# Patient Record
Sex: MALE | Race: WHITE | NOT HISPANIC OR LATINO | Employment: OTHER | URBAN - NONMETROPOLITAN AREA
[De-identification: names, ages, dates, MRNs, and addresses within clinical notes are randomized per-mention and may not be internally consistent; named-entity substitution may affect disease eponyms.]

---

## 2017-03-14 ENCOUNTER — OFFICE VISIT (OUTPATIENT)
Dept: UROLOGY | Facility: CLINIC | Age: 82
End: 2017-03-14

## 2017-03-14 VITALS
SYSTOLIC BLOOD PRESSURE: 120 MMHG | WEIGHT: 158 LBS | HEIGHT: 68 IN | BODY MASS INDEX: 23.95 KG/M2 | DIASTOLIC BLOOD PRESSURE: 80 MMHG | TEMPERATURE: 97.2 F

## 2017-03-14 DIAGNOSIS — R35.0 FREQUENCY OF URINATION: ICD-10-CM

## 2017-03-14 DIAGNOSIS — N40.1 BPH WITH URINARY OBSTRUCTION: Primary | ICD-10-CM

## 2017-03-14 DIAGNOSIS — N13.8 BPH WITH URINARY OBSTRUCTION: Primary | ICD-10-CM

## 2017-03-14 DIAGNOSIS — N32.81 OAB (OVERACTIVE BLADDER): ICD-10-CM

## 2017-03-14 DIAGNOSIS — R35.1 NOCTURIA: ICD-10-CM

## 2017-03-14 LAB
BILIRUB BLD-MCNC: NEGATIVE MG/DL
CLARITY, POC: CLEAR
COLOR UR: YELLOW
GLUCOSE UR STRIP-MCNC: NEGATIVE MG/DL
KETONES UR QL: NEGATIVE
LEUKOCYTE EST, POC: NEGATIVE
NITRITE UR-MCNC: NEGATIVE MG/ML
PH UR: 7 [PH] (ref 5–8)
PROT UR STRIP-MCNC: NEGATIVE MG/DL
RBC # UR STRIP: NEGATIVE /UL
SP GR UR: 1.02 (ref 1–1.03)
UROBILINOGEN UR QL: NORMAL

## 2017-03-14 PROCEDURE — 99204 OFFICE O/P NEW MOD 45 MIN: CPT | Performed by: UROLOGY

## 2017-03-14 PROCEDURE — 81003 URINALYSIS AUTO W/O SCOPE: CPT | Performed by: UROLOGY

## 2017-03-14 PROCEDURE — 51798 US URINE CAPACITY MEASURE: CPT | Performed by: UROLOGY

## 2017-03-14 RX ORDER — ECHINACEA 400 MG
CAPSULE ORAL
COMMUNITY

## 2017-03-14 RX ORDER — PRAVASTATIN SODIUM 40 MG
20 TABLET ORAL NIGHTLY
COMMUNITY
End: 2021-07-07 | Stop reason: SDUPTHER

## 2017-03-14 RX ORDER — ASPIRIN 81 MG/1
TABLET ORAL
COMMUNITY
End: 2018-03-14 | Stop reason: HOSPADM

## 2017-03-14 RX ORDER — ASCORBIC ACID 500 MG
TABLET ORAL
COMMUNITY
End: 2018-11-20

## 2017-03-14 RX ORDER — UREA 10 %
LOTION (ML) TOPICAL
COMMUNITY

## 2017-03-14 RX ORDER — ALFUZOSIN HYDROCHLORIDE 10 MG/1
10 TABLET, EXTENDED RELEASE ORAL DAILY
Qty: 30 TABLET | Refills: 5 | Status: SHIPPED | OUTPATIENT
Start: 2017-03-14 | End: 2017-03-29 | Stop reason: SDUPTHER

## 2017-03-14 RX ORDER — METOPROLOL SUCCINATE 25 MG/1
TABLET, EXTENDED RELEASE ORAL
COMMUNITY
Start: 2015-05-19 | End: 2018-11-20

## 2017-03-14 RX ORDER — FINASTERIDE 5 MG/1
5 TABLET, FILM COATED ORAL DAILY
Qty: 30 TABLET | Refills: 5 | Status: SHIPPED | OUTPATIENT
Start: 2017-03-14 | End: 2017-03-29 | Stop reason: SDUPTHER

## 2017-03-14 RX ORDER — FAMOTIDINE 20 MG
TABLET ORAL
COMMUNITY
End: 2018-11-20

## 2017-03-14 NOTE — PROGRESS NOTES
Subjective    Mr. Henriquez is 87 y.o. male    CHIEF COMPLAINT: Prostate problems    Patient comes in today not seen now since 2012 he comes in with increasing symptoms of difficulties voiding urgency may be some urge incontinence nocturia frequency decreased force of his stream etc. he has not had any gross hematuria he denies any flank pain no urinary tract infections he is not on any medications for his prostate    N/A review of the old chart looks like we did have him on Flomax previously but that that did not help him a lot so he has not been taking it he is not tried any other medications then at that time also his PSA was perfectly normal at 3.1    I think some of his symptoms also may be irritable overactive-type bladder symptoms he did have some recent surgery for a spinal stenosis which improved his back discomfort etc. but again this may be still somewhat related to that as well  History of Present Illness      The following portions of the patient's history were reviewed and updated as appropriate: allergies, current medications, past family history, past medical history, past social history, past surgical history and problem list.    Review of Systems   Constitutional: Negative for appetite change and fever.   HENT: Negative for hearing loss and sore throat.    Eyes: Negative for pain and redness.   Respiratory: Negative for cough and shortness of breath.    Cardiovascular: Negative for chest pain and leg swelling.   Gastrointestinal: Negative for anal bleeding, nausea and vomiting.   Endocrine: Negative for cold intolerance and heat intolerance.   Genitourinary: Positive for difficulty urinating, frequency and urgency. Negative for dysuria, enuresis, flank pain and hematuria.   Musculoskeletal: Negative for joint swelling and myalgias.   Skin: Negative for color change and rash.   Allergic/Immunologic: Negative for immunocompromised state.   Neurological: Negative for dizziness and speech difficulty.  "  Hematological: Negative for adenopathy. Does not bruise/bleed easily.   Psychiatric/Behavioral: Negative for dysphoric mood and suicidal ideas.         Current Outpatient Prescriptions:   •  metoprolol succinate XL (TOPROL XL) 25 MG 24 hr tablet, Take 1 tablet by mouth daily, Disp: , Rfl:   •  aspirin 81 MG EC tablet, Take 81 mg by mouth daily.  , Disp: , Rfl:   •  Flaxseed, Linseed, (FLAXSEED OIL) 1000 MG capsule, Take 1,000 mg by mouth 2 times daily.  , Disp: , Rfl:   •  folic acid ([employer] FOLIC ACID) 800 MCG tablet, Take 800 mcg by mouth daily.  , Disp: , Rfl:   •  pravastatin (PRAVACHOL) 40 MG tablet, Take 40 mg by mouth daily.  , Disp: , Rfl:   •  vitamin C (ASCORBIC ACID) 500 MG tablet, Take 500 mg by mouth daily.  , Disp: , Rfl:   •  Vitamin D, Cholecalciferol, 1000 UNITS capsule, Take 4,000 Units by mouth daily , Disp: , Rfl:     Past Medical History   Diagnosis Date   • Hypertension        Past Surgical History   Procedure Laterality Date   • Back surgery     • Cardiac surgery     • Hernia repair     • Appendectomy         Social History     Social History   • Marital status:      Spouse name: N/A   • Number of children: N/A   • Years of education: N/A     Social History Main Topics   • Smoking status: Former Smoker   • Smokeless tobacco: None   • Alcohol use None   • Drug use: None   • Sexual activity: Not Asked     Other Topics Concern   • None     Social History Narrative   • None       Family History   Problem Relation Age of Onset   • No Known Problems Father    • No Known Problems Mother        Objective    Visit Vitals   • /80   • Temp 97.2 °F (36.2 °C)   • Ht 68\" (172.7 cm)   • Wt 158 lb (71.7 kg)   • BMI 24.02 kg/m2       Physical Exam   Constitutional: He is oriented to person, place, and time. He appears well-developed and well-nourished. No distress.   HENT:   Head: Normocephalic.   Nose: Nose normal.   Eyes: Conjunctivae are normal. No scleral icterus.   Neck: Normal range of " motion. Neck supple. No tracheal deviation present. No thyromegaly present.   Cardiovascular: Normal rate and regular rhythm.    No significant edema or tenderness    Pulmonary/Chest: Effort normal. No accessory muscle usage. No respiratory distress.   Abdominal: Soft. Bowel sounds are normal. He exhibits no distension, no ascites and no mass. There is no hepatosplenomegaly. There is no tenderness. There is no rebound, no guarding and no CVA tenderness. A hernia is present. Hernia confirmed positive in the right inguinal area. Hernia confirmed negative in the left inguinal area.   Stool specimen is not indicated for my portion of the exam   Genitourinary: Testes normal and penis normal. Rectal exam shows no mass, no tenderness, anal tone normal and guaiac negative stool. Tender: no nodules. Right testis shows no mass, no swelling and no tenderness. Left testis shows no mass, no swelling and no tenderness. Circumcised. No hypospadias or penile tenderness (no lesion or deformities).   Genitourinary Comments:  The urethral meatus normal in position without evidence of stricture. Epididymis without mass or tenderness. Vas Deferens is palpably normal.Anus and perineum without mass or tenderness. The seminal vesicle are without mass or enlargement. The prostate is approximately 35 ml. It is Symmetric, with a Soft consistency. There are no nodules present. . The seminal vesicles are Not palpable due to the size of the prostate.     Lymphadenopathy:     He has no cervical adenopathy. No inguinal adenopathy noted on the right or left side.        Right: No inguinal adenopathy present.        Left: No inguinal adenopathy present.   Neurological: He is alert and oriented to person, place, and time.   Skin: Skin is warm and dry. No rash noted. He is not diaphoretic. No pallor.   Psychiatric: He has a normal mood and affect. His behavior is normal.   Vitals reviewed.        No results found for any previous visit.    Results for  orders placed or performed in visit on 03/14/17   POC Urinalysis Dipstick, Automated   Result Value Ref Range    Color Yellow Yellow, Straw, Dark Yellow, Geovanna    Clarity, UA Clear Clear    Glucose, UA Negative Negative, 1000 mg/dL (3+) mg/dL    Bilirubin Negative Negative    Ketones, UA Negative Negative    Specific Gravity  1.020 1.005 - 1.030    Blood, UA Negative Negative    pH, Urine 7.0 5.0 - 8.0    Protein, POC Negative Negative mg/dL    Urobilinogen, UA Normal Normal    Leukocytes Negative Negative    Nitrite, UA Negative Negative   Bladder Scan interpretation  Estimation of residual urine via abdominal ultrasound  Residual Urine: 131ml  Indication: frequency  Position: Supine  Examination: Incremental scanning of the suprapubic area using 3 MHz transducer using copious amounts of acoustic gel.   Findings: An anechoic area was demonstrated which represented the bladder, with measurement of residual urine as noted. I inspected this myself. In that the residual urine was stable or insignificant, no treatment will be necessary at this time.         Assessment and Plan    Diagnoses and all orders for this visit:    BPH with urinary obstruction  -     POC Urinalysis Dipstick, Automated    Frequency of urination    Nocturia    OAB (overactive bladder)     plan--I discussed the options with Mr. Henriquez on the recommend we go ahead and try him on Uroxatral and some Proscar on was seen back in about 3-4 weeks and do a cystoscopy and see if he has improved.    If his prostate is not significantly obstructing and we may try him on an anticholinergic such as Myrbetriq.    I do not think he needs a PSA at this point in time I did discuss the bladder scan with him at length as well    EMR Dragon/Transcription disclaimer:  Much of this encounter note is an electronic transcription/translation of spoken language to printed text. The electronic translation of spoken language may permit erroneous, or at times, nonsensical  words or phrases to be inadvertently transcribed; although I have reviewed the note for such errors, some may still exist.

## 2017-03-29 ENCOUNTER — TELEPHONE (OUTPATIENT)
Dept: UROLOGY | Facility: CLINIC | Age: 82
End: 2017-03-29

## 2017-03-29 DIAGNOSIS — N40.1 BPH WITH URINARY OBSTRUCTION: ICD-10-CM

## 2017-03-29 DIAGNOSIS — N13.8 BPH WITH URINARY OBSTRUCTION: ICD-10-CM

## 2017-03-29 RX ORDER — ALFUZOSIN HYDROCHLORIDE 10 MG/1
10 TABLET, EXTENDED RELEASE ORAL DAILY
Qty: 30 TABLET | Refills: 5 | Status: SHIPPED | OUTPATIENT
Start: 2017-03-29

## 2017-03-29 RX ORDER — FINASTERIDE 5 MG/1
5 TABLET, FILM COATED ORAL DAILY
Qty: 30 TABLET | Refills: 5 | Status: SHIPPED | OUTPATIENT
Start: 2017-03-29

## 2017-03-29 NOTE — TELEPHONE ENCOUNTER
----- Message from Paola Osborne sent at 3/29/2017  2:03 PM CDT -----  Contact: LUISA ALMANZA  He would like his prescriptions for Finasteride 5mg once a day and Alfuzosin ER 10mg once a day with [facility] It [facility] less [facility]. The fax # is [phone]

## 2017-03-30 ENCOUNTER — TELEPHONE (OUTPATIENT)
Dept: UROLOGY | Facility: CLINIC | Age: 82
End: 2017-03-30

## 2017-04-11 ENCOUNTER — OFFICE VISIT (OUTPATIENT)
Dept: UROLOGY | Facility: CLINIC | Age: 82
End: 2017-04-11

## 2017-04-11 VITALS
DIASTOLIC BLOOD PRESSURE: 74 MMHG | TEMPERATURE: 97.3 F | SYSTOLIC BLOOD PRESSURE: 126 MMHG | BODY MASS INDEX: 24.52 KG/M2 | HEIGHT: 68 IN | WEIGHT: 161.8 LBS

## 2017-04-11 DIAGNOSIS — R35.0 FREQUENCY OF URINATION: ICD-10-CM

## 2017-04-11 DIAGNOSIS — R35.1 NOCTURIA: ICD-10-CM

## 2017-04-11 DIAGNOSIS — N32.81 OAB (OVERACTIVE BLADDER): ICD-10-CM

## 2017-04-11 DIAGNOSIS — N40.1 BPH WITH URINARY OBSTRUCTION: Primary | ICD-10-CM

## 2017-04-11 DIAGNOSIS — N13.8 BPH WITH URINARY OBSTRUCTION: Primary | ICD-10-CM

## 2017-04-11 LAB
BILIRUB BLD-MCNC: NEGATIVE MG/DL
CLARITY, POC: CLEAR
COLOR UR: YELLOW
GLUCOSE UR STRIP-MCNC: NEGATIVE MG/DL
KETONES UR QL: NEGATIVE
LEUKOCYTE EST, POC: NEGATIVE
NITRITE UR-MCNC: NEGATIVE MG/ML
PH UR: 6.5 [PH] (ref 5–8)
PROT UR STRIP-MCNC: NEGATIVE MG/DL
RBC # UR STRIP: NEGATIVE /UL
SP GR UR: 1.01 (ref 1–1.03)
UROBILINOGEN UR QL: NORMAL

## 2017-04-11 PROCEDURE — 81003 URINALYSIS AUTO W/O SCOPE: CPT | Performed by: UROLOGY

## 2017-04-11 PROCEDURE — 99213 OFFICE O/P EST LOW 20 MIN: CPT | Performed by: UROLOGY

## 2017-04-11 NOTE — PROGRESS NOTES
Subjective    Mr. Henriquez is 87 y.o. male    CHIEF COMPLAINT: BPH    The patient comes back today for follow-up of BPH he is feels like he is doing much better now on the Uroxatral and the finasteride he feels like he is emptying much better he got a stronger stream he denies any gross hematuria there is no flank pain history of urinary tract infection since we seen    From a BPH point review his AUA score today is 18    Also 0 overactive bladder symptoms I think it improved as well I had a discussion with him indicating that it really is his symptoms that determine what we do as far as the next step I discussed possibly doing a cystoscopy or even a G Roseline or a TURP but I think he is comfortable with his symptoms right now does not want to proceed with anything else      History of Present Illness      The following portions of the patient's history were reviewed and updated as appropriate: allergies, current medications, past family history, past medical history, past social history, past surgical history and problem list.    Review of Systems   Constitutional: Negative.  Negative for chills and fever.   Gastrointestinal: Negative for abdominal distention, abdominal pain, anal bleeding, blood in stool and nausea.   Genitourinary: Positive for frequency and urgency. Negative for difficulty urinating, dysuria, flank pain and hematuria.   Psychiatric/Behavioral: Negative.  Negative for agitation and confusion.         Current Outpatient Prescriptions:   •  alfuzosin (UROXATRAL) 10 MG 24 hr tablet, Take 1 tablet by mouth Daily., Disp: 30 tablet, Rfl: 5  •  aspirin 81 MG EC tablet, Take 81 mg by mouth daily.  , Disp: , Rfl:   •  finasteride (PROSCAR) 5 MG tablet, Take 1 tablet by mouth Daily., Disp: 30 tablet, Rfl: 5  •  Flaxseed, Linseed, (FLAXSEED OIL) 1000 MG capsule, Take 1,000 mg by mouth 2 times daily.  , Disp: , Rfl:   •  folic acid ([employer] FOLIC ACID) 800 MCG tablet, Take 800 mcg by mouth daily.  , Disp: , Rfl:   •  " metoprolol succinate XL (TOPROL XL) 25 MG 24 hr tablet, Take 1 tablet by mouth daily, Disp: , Rfl:   •  pravastatin (PRAVACHOL) 40 MG tablet, Take 40 mg by mouth daily.  , Disp: , Rfl:   •  vitamin C (ASCORBIC ACID) 500 MG tablet, Take 500 mg by mouth daily.  , Disp: , Rfl:   •  Vitamin D, Cholecalciferol, 1000 UNITS capsule, Take 4,000 Units by mouth daily , Disp: , Rfl:     Past Medical History:   Diagnosis Date   • Hypertension        Past Surgical History:   Procedure Laterality Date   • APPENDECTOMY     • BACK SURGERY     • CARDIAC SURGERY     • HERNIA REPAIR         Social History     Social History   • Marital status:      Spouse name: N/A   • Number of children: N/A   • Years of education: N/A     Social History Main Topics   • Smoking status: Former Smoker   • Smokeless tobacco: None   • Alcohol use None   • Drug use: None   • Sexual activity: Not Asked     Other Topics Concern   • None     Social History Narrative       Family History   Problem Relation Age of Onset   • No Known Problems Father    • No Known Problems Mother        Objective    /74  Temp 97.3 °F (36.3 °C)  Ht 68\" (172.7 cm)  Wt 161 lb 12.8 oz (73.4 kg)  BMI 24.6 kg/m2    Physical Exam      Office Visit on 03/14/2017   Component Date Value Ref Range Status   • Color 03/14/2017 Yellow  Yellow, Straw, Dark Yellow, Geovanna Final   • Clarity, UA 03/14/2017 Clear  Clear Final   • Glucose, UA 03/14/2017 Negative  Negative, 1000 mg/dL (3+) mg/dL Final   • Bilirubin 03/14/2017 Negative  Negative Final   • Ketones, UA 03/14/2017 Negative  Negative Final   • Specific Gravity  03/14/2017 1.020  1.005 - 1.030 Final   • Blood, UA 03/14/2017 Negative  Negative Final   • pH, Urine 03/14/2017 7.0  5.0 - 8.0 Final   • Protein, POC 03/14/2017 Negative  Negative mg/dL Final   • Urobilinogen, UA 03/14/2017 Normal  Normal Final   • Leukocytes 03/14/2017 Negative  Negative Final   • Nitrite, UA 03/14/2017 Negative  Negative Final       Results for " orders placed or performed in visit on 04/11/17   POC Urinalysis Dipstick, Automated   Result Value Ref Range    Color Yellow Yellow, Straw, Dark Yellow, Geovanna    Clarity, UA Clear Clear    Glucose, UA Negative Negative, 1000 mg/dL (3+) mg/dL    Bilirubin Negative Negative    Ketones, UA Negative Negative    Specific Gravity  1.015 1.005 - 1.030    Blood, UA Negative Negative    pH, Urine 6.5 5.0 - 8.0    Protein, POC Negative Negative mg/dL    Urobilinogen, UA Normal Normal    Leukocytes Negative Negative    Nitrite, UA Negative Negative       Assessment and Plan    Chad was seen today for benign prostatic hypertrophy.    Diagnoses and all orders for this visit:    BPH with urinary obstruction  -     POC Urinalysis Dipstick, Automated    Frequency of urination    Nocturia    OAB (overactive bladder)   plan--[facility]  I will we did fax those over to them I told him if he has problems with this please let me know    If his symptoms are stable or continue to improve then we will just seen back in about 4 months if they should worsen again I think we need to schedule him for cystoscopy he was currently as he had no further questions today    EMR Dragon/Transcription disclaimer:  Much of this encounter note is an electronic transcription/translation of spoken language to printed text. The electronic translation of spoken language may permit erroneous, or at times, nonsensical words or phrases to be inadvertently transcribed; although I have reviewed the note for such errors, some may still exist.

## 2017-06-20 NOTE — H&P
DATE OF CLINIC VISIT: 06/19/2017    CHIEF COMPLAINT: Bilateral inguinal hernias with right arm cyst.     HISTORY OF PRESENT ILLNESS: The patient is an 87-year-old gentleman who is status post left inguinal hernia repair in the past. He presents complaining of a bulge in his right groin. He does complain of heaviness and aching in this area. He denies any overlying skin changes or GI symptoms. He does state at his most recent prostate examination he was found to have a small recurrent left inguinal hernia. He denies any pain or known recurrence on the left inguinal region. Additionally he complains of a subcutaneous nodule near his right elbow. He states that it gets bumped and it hurts when he lies his arm down.     PAST MEDICAL HISTORY:  1. Chronic anemia.   2. Chronic constipation.   3. Right bundle branch block.   4. Hypercholesterolemia.   5. Orthostatic hypotension.   6. Benign prostatic hypertrophy.   7. Coronary artery disease.   8. History of colonic polyps.   9. Chronic back pain.   10. History of congestive heart failure.   11. Carotid artery stenosis.   12. Benign hypertension.   13. History of bleeding internal hemorrhoids.     PAST SURGICAL HISTORY:  1. Back surgery.   2. Percutaneous transluminal coronary angioplasty with stenting.   3. Coronary artery bypass grafting.   4. Appendectomy.   5. Left inguinal hernia repair.   6. Excision of cyst from left shoulder and neck.     HOME MEDICATIONS:  1. Alfuzosin.   2. Finasteride.   3. Pravastatin.   4. Diphenhydramine .   5. Metoprolol.   6. Aspirin.   7. Vitamin D.   8. Flaxseed oil.   9. Vitamin C.   10. Folic acid.   11. Cetirizine.   12. L-lysine.   13. Psyllium.     ALLERGIES: CODEINE.     SOCIAL HISTORY: Denies tobacco, ethanol, or illicit drug use.     FAMILY HISTORY: His father has a history of heart disease. Mother has a history of stroke.     REVIEW OF SYSTEMS:  CONSTITUTIONAL: No weight changes, fevers, or chills.   HEENT: Hearing loss.    PULMONARY: No shortness of breath, cough, or hoarseness.   CARDIOVASCULAR: No chest pain, palpitations, or arrhythmias.   GASTROINTESTINAL: Constipation and heartburn.   GENITOURINARY: Incontinence.   ENDOCRINE: No history of thyroid disorder or diabetes mellitus.   PSYCHIATRIC: No complaint.   NEUROLOGIC: No complaint.   MUSCULOSKELETAL: Osteoarthritis.   HEMATOLOGIC: Easy bruising.   INTEGUMENT: No complaint.     PHYSICAL EXAMINATION:  VITAL SIGNS: He is 5 foot 8 inches, weighs 145 pounds, BMI is 22, temperature 98.3, blood pressure 120/55, pulse 82.   GENERAL: The patient is a well-developed, well-nourished gentleman, in no acute distress.   HEENT: Normocephalic, atraumatic.   NECK: Supple.   PULMONARY: Clear to auscultation bilaterally.   CARDIOVASCULAR: Regular rate and rhythm.   GASTROINTESTINAL: Abdomen is soft. There is an easily reducible right inguinal hernia and a very small easily reducible left inguinal hernia. No overlying skin changes.   EXTREMITIES: No pedal edema. Right ulna just distal to the olecranon has a 1 to 1.5 cm subcutaneous nodule without overlying skin changes.   NEUROLOGIC: Alert and oriented. Moves all extremities. No gross sensory changes.     ASSESSMENT:   1. Right inguinal hernia.   2. Right upper extremity subcutaneous nodule.     PLAN: The patient will present to [facility] for excision of the right upper extremity mass and open right inguinal hernia repair with possible placement of mesh. The risks, benefits, complications, and possible alternatives of the above procedure were discussed with the patient who agreed to proceed.       cc:         Jackelyn VARNER/[id number]  D:  06/19/2017 15:50:00(Eastern Time)  T:  06/19/2017 17:00:19(Eastern Time)  Voice ID:  [id number]/Document ID:  [id number]  ([facility])

## 2017-06-21 ENCOUNTER — APPOINTMENT (OUTPATIENT)
Dept: PREADMISSION TESTING | Facility: HOSPITAL | Age: 82
End: 2017-06-21

## 2017-06-21 VITALS
OXYGEN SATURATION: 97 % | SYSTOLIC BLOOD PRESSURE: 159 MMHG | DIASTOLIC BLOOD PRESSURE: 76 MMHG | HEIGHT: 68 IN | BODY MASS INDEX: 24.13 KG/M2 | WEIGHT: 159.2 LBS | HEART RATE: 65 BPM | RESPIRATION RATE: 18 BRPM

## 2017-06-21 LAB
ALBUMIN SERPL-MCNC: 4.1 G/DL (ref 3.5–5)
ALBUMIN/GLOB SERPL: 1.8 G/DL (ref 1.1–2.5)
ALP SERPL-CCNC: 48 U/L (ref 24–120)
ALT SERPL W P-5'-P-CCNC: 35 U/L (ref 0–54)
ANION GAP SERPL CALCULATED.3IONS-SCNC: 9 MMOL/L (ref 4–13)
AST SERPL-CCNC: 35 U/L (ref 7–45)
BASOPHILS # BLD AUTO: 0.02 10*3/MM3 (ref 0–0.2)
BASOPHILS NFR BLD AUTO: 0.3 % (ref 0–2)
BILIRUB SERPL-MCNC: 0.6 MG/DL (ref 0.1–1)
BUN BLD-MCNC: 13 MG/DL (ref 5–21)
BUN/CREAT SERPL: 19.7 (ref 7–25)
CALCIUM SPEC-SCNC: 8.6 MG/DL (ref 8.4–10.4)
CHLORIDE SERPL-SCNC: 100 MMOL/L (ref 98–110)
CO2 SERPL-SCNC: 27 MMOL/L (ref 24–31)
CREAT BLD-MCNC: 0.66 MG/DL (ref 0.5–1.4)
DEPRECATED RDW RBC AUTO: 46.1 FL (ref 40–54)
EOSINOPHIL # BLD AUTO: 0.19 10*3/MM3 (ref 0–0.7)
EOSINOPHIL NFR BLD AUTO: 2.5 % (ref 0–4)
ERYTHROCYTE [DISTWIDTH] IN BLOOD BY AUTOMATED COUNT: 13.1 % (ref 12–15)
GFR SERPL CREATININE-BSD FRML MDRD: 114 ML/MIN/1.73
GLOBULIN UR ELPH-MCNC: 2.3 GM/DL
GLUCOSE BLD-MCNC: 96 MG/DL (ref 70–100)
HCT VFR BLD AUTO: 35.7 % (ref 40–52)
HGB BLD-MCNC: 11.9 G/DL (ref 14–18)
IMM GRANULOCYTES # BLD: 0.01 10*3/MM3 (ref 0–0.03)
IMM GRANULOCYTES NFR BLD: 0.1 % (ref 0–5)
LYMPHOCYTES # BLD AUTO: 1.46 10*3/MM3 (ref 0.72–4.86)
LYMPHOCYTES NFR BLD AUTO: 19.2 % (ref 15–45)
MCH RBC QN AUTO: 32.2 PG (ref 28–32)
MCHC RBC AUTO-ENTMCNC: 33.3 G/DL (ref 33–36)
MCV RBC AUTO: 96.7 FL (ref 82–95)
MONOCYTES # BLD AUTO: 0.76 10*3/MM3 (ref 0.19–1.3)
MONOCYTES NFR BLD AUTO: 10 % (ref 4–12)
NEUTROPHILS # BLD AUTO: 5.17 10*3/MM3 (ref 1.87–8.4)
NEUTROPHILS NFR BLD AUTO: 67.9 % (ref 39–78)
PLATELET # BLD AUTO: 246 10*3/MM3 (ref 130–400)
PMV BLD AUTO: 10 FL (ref 6–12)
POTASSIUM BLD-SCNC: 4.5 MMOL/L (ref 3.5–5.3)
PROT SERPL-MCNC: 6.4 G/DL (ref 6.3–8.7)
RBC # BLD AUTO: 3.69 10*6/MM3 (ref 4.8–5.9)
SODIUM BLD-SCNC: 136 MMOL/L (ref 135–145)
WBC NRBC COR # BLD: 7.61 10*3/MM3 (ref 4.8–10.8)

## 2017-06-21 PROCEDURE — 80053 COMPREHEN METABOLIC PANEL: CPT | Performed by: SPECIALIST

## 2017-06-21 PROCEDURE — 85025 COMPLETE CBC W/AUTO DIFF WBC: CPT | Performed by: SPECIALIST

## 2017-06-21 PROCEDURE — 93010 ELECTROCARDIOGRAM REPORT: CPT | Performed by: INTERNAL MEDICINE

## 2017-06-21 PROCEDURE — 93005 ELECTROCARDIOGRAM TRACING: CPT

## 2017-06-21 PROCEDURE — 36415 COLL VENOUS BLD VENIPUNCTURE: CPT

## 2017-06-21 RX ORDER — CHLORPHENIRAMINE MALEATE 4 MG/1
4 TABLET ORAL DAILY
COMMUNITY

## 2017-06-21 RX ORDER — DIPHENHYDRAMINE HCL 25 MG
25 CAPSULE ORAL 2 TIMES DAILY
COMMUNITY
End: 2021-03-30

## 2017-06-21 NOTE — DISCHARGE INSTRUCTIONS
DAY OF SURGERY INSTRUCTIONS        YOUR SURGEON: April YUN    PROCEDURE: RIGHT INGUINAL HERNIA REPAIR, RIGHT ELBOW CYST EXCISION    DATE OF SURGERY: June 22, 2017    ARRIVAL TIME: AS DIRECTED BY OFFICE    DAY OF SURGERY TAKE ONLY THESE MEDICATIONS: METOPROLOL        BEFORE YOU COME TO THE HOSPITAL  (Pre-op instructions)  • Do not eat, drink, smoke or chew gum after midnight the night before surgery.  This also includes no mints.  • Morning of surgery take only the medicines you have been instructed with a sip of water unless otherwise instructed  by your physician.  • Do not shave, wear makeup or dark nail polish.  • Remove all jewelry including rings.  • Leave anything you consider valuable at home.  • Leave your suitcase in the car until after your surgery.  • Bring the following with you if applicable:  o Picture ID and insurance, Medicare or Medicaid cards  o Co-pay/deductible required by insurance (cash, check, credit card)  o Copy of advance directive, living will or power-of- documents if not brought to PAT  o CPAP or BIPAP mask and tubing  o Relaxation aids (MP3 player, book, magazine)  • Confirm your arrival time with you surgeon the day before your surgery (surgery times are subject to change)  • On the day of surgery check in at registration located at the main entrance of the hospital.       Outpatient Surgery Guidelines, Adult  Outpatient procedures are those for which the person having the procedure is allowed to go home the same day as the procedure. Various procedures are done on an outpatient basis. You should follow some general guidelines if you will be having an outpatient procedure.  LET YOUR HEALTH CARE PROVIDER KNOW ABOUT:  · Any allergies you have.  · All medicines you are taking, including vitamins, herbs, eye drops, creams, and over-the-counter medicines.  · Previous problems you or members of your family have had with the use of anesthetics.  · Any blood disorders you  have.  · Previous surgeries you have had.  · Medical conditions you have.  RISKS AND COMPLICATIONS  Your health care provider will discuss possible risks and complications with you before surgery. Common risks and complications include:    · Problems due to the use of anesthetics.  · Blood loss and replacement (does not apply to minor surgical procedures).  · Temporary increase in pain due to surgery.  · Uncorrected pain or problems that the surgery was meant to correct.  · Infection.  · New damage.  BEFORE THE PROCEDURE  · Ask your health care provider about changing or stopping your regular medicines. You may need to stop taking certain medicines in the days or weeks before the procedure.  · Stop smoking at least 2 weeks before surgery. This lowers your risk for complications during and after surgery. Ask your health care provider for help with this if needed.  · Eat your usual meals and a light supper the day before surgery. Continue fluid intake. Do not drink alcohol.  · Do not eat or drink after midnight the night before your surgery.   · Arrange for someone to take you home and to stay with you for 24 hours after the procedure. Medicine given for your procedure may affect your ability to drive or to care for yourself.  · Call your health care provider's office if you develop an illness or problem that may prevent you from safely having your procedure.  AFTER THE PROCEDURE  After surgery, you will be taken to a recovery area, where your progress will be monitored. If there are no complications, you will be allowed to go home when you are awake, stable, and taking fluids well. You may have numbness around the surgical site. Healing will take some time. You will have tenderness at the surgical site and may have some swelling and bruising. You may also have some nausea.  HOME CARE INSTRUCTIONS  · Do not drive for 24 hours, or as directed by your health care provider. Do not drive while taking prescription pain  medicines.  · Do not drink alcohol for 24 hours.  · Do not make important decisions or sign legal documents for 24 hours.  · You may resume a normal diet and activities as directed.  · Do not lift anything heavier than 10 pounds (4.5 kg) or play contact sports until your health care provider says it is okay.  · Change your bandages (dressings) as directed.  · Only take over-the-counter or prescription medicines as directed by your health care provider.  · Follow up with your health care provider as directed.  SEEK MEDICAL CARE IF:  · You have increased bleeding (more than a small spot) from the surgical site.  · You have redness, swelling, or increasing pain in the wound.  · You see pus coming from the wound.  · You have a fever.  · You notice a bad smell coming from the wound or dressing.  · You feel lightheaded or faint.  · You develop a rash.  · You have trouble breathing.  · You develop allergies.  MAKE SURE YOU:  · Understand these instructions.  · Will watch your condition.  · Will get help right away if you are not doing well or get worse.     This information is not intended to replace advice given to you by your health care provider. Make sure you discuss any questions you have with your health care provider.     Document Released: 09/12/2002 Document Revised: 05/03/2016 Document Reviewed: 05/22/2014  [employer] Interactive Patient Education ©2016 [employer]       Fall Prevention in Hospitals, Adult  As a hospital patient, your condition and the treatments you receive can increase your risk for falls. Some additional risk factors for falls in a hospital include:  · Being in an unfamiliar environment.  · Being on bed rest.  · Your surgery.  · Taking certain medicines.  · Your tubing requirements, such as intravenous (IV) therapy or catheters.  It is important that you learn how to decrease fall risks while at the hospital. Below are important tips that can help prevent falls.  SAFETY TIPS FOR PREVENTING  FALLS  Talk about your risk of falling.  · Ask your health care provider why you are at risk for falling. Is it your medicine, illness, tubing placement, or something else?  · Make a plan with your health care provider to keep you safe from falls.  · Ask your health care provider or pharmacist about side effects of your medicines. Some medicines can make you dizzy or affect your coordination.  Ask for help.  · Ask for help before getting out of bed. You may need to press your call button.  · Ask for assistance in getting safely to the toilet.  · Ask for a walker or cane to be put at your bedside. Ask that most of the side rails on your bed be placed up before your health care provider leaves the room.  · Ask family or friends to sit with you.  · Ask for things that are out of your reach, such as your glasses, hearing aids, telephone, bedside table, or call button.  Follow these tips to avoid falling:  · Stay lying or seated, rather than standing, while waiting for help.  · Wear rubber-soled slippers or shoes whenever you walk in the hospital.  · Avoid quick, sudden movements.  ¨ Change positions slowly.  ¨ Sit on the side of your bed before standing.  ¨ Stand up slowly and wait before you start to walk.  · Let your health care provider know if there is a spill on the floor.  · Pay careful attention to the medical equipment, electrical cords, and tubes around you.  · When you need help, use your call button by your bed or in the bathroom. Wait for one of your health care providers to help you.  · If you feel dizzy or unsure of your footing, return to bed and wait for assistance.  · Avoid being distracted by the TV, telephone, or another person in your room.  · Do not lean or support yourself on rolling objects, such as IV poles or bedside tables.     This information is not intended to replace advice given to you by your health care provider. Make sure you discuss any questions you have with your health care  provider.     Document Released: 12/15/2001 Document Revised: 01/08/2016 Document Reviewed: 08/25/2013  [employer] Interactive Patient Education ©2016 [employer]       Surgical Site Infections FAQs  What is a Surgical Site Infection (SSI)?  A surgical site infection is an infection that occurs after surgery in the part of the body where the surgery took place. Most patients who have surgery do not develop an infection. However, infections develop in about 1 to 3 out of every 100 patients who have surgery.  Some of the common symptoms of a surgical site infection are:  · Redness and pain around the area where you had surgery  · Drainage of cloudy fluid from your surgical wound  · Fever  Can SSIs be treated?  Yes. Most surgical site infections can be treated with antibiotics. The antibiotic given to you depends on the bacteria (germs) causing the infection. Sometimes patients with SSIs also need another surgery to treat the infection.  What are some of the things that hospitals are doing to prevent SSIs?  To prevent SSIs, doctors, nurses, and other healthcare providers:  · Clean their hands and arms up to their elbows with an antiseptic agent just before the surgery.  · Clean their hands with soap and water or an alcohol-based hand rub before and after caring for each patient.  · May remove some of your hair immediately before your surgery using electric clippers if the hair is in the same area where the procedure will occur. They should not shave you with a razor.  · Wear special hair covers, masks, gowns, and gloves during surgery to keep the surgery area clean.  · Give you antibiotics before your surgery starts. In most cases, you should get antibiotics within 60 minutes before the surgery starts and the antibiotics should be stopped within 24 hours after surgery.  · Clean the skin at the site of your surgery with a special soap that kills germs.  What can I do to help prevent SSIs?  Before your surgery:  · Tell  your doctor about other medical problems you may have. Health problems such as allergies, diabetes, and obesity could affect your surgery and your treatment.  · Quit smoking. Patients who smoke get more infections. Talk to your doctor about how you can quit before your surgery.  · Do not shave near where you will have surgery. Shaving with a razor can irritate your skin and make it easier to develop an infection.  At the time of your surgery:  · Speak up if someone tries to shave you with a razor before surgery. Ask why you need to be shaved and talk with your surgeon if you have any concerns.  · Ask if you will get antibiotics before surgery.  After your surgery:  · Make sure that your healthcare providers clean their hands before examining you, either with soap and water or an alcohol-based hand rub.  · If you do not see your providers clean their hands, please ask them to do so.  · Family and friends who visit you should not touch the surgical wound or dressings.  · Family and friends should clean their hands with soap and water or an alcohol-based hand rub before and after visiting you. If you do not see them clean their hands, ask them to clean their hands.  What do I need to do when I go home from the hospital?  · Before you go home, your doctor or nurse should explain everything you need to know about taking care of your wound. Make sure you understand how to care for your wound before you leave the hospital.  · Always clean your hands before and after caring for your wound.  · Before you go home, make sure you know who to contact if you have questions or problems after you get home.  · If you have any symptoms of an infection, such as redness and pain at the surgery site, drainage, or fever, call your doctor immediately.  If you have additional questions, please ask your doctor or nurse.  Developed and co-sponsored by The Society for Healthcare Epidemiology of [place] (SHEA); Infectious Diseases Society of  [place] (IDSA); American Hospital Association; Association for Professionals in Infection Control and Epidemiology (APIC); Centers for Disease Control and Prevention (CDC); and The Joint Commission.     This information is not intended to replace advice given to you by your health care provider. Make sure you discuss any questions you have with your health care provider.     Document Released: 12/23/2014 Document Revised: 01/08/2016 Document Reviewed: 03/02/2016  [employer] Interactive Patient Education ©2016 [employer] Inc.       [facility]  CHG 4% Patient Instruction Sheet    Preparing the Skin Before Surgery  Preparing or “prepping” skin before surgery can reduce the risk of infection at the surgical site. To make the process easier,[facility] has chosen 4% Chlorhexidine Gluconate (CHG) antiseptic solution.   The steps below outline the prepping process and should be carefully followed.                                                                                                                                                      Prep the skin at the following time(s):                                                      We recommend you shower the night before surgery, and again the morning of surgery with the 4% CHG antiseptic solution using half of the bottle and a cloth each time.  Dress in clean clothes/sleepwear after showering.  See instructions below for application.          Do not apply any lotions or moisturizers.       Do not shave the area to be prepped for at least 2 days prior to surgery.    Clipping the hair may be done immediately prior to your surgery at the hospital    if needed.    Directions:  Thoroughly rinse your body with water.  Apply 4% CHG to a cloth and wash skin gently, paying special attention to the operative site.  Rinse again thoroughly.  Once you have begun using this product do not apply anything else to your skin. If itching or redness persists, rinse affected areas and  discontinue use.    When using this product:  • Keep out of eyes, ears, and mouth.  • If solution should contact these areas, rinse out promptly and thoroughly with water.  • For external use only.  • Do not use in genital area, on your face or head.      PATIENT/FAMILY/RESPONSIBLE PARTY VERBALIZES UNDERSTANDING OF ABOVE EDUCATION.  COPY OF PAIN SCALE GIVEN AND REVIEWED WITH VERBALIZED UNDERSTANDING.

## 2017-06-22 ENCOUNTER — HOSPITAL ENCOUNTER (OUTPATIENT)
Facility: HOSPITAL | Age: 82
Setting detail: HOSPITAL OUTPATIENT SURGERY
Discharge: HOME | End: 2017-06-22
Attending: SPECIALIST | Admitting: SPECIALIST

## 2017-06-22 ENCOUNTER — ANESTHESIA (OUTPATIENT)
Dept: PERIOP | Facility: HOSPITAL | Age: 82
End: 2017-06-22

## 2017-06-22 ENCOUNTER — ANESTHESIA EVENT (OUTPATIENT)
Dept: PERIOP | Facility: HOSPITAL | Age: 82
End: 2017-06-22

## 2017-06-22 VITALS
SYSTOLIC BLOOD PRESSURE: 166 MMHG | DIASTOLIC BLOOD PRESSURE: 68 MMHG | HEART RATE: 67 BPM | RESPIRATION RATE: 16 BRPM | TEMPERATURE: 97.5 F | OXYGEN SATURATION: 95 %

## 2017-06-22 PROCEDURE — 25010000002 VANCOMYCIN PER 500 MG: Performed by: SPECIALIST

## 2017-06-22 PROCEDURE — C1781 MESH (IMPLANTABLE): HCPCS | Performed by: SPECIALIST

## 2017-06-22 PROCEDURE — 25010000002 MORPHINE SULFATE (PF) 2 MG/ML SOLUTION: Performed by: ANESTHESIOLOGY

## 2017-06-22 PROCEDURE — 25010000002 PROPOFOL 10 MG/ML EMULSION: Performed by: NURSE ANESTHETIST, CERTIFIED REGISTERED

## 2017-06-22 PROCEDURE — 25010000002 ONDANSETRON PER 1 MG: Performed by: NURSE ANESTHETIST, CERTIFIED REGISTERED

## 2017-06-22 PROCEDURE — 25010000002 DEXAMETHASONE PER 1 MG: Performed by: NURSE ANESTHETIST, CERTIFIED REGISTERED

## 2017-06-22 PROCEDURE — 25010000002 FENTANYL CITRATE (PF) 100 MCG/2ML SOLUTION: Performed by: NURSE ANESTHETIST, CERTIFIED REGISTERED

## 2017-06-22 PROCEDURE — 25010000002 NEOSTIGMINE PER 0.5 MG: Performed by: NURSE ANESTHETIST, CERTIFIED REGISTERED

## 2017-06-22 DEVICE — BARD MESH PERFIX PLUG, EXTRA LARGE
Type: IMPLANTABLE DEVICE | Site: ABDOMEN | Status: FUNCTIONAL
Brand: BARD MESH PERFIX PLUG

## 2017-06-22 RX ORDER — MAGNESIUM HYDROXIDE 1200 MG/15ML
LIQUID ORAL AS NEEDED
Status: DISCONTINUED | OUTPATIENT
Start: 2017-06-22 | End: 2017-06-22 | Stop reason: HOSPADM

## 2017-06-22 RX ORDER — SODIUM CHLORIDE 0.9 % (FLUSH) 0.9 %
1-10 SYRINGE (ML) INJECTION AS NEEDED
Status: DISCONTINUED | OUTPATIENT
Start: 2017-06-22 | End: 2017-06-22 | Stop reason: HOSPADM

## 2017-06-22 RX ORDER — ROCURONIUM BROMIDE 10 MG/ML
INJECTION, SOLUTION INTRAVENOUS AS NEEDED
Status: DISCONTINUED | OUTPATIENT
Start: 2017-06-22 | End: 2017-06-22 | Stop reason: SURG

## 2017-06-22 RX ORDER — HYDRALAZINE HYDROCHLORIDE 20 MG/ML
5 INJECTION INTRAMUSCULAR; INTRAVENOUS
Status: DISCONTINUED | OUTPATIENT
Start: 2017-06-22 | End: 2017-06-22 | Stop reason: HOSPADM

## 2017-06-22 RX ORDER — ONDANSETRON 2 MG/ML
4 INJECTION INTRAMUSCULAR; INTRAVENOUS AS NEEDED
Status: DISCONTINUED | OUTPATIENT
Start: 2017-06-22 | End: 2017-06-22 | Stop reason: HOSPADM

## 2017-06-22 RX ORDER — LABETALOL HYDROCHLORIDE 5 MG/ML
5 INJECTION, SOLUTION INTRAVENOUS
Status: DISCONTINUED | OUTPATIENT
Start: 2017-06-22 | End: 2017-06-22 | Stop reason: HOSPADM

## 2017-06-22 RX ORDER — FENTANYL CITRATE 50 UG/ML
INJECTION, SOLUTION INTRAMUSCULAR; INTRAVENOUS AS NEEDED
Status: DISCONTINUED | OUTPATIENT
Start: 2017-06-22 | End: 2017-06-22 | Stop reason: SURG

## 2017-06-22 RX ORDER — METOCLOPRAMIDE HYDROCHLORIDE 5 MG/ML
5 INJECTION INTRAMUSCULAR; INTRAVENOUS
Status: DISCONTINUED | OUTPATIENT
Start: 2017-06-22 | End: 2017-06-22 | Stop reason: HOSPADM

## 2017-06-22 RX ORDER — BUPIVACAINE HYDROCHLORIDE AND EPINEPHRINE 2.5; 5 MG/ML; UG/ML
INJECTION, SOLUTION INFILTRATION; PERINEURAL AS NEEDED
Status: DISCONTINUED | OUTPATIENT
Start: 2017-06-22 | End: 2017-06-22 | Stop reason: HOSPADM

## 2017-06-22 RX ORDER — HYDROCODONE BITARTRATE AND ACETAMINOPHEN 5; 325 MG/1; MG/1
1 TABLET ORAL EVERY 4 HOURS PRN
Status: DISCONTINUED | OUTPATIENT
Start: 2017-06-22 | End: 2017-06-22 | Stop reason: HOSPADM

## 2017-06-22 RX ORDER — MORPHINE SULFATE 2 MG/ML
2 INJECTION, SOLUTION INTRAMUSCULAR; INTRAVENOUS AS NEEDED
Status: DISCONTINUED | OUTPATIENT
Start: 2017-06-22 | End: 2017-06-22 | Stop reason: HOSPADM

## 2017-06-22 RX ORDER — LIDOCAINE HYDROCHLORIDE 20 MG/ML
INJECTION, SOLUTION INFILTRATION; PERINEURAL AS NEEDED
Status: DISCONTINUED | OUTPATIENT
Start: 2017-06-22 | End: 2017-06-22 | Stop reason: SURG

## 2017-06-22 RX ORDER — IPRATROPIUM BROMIDE AND ALBUTEROL SULFATE 2.5; .5 MG/3ML; MG/3ML
3 SOLUTION RESPIRATORY (INHALATION) ONCE AS NEEDED
Status: DISCONTINUED | OUTPATIENT
Start: 2017-06-22 | End: 2017-06-22 | Stop reason: HOSPADM

## 2017-06-22 RX ORDER — DEXAMETHASONE SODIUM PHOSPHATE 4 MG/ML
INJECTION, SOLUTION INTRA-ARTICULAR; INTRALESIONAL; INTRAMUSCULAR; INTRAVENOUS; SOFT TISSUE AS NEEDED
Status: DISCONTINUED | OUTPATIENT
Start: 2017-06-22 | End: 2017-06-22 | Stop reason: SURG

## 2017-06-22 RX ORDER — ONDANSETRON 2 MG/ML
INJECTION INTRAMUSCULAR; INTRAVENOUS AS NEEDED
Status: DISCONTINUED | OUTPATIENT
Start: 2017-06-22 | End: 2017-06-22 | Stop reason: SURG

## 2017-06-22 RX ORDER — GLYCOPYRROLATE 0.2 MG/ML
INJECTION INTRAMUSCULAR; INTRAVENOUS AS NEEDED
Status: DISCONTINUED | OUTPATIENT
Start: 2017-06-22 | End: 2017-06-22 | Stop reason: SURG

## 2017-06-22 RX ORDER — NALOXONE HCL 0.4 MG/ML
0.04 VIAL (ML) INJECTION AS NEEDED
Status: DISCONTINUED | OUTPATIENT
Start: 2017-06-22 | End: 2017-06-22 | Stop reason: HOSPADM

## 2017-06-22 RX ORDER — SODIUM CHLORIDE, SODIUM LACTATE, POTASSIUM CHLORIDE, CALCIUM CHLORIDE 600; 310; 30; 20 MG/100ML; MG/100ML; MG/100ML; MG/100ML
100 INJECTION, SOLUTION INTRAVENOUS CONTINUOUS
Status: DISCONTINUED | OUTPATIENT
Start: 2017-06-22 | End: 2017-06-22 | Stop reason: HOSPADM

## 2017-06-22 RX ORDER — PROPOFOL 10 MG/ML
VIAL (ML) INTRAVENOUS AS NEEDED
Status: DISCONTINUED | OUTPATIENT
Start: 2017-06-22 | End: 2017-06-22 | Stop reason: SURG

## 2017-06-22 RX ORDER — LIDOCAINE HYDROCHLORIDE 40 MG/ML
SOLUTION TOPICAL AS NEEDED
Status: DISCONTINUED | OUTPATIENT
Start: 2017-06-22 | End: 2017-06-22 | Stop reason: SURG

## 2017-06-22 RX ORDER — FLUMAZENIL 0.1 MG/ML
0.2 INJECTION INTRAVENOUS AS NEEDED
Status: DISCONTINUED | OUTPATIENT
Start: 2017-06-22 | End: 2017-06-22 | Stop reason: HOSPADM

## 2017-06-22 RX ORDER — MEPERIDINE HYDROCHLORIDE 25 MG/ML
12.5 INJECTION INTRAMUSCULAR; INTRAVENOUS; SUBCUTANEOUS
Status: DISCONTINUED | OUTPATIENT
Start: 2017-06-22 | End: 2017-06-22 | Stop reason: HOSPADM

## 2017-06-22 RX ORDER — HYDROCODONE BITARTRATE AND ACETAMINOPHEN 5; 325 MG/1; MG/1
1-2 TABLET ORAL EVERY 4 HOURS PRN
Qty: 30 TABLET | Refills: 0 | Status: SHIPPED | OUTPATIENT
Start: 2017-06-22 | End: 2018-02-21

## 2017-06-22 RX ADMIN — PROPOFOL 150 MG: 10 INJECTION, EMULSION INTRAVENOUS at 10:06

## 2017-06-22 RX ADMIN — FENTANYL CITRATE 25 MCG: 50 INJECTION, SOLUTION INTRAMUSCULAR; INTRAVENOUS at 11:00

## 2017-06-22 RX ADMIN — LIDOCAINE HYDROCHLORIDE 1 EACH: 40 SOLUTION TOPICAL at 10:08

## 2017-06-22 RX ADMIN — MORPHINE SULFATE 2 MG: 2 INJECTION, SOLUTION INTRAMUSCULAR; INTRAVENOUS at 11:27

## 2017-06-22 RX ADMIN — Medication 3 MG: at 11:01

## 2017-06-22 RX ADMIN — LIDOCAINE HYDROCHLORIDE 0.5 ML: 10 INJECTION, SOLUTION EPIDURAL; INFILTRATION; INTRACAUDAL; PERINEURAL at 09:43

## 2017-06-22 RX ADMIN — SODIUM CHLORIDE, POTASSIUM CHLORIDE, SODIUM LACTATE AND CALCIUM CHLORIDE 100 ML/HR: 600; 310; 30; 20 INJECTION, SOLUTION INTRAVENOUS at 09:43

## 2017-06-22 RX ADMIN — FENTANYL CITRATE 25 MCG: 50 INJECTION, SOLUTION INTRAMUSCULAR; INTRAVENOUS at 10:33

## 2017-06-22 RX ADMIN — FENTANYL CITRATE 50 MCG: 50 INJECTION, SOLUTION INTRAMUSCULAR; INTRAVENOUS at 10:04

## 2017-06-22 RX ADMIN — ONDANSETRON HYDROCHLORIDE 4 MG: 2 SOLUTION INTRAMUSCULAR; INTRAVENOUS at 10:59

## 2017-06-22 RX ADMIN — ROCURONIUM BROMIDE 20 MG: 10 INJECTION INTRAVENOUS at 10:06

## 2017-06-22 RX ADMIN — LIDOCAINE HYDROCHLORIDE 80 MG: 20 INJECTION, SOLUTION INFILTRATION; PERINEURAL at 10:06

## 2017-06-22 RX ADMIN — HYDROCODONE BITARTRATE AND ACETAMINOPHEN 1 TABLET: 5; 325 TABLET ORAL at 12:45

## 2017-06-22 RX ADMIN — EPHEDRINE SULFATE 10 MG: 50 INJECTION INTRAMUSCULAR; INTRAVENOUS; SUBCUTANEOUS at 10:14

## 2017-06-22 RX ADMIN — DEXAMETHASONE SODIUM PHOSPHATE 8 MG: 4 INJECTION, SOLUTION INTRAMUSCULAR; INTRAVENOUS at 10:12

## 2017-06-22 RX ADMIN — GLYCOPYRROLATE 0.4 MG: 0.2 INJECTION, SOLUTION INTRAMUSCULAR; INTRAVENOUS at 11:01

## 2017-06-22 RX ADMIN — CEFAZOLIN 1 G: 1 INJECTION, POWDER, FOR SOLUTION INTRAMUSCULAR; INTRAVENOUS; PARENTERAL at 10:10

## 2017-06-22 NOTE — PLAN OF CARE
Problem: Patient Care Overview (Adult)  Goal: Plan of Care Review  Outcome: Outcome(s) achieved Date Met:  06/22/17 06/22/17 9297   Coping/Psychosocial Response Interventions   Plan Of Care Reviewed With patient;spouse   Patient Care Overview   Progress improving   Outcome Evaluation   Outcome Summary/Follow up Plan Patient mets discharge criteria. Medicated for pain with good relief noted. Patient and spouse both verbalize understanding of discharge instructions.          Problem: Perioperative Period (Adult)  Goal: Signs and Symptoms of Listed Potential Problems Will be Absent or Manageable (Perioperative Period)  Outcome: Outcome(s) achieved Date Met:  06/22/17

## 2017-06-22 NOTE — ANESTHESIA PROCEDURE NOTES
Airway  Urgency: elective    Airway not difficult    General Information and Staff    Patient location during procedure: OR  CRNA: SHANEKA DEAN    Indications and Patient Condition  Indications for airway management: airway protection    Preoxygenated: yes  Mask difficulty assessment: 1 - vent by mask    Final Airway Details  Final airway type: endotracheal airway      Successful airway: ETT  Cuffed: yes   Successful intubation technique: direct laryngoscopy  Facilitating devices/methods: intubating stylet  Endotracheal tube insertion site: oral  Blade: Vance  Blade size: #2  ETT size: 7.5 mm  Cormack-Lehane Classification: grade I - full view of glottis  Placement verified by: chest auscultation and capnometry   Cuff volume (mL): 8  Measured from: lips  ETT to lips (cm): 22  Number of attempts at approach: 1    Additional Comments  Easy, atraumatic intubation

## 2017-06-22 NOTE — PLAN OF CARE
Problem: Patient Care Overview (Adult)  Goal: Plan of Care Review  Outcome: Ongoing (interventions implemented as appropriate)    06/22/17 0942   Coping/Psychosocial Response Interventions   Plan Of Care Reviewed With patient   Patient Care Overview   Progress no change         Problem: Perioperative Period (Adult)  Goal: Signs and Symptoms of Listed Potential Problems Will be Absent or Manageable (Perioperative Period)  Outcome: Ongoing (interventions implemented as appropriate)    06/22/17 0942   Perioperative Period   Problems Assessed (Perioperative Period) hypothermia;physiologic stress response;situational response   Problems Present (Perioperative Period) none

## 2017-06-22 NOTE — PLAN OF CARE
Problem: Perioperative Period (Adult)  Goal: Signs and Symptoms of Listed Potential Problems Will be Absent or Manageable (Perioperative Period)  Outcome: Ongoing (interventions implemented as appropriate)    06/22/17 1119   Perioperative Period   Problems Assessed (Perioperative Period) all   Problems Present (Perioperative Period) none

## 2017-06-22 NOTE — ANESTHESIA POSTPROCEDURE EVALUATION
Patient: Chad Henriquez    Procedure Summary     Date Anesthesia Start Anesthesia Stop Room / Location    06/22/17 1001 1118  [facility] OR 06 / [facility] OR       Procedure Diagnosis Surgeon Provider    RIGHT INGUINAL HERNIA REPAIR AND EXCISION OF CYST RIGHT ELBOW  (Right Abdomen) (INGUINAL HERNIA , CYST ON ARM ) MD Lana Finch CRNA          Anesthesia Type: general  Last vitals  /65 (06/22/17 1121)    Temp 98.4 °F (36.9 °C) (06/22/17 1030)    Pulse 68 (06/22/17 1121)   Resp 12 (06/22/17 1121)    SpO2 96 % (06/22/17 1121)      Post Anesthesia Care and Evaluation    Patient location during evaluation: PACU  Patient participation: complete - patient participated  Level of consciousness: awake and alert  Pain management: adequate  Airway patency: patent  Anesthetic complications: No anesthetic complications  PONV Status: none  Cardiovascular status: acceptable  Respiratory status: acceptable  Hydration status: acceptable

## 2017-06-22 NOTE — ANESTHESIA PREPROCEDURE EVALUATION
Anesthesia Evaluation            Airway   Mallampati: II  TM distance: >3 FB  Neck ROM: full  no difficulty expected  Dental          Pulmonary - normal exam   (+) a smoker Former,   Cardiovascular - normal exam  Exercise tolerance: good (4-7 METS)    (+) hypertension well controlled, past MI , CABG, cardiac stents Bare metal stent more than 12 months ago hyperlipidemia      Neuro/Psych  (+) tremors (LEFT HANDED),    GI/Hepatic/Renal/Endo - negative ROS     Musculoskeletal (-) negative ROS    Abdominal  - normal exam   Substance History      OB/GYN          Other                                        Anesthesia Plan    ASA 3     general     intravenous induction   Anesthetic plan and risks discussed with patient.    Plan discussed with CRNA.

## 2017-06-22 NOTE — PLAN OF CARE
Problem: Patient Care Overview (Adult)  Goal: Plan of Care Review  Outcome: Ongoing (interventions implemented as appropriate)    06/22/17 1204   Coping/Psychosocial Response Interventions   Plan Of Care Reviewed With patient   Patient Care Overview   Progress improving   Outcome Evaluation   Outcome Summary/Follow up Plan meets criteria for discharge

## 2017-06-22 NOTE — DISCHARGE INSTRUCTIONS

## 2017-06-23 NOTE — OP NOTE
DATE OF PROCEDURE:  06/22/2017    PREOPERATIVE DIAGNOSES:   1.  Right inguinal hernia.   2.  Right upper extremity subcutaneous nodule.     POSTOPERATIVE DIAGNOSES:   1.  Right inguinal hernia.   2.  Right upper extremity subcutaneous nodule.     PROCEDURES PERFORMED:     1.  Open right inguinal hernia repair with placement of mesh.   2.  Excision of right upper extremity nodule.     SURGEON: Jackelyn Arriola MD     ANESTHESIA: General endotracheal with local.     ESTIMATED BLOOD LOSS: Minimal.     IV FLUIDS: Please see anesthesia's notes.     INDICATIONS: The patient is an 87-year-old gentleman who presented complaining of right inguinal hernia that had progressively increased in size as well as pain. He denies any overlying skin changes or GI symptoms. Additionally he complained of a nodule deep to the skin of the dorsal surface of his right elbow. He states that it would hurt when he would lay his arm down on a table or work source. He presents today for excision of the right upper extremity mass as well as open right inguinal hernia repair and possible placement of mesh. The risks, benefits, complications, and possible alternatives of the above procedures were discussed.  The patient agreed to proceed.     DESCRIPTION OF PROCEDURE: The patient was taken to the operating room and laid supine on the operating room table. After general endotracheal anesthesia was obtained, the right groin was prepped and draped in the usual sterile fashion. A 4 cm line was drawn with a marking pen parallel with the inguinal ligament running from the pubic tubercle toward the anterior/superior iliac spine. Then, 0.25% Marcaine with epinephrine was then used to infiltrate the skin and subcutaneous tissue for local anesthesia. The skin was incised. Bovie electrocautery was then used to enter the deep dermis and subcutaneous tissues through Camper's and Theresa's fascia down to the external oblique aponeurosis. A nick was created and  the external oblique aponeurosis parallel with its fibers. A Metzenbaum scissors was used to be placed to create a tract deep to the external oblique aponeurosis to the superficial inguinal ring. The tract was then incised. The free edges of the external oblique aponeurosis were then grasped with hemostats. Using blunt dissection, the loose areolar connective tissue was dissected free from the overlying external oblique aponeurosis from the underlying spermatic cord. The cord was then encircled with a Penrose drain. The ilioinguinal nerve was clipped proximally and distally and ligated. It was noted that the patient had a direct inguinal hernia. A size extra-large plug was chosen.  It was anchored in position in using 0 Prolene in simple interrupted fashion around its perimeter. Flat portion of the mesh was then placed first to the pubic tubercle, shelving edge of the inguinal ligament, and then the conjoined tendon. The tails of the mesh were wrapped around the spermatic cord. The wound bed was then copiously irrigated with sterile saline and suctioned dry. The external oblique aponeurosis was then reapproximated using 3-0 Vicryl in a running continuous fashion. Theresa's fascia and deep dermis were then reapproximated with 3-0 Vicryl in a buried simple interrupted fashion in multiple layers. The skin was then closed with 4-0 Vicryl in a running subcuticular fashion. The wounds were then cleansed and dried. Mastisol, Steri-Strips, dry dressings, and Tegaderm were applied.  Attention was then drawn to the dorsal surface of the right arm overlying the ulna just distal to the olecranon process. There was an approximately 1 cm subcutaneous nodule. An incision was created. There was slight induration of the subcutaneous tissues and then this was removed.  A discrete mass was not seen, but the palpable area was no longer present. The wound bed was then copiously irrigated with sterile saline and suctioned dry. The deep  dermis was then reapproximated with 3-0 Vicryl in a buried simple interrupted fashion. The wound was then cleansed and dried. Mastisol, Steri-Strips, dry dressings, and Tegaderm were applied.     COMPLICATIONS: None.     DISPOSITION: Good, stable to the PACU.     FINDINGS: Right direct hernia and a 1 cm subcutaneous mass, right elbow.       cc:                   Jackelyn VARNER/[id number]  D:  06/23/2017 07:52:27(Eastern Time)  T:  06/23/2017 08:49:42(Eastern Time)  Voice ID:  [id number]/Document ID:  [id number]

## 2017-07-19 ENCOUNTER — TELEPHONE (OUTPATIENT)
Dept: UROLOGY | Facility: CLINIC | Age: 82
End: 2017-07-19

## 2017-07-19 DIAGNOSIS — N13.8 BPH WITH URINARY OBSTRUCTION: ICD-10-CM

## 2017-07-19 DIAGNOSIS — N40.1 BPH WITH URINARY OBSTRUCTION: ICD-10-CM

## 2017-07-19 NOTE — TELEPHONE ENCOUNTER
Pt called today and said he needs a refill on finasteride 5mg 1x daily and alfuzosin hcl 10mg sa1ab 1x daily.  He uses the [facility] on [email or web address] in [facility].  Please call patient when this is done so he can get this medication.  Thank you.

## 2017-07-20 ENCOUNTER — TELEPHONE (OUTPATIENT)
Dept: UROLOGY | Facility: CLINIC | Age: 82
End: 2017-07-20

## 2017-07-20 NOTE — TELEPHONE ENCOUNTER
[facility] called on this patient and they need a corrected prescription faxed to them for alfuzosin and finasteride. The one they received was marked for only 1 pill instead of 30. Their fax is [phone]

## 2017-09-19 ENCOUNTER — OFFICE VISIT (OUTPATIENT)
Dept: UROLOGY | Facility: CLINIC | Age: 82
End: 2017-09-19

## 2017-09-19 VITALS — WEIGHT: 159.8 LBS | HEIGHT: 68 IN | BODY MASS INDEX: 24.22 KG/M2 | TEMPERATURE: 98.6 F

## 2017-09-19 DIAGNOSIS — N40.1 BPH WITH URINARY OBSTRUCTION: Primary | ICD-10-CM

## 2017-09-19 DIAGNOSIS — R35.1 NOCTURIA: ICD-10-CM

## 2017-09-19 DIAGNOSIS — N13.8 BPH WITH URINARY OBSTRUCTION: Primary | ICD-10-CM

## 2017-09-19 DIAGNOSIS — N32.81 OAB (OVERACTIVE BLADDER): ICD-10-CM

## 2017-09-19 DIAGNOSIS — R35.0 FREQUENCY OF URINATION: ICD-10-CM

## 2017-09-19 PROCEDURE — 99213 OFFICE O/P EST LOW 20 MIN: CPT | Performed by: UROLOGY

## 2017-09-19 PROCEDURE — 81003 URINALYSIS AUTO W/O SCOPE: CPT | Performed by: UROLOGY

## 2017-09-19 NOTE — PROGRESS NOTES
Subjective    Mr. Henriquez is 87 y.o. male    CHIEF COMPLAINT: BPH    The patient comes back today for follow-up BPH he seems doing quite well he did get the [facility]ral [facility] he does feel like this is helping him and even today is gotten better and better his AUA score today is only 8 he denies any gross hematuria no recurrent urinary tract infections no significant changes except for improvement since we last saw him.    His urgency frequency nocturia Overactive bladder symptoms also markedly improved so was again hopefully this will continue to slowly improve with him I discussed the use of Proscar and how it is a relatively slow acting drugs      History of Present Illness      The following portions of the patient's history were reviewed and updated as appropriate: allergies, current medications, past family history, past medical history, past social history, past surgical history and problem list.    Review of Systems   Constitutional: Negative.  Negative for chills and fever.   Gastrointestinal: Negative for abdominal distention, abdominal pain, anal bleeding, blood in stool and nausea.   Genitourinary: Negative for difficulty urinating, dysuria, flank pain, frequency, hematuria and urgency.   Psychiatric/Behavioral: Negative.  Negative for agitation and confusion.         Current Outpatient Prescriptions:   •  alfuzosin (UROXATRAL) 10 MG 24 hr tablet, Take 1 tablet by mouth Daily., Disp: 30 tablet, Rfl: 5  •  aspirin 81 MG EC tablet, Take 81 mg by mouth daily.  , Disp: , Rfl:   •  chlorpheniramine (CHLOR-TRIMETON) 4 MG tablet, Take 4 mg by mouth Daily., Disp: , Rfl:   •  diphenhydrAMINE (BENADRYL) 25 mg capsule, Take 25 mg by mouth At Night As Needed for Allergies., Disp: , Rfl:   •  finasteride (PROSCAR) 5 MG tablet, Take 1 tablet by mouth Daily., Disp: 30 tablet, Rfl: 5  •  Flaxseed, Linseed, (FLAXSEED OIL) 1000 MG capsule, Take 1,200 mg by mouth 2 times daily., Disp: , Rfl:   •  folic  acid ([employer] FOLIC ACID) 800 MCG tablet, Take 800 mcg by mouth daily.  , Disp: , Rfl:   •  HYDROcodone-acetaminophen (NORCO) 5-325 MG per tablet, Take 1-2 tablets by mouth Every 4 (Four) Hours As Needed (Pain)., Disp: 30 tablet, Rfl: 0  •  L-Lysine 500 MG tablet, Take 1 tablet by mouth Daily., Disp: , Rfl:   •  metoprolol succinate XL (TOPROL XL) 25 MG 24 hr tablet, Take 1 tablet by mouth daily, Disp: , Rfl:   •  pravastatin (PRAVACHOL) 40 MG tablet, Take 40 mg by mouth daily.  , Disp: , Rfl:   •  vitamin C (ASCORBIC ACID) 500 MG tablet, Take 500 mg by mouth daily.  , Disp: , Rfl:   •  Vitamin D, Cholecalciferol, 1000 UNITS capsule, Take 2,000 Units by mouth BID, Disp: , Rfl:     Past Medical History:   Diagnosis Date   • Allergic rhinitis    • BPH (benign prostatic hypertrophy) with urinary retention    • Hard of hearing    • Heart attack 1986    NO MUSCLE DAMAGE - JUST OCCLUDED VALVE   • High cholesterol    • History of skin cancer     BILATERAL EARS   • Hypertension    • Inguinal hernia    • Leaky heart valve     DR CONCEPCION SAYS NOT ENOUGH TO BE OF CONCERN   • Other bursal cyst, right elbow    • Sleep apnea     DOES NOT USE CPAP OR BIPAP   • Spinal stenosis of cervical region    • Spinal stenosis of lumbar region    • Wears hearing aid     BILATERAL       Past Surgical History:   Procedure Laterality Date   • APPENDECTOMY     • BACK SURGERY      X3   • CARDIAC SURGERY  08/08/1986    X1 BYPASS   • CORONARY ANGIOPLASTY WITH STENT PLACEMENT  1997    X3 STENTS   • HERNIA REPAIR     • INGUINAL HERNIA REPAIR Right 6/22/2017    Procedure: RIGHT INGUINAL HERNIA REPAIR AND EXCISION OF CYST RIGHT ELBOW ;  Surgeon: Jackelyn Arriola MD;  Location: [facility] OR;  Service:        Social History     Social History   • Marital status:      Spouse name: N/A   • Number of children: N/A   • Years of education: N/A     Social History Main Topics   • Smoking status: Former Smoker     Years: 6.00     Types: Cigarettes     Quit  "date: 1949   • Smokeless tobacco: Never Used   • Alcohol use No   • Drug use: No   • Sexual activity: Defer     Other Topics Concern   • None     Social History Narrative       Family History   Problem Relation Age of Onset   • No Known Problems Father    • No Known Problems Mother        Objective    Temp 98.6 °F (37 °C)  Ht 68\" (172.7 cm)  Wt 159 lb 12.8 oz (72.5 kg)  BMI 24.3 kg/m2    Physical Exam      Appointment on 06/21/2017   Component Date Value Ref Range Status   • Glucose 06/21/2017 96  70 - 100 mg/dL Final   • BUN 06/21/2017 13  5 - 21 mg/dL Final   • Creatinine 06/21/2017 0.66  0.50 - 1.40 mg/dL Final   • Sodium 06/21/2017 136  135 - 145 mmol/L Final   • Potassium 06/21/2017 4.5  3.5 - 5.3 mmol/L Final   • Chloride 06/21/2017 100  98 - 110 mmol/L Final   • CO2 06/21/2017 27.0  24.0 - 31.0 mmol/L Final   • Calcium 06/21/2017 8.6  8.4 - 10.4 mg/dL Final   • Total Protein 06/21/2017 6.4  6.3 - 8.7 g/dL Final   • Albumin 06/21/2017 4.10  3.50 - 5.00 g/dL Final   • ALT (SGPT) 06/21/2017 35  0 - 54 U/L Final   • AST (SGOT) 06/21/2017 35  7 - 45 U/L Final   • Alkaline Phosphatase 06/21/2017 48  24 - 120 U/L Final   • Total Bilirubin 06/21/2017 0.6  0.1 - 1.0 mg/dL Final   • eGFR Non African Amer 06/21/2017 114  >60 mL/min/1.73 Final   • Globulin 06/21/2017 2.3  gm/dL Final   • A/G Ratio 06/21/2017 1.8  1.1 - 2.5 g/dL Final   • BUN/Creatinine Ratio 06/21/2017 19.7  7.0 - 25.0 Final   • Anion Gap 06/21/2017 9.0  4.0 - 13.0 mmol/L Final   • WBC 06/21/2017 7.61  4.80 - 10.80 10*3/mm3 Final   • RBC 06/21/2017 3.69* 4.80 - 5.90 10*6/mm3 Final   • Hemoglobin 06/21/2017 11.9* 14.0 - 18.0 g/dL Final   • Hematocrit 06/21/2017 35.7* 40.0 - 52.0 % Final   • MCV 06/21/2017 96.7* 82.0 - 95.0 fL Final   • MCH 06/21/2017 32.2* 28.0 - 32.0 pg Final   • MCHC 06/21/2017 33.3  33.0 - 36.0 g/dL Final   • RDW 06/21/2017 13.1  12.0 - 15.0 % Final   • RDW-SD 06/21/2017 46.1  40.0 - 54.0 fl Final   • MPV 06/21/2017 10.0  6.0 - 12.0 " fL Final   • Platelets 06/21/2017 246  130 - 400 10*3/mm3 Final   • Neutrophil % 06/21/2017 67.9  39.0 - 78.0 % Final   • Lymphocyte % 06/21/2017 19.2  15.0 - 45.0 % Final   • Monocyte % 06/21/2017 10.0  4.0 - 12.0 % Final   • Eosinophil % 06/21/2017 2.5  0.0 - 4.0 % Final   • Basophil % 06/21/2017 0.3  0.0 - 2.0 % Final   • Immature Grans % 06/21/2017 0.1  0.0 - 5.0 % Final   • Neutrophils, Absolute 06/21/2017 5.17  1.87 - 8.40 10*3/mm3 Final   • Lymphocytes, Absolute 06/21/2017 1.46  0.72 - 4.86 10*3/mm3 Final   • Monocytes, Absolute 06/21/2017 0.76  0.19 - 1.30 10*3/mm3 Final   • Eosinophils, Absolute 06/21/2017 0.19  0.00 - 0.70 10*3/mm3 Final   • Basophils, Absolute 06/21/2017 0.02  0.00 - 0.20 10*3/mm3 Final   • Immature Grans, Absolute 06/21/2017 0.01  0.00 - 0.03 10*3/mm3 Final       Results for orders placed or performed in visit on 09/19/17   POC Urinalysis Dipstick, Automated   Result Value Ref Range    Color Yellow Yellow, Straw, Dark Yellow, Geovanna    Clarity, UA Clear Clear    Glucose, UA Negative Negative, 1000 mg/dL (3+) mg/dL    Bilirubin Negative Negative    Ketones, UA Negative Negative    Specific Gravity  1.020 1.005 - 1.030    Blood, UA Negative Negative    pH, Urine 7.0 5.0 - 8.0    Protein, POC Negative Negative mg/dL    Urobilinogen, UA Normal Normal    Leukocytes Negative Negative    Nitrite, UA Negative Negative       Assessment and Plan    Chad was seen today for benign prostatic hypertrophy.    Diagnoses and all orders for this visit:    BPH with urinary obstruction  -     POC Urinalysis Dipstick, Automated    Frequency of urination    Nocturia    OAB (overactive bladder)    Plan--the patient seems to doing quite well I am going to see him back again in 1 year he will continue the Uroxatral and the Proscar [facility] if his symptoms start to worsen again or he has problems bleeding etc. he will let me know    Given his overactive bladder symptoms seem to be  improving as well so I think that certainly keeping him on the medications as appropriate

## 2017-09-20 ENCOUNTER — OFFICE VISIT (OUTPATIENT)
Dept: CARDIOLOGY | Age: 82
End: 2017-09-20
Payer: MEDICARE

## 2017-09-20 VITALS
DIASTOLIC BLOOD PRESSURE: 60 MMHG | SYSTOLIC BLOOD PRESSURE: 138 MMHG | HEIGHT: 68 IN | HEART RATE: 74 BPM | BODY MASS INDEX: 24.1 KG/M2 | WEIGHT: 159 LBS

## 2017-09-20 DIAGNOSIS — I10 ESSENTIAL HYPERTENSION: ICD-10-CM

## 2017-09-20 DIAGNOSIS — E78.2 MIXED HYPERLIPIDEMIA: ICD-10-CM

## 2017-09-20 DIAGNOSIS — I25.118 CORONARY ARTERY DISEASE OF NATIVE ARTERY OF NATIVE HEART WITH STABLE ANGINA PECTORIS (HCC): Primary | ICD-10-CM

## 2017-09-20 PROCEDURE — 99213 OFFICE O/P EST LOW 20 MIN: CPT | Performed by: CLINICAL NURSE SPECIALIST

## 2017-09-20 PROCEDURE — 4040F PNEUMOC VAC/ADMIN/RCVD: CPT | Performed by: CLINICAL NURSE SPECIALIST

## 2017-09-20 PROCEDURE — 1123F ACP DISCUSS/DSCN MKR DOCD: CPT | Performed by: CLINICAL NURSE SPECIALIST

## 2017-09-20 PROCEDURE — G8598 ASA/ANTIPLAT THER USED: HCPCS | Performed by: CLINICAL NURSE SPECIALIST

## 2017-09-20 PROCEDURE — G8427 DOCREV CUR MEDS BY ELIG CLIN: HCPCS | Performed by: CLINICAL NURSE SPECIALIST

## 2017-09-20 PROCEDURE — G8420 CALC BMI NORM PARAMETERS: HCPCS | Performed by: CLINICAL NURSE SPECIALIST

## 2017-09-20 PROCEDURE — 1036F TOBACCO NON-USER: CPT | Performed by: CLINICAL NURSE SPECIALIST

## 2017-09-20 ASSESSMENT — ENCOUNTER SYMPTOMS
CHEST TIGHTNESS: 0
ABDOMINAL PAIN: 0
BACK PAIN: 1
ORTHOPNEA: 0
COUGH: 0
BLURRED VISION: 0
VOMITING: 0
SHORTNESS OF BREATH: 0
NAUSEA: 0
HEARTBURN: 0

## 2017-09-21 ENCOUNTER — OFFICE VISIT (OUTPATIENT)
Dept: NEUROLOGY | Age: 82
End: 2017-09-21
Payer: MEDICARE

## 2017-09-21 VITALS
DIASTOLIC BLOOD PRESSURE: 79 MMHG | HEIGHT: 68 IN | BODY MASS INDEX: 24.23 KG/M2 | OXYGEN SATURATION: 97 % | HEART RATE: 71 BPM | SYSTOLIC BLOOD PRESSURE: 154 MMHG | WEIGHT: 159.9 LBS

## 2017-09-21 DIAGNOSIS — G25.0 ESSENTIAL TREMOR: Primary | ICD-10-CM

## 2017-09-21 DIAGNOSIS — D64.9 ANEMIA, UNSPECIFIED TYPE: ICD-10-CM

## 2017-09-21 PROCEDURE — G8420 CALC BMI NORM PARAMETERS: HCPCS | Performed by: PHYSICIAN ASSISTANT

## 2017-09-21 PROCEDURE — 4040F PNEUMOC VAC/ADMIN/RCVD: CPT | Performed by: PHYSICIAN ASSISTANT

## 2017-09-21 PROCEDURE — 1123F ACP DISCUSS/DSCN MKR DOCD: CPT | Performed by: PHYSICIAN ASSISTANT

## 2017-09-21 PROCEDURE — 99214 OFFICE O/P EST MOD 30 MIN: CPT | Performed by: PHYSICIAN ASSISTANT

## 2017-09-21 PROCEDURE — G8427 DOCREV CUR MEDS BY ELIG CLIN: HCPCS | Performed by: PHYSICIAN ASSISTANT

## 2017-09-21 PROCEDURE — G8598 ASA/ANTIPLAT THER USED: HCPCS | Performed by: PHYSICIAN ASSISTANT

## 2017-09-21 PROCEDURE — 1036F TOBACCO NON-USER: CPT | Performed by: PHYSICIAN ASSISTANT

## 2017-09-21 RX ORDER — FINASTERIDE 5 MG/1
5 TABLET, FILM COATED ORAL DAILY
Status: ON HOLD | COMMUNITY
End: 2018-12-28 | Stop reason: HOSPADM

## 2017-09-21 RX ORDER — ALFUZOSIN HYDROCHLORIDE 10 MG/1
10 TABLET, EXTENDED RELEASE ORAL DAILY
Status: ON HOLD | COMMUNITY
End: 2018-12-28 | Stop reason: HOSPADM

## 2017-09-21 RX ORDER — CHLORPHENIRAMINE MALEATE 4 MG/1
4 TABLET ORAL DAILY
COMMUNITY

## 2017-09-21 RX ORDER — DIPHENHYDRAMINE HCL 25 MG
25 CAPSULE ORAL EVERY 6 HOURS PRN
COMMUNITY
End: 2017-11-16 | Stop reason: HOSPADM

## 2017-09-29 ENCOUNTER — TELEPHONE (OUTPATIENT)
Dept: NEUROLOGY | Age: 82
End: 2017-09-29

## 2017-10-02 ENCOUNTER — HOSPITAL ENCOUNTER (OUTPATIENT)
Dept: MRI IMAGING | Age: 82
Discharge: HOME | End: 2017-10-02
Payer: MEDICARE

## 2017-10-02 DIAGNOSIS — G25.0 ESSENTIAL TREMOR: ICD-10-CM

## 2017-10-02 PROCEDURE — 70551 MRI BRAIN STEM W/O DYE: CPT

## 2017-10-05 ENCOUNTER — TELEPHONE (OUTPATIENT)
Dept: NEUROLOGY | Age: 82
End: 2017-10-05

## 2017-11-16 ENCOUNTER — OFFICE VISIT (OUTPATIENT)
Dept: NEUROLOGY | Age: 82
End: 2017-11-16
Payer: MEDICARE

## 2017-11-16 VITALS
DIASTOLIC BLOOD PRESSURE: 66 MMHG | OXYGEN SATURATION: 98 % | HEART RATE: 52 BPM | HEIGHT: 68 IN | SYSTOLIC BLOOD PRESSURE: 156 MMHG | BODY MASS INDEX: 25.76 KG/M2 | WEIGHT: 170 LBS

## 2017-11-16 DIAGNOSIS — G25.0 ESSENTIAL TREMOR: Primary | ICD-10-CM

## 2017-11-16 PROCEDURE — 1036F TOBACCO NON-USER: CPT | Performed by: PHYSICIAN ASSISTANT

## 2017-11-16 PROCEDURE — G8419 CALC BMI OUT NRM PARAM NOF/U: HCPCS | Performed by: PHYSICIAN ASSISTANT

## 2017-11-16 PROCEDURE — 99214 OFFICE O/P EST MOD 30 MIN: CPT | Performed by: PHYSICIAN ASSISTANT

## 2017-11-16 PROCEDURE — 1123F ACP DISCUSS/DSCN MKR DOCD: CPT | Performed by: PHYSICIAN ASSISTANT

## 2017-11-16 PROCEDURE — G8427 DOCREV CUR MEDS BY ELIG CLIN: HCPCS | Performed by: PHYSICIAN ASSISTANT

## 2017-11-16 PROCEDURE — G8598 ASA/ANTIPLAT THER USED: HCPCS | Performed by: PHYSICIAN ASSISTANT

## 2017-11-16 PROCEDURE — 4040F PNEUMOC VAC/ADMIN/RCVD: CPT | Performed by: PHYSICIAN ASSISTANT

## 2017-11-16 PROCEDURE — G8484 FLU IMMUNIZE NO ADMIN: HCPCS | Performed by: PHYSICIAN ASSISTANT

## 2017-11-16 NOTE — PROGRESS NOTES
REVIEW OF SYSTEMS    Constitutional: []Fever []Sweats []Chills [] Recent Injury   [x] Denies all unless marked  HENT:[]Headache  [] Head Injury  [] Sore Throat  [] Ear Pain  [] Dizziness [] Hearing Loss   [x] Denies all unless marked  Spine:  [] Neck pain  [] Back pain  [] Sciaticia  [x] Denies all unless marked  Cardiovascular:[]Chest Pain []Palpitations [] Heart Disease  [x] Denies all unless marked  Pulmonary: []Shortness of Breath []Cough   [x] Denies all unless marked  Gastrointestinal:  []Abdominal Pain  []Blood in Stool  []Diarrhea []Constipation []Nausea  []Vomiting  [x] Denies all unless marked  Genitourinary:  [] Dysuria [] Frequency  [] Incontinence [] Urgency   [x] Denies all unless marked  Musculoskeletal: [] Arthralgia  [] Myalgias [] Muscle cramps  [x] Muscle twitches   [] Denies all unless marked   Extremities:   [] Pain   [] Swelling   [x] Denies all unless marked  Skin:[] Rash  [] Color Change  [x] Denies all unless marked  Neurological:[] Visual Disturbance [] Double Vision [] Slurred Speech [] Trouble swallowing  [] Vertigo [] Tingling [] Numbness [] Weakness [] Loss of Balance   [] Loss of Consciousness [] Memory Loss  [x] Denies all unless marked  Psychiatric/Behavioral:[] Depression [] Anxiety  [x] Denies all unless marked  Sleep: []  Insomnia [] Sleep Disturbance [] Snoring [] Restless Legs [] Daytime Sleepiness [] Sleep Apnea  [x] Denies all unless marked

## 2017-11-16 NOTE — PATIENT INSTRUCTIONS
Patient Education        Benign Essential Tremor: Care Instructions  Your Care Instructions  Benign essential tremor is a medical term for shaking that you can't control. Your hand or fingers may shake when you lift a cup or point at something. Or your voice may shake when you speak. This type of tremor is not harmful. It is not caused by a stroke or Parkinson's disease. Some things can affect how much you shake. For example, drinking or eating something with caffeine may make tremors worse for a while. Some medicines also can increase tremors. These include antidepressants and too much thyroid replacement. Talk to your doctor if you think one of your medicines makes your tremors worse. If you are self-conscious about your tremors, there are some things you can do to reduce them or make them less noticeable. This includes taking medicine. Follow-up care is a key part of your treatment and safety. Be sure to make and go to all appointments, and call your doctor if you are having problems. It's also a good idea to know your test results and keep a list of the medicines you take. How can you care for yourself at home? · Take your medicines exactly as prescribed. Call your doctor if you think you are having a problem with your medicine. Some medicines that help control tremors have to be taken every day, even if you are not having tremors. You will get more details on the specific medicines your doctor prescribes. · Get plenty of rest.  · Eat a balanced, healthy diet. · Try to reduce stress. Regular exercise and massages may help. · Limit alcohol. Heavy drinking can make your tremors worse. · Avoid drinks or foods with caffeine if they make your tremors worse. These include tea, cola, coffee, and chocolate. · Wear a heavy bracelet or watch. This adds a little weight to your hand. The extra weight may reduce tremors. · Drink from cups or glasses that are only half full.  You may also want to try drinking with a

## 2017-11-16 NOTE — PROGRESS NOTES
[facility] Neurology Follow Up Encounter      Information:   Patient Name: Vivien Cordero  :   1929  Age:   80 y.o. MRN:   [id number]  Account #:  [de-identified]  Today:              17    Provider:  Barry Thurston PA-C    Chief Complaint   Patient presents with    Tremors     Patient stated the tremors havent worsened, only left hand       Subjective:   Vivien Cordero is a 80 y.o. man  with a history of essential tremor who comes in for a follow up. At his last office 2017 I ordered labs and a brain MRI. He declined pharmacotherapy. The brain MRI was consistent for age related changes. He did not completed the labs. Tremor:  Character: Tremor that does not occur at rest but with action  Location:  Left hand  Onset:     Timing:   Only with eating and writing  Progression:  Stable  Associated:  Yes-change in handwriting  No-difficulty with ADLS Yes- impairment of dexterity Yes-postural instability that he relates to chronic back and hip pain No-sleep disturbance  No-rigidity  No- bradykinesia  No-memory imairment No-mood disorder  Aggravated:  Eating and writing   Alleviated:  Nothing   Diagnostic studies: Routine labs, 2017 revealed anemia  Treatments tried:  None  Exposure: vinyl, caustic soda,  and gasoline at a [employer]; employed 12-15 years     Injury:  No  Back pain:  Chronic low back pain  Neck pain:  No  Limb pan:  Chronic hip pain  Numbness:  No  Tingling:  No  Weakness: Right foot drop secondary back surgery  Bowel or bladder dysfunction:  No  Muscle atrophy:  No  Muscle spasm:  Leg cramps  Fasciculation:  No      Objective:     Past Medical History:   Diagnosis Date    Arthritis     Blind     Left eye    CAD (coronary artery disease)     SVG to OM     [facility]     [facility]     Hypertension     Low back pain radiating to right leg 2016    Lumbar facet arthropathy 2016    Lumbar radiculopathy intermittent 2016    Osteoarthritis     Peripheral neuropathy (HCC)     Right foot drop     Tremor        Past Surgical History:   Procedure Laterality Date    APPENDECTOMY      BACK SURGERY  10/2016    upper and lumbar    CARDIAC SURGERY      CABG    DIAGNOSTIC CARDIAC CATH LAB PROCEDURE      Stent to mid and distal RCA 1997    HERNIA REPAIR      NECK SURGERY         Recent Hospitalizations  ·     Significant Injuries  ·     Family History   Problem Relation Age of Onset    High Blood Pressure Mother     Stroke Mother     Coronary Art Dis Father        Social History     Social History    Marital status:      Spouse name: N/A    Number of children: N/A    Years of education: N/A     Occupational History    Not on file. Social History Main Topics    Smoking status: Former Smoker     Packs/day: 2.00     Years: 6.00     Types: Cigarettes    Smokeless tobacco: Never Used    Alcohol use No    Drug use: No    Sexual activity: Yes     Partners: Female      Comment: He is . Other Topics Concern    Not on file     Social History Narrative    No narrative on file       Medications:  Current Outpatient Prescriptions   Medication Sig Dispense Refill    Cyanocobalamin (B-12 PO) Take by mouth daily      alfuzosin (UROXATRAL) 10 MG extended release tablet Take 10 mg by mouth daily      finasteride (PROSCAR) 5 MG tablet Take 5 mg by mouth daily      chlorpheniramine (CHLOR-TRIMETON) 4 MG tablet Take 4 mg by mouth every 6 hours as needed for Allergies      Cetirizine HCl 10 MG CAPS Take 10 mg by mouth daily      Vitamin D (CHOLECALCIFEROL) 1000 UNITS CAPS capsule Take 2,000 Units by mouth daily       metoprolol (TOPROL XL) 25 MG XL tablet Take 1 tablet by mouth daily 90 tablet 3    Ascorbic Acid (VITAMIN C) 500 MG tablet Take 500 mg by mouth daily.  Flaxseed, Linseed, (FLAXSEED OIL) 1000 MG CAPS Take 2,000 mg by mouth daily       pravastatin (PRAVACHOL) 40 MG tablet Take 40 mg by mouth daily.         aspirin 81 MG EC tablet Take 81 mg by mouth daily.  folic acid (CVS FOLIC ACID) 197 MCG tablet Take 800 mcg by mouth daily.  L-Lysine 500 MG TABS Take 500 mg by mouth daily.  Psyllium 28 % POWD Take 1 tablet by mouth daily. No current facility-administered medications for this visit. Allergies: Allergies   Allergen Reactions    Codeine      REVIEW OF SYSTEMS     Constitutional: []Fever []Sweats []Chills [] Recent Injury   [x] Denies all unless marked  HENT:[]Headache  [] Head Injury  [] Sore Throat  [] Ear Pain  [] Dizziness [] Hearing Loss   [x] Denies all unless marked  Spine:  [] Neck pain  [] Back pain  [] Sciaticia  [x] Denies all unless marked  Cardiovascular:[]Chest Pain []Palpitations [] Heart Disease  [x] Denies all unless marked  Pulmonary: []Shortness of Breath []Cough   [x] Denies all unless marked  Gastrointestinal:  []Abdominal Pain  []Blood in Stool  []Diarrhea []Constipation []Nausea  []Vomiting  [x] Denies all unless marked  Genitourinary:  [] Dysuria [] Frequency  [] Incontinence [] Urgency   [x] Denies all unless marked  Musculoskeletal: [] Arthralgia  [] Myalgias [] Muscle cramps  [x] Muscle twitches   [] Denies all unless marked   Extremities:   [] Pain   [] Swelling   [x] Denies all unless marked  Skin:[] Rash  [] Color Change  [x] Denies all unless marked  Neurological:[] Visual Disturbance [] Double Vision [] Slurred Speech [] Trouble swallowing  [] Vertigo [] Tingling [] Numbness [] Weakness [] Loss of Balance   [] Loss of Consciousness [] Memory Loss  [x] Denies all unless marked  Psychiatric/Behavioral:[] Depression [] Anxiety  [x] Denies all unless marked  Sleep: []  Insomnia [] Sleep Disturbance [] Snoring [] Restless Legs [] Daytime Sleepiness [] Sleep Apnea  [x] Denies all unless marked      Examination:  Vitals:  BP (!) 156/66   Pulse 52   Ht 5' 8\" (1.727 m)   Wt 170 lb (77.1 kg)   SpO2 98%   BMI 25.85 kg/m²   General appearance:  Appears healthy. Alert; in no acute distress. Pleasant. , alert and cooperative with exam  HEENT:  PERRLA, EOMI and Neck supple with midline trachea  Heart[de-identified]  regular rate and rhythm, S1, S2 normal, no murmur, click, rub or gallop  Lungs:  clear to auscultation bilaterally  Extremities:  extremities normal, atraumatic, no cyanosis or edema  Neurologic:  Extraocular movements are intact without nystagmus. Visual fields are full to confrontation. Facial movements are symmetrical and normal.  Speech is precise. Extremity strength testing shows a right foot drop, otherwise normal.   Deep tendon reflexes are absent at the ankles and otherwise 3+. Rapid alternating movements are unimpaired. Finger-to-nose testing is performed well, with slight dysmetria in the left hand. There is an action tremor noted in the LUE. Gait is antalgic with evidence of right foot drop. I reviewed the following studies:  MRI brain without, 10/2/2017:    Impression: No acute intracranial abnormality. Mild atrophy and chronic small vessel white matter ischemic changes are present. Assessment:       ICD-10-CM ICD-9-CM    1. Essential tremor G25.0 333.1              [x]  :  Stable        []  :  Improved                          []  :  Well controlled                 []  :  Resolving        []  :  Resolved        []  :  Inadequately controlled        []  :  Worsening      Plan:   No orders of the defined types were placed in this encounter. No orders of the defined types were placed in this encounter. 1.  The following educational material has been included in this visit after visit summary for your review: essential tremor-  Discussed with the patient and all questions fully answered. 2.  We had a discussion about the clinical issues, test results, and went over the various important aspects to consider. Treatment plan discussed. Discussed use, benefit, and SE of indicated medication. Discussed the \"Gyenno\" fork/spoon.  All questions were answered. Pt voiced understanding but defers pharmacotherapy at this time  3. Monitor B/P and follow up with PMD if it persists to be elevated. 4.  Advised that he obtain labs as previously ordered  7. Follow up in 6 months    Note:  A total of >50% (>13 minutes) of 25 minutes was spent discussing the pathophysiology and treatment and/or coordination of care of the above diagnoses.

## 2017-11-17 LAB
FOLATE: >20 NG/ML (ref 4.5–32.2)
T4 FREE: 1 NG/DL (ref 0.9–1.7)
TSH SERPL DL<=0.05 MIU/L-ACNC: 3.2 UIU/ML (ref 0.27–4.2)
VITAMIN B-12: 774 PG/ML (ref 211–946)

## 2017-11-18 LAB — CERULOPLASMIN: 20 MG/DL (ref 17–54)

## 2017-11-22 ENCOUNTER — TELEPHONE (OUTPATIENT)
Dept: NEUROLOGY | Age: 82
End: 2017-11-22

## 2018-01-03 ENCOUNTER — TRANSCRIBE ORDERS (OUTPATIENT)
Dept: GENERAL RADIOLOGY | Facility: HOSPITAL | Age: 83
End: 2018-01-03

## 2018-01-03 ENCOUNTER — LAB (OUTPATIENT)
Dept: LAB | Facility: HOSPITAL | Age: 83
End: 2018-01-03

## 2018-01-03 DIAGNOSIS — R05.9 COUGH: Primary | ICD-10-CM

## 2018-01-03 DIAGNOSIS — R05.9 COUGH: ICD-10-CM

## 2018-01-03 LAB
FLUAV AG NPH QL: NEGATIVE
FLUBV AG NPH QL IA: NEGATIVE

## 2018-01-03 PROCEDURE — 87804 INFLUENZA ASSAY W/OPTIC: CPT

## 2018-02-16 ENCOUNTER — HOSPITAL ENCOUNTER (OUTPATIENT)
Dept: MRI IMAGING | Age: 83
Discharge: HOME | End: 2018-02-16
Payer: MEDICARE

## 2018-02-16 DIAGNOSIS — M54.50 CHRONIC BILATERAL LOW BACK PAIN WITHOUT SCIATICA: ICD-10-CM

## 2018-02-16 DIAGNOSIS — G89.29 CHRONIC BILATERAL LOW BACK PAIN WITHOUT SCIATICA: ICD-10-CM

## 2018-02-16 PROCEDURE — 72148 MRI LUMBAR SPINE W/O DYE: CPT

## 2018-02-21 ENCOUNTER — OFFICE VISIT (OUTPATIENT)
Dept: NEUROSURGERY | Facility: CLINIC | Age: 83
End: 2018-02-21

## 2018-02-21 ENCOUNTER — HOSPITAL ENCOUNTER (OUTPATIENT)
Dept: GENERAL RADIOLOGY | Facility: HOSPITAL | Age: 83
Discharge: HOME | End: 2018-02-21
Attending: NEUROLOGICAL SURGERY | Admitting: NEUROLOGICAL SURGERY

## 2018-02-21 VITALS
WEIGHT: 160 LBS | DIASTOLIC BLOOD PRESSURE: 60 MMHG | SYSTOLIC BLOOD PRESSURE: 148 MMHG | BODY MASS INDEX: 24.25 KG/M2 | HEIGHT: 68 IN

## 2018-02-21 DIAGNOSIS — M25.551 BILATERAL HIP PAIN: ICD-10-CM

## 2018-02-21 DIAGNOSIS — M79.605 LEFT LEG PAIN: ICD-10-CM

## 2018-02-21 DIAGNOSIS — Z78.9 NON-SMOKER: ICD-10-CM

## 2018-02-21 DIAGNOSIS — M25.552 BILATERAL HIP PAIN: ICD-10-CM

## 2018-02-21 DIAGNOSIS — M48.062 SPINAL STENOSIS, LUMBAR REGION, WITH NEUROGENIC CLAUDICATION: Primary | ICD-10-CM

## 2018-02-21 DIAGNOSIS — IMO0001 NORMAL BODY MASS INDEX (BMI): ICD-10-CM

## 2018-02-21 PROCEDURE — 73522 X-RAY EXAM HIPS BI 3-4 VIEWS: CPT

## 2018-02-21 PROCEDURE — 99203 OFFICE O/P NEW LOW 30 MIN: CPT | Performed by: NEUROLOGICAL SURGERY

## 2018-02-21 NOTE — H&P
Patient: Chad Henriquez  : 1929    Primary Care Provider: Zelalem Jackson MD    Requesting Provider: Zelalem Jackson MD        History    Chief Complaint: Left leg pain  Chief Complaint   Patient presents with   • back & leg pain     patient had an MRI @ [facility] and is here to discuss results; patient has constant back pain that radiates down his left hip and leg to his knee       History of Present Illness: 88 -year-old gentleman with a several year history of neurogenic claudication.  He describes left paraspinal back pain and left lower extremity radicular pain that starts in his buttock and hip and radiates into his thigh.  He is very clear when he stands and walks the pain gets worse.  The pain will disappear if he gets off his feet sits down or lies down.  He occasionally uses a cane.  He had some sort of non-instrumented lumbar surgery in 2016 and did not really improve from that.  He had a previous lumbar surgery 10 years before that and did very well.  He has done physical therapy in the past.  He has been working with a pain management specialist with injections for the last year to 2 years without any meaningful improvement.  He takes Tylenol or Motrin orally.  Retired from the [employer] is fully functional in his activities of daily living.  Continues to drive and lives independently    Review of Systems   Musculoskeletal: Positive for back pain.   All other systems reviewed and are negative.      Past Medical History:   Past Medical History:   Diagnosis Date   • Allergic rhinitis    • BPH (benign prostatic hypertrophy) with urinary retention    • Hard of hearing    • Heart attack     NO MUSCLE DAMAGE - JUST OCCLUDED VALVE   • High cholesterol    • History of skin cancer     BILATERAL EARS   • Hypertension    • Inguinal hernia    • Leaky heart valve     DR CONCEPCION SAYS NOT ENOUGH TO BE OF CONCERN   • Other bursal cyst, right elbow    • Sleep apnea     DOES NOT USE CPAP OR BIPAP    • Spinal stenosis of cervical region    • Spinal stenosis of lumbar region    • Wears hearing aid     BILATERAL       Past Surgical History:   Past Surgical History:   Procedure Laterality Date   • APPENDECTOMY     • BACK SURGERY      X3   • CARDIAC SURGERY  08/08/1986    X1 BYPASS   • CORONARY ANGIOPLASTY WITH STENT PLACEMENT  1997    X3 STENTS   • HERNIA REPAIR     • INGUINAL HERNIA REPAIR Right 6/22/2017    Procedure: RIGHT INGUINAL HERNIA REPAIR AND EXCISION OF CYST RIGHT ELBOW ;  Surgeon: Jackelyn Arriola MD;  Location: [facility];  Service:        Family History: family history includes No Known Problems in his father and mother.    Social History:  reports that he quit smoking about 69 years ago. His smoking use included Cigarettes. He quit after 6.00 years of use. He has never used smokeless tobacco. He reports that he does not drink alcohol or use illicit drugs.    Medications:    (Not in a hospital admission)    Allergies:  Codeine    Physical Exam:     Physical Exam   Constitutional: He is oriented to person, place, and time. He appears well-developed and well-nourished.   Cardiovascular: Normal rate and regular rhythm.    Pulmonary/Chest: Effort normal. No respiratory distress.   Abdominal: Soft. He exhibits no distension. There is no tenderness.   Neurological: He is oriented to person, place, and time. He has an abnormal Tandem Gait Test. He has a normal Finger-Nose-Finger Test. Gait normal.   Psychiatric: His speech is normal.       Neurologic Exam     Mental Status   Oriented to person, place, and time.   Attention: normal.   Speech: speech is normal   Level of consciousness: alert  Knowledge: good.     Cranial Nerves   Cranial nerves II through XII intact.     Motor Exam   Muscle bulk: normal  Overall muscle tone: normal  Right arm pronator drift: absent  Left arm pronator drift: absent    Strength   Strength 5/5 except as noted.        Right EHL 3 out of 5 right dorsiflexion 4 out of 5     Sensory  Exam   Light touch normal.   Pinprick normal.     Gait, Coordination, and Reflexes     Gait  Gait: normal    Coordination   Finger to nose coordination: normal  Tandem walking coordination: abnormal    Tremor   Resting tremor: absent  Intention tremor: absent  Action tremor: absent    Reflexes   Reflexes 2+ except as noted.     No Pain with active or passive range of motion of the hips    Independent Review of Radiographic Studies:   MRI the lumbar spine shows severe concentric lumbar stenosis mostly focused at L3 4 and 45.  His evidence of a previous laminectomy at L5-S1.    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  He has been through conservative care and injection therapy.  I think would be reasonable this point to consider a complete bilateral laminectomy and decompression at L3 4 4 5.  I think this would significantly improve his left leg pain which is by far his biggest complaint.  The risks and benefits of the procedure were discussed at length which included but were not limited to infection, bleeding, paralysis, spinal fluid leak, bowel bladder incontinence, stroke, coma, and death.  They acknowledge understanding of this.  Their questions concerns were addressed.  We will get him preop and scheduled by his cardiologist.  In addition we will get a set of hip x-rays to make sure he does not have a primary hip arthropathy  1. Spinal stenosis, lumbar region, with neurogenic claudication    2. Left leg pain    3. Bilateral hip pain    4. Non-smoker    5. Normal body mass index (BMI)          Return for postoperatively.      Kj Falcon MD

## 2018-02-22 ENCOUNTER — TELEPHONE (OUTPATIENT)
Dept: CARDIOLOGY | Age: 83
End: 2018-02-22

## 2018-02-22 NOTE — TELEPHONE ENCOUNTER
Date: 3/12/18    Cardiologist: Brandon Gonzalez    Procedure: Lumbar laminectomy without fusion    Surgeon: Charles Oviedo    Last Office Visit: 9/20/17  Reason for office visit and medical concerns addressed at this office visit: Cad, HTN, Hyperlipidemia    Testing Performed and Date of Service:8/25/16 neg Jules,      RCRI = (1pt CAD) class 2, Risk Low  0.9%   METs n/a    Current Medications:ASA, B-12, Uroxatral, Proscar, Chlor-trimeton, Certirizine HCL, Cholecalciferol, Toprol xl, Pravachol, Folic acid, Psyllium 99% powd    Is the patient currently taking an anticoagulant? If so, what is the diagnosis the patient has been given to warrant the need for the anticoagulant? ASA    Additional Notes: Risk stratification     Addendum:   Ok to send Risk stratification letter.

## 2018-03-02 ENCOUNTER — APPOINTMENT (OUTPATIENT)
Dept: PREADMISSION TESTING | Facility: HOSPITAL | Age: 83
End: 2018-03-02

## 2018-03-02 VITALS
BODY MASS INDEX: 25.62 KG/M2 | HEART RATE: 63 BPM | DIASTOLIC BLOOD PRESSURE: 68 MMHG | HEIGHT: 69 IN | WEIGHT: 173 LBS | RESPIRATION RATE: 18 BRPM | OXYGEN SATURATION: 98 % | SYSTOLIC BLOOD PRESSURE: 163 MMHG

## 2018-03-02 DIAGNOSIS — M48.062 SPINAL STENOSIS, LUMBAR REGION, WITH NEUROGENIC CLAUDICATION: ICD-10-CM

## 2018-03-02 LAB
ALBUMIN SERPL-MCNC: 4.3 G/DL (ref 3.5–5)
ALBUMIN/GLOB SERPL: 1.7 G/DL (ref 1.1–2.5)
ALP SERPL-CCNC: 48 U/L (ref 24–120)
ALT SERPL W P-5'-P-CCNC: 35 U/L (ref 0–54)
ANION GAP SERPL CALCULATED.3IONS-SCNC: 10 MMOL/L (ref 4–13)
AST SERPL-CCNC: 30 U/L (ref 7–45)
BASOPHILS # BLD AUTO: 0.03 10*3/MM3 (ref 0–0.2)
BASOPHILS NFR BLD AUTO: 0.4 % (ref 0–2)
BILIRUB SERPL-MCNC: 0.3 MG/DL (ref 0.1–1)
BILIRUB UR QL STRIP: NEGATIVE
BUN BLD-MCNC: 13 MG/DL (ref 5–21)
BUN/CREAT SERPL: 17.8 (ref 7–25)
CALCIUM SPEC-SCNC: 9.2 MG/DL (ref 8.4–10.4)
CHLORIDE SERPL-SCNC: 99 MMOL/L (ref 98–110)
CLARITY UR: CLEAR
CO2 SERPL-SCNC: 28 MMOL/L (ref 24–31)
COLOR UR: YELLOW
CREAT BLD-MCNC: 0.73 MG/DL (ref 0.5–1.4)
DEPRECATED RDW RBC AUTO: 42.9 FL (ref 40–54)
EOSINOPHIL # BLD AUTO: 0.24 10*3/MM3 (ref 0–0.7)
EOSINOPHIL NFR BLD AUTO: 3.4 % (ref 0–4)
ERYTHROCYTE [DISTWIDTH] IN BLOOD BY AUTOMATED COUNT: 12.4 % (ref 12–15)
GFR SERPL CREATININE-BSD FRML MDRD: 101 ML/MIN/1.73
GLOBULIN UR ELPH-MCNC: 2.5 GM/DL
GLUCOSE BLD-MCNC: 83 MG/DL (ref 70–100)
GLUCOSE UR STRIP-MCNC: NEGATIVE MG/DL
HCT VFR BLD AUTO: 34.9 % (ref 40–52)
HGB BLD-MCNC: 12.1 G/DL (ref 14–18)
HGB UR QL STRIP.AUTO: NEGATIVE
IMM GRANULOCYTES # BLD: 0.01 10*3/MM3 (ref 0–0.03)
IMM GRANULOCYTES NFR BLD: 0.1 % (ref 0–5)
KETONES UR QL STRIP: NEGATIVE
LEUKOCYTE ESTERASE UR QL STRIP.AUTO: NEGATIVE
LYMPHOCYTES # BLD AUTO: 1.86 10*3/MM3 (ref 0.72–4.86)
LYMPHOCYTES NFR BLD AUTO: 26.6 % (ref 15–45)
MCH RBC QN AUTO: 32.7 PG (ref 28–32)
MCHC RBC AUTO-ENTMCNC: 34.7 G/DL (ref 33–36)
MCV RBC AUTO: 94.3 FL (ref 82–95)
MONOCYTES # BLD AUTO: 0.74 10*3/MM3 (ref 0.19–1.3)
MONOCYTES NFR BLD AUTO: 10.6 % (ref 4–12)
NEUTROPHILS # BLD AUTO: 4.1 10*3/MM3 (ref 1.87–8.4)
NEUTROPHILS NFR BLD AUTO: 58.9 % (ref 39–78)
NITRITE UR QL STRIP: NEGATIVE
NRBC BLD MANUAL-RTO: 0 /100 WBC (ref 0–0)
PH UR STRIP.AUTO: 6 [PH] (ref 5–8)
PLATELET # BLD AUTO: 254 10*3/MM3 (ref 130–400)
PMV BLD AUTO: 9.5 FL (ref 6–12)
POTASSIUM BLD-SCNC: 4.7 MMOL/L (ref 3.5–5.3)
PROT SERPL-MCNC: 6.8 G/DL (ref 6.3–8.7)
PROT UR QL STRIP: NEGATIVE
RBC # BLD AUTO: 3.7 10*6/MM3 (ref 4.8–5.9)
SODIUM BLD-SCNC: 137 MMOL/L (ref 135–145)
SP GR UR STRIP: 1.01 (ref 1–1.03)
UROBILINOGEN UR QL STRIP: NORMAL
WBC NRBC COR # BLD: 6.98 10*3/MM3 (ref 4.8–10.8)

## 2018-03-02 PROCEDURE — 80053 COMPREHEN METABOLIC PANEL: CPT | Performed by: NEUROLOGICAL SURGERY

## 2018-03-02 PROCEDURE — 36415 COLL VENOUS BLD VENIPUNCTURE: CPT

## 2018-03-02 PROCEDURE — 81003 URINALYSIS AUTO W/O SCOPE: CPT | Performed by: NEUROLOGICAL SURGERY

## 2018-03-02 PROCEDURE — 85025 COMPLETE CBC W/AUTO DIFF WBC: CPT | Performed by: NEUROLOGICAL SURGERY

## 2018-03-02 PROCEDURE — 93010 ELECTROCARDIOGRAM REPORT: CPT | Performed by: INTERNAL MEDICINE

## 2018-03-02 PROCEDURE — 93005 ELECTROCARDIOGRAM TRACING: CPT

## 2018-03-02 RX ORDER — FUROSEMIDE 40 MG/1
40 TABLET ORAL DAILY
COMMUNITY
End: 2021-02-08

## 2018-03-02 NOTE — DISCHARGE INSTRUCTIONS
DAY OF SURGERY INSTRUCTIONS        YOUR SURGEON: Dr. Falcon    PROCEDURE: Lumbar laminectomy 3-4 4-5    DATE OF SURGERY: March 12, 2018    ARRIVAL TIME: AS DIRECTED BY OFFICE    DAY OF SURGERY TAKE ONLY THESE MEDICATIONS: none        BEFORE YOU COME TO THE HOSPITAL  (Pre-op instructions)  • Do not eat, drink, smoke or chew gum after midnight the night before surgery.  This also includes no mints.  • Morning of surgery take only the medicines you have been instructed with a sip of water unless otherwise instructed  by your physician.  • Do not shave, wear makeup or dark nail polish.  • Remove all jewelry including rings.  • Leave anything you consider valuable at home.  • Leave your suitcase in the car until after your surgery.  • Bring the following with you if applicable:  o Picture ID and insurance, Medicare or Medicaid cards  o Co-pay/deductible required by insurance (cash, check, credit card)  o Copy of advance directive, living will or power-of- documents if not brought to PAT  o CPAP or BIPAP mask and tubing  o Relaxation aids (MP3 player, book, magazine)  • On the day of surgery check in at registration located at the main entrance of the hospital.       Outpatient Surgery Guidelines, Adult  Outpatient procedures are those for which the person having the procedure is allowed to go home the same day as the procedure. Various procedures are done on an outpatient basis. You should follow some general guidelines if you will be having an outpatient procedure.  LET YOUR HEALTH CARE PROVIDER KNOW ABOUT:  · Any allergies you have.  · All medicines you are taking, including vitamins, herbs, eye drops, creams, and over-the-counter medicines.  · Previous problems you or members of your family have had with the use of anesthetics.  · Any blood disorders you have.  · Previous surgeries you have had.  · Medical conditions you have.  RISKS AND COMPLICATIONS  Your health care provider will discuss possible risks  and complications with you before surgery. Common risks and complications include:    · Problems due to the use of anesthetics.  · Blood loss and replacement (does not apply to minor surgical procedures).  · Temporary increase in pain due to surgery.  · Uncorrected pain or problems that the surgery was meant to correct.  · Infection.  · New damage.  BEFORE THE PROCEDURE  · Ask your health care provider about changing or stopping your regular medicines. You may need to stop taking certain medicines in the days or weeks before the procedure.  · Stop smoking at least 2 weeks before surgery. This lowers your risk for complications during and after surgery. Ask your health care provider for help with this if needed.  · Eat your usual meals and a light supper the day before surgery. Continue fluid intake. Do not drink alcohol.  · Do not eat or drink after midnight the night before your surgery.   · Arrange for someone to take you home and to stay with you for 24 hours after the procedure. Medicine given for your procedure may affect your ability to drive or to care for yourself.  · Call your health care provider's office if you develop an illness or problem that may prevent you from safely having your procedure.  AFTER THE PROCEDURE  After surgery, you will be taken to a recovery area, where your progress will be monitored. If there are no complications, you will be allowed to go home when you are awake, stable, and taking fluids well. You may have numbness around the surgical site. Healing will take some time. You will have tenderness at the surgical site and may have some swelling and bruising. You may also have some nausea.  HOME CARE INSTRUCTIONS  · Do not drive for 24 hours, or as directed by your health care provider. Do not drive while taking prescription pain medicines.  · Do not drink alcohol for 24 hours.  · Do not make important decisions or sign legal documents for 24 hours.  · You may resume a normal diet and  activities as directed.  · Do not lift anything heavier than 10 pounds (4.5 kg) or play contact sports until your health care provider says it is okay.  · Change your bandages (dressings) as directed.  · Only take over-the-counter or prescription medicines as directed by your health care provider.  · Follow up with your health care provider as directed.  SEEK MEDICAL CARE IF:  · You have increased bleeding (more than a small spot) from the surgical site.  · You have redness, swelling, or increasing pain in the wound.  · You see pus coming from the wound.  · You have a fever.  · You notice a bad smell coming from the wound or dressing.  · You feel lightheaded or faint.  · You develop a rash.  · You have trouble breathing.  · You develop allergies.  MAKE SURE YOU:  · Understand these instructions.  · Will watch your condition.  · Will get help right away if you are not doing well or get worse.     This information is not intended to replace advice given to you by your health care provider. Make sure you discuss any questions you have with your health care provider.     Document Released: 09/12/2002 Document Revised: 05/03/2016 Document Reviewed: 05/22/2014  [employer] Interactive Patient Education ©2016 [employer]       Fall Prevention in Hospitals, Adult  As a hospital patient, your condition and the treatments you receive can increase your risk for falls. Some additional risk factors for falls in a hospital include:  · Being in an unfamiliar environment.  · Being on bed rest.  · Your surgery.  · Taking certain medicines.  · Your tubing requirements, such as intravenous (IV) therapy or catheters.  It is important that you learn how to decrease fall risks while at the hospital. Below are important tips that can help prevent falls.  SAFETY TIPS FOR PREVENTING FALLS  Talk about your risk of falling.  · Ask your health care provider why you are at risk for falling. Is it your medicine, illness, tubing placement, or  something else?  · Make a plan with your health care provider to keep you safe from falls.  · Ask your health care provider or pharmacist about side effects of your medicines. Some medicines can make you dizzy or affect your coordination.  Ask for help.  · Ask for help before getting out of bed. You may need to press your call button.  · Ask for assistance in getting safely to the toilet.  · Ask for a walker or cane to be put at your bedside. Ask that most of the side rails on your bed be placed up before your health care provider leaves the room.  · Ask family or friends to sit with you.  · Ask for things that are out of your reach, such as your glasses, hearing aids, telephone, bedside table, or call button.  Follow these tips to avoid falling:  · Stay lying or seated, rather than standing, while waiting for help.  · Wear rubber-soled slippers or shoes whenever you walk in the hospital.  · Avoid quick, sudden movements.  ¨ Change positions slowly.  ¨ Sit on the side of your bed before standing.  ¨ Stand up slowly and wait before you start to walk.  · Let your health care provider know if there is a spill on the floor.  · Pay careful attention to the medical equipment, electrical cords, and tubes around you.  · When you need help, use your call button by your bed or in the bathroom. Wait for one of your health care providers to help you.  · If you feel dizzy or unsure of your footing, return to bed and wait for assistance.  · Avoid being distracted by the TV, telephone, or another person in your room.  · Do not lean or support yourself on rolling objects, such as IV poles or bedside tables.     This information is not intended to replace advice given to you by your health care provider. Make sure you discuss any questions you have with your health care provider.     Document Released: 12/15/2001 Document Revised: 01/08/2016 Document Reviewed: 08/25/2013  [employer] Interactive Patient Education ©2016 [employer]       Surgical Site Infections FAQs  What is a Surgical Site Infection (SSI)?  A surgical site infection is an infection that occurs after surgery in the part of the body where the surgery took place. Most patients who have surgery do not develop an infection. However, infections develop in about 1 to 3 out of every 100 patients who have surgery.  Some of the common symptoms of a surgical site infection are:  · Redness and pain around the area where you had surgery  · Drainage of cloudy fluid from your surgical wound  · Fever  Can SSIs be treated?  Yes. Most surgical site infections can be treated with antibiotics. The antibiotic given to you depends on the bacteria (germs) causing the infection. Sometimes patients with SSIs also need another surgery to treat the infection.  What are some of the things that hospitals are doing to prevent SSIs?  To prevent SSIs, doctors, nurses, and other healthcare providers:  · Clean their hands and arms up to their elbows with an antiseptic agent just before the surgery.  · Clean their hands with soap and water or an alcohol-based hand rub before and after caring for each patient.  · May remove some of your hair immediately before your surgery using electric clippers if the hair is in the same area where the procedure will occur. They should not shave you with a razor.  · Wear special hair covers, masks, gowns, and gloves during surgery to keep the surgery area clean.  · Give you antibiotics before your surgery starts. In most cases, you should get antibiotics within 60 minutes before the surgery starts and the antibiotics should be stopped within 24 hours after surgery.  · Clean the skin at the site of your surgery with a special soap that kills germs.  What can I do to help prevent SSIs?  Before your surgery:  · Tell your doctor about other medical problems you may have. Health problems such as allergies, diabetes, and obesity could affect your surgery and your  treatment.  · Quit smoking. Patients who smoke get more infections. Talk to your doctor about how you can quit before your surgery.  · Do not shave near where you will have surgery. Shaving with a razor can irritate your skin and make it easier to develop an infection.  At the time of your surgery:  · Speak up if someone tries to shave you with a razor before surgery. Ask why you need to be shaved and talk with your surgeon if you have any concerns.  · Ask if you will get antibiotics before surgery.  After your surgery:  · Make sure that your healthcare providers clean their hands before examining you, either with soap and water or an alcohol-based hand rub.  · If you do not see your providers clean their hands, please ask them to do so.  · Family and friends who visit you should not touch the surgical wound or dressings.  · Family and friends should clean their hands with soap and water or an alcohol-based hand rub before and after visiting you. If you do not see them clean their hands, ask them to clean their hands.  What do I need to do when I go home from the hospital?  · Before you go home, your doctor or nurse should explain everything you need to know about taking care of your wound. Make sure you understand how to care for your wound before you leave the hospital.  · Always clean your hands before and after caring for your wound.  · Before you go home, make sure you know who to contact if you have questions or problems after you get home.  · If you have any symptoms of an infection, such as redness and pain at the surgery site, drainage, or fever, call your doctor immediately.  If you have additional questions, please ask your doctor or nurse.  Developed and co-sponsored by The Society for Healthcare Epidemiology of [place] (SHEA); Infectious Diseases Society of [place] (IDSA); American Hospital Association; Association for Professionals in Infection Control and Epidemiology (APIC); Centers for Disease  Control and Prevention (CDC); and The Joint Commission.     This information is not intended to replace advice given to you by your health care provider. Make sure you discuss any questions you have with your health care provider.     Document Released: 12/23/2014 Document Revised: 01/08/2016 Document Reviewed: 03/02/2016  [employer] Interactive Patient Education ©2016 [employer].       [facility]  CHG 4% Patient Instruction Sheet    Preparing the Skin Before Surgery  Preparing or “prepping” skin before surgery can reduce the risk of infection at the surgical site. To make the process easier,[facility] has chosen 4% Chlorhexidine Gluconate (CHG) antiseptic solution.   The steps below outline the prepping process and should be carefully followed.                                                                                                                                                      Prep the skin at the following time(s):                                                      We recommend you shower the night before surgery, and again the morning of surgery with the 4% CHG antiseptic solution using half of the bottle and a cloth each time.  Dress in clean clothes/sleepwear after showering.  See instructions below for application.          Do not apply any lotions or moisturizers.       Do not shave the area to be prepped for at least 2 days prior to surgery.    Clipping the hair may be done immediately prior to your surgery at the hospital    if needed.    Directions:  Thoroughly rinse your body with water.  Apply 4% CHG to a cloth and wash skin gently, paying special attention to the operative site.  Rinse again thoroughly.  Once you have begun using this product do not apply anything else to your skin. If itching or redness persists, rinse affected areas and discontinue use.    When using this product:  • Keep out of eyes, ears, and mouth.  • If solution should contact these areas, rinse out promptly  and thoroughly with water.  • For external use only.  • Do not use in genital area, on your face or head.      PATIENT/FAMILY/RESPONSIBLE PARTY VERBALIZES UNDERSTANDING OF ABOVE EDUCATION.  COPY OF PAIN SCALE GIVEN AND REVIEWED WITH VERBALIZED UNDERSTANDING.

## 2018-03-09 ENCOUNTER — ANESTHESIA EVENT (OUTPATIENT)
Dept: PERIOP | Facility: HOSPITAL | Age: 83
End: 2018-03-09

## 2018-03-12 ENCOUNTER — APPOINTMENT (OUTPATIENT)
Dept: GENERAL RADIOLOGY | Facility: HOSPITAL | Age: 83
End: 2018-03-12

## 2018-03-12 ENCOUNTER — HOSPITAL ENCOUNTER (OUTPATIENT)
Facility: HOSPITAL | Age: 83
LOS: 1 days | Discharge: HOME HEALTH | End: 2018-03-14
Attending: NEUROLOGICAL SURGERY | Admitting: NEUROLOGICAL SURGERY

## 2018-03-12 ENCOUNTER — ANESTHESIA (OUTPATIENT)
Dept: PERIOP | Facility: HOSPITAL | Age: 83
End: 2018-03-12

## 2018-03-12 DIAGNOSIS — Z74.09 IMPAIRED MOBILITY AND ADLS: ICD-10-CM

## 2018-03-12 DIAGNOSIS — Z74.09 IMPAIRED MOBILITY: ICD-10-CM

## 2018-03-12 DIAGNOSIS — M48.062 SPINAL STENOSIS, LUMBAR REGION, WITH NEUROGENIC CLAUDICATION: ICD-10-CM

## 2018-03-12 DIAGNOSIS — Z78.9 IMPAIRED MOBILITY AND ADLS: ICD-10-CM

## 2018-03-12 LAB
ABO GROUP BLD: NORMAL
BLD GP AB SCN SERPL QL: NEGATIVE
RH BLD: NEGATIVE

## 2018-03-12 PROCEDURE — 94799 UNLISTED PULMONARY SVC/PX: CPT

## 2018-03-12 PROCEDURE — 25010000002 PROPOFOL 10 MG/ML EMULSION: Performed by: NURSE ANESTHETIST, CERTIFIED REGISTERED

## 2018-03-12 PROCEDURE — 86901 BLOOD TYPING SEROLOGIC RH(D): CPT | Performed by: NEUROLOGICAL SURGERY

## 2018-03-12 PROCEDURE — 72020 X-RAY EXAM OF SPINE 1 VIEW: CPT

## 2018-03-12 PROCEDURE — 86850 RBC ANTIBODY SCREEN: CPT | Performed by: NEUROLOGICAL SURGERY

## 2018-03-12 PROCEDURE — 25010000002 SUCCINYLCHOLINE PER 20 MG: Performed by: NURSE ANESTHETIST, CERTIFIED REGISTERED

## 2018-03-12 PROCEDURE — 25010000003 CEFAZOLIN PER 500 MG: Performed by: NURSE PRACTITIONER

## 2018-03-12 PROCEDURE — 25010000002 ONDANSETRON PER 1 MG: Performed by: NURSE ANESTHETIST, CERTIFIED REGISTERED

## 2018-03-12 PROCEDURE — 25010000002 DEXAMETHASONE PER 1 MG: Performed by: NURSE ANESTHETIST, CERTIFIED REGISTERED

## 2018-03-12 PROCEDURE — G0378 HOSPITAL OBSERVATION PER HR: HCPCS

## 2018-03-12 PROCEDURE — 25010000002 PROMETHAZINE PER 50 MG: Performed by: NEUROLOGICAL SURGERY

## 2018-03-12 PROCEDURE — 86900 BLOOD TYPING SEROLOGIC ABO: CPT | Performed by: NEUROLOGICAL SURGERY

## 2018-03-12 PROCEDURE — 25010000002 FENTANYL CITRATE (PF) 250 MCG/5ML SOLUTION: Performed by: NURSE ANESTHETIST, CERTIFIED REGISTERED

## 2018-03-12 PROCEDURE — 63030 LAMOT DCMPRN NRV RT 1 LMBR: CPT | Performed by: NEUROLOGICAL SURGERY

## 2018-03-12 PROCEDURE — 76000 FLUOROSCOPY <1 HR PHYS/QHP: CPT

## 2018-03-12 PROCEDURE — 25010000002 ONDANSETRON PER 1 MG: Performed by: NURSE PRACTITIONER

## 2018-03-12 PROCEDURE — 25010000002 HYDROMORPHONE PER 4 MG: Performed by: ANESTHESIOLOGY

## 2018-03-12 RX ORDER — DEXTROSE MONOHYDRATE 25 G/50ML
12.5 INJECTION, SOLUTION INTRAVENOUS AS NEEDED
Status: DISCONTINUED | OUTPATIENT
Start: 2018-03-12 | End: 2018-03-12 | Stop reason: HOSPADM

## 2018-03-12 RX ORDER — FINASTERIDE 5 MG/1
5 TABLET, FILM COATED ORAL DAILY
Status: DISCONTINUED | OUTPATIENT
Start: 2018-03-12 | End: 2018-03-14 | Stop reason: HOSPADM

## 2018-03-12 RX ORDER — SODIUM CHLORIDE 9 MG/ML
INJECTION, SOLUTION INTRAVENOUS AS NEEDED
Status: DISCONTINUED | OUTPATIENT
Start: 2018-03-12 | End: 2018-03-12 | Stop reason: HOSPADM

## 2018-03-12 RX ORDER — SODIUM CHLORIDE 9 MG/ML
75 INJECTION, SOLUTION INTRAVENOUS CONTINUOUS
Status: DISCONTINUED | OUTPATIENT
Start: 2018-03-12 | End: 2018-03-14 | Stop reason: HOSPADM

## 2018-03-12 RX ORDER — TAMSULOSIN HYDROCHLORIDE 0.4 MG/1
0.4 CAPSULE ORAL NIGHTLY
Status: DISCONTINUED | OUTPATIENT
Start: 2018-03-12 | End: 2018-03-14 | Stop reason: HOSPADM

## 2018-03-12 RX ORDER — SODIUM CHLORIDE, SODIUM LACTATE, POTASSIUM CHLORIDE, CALCIUM CHLORIDE 600; 310; 30; 20 MG/100ML; MG/100ML; MG/100ML; MG/100ML
9 INJECTION, SOLUTION INTRAVENOUS CONTINUOUS
Status: DISCONTINUED | OUTPATIENT
Start: 2018-03-12 | End: 2018-03-12 | Stop reason: HOSPADM

## 2018-03-12 RX ORDER — MIDAZOLAM HYDROCHLORIDE 1 MG/ML
2 INJECTION INTRAMUSCULAR; INTRAVENOUS
Status: DISCONTINUED | OUTPATIENT
Start: 2018-03-12 | End: 2018-03-12 | Stop reason: HOSPADM

## 2018-03-12 RX ORDER — MIDAZOLAM HYDROCHLORIDE 1 MG/ML
1 INJECTION INTRAMUSCULAR; INTRAVENOUS
Status: DISCONTINUED | OUTPATIENT
Start: 2018-03-12 | End: 2018-03-12 | Stop reason: HOSPADM

## 2018-03-12 RX ORDER — ROCURONIUM BROMIDE 10 MG/ML
INJECTION, SOLUTION INTRAVENOUS AS NEEDED
Status: DISCONTINUED | OUTPATIENT
Start: 2018-03-12 | End: 2018-03-12 | Stop reason: SURG

## 2018-03-12 RX ORDER — ONDANSETRON 2 MG/ML
4 INJECTION INTRAMUSCULAR; INTRAVENOUS EVERY 6 HOURS PRN
Status: DISCONTINUED | OUTPATIENT
Start: 2018-03-12 | End: 2018-03-14 | Stop reason: HOSPADM

## 2018-03-12 RX ORDER — HYDRALAZINE HYDROCHLORIDE 20 MG/ML
5 INJECTION INTRAMUSCULAR; INTRAVENOUS
Status: DISCONTINUED | OUTPATIENT
Start: 2018-03-12 | End: 2018-03-12 | Stop reason: HOSPADM

## 2018-03-12 RX ORDER — NALOXONE HCL 0.4 MG/ML
0.4 VIAL (ML) INJECTION
Status: DISCONTINUED | OUTPATIENT
Start: 2018-03-12 | End: 2018-03-14 | Stop reason: HOSPADM

## 2018-03-12 RX ORDER — LIDOCAINE HYDROCHLORIDE 20 MG/ML
INJECTION, SOLUTION INFILTRATION; PERINEURAL AS NEEDED
Status: DISCONTINUED | OUTPATIENT
Start: 2018-03-12 | End: 2018-03-12 | Stop reason: SURG

## 2018-03-12 RX ORDER — VASOPRESSIN 20 U/ML
INJECTION PARENTERAL AS NEEDED
Status: DISCONTINUED | OUTPATIENT
Start: 2018-03-12 | End: 2018-03-12 | Stop reason: SURG

## 2018-03-12 RX ORDER — METOPROLOL SUCCINATE 25 MG/1
25 TABLET, EXTENDED RELEASE ORAL DAILY
Status: DISCONTINUED | OUTPATIENT
Start: 2018-03-12 | End: 2018-03-14 | Stop reason: HOSPADM

## 2018-03-12 RX ORDER — NALOXONE HCL 0.4 MG/ML
0.4 VIAL (ML) INJECTION AS NEEDED
Status: DISCONTINUED | OUTPATIENT
Start: 2018-03-12 | End: 2018-03-12 | Stop reason: HOSPADM

## 2018-03-12 RX ORDER — SODIUM CHLORIDE 0.9 % (FLUSH) 0.9 %
1-10 SYRINGE (ML) INJECTION AS NEEDED
Status: DISCONTINUED | OUTPATIENT
Start: 2018-03-12 | End: 2018-03-12 | Stop reason: HOSPADM

## 2018-03-12 RX ORDER — FENTANYL CITRATE 50 UG/ML
INJECTION, SOLUTION INTRAMUSCULAR; INTRAVENOUS AS NEEDED
Status: DISCONTINUED | OUTPATIENT
Start: 2018-03-12 | End: 2018-03-12 | Stop reason: SURG

## 2018-03-12 RX ORDER — FAMOTIDINE 20 MG/1
40 TABLET, FILM COATED ORAL DAILY
Status: DISCONTINUED | OUTPATIENT
Start: 2018-03-12 | End: 2018-03-12

## 2018-03-12 RX ORDER — GLYCOPYRROLATE 0.2 MG/ML
INJECTION INTRAMUSCULAR; INTRAVENOUS AS NEEDED
Status: DISCONTINUED | OUTPATIENT
Start: 2018-03-12 | End: 2018-03-12 | Stop reason: SURG

## 2018-03-12 RX ORDER — ONDANSETRON 2 MG/ML
INJECTION INTRAMUSCULAR; INTRAVENOUS AS NEEDED
Status: DISCONTINUED | OUTPATIENT
Start: 2018-03-12 | End: 2018-03-12 | Stop reason: SURG

## 2018-03-12 RX ORDER — BISACODYL 10 MG
10 SUPPOSITORY, RECTAL RECTAL DAILY PRN
Status: DISCONTINUED | OUTPATIENT
Start: 2018-03-12 | End: 2018-03-14 | Stop reason: HOSPADM

## 2018-03-12 RX ORDER — ACETAMINOPHEN 325 MG/1
650 TABLET ORAL EVERY 4 HOURS PRN
Status: DISCONTINUED | OUTPATIENT
Start: 2018-03-12 | End: 2018-03-14 | Stop reason: HOSPADM

## 2018-03-12 RX ORDER — PROMETHAZINE HYDROCHLORIDE 25 MG/ML
25 INJECTION, SOLUTION INTRAMUSCULAR; INTRAVENOUS EVERY 6 HOURS PRN
Status: DISCONTINUED | OUTPATIENT
Start: 2018-03-12 | End: 2018-03-14 | Stop reason: HOSPADM

## 2018-03-12 RX ORDER — HYDROCODONE BITARTRATE AND ACETAMINOPHEN 7.5; 325 MG/1; MG/1
1 TABLET ORAL EVERY 4 HOURS PRN
Status: DISCONTINUED | OUTPATIENT
Start: 2018-03-12 | End: 2018-03-13

## 2018-03-12 RX ORDER — MAGNESIUM HYDROXIDE 1200 MG/15ML
LIQUID ORAL AS NEEDED
Status: DISCONTINUED | OUTPATIENT
Start: 2018-03-12 | End: 2018-03-12 | Stop reason: HOSPADM

## 2018-03-12 RX ORDER — MEPERIDINE HYDROCHLORIDE 25 MG/ML
12.5 INJECTION INTRAMUSCULAR; INTRAVENOUS; SUBCUTANEOUS
Status: DISCONTINUED | OUTPATIENT
Start: 2018-03-12 | End: 2018-03-12 | Stop reason: HOSPADM

## 2018-03-12 RX ORDER — FENTANYL CITRATE 50 UG/ML
25 INJECTION, SOLUTION INTRAMUSCULAR; INTRAVENOUS
Status: DISCONTINUED | OUTPATIENT
Start: 2018-03-12 | End: 2018-03-12 | Stop reason: HOSPADM

## 2018-03-12 RX ORDER — SUCCINYLCHOLINE CHLORIDE 20 MG/ML
INJECTION INTRAMUSCULAR; INTRAVENOUS AS NEEDED
Status: DISCONTINUED | OUTPATIENT
Start: 2018-03-12 | End: 2018-03-12 | Stop reason: SURG

## 2018-03-12 RX ORDER — LABETALOL HYDROCHLORIDE 5 MG/ML
5 INJECTION, SOLUTION INTRAVENOUS
Status: DISCONTINUED | OUTPATIENT
Start: 2018-03-12 | End: 2018-03-12 | Stop reason: HOSPADM

## 2018-03-12 RX ORDER — DEXAMETHASONE SODIUM PHOSPHATE 4 MG/ML
INJECTION, SOLUTION INTRA-ARTICULAR; INTRALESIONAL; INTRAMUSCULAR; INTRAVENOUS; SOFT TISSUE AS NEEDED
Status: DISCONTINUED | OUTPATIENT
Start: 2018-03-12 | End: 2018-03-12 | Stop reason: SURG

## 2018-03-12 RX ORDER — MORPHINE SULFATE 2 MG/ML
2 INJECTION, SOLUTION INTRAMUSCULAR; INTRAVENOUS
Status: DISCONTINUED | OUTPATIENT
Start: 2018-03-12 | End: 2018-03-12 | Stop reason: HOSPADM

## 2018-03-12 RX ORDER — SODIUM CHLORIDE 0.9 % (FLUSH) 0.9 %
1-10 SYRINGE (ML) INJECTION AS NEEDED
Status: DISCONTINUED | OUTPATIENT
Start: 2018-03-12 | End: 2018-03-14 | Stop reason: HOSPADM

## 2018-03-12 RX ORDER — IPRATROPIUM BROMIDE AND ALBUTEROL SULFATE 2.5; .5 MG/3ML; MG/3ML
3 SOLUTION RESPIRATORY (INHALATION) ONCE AS NEEDED
Status: DISCONTINUED | OUTPATIENT
Start: 2018-03-12 | End: 2018-03-12 | Stop reason: HOSPADM

## 2018-03-12 RX ORDER — FUROSEMIDE 40 MG/1
40 TABLET ORAL DAILY
Status: DISCONTINUED | OUTPATIENT
Start: 2018-03-12 | End: 2018-03-14 | Stop reason: HOSPADM

## 2018-03-12 RX ORDER — FAMOTIDINE 10 MG/ML
20 INJECTION, SOLUTION INTRAVENOUS DAILY
Status: DISCONTINUED | OUTPATIENT
Start: 2018-03-12 | End: 2018-03-14 | Stop reason: HOSPADM

## 2018-03-12 RX ORDER — PROPOFOL 10 MG/ML
VIAL (ML) INTRAVENOUS AS NEEDED
Status: DISCONTINUED | OUTPATIENT
Start: 2018-03-12 | End: 2018-03-12 | Stop reason: SURG

## 2018-03-12 RX ORDER — LIDOCAINE HYDROCHLORIDE 40 MG/ML
SOLUTION TOPICAL AS NEEDED
Status: DISCONTINUED | OUTPATIENT
Start: 2018-03-12 | End: 2018-03-12 | Stop reason: SURG

## 2018-03-12 RX ORDER — MORPHINE SULFATE 2 MG/ML
1 INJECTION, SOLUTION INTRAMUSCULAR; INTRAVENOUS EVERY 4 HOURS PRN
Status: DISCONTINUED | OUTPATIENT
Start: 2018-03-12 | End: 2018-03-13

## 2018-03-12 RX ORDER — DIPHENHYDRAMINE HYDROCHLORIDE 50 MG/ML
12.5 INJECTION INTRAMUSCULAR; INTRAVENOUS
Status: DISCONTINUED | OUTPATIENT
Start: 2018-03-12 | End: 2018-03-12 | Stop reason: HOSPADM

## 2018-03-12 RX ORDER — ONDANSETRON 2 MG/ML
4 INJECTION INTRAMUSCULAR; INTRAVENOUS ONCE AS NEEDED
Status: DISCONTINUED | OUTPATIENT
Start: 2018-03-12 | End: 2018-03-12 | Stop reason: HOSPADM

## 2018-03-12 RX ORDER — SODIUM CHLORIDE, SODIUM LACTATE, POTASSIUM CHLORIDE, CALCIUM CHLORIDE 600; 310; 30; 20 MG/100ML; MG/100ML; MG/100ML; MG/100ML
30 INJECTION, SOLUTION INTRAVENOUS CONTINUOUS
Status: DISCONTINUED | OUTPATIENT
Start: 2018-03-12 | End: 2018-03-12 | Stop reason: HOSPADM

## 2018-03-12 RX ORDER — DOCUSATE SODIUM 100 MG/1
100 CAPSULE, LIQUID FILLED ORAL 2 TIMES DAILY PRN
Status: DISCONTINUED | OUTPATIENT
Start: 2018-03-12 | End: 2018-03-14 | Stop reason: HOSPADM

## 2018-03-12 RX ORDER — SENNA AND DOCUSATE SODIUM 50; 8.6 MG/1; MG/1
1 TABLET, FILM COATED ORAL NIGHTLY PRN
Status: DISCONTINUED | OUTPATIENT
Start: 2018-03-12 | End: 2018-03-14 | Stop reason: HOSPADM

## 2018-03-12 RX ADMIN — SODIUM CHLORIDE, POTASSIUM CHLORIDE, SODIUM LACTATE AND CALCIUM CHLORIDE: 600; 310; 30; 20 INJECTION, SOLUTION INTRAVENOUS at 11:53

## 2018-03-12 RX ADMIN — VASOPRESSIN 1 UNITS: 20 INJECTION INTRAVENOUS at 11:01

## 2018-03-12 RX ADMIN — PROMETHAZINE HYDROCHLORIDE 25 MG: 25 INJECTION INTRAMUSCULAR; INTRAVENOUS at 23:13

## 2018-03-12 RX ADMIN — FENTANYL CITRATE 100 MCG: 50 INJECTION INTRAMUSCULAR; INTRAVENOUS at 10:45

## 2018-03-12 RX ADMIN — SODIUM CHLORIDE, POTASSIUM CHLORIDE, SODIUM LACTATE AND CALCIUM CHLORIDE 30 ML/HR: 600; 310; 30; 20 INJECTION, SOLUTION INTRAVENOUS at 09:43

## 2018-03-12 RX ADMIN — FUROSEMIDE 40 MG: 40 TABLET ORAL at 16:56

## 2018-03-12 RX ADMIN — CEFAZOLIN 2 G: 1 INJECTION, POWDER, FOR SOLUTION INTRAVENOUS at 18:18

## 2018-03-12 RX ADMIN — FENTANYL CITRATE 150 MCG: 50 INJECTION INTRAMUSCULAR; INTRAVENOUS at 10:40

## 2018-03-12 RX ADMIN — ONDANSETRON 4 MG: 2 INJECTION INTRAMUSCULAR; INTRAVENOUS at 18:18

## 2018-03-12 RX ADMIN — FAMOTIDINE 20 MG: 10 INJECTION, SOLUTION INTRAVENOUS at 23:13

## 2018-03-12 RX ADMIN — ROCURONIUM BROMIDE 5 MG: 10 INJECTION INTRAVENOUS at 10:40

## 2018-03-12 RX ADMIN — DEXAMETHASONE SODIUM PHOSPHATE 4 MG: 4 INJECTION, SOLUTION INTRAMUSCULAR; INTRAVENOUS at 10:54

## 2018-03-12 RX ADMIN — LIDOCAINE HYDROCHLORIDE 1 EACH: 40 SOLUTION TOPICAL at 10:40

## 2018-03-12 RX ADMIN — SODIUM CHLORIDE 75 ML/HR: 9 INJECTION, SOLUTION INTRAVENOUS at 14:59

## 2018-03-12 RX ADMIN — HYDROMORPHONE HYDROCHLORIDE 1 MG: 1 INJECTION, SOLUTION INTRAMUSCULAR; INTRAVENOUS; SUBCUTANEOUS at 13:57

## 2018-03-12 RX ADMIN — PROPOFOL 50 MG: 10 INJECTION, EMULSION INTRAVENOUS at 12:30

## 2018-03-12 RX ADMIN — FAMOTIDINE 40 MG: 20 TABLET, FILM COATED ORAL at 22:38

## 2018-03-12 RX ADMIN — PROPOFOL 200 MG: 10 INJECTION, EMULSION INTRAVENOUS at 10:40

## 2018-03-12 RX ADMIN — ONDANSETRON HYDROCHLORIDE 4 MG: 2 SOLUTION INTRAMUSCULAR; INTRAVENOUS at 12:59

## 2018-03-12 RX ADMIN — GLYCOPYRROLATE 0.2 MG: 0.2 INJECTION, SOLUTION INTRAMUSCULAR; INTRAVENOUS at 10:37

## 2018-03-12 RX ADMIN — FINASTERIDE 5 MG: 5 TABLET, FILM COATED ORAL at 16:56

## 2018-03-12 RX ADMIN — LIDOCAINE HYDROCHLORIDE 40 MG: 20 INJECTION, SOLUTION INFILTRATION; PERINEURAL at 10:40

## 2018-03-12 RX ADMIN — PROPOFOL 100 MG: 10 INJECTION, EMULSION INTRAVENOUS at 10:54

## 2018-03-12 RX ADMIN — HYDROMORPHONE HYDROCHLORIDE 1 MG: 1 INJECTION, SOLUTION INTRAMUSCULAR; INTRAVENOUS; SUBCUTANEOUS at 13:43

## 2018-03-12 RX ADMIN — EPHEDRINE SULFATE 15 MG: 50 INJECTION INTRAMUSCULAR; INTRAVENOUS; SUBCUTANEOUS at 11:53

## 2018-03-12 RX ADMIN — METOPROLOL SUCCINATE 25 MG: 25 TABLET, FILM COATED, EXTENDED RELEASE ORAL at 16:56

## 2018-03-12 RX ADMIN — Medication 2 G: at 10:43

## 2018-03-12 RX ADMIN — SUCCINYLCHOLINE CHLORIDE 140 MG: 20 INJECTION, SOLUTION INTRAMUSCULAR; INTRAVENOUS at 10:40

## 2018-03-12 NOTE — OP NOTE
Procedure Note  Preop Diagnosis: Spinal stenosis, lumbar region, with neurogenic claudication [M48.062]    Post-Op Diagnosis Codes:     * Spinal stenosis, lumbar region, with neurogenic claudication [M48.062]     Procedure Name:L3,L4 laminectomy, foramenotomy    Indications:  A MRI of the lumbar spine revealed findings of lumbar stenosis. The patient now presents for lumbar decompression after discussing therapeutic alternatives.          Surgeon: Kj Falcon MD     Assistants: None    Anesthesia: General endotracheal anesthesia    ASA Class: 3    Procedure Details   After obtaining informed consent, having the risks and benefits of the procedure explained including but not limited to infection, bleeding, paralysis, spinal fluid leak, bowel bladder incontinence, stroke, coma, and death.  The patient was brought to the operating.  He was given general anesthesia via an endotracheal tube.  He was flipped prone onto a Flako axis table.  Portable fluoroscopy was used to localize level of L3 and 4.  A preplanned midline incision was marked with an indelible marker.  The patient was then prepped and draped in a standard sterile fashion.  The preplanned incision was infiltrated Marcaine and epinephrine.  A 10 blade scalpel was used to make an incision through the dermis and epidermis.  Bovie cautery was used to extend the incision down to the subcutaneous and soft tissues to the level of the spinous processes.  The musculature and connective tissue then dissected off the spinous processes and lamina of L3 and L4 bilaterally.  A Kenedy instrument was placed under the lamina of L3.  Fluoroscopy confirm this was a correct level.  2 cerebellar retractors were then placed into the wound.  A Leksell rongeur was then used to remove the supraspinous and interspinous ligaments of L3 and 4.  And the spinous process and portions of the lamina removed using a Leksell rongeur.  The laminectomy was then completed at each level  using a 3 mm round cutting bur and a combination of 2 mm and 3 mm Kerrisons.  There was a tremendous scar tissue at the L3 4 junction.  The foramen bilaterally however could be decompressed with the 2 and 3 mm Kerrison.  Once we are satisfied that we had safely adequately decompressed all the neural structures the wound was copiously irrigated with an antibiotic solution.  It was inspected for hemostasis.  A 7 flat YECENIA drain was placed into the epidural space and anchored to the skin using a 4-0 Monocryl.  The deep tissues were then closed using a series of inverted interrupted 0 Vicryl sutures.  Subcutaneous and soft tissues were closed using a series of inverted interrupted 2-0 Vicryl sutures.  And the skin was closed using a running 4-0 Monocryl subcuticular.  All sponge needle and instrument counts were correct at the end the procedure.  The patient was extubated in stable condition returned recovery with about 80 mL of blood loss    Findings:  umbar stenosis]    Estimated Blood Loss: 80           Drains: 7 flat YECENIA           Total IV Fluids: ml           Specimens: None           Implants: * No implants in log *           Complications:  None           Disposition: PACU - hemodynamically stable.           Condition: stable        Kj Falcon MD

## 2018-03-12 NOTE — ANESTHESIA PROCEDURE NOTES
Airway  Urgency: elective    Airway not difficult    General Information and Staff    Patient location during procedure: OR  CRNA: TIFF GARCIA    Indications and Patient Condition  Indications for airway management: airway protection    Preoxygenated: yes  Mask difficulty assessment: 1 - vent by mask    Final Airway Details  Final airway type: endotracheal airway      Successful airway: ETT and reinforced tube  Cuffed: yes   Successful intubation technique: direct laryngoscopy  Facilitating devices/methods: intubating stylet  Endotracheal tube insertion site: oral  Blade: Vance  ETT size: 8.0 mm  Cormack-Lehane Classification: grade I - full view of glottis  Placement verified by: chest auscultation and capnometry   Measured from: lips  ETT to lips (cm): 23  Number of attempts at approach: 1

## 2018-03-12 NOTE — ANESTHESIA POSTPROCEDURE EVALUATION
"Patient: Chad Henriquez    Procedure Summary     Date:  03/12/18 Room / Location:   [facility]  /  [facility]    Anesthesia Start:  1036 Anesthesia Stop:  1318    Procedure:  LUMBAR LAMINECTOMY WITHOUT FUSION L3-4,4-5 (N/A Spine Lumbar) Diagnosis:       Spinal stenosis, lumbar region, with neurogenic claudication      (Spinal stenosis, lumbar region, with neurogenic claudication [M48.062])    Surgeon:  Kj Falcon MD Provider:  Abhijeet Wang CRNA    Anesthesia Type:  general ASA Status:  3          Anesthesia Type: general  Last vitals  BP   138/65 (03/12/18 1516)   Temp   97.8 °F (36.6 °C) (03/12/18 1516)   Pulse   71 (03/12/18 1516)   Resp   20 (03/12/18 1516)     SpO2   99 % (03/12/18 1516)     Post Anesthesia Care and Evaluation    PONV Status: none  Comments: Patient d/c from PACU prior to anes eval based on Colton score.  Please see RN notes for details of d/c criteria.    Blood pressure 138/65, pulse 71, temperature 97.8 °F (36.6 °C), temperature source Axillary, resp. rate 20, height 172.7 cm (68\"), weight 68.7 kg (151 lb 8 oz), SpO2 99 %.          "

## 2018-03-12 NOTE — ANESTHESIA PREPROCEDURE EVALUATION
Anesthesia Evaluation     Patient summary reviewed   history of anesthetic complications: PONV  NPO Solid Status: > 8 hours             Airway   Mallampati: II  TM distance: >3 FB  Neck ROM: full  Dental    (+) lower dentures and upper dentures    Pulmonary    (+) sleep apnea,   (-) COPD, asthma, not a smoker  Cardiovascular   Exercise tolerance: good (4-7 METS)    ECG reviewed  Patient on routine beta blocker and Beta blocker given within 24 hours of surgery    (+) hypertension, CAD, CABG (1986), PVD, hyperlipidemia,   (-) pacemaker, angina, cardiac stents    ROS comment: Low risk per [place]    Neuro/Psych  (-) seizures, TIA, CVA  GI/Hepatic/Renal/Endo    (-) GERD, liver disease, no renal disease, diabetes    Musculoskeletal     Abdominal    Substance History      OB/GYN          Other                        Anesthesia Plan    ASA 3     general     intravenous induction   Anesthetic plan and risks discussed with patient.

## 2018-03-12 NOTE — H&P (VIEW-ONLY)
Patient: Chad Henriquez  : 1929    Primary Care Provider: Zelalem Jackson MD    Requesting Provider: Zelalem Jackson MD        History    Chief Complaint: Left leg pain  Chief Complaint   Patient presents with   • back & leg pain     patient had an MRI @ [facility] and is here to discuss results; patient has constant back pain that radiates down his left hip and leg to his knee       History of Present Illness: 88 -year-old gentleman with a several year history of neurogenic claudication.  He describes left paraspinal back pain and left lower extremity radicular pain that starts in his buttock and hip and radiates into his thigh.  He is very clear when he stands and walks the pain gets worse.  The pain will disappear if he gets off his feet sits down or lies down.  He occasionally uses a cane.  He had some sort of non-instrumented lumbar surgery in 2016 and did not really improve from that.  He had a previous lumbar surgery 10 years before that and did very well.  He has done physical therapy in the past.  He has been working with a pain management specialist with injections for the last year to 2 years without any meaningful improvement.  He takes Tylenol or Motrin orally.  Retired from the [employer] is fully functional in his activities of daily living.  Continues to drive and lives independently    Review of Systems   Musculoskeletal: Positive for back pain.   All other systems reviewed and are negative.      Past Medical History:   Past Medical History:   Diagnosis Date   • Allergic rhinitis    • BPH (benign prostatic hypertrophy) with urinary retention    • Hard of hearing    • Heart attack     NO MUSCLE DAMAGE - JUST OCCLUDED VALVE   • High cholesterol    • History of skin cancer     BILATERAL EARS   • Hypertension    • Inguinal hernia    • Leaky heart valve     DR CONCEPCION SAYS NOT ENOUGH TO BE OF CONCERN   • Other bursal cyst, right elbow    • Sleep apnea     DOES NOT USE CPAP OR BIPAP    • Spinal stenosis of cervical region    • Spinal stenosis of lumbar region    • Wears hearing aid     BILATERAL       Past Surgical History:   Past Surgical History:   Procedure Laterality Date   • APPENDECTOMY     • BACK SURGERY      X3   • CARDIAC SURGERY  08/08/1986    X1 BYPASS   • CORONARY ANGIOPLASTY WITH STENT PLACEMENT  1997    X3 STENTS   • HERNIA REPAIR     • INGUINAL HERNIA REPAIR Right 6/22/2017    Procedure: RIGHT INGUINAL HERNIA REPAIR AND EXCISION OF CYST RIGHT ELBOW ;  Surgeon: Jackelyn Arriola MD;  Location: [facility];  Service:        Family History: family history includes No Known Problems in his father and mother.    Social History:  reports that he quit smoking about 69 years ago. His smoking use included Cigarettes. He quit after 6.00 years of use. He has never used smokeless tobacco. He reports that he does not drink alcohol or use illicit drugs.    Medications:    (Not in a hospital admission)    Allergies:  Codeine    Physical Exam:     Physical Exam   Constitutional: He is oriented to person, place, and time. He appears well-developed and well-nourished.   Cardiovascular: Normal rate and regular rhythm.    Pulmonary/Chest: Effort normal. No respiratory distress.   Abdominal: Soft. He exhibits no distension. There is no tenderness.   Neurological: He is oriented to person, place, and time. He has an abnormal Tandem Gait Test. He has a normal Finger-Nose-Finger Test. Gait normal.   Psychiatric: His speech is normal.       Neurologic Exam     Mental Status   Oriented to person, place, and time.   Attention: normal.   Speech: speech is normal   Level of consciousness: alert  Knowledge: good.     Cranial Nerves   Cranial nerves II through XII intact.     Motor Exam   Muscle bulk: normal  Overall muscle tone: normal  Right arm pronator drift: absent  Left arm pronator drift: absent    Strength   Strength 5/5 except as noted.        Right EHL 3 out of 5 right dorsiflexion 4 out of 5     Sensory  Exam   Light touch normal.   Pinprick normal.     Gait, Coordination, and Reflexes     Gait  Gait: normal    Coordination   Finger to nose coordination: normal  Tandem walking coordination: abnormal    Tremor   Resting tremor: absent  Intention tremor: absent  Action tremor: absent    Reflexes   Reflexes 2+ except as noted.     No Pain with active or passive range of motion of the hips    Independent Review of Radiographic Studies:   MRI the lumbar spine shows severe concentric lumbar stenosis mostly focused at L3 4 and 45.  His evidence of a previous laminectomy at L5-S1.    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  He has been through conservative care and injection therapy.  I think would be reasonable this point to consider a complete bilateral laminectomy and decompression at L3 4 4 5.  I think this would significantly improve his left leg pain which is by far his biggest complaint.  The risks and benefits of the procedure were discussed at length which included but were not limited to infection, bleeding, paralysis, spinal fluid leak, bowel bladder incontinence, stroke, coma, and death.  They acknowledge understanding of this.  Their questions concerns were addressed.  We will get him preop and scheduled by his cardiologist.  In addition we will get a set of hip x-rays to make sure he does not have a primary hip arthropathy  1. Spinal stenosis, lumbar region, with neurogenic claudication    2. Left leg pain    3. Bilateral hip pain    4. Non-smoker    5. Normal body mass index (BMI)          Return for postoperatively.      Kj Falcon MD

## 2018-03-13 LAB
ANION GAP SERPL CALCULATED.3IONS-SCNC: 7 MMOL/L (ref 4–13)
BASOPHILS # BLD AUTO: 0.02 10*3/MM3 (ref 0–0.2)
BASOPHILS NFR BLD AUTO: 0.2 % (ref 0–2)
BUN BLD-MCNC: 13 MG/DL (ref 5–21)
BUN/CREAT SERPL: 21.3 (ref 7–25)
CALCIUM SPEC-SCNC: 8.4 MG/DL (ref 8.4–10.4)
CHLORIDE SERPL-SCNC: 103 MMOL/L (ref 98–110)
CO2 SERPL-SCNC: 29 MMOL/L (ref 24–31)
CREAT BLD-MCNC: 0.61 MG/DL (ref 0.5–1.4)
DEPRECATED RDW RBC AUTO: 42.8 FL (ref 40–54)
EOSINOPHIL # BLD AUTO: 0.05 10*3/MM3 (ref 0–0.7)
EOSINOPHIL NFR BLD AUTO: 0.4 % (ref 0–4)
ERYTHROCYTE [DISTWIDTH] IN BLOOD BY AUTOMATED COUNT: 12.4 % (ref 12–15)
GFR SERPL CREATININE-BSD FRML MDRD: 125 ML/MIN/1.73
GLUCOSE BLD-MCNC: 105 MG/DL (ref 70–100)
HCT VFR BLD AUTO: 29.6 % (ref 40–52)
HGB BLD-MCNC: 10.1 G/DL (ref 14–18)
IMM GRANULOCYTES # BLD: 0.05 10*3/MM3 (ref 0–0.03)
IMM GRANULOCYTES NFR BLD: 0.4 % (ref 0–5)
LYMPHOCYTES # BLD AUTO: 1.3 10*3/MM3 (ref 0.72–4.86)
LYMPHOCYTES NFR BLD AUTO: 11.4 % (ref 15–45)
MCH RBC QN AUTO: 32 PG (ref 28–32)
MCHC RBC AUTO-ENTMCNC: 34.1 G/DL (ref 33–36)
MCV RBC AUTO: 93.7 FL (ref 82–95)
MONOCYTES # BLD AUTO: 0.96 10*3/MM3 (ref 0.19–1.3)
MONOCYTES NFR BLD AUTO: 8.4 % (ref 4–12)
NEUTROPHILS # BLD AUTO: 9.03 10*3/MM3 (ref 1.87–8.4)
NEUTROPHILS NFR BLD AUTO: 79.2 % (ref 39–78)
NRBC BLD MANUAL-RTO: 0 /100 WBC (ref 0–0)
PLATELET # BLD AUTO: 220 10*3/MM3 (ref 130–400)
PMV BLD AUTO: 9.8 FL (ref 6–12)
POTASSIUM BLD-SCNC: 4.3 MMOL/L (ref 3.5–5.3)
RBC # BLD AUTO: 3.16 10*6/MM3 (ref 4.8–5.9)
SODIUM BLD-SCNC: 139 MMOL/L (ref 135–145)
WBC NRBC COR # BLD: 11.41 10*3/MM3 (ref 4.8–10.8)

## 2018-03-13 PROCEDURE — G8979 MOBILITY GOAL STATUS: HCPCS

## 2018-03-13 PROCEDURE — 99024 POSTOP FOLLOW-UP VISIT: CPT | Performed by: NEUROLOGICAL SURGERY

## 2018-03-13 PROCEDURE — 25010000003 CEFAZOLIN PER 500 MG: Performed by: NURSE PRACTITIONER

## 2018-03-13 PROCEDURE — G8988 SELF CARE GOAL STATUS: HCPCS | Performed by: OCCUPATIONAL THERAPIST

## 2018-03-13 PROCEDURE — 97110 THERAPEUTIC EXERCISES: CPT

## 2018-03-13 PROCEDURE — 97535 SELF CARE MNGMENT TRAINING: CPT

## 2018-03-13 PROCEDURE — G8978 MOBILITY CURRENT STATUS: HCPCS

## 2018-03-13 PROCEDURE — 80048 BASIC METABOLIC PNL TOTAL CA: CPT | Performed by: NURSE PRACTITIONER

## 2018-03-13 PROCEDURE — 97116 GAIT TRAINING THERAPY: CPT

## 2018-03-13 PROCEDURE — 97161 PT EVAL LOW COMPLEX 20 MIN: CPT

## 2018-03-13 PROCEDURE — 97166 OT EVAL MOD COMPLEX 45 MIN: CPT | Performed by: OCCUPATIONAL THERAPIST

## 2018-03-13 PROCEDURE — 85025 COMPLETE CBC W/AUTO DIFF WBC: CPT | Performed by: NURSE PRACTITIONER

## 2018-03-13 PROCEDURE — G8987 SELF CARE CURRENT STATUS: HCPCS | Performed by: OCCUPATIONAL THERAPIST

## 2018-03-13 RX ORDER — TRAMADOL HYDROCHLORIDE 50 MG/1
50 TABLET ORAL EVERY 6 HOURS PRN
Status: DISCONTINUED | OUTPATIENT
Start: 2018-03-13 | End: 2018-03-14 | Stop reason: HOSPADM

## 2018-03-13 RX ORDER — MEPERIDINE HYDROCHLORIDE 50 MG/ML
50 INJECTION INTRAMUSCULAR; INTRAVENOUS; SUBCUTANEOUS EVERY 4 HOURS PRN
Status: DISCONTINUED | OUTPATIENT
Start: 2018-03-13 | End: 2018-03-14 | Stop reason: HOSPADM

## 2018-03-13 RX ADMIN — SODIUM CHLORIDE 75 ML/HR: 9 INJECTION, SOLUTION INTRAVENOUS at 15:10

## 2018-03-13 RX ADMIN — FINASTERIDE 5 MG: 5 TABLET, FILM COATED ORAL at 09:01

## 2018-03-13 RX ADMIN — SODIUM CHLORIDE 75 ML/HR: 9 INJECTION, SOLUTION INTRAVENOUS at 02:15

## 2018-03-13 RX ADMIN — TRAMADOL HYDROCHLORIDE 50 MG: 50 TABLET, COATED ORAL at 14:45

## 2018-03-13 RX ADMIN — CEFAZOLIN 2 G: 1 INJECTION, POWDER, FOR SOLUTION INTRAVENOUS at 04:02

## 2018-03-13 RX ADMIN — FUROSEMIDE 40 MG: 40 TABLET ORAL at 09:01

## 2018-03-13 RX ADMIN — ACETAMINOPHEN 650 MG: 325 TABLET, FILM COATED ORAL at 18:00

## 2018-03-13 RX ADMIN — METOPROLOL SUCCINATE 25 MG: 25 TABLET, FILM COATED, EXTENDED RELEASE ORAL at 09:01

## 2018-03-13 RX ADMIN — TAMSULOSIN HYDROCHLORIDE 0.4 MG: 0.4 CAPSULE ORAL at 20:55

## 2018-03-13 NOTE — PROGRESS NOTES
Chad Henriquez  88 y.o.      Chief complaint:   Back pain     Subjective  Nausea and vomiting overnight, feeling better this am. Pain controlled    Temp:  [97.2 °F (36.2 °C)-98.5 °F (36.9 °C)] 98.5 °F (36.9 °C)  Heart Rate:  [65-84] 69  Resp:  [10-20] 18  BP: (111-171)/(41-66) 127/46      Objective    Neurologic Exam     Mental Status   Oriented to person, place, and time.   Attention: normal.   Speech: speech is normal   Level of consciousness: alert  Knowledge: good.     Cranial Nerves   Cranial nerves II through XII intact.     Motor Exam   Muscle bulk: normal  Overall muscle tone: normal  Right arm pronator drift: absent  Left arm pronator drift: absent    Strength   Strength 5/5 throughout.     Sensory Exam   Light touch normal.   Proprioception normal.     Gait, Coordination, and Reflexes     Gait  Gait: normal      Principal Problem:    Spinal stenosis, lumbar region, with neurogenic claudication      Lab Results (last 24 hours)     Procedure Component Value Units Date/Time    Basic Metabolic Panel [[id number]]  (Abnormal) Collected:  03/13/18 0443    Specimen:  Blood Updated:  03/13/18 0533     Glucose 105 (H) mg/dL      BUN 13 mg/dL      Creatinine 0.61 mg/dL      Sodium 139 mmol/L      Potassium 4.3 mmol/L      Chloride 103 mmol/L      CO2 29.0 mmol/L      Calcium 8.4 mg/dL      eGFR Non African Amer 125 mL/min/1.73      BUN/Creatinine Ratio 21.3     Anion Gap 7.0 mmol/L     Narrative:       The MDRD GFR formula is only valid for adults with stable renal function between ages 18 and 70.    CBC & Differential [[id number]] Collected:  03/13/18 0443    Specimen:  Blood Updated:  03/13/18 0515    Narrative:       The following orders were created for panel order CBC & Differential.  Procedure                               Abnormality         Status                     ---------                               -----------         ------                     CBC Auto Differential[[id number]]        Abnormal             Final result                 Please view results for these tests on the individual orders.    CBC Auto Differential [[id number]]  (Abnormal) Collected:  03/13/18 0443    Specimen:  Blood Updated:  03/13/18 0515     WBC 11.41 (H) 10*3/mm3      RBC 3.16 (L) 10*6/mm3      Hemoglobin 10.1 (L) g/dL      Hematocrit 29.6 (L) %      MCV 93.7 fL      MCH 32.0 pg      MCHC 34.1 g/dL      RDW 12.4 %      RDW-SD 42.8 fl      MPV 9.8 fL      Platelets 220 10*3/mm3      Neutrophil % 79.2 (H) %      Lymphocyte % 11.4 (L) %      Monocyte % 8.4 %      Eosinophil % 0.4 %      Basophil % 0.2 %      Immature Grans % 0.4 %      Neutrophils, Absolute 9.03 (H) 10*3/mm3      Lymphocytes, Absolute 1.30 10*3/mm3      Monocytes, Absolute 0.96 10*3/mm3      Eosinophils, Absolute 0.05 10*3/mm3      Basophils, Absolute 0.02 10*3/mm3      Immature Grans, Absolute 0.05 (H) 10*3/mm3      nRBC 0.0 /100 WBC                Plan:   Lumbar laminectomy, change pain medication to aide in vomiting. Mobilize with PT    Kj Falcon MD

## 2018-03-13 NOTE — PROGRESS NOTES
Discharge Planning Assessment  [facility]     Patient Name: Chad Henriquez  MRN: [id number]  Today's Date: 3/13/2018    Admit Date: 3/12/2018          Discharge Needs Assessment     Row Name 03/13/18 1356       Living Environment    Lives With spouse    Current Living Arrangements home/apartment/condo    Primary Care Provided by spouse/significant other    Family Caregiver if Needed spouse    Quality of Family Relationships involved;supportive;helpful    Able to Return to Prior Arrangements yes       Resource/Environmental Concerns    Transportation Concerns car, none       Transition Planning    Patient/Family Anticipates Transition to home with family    Patient/Family Anticipated Services at Transition none    Transportation Anticipated family or friend will provide       Discharge Needs Assessment    Readmission Within the Last 30 Days no previous admission in last 30 days    Concerns to be Addressed no discharge needs identified;denies needs/concerns at this time    Equipment Currently Used at Home cane, quad;walker, rolling;shower chair   transfer bench    Anticipated Changes Related to Illness none    Discharge Coordination/Progress Patient lives with his wife and plans to return home at time of discharge. No definite needs identified. If MD feels home health is necessary, SW can arrange at discharge with MD orders. SW will follow.             Discharge Plan    No documentation.       Destination     No service coordination in this encounter.      Durable Medical Equipment     No service coordination in this encounter.      Dialysis/Infusion     No service coordination in this encounter.      Home Medical Care     No service coordination in this encounter.      Social Care     No service coordination in this encounter.                Demographic Summary    No documentation.           Functional Status    No documentation.           Psychosocial    No documentation.           Abuse/Neglect    No  documentation.           Legal    No documentation.           Substance Abuse    No documentation.           Patient Forms    No documentation.         SARBJIT Terry

## 2018-03-13 NOTE — THERAPY TREATMENT NOTE
Acute Care - Physical Therapy Treatment Note  [facility]     Patient Name: Chad Henriquez  : 1929  MRN: [id number]  Today's Date: 3/13/2018  Onset of Illness/Injury or Date of Surgery: 18  Date of Referral to PT: 18  Referring Physician: EDIE Jaime    Admit Date: 3/12/2018    Visit Dx:    ICD-10-CM ICD-9-CM   1. Spinal stenosis, lumbar region, with neurogenic claudication M48.062 724.03   2. Impaired mobility Z74.09 799.89   3. Impaired mobility and ADLs Z74.09 799.89     Patient Active Problem List   Diagnosis   • BPH with urinary obstruction   • Frequency of urination   • Nocturia   • OAB (overactive bladder)   • Non-smoker   • Normal body mass index (BMI)   • Spinal stenosis, lumbar region, with neurogenic claudication   • Left leg pain   • Bilateral hip pain       Therapy Treatment    Therapy Treatment / Health Promotion    Treatment Time/Intention  Discipline: physical therapy assistant  Document Type: therapy note (daily note)  Subjective Information: complains of, weakness, pain  Mode of Treatment: physical therapy  Therapy Frequency (PT Clinical Impression): 2 times/day  Patient Effort: excellent  Existing Precautions/Restrictions: fall, spinal  Plan of Care Review  Plan of Care Reviewed With: patient    Vitals/Pain/Safety  Vital Signs  Post SpO2 (%): 96  O2 Delivery Post Treatment: room air  Post Patient Position: Sitting  Pain Scale: Numbers Pre/Post-Treatment  Pain Scale: Numbers, Pretreatment: 310  Pain Location - Side:  (incision)  Pain Location - Orientation: lower  Pain Location: back  Pain Intervention(s): Medication (See MAR), Repositioned, Ambulation/increased activity  Pain Scale: Word Pre/Post-Treatment  Pain Location - Side:  (incision)  Pain Location - Orientation: lower  Pain Location: back  Pain Intervention(s): Medication (See MAR), Repositioned, Ambulation/increased activity  Pain Scale: FACES Pre/Post-Treatment  Pain Location - Side:  (incision)  Pain  Location - Orientation: lower  Pain Location: back  Pain Intervention(s): Medication (See MAR), Repositioned, Ambulation/increased activity  Positioning and Restraints  Pre-Treatment Position: in bed  Post Treatment Position: bed  In Bed: fowlers, call light within reach, encouraged to call for assist, with family/caregiver, with other staff, side rails up x2  In Chair: sitting, call light within reach, encouraged to call for assist, with family/caregiver    Mobility,ADL,Motor, Modality  Bed Mobility Assessment/Treatment  Bed Mobility Assessment/Treatment: rolling right, supine-sit  Rolling Right [place] (Bed Mobility): supervision  Supine-Sit [place] (Bed Mobility): verbal cues, supervision  Sidelying-Sit [place] (Bed Mobility): supervision  Assistive Device (Bed Mobility): bed rails  Transfer Assessment/Treatment  Transfer Assessment/Treatment: sit-stand transfer, stand-sit transfer  Sit-Stand Transfer  Sit-Stand [place] (Transfers): verbal cues, contact guard  Stand-Sit Transfer  Stand-Sit [place] (Transfers): verbal cues, contact guard  Gait/Stairs Assessment/Training  [place] Level (Gait): contact guard  Assistive Device (Gait):  (hand held)  Distance in Feet (Gait): 150 (x 2 with short standing rest)     Motor Skills Assessment/Interventions  Additional Documentation: Therapeutic Exercise (Group)  Therapeutic Exercise  Comment (Therapeutic Exercise): COMPLETED 20 REPS B LE STRENGTHENING EXERCISES, AP', LAQ'S, MARCHING, HIP ABD/ADD           ROM/MMT  General ROM  GENERAL ROM COMMENTS: WFL  General Assessment (Manual Muscle Testing)  General Manual Muscle Testing (MMT) Assessment: no strength deficits identified       Sensory, Edema, Orthotics          Cognition, Communication, Swallow  Cognitive Assessment/Intervention- PT/OT  Orientation Status (Cognition): oriented x 4  Follows Commands (Cognition): WNL  Speaking Valve  Post SpO2 (%): 96  General Eating/Swallowing  Observations  Post SpO2 (%): 96    Outcome Summary  Outcome Summary/Treatment Plan (PT)  Anticipated Discharge Disposition (PT): home or self care            PT Rehab Goals     Row Name 03/13/18 0950             Bed Mobility Goal 1 (PT)    Activity/Assistive Device (Bed Mobility Goal 1, PT) bed mobility activities, all  -LH      [place] Level/Cues Needed (Bed Mobility Goal 1, PT) independent  -LH      Time Frame (Bed Mobility Goal 1, PT) by discharge  -      Progress/Outcomes (Bed Mobility Goal 1, PT) goal ongoing  -         Transfer Goal 1 (PT)    Activity/Assistive Device (Transfer Goal 1, PT) sit-to-stand/stand-to-sit;bed-to-chair/chair-to-bed  -LH      [place] Level/Cues Needed (Transfer Goal 1, PT) independent  -LH      Time Frame (Transfer Goal 1, PT) by discharge  -      Progress/Outcome (Transfer Goal 1, PT) goal ongoing  -         Gait Training Goal 1 (PT)    Activity/Assistive Device (Gait Training Goal 1, PT) gait (walking locomotion)  -      [place] Level (Gait Training Goal 1, PT) independent  -      Distance (Gait Goal 1, PT) 250  -LH      Time Frame (Gait Training Goal 1, PT) by discharge  -      Progress/Outcome (Gait Training Goal 1, PT) goal ongoing  -         Stairs Goal 1 (PT)    Activity/Assistive Device (Stairs Goal 1, PT) ascending stairs;descending stairs;using handrail  -      [place] Level/Cues Needed (Stairs Goal 1, PT) supervision required  -      Number of Stairs (Stairs Goal 1, PT) 5  -LH      Time Frame (Stairs Goal 1, PT) by discharge  -      Progress/Outcome (Stairs Goal 1, PT) goal ongoing  -        User Key  (r) = Recorded By, (t) = Taken By, (c) = Cosigned By    Initials Name Provider Type     Champ Hercules PT Physical Therapist          Physical Therapy Education     Title: PT OT SLP Therapies (Active)     Topic: Physical Therapy (Done)     Point: Mobility training (Done)    Learning Progress Summary     Learner Status Readiness  Method Response Comment Documented by    Patient Done Acceptance E VU transfers, home safety, LB dressing TS 03/13/18 1401     Done Acceptance E,D VU,DU benefits of PT and POC, call for assist w/ mobility, proper body mechanics when transitioning  03/13/18 1038    Significant Other Done Acceptance E VU transfers, home safety, LB dressing TS 03/13/18 1401          Point: Body mechanics (Done)    Learning Progress Summary     Learner Status Readiness Method Response Comment Documented by    Patient Done Acceptance E VU transfers, home safety, LB dressing TS 03/13/18 1401     Done Acceptance E,D VU,DU benefits of PT and POC, call for assist w/ mobility, proper body mechanics when transitioning  03/13/18 1038    Significant Other Done Acceptance E VU transfers, home safety, LB dressing TS 03/13/18 1401          Point: Precautions (Done)    Learning Progress Summary     Learner Status Readiness Method Response Comment Documented by    Patient Done Acceptance E VU transfers, home safety, LB dressing TS 03/13/18 1401     Done Acceptance E,D VU,DU benefits of PT and POC, call for assist w/ mobility, proper body mechanics when transitioning  03/13/18 1038    Significant Other Done Acceptance E VU transfers, home safety, LB dressing TS 03/13/18 1401                      User Key     Initials Effective Dates Name Provider Type Discipline     08/02/16 -  Champ Hercules, PT Physical Therapist PT     08/02/16 -  KIM Loving/L Occupational Therapy Assistant OT                    PT Recommendation and Plan  Anticipated Discharge Disposition (PT): home or self care  Planned Therapy Interventions (PT Eval): bed mobility training, gait training, patient/family education, stair training, strengthening, transfer training  Therapy Frequency (PT Clinical Impression): 2 times/day             Outcome Measures     Row Name 03/13/18 1400 03/13/18 1036 03/13/18 0947       How much help from another person do you currently  need...    Turning from your back to your side while in flat bed without using bedrails?  -- 4  -LH  --    Moving from lying on back to sitting on the side of a flat bed without bedrails?  -- 3  -LH  --    Moving to and from a bed to a chair (including a wheelchair)?  -- 3  -LH  --    Standing up from a chair using your arms (e.g., wheelchair, bedside chair)?  -- 3  -LH  --    Climbing 3-5 steps with a railing?  -- 3  -LH  --    To walk in hospital room?  -- 3  -LH  --    AM-PAC 6 Clicks Score  -- 19  -LH  --       How much help from another is currently needed...    Putting on and taking off regular lower body clothing? 3  -TS  -- 3  -CH    Bathing (including washing, rinsing, and drying) 3  -TS  -- 3  -CH    Toileting (which includes using toilet bed pan or urinal) 3  -TS  -- 3  -CH    Putting on and taking off regular upper body clothing 4  -TS  -- 4  -CH    Taking care of personal grooming (such as brushing teeth) 4  -TS  -- 4  -CH    Eating meals 4  -TS  -- 4  -CH    Score 21  -TS  -- 21  -CH       Functional Assessment    Outcome Measure Options AM-PAC 6 Clicks Daily Activity (OT)  -TS AM-PAC 6 Clicks Basic Mobility (PT)  - AM-PAC 6 Clicks Daily Activity (OT)  -      User Key  (r) = Recorded By, (t) = Taken By, (c) = Cosigned By    Initials Name Provider Type     Champ Herucles, PT Physical Therapist     Deepti Buckner, OTR/L Occupational Therapist    TS Kitty Villeda ALVAREZ/L Occupational Therapy Assistant           Time Calculation:         PT Charges     Row Name 03/13/18 1444 03/13/18 1037          Time Calculation    Start Time 1444  -LG 0950  -     Stop Time 1517  -LG 1030  -     Time Calculation (min) 33 min  -LG 40 min  -     PT Received On 03/13/18  -LG 03/13/18  -     PT Goal Re-Cert Due Date 03/23/18  -LG 03/23/18  -        Time Calculation- PT    Total Timed Code Minutes- PT 33 minute(s)  -  --       User Key  (r) = Recorded By, (t) = Taken By, (c) = Cosigned By    Initials  Name Provider Type     Ari Alicia PTA Physical Therapy Assistant     Champ Hercuels PT Physical Therapist          Therapy Charges for Today     Code Description Service Date Service Provider Modifiers Qty    28056933058 HC GAIT TRAINING EA 15 MIN 3/13/2018 Ari Alicia PTA  1    51037619134 HC PT THER PROC EA 15 MIN 3/13/2018 Ari Alicia PTA  1          PT G-Codes  Outcome Measure Options: AM-PAC 6 Clicks Daily Activity (OT)  Score: 19  Functional Limitation: Mobility: Walking and moving around  Mobility: Walking and Moving Around Current Status (): At least 20 percent but less than 40 percent impaired, limited or restricted  Mobility: Walking and Moving Around Goal Status (): At least 1 percent but less than 20 percent impaired, limited or restricted    Ari Alicia PTA  3/13/2018

## 2018-03-13 NOTE — THERAPY EVALUATION
Acute Care - Physical Therapy Initial Evaluation  [facility]     Patient Name: Chad Henriquez  : 1929  MRN: [id number]  Today's Date: 3/13/2018   Onset of Illness/Injury or Date of Surgery: 18  Date of Referral to PT: 18  Referring Physician: EDIE Jaime      Admit Date: 3/12/2018    Visit Dx:     ICD-10-CM ICD-9-CM   1. Spinal stenosis, lumbar region, with neurogenic claudication M48.062 724.03   2. Impaired mobility Z74.09 799.89     Patient Active Problem List   Diagnosis   • BPH with urinary obstruction   • Frequency of urination   • Nocturia   • OAB (overactive bladder)   • Non-smoker   • Normal body mass index (BMI)   • Spinal stenosis, lumbar region, with neurogenic claudication   • Left leg pain   • Bilateral hip pain     Past Medical History:   Diagnosis Date   • Allergic rhinitis    • Arthritis    • BPH (benign prostatic hypertrophy) with urinary retention    • Cancer     skin cancer   • Constipation    • Coronary artery disease    • Hard of hearing    • Heart attack     NO MUSCLE DAMAGE - JUST OCCLUDED VALVE   • High cholesterol    • History of skin cancer     BILATERAL EARS   • History of transfusion        • Hypertension    • Inguinal hernia    • Leaky heart valve     DR CONCEPCION SAYS NOT ENOUGH TO BE OF CONCERN   • Other bursal cyst, right elbow    • PONV (postoperative nausea and vomiting)     has had scopalamine patch in past    • Sleep apnea     DOES NOT USE CPAP OR BIPAP   • Spinal stenosis of cervical region    • Spinal stenosis of lumbar region    • Wears hearing aid     BILATERAL     Past Surgical History:   Procedure Laterality Date   • APPENDECTOMY     • BACK SURGERY      X3   • CARDIAC SURGERY  08/08/1986    X1 BYPASS   • CORONARY ANGIOPLASTY WITH STENT PLACEMENT  1997    X3 STENTS   • HERNIA REPAIR      x2   • INGUINAL HERNIA REPAIR Right 2017    Procedure: RIGHT INGUINAL HERNIA REPAIR AND EXCISION OF CYST RIGHT ELBOW ;  Surgeon: Jackelyn Arriola,  MD;  Location:  [facility] OR;  Service:    • LUMBAR LAMINECTOMY N/A 3/12/2018    Procedure: LUMBAR LAMINECTOMY WITHOUT FUSION L3-4,4-5;  Surgeon: Kj Falcon MD;  Location:  [facility] OR;  Service: Neurosurgery        PT ASSESSMENT (last 72 hours)      Physical Therapy Evaluation     Row Name 03/13/18 0950          PT Evaluation Time/Intention    Subjective Information complains of;pain  -     Document Type evaluation  -     Mode of Treatment physical therapy   co-evaluation  -     Row Name 03/13/18 0957          General Information    Patient Profile Reviewed? yes  -     Onset of Illness/Injury or Date of Surgery 03/12/18  -     Referring Physician EDIE Jaime  -     Patient Observations alert;cooperative;agree to therapy  -     General Observations of Patient awake, fowlers in bed  -     Prior Level of Function independent:;all household mobility;community mobility;ADL's;driving  -     Equipment Currently Used at Home bath bench;cane, quad  -     Pertinent History of Current Functional Problem several yr hx of neurogenic claudication, back pain into LLE to knee, Dx:  spinal stenosis, Sx:  lumbar laminectomy L3,4 3.12.18  -     Existing Precautions/Restrictions spinal  -     Limitations/Impairments hearing  -     Risks Reviewed patient:;LOB;nausea/vomiting;dizziness;increased discomfort  -     Benefits Reviewed patient:;improve function;increase independence;increase strength;increase balance  -     Barriers to Rehab hearing deficit  -     Row Name 03/13/18 0950          Relationship/Environment    Primary Source of Support/Comfort spouse  -     Lives With spouse  -     Row Name 03/13/18 0950          Resource/Environmental Concerns    Current Living Arrangements home/apartment/condo  -     Row Name 03/13/18 0950          Home Main Entrance    Number of Stairs, Main Entrance five  -     Stair Railings, Main Entrance railing on left side (ascending)  -     Row Name  03/13/18 0950          Cognitive Assessment/Intervention- PT/OT    Orientation Status (Cognition) oriented x 4  -LH     Follows Commands (Cognition) WNL  -     Row Name 03/13/18 0950          Bed Mobility Assessment/Treatment    Bed Mobility Assessment/Treatment rolling right;supine-sit  -     Rolling Right [place] (Bed Mobility) verbal cues;supervision  -     Supine-Sit [place] (Bed Mobility) verbal cues;supervision  -     Assistive Device (Bed Mobility) bed rails  -     Row Name 03/13/18 0950          Transfer Assessment/Treatment    Transfer Assessment/Treatment sit-stand transfer;stand-sit transfer  -     Sit-Stand [place] (Transfers) verbal cues;contact guard  -     Stand-Sit [place] (Transfers) verbal cues;contact guard  -     Row Name 03/13/18 0950          Gait/Stairs Assessment/Training    [place] Level (Gait) contact guard  -     Assistive Device (Gait) --   hand held  -     Distance in Feet (Gait) 120  -     Row Name 03/13/18 0950          General ROM    GENERAL ROM COMMENTS WFL  -     Row Name 03/13/18 0950          General Assessment (Manual Muscle Testing)    General Manual Muscle Testing (MMT) Assessment no strength deficits identified  -     Row Name 03/13/18 0950          Pain Assessment    Additional Documentation Pain Scale: Numbers Pre/Post-Treatment (Group)  -     Row Name 03/13/18 0950          Pain Scale: Numbers Pre/Post-Treatment    Pain Scale: Numbers, Pretreatment 3/10  -     Pain Location - Side --   incision  -LH     Pain Location - Orientation lower  -LH     Pain Location back  -LH     Pain Intervention(s) Medication (See MAR);Repositioned;Ambulation/increased activity  -     Row Name             Wound 03/12/18 1124 posterior back incision    Wound - Properties Group Date first assessed: 03/12/18  -SWETA Time first assessed: 1124  -SWETA Present On Admission : no  -SWETA Orientation: posterior  -SWETA Location: back  -SWETA Type: incision  -SWETA  Additional Comments: mike tape  -SWETA    Row Name             Wound 03/12/18 1259 back incision    Wound - Properties Group Date first assessed: 03/12/18  -SWETA Time first assessed: 1259  -SWETA Location: back  -SWETA Type: incision  -SWETA    Row Name 03/13/18 0950          Plan of Care Review    Plan of Care Reviewed With patient  -     Row Name 03/13/18 0950          Physical Therapy Clinical Impression    Date of Referral to PT 03/12/18  -     PT Diagnosis (PT Clinical Impression) impaired mobility  -     Patient/Family Goals Statement (PT Clinical Impression) return home, walk in mall  -     Criteria for Skilled Interventions Met (PT Clinical Impression) yes;treatment indicated  -     Rehab Potential (PT Clinical Summary) good, to achieve stated therapy goals  -     Predicted Duration of Therapy (PT) until d/c  -     Care Plan Review (PT) patient/other agree to care plan;risks/benefits reviewed  -     Row Name 03/13/18 0950          Vital Signs    Post SpO2 (%) 96  -     O2 Delivery Post Treatment room air  -     Post Patient Position Sitting  -     Row Name 03/13/18 0950          Physical Therapy Goals    Bed Mobility Goal Selection (PT) bed mobility, PT goal 1  -     Transfer Goal Selection (PT) transfer, PT goal 1  -     Gait Training Goal Selection (PT) gait training, PT goal 1  -     Stairs Goal Selection (PT) stairs, PT goal 1  -     Additional Documentation Stairs Goal Selection (PT) (Row)  -     Row Name 03/13/18 0950          Bed Mobility Goal 1 (PT)    Activity/Assistive Device (Bed Mobility Goal 1, PT) bed mobility activities, all  -     [place] Level/Cues Needed (Bed Mobility Goal 1, PT) independent  -     Time Frame (Bed Mobility Goal 1, PT) by discharge  -     Progress/Outcomes (Bed Mobility Goal 1, PT) goal ongoing  -     Row Name 03/13/18 0950          Transfer Goal 1 (PT)    Activity/Assistive Device (Transfer Goal 1, PT)  sit-to-stand/stand-to-sit;bed-to-chair/chair-to-bed  -     [place] Level/Cues Needed (Transfer Goal 1, PT) independent  -     Time Frame (Transfer Goal 1, PT) by discharge  -     Progress/Outcome (Transfer Goal 1, PT) goal ongoing  -     Row Name 03/13/18 0950          Gait Training Goal 1 (PT)    Activity/Assistive Device (Gait Training Goal 1, PT) gait (walking locomotion)  -     [place] Level (Gait Training Goal 1, PT) independent  -     Distance (Gait Goal 1, PT) 250  -LH     Time Frame (Gait Training Goal 1, PT) by discharge  -     Progress/Outcome (Gait Training Goal 1, PT) goal ongoing  -     Row Name 03/13/18 0950          Stairs Goal 1 (PT)    Activity/Assistive Device (Stairs Goal 1, PT) ascending stairs;descending stairs;using handrail  -     [place] Level/Cues Needed (Stairs Goal 1, PT) supervision required  -     Number of Stairs (Stairs Goal 1, PT) 5  -LH     Time Frame (Stairs Goal 1, PT) by discharge  -     Progress/Outcome (Stairs Goal 1, PT) goal ongoing  -     Row Name 03/13/18 0937          Positioning and Restraints    Pre-Treatment Position in bed  -     Post Treatment Position chair  -     In Chair sitting;call light within reach;encouraged to call for assist;with family/caregiver  -     Row Name 03/13/18 0943          Living Environment    Home Accessibility stairs to enter home;tub/shower is not walk in  -       User Key  (r) = Recorded By, (t) = Taken By, (c) = Cosigned By    Initials Name Provider Type     Champ Hercules PT Physical Therapist    SWETA Oropeza, RN Registered Nurse          Physical Therapy Education     Title: PT OT SLP Therapies (Done)     Topic: Physical Therapy (Done)     Point: Mobility training (Done)    Learning Progress Summary     Learner Status Readiness Method Response Comment Documented by    Patient Done Acceptance E,STACIA CHEN benefits of PT and POC, call for assist w/ mobility, proper body mechanics when  transitioning  03/13/18 1038          Point: Body mechanics (Done)    Learning Progress Summary     Learner Status Readiness Method Response Comment Documented by    Patient Done Acceptance DAGOBERTO HAMLIN,STACIA benefits of PT and POC, call for assist w/ mobility, proper body mechanics when transitioning  03/13/18 1038          Point: Precautions (Done)    Learning Progress Summary     Learner Status Readiness Method Response Comment Documented by    Patient Done Acceptance DAGOBERTO HAMLIN,DU benefits of PT and POC, call for assist w/ mobility, proper body mechanics when transitioning  03/13/18 1038                      User Key     Initials Effective Dates Name Provider Type Discipline     08/02/16 -  Champ Hercules, PT Physical Therapist PT                PT Recommendation and Plan  Planned Therapy Interventions (PT Eval): bed mobility training, gait training, patient/family education, stair training, strengthening, transfer training  Therapy Frequency (PT Clinical Impression): 2 times/day  Plan of Care Reviewed With: patient  Plan of Care Reviewed With: patient          Outcome Measures     Row Name 03/13/18 1036 03/13/18 0947          How much help from another person do you currently need...    Turning from your back to your side while in flat bed without using bedrails? 4  -LH  --     Moving from lying on back to sitting on the side of a flat bed without bedrails? 3  -LH  --     Moving to and from a bed to a chair (including a wheelchair)? 3  -LH  --     Standing up from a chair using your arms (e.g., wheelchair, bedside chair)? 3  -LH  --     Climbing 3-5 steps with a railing? 3  -LH  --     To walk in hospital room? 3  -LH  --     AM-PAC 6 Clicks Score 19  -LH  --        How much help from another is currently needed...    Putting on and taking off regular lower body clothing?  -- 3  -CH     Bathing (including washing, rinsing, and drying)  -- 3  -CH     Toileting (which includes using toilet bed pan or urinal)  -- 3  -CH      Putting on and taking off regular upper body clothing  -- 4  -CH     Taking care of personal grooming (such as brushing teeth)  -- 4  -CH     Eating meals  -- 4  -CH     Score  -- 21  -CH        Functional Assessment    Outcome Measure Options AM-PAC 6 Clicks Basic Mobility (PT)  - AM-PAC 6 Clicks Daily Activity (OT)  -       User Key  (r) = Recorded By, (t) = Taken By, (c) = Cosigned By    Initials Name Provider Type     Champ Hercules PT Physical Therapist    CH Deepti Buckner, OTR/L Occupational Therapist           Time Calculation:         PT Charges     Row Name 03/13/18 1037             Time Calculation    Start Time 0950  -      Stop Time 1030  -      Time Calculation (min) 40 min  -      PT Received On 03/13/18  -      PT Goal Re-Cert Due Date 03/23/18  -        User Key  (r) = Recorded By, (t) = Taken By, (c) = Cosigned By    Initials Name Provider Type     Champ Hercules PT Physical Therapist          Therapy Charges for Today     Code Description Service Date Service Provider Modifiers Qty    82338746967 HC PT MOBILITY CURRENT 3/13/2018 Champ Hercules, PT GP, CJ 1    [id number] HC PT MOBILITY PROJECTED 3/13/2018 Champ Hercules, PT GP, CI 1    [id number] HC PT EVAL LOW COMPLEXITY 3 3/13/2018 Champ Hercules, PT GP 1          PT G-Codes  Outcome Measure Options: AM-PAC 6 Clicks Basic Mobility (PT)  Score: 19  Functional Limitation: Mobility: Walking and moving around  Mobility: Walking and Moving Around Current Status (): At least 20 percent but less than 40 percent impaired, limited or restricted  Mobility: Walking and Moving Around Goal Status (): At least 1 percent but less than 20 percent impaired, limited or restricted      Champ Hercules, PT  3/13/2018

## 2018-03-13 NOTE — PLAN OF CARE
Problem: Patient Care Overview (Adult)  Goal: Plan of Care Review  Outcome: Ongoing (interventions implemented as appropriate)   03/13/18 1401   Coping/Psychosocial Response Interventions   Plan Of Care Reviewed With patient   Outcome Evaluation   Outcome Summary/Follow up Plan Pt and spouse educated on DME and AE for increased safety at home. Pt S for bed mobility and SBA/CGA for transfers. Pt min A for LB dressing but spouse will be with pt at discharge and is able to assist. Continue OT POC    Patient Care Overview   Progress improving

## 2018-03-13 NOTE — PLAN OF CARE
Problem: Patient Care Overview  Goal: Plan of Care Review  Outcome: Ongoing (interventions implemented as appropriate)   03/12/18 9126   Coping/Psychosocial   Plan of Care Reviewed With patient;spouse   Plan of Care Review   Progress no change   OTHER   Outcome Summary Pt A&Ox4. O2 per nasal canula at 3L. Incision covered by gauze/medipore which is c/d/i. Pt drowsy, but easily awakened. Vomited after taking b/p meds, zofran effective. Garcia in place. Family at bedside.       Problem: Fall Risk (Adult)  Goal: Identify Related Risk Factors and Signs and Symptoms  Outcome: Ongoing (interventions implemented as appropriate)    Goal: Absence of Fall  Outcome: Ongoing (interventions implemented as appropriate)      Problem: Pain, Acute (Adult)  Goal: Identify Related Risk Factors and Signs and Symptoms  Outcome: Ongoing (interventions implemented as appropriate)    Goal: Acceptable Pain Control/Comfort Level  Outcome: Ongoing (interventions implemented as appropriate)

## 2018-03-13 NOTE — PLAN OF CARE
Problem: Patient Care Overview (Adult)  Goal: Plan of Care Review  Outcome: Ongoing (interventions implemented as appropriate)   03/13/18 1039   Coping/Psychosocial Response Interventions   Plan Of Care Reviewed With patient   Outcome Evaluation   Outcome Summary/Follow up Plan PT IE complete. Pt ambulated ~ 120 feet contact guard assist hand held. No LOB. Pt to receive skilled PT to address gait distance, proper body mechanics during mobility, and stairs. Recommend home w/ family at D/C. Thank you for referral.

## 2018-03-13 NOTE — PLAN OF CARE
Problem: Patient Care Overview  Goal: Plan of Care Review  Outcome: Ongoing (interventions implemented as appropriate)   03/13/18 5327   Coping/Psychosocial   Plan of Care Reviewed With patient;family   Plan of Care Review   Progress improving   OTHER   Outcome Summary VSS. YECENIA drain removed. Patient has 02@1. Patient voided. Minimal pain throughout shift.      Goal: Individualization and Mutuality  Outcome: Ongoing (interventions implemented as appropriate)    Goal: Discharge Needs Assessment  Outcome: Ongoing (interventions implemented as appropriate)    Goal: Interprofessional Rounds/Family Conf  Outcome: Ongoing (interventions implemented as appropriate)      Problem: Fall Risk (Adult)  Goal: Absence of Fall  Outcome: Ongoing (interventions implemented as appropriate)      Problem: Skin Injury Risk (Adult)  Goal: Skin Health and Integrity  Outcome: Ongoing (interventions implemented as appropriate)

## 2018-03-13 NOTE — THERAPY EVALUATION
Acute Care - Occupational Therapy Initial Evaluation  [facility]     Patient Name: Chad Henriquez  : 1929  MRN: [id number]  Today's Date: 3/13/2018  Onset of Illness/Injury or Date of Surgery: 18  Date of Referral to OT: 18  Referring Physician: EDIE Jaime    Admit Date: 3/12/2018       ICD-10-CM ICD-9-CM   1. Spinal stenosis, lumbar region, with neurogenic claudication M48.062 724.03   2. Impaired mobility Z74.09 799.89   3. Impaired mobility and ADLs Z74.09 799.89     Patient Active Problem List   Diagnosis   • BPH with urinary obstruction   • Frequency of urination   • Nocturia   • OAB (overactive bladder)   • Non-smoker   • Normal body mass index (BMI)   • Spinal stenosis, lumbar region, with neurogenic claudication   • Left leg pain   • Bilateral hip pain     Past Medical History:   Diagnosis Date   • Allergic rhinitis    • Arthritis    • BPH (benign prostatic hypertrophy) with urinary retention    • Cancer     skin cancer   • Constipation    • Coronary artery disease    • Hard of hearing    • Heart attack     NO MUSCLE DAMAGE - JUST OCCLUDED VALVE   • High cholesterol    • History of skin cancer     BILATERAL EARS   • History of transfusion        • Hypertension    • Inguinal hernia    • Leaky heart valve     DR CONCEPCION SAYS NOT ENOUGH TO BE OF CONCERN   • Other bursal cyst, right elbow    • PONV (postoperative nausea and vomiting)     has had scopalamine patch in past    • Sleep apnea     DOES NOT USE CPAP OR BIPAP   • Spinal stenosis of cervical region    • Spinal stenosis of lumbar region    • Wears hearing aid     BILATERAL     Past Surgical History:   Procedure Laterality Date   • APPENDECTOMY     • BACK SURGERY      X3   • CARDIAC SURGERY  08/08/1986    X1 BYPASS   • CORONARY ANGIOPLASTY WITH STENT PLACEMENT  1997    X3 STENTS   • HERNIA REPAIR      x2   • INGUINAL HERNIA REPAIR Right 2017    Procedure: RIGHT INGUINAL HERNIA REPAIR AND EXCISION OF CYST RIGHT  ELBOW ;  Surgeon: Jackelyn Arriola MD;  Location:  [facility] OR;  Service:    • LUMBAR LAMINECTOMY N/A 3/12/2018    Procedure: LUMBAR LAMINECTOMY WITHOUT FUSION L3-4,4-5;  Surgeon: Kj Falcon MD;  Location:  [facility] OR;  Service: Neurosurgery          OT ASSESSMENT FLOWSHEET (last 72 hours)      Occupational Therapy Evaluation     Row Name 03/13/18 0947 03/12/18 1331                OT Evaluation Time/Intention    Subjective Information complains of;pain  -  --       Document Type evaluation  -  --       Mode of Treatment occupational therapy  -CH  --       Patient Effort excellent  -CH  --          General Information    Patient Profile Reviewed? yes  -CH  --       Onset of Illness/Injury or Date of Surgery 03/12/18  -  --       Referring Physician Dr. Falcon  -  --       Patient Observations alert;cooperative;agree to therapy  -  --       General Observations of Patient fowlers, O2 per NC, hemavac, cont pulse ox  -  --       Pertinent History of Current Functional Problem Pt. reports several year history of neurogenic claudication.  He describes left paraspinal back pain and left lower extremity radicular pain that starts in his buttock and hip and radiates into his thigh that increases with standing & ambulation.  Pain improves with rest.  Pt. has had PT, injections, & medication.  Dx: L3,L4 laminectomy, foramenotomy  -  --       Existing Precautions/Restrictions spinal;fall  -  --       Limitations/Impairments hearing  -  --       Risks Reviewed patient and family:;LOB;increased discomfort;dizziness;nausea/vomiting  -  --       Benefits Reviewed patient and family:;improve function;increase independence;increase strength;increase balance  -CH  --       Barriers to Rehab hearing deficit  -  --          Relationship/Environment    Primary Source of Support/Comfort spouse  -CH  --       Lives With spouse  -CH  --          Resource/Environmental Concerns    Current Living Arrangements  home/apartment/condo  -  --          Home Main Entrance    Number of Stairs, Main Entrance five  -  --       Stair Railings, Main Entrance railing on left side (ascending)  -  --       Stairs Comment, Main Entrance from garage  -  --          Cognitive Assessment/Interventions    Additional Documentation Cognitive Assessment/Intervention (Group)  -  --          Cognitive Assessment/Intervention- PT/OT    Affect/Mental Status (Cognitive) WNL  -  --       Orientation Status (Cognition) oriented x 4  -  --       Follows Commands (Cognition) WNL  -  --       Personal Safety Interventions fall prevention program maintained;gait belt;muscle strengthening facilitated;nonskid shoes/slippers when out of bed;supervised activity  -  --          Safety Issues, Functional Mobility    Impairments Affecting Function (Mobility) balance;postural/trunk control;pain  -  --          Bed Mobility Assessment/Treatment    Bed Mobility Assessment/Treatment rolling right;sidelying-sit  -  --       Rolling Right [place] (Bed Mobility) supervision;verbal cues  -  --       Sidelying-Sit [place] (Bed Mobility) supervision;verbal cues  -  --       Assistive Device (Bed Mobility) bed rails;head of bed elevated  -  --          Functional Mobility    Functional Mobility- Ind. Level contact guard assist;2 person assist required   with [facility]  -  --       Functional Mobility- Device other (see comments)   [facility]  -  --       Functional Mobility- Safety Issues step length decreased;other (see comments)   R foot drop from previous sx but he compensates well   -  --       Functional Mobility- Comment ambulated in room & hallway  -  --          Transfer Assessment/Treatment    Transfer Assessment/Treatment sit-stand transfer;stand-sit transfer  -  --       Sit-Stand [place] (Transfers) contact guard;verbal cues  -  --       Stand-Sit [place] (Transfers) contact guard;verbal cues   A  -  --           Sit-Stand Transfer    Assistive Device (Sit-Stand Transfers) --   HHA  -CH  --          ADL Assessment/Intervention    BADL Assessment/Intervention upper body dressing;lower body dressing  -CH  --          Upper Body Dressing Assessment/Training    Upper Body Dressing [place] Level set up;[place]/diane  -CH  --       Upper Body Dressing Position edge of bed sitting  -CH  --          Lower Body Dressing Assessment/Training    Lower Body Dressing [place] Level minimum assist (75% patient effort);don;shoes/slippers  -CH  --       Lower Body Dressing Position edge of bed sitting  -CH  --       Comment (Lower Body Dressing) Anticipate pt. will require assist for socks, brief, pants  -CH  --          BADL Safety/Performance    Impairments, BADL Safety/Performance balance;pain  -CH  --          General ROM    GENERAL ROM COMMENTS AROM WFL for age  -CH  --          General Assessment (Manual Muscle Testing)    General Manual Muscle Testing (MMT) Assessment no strength deficits identified  -CH  --          Motor Assessment/Interventions    Additional Documentation Balance (Group)  -CH  --          Balance    Balance static sitting balance;dynamic sitting balance;static standing balance  -CH  --          Static Sitting Balance    Level of [place] (Unsupported Sitting, Static Balance) supervision  -CH  --       Sitting Position (Unsupported Sitting, Static Balance) sitting on edge of bed  -CH  --       Time Able to Maintain Position (Unsupported Sitting, Static Balance) 1 to 2 minutes  -CH  --          Dynamic Sitting Balance    Level of [place], Reaches Outside Midline (Sitting, Dynamic Balance) contact guard assist  -CH  --       Sitting Position, Reaches Outside Midline (Sitting, Dynamic Balance) sitting on edge of bed  -CH  --          Static Standing Balance    Level of [place] (Supported Standing, Static Balance) contact guard assist   x2  -CH  --       Assistive Device Utilized (Supported  Standing, Static Balance) --   HHA  -  --          Sensory Assessment/Intervention    Sensory General Assessment no sensation deficits identified  -  --       Additional Documentation Vision Assessment/Intervention (Group);Hearing Assessment (Group)  - Vision Assessment/Intervention (Group)  -          Hearing Assessment    Hearing Status hearing aid, bilateral  -  --          Vision Assessment/Intervention    Visual Impairment/Limitations WFL  -  --          Positioning and Restraints    Pre-Treatment Position in bed  -  --       Post Treatment Position chair  -  --       In Chair sitting;call light within reach;encouraged to call for assist;with family/caregiver  -  --          Pain Assessment    Additional Documentation Pain Scale: Numbers Pre/Post-Treatment (Group)  -  --          Pain Scale: Numbers Pre/Post-Treatment    Pain Scale: Numbers, Pretreatment 3/10  -  --       Pain Location - Orientation lower  -  --       Pain Location back  -  --       Pain Intervention(s) Ambulation/increased activity;Repositioned  -  --          Orthotics & Prosthetics Management    Additional Documentation --   no brace ordered by MD  -  --          Wound 03/12/18 1124 posterior back incision    Wound - Properties Group Date first assessed: 03/12/18  -SWETA Time first assessed: 1124  -SWETA Present On Admission : no  -SWETA Orientation: posterior  -SWETA Location: back  -SWETA Type: incision  -SWETA Additional Comments: telfa, tape  -SEWTA       Wound 03/12/18 1259 back incision    Wound - Properties Group Date first assessed: 03/12/18  -SWETA Time first assessed: 1259  -SWETA Location: back  -SWETA Type: incision  -SWETA       Plan of Care Review    Plan of Care Reviewed With patient;spouse;son  -  --          Clinical Impression (OT)    Date of Referral to OT 03/12/18  -  --       OT Diagnosis decline in ADL  -  --       Patient/Family Goals Statement (OT Eval) return home  -  --       Criteria for Skilled Therapeutic  Interventions Met (OT Eval) yes;treatment indicated  -CH  --       Rehab Potential (OT Eval) good, to achieve stated therapy goals  -CH  --       Therapy Frequency (OT Eval) 3 times/wk  -CH  --       Predicted Duration of Therapy Intervention (OT Eval) until DC from this facility  -CH  --       Care Plan Review (OT) evaluation/treatment results reviewed;care plan/treatment goals reviewed;risks/benefits reviewed;current/potential barriers reviewed;patient/other agree to care plan  -CH  --       Care Plan Review, Other Participant (OT Eval) spouse;son  -CH  --       Anticipated Equipment Needs at Discharge (OT) front wheeled walker  -CH  --       Anticipated Discharge Disposition (OT) home or self care;home with home health  -CH  --          Vital Signs    Pre SpO2 (%) 94  -CH  --       O2 Delivery Pre Treatment supplemental O2   1L  -CH  --       Post SpO2 (%) 96  -CH  --       O2 Delivery Post Treatment room air  -CH  --       Pre Patient Position Supine  -CH  --       Post Patient Position Sitting  -CH  --          Planned OT Interventions    Planned Therapy Interventions (OT Eval) activity tolerance training;BADL retraining;functional balance retraining;occupation/activity based interventions;transfer/mobility retraining;patient/caregiver education/training  -CH  --          OT Goals    Dressing Goal Selection (OT) dressing, OT goal 1  -CH  --       Toileting Goal Selection (OT) toileting, OT goal 1  -CH  --          Dressing Goal 1 (OT)    Activity/Assistive Device (Dressing Goal 1, OT) lower body dressing  -CH  --       [place]/Cues Needed (Dressing Goal 1, OT) verbal cues required  -CH  --       Time Frame (Dressing Goal 1, OT) long term goal (LTG);by discharge  -CH  --       Barriers (Dressing Goal 1, OT) pain  -CH  --       Progress/Outcome (Dressing Goal 1, OT) goal ongoing  -CH  --          Toileting Goal 1 (OT)    Activity/Device (Toileting Goal 1, OT) toileting skills, all;commode  -CH  --        [place] Level/Cues Needed (Toileting Goal 1, OT) conditional independence  -CH  --       Time Frame (Toileting Goal 1, OT) long term goal (LTG);by discharge  -CH  --       Barriers (Toileting Goal 1, OT) pain, balance impairments  -CH  --       Progress/Outcome (Toileting Goal 1, OT) goal ongoing  -CH  --          Living Environment    Home Accessibility stairs to enter home;tub/shower is not walk in  - stairs to enter home  -         User Key  (r) = Recorded By, (t) = Taken By, (c) = Cosigned By    Initials Name Effective Dates    CH Deepti Buckner, OTR/L 08/02/16 -     SWETA Martell Oropeza RN 08/02/16 -                  OT Recommendation and Plan  Outcome Evaluation   Outcome Summary/Follow up Plan OT eval completed. Pt. performed bed mobility at S, LBD at Min A, t/fs & amb at CGA x 2. He shows good safety during fxl mobility & demos he is highly motivated to return to his PLOF. Mr. Henriquez would benefit from skilled OT Tx toimprove his indep & fxl mobility prior to DC. I anticipate he will DC home with assist from spouse & rec HH OT.   Patient Care Overview   Progress progress toward functional goals as expected       Anticipated Equipment Needs at Discharge (OT): front wheeled walker  Anticipated Discharge Disposition (OT): home or self care, home with home health  Planned Therapy Interventions (OT Eval): activity tolerance training, BADL retraining, functional balance retraining, occupation/activity based interventions, transfer/mobility retraining, patient/caregiver education/training  Therapy Frequency (OT Eval): 3 times/wk  Plan of Care Review  Plan of Care Reviewed With: patient, spouse, son  Plan of Care Reviewed With: patient, spouse, son          Outcome Measures     Row Name 03/13/18 1036 03/13/18 0947          How much help from another person do you currently need...    Turning from your back to your side while in flat bed without using bedrails? 4  -LH  --     Moving from lying on back to  sitting on the side of a flat bed without bedrails? 3  -LH  --     Moving to and from a bed to a chair (including a wheelchair)? 3  -LH  --     Standing up from a chair using your arms (e.g., wheelchair, bedside chair)? 3  -LH  --     Climbing 3-5 steps with a railing? 3  -LH  --     To walk in hospital room? 3  -LH  --     AM-PAC 6 Clicks Score 19  -LH  --        How much help from another is currently needed...    Putting on and taking off regular lower body clothing?  -- 3  -CH     Bathing (including washing, rinsing, and drying)  -- 3  -CH     Toileting (which includes using toilet bed pan or urinal)  -- 3  -CH     Putting on and taking off regular upper body clothing  -- 4  -CH     Taking care of personal grooming (such as brushing teeth)  -- 4  -CH     Eating meals  -- 4  -CH     Score  -- 21  -CH        Functional Assessment    Outcome Measure Options AM-PAC 6 Clicks Basic Mobility (PT)  - AM-PAC 6 Clicks Daily Activity (OT)  -       User Key  (r) = Recorded By, (t) = Taken By, (c) = Cosigned By    Initials Name Provider Type     Champ Hercules, PT Physical Therapist     Deepti Buckner OTR/L Occupational Therapist          Time Calculation:   OT Start Time: 0947  OT Stop Time: 1030  OT Time Calculation (min): 43 min    Therapy Charges for Today     Code Description Service Date Service Provider Modifiers Qty    [id number]  OT SELFCARE CURRENT 3/13/2018 Deepti Buckner OTR/L GO, CJ 1    [id number]  OT SELFCARE PROJECTED 3/13/2018 Deepti Buckner OTR/L CLAUDINE, CI 1    [id number]  OT EVAL MOD COMPLEXITY 3 3/13/2018 ELENA Jaimes/L GO, KX 1          OT G-codes  OT Professional Judgement Used?: Yes  OT Functional Scales Options: AM-PAC 6 Clicks Daily Activity (OT)  Score: 21  Functional Limitation: Self care  Self Care Current Status (): At least 20 percent but less than 40 percent impaired, limited or restricted  Self Care Goal Status (): At least 1 percent but less than 20 percent  impaired, limited or restricted    Deepti Buckner, OTR/L  3/13/2018

## 2018-03-13 NOTE — PLAN OF CARE
Problem: Patient Care Overview  Goal: Plan of Care Review  Outcome: Ongoing (interventions implemented as appropriate)   03/13/18 0517   Coping/Psychosocial   Plan of Care Reviewed With patient;son   Plan of Care Review   Progress improving   OTHER   Outcome Summary Dressing has small amount of drainage, O2 @ 1L, YECENIA drain- 50 ml of output, Phenergan given for nausea with relief, unable to tolerate PO medication, Garcia in place and draining (will be d/c this AM), VSS, safety maintained, will continue to monitor.        Problem: Fall Risk (Adult)  Goal: Identify Related Risk Factors and Signs and Symptoms  Outcome: Outcome(s) achieved Date Met: 03/13/18    Goal: Absence of Fall  Outcome: Ongoing (interventions implemented as appropriate)      Problem: Skin Injury Risk (Adult)  Goal: Identify Related Risk Factors and Signs and Symptoms  Outcome: Outcome(s) achieved Date Met: 03/13/18    Goal: Skin Health and Integrity  Outcome: Ongoing (interventions implemented as appropriate)

## 2018-03-13 NOTE — THERAPY TREATMENT NOTE
Acute Care - Occupational Therapy Treatment Note  [facility]     Patient Name: Chad Henriquez  : 1929  MRN: [id number]  Today's Date: 3/13/2018  Onset of Illness/Injury or Date of Surgery: 18  Date of Referral to OT: 18  Referring Physician: EDIE Jaime    Admit Date: 3/12/2018       ICD-10-CM ICD-9-CM   1. Spinal stenosis, lumbar region, with neurogenic claudication M48.062 724.03   2. Impaired mobility Z74.09 799.89   3. Impaired mobility and ADLs Z74.09 799.89     Patient Active Problem List   Diagnosis   • BPH with urinary obstruction   • Frequency of urination   • Nocturia   • OAB (overactive bladder)   • Non-smoker   • Normal body mass index (BMI)   • Spinal stenosis, lumbar region, with neurogenic claudication   • Left leg pain   • Bilateral hip pain     Past Medical History:   Diagnosis Date   • Allergic rhinitis    • Arthritis    • BPH (benign prostatic hypertrophy) with urinary retention    • Cancer     skin cancer   • Constipation    • Coronary artery disease    • Hard of hearing    • Heart attack     NO MUSCLE DAMAGE - JUST OCCLUDED VALVE   • High cholesterol    • History of skin cancer     BILATERAL EARS   • History of transfusion        • Hypertension    • Inguinal hernia    • Leaky heart valve     DR CONCEPCION SAYS NOT ENOUGH TO BE OF CONCERN   • Other bursal cyst, right elbow    • PONV (postoperative nausea and vomiting)     has had scopalamine patch in past    • Sleep apnea     DOES NOT USE CPAP OR BIPAP   • Spinal stenosis of cervical region    • Spinal stenosis of lumbar region    • Wears hearing aid     BILATERAL     Past Surgical History:   Procedure Laterality Date   • APPENDECTOMY     • BACK SURGERY      X3   • CARDIAC SURGERY  08/08/1986    X1 BYPASS   • CORONARY ANGIOPLASTY WITH STENT PLACEMENT  1997    X3 STENTS   • HERNIA REPAIR      x2   • INGUINAL HERNIA REPAIR Right 2017    Procedure: RIGHT INGUINAL HERNIA REPAIR AND EXCISION OF CYST RIGHT  ELBOW ;  Surgeon: Jackelyn Arriola MD;  Location:  [facility] OR;  Service:    • LUMBAR LAMINECTOMY N/A 3/12/2018    Procedure: LUMBAR LAMINECTOMY WITHOUT FUSION L3-4,4-5;  Surgeon: Kj Falcon MD;  Location:  [facility] OR;  Service: Neurosurgery       Therapy Treatment    Therapy Treatment / Health Promotion    Treatment Time/Intention  Discipline: occupational therapy assistant  Document Type: therapy note (daily note)  Subjective Information: no complaints  Mode of Treatment: occupational therapy  Therapy Frequency (OT Eval): 3 times/wk  Patient Effort: excellent  Existing Precautions/Restrictions: fall, brace worn when out of bed, spinal  Plan of Care Review  Plan of Care Reviewed With: patient, spouse, son    Vitals/Pain/Safety  Vital Signs  Pre SpO2 (%): 94  O2 Delivery Pre Treatment: supplemental O2 (1L)  Post SpO2 (%): 96  O2 Delivery Post Treatment: room air  Pre Patient Position: Supine  Post Patient Position: Sitting  Pain Scale: Numbers Pre/Post-Treatment  Pain Scale: Numbers, Pretreatment: 3/10  Pain Location - Orientation: lower  Pain Location: back  Pain Intervention(s): Ambulation/increased activity, Repositioned  Pain Scale: Word Pre/Post-Treatment  Pain Location - Orientation: lower  Pain Location: back  Pain Intervention(s): Ambulation/increased activity, Repositioned  Pain Scale: FACES Pre/Post-Treatment  Pain Location - Orientation: lower  Pain Location: back  Pain Intervention(s): Ambulation/increased activity, Repositioned  Safety Issues, Functional Mobility  Impairments Affecting Function (Mobility): balance, postural/trunk control, pain  Positioning and Restraints  Pre-Treatment Position: in bed  Post Treatment Position: chair  In Chair: sitting, call light within reach, encouraged to call for assist, notified nsg, with family/caregiver    Mobility,ADL,Motor, Modality  Bed Mobility Assessment/Treatment  Bed Mobility Assessment/Treatment: rolling right, sidelying-sit  Rolling Right [place]  (Bed Mobility): supervision  Sidelying-Sit [place] (Bed Mobility): supervision  Assistive Device (Bed Mobility): bed rails, head of bed elevated  Transfer Assessment/Treatment  Transfer Assessment/Treatment: sit-stand transfer, stand-sit transfer  Comment (Transfers): pt and spouse educated on improving safety for transfers at home with grab bar for tub transfer and BSC if needed for commode transfer  Sit-Stand Transfer  Sit-Stand [place] (Transfers):  (SBA)  Assistive Device (Sit-Stand Transfers):  (HHA)  Stand-Sit Transfer  Stand-Sit [place] (Transfers):  (SBA)  ADL Assessment/Intervention  BADL Assessment/Intervention: lower body dressing  Additional Documentation: Comment, IADL Assessment/Training (Row)  Upper Body Dressing Assessment/Training  Upper Body Dressing [place] Level: set up, pajama/robe  Upper Body Dressing Position: edge of bed sitting  Lower Body Dressing Assessment/Training  Lower Body Dressing [place] Level: minimum assist (75% patient effort), don, pants/bottoms  Lower Body Dressing Position: edge of bed sitting, supported standing  Comment (Lower Body Dressing): pt has spouse who is able to assist at home. Pt able to bring BLE up over opposite knee  BADL Safety/Performance  Impairments, BADL Safety/Performance: balance, pain     Balance  Balance: static sitting balance, dynamic sitting balance, static standing balance  Static Sitting Balance  Level of [place] (Unsupported Sitting, Static Balance): supervision  Sitting Position (Unsupported Sitting, Static Balance): sitting on edge of bed  Time Able to Maintain Position (Unsupported Sitting, Static Balance): 1 to 2 minutes  Dynamic Sitting Balance  Level of [place], Reaches Outside Midline (Sitting, Dynamic Balance): contact guard assist  Sitting Position, Reaches Outside Midline (Sitting, Dynamic Balance): sitting on edge of bed  Static Standing Balance  Level of [place] (Supported Standing, Static  Balance): contact guard assist (x2)  Assistive Device Utilized (Supported Standing, Static Balance):  (HHA)        ROM/MMT  General ROM  GENERAL ROM COMMENTS: AROM WFL for age  General Assessment (Manual Muscle Testing)  General Manual Muscle Testing (MMT) Assessment: no strength deficits identified       Sensory, Edema, Orthotics  Sensory Assessment/Intervention  Sensory General Assessment: no sensation deficits identified  Hearing Assessment  Hearing Status: hearing aid, bilateral  Vision Assessment/Intervention  Visual Impairment/Limitations: WFL       Cognition, Communication, Swallow  Cognitive Assessment/Intervention- PT/OT  Affect/Mental Status (Cognitive): WNL  Orientation Status (Cognition): oriented x 4  Follows Commands (Cognition): WNL  Personal Safety Interventions: fall prevention program maintained, gait belt, muscle strengthening facilitated, nonskid shoes/slippers when out of bed, supervised activity  Speaking Valve  Pre SpO2 (%): 94  Post SpO2 (%): 96  General Eating/Swallowing Observations  Pre SpO2 (%): 94  Post SpO2 (%): 96    Outcome Summary  Rehab Outcome Summary/Treatment Plan  Anticipated Equipment Needs at Discharge (OT): front wheeled walker  Anticipated Discharge Disposition (OT): home or self care, home with home health        OT Rehab Goals     Row Name 03/13/18 0947             Dressing Goal 1 (OT)    Activity/Assistive Device (Dressing Goal 1, OT) lower body dressing  -CH      [place]/Cues Needed (Dressing Goal 1, OT) verbal cues required  -CH      Time Frame (Dressing Goal 1, OT) long term goal (LTG);by discharge  -CH      Barriers (Dressing Goal 1, OT) pain  -CH      Progress/Outcome (Dressing Goal 1, OT) goal ongoing  -CH         Toileting Goal 1 (OT)    Activity/Device (Toileting Goal 1, OT) toileting skills, all;commode  -CH      [place] Level/Cues Needed (Toileting Goal 1, OT) conditional independence  -CH      Time Frame (Toileting Goal 1, OT) long term goal (LTG);by  discharge  -CH      Barriers (Toileting Goal 1, OT) pain, balance impairments  -      Progress/Outcome (Toileting Goal 1, OT) goal ongoing  -        User Key  (r) = Recorded By, (t) = Taken By, (c) = Cosigned By    Initials Name Provider Type    CH Deepti Buckner OTR/L Occupational Therapist        Occupational Therapy Education     Title: PT OT SLP Therapies (Active)     Topic: Occupational Therapy (Active)     Point: ADL training (Done)     Description: Instruct learner(s) on proper safety adaptation and remediation techniques during self care or transfers.   Instruct in proper use of assistive devices.   Learning Progress Summary     Learner Status Readiness Method Response Comment Documented by    Patient Done Acceptance E VU transfers, home safety, LB dressing TS 03/13/18 1401    Significant Other Done Acceptance E VU transfers, home safety, LB dressing TS 03/13/18 1401                      User Key     Initials Effective Dates Name Provider Type Discipline     08/02/16 -  Kitty Villeda ALVAREZ/L Occupational Therapy Assistant OT                  OT Recommendation and Plan  Anticipated Equipment Needs at Discharge (OT): front wheeled walker  Anticipated Discharge Disposition (OT): home or self care, home with home health  Planned Therapy Interventions (OT Eval): activity tolerance training, BADL retraining, functional balance retraining, occupation/activity based interventions, transfer/mobility retraining, patient/caregiver education/training  Therapy Frequency (OT Eval): 3 times/wk           Outcome Measures     Row Name 03/13/18 1400 03/13/18 1036 03/13/18 0947       How much help from another person do you currently need...    Turning from your back to your side while in flat bed without using bedrails?  -- 4  -LH  --    Moving from lying on back to sitting on the side of a flat bed without bedrails?  -- 3  -LH  --    Moving to and from a bed to a chair (including a wheelchair)?  -- 3  -LH  --     Standing up from a chair using your arms (e.g., wheelchair, bedside chair)?  -- 3  -LH  --    Climbing 3-5 steps with a railing?  -- 3  -LH  --    To walk in hospital room?  -- 3  -LH  --    AM-PAC 6 Clicks Score  -- 19  -LH  --       How much help from another is currently needed...    Putting on and taking off regular lower body clothing? 3  -TS  -- 3  -CH    Bathing (including washing, rinsing, and drying) 3  -TS  -- 3  -CH    Toileting (which includes using toilet bed pan or urinal) 3  -TS  -- 3  -CH    Putting on and taking off regular upper body clothing 4  -TS  -- 4  -CH    Taking care of personal grooming (such as brushing teeth) 4  -TS  -- 4  -CH    Eating meals 4  -TS  -- 4  -CH    Score 21  -TS  -- 21  -CH       Functional Assessment    Outcome Measure Options AM-PAC 6 Clicks Daily Activity (OT)  -TS AM-PAC 6 Clicks Basic Mobility (PT)  - AM-PAC 6 Clicks Daily Activity (OT)  -      User Key  (r) = Recorded By, (t) = Taken By, (c) = Cosigned By    Initials Name Provider Type     Champ Hercules, PT Physical Therapist     Deepti Buckner, OTR/L Occupational Therapist     KIM Loving/L Occupational Therapy Assistant           Time Calculation:         Time Calculation- OT     Row Name 03/13/18 1403 03/13/18 0947          Time Calculation- OT    OT Start Time 1305  -TS 0947  -     OT Stop Time 1330  -TS 1030  -     OT Time Calculation (min) 25 min  -TS 43 min  -     Total Timed Code Minutes- OT 25 minute(s)  -TS  --     OT Received On 03/13/18  -TS 03/13/18  -     OT Goal Re-Cert Due Date  -- 03/23/18  -       User Key  (r) = Recorded By, (t) = Taken By, (c) = Cosigned By    Initials Name Provider Type     Deepti Buckner, OTR/L Occupational Therapist     KIM Loving/L Occupational Therapy Assistant           Therapy Charges for Today     Code Description Service Date Service Provider Modifiers Qty    [id number]  OT SELF CARE/MGMT/TRAIN EA 15 MIN 3/13/2018  KIM Loving/L GO, KX 2          OT G-codes  OT Professional Judgement Used?: Yes  OT Functional Scales Options: AM-PAC 6 Clicks Daily Activity (OT)  Score: 21  Functional Limitation: Self care  Self Care Current Status (): At least 20 percent but less than 40 percent impaired, limited or restricted  Self Care Goal Status (): At least 1 percent but less than 20 percent impaired, limited or restricted    MYRA Deutsch  3/13/2018

## 2018-03-13 NOTE — PLAN OF CARE
Problem: Patient Care Overview (Adult)  Goal: Plan of Care Review  Outcome: Ongoing (interventions implemented as appropriate)   03/13/18 4317   Coping/Psychosocial Response Interventions   Plan Of Care Reviewed With patient   Outcome Evaluation   Outcome Summary/Follow up Plan OT natan completed. Pt. performed bed mobility at S, LBD at Min A, t/fs & amb at CGA x 2. He shows good safety during fxl mobility & demos he is highly motivated to return to his PLOF. Mr. Henriquez would benefit from skilled OT Tx toimprove his indep & fxl mobility prior to DC. I anticipate he will DC home with assist from spouse & rec HH OT.   Patient Care Overview   Progress progress toward functional goals as expected

## 2018-03-13 NOTE — THERAPY EVALUATION
Acute Care - Occupational Therapy Initial Evaluation  [facility]     Patient Name: Chad Henriquez  : 1929  MRN: [id number]  Today's Date: 3/13/2018  Onset of Illness/Injury or Date of Surgery: 18  Date of Referral to OT: 18  Referring Physician: EDIE Jaime    Admit Date: 3/12/2018       ICD-10-CM ICD-9-CM   1. Spinal stenosis, lumbar region, with neurogenic claudication M48.062 724.03   2. Impaired mobility Z74.09 799.89   3. Impaired mobility and ADLs Z74.09 799.89     Patient Active Problem List   Diagnosis   • BPH with urinary obstruction   • Frequency of urination   • Nocturia   • OAB (overactive bladder)   • Non-smoker   • Normal body mass index (BMI)   • Spinal stenosis, lumbar region, with neurogenic claudication   • Left leg pain   • Bilateral hip pain     Past Medical History:   Diagnosis Date   • Allergic rhinitis    • Arthritis    • BPH (benign prostatic hypertrophy) with urinary retention    • Cancer     skin cancer   • Constipation    • Coronary artery disease    • Hard of hearing    • Heart attack     NO MUSCLE DAMAGE - JUST OCCLUDED VALVE   • High cholesterol    • History of skin cancer     BILATERAL EARS   • History of transfusion        • Hypertension    • Inguinal hernia    • Leaky heart valve     DR CONCEPCION SAYS NOT ENOUGH TO BE OF CONCERN   • Other bursal cyst, right elbow    • PONV (postoperative nausea and vomiting)     has had scopalamine patch in past    • Sleep apnea     DOES NOT USE CPAP OR BIPAP   • Spinal stenosis of cervical region    • Spinal stenosis of lumbar region    • Wears hearing aid     BILATERAL     Past Surgical History:   Procedure Laterality Date   • APPENDECTOMY     • BACK SURGERY      X3   • CARDIAC SURGERY  08/08/1986    X1 BYPASS   • CORONARY ANGIOPLASTY WITH STENT PLACEMENT  1997    X3 STENTS   • HERNIA REPAIR      x2   • INGUINAL HERNIA REPAIR Right 2017    Procedure: RIGHT INGUINAL HERNIA REPAIR AND EXCISION OF CYST RIGHT  ELBOW ;  Surgeon: Jackelyn Arriola MD;  Location:  [facility] OR;  Service:    • LUMBAR LAMINECTOMY N/A 3/12/2018    Procedure: LUMBAR LAMINECTOMY WITHOUT FUSION L3-4,4-5;  Surgeon: Kj Falcon MD;  Location:  [facility] OR;  Service: Neurosurgery          OT ASSESSMENT FLOWSHEET (last 72 hours)      Occupational Therapy Evaluation     Row Name 03/13/18 0947 03/12/18 1331                OT Evaluation Time/Intention    Subjective Information complains of;pain  -  --       Document Type evaluation  -  --       Mode of Treatment occupational therapy  -CH  --       Patient Effort excellent  -CH  --          General Information    Patient Profile Reviewed? yes  -CH  --       Onset of Illness/Injury or Date of Surgery 03/12/18  -  --       Referring Physician Dr. Falcon  -  --       Patient Observations alert;cooperative;agree to therapy  -  --       General Observations of Patient fowlers, O2 per NC, hemavac, cont pulse ox  -  --       Pertinent History of Current Functional Problem Pt. reports several year history of neurogenic claudication.  He describes left paraspinal back pain and left lower extremity radicular pain that starts in his buttock and hip and radiates into his thigh that increases with standing & ambulation.  Pain improves with rest.  Pt. has had PT, injections, & medication.  Dx: L3,L4 laminectomy, foramenotomy  -  --       Existing Precautions/Restrictions spinal;fall  -  --       Limitations/Impairments hearing  -  --       Risks Reviewed patient and family:;LOB;increased discomfort;dizziness;nausea/vomiting  -  --       Benefits Reviewed patient and family:;improve function;increase independence;increase strength;increase balance  -CH  --       Barriers to Rehab hearing deficit  -  --          Relationship/Environment    Primary Source of Support/Comfort spouse  -CH  --       Lives With spouse  -CH  --          Resource/Environmental Concerns    Current Living Arrangements  home/apartment/condo  -  --          Home Main Entrance    Number of Stairs, Main Entrance five  -  --       Stair Railings, Main Entrance railing on left side (ascending)  -  --       Stairs Comment, Main Entrance from garage  -  --          Cognitive Assessment/Interventions    Additional Documentation Cognitive Assessment/Intervention (Group)  -  --          Cognitive Assessment/Intervention- PT/OT    Affect/Mental Status (Cognitive) WNL  -  --       Orientation Status (Cognition) oriented x 4  -  --       Follows Commands (Cognition) WNL  -  --       Personal Safety Interventions fall prevention program maintained;gait belt;muscle strengthening facilitated;nonskid shoes/slippers when out of bed;supervised activity  -  --          Safety Issues, Functional Mobility    Impairments Affecting Function (Mobility) balance;postural/trunk control;pain  -  --          Bed Mobility Assessment/Treatment    Bed Mobility Assessment/Treatment rolling right;sidelying-sit  -  --       Rolling Right [place] (Bed Mobility) supervision;verbal cues  -  --       Sidelying-Sit [place] (Bed Mobility) supervision;verbal cues  -  --       Assistive Device (Bed Mobility) bed rails;head of bed elevated  -  --          Functional Mobility    Functional Mobility- Ind. Level contact guard assist;2 person assist required   with [facility]  -  --       Functional Mobility- Device other (see comments)   [facility]  -  --       Functional Mobility- Safety Issues step length decreased;other (see comments)   R foot drop from previous sx but he compensates well   -  --       Functional Mobility- Comment ambulated in room & hallway  -  --          Transfer Assessment/Treatment    Transfer Assessment/Treatment sit-stand transfer;stand-sit transfer  -  --       Sit-Stand [place] (Transfers) contact guard;verbal cues  -  --       Stand-Sit [place] (Transfers) contact guard;verbal cues   A  -  --           Sit-Stand Transfer    Assistive Device (Sit-Stand Transfers) --   HHA  -CH  --          ADL Assessment/Intervention    BADL Assessment/Intervention upper body dressing;lower body dressing  -CH  --          Upper Body Dressing Assessment/Training    Upper Body Dressing [place] Level set up;[place]/diane  -CH  --       Upper Body Dressing Position edge of bed sitting  -CH  --          Lower Body Dressing Assessment/Training    Lower Body Dressing [place] Level minimum assist (75% patient effort);don;shoes/slippers  -CH  --       Lower Body Dressing Position edge of bed sitting  -CH  --       Comment (Lower Body Dressing) Anticipate pt. will require assist for socks, brief, pants  -CH  --          BADL Safety/Performance    Impairments, BADL Safety/Performance balance;pain  -CH  --          General ROM    GENERAL ROM COMMENTS AROM WFL for age  -CH  --          General Assessment (Manual Muscle Testing)    General Manual Muscle Testing (MMT) Assessment no strength deficits identified  -CH  --          Motor Assessment/Interventions    Additional Documentation Balance (Group)  -CH  --          Balance    Balance static sitting balance;dynamic sitting balance;static standing balance  -CH  --          Static Sitting Balance    Level of [place] (Unsupported Sitting, Static Balance) supervision  -CH  --       Sitting Position (Unsupported Sitting, Static Balance) sitting on edge of bed  -CH  --       Time Able to Maintain Position (Unsupported Sitting, Static Balance) 1 to 2 minutes  -CH  --          Dynamic Sitting Balance    Level of [place], Reaches Outside Midline (Sitting, Dynamic Balance) contact guard assist  -CH  --       Sitting Position, Reaches Outside Midline (Sitting, Dynamic Balance) sitting on edge of bed  -CH  --          Static Standing Balance    Level of [place] (Supported Standing, Static Balance) contact guard assist   x2  -CH  --       Assistive Device Utilized (Supported  Standing, Static Balance) --   HHA  -  --          Sensory Assessment/Intervention    Sensory General Assessment no sensation deficits identified  -  --       Additional Documentation Vision Assessment/Intervention (Group);Hearing Assessment (Group)  - Vision Assessment/Intervention (Group)  -          Hearing Assessment    Hearing Status hearing aid, bilateral  -  --          Vision Assessment/Intervention    Visual Impairment/Limitations WFL  -  --          Positioning and Restraints    Pre-Treatment Position in bed  -  --       Post Treatment Position chair  -  --       In Chair sitting;call light within reach;encouraged to call for assist;with family/caregiver  -  --          Pain Assessment    Additional Documentation Pain Scale: Numbers Pre/Post-Treatment (Group)  -  --          Pain Scale: Numbers Pre/Post-Treatment    Pain Scale: Numbers, Pretreatment 3/10  -  --       Pain Location - Orientation lower  -  --       Pain Location back  -  --       Pain Intervention(s) Ambulation/increased activity;Repositioned  -  --          Orthotics & Prosthetics Management    Additional Documentation --   no brace ordered by MD  -  --          Wound 03/12/18 1124 posterior back incision    Wound - Properties Group Date first assessed: 03/12/18  -SWETA Time first assessed: 1124  -SWETA Present On Admission : no  -SWETA Orientation: posterior  -SWETA Location: back  -SWETA Type: incision  -SWETA Additional Comments: telfa, tape  -SWETA       Wound 03/12/18 1259 back incision    Wound - Properties Group Date first assessed: 03/12/18  -SWETA Time first assessed: 1259  -SWETA Location: back  -SWETA Type: incision  -SWETA       Plan of Care Review    Plan of Care Reviewed With patient;spouse;son  -  --          Clinical Impression (OT)    Date of Referral to OT 03/12/18  -  --       OT Diagnosis decline in ADL  -  --       Patient/Family Goals Statement (OT Eval) return home  -  --       Criteria for Skilled Therapeutic  Interventions Met (OT Eval) yes;treatment indicated  -CH  --       Rehab Potential (OT Eval) good, to achieve stated therapy goals  -CH  --       Therapy Frequency (OT Eval) 3 times/wk  -CH  --       Predicted Duration of Therapy Intervention (OT Eval) until DC from this facility  -CH  --       Care Plan Review (OT) evaluation/treatment results reviewed;care plan/treatment goals reviewed;risks/benefits reviewed;current/potential barriers reviewed;patient/other agree to care plan  -CH  --       Care Plan Review, Other Participant (OT Eval) spouse;son  -CH  --       Anticipated Equipment Needs at Discharge (OT) front wheeled walker  -CH  --       Anticipated Discharge Disposition (OT) home or self care;home with home health  -CH  --          Vital Signs    Pre SpO2 (%) 94  -CH  --       O2 Delivery Pre Treatment supplemental O2   1L  -CH  --       Post SpO2 (%) 96  -CH  --       O2 Delivery Post Treatment room air  -CH  --       Pre Patient Position Supine  -CH  --       Post Patient Position Sitting  -CH  --          Planned OT Interventions    Planned Therapy Interventions (OT Eval) activity tolerance training;BADL retraining;functional balance retraining;occupation/activity based interventions;transfer/mobility retraining;patient/caregiver education/training  -CH  --          OT Goals    Dressing Goal Selection (OT) dressing, OT goal 1  -CH  --       Toileting Goal Selection (OT) toileting, OT goal 1  -CH  --          Dressing Goal 1 (OT)    Activity/Assistive Device (Dressing Goal 1, OT) lower body dressing  -CH  --       [place]/Cues Needed (Dressing Goal 1, OT) verbal cues required  -CH  --       Time Frame (Dressing Goal 1, OT) long term goal (LTG);by discharge  -CH  --       Barriers (Dressing Goal 1, OT) pain  -CH  --       Progress/Outcome (Dressing Goal 1, OT) goal ongoing  -CH  --          Toileting Goal 1 (OT)    Activity/Device (Toileting Goal 1, OT) toileting skills, all;commode  -CH  --        [place] Level/Cues Needed (Toileting Goal 1, OT) conditional independence  -CH  --       Time Frame (Toileting Goal 1, OT) long term goal (LTG);by discharge  -CH  --       Barriers (Toileting Goal 1, OT) pain, balance impairments  -  --       Progress/Outcome (Toileting Goal 1, OT) goal ongoing  -  --          Living Environment    Home Accessibility stairs to enter home;tub/shower is not walk in  - stairs to enter home  -         User Key  (r) = Recorded By, (t) = Taken By, (c) = Cosigned By    Initials Name Effective Dates     Deepti Buckner, OTR/L 08/02/16 -     SWETA Martell Oropeza RN 08/02/16 -                  OT Recommendation and Plan  Anticipated Equipment Needs at Discharge (OT): front wheeled walker  Anticipated Discharge Disposition (OT): home or self care, home with home health  Planned Therapy Interventions (OT Eval): activity tolerance training, BADL retraining, functional balance retraining, occupation/activity based interventions, transfer/mobility retraining, patient/caregiver education/training  Therapy Frequency (OT Eval): 3 times/wk  Plan of Care Review  Plan of Care Reviewed With: patient, spouse, son  Plan of Care Reviewed With: patient, spouse, son          Outcome Measures     Row Name 03/13/18 1036 03/13/18 0947          How much help from another person do you currently need...    Turning from your back to your side while in flat bed without using bedrails? 4  -LH  --     Moving from lying on back to sitting on the side of a flat bed without bedrails? 3  -LH  --     Moving to and from a bed to a chair (including a wheelchair)? 3  -LH  --     Standing up from a chair using your arms (e.g., wheelchair, bedside chair)? 3  -LH  --     Climbing 3-5 steps with a railing? 3  -LH  --     To walk in hospital room? 3  -LH  --     AM-PAC 6 Clicks Score 19  -LH  --        How much help from another is currently needed...    Putting on and taking off regular lower body clothing?  -- 3  -CH      Bathing (including washing, rinsing, and drying)  -- 3  -CH     Toileting (which includes using toilet bed pan or urinal)  -- 3  -CH     Putting on and taking off regular upper body clothing  -- 4  -CH     Taking care of personal grooming (such as brushing teeth)  -- 4  -CH     Eating meals  -- 4  -CH     Score  -- 21  -CH        Functional Assessment    Outcome Measure Options AM-PAC 6 Clicks Basic Mobility (PT)  - AM-PAC 6 Clicks Daily Activity (OT)  -       User Key  (r) = Recorded By, (t) = Taken By, (c) = Cosigned By    Initials Name Provider Type     Champ Hercules, PT Physical Therapist     Deepti Buckner, OTR/L Occupational Therapist          Time Calculation:   OT Start Time: 0947  OT Stop Time: 1030  OT Time Calculation (min): 43 min    Therapy Charges for Today     Code Description Service Date Service Provider Modifiers Qty    76399864293  OT SELFCARE CURRENT 3/13/2018 Deepti Buckner OTR/L CLAUDINE, CJ 1    [id number]  OT SELFCARE PROJECTED 3/13/2018 Deepti Buckner OTR/L GO, CI 1    [id number]  OT EVAL MOD COMPLEXITY 3 3/13/2018 Deepti Buckner OTR/L CLAUDINE, KX 1          OT G-codes  OT Professional Judgement Used?: Yes  OT Functional Scales Options: AM-PAC 6 Clicks Daily Activity (OT)  Score: 21  Functional Limitation: Self care  Self Care Current Status (): At least 20 percent but less than 40 percent impaired, limited or restricted  Self Care Goal Status (): At least 1 percent but less than 20 percent impaired, limited or restricted    Deepti Buckner OTR/L  3/13/2018

## 2018-03-14 VITALS
HEIGHT: 68 IN | TEMPERATURE: 98.6 F | HEART RATE: 72 BPM | BODY MASS INDEX: 22.96 KG/M2 | RESPIRATION RATE: 18 BRPM | OXYGEN SATURATION: 92 % | DIASTOLIC BLOOD PRESSURE: 56 MMHG | SYSTOLIC BLOOD PRESSURE: 167 MMHG | WEIGHT: 151.5 LBS

## 2018-03-14 PROCEDURE — 97116 GAIT TRAINING THERAPY: CPT

## 2018-03-14 PROCEDURE — 97110 THERAPEUTIC EXERCISES: CPT

## 2018-03-14 PROCEDURE — 97530 THERAPEUTIC ACTIVITIES: CPT

## 2018-03-14 PROCEDURE — 99024 POSTOP FOLLOW-UP VISIT: CPT | Performed by: NURSE PRACTITIONER

## 2018-03-14 RX ORDER — TRAMADOL HYDROCHLORIDE 50 MG/1
50 TABLET ORAL EVERY 6 HOURS PRN
Qty: 120 TABLET | Refills: 0 | Status: SHIPPED | OUTPATIENT
Start: 2018-03-14 | End: 2018-03-23

## 2018-03-14 RX ADMIN — METOPROLOL SUCCINATE 25 MG: 25 TABLET, FILM COATED, EXTENDED RELEASE ORAL at 08:52

## 2018-03-14 RX ADMIN — FUROSEMIDE 40 MG: 40 TABLET ORAL at 08:52

## 2018-03-14 RX ADMIN — FINASTERIDE 5 MG: 5 TABLET, FILM COATED ORAL at 08:52

## 2018-03-14 RX ADMIN — FAMOTIDINE 20 MG: 10 INJECTION, SOLUTION INTRAVENOUS at 08:52

## 2018-03-14 RX ADMIN — SODIUM CHLORIDE 75 ML/HR: 9 INJECTION, SOLUTION INTRAVENOUS at 03:20

## 2018-03-14 RX ADMIN — MEPERIDINE HYDROCHLORIDE 50 MG: 50 INJECTION, SOLUTION INTRAMUSCULAR; INTRAVENOUS; SUBCUTANEOUS at 03:20

## 2018-03-14 NOTE — THERAPY DISCHARGE NOTE
Acute Care - Physical Therapy Discharge Summary  [facility]       Patient Name: Chad Henriquez  : 1929  MRN: [id number]    Today's Date: 3/14/2018  Onset of Illness/Injury or Date of Surgery: 18    Date of Referral to PT: 18  Referring Physician: EDIE Jaime      Admit Date: 3/12/2018      PT Recommendation and Plan    Visit Dx:    ICD-10-CM ICD-9-CM   1. Spinal stenosis, lumbar region, with neurogenic claudication M48.062 724.03   2. Impaired mobility Z74.09 799.89   3. Impaired mobility and ADLs Z74.09 799.89             Outcome Measures     Row Name 18 1119 18 1400 18 1036       How much help from another person do you currently need...    Turning from your back to your side while in flat bed without using bedrails? 4  -LG  -- 4  -LH    Moving from lying on back to sitting on the side of a flat bed without bedrails? 3  -LG  -- 3  -LH    Moving to and from a bed to a chair (including a wheelchair)? 3  -LG  -- 3  -LH    Standing up from a chair using your arms (e.g., wheelchair, bedside chair)? 3  -LG  -- 3  -LH    Climbing 3-5 steps with a railing? 3  -LG  -- 3  -LH    To walk in hospital room? 3  -LG  -- 3  -LH    AM-PAC 6 Clicks Score 19  -LG  -- 19  -LH       How much help from another is currently needed...    Putting on and taking off regular lower body clothing?  -- 3  -TS  --    Bathing (including washing, rinsing, and drying)  -- 3  -TS  --    Toileting (which includes using toilet bed pan or urinal)  -- 3  -TS  --    Putting on and taking off regular upper body clothing  -- 4  -TS  --    Taking care of personal grooming (such as brushing teeth)  -- 4  -TS  --    Eating meals  -- 4  -TS  --    Score  -- 21  -TS  --       Functional Assessment    Outcome Measure Options AM-PAC 6 Clicks Basic Mobility (PT)  -LG AM-PAC 6 Clicks Daily Activity (OT)  -TS AM-PAC 6 Clicks Basic Mobility (PT)  -    Row Name 18 0947             How much help from another is  currently needed...    Putting on and taking off regular lower body clothing? 3  -CH      Bathing (including washing, rinsing, and drying) 3  -CH      Toileting (which includes using toilet bed pan or urinal) 3  -CH      Putting on and taking off regular upper body clothing 4  -CH      Taking care of personal grooming (such as brushing teeth) 4  -CH      Eating meals 4  -CH      Score 21  -CH         Functional Assessment    Outcome Measure Options AM-PAC 6 Clicks Daily Activity (OT)  -        User Key  (r) = Recorded By, (t) = Taken By, (c) = Cosigned By    Initials Name Provider Type     Ari Alicia PTA Physical Therapy Assistant     Champ Hercules, PT Physical Therapist    CH Deepti Buckner, OTR/L Occupational Therapist    TS Kitty Villeda, ALVAREZ/L Occupational Therapy Assistant                PT Charges     Row Name 03/14/18 1034             Time Calculation    Start Time 1034  -      Stop Time 1119  -      Time Calculation (min) 45 min  -      PT Received On 03/14/18  -      PT Goal Re-Cert Due Date 03/23/18  -         Time Calculation- PT    Total Timed Code Minutes- PT 45 minute(s)  -        User Key  (r) = Recorded By, (t) = Taken By, (c) = Cosigned By    Initials Name Provider Type     Ari Alicia PTA Physical Therapy Assistant                  PT Rehab Goals     Row Name 03/14/18 1608 03/13/18 0950          Bed Mobility Goal 1 (PT)    Activity/Assistive Device (Bed Mobility Goal 1, PT)  -- bed mobility activities, all  -     [place] Level/Cues Needed (Bed Mobility Goal 1, PT)  -- independent  -     Time Frame (Bed Mobility Goal 1, PT)  -- by discharge  -     Progress/Outcomes (Bed Mobility Goal 1, PT) goal met  - goal ongoing  -        Transfer Goal 1 (PT)    Activity/Assistive Device (Transfer Goal 1, PT)  -- sit-to-stand/stand-to-sit;bed-to-chair/chair-to-bed  -     [place] Level/Cues Needed (Transfer Goal 1, PT)  -- independent  -     Time Frame  (Transfer Goal 1, PT)  -- by discharge  -     Progress/Outcome (Transfer Goal 1, PT) goal not met  -LG goal ongoing  -        Gait Training Goal 1 (PT)    Activity/Assistive Device (Gait Training Goal 1, PT)  -- gait (walking locomotion)  -     [place] Level (Gait Training Goal 1, PT)  -- independent  -     Distance (Gait Goal 1, PT)  -- 250  -LH     Time Frame (Gait Training Goal 1, PT)  -- by discharge  -     Progress/Outcome (Gait Training Goal 1, PT) goal not met  -LG goal ongoing  -        Stairs Goal 1 (PT)    Activity/Assistive Device (Stairs Goal 1, PT)  -- ascending stairs;descending stairs;using handrail  -     [place] Level/Cues Needed (Stairs Goal 1, PT)  -- supervision required  -     Number of Stairs (Stairs Goal 1, PT)  -- 5  -LH     Time Frame (Stairs Goal 1, PT)  -- by discharge  -     Progress/Outcome (Stairs Goal 1, PT) goal not met   pt did 5 steps but not supervision, required CGA  -LG goal ongoing  -       User Key  (r) = Recorded By, (t) = Taken By, (c) = Cosigned By    Initials Name Provider Type     Ari Alicia PTA Physical Therapy Assistant     Champ Hercules PT Physical Therapist          Therapy Charges for Today     Code Description Service Date Service Provider Modifiers Qty    00410269779 HC GAIT TRAINING EA 15 MIN 3/13/2018 Ari Alicia PTA GP, KX 1    [id number] HC PT THER PROC EA 15 MIN 3/13/2018 Ari Alicia PTA GP, KX 1    [id number] HC GAIT TRAINING EA 15 MIN 3/14/2018 Ari Alicia PTA GP, KX 1    [id number] HC PT THERAPEUTIC ACT EA 15 MIN 3/14/2018 Ari Alicia PTA GP, KX 1    83391701274 HC PT THER PROC EA 15 MIN 3/14/2018 Ari Alicia PTA GP, KX 1          PT Discharge Summary  Anticipated Discharge Disposition (PT): home with home health care  Reason for Discharge: Discharge from facility  Outcomes Achieved: Refer to plan of care for updates on goals achieved  Discharge Destination: Home with home health      Ari Alicia  PTA   3/14/2018

## 2018-03-14 NOTE — PROGRESS NOTES
Spoke with patient about plans for [facility] and he is agreeable to admission. Santa Harrell Rn, [facility]

## 2018-03-14 NOTE — PLAN OF CARE
Problem: Patient Care Overview  Goal: Plan of Care Review  Outcome: Ongoing (interventions implemented as appropriate)   03/14/18 3256   Coping/Psychosocial   Plan of Care Reviewed With patient;family   Plan of Care Review   Progress improving   OTHER   Outcome Summary VSS, safety maintained, O2 @ 1L, c/o pain with movement, prn pain medication provided X1 with good relief, up X1, resting well throughout shift, will continue to monitor     Goal: Individualization and Mutuality  Outcome: Ongoing (interventions implemented as appropriate)    Goal: Discharge Needs Assessment  Outcome: Ongoing (interventions implemented as appropriate)      Problem: Skin Injury Risk (Adult)  Goal: Skin Health and Integrity  Outcome: Ongoing (interventions implemented as appropriate)

## 2018-03-14 NOTE — THERAPY DISCHARGE NOTE
Acute Care - Occupational Therapy Discharge Summary  [facility]     Patient Name: Chad Henriquez  : 1929  MRN: [id number]    Today's Date: 3/14/2018       Date of Referral to OT: 18         Admit Date: 3/12/2018        OT Recommendation and Plan    Visit Dx:    ICD-10-CM ICD-9-CM   1. Spinal stenosis, lumbar region, with neurogenic claudication M48.062 724.03   2. Impaired mobility Z74.09 799.89   3. Impaired mobility and ADLs Z74.09 799.89                     OT Rehab Goals     Row Name 18 1400 18 0947          Dressing Goal 1 (OT)    Activity/Assistive Device (Dressing Goal 1, OT)  -- lower body dressing  -CH     [place]/Cues Needed (Dressing Goal 1, OT)  -- verbal cues required  -CH     Time Frame (Dressing Goal 1, OT)  -- long term goal (LTG);by discharge  -CH     Barriers (Dressing Goal 1, OT)  -- pain  -CH     Progress/Outcome (Dressing Goal 1, OT) goal not met  -TS goal ongoing  -CH        Toileting Goal 1 (OT)    Activity/Device (Toileting Goal 1, OT)  -- toileting skills, all;commode  -CH     [place] Level/Cues Needed (Toileting Goal 1, OT)  -- conditional independence  -CH     Time Frame (Toileting Goal 1, OT)  -- long term goal (LTG);by discharge  -CH     Barriers (Toileting Goal 1, OT)  -- pain, balance impairments  -CH     Progress/Outcome (Toileting Goal 1, OT) goal not met  -TS goal ongoing  -CH       User Key  (r) = Recorded By, (t) = Taken By, (c) = Cosigned By    Initials Name Provider Type    CH ELENA Jaimes/L Occupational Therapist    TS KIM Loving/L Occupational Therapy Assistant                Outcome Measures     Row Name 18 1119 18 1400 18 1036       How much help from another person do you currently need...    Turning from your back to your side while in flat bed without using bedrails? 4  -LG  -- 4  -LH    Moving from lying on back to sitting on the side of a flat bed without bedrails? 3  -LG  -- 3  -LH    Moving to  and from a bed to a chair (including a wheelchair)? 3  -LG  -- 3  -LH    Standing up from a chair using your arms (e.g., wheelchair, bedside chair)? 3  -LG  -- 3  -LH    Climbing 3-5 steps with a railing? 3  -LG  -- 3  -LH    To walk in hospital room? 3  -LG  -- 3  -LH    AM-PAC 6 Clicks Score 19  -LG  -- 19  -LH       How much help from another is currently needed...    Putting on and taking off regular lower body clothing?  -- 3  -TS  --    Bathing (including washing, rinsing, and drying)  -- 3  -TS  --    Toileting (which includes using toilet bed pan or urinal)  -- 3  -TS  --    Putting on and taking off regular upper body clothing  -- 4  -TS  --    Taking care of personal grooming (such as brushing teeth)  -- 4  -TS  --    Eating meals  -- 4  -TS  --    Score  -- 21  -TS  --       Functional Assessment    Outcome Measure Options AM-PAC 6 Clicks Basic Mobility (PT)  -LG AM-PAC 6 Clicks Daily Activity (OT)  -TS AM-PAC 6 Clicks Basic Mobility (PT)  -    Row Name 03/13/18 0947             How much help from another is currently needed...    Putting on and taking off regular lower body clothing? 3  -CH      Bathing (including washing, rinsing, and drying) 3  -CH      Toileting (which includes using toilet bed pan or urinal) 3  -CH      Putting on and taking off regular upper body clothing 4  -CH      Taking care of personal grooming (such as brushing teeth) 4  -CH      Eating meals 4  -CH      Score 21  -CH         Functional Assessment    Outcome Measure Options AM-PAC 6 Clicks Daily Activity (OT)  -        User Key  (r) = Recorded By, (t) = Taken By, (c) = Cosigned By    Initials Name Provider Type    LG Ari Alicia, FRANCHESKA Physical Therapy Assistant    EUSEBIA Hercules, PT Physical Therapist    CH Deepti Buckner, OTR/L Occupational Therapist    TS Kitty Villeda, ALVAREZ/L Occupational Therapy Assistant          Therapy Charges for Today     Code Description Service Date Service Provider Modifiers Qty     22115119274  OT SELF CARE/MGMT/TRAIN EA 15 MIN 3/13/2018 KIM Loving/L GO, KX 2          OT Discharge Summary  Reason for Discharge: Discharge from facility  Outcomes Achieved: Refer to plan of care for updates on goals achieved  Discharge Destination: Home with assist, Home with home health      MYRA Deutsch  3/14/2018

## 2018-03-14 NOTE — PROGRESS NOTES
Continued Stay Note  [facility]     Patient Name: Chad Henriquez  MRN: [id number]  Today's Date: 3/14/2018    Admit Date: 3/12/2018          Discharge Plan     Row Name 03/14/18 0911       Plan    Plan [facility]    Patient/Family in Agreement with Plan yes    Final Discharge Disposition Code 06 - home with home health care    Final Note Patient is discharged home today with orders for home health care. TATIANA spoke to patient and wife in room re this and provided options for [facility]. Patient has selected [facility]. TATIANA called Santa Harrell RN with [facility] and provided referral. Santa confirmed patient will be accepted and she will speak to patient and wife in room re admission to [facility].               Discharge Codes    No documentation.       Expected Discharge Date and Time     Expected Discharge Date Expected Discharge Time    Mar 14, 2018             SARBJIT Terry

## 2018-03-14 NOTE — THERAPY TREATMENT NOTE
Acute Care - Physical Therapy Treatment Note  [facility]     Patient Name: Chad Henriquez  : 1929  MRN: [id number]  Today's Date: 3/14/2018  Onset of Illness/Injury or Date of Surgery: 18  Date of Referral to PT: 18  Referring Physician: EDIE Jaime    Admit Date: 3/12/2018    Visit Dx:    ICD-10-CM ICD-9-CM   1. Spinal stenosis, lumbar region, with neurogenic claudication M48.062 724.03   2. Impaired mobility Z74.09 799.89   3. Impaired mobility and ADLs Z74.09 799.89     Patient Active Problem List   Diagnosis   • BPH with urinary obstruction   • Frequency of urination   • Nocturia   • OAB (overactive bladder)   • Non-smoker   • Normal body mass index (BMI)   • Spinal stenosis, lumbar region, with neurogenic claudication   • Left leg pain   • Bilateral hip pain       Therapy Treatment    Therapy Treatment / Health Promotion    Treatment Time/Intention  Discipline: physical therapy assistant (Ari Alicia PTA)  Document Type: therapy note (daily note) (Ari Alicia PTA)  Subjective Information: complains of (Ari Alicia PTA)  Mode of Treatment: physical therapy (Ari Alicia PTA)  Existing Precautions/Restrictions: fall, spinal (Ari Alicia PTA)  Plan of Care Review  Plan of Care Reviewed With: patient, spouse (Ari Alicia PTA)    Vitals/Pain/Safety  Pain Scale: Numbers Pre/Post-Treatment  Pain Scale: Numbers, Pretreatment: 3/10 (Ari Alicia PTA)  Pain Location - Orientation: lower (Ari Alicia PTA)  Pain Location: back (Ari Alicia PTA)  Pain Intervention(s): Medication (See MAR), Repositioned, Ambulation/increased activity (Ari Alicia PTA)  Pain Scale: Word Pre/Post-Treatment  Pain Location - Orientation: lower (Ari Alicia PTA)  Pain Location: back (Ari Alicia PTA)  Pain Intervention(s): Medication (See MAR), Repositioned, Ambulation/increased activity (Ari Alicia PTA)  Pain Scale: FACES Pre/Post-Treatment  Pain Location - Orientation: lower ([place] Alicia,  PTA)  Pain Location: back ([place] R Maral PTA)  Pain Intervention(s): Medication (See MAR), Repositioned, Ambulation/increased activity ([place] R Alicia, PTA)  Positioning and Restraints  Pre-Treatment Position: in bed ([place] R Alicia, PTA)  Post Treatment Position: chair (Chap[place] R Alicia, PTA)  In Chair: sitting, call light within reach, encouraged to call for assist, with family/caregiver (Ari Alicia PTA)    Mobility,ADL,Motor, Modality  Bed Mobility Assessment/Treatment  Sidelying-Sit [place] (Bed Mobility): contact guard (Chap[place] R Alicia, PTA)  Assistive Device (Bed Mobility): bed rails ([place] R Alicia, PTA)  Sit-Stand Transfer  Sit-Stand [place] (Transfers): contact guard ([place] R Alicia, PTA)  Stand-Sit Transfer  Stand-Sit [place] (Transfers): contact guard (Chapman R Alicia, PTA)  Gait/Stairs Assessment/Training  [place] Level (Gait): contact guard ([place] R Alicia, PTA)  Assistive Device (Gait):  (HHA) (Chapman R Alicia, PTA)  Distance in Feet (Gait): 150 (x 2 reps with short standing rest break) (Chapman R Alicia, PTA)  [place] Level (Stairs): verbal cues, minimum assist (75% patient effort) (Chapman R Alicia PTA)  Handrail Location (Stairs): left side (ascending) (Chapman R Alicia, PTA)  Number of Steps (Stairs): 5 (Chapman R Alicia, PTA)  Ascending Technique (Stairs): step-to-step (Chapman R Alicia, PTA)  Descending Technique (Stairs): step-to-step (Chap[place] R Alicia, PTA)  Stairs, Safety Issues: balance decreased during turns (Chapman R Alicia PTA)  Stairs, Impairments: strength decreased, pain (Chapman R Alicia, PTA)     Therapeutic Exercise  Comment (Therapeutic Exercise): 20 Reps B LE Strengthenging (Chapman R Alicia, PTA)           ROM/MMT             Sensory, Edema, Orthotics          Cognition, Communication, Swallow       Outcome Summary               PT Rehab Goals     Row Name 03/13/18 5032             Bed Mobility Goal 1 (PT)    Activity/Assistive Device (Bed Mobility Goal 1, PT) bed mobility activities, all  -       [place] Level/Cues Needed (Bed Mobility Goal 1, PT) independent  -LH      Time Frame (Bed Mobility Goal 1, PT) by discharge  -      Progress/Outcomes (Bed Mobility Goal 1, PT) goal ongoing  -         Transfer Goal 1 (PT)    Activity/Assistive Device (Transfer Goal 1, PT) sit-to-stand/stand-to-sit;bed-to-chair/chair-to-bed  -LH      [place] Level/Cues Needed (Transfer Goal 1, PT) independent  -LH      Time Frame (Transfer Goal 1, PT) by discharge  -LH      Progress/Outcome (Transfer Goal 1, PT) goal ongoing  -         Gait Training Goal 1 (PT)    Activity/Assistive Device (Gait Training Goal 1, PT) gait (walking locomotion)  -      [place] Level (Gait Training Goal 1, PT) independent  -      Distance (Gait Goal 1, PT) 250  -LH      Time Frame (Gait Training Goal 1, PT) by discharge  -      Progress/Outcome (Gait Training Goal 1, PT) goal ongoing  -         Stairs Goal 1 (PT)    Activity/Assistive Device (Stairs Goal 1, PT) ascending stairs;descending stairs;using handrail  -      [place] Level/Cues Needed (Stairs Goal 1, PT) supervision required  -      Number of Stairs (Stairs Goal 1, PT) 5  -LH      Time Frame (Stairs Goal 1, PT) by discharge  -      Progress/Outcome (Stairs Goal 1, PT) goal ongoing  -        User Key  (r) = Recorded By, (t) = Taken By, (c) = Cosigned By    Initials Name Provider Type     Champ Hercules PT Physical Therapist          Physical Therapy Education     Title: PT OT SLP Therapies (Active)     Topic: Physical Therapy (Done)     Point: Mobility training (Done)    Learning Progress Summary     Learner Status Readiness Method Response Comment Documented by    Patient Done DAGOBERTO aGrcia Educated on benefits of activity. Instructed in Bed Mobility, Transfers, Gait, Therapeutic Exercises. LG 03/14/18 1034     Done Acceptance YAKELIN DACOSTA transfers, home safety, LB dressing TS 03/13/18 1401     Done Acceptance DAGOBERTO HAMLIN DU benefits of PT and POC, call for assist w/  mobility, proper body mechanics when transitioning  03/13/18 1038    Significant Other Done DAGOBERTO Garcia Educated on benefits of activity. Instructed in Bed Mobility, Transfers, Gait, Therapeutic Exercises.  03/14/18 1034     Done Acceptance E VU transfers, home safety, LB dressing TS 03/13/18 1401          Point: Body mechanics (Done)    Learning Progress Summary     Learner Status Readiness Method Response Comment Documented by    Patient Done DAGOBERTO Garcia Educated on benefits of activity. Instructed in Bed Mobility, Transfers, Gait, Therapeutic Exercises.  03/14/18 1034     Done Acceptance E VU transfers, home safety, LB dressing TS 03/13/18 1401     Done Acceptance DAGOBERTO HAMLIN DU benefits of PT and POC, call for assist w/ mobility, proper body mechanics when transitioning  03/13/18 1038    Significant Other Done DAGOBERTO Garcia Educated on benefits of activity. Instructed in Bed Mobility, Transfers, Gait, Therapeutic Exercises.  03/14/18 1034     Done Acceptance E VU transfers, home safety, LB dressing TS 03/13/18 1401          Point: Precautions (Done)    Learning Progress Summary     Learner Status Readiness Method Response Comment Documented by    Patient Done DAGOBERTO Garcia Educated on benefits of activity. Instructed in Bed Mobility, Transfers, Gait, Therapeutic Exercises.  03/14/18 1034     Done Acceptance E VU transfers, home safety, LB dressing TS 03/13/18 1401     Done Acceptance DAGOBERTO HAMLIN DU benefits of PT and POC, call for assist w/ mobility, proper body mechanics when transitioning  03/13/18 1038    Significant Other Done DAGOBERTO Garcia Educated on benefits of activity. Instructed in Bed Mobility, Transfers, Gait, Therapeutic Exercises.  03/14/18 1034     Done Acceptance E VU transfers, home safety, LB dressing  03/13/18 1401                      User Key     Initials Effective Dates Name Provider Type Discipline     08/02/16 -  Ari Alicia PTA Physical Therapy Assistant PT     08/02/16 -   Champ Hercules, PT Physical Therapist PT    TS 08/02/16 -  KIM Loving/L Occupational Therapy Assistant OT                    PT Recommendation and Plan     Plan of Care Reviewed With: patient, spouse  Progress: improving  Outcome Summary: Pt Supervision for bed mobility, CGA for Sit to/from Stand, CGA for Gait x 150' x 2 reps with 1 standing break. Pt completd 5 steps with step to step pattern. Plans to DC with HH and PT.           Outcome Measures     Row Name 03/14/18 1119 03/13/18 1400 03/13/18 1036       How much help from another person do you currently need...    Turning from your back to your side while in flat bed without using bedrails? 4  -LG  -- 4  -LH    Moving from lying on back to sitting on the side of a flat bed without bedrails? 3  -LG  -- 3  -LH    Moving to and from a bed to a chair (including a wheelchair)? 3  -LG  -- 3  -LH    Standing up from a chair using your arms (e.g., wheelchair, bedside chair)? 3  -LG  -- 3  -LH    Climbing 3-5 steps with a railing? 3  -LG  -- 3  -LH    To walk in hospital room? 3  -LG  -- 3  -LH    AM-PAC 6 Clicks Score 19  -LG  -- 19  -LH       How much help from another is currently needed...    Putting on and taking off regular lower body clothing?  -- 3  -TS  --    Bathing (including washing, rinsing, and drying)  -- 3  -TS  --    Toileting (which includes using toilet bed pan or urinal)  -- 3  -TS  --    Putting on and taking off regular upper body clothing  -- 4  -TS  --    Taking care of personal grooming (such as brushing teeth)  -- 4  -TS  --    Eating meals  -- 4  -TS  --    Score  -- 21  -TS  --       Functional Assessment    Outcome Measure Options AM-PAC 6 Clicks Basic Mobility (PT)  -LG AM-PAC 6 Clicks Daily Activity (OT)  -TS AM-PAC 6 Clicks Basic Mobility (PT)  -    Row Name 03/13/18 0947             How much help from another is currently needed...    Putting on and taking off regular lower body clothing? 3  -CH      Bathing (including  washing, rinsing, and drying) 3  -CH      Toileting (which includes using toilet bed pan or urinal) 3  -CH      Putting on and taking off regular upper body clothing 4  -CH      Taking care of personal grooming (such as brushing teeth) 4  -CH      Eating meals 4  -CH      Score 21  -CH         Functional Assessment    Outcome Measure Options AM-PAC 6 Clicks Daily Activity (OT)  -        User Key  (r) = Recorded By, (t) = Taken By, (c) = Cosigned By    Initials Name Provider Type    LG Ari Alicia PTA Physical Therapy Assistant     Champ Hercules, PT Physical Therapist    CH Deepti Buckner, OTR/L Occupational Therapist    TS Kitty Villeda, ALVAREZ/L Occupational Therapy Assistant           Time Calculation:         PT Charges     Row Name 03/14/18 1034             Time Calculation    Start Time 1034  -LG      Stop Time 1119  -LG      Time Calculation (min) 45 min  -LG      PT Received On 03/14/18  -LG      PT Goal Re-Cert Due Date 03/23/18  -         Time Calculation- PT    Total Timed Code Minutes- PT 45 minute(s)  -        User Key  (r) = Recorded By, (t) = Taken By, (c) = Cosigned By    Initials Name Provider Type    LG Ari Alicia PTA Physical Therapy Assistant          Therapy Charges for Today     Code Description Service Date Service Provider Modifiers Qty    80351425632 HC GAIT TRAINING EA 15 MIN 3/13/2018 Ari Alicia PTA GP, KX 1    97228562998 HC PT THER PROC EA 15 MIN 3/13/2018 Ari Alicia PTA GP, KX 1    63378694272 HC GAIT TRAINING EA 15 MIN 3/14/2018 Ari Alicia PTA GP, KX 1    98503436148 HC PT THERAPEUTIC ACT EA 15 MIN 3/14/2018 Ari Alicia PTA GP, KX 1    56004834872 HC PT THER PROC EA 15 MIN 3/14/2018 Ari Alicia PTA GP, KX 1          PT G-Codes  Outcome Measure Options: AM-PAC 6 Clicks Basic Mobility (PT)  Score: 19  Functional Limitation: Mobility: Walking and moving around  Mobility: Walking and Moving Around Current Status (): At least 20 percent but less than 40  percent impaired, limited or restricted  Mobility: Walking and Moving Around Goal Status (): At least 1 percent but less than 20 percent impaired, limited or restricted    Ari Alicia, PTA  3/14/2018

## 2018-03-14 NOTE — PLAN OF CARE
Problem: Patient Care Overview  Goal: Plan of Care Review  Outcome: Ongoing (interventions implemented as appropriate)   03/14/18 1034   Coping/Psychosocial   Plan of Care Reviewed With patient;spouse   Plan of Care Review   Progress improving   OTHER   Outcome Summary Pt Supervision for bed mobility, CGA for Sit to/from Stand, CGA for Gait x 150' x 2 reps with 1 standing break. Pt completd 5 steps with step to step pattern. Plans to DC with HH and PT.

## 2018-03-14 NOTE — DISCHARGE SUMMARY
Date of Discharge:  3/14/2018    Discharge Diagnosis: Lumbar stenosis    Presenting Problem/History of Present Illness  Spinal stenosis, lumbar region, with neurogenic claudication [M48.062]  Spinal stenosis, lumbar region, with neurogenic claudication [M48.062]  Spinal stenosis, lumbar region, with neurogenic claudication [M48.062]       Hospital Course  Patient is a 88 y.o. male presented with back and leg pain.  He went to the operating room on 03/12/2018 for an L3-4, 4-5 lumbar laminectomy.  Patient admitted through all manner conservative care without any relief.  Patient was having neurogenic claudication.  Patient did tolerate the surgery well.  He states at this time that his leg pain is much improved.  He is still having some back pain but this is more related to the postop incision.  He is ambulating.  He is voiding spontaneously.  He is tolerating by mouth.  It is felt this on patient is stable and suitable for discharged home.  We will have home health nursing to assess the patient for postop wound care and vital signs and neuro checks as well as medication education.  We will have physical therapy and occupational therapy come to help the patient with gait training strengthening and balance and home health assessment.      Procedures Performed  Procedure(s):  LUMBAR LAMINECTOMY WITHOUT FUSION L3-4,4-5       Consults:   Consults     No orders found from 2/11/2018 to 3/13/2018.              Condition on Discharge:  stable    Vital Signs  Temp:  [98.2 °F (36.8 °C)-98.8 °F (37.1 °C)] 98.6 °F (37 °C)  Heart Rate:  [65-80] 71  Resp:  [16-18] 16  BP: (123-141)/(47-65) 141/58    Physical Exam:   Physical Exam   Constitutional: He is oriented to person, place, and time. He appears well-developed and well-nourished.   HENT:   Head: Normocephalic.   Eyes: EOM are normal. Pupils are equal, round, and reactive to light.   Neck: Normal range of motion.   Pulmonary/Chest: Effort normal.   Musculoskeletal: Normal  range of motion.        Lumbar back: He exhibits pain.   Neurological: He is alert and oriented to person, place, and time. He has normal strength and normal reflexes. No cranial nerve deficit or sensory deficit. Gait normal. GCS eye subscore is 4. GCS verbal subscore is 5. GCS motor subscore is 6.   Skin: Skin is warm.   Incision clean dry and intact   Psychiatric: He has a normal mood and affect. His speech is normal and behavior is normal. Thought content normal.        Neurologic Exam     Mental Status   Oriented to person, place, and time.   Speech: speech is normal     Cranial Nerves     CN III, IV, VI   Pupils are equal, round, and reactive to light.  Extraocular motions are normal.     Motor Exam     Strength   Strength 5/5 throughout.          Discharge Disposition  Home or Self Care    Discharge Medications   Chad Henriquez   Home Medication Instructions JEAN:[id number]    Printed on:03/14/18 4867   Medication Information                      alfuzosin (UROXATRAL) 10 MG 24 hr tablet  Take 1 tablet by mouth Daily.             chlorpheniramine (CHLOR-TRIMETON) 4 MG tablet  Take 4 mg by mouth Daily.             diphenhydrAMINE (BENADRYL) 25 mg capsule  Take 25 mg by mouth Every Night.             finasteride (PROSCAR) 5 MG tablet  Take 1 tablet by mouth Daily.             Flaxseed, Linseed, (FLAXSEED OIL) 1000 MG capsule  540mg takes 4 tablets by mouth daily             folic acid ([employer] FOLIC ACID) 800 MCG tablet  Take 800 mcg by mouth daily.               furosemide (LASIX) 40 MG tablet  Take 40 mg by mouth Daily.             L-Lysine 500 MG tablet  Take 1 tablet by mouth Daily.             metoprolol succinate XL (TOPROL XL) 25 MG 24 hr tablet  Take 1 tablet by mouth daily             pravastatin (PRAVACHOL) 40 MG tablet  Take 40 mg by mouth daily.               traMADol (ULTRAM) 50 MG tablet  Take 1 tablet by mouth Every 6 (Six) Hours As Needed for Moderate Pain  for up to 9 days.             vitamin C  (ASCORBIC ACID) 500 MG tablet  Take 500 mg by mouth daily.               Vitamin D, Cholecalciferol, 1000 UNITS capsule  2 tablets by mouth daily                 Discharge Diet:   Diet Instructions     Diet: Regular; Thin       Discharge Diet:  Regular    Fluid Consistency:  Thin          Activity at Discharge:   Activity Instructions     Discharge Activity Restrictions       1) No driving for 1 week and no longer taking narcotics.     2) May shower / sponge bathe in 24 hours.  3) Do not lift, push, or pull          Follow-up Appointments  No future appointments.  Additional Instructions for the Follow-ups that You Need to Schedule     Call MD With Problems / Concerns    As directed      Call with worsening back or leg pain, drainage from wound, fever, or difficulty walking    Order Comments:  Call with worsening back or leg pain, drainage from wound, fever, or difficulty walking          Discharge Follow-up with Specialty: job hernandez np; 3 Weeks    As directed      Specialty:  job hernandez np    Follow Up:  3 Weeks         Referral to Home Health    As directed      Face to Face Visit Date:  3/14/2018    Follow-up Provider for Plan of Care?:  I will be treating the patient on an ongoing basis.  Please send me the Plan of Care for signature.    Follow-up Provider:  BJORN GOODWIN [[id number]]    Reason/Clinical Findings:  post op    Describe mobility limitations that make leaving home difficult:  recent surgery    Nursing/Therapeutic Services Requested:  Skilled Nursing Physical Therapy Occupational Therapy    Skilled nursing orders:  Medication education Pain management Wound care dressing/changes    Instructions:  no dressing after 72 post op. check wound to make sure no drainage or s/s of infection. clean daily with soap and water    PT orders:  Therapeutic exercise Gait Training Strengthening Home safety assessment    Weight Bearing Status:  Full Weight Bearing    Occupational orders:  Activities of daily  living Energy conservation Strengthening Home safety assessment    Frequency:  1 Week 1               Test Results Pending at Discharge       Josiah Robles, EDIE  03/14/18  8:08 AM    Time: Discharge 30 min

## 2018-03-14 NOTE — PLAN OF CARE
Problem: Patient Care Overview  Goal: Plan of Care Review  Outcome: Outcome(s) achieved Date Met: 03/14/18 03/14/18 1210   Coping/Psychosocial   Plan of Care Reviewed With patient;spouse   Plan of Care Review   Progress improving   OTHER   Outcome Summary VSS. Oxygen @1L. pain only when moving. Ready for discharge.      Goal: Individualization and Mutuality  Outcome: Ongoing (interventions implemented as appropriate)    Goal: Discharge Needs Assessment  Outcome: Ongoing (interventions implemented as appropriate)    Goal: Interprofessional Rounds/Family Conf  Outcome: Ongoing (interventions implemented as appropriate)      Problem: Fall Risk (Adult)  Goal: Absence of Fall  Outcome: Outcome(s) achieved Date Met: 03/14/18      Problem: Skin Injury Risk (Adult)  Goal: Skin Health and Integrity  Outcome: Ongoing (interventions implemented as appropriate)

## 2018-03-19 RX ORDER — ONDANSETRON 4 MG/1
4 TABLET, FILM COATED ORAL EVERY 8 HOURS PRN
Qty: 42 TABLET | Refills: 0 | Status: SHIPPED | OUTPATIENT
Start: 2018-03-19 | End: 2018-06-05 | Stop reason: SDUPTHER

## 2018-03-22 ENCOUNTER — OFFICE VISIT (OUTPATIENT)
Dept: CARDIOLOGY | Age: 83
End: 2018-03-22
Payer: MEDICARE

## 2018-03-22 VITALS
SYSTOLIC BLOOD PRESSURE: 136 MMHG | HEIGHT: 68 IN | WEIGHT: 170 LBS | DIASTOLIC BLOOD PRESSURE: 70 MMHG | HEART RATE: 80 BPM | BODY MASS INDEX: 25.76 KG/M2

## 2018-03-22 DIAGNOSIS — I10 ESSENTIAL HYPERTENSION: ICD-10-CM

## 2018-03-22 DIAGNOSIS — I25.10 ARTERIOSCLEROTIC HEART DISEASE: Primary | ICD-10-CM

## 2018-03-22 DIAGNOSIS — M48.062 SPINAL STENOSIS, LUMBAR REGION, WITH NEUROGENIC CLAUDICATION: Primary | ICD-10-CM

## 2018-03-22 PROCEDURE — G8419 CALC BMI OUT NRM PARAM NOF/U: HCPCS | Performed by: INTERNAL MEDICINE

## 2018-03-22 PROCEDURE — G8484 FLU IMMUNIZE NO ADMIN: HCPCS | Performed by: INTERNAL MEDICINE

## 2018-03-22 PROCEDURE — 1036F TOBACCO NON-USER: CPT | Performed by: INTERNAL MEDICINE

## 2018-03-22 PROCEDURE — 1123F ACP DISCUSS/DSCN MKR DOCD: CPT | Performed by: INTERNAL MEDICINE

## 2018-03-22 PROCEDURE — 4040F PNEUMOC VAC/ADMIN/RCVD: CPT | Performed by: INTERNAL MEDICINE

## 2018-03-22 PROCEDURE — G8427 DOCREV CUR MEDS BY ELIG CLIN: HCPCS | Performed by: INTERNAL MEDICINE

## 2018-03-22 PROCEDURE — 99213 OFFICE O/P EST LOW 20 MIN: CPT | Performed by: INTERNAL MEDICINE

## 2018-03-22 PROCEDURE — G8598 ASA/ANTIPLAT THER USED: HCPCS | Performed by: INTERNAL MEDICINE

## 2018-03-22 RX ORDER — FUROSEMIDE 40 MG/1
40 TABLET ORAL 2 TIMES DAILY
COMMUNITY
End: 2018-09-27 | Stop reason: ALTCHOICE

## 2018-03-26 RX ORDER — CYCLOBENZAPRINE HCL 5 MG
5 TABLET ORAL 2 TIMES DAILY PRN
Qty: 28 TABLET | Refills: 0 | Status: SHIPPED | OUTPATIENT
Start: 2018-03-26 | End: 2018-11-20

## 2018-03-26 NOTE — TELEPHONE ENCOUNTER
Home health nurse, Rhonda, called stating patient was complinaing of stiffness and muscle spasms in the morning that cuase him to have trouble getting out of bed.  The patient's wife had mentioned that the patient was given Flexeril last time he had surgery and it seemed to really help with the muscle spasms and wonders if maybe Dr Falcon would give him a couple weeks worth to see if that will help him.  I spoke w/patient's wife and she said the patient is doing well except that he is having some leg pain/discomfort in the mornings but actually he is doing really well.  Per Dr Falcon: Flexeril 5 mg BID x 2 weeks    chi wilkerson CMA

## 2018-04-04 ENCOUNTER — OFFICE VISIT (OUTPATIENT)
Dept: NEUROSURGERY | Facility: CLINIC | Age: 83
End: 2018-04-04

## 2018-04-04 VITALS
HEIGHT: 68 IN | SYSTOLIC BLOOD PRESSURE: 145 MMHG | BODY MASS INDEX: 22.88 KG/M2 | DIASTOLIC BLOOD PRESSURE: 60 MMHG | WEIGHT: 151 LBS

## 2018-04-04 DIAGNOSIS — M48.062 SPINAL STENOSIS, LUMBAR REGION, WITH NEUROGENIC CLAUDICATION: Primary | ICD-10-CM

## 2018-04-04 DIAGNOSIS — IMO0001 NORMAL BODY MASS INDEX (BMI): ICD-10-CM

## 2018-04-04 DIAGNOSIS — Z78.9 NON-SMOKER: ICD-10-CM

## 2018-04-04 PROCEDURE — 99024 POSTOP FOLLOW-UP VISIT: CPT | Performed by: NURSE PRACTITIONER

## 2018-04-04 NOTE — PROGRESS NOTES
"    Chief complaint:   Chief Complaint   Patient presents with   • Post-op     Chad returns today for a post op visit for his 03/12/18 back surgery.         Subjective     HPI: This is an 88-year-old male gentleman who went to the operating room on 03/12/2018 for an L3-4, 4-5 laminectomy.  He is here in follow-up today.  He states that he is doing better since the surgery.  Is not having as much pain as he did before.  He says that he will occasionally get some back pain pain that right is down into his left lower extremity.  He has had a few days where he is not had any pain but this comes on and can cause him quite a bit of pain but he says again that he is better than what he was prior to surgery.  He has not taking any pain medication on the fact that he does not like topics and fill and it does constipate him.  Denies any bowel or bladder incontinence.  He is working with home health therapy and does want to start getting back into his regular activities.    Review of Systems   Musculoskeletal: Positive for back pain.   Neurological: Negative.          Objective      Vital Signs  /60 (BP Location: Right arm, Patient Position: Sitting)   Ht 172.7 cm (68\")   Wt 68.5 kg (151 lb)   BMI 22.96 kg/m²     Physical Exam   Constitutional: He is oriented to person, place, and time. He appears well-developed and well-nourished.   HENT:   Head: Normocephalic.   Eyes: EOM are normal. Pupils are equal, round, and reactive to light.   Neck: Normal range of motion.   Pulmonary/Chest: Effort normal.   Musculoskeletal: Normal range of motion.        Lumbar back: He exhibits pain.   Neurological: He is alert and oriented to person, place, and time. He has normal strength and normal reflexes. No cranial nerve deficit or sensory deficit. Gait normal. GCS eye subscore is 4. GCS verbal subscore is 5. GCS motor subscore is 6.   Skin: Skin is warm.   Incision clean dry and intact   Psychiatric: He has a normal mood and affect. " His speech is normal and behavior is normal. Thought content normal.       Results Review: No new imaging          Assessment/Plan: At this point the patient appears to be doing very well.  We will follow-up with him in 6 weeks which time he will see Dr. Falcon.  He was told that if he had any further positives a call and we will be happy to see him back here in the office.  BMI shows that is at a healthy weight.  He is a nonsmoker.  He was told that after next week he can start getting back into his regular activities.         Chad was seen today for post-op.    Diagnoses and all orders for this visit:    Spinal stenosis, lumbar region, with neurogenic claudication    Normal body mass index (BMI)    Non-smoker        I discussed the patients findings and my recommendations with patient  EDIE Maloney  04/04/18  10:32 AM

## 2018-05-16 ENCOUNTER — OFFICE VISIT (OUTPATIENT)
Dept: NEUROLOGY | Age: 83
End: 2018-05-16
Payer: MEDICARE

## 2018-05-16 VITALS
BODY MASS INDEX: 24.4 KG/M2 | SYSTOLIC BLOOD PRESSURE: 155 MMHG | HEART RATE: 76 BPM | WEIGHT: 161 LBS | OXYGEN SATURATION: 94 % | DIASTOLIC BLOOD PRESSURE: 64 MMHG | HEIGHT: 68 IN

## 2018-05-16 DIAGNOSIS — G25.0 ESSENTIAL TREMOR: Primary | ICD-10-CM

## 2018-05-16 PROCEDURE — 1036F TOBACCO NON-USER: CPT | Performed by: PHYSICIAN ASSISTANT

## 2018-05-16 PROCEDURE — G8420 CALC BMI NORM PARAMETERS: HCPCS | Performed by: PHYSICIAN ASSISTANT

## 2018-05-16 PROCEDURE — 4040F PNEUMOC VAC/ADMIN/RCVD: CPT | Performed by: PHYSICIAN ASSISTANT

## 2018-05-16 PROCEDURE — 99213 OFFICE O/P EST LOW 20 MIN: CPT | Performed by: PHYSICIAN ASSISTANT

## 2018-05-16 PROCEDURE — G8598 ASA/ANTIPLAT THER USED: HCPCS | Performed by: PHYSICIAN ASSISTANT

## 2018-05-16 PROCEDURE — G8427 DOCREV CUR MEDS BY ELIG CLIN: HCPCS | Performed by: PHYSICIAN ASSISTANT

## 2018-05-16 PROCEDURE — 1123F ACP DISCUSS/DSCN MKR DOCD: CPT | Performed by: PHYSICIAN ASSISTANT

## 2018-05-29 ENCOUNTER — OFFICE VISIT (OUTPATIENT)
Dept: NEUROSURGERY | Facility: CLINIC | Age: 83
End: 2018-05-29

## 2018-05-29 VITALS
DIASTOLIC BLOOD PRESSURE: 60 MMHG | HEIGHT: 68 IN | BODY MASS INDEX: 22.88 KG/M2 | WEIGHT: 151 LBS | SYSTOLIC BLOOD PRESSURE: 145 MMHG

## 2018-05-29 DIAGNOSIS — Z78.9 NON-SMOKER: ICD-10-CM

## 2018-05-29 DIAGNOSIS — M25.551 BILATERAL HIP PAIN: ICD-10-CM

## 2018-05-29 DIAGNOSIS — M25.552 BILATERAL HIP PAIN: ICD-10-CM

## 2018-05-29 DIAGNOSIS — M79.605 LEFT LEG PAIN: ICD-10-CM

## 2018-05-29 DIAGNOSIS — M54.42 ACUTE LEFT-SIDED LOW BACK PAIN WITH LEFT-SIDED SCIATICA: Primary | ICD-10-CM

## 2018-05-29 DIAGNOSIS — IMO0001 NORMAL BODY MASS INDEX (BMI): ICD-10-CM

## 2018-05-29 PROCEDURE — 99024 POSTOP FOLLOW-UP VISIT: CPT | Performed by: NEUROLOGICAL SURGERY

## 2018-05-29 RX ORDER — METHYLPREDNISOLONE 4 MG/1
TABLET ORAL
Qty: 1 EACH | Refills: 0 | Status: SHIPPED | OUTPATIENT
Start: 2018-05-29 | End: 2018-06-05

## 2018-05-29 RX ORDER — DICLOFENAC SODIUM 75 MG/1
75 TABLET, DELAYED RELEASE ORAL 2 TIMES DAILY
Qty: 60 TABLET | Refills: 3 | Status: SHIPPED | OUTPATIENT
Start: 2018-05-29 | End: 2018-11-20

## 2018-05-29 NOTE — PROGRESS NOTES
SUBJECTIVE:  Patient ID: Chad Henriquez is a 88 y.o. male is here today for follow-up.    Chief Complaint: Leg pain  Chief Complaint   Patient presents with   • back & leg pain     patient underwent laminectomy @ L3/4, 4/5 on 3/12/18; today patient states his LLE pain is back, seems to be worsening in the last week (had been pain free for over a month)       HPI  88-year-old gentleman that went to the operating room at the beginning of March for a L34, 45 laminectomy for left lower extremity radicular pain.  He did very well after that surgery with a marked improvement in his left leg pain.  He was and doing better and not using any sort of pain medicine or a cane.  He says about a week ago he developed worsening left lower extremity pain.  He does feel that is different than his previous pain.  Its more posterior where his previous pain was anterior thigh.  He does describe some numbness and tingling in that leg also.  He is taken some Ultram but it has not been very effective.  Its worse when he stands and walks.  He gets notable amount of relief if he gets off of his feet.    The following portions of the patient's history were reviewed and updated as appropriate: allergies, current medications, past family history, past medical history, past social history, past surgical history and problem list.    OBJECTIVE:    Review of Systems   Musculoskeletal: Positive for back pain.   All other systems reviewed and are negative.         Physical Exam   Constitutional: He is oriented to person, place, and time. He appears well-developed and well-nourished.   HENT:   Head: Normocephalic and atraumatic.   Right Ear: Hearing normal.   Left Ear: Hearing normal.   Eyes: EOM are normal. Pupils are equal, round, and reactive to light.   Neck: Normal range of motion.   Neurological: He is alert and oriented to person, place, and time. He has normal strength and normal reflexes. No cranial nerve deficit or sensory deficit. He  displays a negative Romberg sign. GCS eye subscore is 4. GCS verbal subscore is 5. GCS motor subscore is 6. He displays no Babinski's sign on the right side. He displays no Babinski's sign on the left side.   Psychiatric: His speech is normal. Judgment normal. Cognition and memory are normal.       Neurologic Exam     Mental Status   Oriented to person, place, and time.   Speech: speech is normal     Cranial Nerves     CN III, IV, VI   Pupils are equal, round, and reactive to light.  Extraocular motions are normal.     Motor Exam     Strength   Strength 5/5 throughout.    no real pain with active or passive range of motion of the hips.    Independent Review of Radiographic Studies:       ASSESSMENT/PLAN:  Chad has a recurrence of his left lower extremity radicular pain after an initial significant improvement after laminectomy.  We are going to start him on steroids and a prescription strength anti-inflammatory.  And order a new MRI of his lumbar spine to see if there is any changes.  He is already had hip x-rays this year I think it unlikely this is a primary hip arthropathy.      1. Acute left-sided low back pain with left-sided sciatica    2. Bilateral hip pain    3. Left leg pain    4. Non-smoker    5. Normal body mass index (BMI)            No Follow-up on file.      Kj Falcon MD

## 2018-05-29 NOTE — PATIENT INSTRUCTIONS
PATIENT TO CONTINUE TO FOLLOW UP WITH HIS PRIMARY CARE PROVIDER FOR YEARLY EXAMS TO ENSURE COMPLETE HEALTH MAINTENANCE.

## 2018-05-31 RX ORDER — HYDROCODONE BITARTRATE AND ACETAMINOPHEN 7.5; 325 MG/1; MG/1
1 TABLET ORAL EVERY 6 HOURS PRN
Qty: 56 TABLET | Refills: 0 | Status: SHIPPED | OUTPATIENT
Start: 2018-05-31 | End: 2018-08-28 | Stop reason: ALTCHOICE

## 2018-06-01 ENCOUNTER — HOSPITAL ENCOUNTER (OUTPATIENT)
Dept: MRI IMAGING | Facility: HOSPITAL | Age: 83
Discharge: HOME | End: 2018-06-01
Attending: NEUROLOGICAL SURGERY | Admitting: NEUROLOGICAL SURGERY

## 2018-06-01 DIAGNOSIS — M25.551 BILATERAL HIP PAIN: ICD-10-CM

## 2018-06-01 DIAGNOSIS — M54.42 ACUTE LEFT-SIDED LOW BACK PAIN WITH LEFT-SIDED SCIATICA: ICD-10-CM

## 2018-06-01 DIAGNOSIS — M79.605 LEFT LEG PAIN: ICD-10-CM

## 2018-06-01 DIAGNOSIS — M25.552 BILATERAL HIP PAIN: ICD-10-CM

## 2018-06-01 PROCEDURE — 72148 MRI LUMBAR SPINE W/O DYE: CPT

## 2018-06-04 RX ORDER — LORAZEPAM 1 MG/1
TABLET ORAL
Qty: 2 TABLET | Refills: 0 | Status: SHIPPED | OUTPATIENT
Start: 2018-06-04 | End: 2018-08-28

## 2018-06-05 ENCOUNTER — OFFICE VISIT (OUTPATIENT)
Dept: NEUROSURGERY | Facility: CLINIC | Age: 83
End: 2018-06-05

## 2018-06-05 VITALS
SYSTOLIC BLOOD PRESSURE: 164 MMHG | BODY MASS INDEX: 22.88 KG/M2 | WEIGHT: 151 LBS | HEIGHT: 68 IN | DIASTOLIC BLOOD PRESSURE: 84 MMHG

## 2018-06-05 DIAGNOSIS — Z78.9 NON-SMOKER: ICD-10-CM

## 2018-06-05 DIAGNOSIS — M48.062 SPINAL STENOSIS, LUMBAR REGION, WITH NEUROGENIC CLAUDICATION: Primary | ICD-10-CM

## 2018-06-05 DIAGNOSIS — IMO0001 NORMAL BODY MASS INDEX (BMI): ICD-10-CM

## 2018-06-05 DIAGNOSIS — M79.605 LEFT LEG PAIN: ICD-10-CM

## 2018-06-05 DIAGNOSIS — M54.42 ACUTE LEFT-SIDED LOW BACK PAIN WITH LEFT-SIDED SCIATICA: ICD-10-CM

## 2018-06-05 PROCEDURE — 99024 POSTOP FOLLOW-UP VISIT: CPT | Performed by: NEUROLOGICAL SURGERY

## 2018-06-05 RX ORDER — FENTANYL 12 UG/H
1 PATCH TRANSDERMAL
Qty: 10 PATCH | Refills: 0 | Status: SHIPPED | OUTPATIENT
Start: 2018-06-05 | End: 2018-08-28 | Stop reason: ALTCHOICE

## 2018-06-05 RX ORDER — GABAPENTIN 100 MG/1
CAPSULE ORAL
Qty: 63 CAPSULE | Refills: 0 | Status: SHIPPED | OUTPATIENT
Start: 2018-06-05 | End: 2018-08-28 | Stop reason: ALTCHOICE

## 2018-06-05 RX ORDER — GABAPENTIN 300 MG/1
CAPSULE ORAL
Qty: 90 CAPSULE | Refills: 0 | Status: SHIPPED | OUTPATIENT
Start: 2018-06-05 | End: 2018-08-28 | Stop reason: ALTCHOICE

## 2018-06-05 RX ORDER — ONDANSETRON 4 MG/1
4 TABLET, FILM COATED ORAL EVERY 8 HOURS PRN
Qty: 63 TABLET | Refills: 0 | Status: SHIPPED | OUTPATIENT
Start: 2018-06-05 | End: 2018-11-20

## 2018-06-05 NOTE — PROGRESS NOTES
SUBJECTIVE:  Patient ID: Chad Henriquez is a 88 y.o. male is here today for follow-up.    Chief Complaint: Back pain  Chief Complaint   Patient presents with   • back & leg pain     acute onset of LLE pain; patient had MRI on 6/1/18 and is here to discuss results.       HPI  88-year-old gentleman that had a multilevel laminectomy for back pain and left lower extremity radicular pain a few months ago.  He initially did well but then he said a recurrence of his severe back pain and left lower extremity radicular pain we repeated his MRI is here to discuss the results.    The following portions of the patient's history were reviewed and updated as appropriate: allergies, current medications, past family history, past medical history, past social history, past surgical history and problem list.    OBJECTIVE:    Review of Systems   Musculoskeletal: Positive for back pain and gait problem.   All other systems reviewed and are negative.         Physical Exam   Constitutional: He is oriented to person, place, and time. He appears well-developed and well-nourished.   HENT:   Head: Normocephalic and atraumatic.   Right Ear: Hearing normal.   Left Ear: Hearing normal.   Eyes: EOM are normal. Pupils are equal, round, and reactive to light.   Neck: Normal range of motion.   Neurological: He is alert and oriented to person, place, and time. He has normal strength and normal reflexes. No cranial nerve deficit or sensory deficit. He displays a negative Romberg sign. GCS eye subscore is 4. GCS verbal subscore is 5. GCS motor subscore is 6. He displays no Babinski's sign on the right side. He displays no Babinski's sign on the left side.   Psychiatric: His speech is normal. Judgment normal. Cognition and memory are normal.       Neurologic Exam     Mental Status   Oriented to person, place, and time.   Speech: speech is normal     Cranial Nerves     CN III, IV, VI   Pupils are equal, round, and reactive to light.  Extraocular  motions are normal.     Motor Exam     Strength   Strength 5/5 throughout.       Independent Review of Radiographic Studies:   MRI shows central decompression at L3 4 and 5 from the previous laminectomy.  However there is significant disc bulging and facet arthropathy still present that is likely contributing to continue lateral recess and neuroforaminal narrowing.    ASSESSMENT/PLAN:  Mr. 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  At this point unfortunately I think he would require a multilevel facetectomy and instrumented fusion to try to address all of the pathology lumbar spine.  I do not think this is appropriate at his age.  We discussed several other options to manage his pain.  I am going to start him on a trial dose of fentanyl patch 12.5 µg, escalating dose of Neurontin and make a referral for discussions about a spinal cord stimulator with Dr. callahan.  He has had some pain management interventions in the past with mixed results.  There has been discussions prior to this laminectomy about a spinal cord stimulator.  We will see him in follow-up in about 3 weeks.      1. Spinal stenosis, lumbar region, with neurogenic claudication    2. Acute left-sided low back pain with left-sided sciatica    3. Left leg pain    4. Non-smoker    5. Normal body mass index (BMI)            Return in about 3 weeks (around 6/26/2018) for follow up w/DR GOODWIN.      Kj Goodwin MD

## 2018-06-05 NOTE — PATIENT INSTRUCTIONS
PATIENT TO CONTINUE TO FOLLOW UP WITH HIS PRIMARY CARE PROVIDER FOR YEARLY EXAM TO ENSURE COMPLETE HEALTH MAINTENANCE.

## 2018-06-14 ENCOUNTER — TELEPHONE (OUTPATIENT)
Dept: NEUROSURGERY | Facility: CLINIC | Age: 83
End: 2018-06-14

## 2018-06-14 NOTE — TELEPHONE ENCOUNTER
"Patient was seen in the office on 6/5/18 and given Fentanyl patch and to increase his Norco, he was also given escalating dose of Neurontin (he began the 200 mg TID today) - however, this is not helping him with his \"nerve pain\".    Patient does have an appt w/Dr Guillory in August for consultation for the DCS.    I will talk to Dr Falcon about this and see what he wants to do next.  I am going to call Dr Guillory's office and see if we can get him in sooner.    chi wilkerson CMA  "

## 2018-06-26 ENCOUNTER — OFFICE VISIT (OUTPATIENT)
Dept: NEUROSURGERY | Facility: CLINIC | Age: 83
End: 2018-06-26

## 2018-06-26 VITALS
SYSTOLIC BLOOD PRESSURE: 138 MMHG | HEIGHT: 68 IN | WEIGHT: 151 LBS | BODY MASS INDEX: 22.88 KG/M2 | DIASTOLIC BLOOD PRESSURE: 58 MMHG

## 2018-06-26 DIAGNOSIS — M48.062 SPINAL STENOSIS, LUMBAR REGION, WITH NEUROGENIC CLAUDICATION: Primary | ICD-10-CM

## 2018-06-26 DIAGNOSIS — IMO0001 NORMAL BODY MASS INDEX (BMI): ICD-10-CM

## 2018-06-26 DIAGNOSIS — Z78.9 NON-SMOKER: ICD-10-CM

## 2018-06-26 DIAGNOSIS — M79.605 LEFT LEG PAIN: ICD-10-CM

## 2018-06-26 PROCEDURE — 99213 OFFICE O/P EST LOW 20 MIN: CPT | Performed by: NEUROLOGICAL SURGERY

## 2018-07-16 ENCOUNTER — HOSPITAL ENCOUNTER (OUTPATIENT)
Dept: MRI IMAGING | Age: 83
Discharge: HOME | End: 2018-07-16
Payer: MEDICARE

## 2018-07-16 DIAGNOSIS — M51.34 THORACIC DEGENERATIVE DISC DISEASE: ICD-10-CM

## 2018-07-16 DIAGNOSIS — M54.15 RADICULOPATHY, THORACOLUMBAR REGION: ICD-10-CM

## 2018-08-01 ENCOUNTER — HOSPITAL ENCOUNTER (OUTPATIENT)
Dept: MRI IMAGING | Age: 83
Discharge: HOME | End: 2018-08-01
Payer: MEDICARE

## 2018-08-01 DIAGNOSIS — M47.816 SPONDYLOSIS WITHOUT MYELOPATHY OR RADICULOPATHY, LUMBAR REGION: ICD-10-CM

## 2018-08-01 DIAGNOSIS — M51.36 OTHER INTERVERTEBRAL DISC DEGENERATION, LUMBAR REGION: ICD-10-CM

## 2018-08-01 DIAGNOSIS — M51.16 INTERVERTEBRAL DISC DISORDERS WITH RADICULOPATHY, LUMBAR REGION: ICD-10-CM

## 2018-08-01 DIAGNOSIS — M43.06 SPONDYLOLYSIS, LUMBAR REGION: ICD-10-CM

## 2018-08-01 PROCEDURE — 72148 MRI LUMBAR SPINE W/O DYE: CPT

## 2018-08-27 ENCOUNTER — HOSPITAL ENCOUNTER (EMERGENCY)
Facility: HOSPITAL | Age: 83
Discharge: HOME | End: 2018-08-27
Admitting: EMERGENCY MEDICINE

## 2018-08-27 ENCOUNTER — OFFICE VISIT (OUTPATIENT)
Dept: RETAIL CLINIC | Facility: CLINIC | Age: 83
End: 2018-08-27

## 2018-08-27 VITALS
SYSTOLIC BLOOD PRESSURE: 187 MMHG | TEMPERATURE: 98 F | HEIGHT: 68 IN | HEART RATE: 77 BPM | OXYGEN SATURATION: 95 % | WEIGHT: 140 LBS | BODY MASS INDEX: 21.22 KG/M2 | DIASTOLIC BLOOD PRESSURE: 86 MMHG | RESPIRATION RATE: 17 BRPM

## 2018-08-27 VITALS
SYSTOLIC BLOOD PRESSURE: 204 MMHG | HEART RATE: 97 BPM | DIASTOLIC BLOOD PRESSURE: 103 MMHG | RESPIRATION RATE: 20 BRPM | TEMPERATURE: 98.6 F | OXYGEN SATURATION: 98 %

## 2018-08-27 DIAGNOSIS — T14.8XXA SKIN AVULSION: Primary | ICD-10-CM

## 2018-08-27 DIAGNOSIS — R03.0 ELEVATED BLOOD PRESSURE READING: ICD-10-CM

## 2018-08-27 DIAGNOSIS — I10 UNCONTROLLED HYPERTENSION: Primary | ICD-10-CM

## 2018-08-27 DIAGNOSIS — T14.8XXA BLEEDING FROM WOUND: ICD-10-CM

## 2018-08-27 DIAGNOSIS — Z76.89 REFERRAL OF PATIENT: ICD-10-CM

## 2018-08-27 LAB
ALBUMIN SERPL-MCNC: 3.7 G/DL (ref 3.5–5)
ALBUMIN/GLOB SERPL: 1.6 G/DL (ref 1.1–2.5)
ALP SERPL-CCNC: 46 U/L (ref 24–120)
ALT SERPL W P-5'-P-CCNC: 23 U/L (ref 0–54)
ANION GAP SERPL CALCULATED.3IONS-SCNC: 9 MMOL/L (ref 4–13)
AST SERPL-CCNC: 30 U/L (ref 7–45)
BASOPHILS # BLD AUTO: 0.04 10*3/MM3 (ref 0–0.2)
BASOPHILS NFR BLD AUTO: 0.6 % (ref 0–2)
BILIRUB SERPL-MCNC: 0.4 MG/DL (ref 0.1–1)
BUN BLD-MCNC: 15 MG/DL (ref 5–21)
BUN/CREAT SERPL: 28.8 (ref 7–25)
CALCIUM SPEC-SCNC: 8.3 MG/DL (ref 8.4–10.4)
CHLORIDE SERPL-SCNC: 102 MMOL/L (ref 98–110)
CO2 SERPL-SCNC: 27 MMOL/L (ref 24–31)
CREAT BLD-MCNC: 0.52 MG/DL (ref 0.5–1.4)
DEPRECATED RDW RBC AUTO: 46.9 FL (ref 40–54)
EOSINOPHIL # BLD AUTO: 0.27 10*3/MM3 (ref 0–0.7)
EOSINOPHIL NFR BLD AUTO: 3.9 % (ref 0–4)
ERYTHROCYTE [DISTWIDTH] IN BLOOD BY AUTOMATED COUNT: 13.3 % (ref 12–15)
GFR SERPL CREATININE-BSD FRML MDRD: 150 ML/MIN/1.73
GLOBULIN UR ELPH-MCNC: 2.3 GM/DL
GLUCOSE BLD-MCNC: 99 MG/DL (ref 70–100)
HCT VFR BLD AUTO: 36.1 % (ref 40–52)
HGB BLD-MCNC: 12.3 G/DL (ref 14–18)
IMM GRANULOCYTES # BLD: 0.02 10*3/MM3 (ref 0–0.03)
IMM GRANULOCYTES NFR BLD: 0.3 % (ref 0–5)
LYMPHOCYTES # BLD AUTO: 1.48 10*3/MM3 (ref 0.72–4.86)
LYMPHOCYTES NFR BLD AUTO: 21.4 % (ref 15–45)
MCH RBC QN AUTO: 32.8 PG (ref 28–32)
MCHC RBC AUTO-ENTMCNC: 34.1 G/DL (ref 33–36)
MCV RBC AUTO: 96.3 FL (ref 82–95)
MONOCYTES # BLD AUTO: 0.71 10*3/MM3 (ref 0.19–1.3)
MONOCYTES NFR BLD AUTO: 10.3 % (ref 4–12)
NEUTROPHILS # BLD AUTO: 4.4 10*3/MM3 (ref 1.87–8.4)
NEUTROPHILS NFR BLD AUTO: 63.5 % (ref 39–78)
NRBC BLD MANUAL-RTO: 0 /100 WBC (ref 0–0)
PLATELET # BLD AUTO: 265 10*3/MM3 (ref 130–400)
PMV BLD AUTO: 9.2 FL (ref 6–12)
POTASSIUM BLD-SCNC: 4.2 MMOL/L (ref 3.5–5.3)
PROT SERPL-MCNC: 6 G/DL (ref 6.3–8.7)
RBC # BLD AUTO: 3.75 10*6/MM3 (ref 4.8–5.9)
SODIUM BLD-SCNC: 138 MMOL/L (ref 135–145)
WBC NRBC COR # BLD: 6.92 10*3/MM3 (ref 4.8–10.8)

## 2018-08-27 PROCEDURE — 90471 IMMUNIZATION ADMIN: CPT | Performed by: PHYSICIAN ASSISTANT

## 2018-08-27 PROCEDURE — 99284 EMERGENCY DEPT VISIT MOD MDM: CPT

## 2018-08-27 PROCEDURE — 85025 COMPLETE CBC W/AUTO DIFF WBC: CPT | Performed by: PHYSICIAN ASSISTANT

## 2018-08-27 PROCEDURE — 25010000002 TDAP 5-2.5-18.5 LF-MCG/0.5 SUSPENSION: Performed by: PHYSICIAN ASSISTANT

## 2018-08-27 PROCEDURE — 96374 THER/PROPH/DIAG INJ IV PUSH: CPT

## 2018-08-27 PROCEDURE — 80053 COMPREHEN METABOLIC PANEL: CPT | Performed by: PHYSICIAN ASSISTANT

## 2018-08-27 PROCEDURE — 90715 TDAP VACCINE 7 YRS/> IM: CPT | Performed by: PHYSICIAN ASSISTANT

## 2018-08-27 RX ORDER — LABETALOL HYDROCHLORIDE 5 MG/ML
20 INJECTION, SOLUTION INTRAVENOUS ONCE
Status: COMPLETED | OUTPATIENT
Start: 2018-08-27 | End: 2018-08-27

## 2018-08-27 RX ADMIN — LABETALOL HYDROCHLORIDE 20 MG: 5 INJECTION INTRAVENOUS at 20:17

## 2018-08-27 RX ADMIN — TETANUS TOXOID, REDUCED DIPHTHERIA TOXOID AND ACELLULAR PERTUSSIS VACCINE, ADSORBED 0.5 ML: 5; 2.5; 8; 8; 2.5 SUSPENSION INTRAMUSCULAR at 20:09

## 2018-08-28 ENCOUNTER — OFFICE VISIT (OUTPATIENT)
Dept: NEUROSURGERY | Facility: CLINIC | Age: 83
End: 2018-08-28

## 2018-08-28 VITALS
DIASTOLIC BLOOD PRESSURE: 76 MMHG | BODY MASS INDEX: 21.22 KG/M2 | WEIGHT: 140 LBS | SYSTOLIC BLOOD PRESSURE: 140 MMHG | HEIGHT: 68 IN

## 2018-08-28 DIAGNOSIS — G25.0 ESSENTIAL TREMOR: ICD-10-CM

## 2018-08-28 DIAGNOSIS — M48.062 SPINAL STENOSIS, LUMBAR REGION, WITH NEUROGENIC CLAUDICATION: Primary | ICD-10-CM

## 2018-08-28 DIAGNOSIS — Z78.9 NON-SMOKER: ICD-10-CM

## 2018-08-28 DIAGNOSIS — M25.551 BILATERAL HIP PAIN: ICD-10-CM

## 2018-08-28 DIAGNOSIS — IMO0001 NORMAL BODY MASS INDEX (BMI): ICD-10-CM

## 2018-08-28 DIAGNOSIS — M25.552 BILATERAL HIP PAIN: ICD-10-CM

## 2018-08-28 PROCEDURE — 99213 OFFICE O/P EST LOW 20 MIN: CPT | Performed by: NEUROLOGICAL SURGERY

## 2018-08-28 RX ORDER — DULOXETIN HYDROCHLORIDE 60 MG/1
60 CAPSULE, DELAYED RELEASE ORAL DAILY
COMMUNITY
End: 2021-02-08

## 2018-08-28 RX ORDER — OXYCODONE AND ACETAMINOPHEN 7.5; 325 MG/1; MG/1
1 TABLET ORAL EVERY 8 HOURS PRN
COMMUNITY
End: 2018-12-13 | Stop reason: HOSPADM

## 2018-08-28 RX ORDER — PRIMIDONE 50 MG/1
TABLET ORAL
Qty: 45 TABLET | Refills: 3 | Status: SHIPPED | OUTPATIENT
Start: 2018-08-28

## 2018-08-28 RX ORDER — LIDOCAINE 50 MG/G
1 PATCH TOPICAL EVERY 24 HOURS
COMMUNITY
End: 2021-02-08

## 2018-08-28 NOTE — PROGRESS NOTES
Patient presented with his wife with complaints of wound involving his little toe left foot.  They clipped his toenails this morning and cut his skin. He had bleeding and they felt that it was controlled. He was very active today and has not been able to keep his foot elevated. Tonight when he took his shoe off his sock was saturated with blood. Despite applying pressure and redressing the wound it has continued to bleed.  He denies being on a prescription blood thinner, but he does take Flax seed oil daily.  He has never had a problem with bleeding in the past. His blood pressure is very high.  He states it has been normal at home. He denies headache or other symptoms.  Due to the limitations of this clinic, his high blood pressure and issues with bleeding wound I have recommended he go now to the ER for evaluation. I did not unwrap his foot as there is only a small area of blood noted on the dressing currently.   His wife states she will drive him now to [facility] ER.  They did not want to consider ambulance transport. He was escorted from the clinic by HARJIT Nunez and helped to the awaiting vehicle.  MARISSA Zhao charge nurse at  ER notified.

## 2018-08-28 NOTE — PATIENT INSTRUCTIONS
PATIENT TO CONTINUE TO FOLLOW UP WITH HIS PRIMARY CARE PROVIDER FOR YEARLY PHYSICAL EXAMS TO ENSURE COMPLETE HEALTH MAINTENANCE.

## 2018-09-24 ENCOUNTER — HOSPITAL ENCOUNTER (OUTPATIENT)
Dept: MRI IMAGING | Age: 83
Discharge: HOME | End: 2018-09-24
Payer: MEDICARE

## 2018-09-24 DIAGNOSIS — M54.15 RADICULOPATHY, THORACOLUMBAR REGION: ICD-10-CM

## 2018-09-24 DIAGNOSIS — M51.34 THORACIC DEGENERATIVE DISC DISEASE: ICD-10-CM

## 2018-09-24 PROCEDURE — 72146 MRI CHEST SPINE W/O DYE: CPT

## 2018-09-27 ENCOUNTER — OFFICE VISIT (OUTPATIENT)
Dept: CARDIOLOGY | Age: 83
End: 2018-09-27
Payer: MEDICARE

## 2018-09-27 VITALS
WEIGHT: 156 LBS | SYSTOLIC BLOOD PRESSURE: 140 MMHG | HEIGHT: 66 IN | HEART RATE: 82 BPM | BODY MASS INDEX: 25.07 KG/M2 | DIASTOLIC BLOOD PRESSURE: 56 MMHG

## 2018-09-27 DIAGNOSIS — E78.2 MIXED HYPERLIPIDEMIA: ICD-10-CM

## 2018-09-27 DIAGNOSIS — I25.10 CORONARY ARTERY DISEASE INVOLVING NATIVE CORONARY ARTERY OF NATIVE HEART WITHOUT ANGINA PECTORIS: Primary | ICD-10-CM

## 2018-09-27 DIAGNOSIS — I10 ESSENTIAL HYPERTENSION: ICD-10-CM

## 2018-09-27 PROCEDURE — G8427 DOCREV CUR MEDS BY ELIG CLIN: HCPCS | Performed by: NURSE PRACTITIONER

## 2018-09-27 PROCEDURE — 99214 OFFICE O/P EST MOD 30 MIN: CPT | Performed by: NURSE PRACTITIONER

## 2018-09-27 PROCEDURE — 1101F PT FALLS ASSESS-DOCD LE1/YR: CPT | Performed by: NURSE PRACTITIONER

## 2018-09-27 PROCEDURE — 93000 ELECTROCARDIOGRAM COMPLETE: CPT | Performed by: NURSE PRACTITIONER

## 2018-09-27 PROCEDURE — G8419 CALC BMI OUT NRM PARAM NOF/U: HCPCS | Performed by: NURSE PRACTITIONER

## 2018-09-27 RX ORDER — OXYCODONE AND ACETAMINOPHEN 10; 325 MG/1; MG/1
1 TABLET ORAL EVERY 4 HOURS PRN
Status: ON HOLD | COMMUNITY
End: 2018-12-13 | Stop reason: ALTCHOICE

## 2018-09-27 RX ORDER — SUCRALFATE 1 G/1
1 TABLET ORAL 4 TIMES DAILY
Status: ON HOLD | COMMUNITY
End: 2018-12-13 | Stop reason: ALTCHOICE

## 2018-09-27 RX ORDER — DULOXETIN HYDROCHLORIDE 60 MG/1
60 CAPSULE, DELAYED RELEASE ORAL DAILY
COMMUNITY
End: 2020-11-12

## 2018-09-27 NOTE — PATIENT INSTRUCTIONS
may be the most important step you can take to protect your heart. It is never too late to quit. You will get health benefits right away.     · Limit alcohol to 2 drinks a day for men and 1 drink a day for women. Too much alcohol can cause health problems. Medicines    · Take your medicines exactly as prescribed. Call your doctor if you think you are having a problem with your medicine.     · If your doctor recommends aspirin, take the amount directed each day. Make sure you take aspirin and not another kind of pain reliever, such as acetaminophen (Tylenol). If you take ibuprofen (such as Advil or Motrin) for other problems, take aspirin at least 2 hours before taking ibuprofen. When should you call for help? Call 911 if you have symptoms of a heart attack. These may include:    · Chest pain or pressure, or a strange feeling in the chest.     · Sweating.     · Shortness of breath.     · Pain, pressure, or a strange feeling in the back, neck, jaw, or upper belly or in one or both shoulders or arms.     · Lightheadedness or sudden weakness.     · A fast or irregular heartbeat.    After you call 911, the  may tell you to chew 1 adult-strength or 2 to 4 low-dose aspirin. Wait for an ambulance. Do not try to drive yourself.   Watch closely for changes in your health, and be sure to contact your doctor if you have any problems. Where can you learn more? Go to https://[employer] ProjectpesalewJoroto.Vivebio. org and sign in to your jslyhl account. Enter Y859 in the [facility] box to learn more about \"A Healthy Heart: Care Instructions. \"     If you do not have an account, please click on the \"Sign Up Now\" link. Current as of: December 6, 2017  Content Version: 11.7  © 7197-3086 [employer] Virtual [employer]. Care instructions adapted under license by [facility] ([facility]).  If you have questions about a medical condition or this instruction, always ask your healthcare professional. [address] any warranty or liability for your use of this information.

## 2018-09-27 NOTE — PROGRESS NOTES
cyanosis. Dentition is normal.  EYES -   Lids normal without ptosis. No discharge, edema or subconjunctival hemorrhage. Neck - Symmetrical without apparent mass or lymphadenopathy. Respiratory - Normal respiratory effort without use of accessory muscles. Ausculatation reveals vesicular breath sounds without crackles, wheezes, rub or rhonchi. Cardiovascular  No jugular venous distention. Auscultation reveals regular rate and rhythm. No audible clicks, gallop or rub. No murmur. No lower extremity varicosities. No carotid bruits. Abdominal -  No visible distention, mass or pulsations. Extremities - No clubbing or cyanosis. No statis dermatitis or ulcers. No edema. Musculoskeletal -   No Osler's nodes. No kyphosis or scoliosis. Gait is even and regular without limp or shuffle. Ambulates with assistance of cane. Skin -  Warm and dry; no rash or pallor. No new surgical wound. Neurological - No focal neurological deficits. Thought processes coherent. No apparent tremor. Oriented to person, place and time. Psychiatric -  Appropriate affect and mood. Assessment:     Diagnosis Orders   1. Coronary artery disease involving native coronary artery of native heart without angina pectoris     2. Essential hypertension  EKG 12 lead   3. Mixed hyperlipidemia       Data reviewed:  Findings: The resting ejection fraction was calculated to be 78   percent. There was no evidence of infarct or reversible ischemia.       Conclusions: Grace Love with preserved resting left ventricular systolic   function without evidence of infarct or reversible ischemia       Dictated on 8/23/2016 4:18 PM EST. Signed by Dr Greg Gandara on   8/23/2016 4:19 PM EST   Signed by Dr Dotty Mendez 08/23/2016 15:19     EKG reviewed:  NSR 66 BPM; RBBB; rate variation. No acute ischemic changes. BP currently well controlled. PCP follows lipids - tolerating statin.   Stable CV status without symptoms of overt heart failure, arrhythmia or angina. Patient is compliant with medication regimen. BP Readings from Last 3 Encounters:   09/27/18 (!) 140/56   05/16/18 (!) 155/64   03/22/18 136/70    Pulse Readings from Last 3 Encounters:   09/27/18 82   05/16/18 76   03/22/18 80        Wt Readings from Last 3 Encounters:   09/27/18 156 lb (70.8 kg)   05/16/18 161 lb (73 kg)   03/22/18 170 lb (77.1 kg)     Plan  Previous cardiac history and records reviewed. Continue current medications as prescribed. Continue to follow up with primary care provider for non cardiac medical problems. Call the office with any problems, questions or concerns at [phone]. Follow up as scheduled with your cardiologist - Dr. Keyon Rucker 6 months. The following educational material has been included in this after visit summary for your review: heart health.     Additional instructions:  Coronary artery disease risk factors you can control: Smoking, high blood pressure, high cholesterol, diabetes, being overweight, lack of exercise and stress. Continue heart healthy diet. Take medications as directed. Exercise as tolerated. Strive for 15 minutes of exercise most days of the week. If asked to keep a blood pressure log, do so for 2 weeks. Call the office to report readings at [phone]. Blood pressure goal is 140/90 or less. If you are a diabetic, the goal is 130/80 or less. If you are taking cholesterol lowering medications, it is recommended that lab work be checked annually. Always keep a current medication list. Bring your medications to every office visit.       MORGAN Tomas

## 2018-10-15 LAB
ALBUMIN SERPL-MCNC: 4.2 G/DL (ref 3.5–5.2)
ALP BLD-CCNC: 65 U/L (ref 40–130)
ALT SERPL-CCNC: 18 U/L (ref 5–41)
ANION GAP SERPL CALCULATED.3IONS-SCNC: 11 MMOL/L (ref 7–19)
AST SERPL-CCNC: 16 U/L (ref 5–40)
BASOPHILS ABSOLUTE: 0 K/UL (ref 0–0.2)
BASOPHILS RELATIVE PERCENT: 0.4 % (ref 0–1)
BILIRUB SERPL-MCNC: 0.4 MG/DL (ref 0.2–1.2)
BUN BLDV-MCNC: 14 MG/DL (ref 8–23)
CALCIUM SERPL-MCNC: 9 MG/DL (ref 8.8–10.2)
CHLORIDE BLD-SCNC: 97 MMOL/L (ref 98–111)
CO2: 29 MMOL/L (ref 22–29)
CREAT SERPL-MCNC: 0.7 MG/DL (ref 0.5–1.2)
EOSINOPHILS ABSOLUTE: 0.2 K/UL (ref 0–0.6)
EOSINOPHILS RELATIVE PERCENT: 2.3 % (ref 0–5)
GFR NON-AFRICAN AMERICAN: >60
GLUCOSE BLD-MCNC: 92 MG/DL (ref 74–109)
HCT VFR BLD CALC: 36.9 % (ref 42–52)
HEMOGLOBIN: 11.9 G/DL (ref 14–18)
LYMPHOCYTES ABSOLUTE: 1.4 K/UL (ref 1.1–4.5)
LYMPHOCYTES RELATIVE PERCENT: 19.3 % (ref 20–40)
MCH RBC QN AUTO: 32.2 PG (ref 27–31)
MCHC RBC AUTO-ENTMCNC: 32.2 G/DL (ref 33–37)
MCV RBC AUTO: 99.7 FL (ref 80–94)
MONOCYTES ABSOLUTE: 0.8 K/UL (ref 0–0.9)
MONOCYTES RELATIVE PERCENT: 11 % (ref 0–10)
NEUTROPHILS ABSOLUTE: 4.8 K/UL (ref 1.5–7.5)
NEUTROPHILS RELATIVE PERCENT: 66.6 % (ref 50–65)
PDW BLD-RTO: 13 % (ref 11.5–14.5)
PLATELET # BLD: 240 K/UL (ref 130–400)
PMV BLD AUTO: 9.4 FL (ref 9.4–12.4)
POTASSIUM SERPL-SCNC: 4.4 MMOL/L (ref 3.5–5)
RBC # BLD: 3.7 M/UL (ref 4.7–6.1)
SODIUM BLD-SCNC: 137 MMOL/L (ref 136–145)
TOTAL PROTEIN: 6.1 G/DL (ref 6.6–8.7)
WBC # BLD: 7.3 K/UL (ref 4.8–10.8)

## 2018-10-16 ENCOUNTER — OFFICE VISIT (OUTPATIENT)
Dept: UROLOGY | Facility: CLINIC | Age: 83
End: 2018-10-16

## 2018-10-16 VITALS — WEIGHT: 143 LBS | TEMPERATURE: 98.4 F | HEIGHT: 68 IN | BODY MASS INDEX: 21.67 KG/M2

## 2018-10-16 DIAGNOSIS — N32.81 OAB (OVERACTIVE BLADDER): ICD-10-CM

## 2018-10-16 DIAGNOSIS — N40.1 BPH WITH URINARY OBSTRUCTION: Primary | ICD-10-CM

## 2018-10-16 DIAGNOSIS — R35.1 NOCTURIA: ICD-10-CM

## 2018-10-16 DIAGNOSIS — N13.8 BPH WITH URINARY OBSTRUCTION: Primary | ICD-10-CM

## 2018-10-16 DIAGNOSIS — R35.0 FREQUENCY OF URINATION: ICD-10-CM

## 2018-10-16 PROCEDURE — 81001 URINALYSIS AUTO W/SCOPE: CPT | Performed by: UROLOGY

## 2018-10-16 PROCEDURE — 99213 OFFICE O/P EST LOW 20 MIN: CPT | Performed by: UROLOGY

## 2018-10-16 NOTE — PROGRESS NOTES
Subjective    Mr. Henriquez is 88 y.o. male    CHIEF COMPLAINT: BPH    Patient comes back today for follow-up of BPH with his wife is a way score today is 10 he denies any gross hematuria there is no flank pain no burning stinging he does have low but urgency frequency and alive but much improved with the Uroxatral and finasteride that he is on.    He had no recurrent urinary tract infections gross hematuria flank pain etc.    He also has an overactive irritable bladder fact I he is improved with some the medications he is going to get a pain sounds like InterStim type procedure tomorrow from Dr. callahan he does have spinal stenosis which may be contributing to his OAB      History of Present Illness      The following portions of the patient's history were reviewed and updated as appropriate: allergies, current medications, past family history, past medical history, past social history, past surgical history and problem list.    Review of Systems   Constitutional: Negative.  Negative for chills and fever.   Gastrointestinal: Negative for abdominal distention, abdominal pain, anal bleeding, blood in stool and nausea.   Genitourinary: Positive for frequency and urgency. Negative for decreased urine volume, difficulty urinating, discharge, dysuria, enuresis, flank pain, genital sores, hematuria, penile pain, penile swelling, scrotal swelling and testicular pain.   Psychiatric/Behavioral: Negative.  Negative for agitation and confusion.         Current Outpatient Prescriptions:   •  alfuzosin (UROXATRAL) 10 MG 24 hr tablet, Take 1 tablet by mouth Daily., Disp: 30 tablet, Rfl: 5  •  chlorpheniramine (CHLOR-TRIMETON) 4 MG tablet, Take 4 mg by mouth Daily., Disp: , Rfl:   •  cyclobenzaprine (FLEXERIL) 5 MG tablet, Take 1 tablet by mouth 2 (Two) Times a Day As Needed for Muscle Spasms., Disp: 28 tablet, Rfl: 0  •  diclofenac (VOLTAREN) 75 MG EC tablet, Take 1 tablet by mouth 2 (Two) Times a Day., Disp: 60 tablet, Rfl: 3  •   diphenhydrAMINE (BENADRYL) 25 mg capsule, Take 25 mg by mouth Every Night., Disp: , Rfl:   •  DULoxetine (CYMBALTA) 60 MG capsule, Take 60 mg by mouth Daily., Disp: , Rfl:   •  finasteride (PROSCAR) 5 MG tablet, Take 1 tablet by mouth Daily., Disp: 30 tablet, Rfl: 5  •  Flaxseed, Linseed, (FLAXSEED OIL) 1000 MG capsule, 540mg takes 4 tablets by mouth daily, Disp: , Rfl:   •  folic acid ([employer]S FOLIC ACID) 800 MCG tablet, Take 800 mcg by mouth daily.  , Disp: , Rfl:   •  furosemide (LASIX) 40 MG tablet, Take 40 mg by mouth Daily., Disp: , Rfl:   •  L-Lysine 500 MG tablet, Take 1 tablet by mouth Daily., Disp: , Rfl:   •  lidocaine (LIDODERM) 5 %, Place 1 patch on the skin as directed by provider Daily. Remove & Discard patch within 12 hours or as directed by MD, Disp: , Rfl:   •  metoprolol succinate XL (TOPROL XL) 25 MG 24 hr tablet, Take 1 tablet by mouth daily, Disp: , Rfl:   •  ondansetron (ZOFRAN) 4 MG tablet, Take 1 tablet by mouth Every 8 (Eight) Hours As Needed for Nausea or Vomiting., Disp: 63 tablet, Rfl: 0  •  oxyCODONE-acetaminophen (PERCOCET) 7.5-325 MG per tablet, Take 1 tablet by mouth Every 8 (Eight) Hours As Needed., Disp: , Rfl:   •  pravastatin (PRAVACHOL) 40 MG tablet, Take 40 mg by mouth daily.  , Disp: , Rfl:   •  primidone (MYSOLINE) 50 MG tablet, Take 1/2 tablet at bedtime, Disp: 45 tablet, Rfl: 3  •  PSYLLIUM PO, Take  by mouth., Disp: , Rfl:   •  vitamin C (ASCORBIC ACID) 500 MG tablet, Take 500 mg by mouth daily.  , Disp: , Rfl:   •  Vitamin D, Cholecalciferol, 1000 UNITS capsule, 2 tablets by mouth daily, Disp: , Rfl:     Past Medical History:   Diagnosis Date   • Allergic rhinitis    • Arthritis    • BPH (benign prostatic hypertrophy) with urinary retention    • Cancer (CMS/HCC)     skin cancer   • Constipation    • Coronary artery disease    • Hard of hearing    • Heart attack (CMS/HCC) 1986    NO MUSCLE DAMAGE - JUST OCCLUDED VALVE   • High cholesterol    • History of skin cancer      "BILATERAL EARS   • History of transfusion     1986   • Hypertension    • Inguinal hernia    • Leaky heart valve     DR CONCEPCION SAYS NOT ENOUGH TO BE OF CONCERN   • Other bursal cyst, right elbow    • PONV (postoperative nausea and vomiting)     has had scopalamine patch in past    • Sleep apnea     DOES NOT USE CPAP OR BIPAP   • Spinal stenosis of cervical region    • Spinal stenosis of lumbar region    • Wears hearing aid     BILATERAL       Past Surgical History:   Procedure Laterality Date   • APPENDECTOMY     • BACK SURGERY      X3   • CARDIAC SURGERY  08/08/1986    X1 BYPASS   • CORONARY ANGIOPLASTY WITH STENT PLACEMENT  1997    X3 STENTS   • HERNIA REPAIR      x2   • INGUINAL HERNIA REPAIR Right 6/22/2017    Procedure: RIGHT INGUINAL HERNIA REPAIR AND EXCISION OF CYST RIGHT ELBOW ;  Surgeon: Jackelyn Arriola MD;  Location:  [facility] OR;  Service:    • LUMBAR LAMINECTOMY N/A 3/12/2018    Procedure: LUMBAR LAMINECTOMY WITHOUT FUSION L3-4,4-5;  Surgeon: Kj Falcon MD;  Location:  [facility] OR;  Service: Neurosurgery       Social History     Social History   • Marital status:      Social History Main Topics   • Smoking status: Former Smoker     Years: 6.00     Types: Cigarettes     Quit date: 1949   • Smokeless tobacco: Never Used      Comment: age 14-20 years of age   • Alcohol use No   • Drug use: No   • Sexual activity: Defer     Other Topics Concern   • Not on file       Family History   Problem Relation Age of Onset   • No Known Problems Father    • No Known Problems Mother        Objective    Temp 98.4 °F (36.9 °C)   Ht 172.7 cm (68\")   Wt 64.9 kg (143 lb)   BMI 21.74 kg/m²     Physical Exam      Admission on 08/27/2018, Discharged on 08/27/2018   Component Date Value Ref Range Status   • Glucose 08/27/2018 99  70 - 100 mg/dL Final   • BUN 08/27/2018 15  5 - 21 mg/dL Final    Specimen hemolyzed. Results may be affected.   • Creatinine 08/27/2018 0.52  0.50 - 1.40 mg/dL Final   • Sodium 08/27/2018 " 138  135 - 145 mmol/L Final   • Potassium 08/27/2018 4.2  3.5 - 5.3 mmol/L Final    Specimen hemolyzed.  Results may be affected.   • Chloride 08/27/2018 102  98 - 110 mmol/L Final   • CO2 08/27/2018 27.0  24.0 - 31.0 mmol/L Final   • Calcium 08/27/2018 8.3* 8.4 - 10.4 mg/dL Final   • Total Protein 08/27/2018 6.0* 6.3 - 8.7 g/dL Final   • Albumin 08/27/2018 3.70  3.50 - 5.00 g/dL Final   • ALT (SGPT) 08/27/2018 23  0 - 54 U/L Final    Specimen hemolyzed.  Results may be affected.   • AST (SGOT) 08/27/2018 30  7 - 45 U/L Final    Specimen hemolyzed.  Results may be affected.   • Alkaline Phosphatase 08/27/2018 46  24 - 120 U/L Final    Specimen hemolyzed. Results may be affected.   • Total Bilirubin 08/27/2018 0.4  0.1 - 1.0 mg/dL Final   • eGFR Non African Amer 08/27/2018 150  >60 mL/min/1.73 Final   • Globulin 08/27/2018 2.3  gm/dL Final   • A/G Ratio 08/27/2018 1.6  1.1 - 2.5 g/dL Final   • BUN/Creatinine Ratio 08/27/2018 28.8* 7.0 - 25.0 Final   • Anion Gap 08/27/2018 9.0  4.0 - 13.0 mmol/L Final   • WBC 08/27/2018 6.92  4.80 - 10.80 10*3/mm3 Final   • RBC 08/27/2018 3.75* 4.80 - 5.90 10*6/mm3 Final   • Hemoglobin 08/27/2018 12.3* 14.0 - 18.0 g/dL Final   • Hematocrit 08/27/2018 36.1* 40.0 - 52.0 % Final   • MCV 08/27/2018 96.3* 82.0 - 95.0 fL Final   • MCH 08/27/2018 32.8* 28.0 - 32.0 pg Final   • MCHC 08/27/2018 34.1  33.0 - 36.0 g/dL Final   • RDW 08/27/2018 13.3  12.0 - 15.0 % Final   • RDW-SD 08/27/2018 46.9  40.0 - 54.0 fl Final   • MPV 08/27/2018 9.2  6.0 - 12.0 fL Final   • Platelets 08/27/2018 265  130 - 400 10*3/mm3 Final   • Neutrophil % 08/27/2018 63.5  39.0 - 78.0 % Final   • Lymphocyte % 08/27/2018 21.4  15.0 - 45.0 % Final   • Monocyte % 08/27/2018 10.3  4.0 - 12.0 % Final   • Eosinophil % 08/27/2018 3.9  0.0 - 4.0 % Final   • Basophil % 08/27/2018 0.6  0.0 - 2.0 % Final   • Immature Grans % 08/27/2018 0.3  0.0 - 5.0 % Final   • Neutrophils, Absolute 08/27/2018 4.40  1.87 - 8.40 10*3/mm3 Final   •  Lymphocytes, Absolute 08/27/2018 1.48  0.72 - 4.86 10*3/mm3 Final   • Monocytes, Absolute 08/27/2018 0.71  0.19 - 1.30 10*3/mm3 Final   • Eosinophils, Absolute 08/27/2018 0.27  0.00 - 0.70 10*3/mm3 Final   • Basophils, Absolute 08/27/2018 0.04  0.00 - 0.20 10*3/mm3 Final   • Immature Grans, Absolute 08/27/2018 0.02  0.00 - 0.03 10*3/mm3 Final   • nRBC 08/27/2018 0.0  0.0 - 0.0 /100 WBC Final       Results for orders placed or performed in visit on 10/16/18   POC Urinalysis Dipstick, Multipro   Result Value Ref Range    Color Yellow Yellow, Straw, Dark Yellow, Geovanna    Clarity, UA Clear Clear    Glucose, UA Negative Negative, 1000 mg/dL (3+) mg/dL    Bilirubin Negative Negative    Ketones, UA Negative Negative    Specific Gravity  1.015 1.005 - 1.030    Blood, UA Negative Negative    pH, Urine 6.5 5.0 - 8.0    Protein, POC Negative Negative mg/dL    Urobilinogen, UA Normal Normal    Nitrite, UA Negative Negative    Leukocytes Negative Negative       Assessment and Plan    Diagnoses and all orders for this visit:    BPH with urinary obstruction  -     POC Urinalysis Dipstick, Multipro    Frequency of urination    Nocturia    OAB (overactive bladder)    Plan--the patient's symptoms seem to be stable on present medications [facility] so we will continue to feel it does fill.    As for his OAB symptoms these are stable as well I he does want any additional medication for discussed the options with the wife I am limited to retire next year and [facility] so he went to see me back again here when necessary if he should have any difficulties think he and his wife are both comfortable

## 2018-10-29 ENCOUNTER — OFFICE VISIT (OUTPATIENT)
Dept: NEUROSURGERY | Facility: CLINIC | Age: 83
End: 2018-10-29

## 2018-10-29 VITALS
WEIGHT: 143 LBS | BODY MASS INDEX: 21.67 KG/M2 | SYSTOLIC BLOOD PRESSURE: 124 MMHG | DIASTOLIC BLOOD PRESSURE: 58 MMHG | HEIGHT: 68 IN

## 2018-10-29 DIAGNOSIS — M79.605 LEFT LEG PAIN: ICD-10-CM

## 2018-10-29 DIAGNOSIS — M48.062 SPINAL STENOSIS, LUMBAR REGION, WITH NEUROGENIC CLAUDICATION: Primary | ICD-10-CM

## 2018-10-29 DIAGNOSIS — Z78.9 NON-SMOKER: ICD-10-CM

## 2018-10-29 PROCEDURE — 99214 OFFICE O/P EST MOD 30 MIN: CPT | Performed by: NEUROLOGICAL SURGERY

## 2018-10-29 NOTE — PATIENT INSTRUCTIONS
PATIENT TO CONTINUE TO FOLLOW UP WITH HIS PRIMARY CARE PROVIDER FOR YEARLY PHYSICAL EXAMS TO ENSURE COMPLETE HEALTH MAINTENANCE

## 2018-10-29 NOTE — PROGRESS NOTES
SUBJECTIVE:  Patient ID: Chad Henriquez is a 88 y.o. male is here today for follow-up.    Chief Complaint: Back pain  Chief Complaint   Patient presents with   • Back & leg pain     patient underwent DCS trial but it was not successful; he is here today to discuss possible lumbar fusion.       HPI  88-year-old gentleman we have been following for quite some time for back pain and left lower extremity pain.  He is about 60% leg pain and about 40% back pain.  He underwent a dorsal column stimulator trial with Dr. callahan but unfortunately did not get a lot of satisfaction from the trial.  He has had a multilevel laminectomy which did improve his situation for several months however most recently he has been almost completely disabled from back pain and claudicate leg pain.  He is able to get around the house however to leave the house he is premature in a wheelchair.  He has done a dedicated course of physical therapy.  He is gone through a series of injections tried oral pain medications, anti-inflammatories all of which has not improved his function at all.    The following portions of the patient's history were reviewed and updated as appropriate: allergies, current medications, past family history, past medical history, past social history, past surgical history and problem list.    OBJECTIVE:    Review of Systems   All other systems reviewed and are negative.         Physical Exam   Constitutional: He is oriented to person, place, and time. He appears well-developed and well-nourished.   Cardiovascular: Normal rate and regular rhythm.    Pulmonary/Chest: Effort normal. No respiratory distress. He has no wheezes.   Abdominal: Soft. He exhibits no distension. There is no tenderness.   Neurological: He is oriented to person, place, and time. He has normal strength.   Reflex Scores:       Patellar reflexes are 0 on the right side and 0 on the left side.       Achilles reflexes are 0 on the right side and 0 on the left  side.  Psychiatric: His speech is normal.       Neurologic Exam     Mental Status   Oriented to person, place, and time.   Attention: normal.   Speech: speech is normal   Level of consciousness: alert  Knowledge: good.     Cranial Nerves   Cranial nerves II through XII intact.     Motor Exam   Muscle bulk: normal  Overall muscle tone: normal  Right arm pronator drift: absent  Left arm pronator drift: absent    Strength   Strength 5/5 throughout.     Sensory Exam   Light touch normal.   Pinprick normal.     Gait, Coordination, and Reflexes     Gait  Gait: (Significantly impaired gait, flexed posture)    Reflexes   Right patellar: 0  Left patellar: 0  Right achilles: 0  Left achilles: 0      Independent Review of Radiographic Studies:   MRI the lumbar spine shows severe degenerative disc disease at L3 4, 45, 5 S1.  There is bilateral foraminal stenosis but left is greater than right at each level.    ASSESSMENT/PLAN:  Mr. Henriquez has gone through all manner of conservative care including needing a dorsal column stimulator trial to try to improve his back pain and left lower extremity claudication pain.  At this point really out of nonsurgical options.  The only reasonable option to try to improve this situation would be a extensive 3 level lumbar fusion.  I would recommend an anterior lumbar fusion at L5-S1 followed by a left L3 4, 45 transforaminal lumbar body fusion using the quadrant tube and minimally invasive techniques.  We discussed this at length.  We would thoroughly reviewed the risks of doing an extensive lumbar fusion at this man's age with his cardiac history.  These risks included infection, bleeding, paralysis, spinal fluid leak, bowel bladder incontinence, major cardiac event, extended course of recovery and rehabilitation, stroke, coma, and death.  He acknowledged understanding of this.  Him and his family's questions were answered.  We will get him prepped and scheduled as soon as possible.      1.  Spinal stenosis, lumbar region, with neurogenic claudication    2. Left leg pain    3. Non-smoker    4. BMI 21.0-21.9, adult            Return for postoperatively.      Kj Falcon MD

## 2018-11-06 ENCOUNTER — TELEPHONE (OUTPATIENT)
Dept: VASCULAR SURGERY | Facility: CLINIC | Age: 83
End: 2018-11-06

## 2018-11-06 NOTE — TELEPHONE ENCOUNTER
Confirmed with spouse of changed appointment for morning of 11/20  instead of afternoon. Spouse expressed understanding.

## 2018-11-19 ENCOUNTER — TELEPHONE (OUTPATIENT)
Dept: VASCULAR SURGERY | Facility: CLINIC | Age: 83
End: 2018-11-19

## 2018-11-20 ENCOUNTER — APPOINTMENT (OUTPATIENT)
Dept: PREADMISSION TESTING | Facility: HOSPITAL | Age: 83
End: 2018-11-20

## 2018-11-20 ENCOUNTER — OFFICE VISIT (OUTPATIENT)
Dept: VASCULAR SURGERY | Facility: CLINIC | Age: 83
End: 2018-11-20

## 2018-11-20 VITALS
WEIGHT: 150 LBS | OXYGEN SATURATION: 97 % | HEIGHT: 68 IN | HEART RATE: 91 BPM | SYSTOLIC BLOOD PRESSURE: 144 MMHG | BODY MASS INDEX: 22.73 KG/M2 | DIASTOLIC BLOOD PRESSURE: 74 MMHG

## 2018-11-20 VITALS
HEART RATE: 84 BPM | HEIGHT: 68 IN | SYSTOLIC BLOOD PRESSURE: 195 MMHG | OXYGEN SATURATION: 98 % | WEIGHT: 161.6 LBS | DIASTOLIC BLOOD PRESSURE: 85 MMHG | BODY MASS INDEX: 24.49 KG/M2

## 2018-11-20 DIAGNOSIS — M48.062 SPINAL STENOSIS, LUMBAR REGION, WITH NEUROGENIC CLAUDICATION: ICD-10-CM

## 2018-11-20 DIAGNOSIS — M48.062 SPINAL STENOSIS, LUMBAR REGION, WITH NEUROGENIC CLAUDICATION: Primary | ICD-10-CM

## 2018-11-20 LAB
ALBUMIN SERPL-MCNC: 4.2 G/DL (ref 3.5–5)
ALBUMIN/GLOB SERPL: 1.6 G/DL (ref 1.1–2.5)
ALP SERPL-CCNC: 54 U/L (ref 24–120)
ALT SERPL W P-5'-P-CCNC: 25 U/L (ref 0–54)
ANION GAP SERPL CALCULATED.3IONS-SCNC: 9 MMOL/L (ref 4–13)
AST SERPL-CCNC: 32 U/L (ref 7–45)
BILIRUB SERPL-MCNC: 0.6 MG/DL (ref 0.1–1)
BUN BLD-MCNC: 10 MG/DL (ref 5–21)
BUN/CREAT SERPL: 17.5 (ref 7–25)
CALCIUM SPEC-SCNC: 9.1 MG/DL (ref 8.4–10.4)
CHLORIDE SERPL-SCNC: 95 MMOL/L (ref 98–110)
CO2 SERPL-SCNC: 31 MMOL/L (ref 24–31)
CREAT BLD-MCNC: 0.57 MG/DL (ref 0.5–1.4)
DEPRECATED RDW RBC AUTO: 44.6 FL (ref 40–54)
ERYTHROCYTE [DISTWIDTH] IN BLOOD BY AUTOMATED COUNT: 12.5 % (ref 12–15)
GFR SERPL CREATININE-BSD FRML MDRD: 135 ML/MIN/1.73
GLOBULIN UR ELPH-MCNC: 2.6 GM/DL
GLUCOSE BLD-MCNC: 94 MG/DL (ref 70–100)
HCT VFR BLD AUTO: 36.3 % (ref 40–52)
HGB BLD-MCNC: 12.4 G/DL (ref 14–18)
MCH RBC QN AUTO: 33.4 PG (ref 28–32)
MCHC RBC AUTO-ENTMCNC: 34.2 G/DL (ref 33–36)
MCV RBC AUTO: 97.8 FL (ref 82–95)
PLATELET # BLD AUTO: 245 10*3/MM3 (ref 130–400)
PMV BLD AUTO: 9.5 FL (ref 6–12)
POTASSIUM BLD-SCNC: 4.7 MMOL/L (ref 3.5–5.3)
PROT SERPL-MCNC: 6.8 G/DL (ref 6.3–8.7)
RBC # BLD AUTO: 3.71 10*6/MM3 (ref 4.8–5.9)
SODIUM BLD-SCNC: 135 MMOL/L (ref 135–145)
WBC NRBC COR # BLD: 7.44 10*3/MM3 (ref 4.8–10.8)

## 2018-11-20 PROCEDURE — 99203 OFFICE O/P NEW LOW 30 MIN: CPT | Performed by: SURGERY

## 2018-11-20 PROCEDURE — 80053 COMPREHEN METABOLIC PANEL: CPT | Performed by: NEUROLOGICAL SURGERY

## 2018-11-20 PROCEDURE — 93010 ELECTROCARDIOGRAM REPORT: CPT | Performed by: INTERNAL MEDICINE

## 2018-11-20 PROCEDURE — 93005 ELECTROCARDIOGRAM TRACING: CPT

## 2018-11-20 PROCEDURE — 85027 COMPLETE CBC AUTOMATED: CPT | Performed by: NEUROLOGICAL SURGERY

## 2018-11-20 PROCEDURE — 36415 COLL VENOUS BLD VENIPUNCTURE: CPT

## 2018-11-20 RX ORDER — FERROUS SULFATE 325(65) MG
325 TABLET ORAL
COMMUNITY
End: 2021-02-08

## 2018-11-20 RX ORDER — METOPROLOL SUCCINATE 25 MG/1
25 TABLET, EXTENDED RELEASE ORAL NIGHTLY
COMMUNITY
End: 2021-03-30

## 2018-11-20 NOTE — PROGRESS NOTES
11/20/2018      Kj Falcon MD  [address]  [facility] [address]  [place] [address]    Chad Henriquez  11/9/1929    Chief Complaint   Patient presents with   • Establish Care     ALIF with Dr Falcon. Patient denies any stroke like symptoms.        Dear Kj Falcon MD:      HPI  I had the pleasure of seeing your patient Chad Henriquez in the office today.  Thank you kindly for this consultation.  As you recall, Chad Henriquez is a 89 y.o.  male who you are currently following for chronic back and left lower extremity pain.  He has had previous dorsal column stimulator trial with Dr. Guillory without much relief.  He has had multilevel laminectomy which improved initially, however has gradually worsened.  He has had increasing back and left leg pain.      Past Medical History:   Diagnosis Date   • Allergic rhinitis    • Anemia    • Arthritis    • BPH (benign prostatic hypertrophy) with urinary retention    • Cancer (CMS/HCC)     skin cancer   • Chronic back pain     RUNS DOWN BOTH LEGS   • Constipation    • Coronary artery disease    • Hard of hearing    • Heart attack (CMS/HCC) 1986    NO MUSCLE DAMAGE - JUST OCCLUDED VALVE   • High cholesterol    • History of skin cancer     BILATERAL EARS   • History of transfusion     1986   • Hypertension    • Inguinal hernia    • Leaky heart valve     DR CONCEPCION SAYS NOT ENOUGH TO BE OF CONCERN   • Other bursal cyst, right elbow    • PONV (postoperative nausea and vomiting)     has had scopalamine patch in past    • Sleep apnea     DOES NOT USE CPAP OR BIPAP   • Spinal stenosis of cervical region    • Spinal stenosis of lumbar region    • Tremors of nervous system    • Wears hearing aid     BILATERAL       Past Surgical History:   Procedure Laterality Date   • APPENDECTOMY     • BACK SURGERY      X3   • CARDIAC SURGERY  08/08/1986    X1 BYPASS   • CORONARY ANGIOPLASTY WITH STENT PLACEMENT  1997    X3 STENTS   • HERNIA REPAIR Bilateral     x2       Family History   Problem  Relation Age of Onset   • No Known Problems Father    • No Known Problems Mother        Social History     Socioeconomic History   • Marital status:      Spouse name: Not on file   • Number of children: Not on file   • Years of education: Not on file   • Highest education level: Not on file   Social Needs   • Financial resource strain: Not on file   • Food insecurity - worry: Not on file   • Food insecurity - inability: Not on file   • Transportation needs - medical: Not on file   • Transportation needs - non-medical: Not on file   Occupational History   • Not on file   Tobacco Use   • Smoking status: Former Smoker     Years: 6.00     Types: Cigarettes     Last attempt to quit: 194     Years since quittin.9   • Smokeless tobacco: Never Used   • Tobacco comment: age 14-20 years of age   Substance and Sexual Activity   • Alcohol use: No   • Drug use: No   • Sexual activity: Defer   Other Topics Concern   • Not on file   Social History Narrative   • Not on file       Allergies   Allergen Reactions   • Fentanyl Hallucinations     DELUSIONAL   • Codeine Nausea And Vomiting and GI Intolerance       Prior to Admission medications    Medication Sig Start Date End Date Taking? Authorizing Provider   alfuzosin (UROXATRAL) 10 MG 24 hr tablet Take 1 tablet by mouth Daily. 3/29/17  Yes Dylon Maldonado MD   chlorpheniramine (CHLOR-TRIMETON) 4 MG tablet Take 4 mg by mouth Daily.   Yes ProviderFredrick MD   diclofenac (VOLTAREN) 75 MG EC tablet Take 1 tablet by mouth 2 (Two) Times a Day. 18  Yes Kj Falcon MD   diphenhydrAMINE (BENADRYL) 25 mg capsule Take 25 mg by mouth Every Night.   Yes ProviderFredrick MD   DULoxetine (CYMBALTA) 60 MG capsule Take 60 mg by mouth Daily.   Yes ProviderFredrick MD   ferrous sulfate 325 (65 FE) MG tablet Take 325 mg by mouth 3 (Three) Times a Day With Meals.   Yes ProviderFredrick MD   finasteride (PROSCAR) 5 MG tablet Take 1 tablet by mouth Daily.  3/29/17  Yes Dylon Maldonado MD   Flaxseed, Linseed, (FLAXSEED OIL) 1000 MG capsule 540mg takes 4 tablets by mouth daily   Yes Fredrick Manley MD   folic acid ([employer]S FOLIC ACID) 800 MCG tablet Take 800 mcg by mouth daily.     Yes Fredrick Manley MD   furosemide (LASIX) 40 MG tablet Take 40 mg by mouth Daily.   Yes Fredrick Manley MD   L-Lysine 500 MG tablet Take 1 tablet by mouth Daily.   Yes Fredrick Manley MD   lidocaine (LIDODERM) 5 % Place 1 patch on the skin as directed by provider Daily. Remove & Discard patch within 12 hours or as directed by MD   Yes Fredrick Manley MD   metoprolol succinate XL (TOPROL XL) 25 MG 24 hr tablet Take 1 tablet by mouth daily 5/19/15  Yes Fredrick Manley MD   oxyCODONE-acetaminophen (PERCOCET) 7.5-325 MG per tablet Take 1 tablet by mouth Every 8 (Eight) Hours As Needed.   Yes Fredrick Manley MD   pravastatin (PRAVACHOL) 40 MG tablet Take 40 mg by mouth daily.     Yes ProviderFredrick MD   primidone (MYSOLINE) 50 MG tablet Take 1/2 tablet at bedtime 8/28/18  Yes Kj Falcon MD   PSYLLIUM PO Take  by mouth.   Yes ProviderFredrick MD   vitamin C (ASCORBIC ACID) 500 MG tablet Take 500 mg by mouth daily.     Yes ProviderFredrick MD   Vitamin D, Cholecalciferol, 1000 UNITS capsule 2 tablets by mouth daily   Yes Fredrick Manley MD   cyclobenzaprine (FLEXERIL) 5 MG tablet Take 1 tablet by mouth 2 (Two) Times a Day As Needed for Muscle Spasms. 3/26/18 11/20/18  Kj Falcon MD   ondansetron (ZOFRAN) 4 MG tablet Take 1 tablet by mouth Every 8 (Eight) Hours As Needed for Nausea or Vomiting. 6/5/18 11/20/18  Kj Falcon MD       Review of Systems   Constitutional: Negative.    HENT: Positive for hearing loss.    Eyes: Negative.    Respiratory: Negative.    Cardiovascular: Negative.    Gastrointestinal: Negative.    Endocrine: Negative.    Genitourinary: Negative.    Musculoskeletal: Positive for back pain and  "gait problem.   Skin: Negative.    Allergic/Immunologic: Negative.    Hematological: Negative.    Psychiatric/Behavioral: Negative.    All other systems reviewed and are negative.      /74 (BP Location: Right arm, Patient Position: Sitting, Cuff Size: Adult)   Pulse 91   Ht 172.7 cm (68\")   Wt 68 kg (150 lb)   SpO2 97%   BMI 22.81 kg/m²   Physical Exam   Constitutional: He is oriented to person, place, and time. He appears well-developed and well-nourished.   HENT:   Head: Normocephalic and atraumatic.   Eyes: Conjunctivae are normal. Pupils are equal, round, and reactive to light. No scleral icterus.   Neck: Normal range of motion. Neck supple. No thyromegaly present.   Cardiovascular: Normal rate, regular rhythm, normal heart sounds and intact distal pulses.   Pulmonary/Chest: Effort normal and breath sounds normal.   Abdominal: Soft. Bowel sounds are normal.   Musculoskeletal: Normal range of motion.        Lumbar back: He exhibits pain.   Neurological: He is alert and oriented to person, place, and time.   Gait difficulty   Skin: Skin is warm and dry.   Psychiatric: He has a normal mood and affect. His behavior is normal. Judgment and thought content normal.   Nursing note and vitals reviewed.      No results found.    Patient Active Problem List   Diagnosis   • BPH with urinary obstruction   • Frequency of urination   • Nocturia   • OAB (overactive bladder)   • Non-smoker   • Normal body mass index (BMI)   • Spinal stenosis, lumbar region, with neurogenic claudication   • Left leg pain   • Bilateral hip pain   • Acute left-sided low back pain with left-sided sciatica   • Essential tremor   • BMI 21.0-21.9, adult         ICD-10-CM ICD-9-CM   1. Spinal stenosis, lumbar region, with neurogenic claudication M48.062 724.03       Plan: After thoroughly evaluating Chad Henriquez, I believe the best course of action is to proceed with anterior lumbar interbody fusion of L5-S1.  Risks of ALIF were discussed " and include, but are not limited to, bleeding, infection, nerve damage, vessel damage, retrograde ejaculation, bowel damage, ureteral damage, DVT, MI, stroke, and death.  The patient understands these risks and wishes to proceed with procedure. The patient can continue taking their current medication regimen as previously planned.  This was all discussed in full with complete understanding.    Thank you for allowing me to participate in the care of your patient.  Please do not hesitate with any questions or concerns.  I will keep you aware of any further encounters with Chad Henriquez.        Sincerely yours,         Manolo Mcleod, DO

## 2018-11-20 NOTE — DISCHARGE INSTRUCTIONS
DAY OF SURGERY INSTRUCTIONS  LUMBAR LAMINECTOMY TRANSFORAMINAL LUMBAR INTERBODY FUSION LEFT L3-4, L4-5 QUADRANT   DR GOODWIN        DATE OF SURGERY: DEC 7 2018    ARRIVAL TIME: AS DIRECTED BY OFFICE    YOU MAY TAKE THE FOLLOWING MEDICATION(S) THE MORNING OF SURGERY WITH A SIP OF WATER: OXYCODONE    DO NOT TAKE THE FOLLOWING MEDICATION(S) THE MORNING OF SURGERY:NO OTHER MEDICATIONS              MANAGING PAIN AFTER SURGERY    We know you are probably wondering what your pain will be like after surgery.  Following surgery it is unrealistic to expect you will not have pain.   Pain is how our bodies let us know that something is wrong or cautions us to be careful.  That said, our goal is to make your pain tolerable.    Methods we may use to treat your pain include (oral or IV medications, PCAs, epidurals, nerve blocks, etc.)   While some procedures require IV pain medications for a short time after surgery, transitioning to pain medications by mouth allows for better management of pain.   Your nurse will encourage you to take oral pain medications whenever possible.  IV medications work almost immediately, but only last a short while.  Taking medications by mouth allows for a more constant level of medication in your blood stream for a longer period of time.      Once your pain is out of control it is harder to get back under control.  It is important you are aware when your next dose of pain medication is due.  If you are admitted, your nurse may write the time of your next dose on the white board in your room to help you remember.      We are interested in your pain and encourage you to inform us about aggravating factors during your visit.   Many times a simple repositioning every few hours can make a big difference.    If your physician says it is okay, do not let your pain prevent you from getting out of bed. Be sure to call your nurse for assistance prior to getting up so you do not fall.      Before surgery,  please decide your tolerable pain goal.  These faces help describe the pain ratings we use on a 0-10 scale.   Be prepared to tell us your goal and whether or not you take pain or anxiety medications at home.          BEFORE YOU COME TO THE HOSPITAL  (Pre-op instructions)  • Do not eat, drink, smoke or chew gum after midnight the night before surgery.  This also includes no mints.  • Morning of surgery take only the medicines you have been instructed with a sip of water unless otherwise instructed  by your physician.  • Do not shave, wear makeup or dark nail polish.  • Remove all jewelry including rings.  • Leave anything you consider valuable at home.  • Leave your suitcase in the car until after your surgery.  • Bring the following with you if applicable:  o Picture ID and insurance, Medicare or Medicaid cards  o Co-pay/deductible required by insurance (cash, check, credit card)  o Copy of advance directive, living will or power-of- documents if not brought to PAT  o CPAP or BIPAP mask and tubing  o Relaxation aids (MP3 player, book, magazine)  • On the day of surgery check in at registration located at the main entrance of the hospital.       Outpatient Surgery Guidelines, Adult  Outpatient procedures are those for which the person having the procedure is allowed to go home the same day as the procedure. Various procedures are done on an outpatient basis. You should follow some general guidelines if you will be having an outpatient procedure.  LET YOUR HEALTH CARE PROVIDER KNOW ABOUT:  · Any allergies you have.  · All medicines you are taking, including vitamins, herbs, eye drops, creams, and over-the-counter medicines.  · Previous problems you or members of your family have had with the use of anesthetics.  · Any blood disorders you have.  · Previous surgeries you have had.  · Medical conditions you have.  RISKS AND COMPLICATIONS  Your health care provider will discuss possible risks and complications  with you before surgery. Common risks and complications include:    · Problems due to the use of anesthetics.  · Blood loss and replacement (does not apply to minor surgical procedures).  · Temporary increase in pain due to surgery.  · Uncorrected pain or problems that the surgery was meant to correct.  · Infection.  · New damage.  BEFORE THE PROCEDURE  · Ask your health care provider about changing or stopping your regular medicines. You may need to stop taking certain medicines in the days or weeks before the procedure.  · Stop smoking at least 2 weeks before surgery. This lowers your risk for complications during and after surgery. Ask your health care provider for help with this if needed.  · Eat your usual meals and a light supper the day before surgery. Continue fluid intake. Do not drink alcohol.  · Do not eat or drink after midnight the night before your surgery.   · Arrange for someone to take you home and to stay with you for 24 hours after the procedure. Medicine given for your procedure may affect your ability to drive or to care for yourself.  · Call your health care provider's office if you develop an illness or problem that may prevent you from safely having your procedure.  AFTER THE PROCEDURE  After surgery, you will be taken to a recovery area, where your progress will be monitored. If there are no complications, you will be allowed to go home when you are awake, stable, and taking fluids well. You may have numbness around the surgical site. Healing will take some time. You will have tenderness at the surgical site and may have some swelling and bruising. You may also have some nausea.  HOME CARE INSTRUCTIONS  · Do not drive for 24 hours, or as directed by your health care provider. Do not drive while taking prescription pain medicines.  · Do not drink alcohol for 24 hours.  · Do not make important decisions or sign legal documents for 24 hours.  · You may resume a normal diet and activities as  directed.  · Do not lift anything heavier than 10 pounds (4.5 kg) or play contact sports until your health care provider says it is okay.  · Change your bandages (dressings) as directed.  · Only take over-the-counter or prescription medicines as directed by your health care provider.  · Follow up with your health care provider as directed.  SEEK MEDICAL CARE IF:  · You have increased bleeding (more than a small spot) from the surgical site.  · You have redness, swelling, or increasing pain in the wound.  · You see pus coming from the wound.  · You have a fever.  · You notice a bad smell coming from the wound or dressing.  · You feel lightheaded or faint.  · You develop a rash.  · You have trouble breathing.  · You develop allergies.  MAKE SURE YOU:  · Understand these instructions.  · Will watch your condition.  · Will get help right away if you are not doing well or get worse.     This information is not intended to replace advice given to you by your health care provider. Make sure you discuss any questions you have with your health care provider.     Document Released: 09/12/2002 Document Revised: 05/03/2016 Document Reviewed: 05/22/2014  [employer] Interactive Patient Education ©2016 [employer]       Fall Prevention in Hospitals, Adult  As a hospital patient, your condition and the treatments you receive can increase your risk for falls. Some additional risk factors for falls in a hospital include:  · Being in an unfamiliar environment.  · Being on bed rest.  · Your surgery.  · Taking certain medicines.  · Your tubing requirements, such as intravenous (IV) therapy or catheters.  It is important that you learn how to decrease fall risks while at the hospital. Below are important tips that can help prevent falls.  SAFETY TIPS FOR PREVENTING FALLS  Talk about your risk of falling.  · Ask your health care provider why you are at risk for falling. Is it your medicine, illness, tubing placement, or something  else?  · Make a plan with your health care provider to keep you safe from falls.  · Ask your health care provider or pharmacist about side effects of your medicines. Some medicines can make you dizzy or affect your coordination.  Ask for help.  · Ask for help before getting out of bed. You may need to press your call button.  · Ask for assistance in getting safely to the toilet.  · Ask for a walker or cane to be put at your bedside. Ask that most of the side rails on your bed be placed up before your health care provider leaves the room.  · Ask family or friends to sit with you.  · Ask for things that are out of your reach, such as your glasses, hearing aids, telephone, bedside table, or call button.  Follow these tips to avoid falling:  · Stay lying or seated, rather than standing, while waiting for help.  · Wear rubber-soled slippers or shoes whenever you walk in the hospital.  · Avoid quick, sudden movements.  ¨ Change positions slowly.  ¨ Sit on the side of your bed before standing.  ¨ Stand up slowly and wait before you start to walk.  · Let your health care provider know if there is a spill on the floor.  · Pay careful attention to the medical equipment, electrical cords, and tubes around you.  · When you need help, use your call button by your bed or in the bathroom. Wait for one of your health care providers to help you.  · If you feel dizzy or unsure of your footing, return to bed and wait for assistance.  · Avoid being distracted by the TV, telephone, or another person in your room.  · Do not lean or support yourself on rolling objects, such as IV poles or bedside tables.     This information is not intended to replace advice given to you by your health care provider. Make sure you discuss any questions you have with your health care provider.     Document Released: 12/15/2001 Document Revised: 01/08/2016 Document Reviewed: 08/25/2013  Elsevier Interactive Patient Education ©2016 [employer]       Surgical Site Infections FAQs  What is a Surgical Site Infection (SSI)?  A surgical site infection is an infection that occurs after surgery in the part of the body where the surgery took place. Most patients who have surgery do not develop an infection. However, infections develop in about 1 to 3 out of every 100 patients who have surgery.  Some of the common symptoms of a surgical site infection are:  · Redness and pain around the area where you had surgery  · Drainage of cloudy fluid from your surgical wound  · Fever  Can SSIs be treated?  Yes. Most surgical site infections can be treated with antibiotics. The antibiotic given to you depends on the bacteria (germs) causing the infection. Sometimes patients with SSIs also need another surgery to treat the infection.  What are some of the things that hospitals are doing to prevent SSIs?  To prevent SSIs, doctors, nurses, and other healthcare providers:  · Clean their hands and arms up to their elbows with an antiseptic agent just before the surgery.  · Clean their hands with soap and water or an alcohol-based hand rub before and after caring for each patient.  · May remove some of your hair immediately before your surgery using electric clippers if the hair is in the same area where the procedure will occur. They should not shave you with a razor.  · Wear special hair covers, masks, gowns, and gloves during surgery to keep the surgery area clean.  · Give you antibiotics before your surgery starts. In most cases, you should get antibiotics within 60 minutes before the surgery starts and the antibiotics should be stopped within 24 hours after surgery.  · Clean the skin at the site of your surgery with a special soap that kills germs.  What can I do to help prevent SSIs?  Before your surgery:  · Tell your doctor about other medical problems you may have. Health problems such as allergies, diabetes, and obesity could affect your surgery and your  treatment.  · Quit smoking. Patients who smoke get more infections. Talk to your doctor about how you can quit before your surgery.  · Do not shave near where you will have surgery. Shaving with a razor can irritate your skin and make it easier to develop an infection.  At the time of your surgery:  · Speak up if someone tries to shave you with a razor before surgery. Ask why you need to be shaved and talk with your surgeon if you have any concerns.  · Ask if you will get antibiotics before surgery.  After your surgery:  · Make sure that your healthcare providers clean their hands before examining you, either with soap and water or an alcohol-based hand rub.  · If you do not see your providers clean their hands, please ask them to do so.  · Family and friends who visit you should not touch the surgical wound or dressings.  · Family and friends should clean their hands with soap and water or an alcohol-based hand rub before and after visiting you. If you do not see them clean their hands, ask them to clean their hands.  What do I need to do when I go home from the hospital?  · Before you go home, your doctor or nurse should explain everything you need to know about taking care of your wound. Make sure you understand how to care for your wound before you leave the hospital.  · Always clean your hands before and after caring for your wound.  · Before you go home, make sure you know who to contact if you have questions or problems after you get home.  · If you have any symptoms of an infection, such as redness and pain at the surgery site, drainage, or fever, call your doctor immediately.  If you have additional questions, please ask your doctor or nurse.  Developed and co-sponsored by The Society for Healthcare Epidemiology of [place] (SHEA); Infectious Diseases Society of [place] (IDSA); American Hospital Association; Association for Professionals in Infection Control and Epidemiology (APIC); Centers for Disease  Control and Prevention (CDC); and The Joint Commission.     This information is not intended to replace advice given to you by your health care provider. Make sure you discuss any questions you have with your health care provider.     Document Released: 12/23/2014 Document Revised: 01/08/2016 Document Reviewed: 03/02/2016  [employer] Interactive Patient Education ©2016 [employer].       [facility]  CHG 4% Patient Instruction Sheet    Preparing the Skin Before Surgery  Preparing or “prepping” skin before surgery can reduce the risk of infection at the surgical site. To make the process easier,[facility] has chosen 4% Chlorhexidine Gluconate (CHG) antiseptic solution.   The steps below outline the prepping process and should be carefully followed.                                                                                                                                                      Prep the skin at the following time(s):                                                      We recommend you shower the night before surgery, and again the morning of surgery with the 4% CHG antiseptic solution using half of the bottle and a cloth each time.  Dress in clean clothes/sleepwear after showering.  See instructions below for application.          Do not apply any lotions or moisturizers.       Do not shave the area to be prepped for at least 2 days prior to surgery.    Clipping the hair may be done immediately prior to your surgery at the hospital    if needed.    Directions:  Thoroughly rinse your body with water.  Apply 4% CHG to a cloth and wash skin gently, paying special attention to the operative site.  Rinse again thoroughly.  Once you have begun using this product do not apply anything else to your skin. If itching or redness persists, rinse affected areas and discontinue use.    When using this product:  • Keep out of eyes, ears, and mouth.  • If solution should contact these areas, rinse out promptly  and thoroughly with water.  • For external use only.  • Do not use in genital area, on your face or head.      PATIENT/FAMILY/RESPONSIBLE PARTY VERBALIZES UNDERSTANDING OF ABOVE EDUCATION.  COPY OF PAIN SCALE GIVEN AND REVIEWED WITH VERBALIZED UNDERSTANDING.

## 2018-12-07 ENCOUNTER — ANESTHESIA EVENT (OUTPATIENT)
Dept: PERIOP | Facility: HOSPITAL | Age: 83
End: 2018-12-07

## 2018-12-07 ENCOUNTER — ANESTHESIA (OUTPATIENT)
Dept: PERIOP | Facility: HOSPITAL | Age: 83
End: 2018-12-07

## 2018-12-07 ENCOUNTER — APPOINTMENT (OUTPATIENT)
Dept: GENERAL RADIOLOGY | Facility: HOSPITAL | Age: 83
End: 2018-12-07

## 2018-12-07 ENCOUNTER — HOSPITAL ENCOUNTER (INPATIENT)
Facility: HOSPITAL | Age: 83
LOS: 6 days | Discharge: NURSING, REHAB OR LONG-TERM CARE | End: 2018-12-13
Attending: NEUROLOGICAL SURGERY | Admitting: NEUROLOGICAL SURGERY

## 2018-12-07 DIAGNOSIS — M51.37 DEGENERATION OF LUMBAR OR LUMBOSACRAL INTERVERTEBRAL DISC: ICD-10-CM

## 2018-12-07 DIAGNOSIS — Z78.9 DECREASED ACTIVITIES OF DAILY LIVING (ADL): ICD-10-CM

## 2018-12-07 DIAGNOSIS — M48.062 SPINAL STENOSIS, LUMBAR REGION, WITH NEUROGENIC CLAUDICATION: ICD-10-CM

## 2018-12-07 DIAGNOSIS — Z74.09 IMPAIRED FUNCTIONAL MOBILITY, BALANCE, GAIT, AND ENDURANCE: ICD-10-CM

## 2018-12-07 PROBLEM — M48.00 SPINAL STENOSIS: Status: ACTIVE | Noted: 2018-12-07

## 2018-12-07 LAB
ABO GROUP BLD: NORMAL
BLD GP AB SCN SERPL QL: NEGATIVE
RH BLD: NEGATIVE
T&S EXPIRATION DATE: NORMAL

## 2018-12-07 PROCEDURE — 88304 TISSUE EXAM BY PATHOLOGIST: CPT | Performed by: NEUROLOGICAL SURGERY

## 2018-12-07 PROCEDURE — 25010000002 ONDANSETRON PER 1 MG: Performed by: NURSE ANESTHETIST, CERTIFIED REGISTERED

## 2018-12-07 PROCEDURE — 25010000002 ONDANSETRON PER 1 MG: Performed by: NURSE PRACTITIONER

## 2018-12-07 PROCEDURE — 22558 ARTHRD ANT NTRBD MIN DSC LUM: CPT | Performed by: NEUROLOGICAL SURGERY

## 2018-12-07 PROCEDURE — 22558 ARTHRD ANT NTRBD MIN DSC LUM: CPT | Performed by: SURGERY

## 2018-12-07 PROCEDURE — 94799 UNLISTED PULMONARY SVC/PX: CPT

## 2018-12-07 PROCEDURE — 22845 INSERT SPINE FIXATION DEVICE: CPT | Performed by: NEUROLOGICAL SURGERY

## 2018-12-07 PROCEDURE — 88311 DECALCIFY TISSUE: CPT | Performed by: NEUROLOGICAL SURGERY

## 2018-12-07 PROCEDURE — 25010000002 ONDANSETRON PER 1 MG: Performed by: ANESTHESIOLOGY

## 2018-12-07 PROCEDURE — G8979 MOBILITY GOAL STATUS: HCPCS | Performed by: PHYSICAL THERAPIST

## 2018-12-07 PROCEDURE — C1713 ANCHOR/SCREW BN/BN,TIS/BN: HCPCS | Performed by: NEUROLOGICAL SURGERY

## 2018-12-07 PROCEDURE — G8978 MOBILITY CURRENT STATUS: HCPCS | Performed by: PHYSICAL THERAPIST

## 2018-12-07 PROCEDURE — 25010000002 PROPOFOL 10 MG/ML EMULSION: Performed by: NURSE ANESTHETIST, CERTIFIED REGISTERED

## 2018-12-07 PROCEDURE — 76000 FLUOROSCOPY <1 HR PHYS/QHP: CPT

## 2018-12-07 PROCEDURE — 86850 RBC ANTIBODY SCREEN: CPT | Performed by: NEUROLOGICAL SURGERY

## 2018-12-07 PROCEDURE — 25010000002 MORPHINE SULFATE (PF) 2 MG/ML SOLUTION: Performed by: ANESTHESIOLOGY

## 2018-12-07 PROCEDURE — 86900 BLOOD TYPING SEROLOGIC ABO: CPT | Performed by: NEUROLOGICAL SURGERY

## 2018-12-07 PROCEDURE — 25010000002 SUCCINYLCHOLINE PER 20 MG: Performed by: NURSE ANESTHETIST, CERTIFIED REGISTERED

## 2018-12-07 PROCEDURE — 74018 RADEX ABDOMEN 1 VIEW: CPT

## 2018-12-07 PROCEDURE — 25010000002 NEOSTIGMINE PER 0.5 MG: Performed by: NURSE ANESTHETIST, CERTIFIED REGISTERED

## 2018-12-07 PROCEDURE — 25010000002 MORPHINE PER 10 MG: Performed by: ANESTHESIOLOGY

## 2018-12-07 PROCEDURE — 25010000002 MORPHINE PER 10 MG: Performed by: NURSE PRACTITIONER

## 2018-12-07 PROCEDURE — 97161 PT EVAL LOW COMPLEX 20 MIN: CPT | Performed by: PHYSICAL THERAPIST

## 2018-12-07 PROCEDURE — 72100 X-RAY EXAM L-S SPINE 2/3 VWS: CPT

## 2018-12-07 PROCEDURE — 22853 INSJ BIOMECHANICAL DEVICE: CPT | Performed by: NEUROLOGICAL SURGERY

## 2018-12-07 PROCEDURE — 86901 BLOOD TYPING SEROLOGIC RH(D): CPT | Performed by: NEUROLOGICAL SURGERY

## 2018-12-07 PROCEDURE — 0SG30A0 FUSION OF LUMBOSACRAL JOINT WITH INTERBODY FUSION DEVICE, ANTERIOR APPROACH, ANTERIOR COLUMN, OPEN APPROACH: ICD-10-PCS | Performed by: NEUROLOGICAL SURGERY

## 2018-12-07 DEVICE — ASSEMBLY 7969812 L 42X30 12MM 8 DEG CP
Type: IMPLANTABLE DEVICE | Site: SPINE LUMBAR | Status: FUNCTIONAL
Brand: CRESCENT™ SPINAL SYSTEM

## 2018-12-07 DEVICE — BONE GRAFT KIT 7510200 INFUSE SMALL
Type: IMPLANTABLE DEVICE | Status: FUNCTIONAL
Brand: INFUSE® BONE GRAFT

## 2018-12-07 DEVICE — HEMO ABS GELFOAM PWDR PORCN 1GM: Type: IMPLANTABLE DEVICE | Status: FUNCTIONAL

## 2018-12-07 DEVICE — SCREW 7975530 SOVEREIGN FA 5.5 X 30MM
Type: IMPLANTABLE DEVICE | Status: FUNCTIONAL
Brand: SOVEREIGN™ SPINAL SYSTEM

## 2018-12-07 DEVICE — DBM T43105 5CC GRAFTON PUTTY
Type: IMPLANTABLE DEVICE | Status: FUNCTIONAL
Brand: GRAFTON®AND GRAFTON PLUS®DEMINERALIZED BONE MATRIX (DBM)

## 2018-12-07 RX ORDER — NALOXONE HCL 0.4 MG/ML
0.04 VIAL (ML) INJECTION AS NEEDED
Status: DISCONTINUED | OUTPATIENT
Start: 2018-12-07 | End: 2018-12-07 | Stop reason: HOSPADM

## 2018-12-07 RX ORDER — SODIUM CHLORIDE 9 MG/ML
75 INJECTION, SOLUTION INTRAVENOUS CONTINUOUS
Status: DISCONTINUED | OUTPATIENT
Start: 2018-12-07 | End: 2018-12-13 | Stop reason: HOSPADM

## 2018-12-07 RX ORDER — SODIUM CHLORIDE 0.9 % (FLUSH) 0.9 %
3 SYRINGE (ML) INJECTION EVERY 12 HOURS SCHEDULED
Status: DISCONTINUED | OUTPATIENT
Start: 2018-12-07 | End: 2018-12-13 | Stop reason: HOSPADM

## 2018-12-07 RX ORDER — SUFENTANIL CITRATE 50 UG/ML
INJECTION EPIDURAL; INTRAVENOUS AS NEEDED
Status: DISCONTINUED | OUTPATIENT
Start: 2018-12-07 | End: 2018-12-07 | Stop reason: SURG

## 2018-12-07 RX ORDER — METOPROLOL SUCCINATE 25 MG/1
25 TABLET, EXTENDED RELEASE ORAL NIGHTLY
Status: DISCONTINUED | OUTPATIENT
Start: 2018-12-07 | End: 2018-12-13 | Stop reason: HOSPADM

## 2018-12-07 RX ORDER — IPRATROPIUM BROMIDE AND ALBUTEROL SULFATE 2.5; .5 MG/3ML; MG/3ML
3 SOLUTION RESPIRATORY (INHALATION) ONCE AS NEEDED
Status: DISCONTINUED | OUTPATIENT
Start: 2018-12-07 | End: 2018-12-07 | Stop reason: HOSPADM

## 2018-12-07 RX ORDER — OXYCODONE AND ACETAMINOPHEN 10; 325 MG/1; MG/1
1 TABLET ORAL ONCE AS NEEDED
Status: DISCONTINUED | OUTPATIENT
Start: 2018-12-07 | End: 2018-12-07 | Stop reason: HOSPADM

## 2018-12-07 RX ORDER — SODIUM CHLORIDE 0.9 % (FLUSH) 0.9 %
1-10 SYRINGE (ML) INJECTION AS NEEDED
Status: DISCONTINUED | OUTPATIENT
Start: 2018-12-07 | End: 2018-12-07 | Stop reason: HOSPADM

## 2018-12-07 RX ORDER — METOCLOPRAMIDE HYDROCHLORIDE 5 MG/ML
5 INJECTION INTRAMUSCULAR; INTRAVENOUS
Status: DISCONTINUED | OUTPATIENT
Start: 2018-12-07 | End: 2018-12-07 | Stop reason: HOSPADM

## 2018-12-07 RX ORDER — HYDRALAZINE HYDROCHLORIDE 20 MG/ML
5 INJECTION INTRAMUSCULAR; INTRAVENOUS
Status: DISCONTINUED | OUTPATIENT
Start: 2018-12-07 | End: 2018-12-07 | Stop reason: HOSPADM

## 2018-12-07 RX ORDER — MORPHINE SULFATE 2 MG/ML
2 INJECTION, SOLUTION INTRAMUSCULAR; INTRAVENOUS
Status: DISCONTINUED | OUTPATIENT
Start: 2018-12-07 | End: 2018-12-07 | Stop reason: HOSPADM

## 2018-12-07 RX ORDER — SODIUM CHLORIDE 9 MG/ML
INJECTION, SOLUTION INTRAVENOUS AS NEEDED
Status: DISCONTINUED | OUTPATIENT
Start: 2018-12-07 | End: 2018-12-07 | Stop reason: HOSPADM

## 2018-12-07 RX ORDER — FINASTERIDE 5 MG/1
5 TABLET, FILM COATED ORAL DAILY
Status: DISCONTINUED | OUTPATIENT
Start: 2018-12-07 | End: 2018-12-13 | Stop reason: HOSPADM

## 2018-12-07 RX ORDER — ONDANSETRON 2 MG/ML
4 INJECTION INTRAMUSCULAR; INTRAVENOUS AS NEEDED
Status: DISCONTINUED | OUTPATIENT
Start: 2018-12-07 | End: 2018-12-07 | Stop reason: HOSPADM

## 2018-12-07 RX ORDER — ACETAMINOPHEN 500 MG
1000 TABLET ORAL ONCE
Status: COMPLETED | OUTPATIENT
Start: 2018-12-07 | End: 2018-12-07

## 2018-12-07 RX ORDER — LABETALOL HYDROCHLORIDE 5 MG/ML
5 INJECTION, SOLUTION INTRAVENOUS
Status: DISCONTINUED | OUTPATIENT
Start: 2018-12-07 | End: 2018-12-07 | Stop reason: HOSPADM

## 2018-12-07 RX ORDER — SODIUM CHLORIDE 0.9 % (FLUSH) 0.9 %
1-10 SYRINGE (ML) INJECTION AS NEEDED
Status: DISCONTINUED | OUTPATIENT
Start: 2018-12-07 | End: 2018-12-13 | Stop reason: HOSPADM

## 2018-12-07 RX ORDER — SUCCINYLCHOLINE CHLORIDE 20 MG/ML
INJECTION INTRAMUSCULAR; INTRAVENOUS AS NEEDED
Status: DISCONTINUED | OUTPATIENT
Start: 2018-12-07 | End: 2018-12-07 | Stop reason: SURG

## 2018-12-07 RX ORDER — DULOXETIN HYDROCHLORIDE 30 MG/1
60 CAPSULE, DELAYED RELEASE ORAL DAILY
Status: DISCONTINUED | OUTPATIENT
Start: 2018-12-07 | End: 2018-12-13 | Stop reason: HOSPADM

## 2018-12-07 RX ORDER — PRIMIDONE 50 MG/1
25 TABLET ORAL NIGHTLY
Status: DISCONTINUED | OUTPATIENT
Start: 2018-12-07 | End: 2018-12-13 | Stop reason: HOSPADM

## 2018-12-07 RX ORDER — SODIUM CHLORIDE, SODIUM LACTATE, POTASSIUM CHLORIDE, CALCIUM CHLORIDE 600; 310; 30; 20 MG/100ML; MG/100ML; MG/100ML; MG/100ML
20 INJECTION, SOLUTION INTRAVENOUS CONTINUOUS
Status: DISCONTINUED | OUTPATIENT
Start: 2018-12-07 | End: 2018-12-07 | Stop reason: HOSPADM

## 2018-12-07 RX ORDER — SODIUM CHLORIDE, SODIUM LACTATE, POTASSIUM CHLORIDE, CALCIUM CHLORIDE 600; 310; 30; 20 MG/100ML; MG/100ML; MG/100ML; MG/100ML
1000 INJECTION, SOLUTION INTRAVENOUS CONTINUOUS
Status: DISCONTINUED | OUTPATIENT
Start: 2018-12-07 | End: 2018-12-07 | Stop reason: HOSPADM

## 2018-12-07 RX ORDER — SODIUM CHLORIDE 0.9 % (FLUSH) 0.9 %
3 SYRINGE (ML) INJECTION AS NEEDED
Status: DISCONTINUED | OUTPATIENT
Start: 2018-12-07 | End: 2018-12-07 | Stop reason: HOSPADM

## 2018-12-07 RX ORDER — MEPERIDINE HYDROCHLORIDE 25 MG/ML
12.5 INJECTION INTRAMUSCULAR; INTRAVENOUS; SUBCUTANEOUS
Status: DISCONTINUED | OUTPATIENT
Start: 2018-12-07 | End: 2018-12-07 | Stop reason: HOSPADM

## 2018-12-07 RX ORDER — FUROSEMIDE 40 MG/1
40 TABLET ORAL DAILY
Status: DISCONTINUED | OUTPATIENT
Start: 2018-12-07 | End: 2018-12-13 | Stop reason: HOSPADM

## 2018-12-07 RX ORDER — ONDANSETRON 2 MG/ML
INJECTION INTRAMUSCULAR; INTRAVENOUS AS NEEDED
Status: DISCONTINUED | OUTPATIENT
Start: 2018-12-07 | End: 2018-12-07 | Stop reason: SURG

## 2018-12-07 RX ORDER — ONDANSETRON 2 MG/ML
4 INJECTION INTRAMUSCULAR; INTRAVENOUS EVERY 6 HOURS PRN
Status: DISCONTINUED | OUTPATIENT
Start: 2018-12-07 | End: 2018-12-13 | Stop reason: HOSPADM

## 2018-12-07 RX ORDER — BUPIVACAINE HCL/0.9 % NACL/PF 0.1 %
2 PLASTIC BAG, INJECTION (ML) EPIDURAL ONCE
Status: COMPLETED | OUTPATIENT
Start: 2018-12-07 | End: 2018-12-07

## 2018-12-07 RX ORDER — LIDOCAINE HYDROCHLORIDE 20 MG/ML
INJECTION, SOLUTION INFILTRATION; PERINEURAL AS NEEDED
Status: DISCONTINUED | OUTPATIENT
Start: 2018-12-07 | End: 2018-12-07 | Stop reason: SURG

## 2018-12-07 RX ORDER — SODIUM CHLORIDE, SODIUM LACTATE, POTASSIUM CHLORIDE, CALCIUM CHLORIDE 600; 310; 30; 20 MG/100ML; MG/100ML; MG/100ML; MG/100ML
100 INJECTION, SOLUTION INTRAVENOUS CONTINUOUS
Status: DISCONTINUED | OUTPATIENT
Start: 2018-12-07 | End: 2018-12-07 | Stop reason: HOSPADM

## 2018-12-07 RX ORDER — ROCURONIUM BROMIDE 10 MG/ML
INJECTION, SOLUTION INTRAVENOUS AS NEEDED
Status: DISCONTINUED | OUTPATIENT
Start: 2018-12-07 | End: 2018-12-07 | Stop reason: SURG

## 2018-12-07 RX ORDER — MAGNESIUM HYDROXIDE 1200 MG/15ML
LIQUID ORAL AS NEEDED
Status: DISCONTINUED | OUTPATIENT
Start: 2018-12-07 | End: 2018-12-07 | Stop reason: HOSPADM

## 2018-12-07 RX ORDER — GLYCOPYRROLATE 0.2 MG/ML
INJECTION INTRAMUSCULAR; INTRAVENOUS AS NEEDED
Status: DISCONTINUED | OUTPATIENT
Start: 2018-12-07 | End: 2018-12-07 | Stop reason: SURG

## 2018-12-07 RX ORDER — SODIUM CHLORIDE 0.9 % (FLUSH) 0.9 %
3 SYRINGE (ML) INJECTION EVERY 12 HOURS SCHEDULED
Status: DISCONTINUED | OUTPATIENT
Start: 2018-12-07 | End: 2018-12-07 | Stop reason: HOSPADM

## 2018-12-07 RX ORDER — NALOXONE HCL 0.4 MG/ML
0.4 VIAL (ML) INJECTION
Status: DISCONTINUED | OUTPATIENT
Start: 2018-12-07 | End: 2018-12-13 | Stop reason: HOSPADM

## 2018-12-07 RX ORDER — MORPHINE SULFATE 2 MG/ML
1 INJECTION, SOLUTION INTRAMUSCULAR; INTRAVENOUS EVERY 4 HOURS PRN
Status: DISCONTINUED | OUTPATIENT
Start: 2018-12-07 | End: 2018-12-09 | Stop reason: RX

## 2018-12-07 RX ORDER — SENNA AND DOCUSATE SODIUM 50; 8.6 MG/1; MG/1
1 TABLET, FILM COATED ORAL NIGHTLY PRN
Status: DISCONTINUED | OUTPATIENT
Start: 2018-12-07 | End: 2018-12-13 | Stop reason: HOSPADM

## 2018-12-07 RX ORDER — PROPOFOL 10 MG/ML
VIAL (ML) INTRAVENOUS AS NEEDED
Status: DISCONTINUED | OUTPATIENT
Start: 2018-12-07 | End: 2018-12-07 | Stop reason: SURG

## 2018-12-07 RX ORDER — ACETAMINOPHEN 325 MG/1
650 TABLET ORAL EVERY 4 HOURS PRN
Status: DISCONTINUED | OUTPATIENT
Start: 2018-12-07 | End: 2018-12-13 | Stop reason: HOSPADM

## 2018-12-07 RX ORDER — BISACODYL 10 MG
10 SUPPOSITORY, RECTAL RECTAL DAILY PRN
Status: DISCONTINUED | OUTPATIENT
Start: 2018-12-07 | End: 2018-12-13 | Stop reason: HOSPADM

## 2018-12-07 RX ORDER — DOCUSATE SODIUM 100 MG/1
100 CAPSULE, LIQUID FILLED ORAL 2 TIMES DAILY PRN
Status: DISCONTINUED | OUTPATIENT
Start: 2018-12-07 | End: 2018-12-13 | Stop reason: HOSPADM

## 2018-12-07 RX ORDER — OXYCODONE AND ACETAMINOPHEN 7.5; 325 MG/1; MG/1
1 TABLET ORAL EVERY 4 HOURS PRN
Status: DISCONTINUED | OUTPATIENT
Start: 2018-12-07 | End: 2018-12-13 | Stop reason: HOSPADM

## 2018-12-07 RX ORDER — DEXAMETHASONE SODIUM PHOSPHATE 4 MG/ML
4 INJECTION, SOLUTION INTRA-ARTICULAR; INTRALESIONAL; INTRAMUSCULAR; INTRAVENOUS; SOFT TISSUE ONCE AS NEEDED
Status: DISCONTINUED | OUTPATIENT
Start: 2018-12-07 | End: 2018-12-07 | Stop reason: HOSPADM

## 2018-12-07 RX ORDER — FLUMAZENIL 0.1 MG/ML
0.2 INJECTION INTRAVENOUS AS NEEDED
Status: DISCONTINUED | OUTPATIENT
Start: 2018-12-07 | End: 2018-12-07 | Stop reason: HOSPADM

## 2018-12-07 RX ADMIN — SODIUM CHLORIDE, POTASSIUM CHLORIDE, SODIUM LACTATE AND CALCIUM CHLORIDE 1000 ML: 600; 310; 30; 20 INJECTION, SOLUTION INTRAVENOUS at 06:04

## 2018-12-07 RX ADMIN — MORPHINE SULFATE 1 MG: 2 INJECTION, SOLUTION INTRAMUSCULAR; INTRAVENOUS at 16:17

## 2018-12-07 RX ADMIN — SODIUM CHLORIDE, POTASSIUM CHLORIDE, SODIUM LACTATE AND CALCIUM CHLORIDE: 600; 310; 30; 20 INJECTION, SOLUTION INTRAVENOUS at 10:58

## 2018-12-07 RX ADMIN — EPHEDRINE SULFATE 15 MG: 50 INJECTION INTRAMUSCULAR; INTRAVENOUS; SUBCUTANEOUS at 07:37

## 2018-12-07 RX ADMIN — Medication 2 MG: at 08:19

## 2018-12-07 RX ADMIN — MORPHINE SULFATE 2 MG: 2 INJECTION, SOLUTION INTRAMUSCULAR; INTRAVENOUS at 12:00

## 2018-12-07 RX ADMIN — FUROSEMIDE 40 MG: 40 TABLET ORAL at 14:13

## 2018-12-07 RX ADMIN — FINASTERIDE 5 MG: 5 TABLET, FILM COATED ORAL at 14:14

## 2018-12-07 RX ADMIN — SODIUM CHLORIDE, POTASSIUM CHLORIDE, SODIUM LACTATE AND CALCIUM CHLORIDE 20 ML/HR: 600; 310; 30; 20 INJECTION, SOLUTION INTRAVENOUS at 06:51

## 2018-12-07 RX ADMIN — LABETALOL 20 MG/4 ML (5 MG/ML) INTRAVENOUS SYRINGE 5 MG: at 11:55

## 2018-12-07 RX ADMIN — LIDOCAINE HYDROCHLORIDE 100 MG: 20 INJECTION, SOLUTION INFILTRATION; PERINEURAL at 07:27

## 2018-12-07 RX ADMIN — EPHEDRINE SULFATE 15 MG: 50 INJECTION INTRAMUSCULAR; INTRAVENOUS; SUBCUTANEOUS at 08:31

## 2018-12-07 RX ADMIN — MORPHINE SULFATE 2 MG: 2 INJECTION, SOLUTION INTRAMUSCULAR; INTRAVENOUS at 13:31

## 2018-12-07 RX ADMIN — DULOXETINE HYDROCHLORIDE 60 MG: 30 CAPSULE, DELAYED RELEASE ORAL at 14:14

## 2018-12-07 RX ADMIN — SUCCINYLCHOLINE CHLORIDE 100 MG: 20 INJECTION, SOLUTION INTRAMUSCULAR; INTRAVENOUS at 07:27

## 2018-12-07 RX ADMIN — GLYCOPYRROLATE 0.2 MG: 0.2 INJECTION, SOLUTION INTRAMUSCULAR; INTRAVENOUS at 08:19

## 2018-12-07 RX ADMIN — KETAMINE HYDROCHLORIDE 3 MCG/KG/MIN: 100 INJECTION INTRAMUSCULAR; INTRAVENOUS at 07:50

## 2018-12-07 RX ADMIN — LIDOCAINE HYDROCHLORIDE 0.5 ML: 10 INJECTION, SOLUTION EPIDURAL; INFILTRATION; INTRACAUDAL; PERINEURAL at 06:02

## 2018-12-07 RX ADMIN — SUFENTANIL CITRATE 10 MCG: 50 INJECTION, SOLUTION EPIDURAL; INTRAVENOUS at 08:02

## 2018-12-07 RX ADMIN — ACETAMINOPHEN 1000 MG: 500 TABLET ORAL at 06:47

## 2018-12-07 RX ADMIN — SODIUM CHLORIDE 75 ML/HR: 9 INJECTION, SOLUTION INTRAVENOUS at 14:15

## 2018-12-07 RX ADMIN — OXYCODONE HYDROCHLORIDE AND ACETAMINOPHEN 1 TABLET: 7.5; 325 TABLET ORAL at 14:13

## 2018-12-07 RX ADMIN — PRIMIDONE 25 MG: 50 TABLET ORAL at 21:42

## 2018-12-07 RX ADMIN — ONDANSETRON 4 MG: 2 INJECTION INTRAMUSCULAR; INTRAVENOUS at 12:00

## 2018-12-07 RX ADMIN — SUFENTANIL CITRATE 10 MCG: 50 INJECTION, SOLUTION EPIDURAL; INTRAVENOUS at 07:45

## 2018-12-07 RX ADMIN — METOPROLOL SUCCINATE 25 MG: 25 TABLET, FILM COATED, EXTENDED RELEASE ORAL at 21:42

## 2018-12-07 RX ADMIN — ROCURONIUM BROMIDE 10 MG: 10 INJECTION INTRAVENOUS at 07:27

## 2018-12-07 RX ADMIN — SUFENTANIL CITRATE 10 MCG: 50 INJECTION, SOLUTION EPIDURAL; INTRAVENOUS at 07:56

## 2018-12-07 RX ADMIN — ROCURONIUM BROMIDE 20 MG: 10 INJECTION INTRAVENOUS at 07:52

## 2018-12-07 RX ADMIN — ONDANSETRON HYDROCHLORIDE 4 MG: 2 SOLUTION INTRAMUSCULAR; INTRAVENOUS at 11:04

## 2018-12-07 RX ADMIN — ONDANSETRON 4 MG: 2 INJECTION, SOLUTION INTRAMUSCULAR; INTRAVENOUS at 14:16

## 2018-12-07 RX ADMIN — EPHEDRINE SULFATE 15 MG: 50 INJECTION INTRAMUSCULAR; INTRAVENOUS; SUBCUTANEOUS at 07:39

## 2018-12-07 RX ADMIN — PROPOFOL 70 MG: 10 INJECTION, EMULSION INTRAVENOUS at 07:27

## 2018-12-07 RX ADMIN — SODIUM CHLORIDE, PRESERVATIVE FREE 3 ML: 5 INJECTION INTRAVENOUS at 14:15

## 2018-12-07 RX ADMIN — Medication 2 G: at 07:38

## 2018-12-07 RX ADMIN — SUFENTANIL CITRATE 20 MCG: 50 INJECTION, SOLUTION EPIDURAL; INTRAVENOUS at 07:24

## 2018-12-07 NOTE — ANESTHESIA PROCEDURE NOTES
ANESTHESIA INTUBATION  Urgency: elective    Airway not difficult    General Information and Staff    Patient location during procedure: OR  CRNA: Tre Malagon CRNA    Indications and Patient Condition  Indications for airway management: airway protection    Preoxygenated: yes  Mask difficulty assessment: 1 - vent by mask    Final Airway Details  Final airway type: endotracheal airway      Successful airway: ETT  Cuffed: yes   Successful intubation technique: direct laryngoscopy  Endotracheal tube insertion site: oral  Blade: Vance  Blade size: 2  ETT size (mm): 8.0  Cormack-Lehane Classification: grade I - full view of glottis  Placement verified by: capnometry   Measured from: lips  ETT to lips (cm): 23  Number of attempts at approach: 1

## 2018-12-07 NOTE — PLAN OF CARE
Problem: Patient Care Overview  Goal: Plan of Care Review  Outcome: Outcome(s) achieved Date Met: 12/07/18 12/07/18 0839   Coping/Psychosocial   Plan of Care Reviewed With patient   Plan of Care Review   Progress improving   OTHER   Outcome Summary PT evaluation completed. The patient demonstrates weakness on the left leg and had difficulty with clearing his left foot when walking. He became very fatigued and nauseated after walking 25ft. He had to sit down to rest. The patient is aware of his spinal restrictions and will need to use a rolling walker for now instead of his cane. He will benefit from further PT to work increase strength, endurance, and gait. The patient is having the second part of his surgery on Monday, PT will re-assess then for discharge planning.

## 2018-12-07 NOTE — ANESTHESIA POSTPROCEDURE EVALUATION
Patient: Chad Henriquez    Procedure Summary     Date:  12/07/18 Room / Location:   [facility] OR  /  [facility]    Anesthesia Start:  0719 Anesthesia Stop:  1116    Procedures:       ANTERIOR LUMBAR INTERBODY FUSION L5-S1 (N/A Spine Cervical)      LUMBAR LAMINECTOMY TRANSFORAMINAL LUMBAR INTERBODY FUSION LEFT L3-4, L4-5 QUADRANT RETRACTOR (Left Spine Lumbar)      ANTERIOR LUMBAR EXPOSURE (N/A Abdomen) Diagnosis:       Spinal stenosis, lumbar region, with neurogenic claudication      (Spinal stenosis, lumbar region, with neurogenic claudication [M48.062])    Surgeon:  Kj Falcon MD; Manolo Mcleod DO Provider:  Tre Malagon CRNA    Anesthesia Type:  general ASA Status:  3          Anesthesia Type: general  Last vitals  BP   (!) 183/61(MARISSA Harley notified) (12/07/18 1305)   Temp   97.4 °F (36.3 °C) (12/07/18 1305)   Pulse   70 (12/07/18 1305)   Resp   14 (12/07/18 1305)     SpO2   95 % (12/07/18 1305)     Post Anesthesia Care and Evaluation    PONV Status: none  Comments: Patient d/c from PACU prior to anes eval based on Colton score.  Please see RN notes for details of d/c criteria.    Blood pressure (!) 183/61, pulse 70, temperature 97.4 °F (36.3 °C), temperature source Oral, resp. rate 14, SpO2 95 %.

## 2018-12-07 NOTE — ANESTHESIA PREPROCEDURE EVALUATION
Anesthesia Evaluation     Patient summary reviewed   history of anesthetic complications (Remote): PONV  NPO Solid Status: > 8 hours  NPO Liquid Status: > 8 hours           Airway   Mallampati: I  TM distance: >3 FB  Neck ROM: full  Dental          Pulmonary - normal exam    breath sounds clear to auscultation  (+) sleep apnea,   (-) asthma, recent URI, not a smoker  Cardiovascular - normal exam  Exercise tolerance: good (4-7 METS)    ECG reviewed  Patient on routine beta blocker and Beta blocker given within 24 hours of surgery  Rhythm: regular  Rate: normal    (+) hypertension well controlled, past MI (1986) , CAD, CABG (1 vessel, 1986), cardiac stents (x3, 1998) hyperlipidemia,   (-) pacemaker, angina      Neuro/Psych  (-) seizures, TIA, CVA  GI/Hepatic/Renal/Endo    (-) GERD, liver disease, no renal disease, diabetes, hypothyroidism, hyperthyroidism    Musculoskeletal     Abdominal    Substance History      OB/GYN          Other                        Anesthesia Plan    ASA 3     general     intravenous induction   Anesthetic plan, all risks, benefits, and alternatives have been provided, discussed and informed consent has been obtained with: patient.  Use of blood products discussed with patient  Consented to blood products.

## 2018-12-07 NOTE — H&P
Chief Complaint   Patient presents with   • Back & leg pain       patient underwent DCS trial but it was not successful; he is here today to discuss possible lumbar fusion.         HPI  88-year-old gentleman we have been following for quite some time for back pain and left lower extremity pain.  He is about 60% leg pain and about 40% back pain.  He underwent a dorsal column stimulator trial with Dr. callahan but unfortunately did not get a lot of satisfaction from the trial.  He has had a multilevel laminectomy which did improve his situation for several months however most recently he has been almost completely disabled from back pain and claudicate leg pain.  He is able to get around the house however to leave the house he is premature in a wheelchair.  He has done a dedicated course of physical therapy.  He is gone through a series of injections tried oral pain medications, anti-inflammatories all of which has not improved his function at all.     The following portions of the patient's history were reviewed and updated as appropriate: allergies, current medications, past family history, past medical history, past social history, past surgical history and problem list.     OBJECTIVE:     Review of Systems   All other systems reviewed and are negative.           Physical Exam   Constitutional: He is oriented to person, place, and time. He appears well-developed and well-nourished.   Cardiovascular: Normal rate and regular rhythm.    Pulmonary/Chest: Effort normal. No respiratory distress. He has no wheezes.   Abdominal: Soft. He exhibits no distension. There is no tenderness.   Neurological: He is oriented to person, place, and time. He has normal strength.   Reflex Scores:       Patellar reflexes are 0 on the right side and 0 on the left side.       Achilles reflexes are 0 on the right side and 0 on the left side.  Psychiatric: His speech is normal.         Neurologic Exam      Mental Status   Oriented to person,  place, and time.   Attention: normal.   Speech: speech is normal   Level of consciousness: alert  Knowledge: good.      Cranial Nerves   Cranial nerves II through XII intact.      Motor Exam   Muscle bulk: normal  Overall muscle tone: normal  Right arm pronator drift: absent  Left arm pronator drift: absent     Strength   Strength 5/5 throughout.      Sensory Exam   Light touch normal.   Pinprick normal.      Gait, Coordination, and Reflexes      Gait  Gait: (Significantly impaired gait, flexed posture)     Reflexes   Right patellar: 0  Left patellar: 0  Right achilles: 0  Left achilles: 0        Independent Review of Radiographic Studies:   MRI the lumbar spine shows severe degenerative disc disease at L3 4, 45, 5 S1.  There is bilateral foraminal stenosis but left is greater than right at each level.     ASSESSMENT/PLAN:  Mr. Henriquez has gone through all manner of conservative care including needing a dorsal column stimulator trial to try to improve his back pain and left lower extremity claudication pain.  At this point really out of nonsurgical options.  The only reasonable option to try to improve this situation would be a extensive 3 level lumbar fusion.  I would recommend an anterior lumbar fusion at L5-S1 followed by a left L3 4, 45 transforaminal lumbar body fusion using the quadrant tube and minimally invasive techniques.  We discussed this at length.  We would thoroughly reviewed the risks of doing an extensive lumbar fusion at this man's age with his cardiac history.  These risks included infection, bleeding, paralysis, spinal fluid leak, bowel bladder incontinence, major cardiac event, extended course of recovery and rehabilitation, stroke, coma, and death.  He acknowledged understanding of this.  Him and his family's questions were answered.  We will get him prepped and scheduled as soon as possible.        1. Spinal stenosis, lumbar region, with neurogenic claudication    2. Left leg pain    3.  Non-smoker    4. BMI 21.0-21.9, adult                Return for postoperatively.        Kj Falcon MD

## 2018-12-07 NOTE — ANESTHESIA PROCEDURE NOTES
Arterial Line    Pre-sedation assessment completed: 12/7/2018 6:48 AM    Patient location during procedure: pre-op  Start time: 12/7/2018 6:49 AM  Stop Time:12/7/2018 6:50 AM       Line placed for ABGs/Labs/ISTAT and hemodynamic monitoring.  Performed By   Anesthesiologist: Rich Soto MD  Preanesthetic Checklist  Completed: patient identified, site marked, surgical consent, pre-op evaluation, timeout performed, IV checked, risks and benefits discussed and monitors and equipment checked  Arterial Line Prep   Sterile Tech: gloves  Prep: ChloraPrep  Patient monitoring: continuous pulse oximetry  Arterial Line Procedure   Laterality:right  Location:  radial artery  Catheter size: 20 G   Guidance: palpation technique  Number of attempts: 1  Successful placement: yes          Post Assessment   Dressing Type: secured with tape and wrist guard applied.   Complications no  Circ/Move/Sens Assessment: normal.   Patient Tolerance: patient tolerated the procedure well with no apparent complications

## 2018-12-07 NOTE — THERAPY EVALUATION
Acute Care - Physical Therapy Initial Evaluation  [facility]     Patient Name: Chad Henriquez  : 1929  MRN: [id number]  Today's Date: 2018   Onset of Illness/Injury or Date of Surgery: 18  Date of Referral to PT: 18  Referring Physician: Dr. Falcon      Admit Date: 2018    Visit Dx:     ICD-10-CM ICD-9-CM   1. Spinal stenosis, lumbar region, with neurogenic claudication M48.062 724.03   2. Impaired functional mobility, balance, gait, and endurance Z74.09 V49.89     Patient Active Problem List   Diagnosis   • BPH with urinary obstruction   • Frequency of urination   • Nocturia   • OAB (overactive bladder)   • Non-smoker   • Normal body mass index (BMI)   • Spinal stenosis, lumbar region, with neurogenic claudication   • Left leg pain   • Bilateral hip pain   • Acute left-sided low back pain with left-sided sciatica   • Essential tremor   • BMI 21.0-21.9, adult   • Spinal stenosis     Past Medical History:   Diagnosis Date   • Allergic rhinitis    • Anemia    • Arthritis    • BPH (benign prostatic hypertrophy) with urinary retention    • Cancer (CMS/HCC)     skin cancer   • Chronic back pain     RUNS DOWN BOTH LEGS   • Constipation    • Coronary artery disease    • Hard of hearing    • Heart attack (CMS/HCC)     NO MUSCLE DAMAGE - JUST OCCLUDED VALVE   • High cholesterol    • History of skin cancer     BILATERAL EARS   • History of transfusion        • Hypertension    • Inguinal hernia    • Leaky heart valve     DR CONCEPCION SAYS NOT ENOUGH TO BE OF CONCERN   • Other bursal cyst, right elbow    • PONV (postoperative nausea and vomiting)     has had scopalamine patch in past    • Sleep apnea     DOES NOT USE CPAP OR BIPAP   • Spinal stenosis of cervical region    • Spinal stenosis of lumbar region    • Tremors of nervous system    • Wears hearing aid     BILATERAL     Past Surgical History:   Procedure Laterality Date   • APPENDECTOMY     • BACK SURGERY      X3   • CARDIAC SURGERY   08/08/1986    X1 BYPASS   • CORONARY ANGIOPLASTY WITH STENT PLACEMENT  1997    X3 STENTS   • HERNIA REPAIR Bilateral     x2        PT ASSESSMENT (last 12 hours)      Physical Therapy Evaluation     Row Name 12/07/18 1330          PT Evaluation Time/Intention    Subjective Information  complains of;pain  -MS     Document Type  evaluation  -MS     Mode of Treatment  physical therapy  -MS     Row Name 12/07/18 1330          General Information    Patient Profile Reviewed?  yes  -MS     Onset of Illness/Injury or Date of Surgery  12/07/18  -MS     Referring Physician  Dr. Falcon  -MS     Patient Observations  alert;cooperative;agree to therapy  -MS     Patient/Family Observations  wife present in room  -MS     General Observations of Patient  pt lying in bed, awake and in no apparent distress. LSO in room  -MS     Prior Level of Function  independent:;all household mobility;ADL's used cane, wife would help with tying shoes sometimes  -MS     Equipment Currently Used at Home  walker, rolling;cane, straight;bath bench  -MS     Pertinent History of Current Functional Problem  ANTERIOR LUMBAR INTERBODY FUSION L5-S1 ANTERIOR LUMBAR INTERBODY FUSION L5-S1  -MS     Existing Precautions/Restrictions  fall;spinal;brace worn when out of bed  -MS     Risks Reviewed  patient:;LOB;nausea/vomiting;dizziness;increased discomfort  -MS     Benefits Reviewed  patient:;improve function;increase independence;increase strength;increase balance;decrease pain;increase knowledge  -MS     Barriers to Rehab  physical barrier  -MS     Row Name 12/07/18 1330          Relationship/Environment    Primary Source of Support/Comfort  spouse  -MS     Lives With  spouse  -MS     Family Caregiver if Needed  spouse  -MS     Row Name 12/07/18 1330          Resource/Environmental Concerns    Current Living Arrangements  home/apartment/condo  -MS     Row Name 12/07/18 1330          Home Main Entrance    Number of Stairs, Main Entrance  four  -MS     Stair  Railings, Main Entrance  railing on right side (ascending)  -MS     Row Name 12/07/18 1330          Cognitive Assessment/Interventions    Additional Documentation  Cognitive Assessment/Intervention (Group)  -MS     Row Name 12/07/18 1330          Cognitive Assessment/Intervention- PT/OT    Affect/Mental Status (Cognitive)  WNL  -MS     Orientation Status (Cognition)  oriented x 4  -MS     Follows Commands (Cognition)  WFL  -MS     Personal Safety Interventions  fall prevention program maintained;muscle strengthening facilitated;nonskid shoes/slippers when out of bed;supervised activity  -MS     Row Name 12/07/18 1330          Safety Issues, Functional Mobility    Impairments Affecting Function (Mobility)  balance;pain;strength  -MS     Row Name 12/07/18 1330          Bed Mobility Assessment/Treatment    Bed Mobility Assessment/Treatment  rolling right;sidelying-sit  -MS     Rolling Right [place] (Bed Mobility)  contact guard;verbal cues;nonverbal cues (demo/gesture)  -MS     Sidelying-Sit [place] (Bed Mobility)  minimum assist (75% patient effort);verbal cues;nonverbal cues (demo/gesture)  -MS     Bed Mobility, Safety Issues  decreased use of arms for pushing/pulling  -MS     Assistive Device (Bed Mobility)  bed rails  -MS     Row Name 12/07/18 1330          Transfer Assessment/Treatment    Transfer Assessment/Treatment  sit-stand transfer;stand-sit transfer  -MS     Sit-Stand [place] (Transfers)  minimum assist (75% patient effort);verbal cues;nonverbal cues (demo/gesture)  -MS     Stand-Sit [place] (Transfers)  minimum assist (75% patient effort);nonverbal cues (demo/gesture);contact guard  -MS     Row Name 12/07/18 1330          Sit-Stand Transfer    Assistive Device (Sit-Stand Transfers)  walker, front-wheeled  -MS     Row Name 12/07/18 1330          Stand-Sit Transfer    Assistive Device (Stand-Sit Transfers)  walker, front-wheeled  -MS     Row Name 12/07/18 1330          Gait/Stairs  Assessment/Training    N[place]oll[place] Level (Gait)  minimum assist (75% patient effort);verbal cues;nonverbal cues (demo/gesture)  -MS     Assistive Device (Gait)  walker, front-wheeled  -MS     Distance in Feet (Gait)  25ft, pt became fatigued and nauseated, Pt demonstrated difficulty with clearing the L foot during swing phase and became more difficult as he fatigued  -MS     Row Name 12/07/18 1330          General ROM    GENERAL ROM COMMENTS  all extremities WNL  -MS     Row Name 12/07/18 1330          MMT (Manual Muscle Testing)    General MMT Comments  L great toe 4/5, dorsiflexion 4/5, knee flex/ext 5/5. R great toe 4-/5, dorsiflexion 5/5, knee flex/ext 5/5  -MS     Row Name 12/07/18 1330          Sensory Assessment/Intervention    Sensory General Assessment  no sensation deficits identified  -MS     Row Name 12/07/18 1330          Pain Assessment    Additional Documentation  Pain Scale: Numbers Pre/Post-Treatment (Group)  -MS     Row Name 12/07/18 1330          Pain Scale: Numbers Pre/Post-Treatment    Pain Scale: Numbers, Pretreatment  5/10  -MS     Pain Location  abdomen  -MS     Pain Intervention(s)  Medication (See MAR);Repositioned;Ambulation/increased activity  -MS     Row Name 12/07/18 1330          Orthotics & Prosthetics Management    Orthosis Location  spinal orthosis  -MS     Additional Documentation  Orthosis Location (Row)  -MS     Row Name 12/07/18 1330          Spinal Orthosis Management    Type (Spinal Orthosis)  LSO (lumbar sacral orthosis)  -MS     Fabrication Comment (Spinal Orthosis)  pt fitted for LSO at outside facility and correctly fits the patient  -MS     Functional Design (Spinal Orthosis)  static orthosis  -MS     Therapeutic Indications (Spinal Orthosis)  post-op positioning/protection  -MS     Wearing Schedule (Spinal Orthosis)  wear when out of bed only  -MS     Orthosis Training (Spinal Orthosis)  patient;caregiver;all orthosis skills  -MS     Compliance/Wearing Issues (Spinal  Orthosis)  patient/caregiver comprehend strategies  -MS     Row Name             Wound 12/07/18 1109 back incision    Wound - Properties Group Date first assessed: 12/07/18  -SWETA Time first assessed: 1109  -SWETA Location: back  -SWETA Type: incision  -SWETA    Row Name 12/07/18 1330          Plan of Care Review    Plan of Care Reviewed With  patient  -MS     Row Name 12/07/18 1330          Physical Therapy Clinical Impression    Date of Referral to PT  12/07/18  -MS     Patient/Family Goals Statement (PT Clinical Impression)  go to inpt rehab and then return home  -MS     Criteria for Skilled Interventions Met (PT Clinical Impression)  yes;treatment indicated  -MS     Pathology/Pathophysiology Noted (Describe Specifically for Each System)  neuromuscular  -MS     Impairments Found (describe specific impairments)  gait, locomotion, and balance;muscle performance  -MS     Rehab Potential (PT Clinical Summary)  good, to achieve stated therapy goals  -MS     Predicted Duration of Therapy (PT)  until discharge  -MS     Care Plan Review (PT)  evaluation/treatment results reviewed;care plan/treatment goals reviewed;risks/benefits reviewed;current/potential barriers reviewed;patient/other agree to care plan  -MS     Care Plan Review, Other Participant (PT Clinical Impression)  spouse  -MS     Row Name 12/07/18 1330          Vital Signs    Pre SpO2 (%)  95  -MS     O2 Delivery Pre Treatment  room air  -MS     Intra SpO2 (%)  98  -MS     Post SpO2 (%)  97  -MS     Row Name 12/07/18 1330          Physical Therapy Goals    Bed Mobility Goal Selection (PT)  bed mobility, PT goal 1  -MS     Transfer Goal Selection (PT)  transfer, PT goal 1  -MS     Gait Training Goal Selection (PT)  gait training, PT goal 1  -MS     Stairs Goal Selection (PT)  stairs, PT goal 1  -MS     Additional Documentation  Stairs Goal Selection (PT) (Row)  -MS     Row Name 12/07/18 1330          Bed Mobility Goal 1 (PT)    Activity/Assistive Device (Bed Mobility Goal  1, PT)  bed mobility activities, all  -MS     Homer Level/Cues Needed (Bed Mobility Goal 1, PT)  independent  -MS     Time Frame (Bed Mobility Goal 1, PT)  long term goal (LTG);by discharge  -MS     Progress/Outcomes (Bed Mobility Goal 1, PT)  goal ongoing  -MS     Row Name 12/07/18 1468          Transfer Goal 1 (PT)    Activity/Assistive Device (Transfer Goal 1, PT)  sit-to-stand/stand-to-sit;bed-to-chair/chair-to-bed;walker, rolling  -MS     Homer Level/Cues Needed (Transfer Goal 1, PT)  conditional independence  -MS     Time Frame (Transfer Goal 1, PT)  long term goal (LTG);by discharge  -MS     Progress/Outcome (Transfer Goal 1, PT)  goal ongoing  -MS     Row Name 12/07/18 4480          Gait Training Goal 1 (PT)    Activity/Assistive Device (Gait Training Goal 1, PT)  gait (walking locomotion);assistive device use;decrease fall risk;forward stepping;improve balance and speed;increase endurance/gait distance;walker, rolling  -MS     Homer Level (Gait Training Goal 1, PT)  conditional independence  -MS     Distance (Gait Goal 1, PT)  100ft with clearance of L foot 100% of the time  -MS     Time Frame (Gait Training Goal 1, PT)  long term goal (LTG);by discharge  -MS     Progress/Outcome (Gait Training Goal 1, PT)  goal ongoing  -MS     Row Name 12/07/18 3292          Stairs Goal 1 (PT)    Activity/Assistive Device (Stairs Goal 1, PT)  ascending stairs;descending stairs;using handrail, right;decrease fall risk;step-to-step  -MS     Homer Level/Cues Needed (Stairs Goal 1, PT)  supervision required  -MS     Number of Stairs (Stairs Goal 1, PT)  5  -MS     Time Frame (Stairs Goal 1, PT)  long term goal (LTG);by discharge  -MS     Progress/Outcome (Stairs Goal 1, PT)  goal ongoing  -MS     Row Name 12/07/18 5588          Positioning and Restraints    Post Treatment Position  chair  -MS     In Chair  sitting;call light within reach;encouraged to call for assist;with family/caregiver;with  nsg;with brace  -MS     Row Name 12/07/18 5970          Living Environment    Home Accessibility  stairs to enter home;tub/shower is not walk in  -MS       User Key  (r) = Recorded By, (t) = Taken By, (c) = Cosigned By    Initials Name Provider Type    MS Sarah Rainey MNIO, PT, DPT, NCS Physical Therapist    Martell Garcia, RN Registered Nurse        Physical Therapy Education     Title: PT OT SLP Therapies (In Progress)     Topic: Physical Therapy (In Progress)     Point: Mobility training (Done)     Learning Progress Summary           Patient Acceptance, E, VU by MS at 12/7/2018  4:32 PM    Comment:  role of PT in his care, spinal restrictions                   Point: Precautions (Done)     Learning Progress Summary           Patient Acceptance, E, VU by MS at 12/7/2018  4:32 PM    Comment:  role of PT in his care, spinal restrictions                               User Key     Initials Effective Dates Name Provider Type Discipline    MS 06/19/18 -  Sarah Rainey, PT, DPT, NCS Physical Therapist PT              PT Recommendation and Plan  Anticipated Discharge Disposition (PT): inpatient rehabilitation facility, home with assist(depending upon progress after second sx)  Planned Therapy Interventions (PT Eval): balance training, bed mobility training, gait training, orthotic fitting/training, patient/family education, strengthening, transfer training  Therapy Frequency (PT Clinical Impression): 2 times/day  Outcome Summary/Treatment Plan (PT)  Anticipated Discharge Disposition (PT): inpatient rehabilitation facility, home with assist(depending upon progress after second sx)  Plan of Care Reviewed With: patient  Progress: improving  Outcome Summary: PT evaluation completed. The patient demonstrates weakness on the left leg and had difficulty with clearing his left foot when walking. He became very fatigued and nauseated after walking 25ft. He had to sit down to rest. The patient is aware of his spinal  restrictions and will need to use a rolling walker for now instead of his cane. He will benefit from further PT to work increase strength, endurance, and gait. The patient is having the second part of his surgery on Monday, PT will re-assess then for discharge planning.   Outcome Measures     Row Name 12/07/18 1330             How much help from another person do you currently need...    Turning from your back to your side while in flat bed without using bedrails?  3  -MS      Moving from lying on back to sitting on the side of a flat bed without bedrails?  3  -MS      Moving to and from a bed to a chair (including a wheelchair)?  3  -MS      Standing up from a chair using your arms (e.g., wheelchair, bedside chair)?  3  -MS      Climbing 3-5 steps with a railing?  1  -MS      To walk in hospital room?  3  -MS      AM-PAC 6 Clicks Score  16  -MS         Functional Assessment    Outcome Measure Options  AM-PAC 6 Clicks Basic Mobility (PT)  -MS        User Key  (r) = Recorded By, (t) = Taken By, (c) = Cosigned By    Initials Name Provider Type    MS Sarah Rainey, PT, DPT, NCS Physical Therapist         Time Calculation:   PT Charges     Row Name 12/07/18 1638             Time Calculation    Start Time  1330  -MS      Stop Time  1420  -MS      Time Calculation (min)  50 min  -MS      PT Received On  12/07/18  -MS      PT Goal Re-Cert Due Date  12/17/18  -MS        User Key  (r) = Recorded By, (t) = Taken By, (c) = Cosigned By    Initials Name Provider Type    MS Sarah Rainey, PT, DPT, NCS Physical Therapist        Therapy Suggested Charges     Code   Minutes Charges    None           Therapy Charges for Today     Code Description Service Date Service Provider Modifiers Qty    73616240703 HC PT MOBILITY CURRENT 12/7/2018 Sarah Rainey PT, DPT, NCS GP, CK 1    04725629873 HC PT MOBILITY PROJECTED 12/7/2018 Sarah Rainey PT, DPT, NCS GP, CI 1    [id number] HC PT EVAL LOW COMPLEXITY 3 12/7/2018 Redd  Sarah HERZOG PT, DPT, NCS GP 1          PT G-Codes  Outcome Measure Options: AM-PAC 6 Clicks Basic Mobility (PT)  AM-PAC 6 Clicks Score: 16  Functional Limitation: Mobility: Walking and moving around  Mobility: Walking and Moving Around Current Status (): At least 40 percent but less than 60 percent impaired, limited or restricted  Mobility: Walking and Moving Around Goal Status (): At least 1 percent but less than 20 percent impaired, limited or restricted      Sarah Rainey, PT, DPT, NCS  12/7/2018

## 2018-12-07 NOTE — OP NOTE
Chad Henriquez  12/7/2018     PREOPERATIVE DIAGNOSIS: Spinal stenosis, lumbar region, with neurogenic claudication [M48.062]     POSTOPERATIVE DIAGNOSIS: Post-Op Diagnosis Codes:     * Spinal stenosis, lumbar region, with neurogenic claudication [M48.062]     PROCEDURE PERFORMED:   1.  Anterior lumbar interbody fusion of L5-S1 with instrumentation     SURGEON: Manolo Mcleod DO   COSURGEON: Kj Falcon M.D.     ANESTHESIA: General.    PREPARATION: Routine.    STAFF: Circulator: Martell Oropeza RN  Scrub Person: Suma Melara Tiffany M  Vendor Representative: Ayana Sarkar    ESTIMATED BLOOD LOSS: 75 mL    SPECIMENS: None    COMPLICATIONS: None    INDICATIONS: Chad Henriquez is a 89 y.o. male who you are currently following for chronic back and left lower extremity pain.  He has had previous dorsal column stimulator trial with Dr. Guillory without much relief.  He has had multilevel laminectomy which improved initially, however has gradually worsened.  He has had increasing back and left leg pain.  The indications, risks, and possible complications of the procedure were explained to the patient, who voiced understanding and wished to proceed with surgery.     PROCEDURE IN DETAIL:   The patient was taken to the operating room and placed on the operating table in a supine position. After general anesthesia was obtained, the abdomen was prepped and draped in a sterile manner.  The skin was anesthetized using 20 mL of 0.5% Marcaine plain.  A transverse incision was then made in the left lower quadrant.  Careful dissection was made down through the subcutaneous tissues using the Bovie cautery to ensure hemostasis.  Any crossing veins were ligated with 3-0 silk suture and hemoclips.  The rectus fascia was identified.  It was incised with the Bovie cautery.  Kocher clamps are placed on each side of the rectus fascia and subfascial planes were established in a cephalad and caudad direction.  Once the  subfascial planes were established the attention was then turned to the left rectus muscle.  Blunt mobilization was made of the left rectus muscle including its blood supply medially and to the right.  Once it was fully mobilized the attention was then turned laterally to the peritoneal reflection.  Entrance into the retroperitoneal space was established with the use of a sponge stick and the Bovie cautery.  Continued blunt mobilization was made with my hand using a finger sweeping motion moving the peritoneal contents and sacral fat pad medially and to the right.  Once it was fully mobilized the Brau-York retractor system was set up.  The retractor blades were set in place.  The left iliac vein was then carefully dissected free and placed safely behind the retractor blade.  The sacral vessels were carefully taken down with hemoclips.  At this point full exposure was established of the L5-S1 disc space.  The next part of the case will be dictated by Dr. Falcon.  Upon completion of Dr. Falcon's part of the case the wound bed was irrigated with antibiotic saline and hemostasis was observed.  The retractor blades were carefully taken out one at a time.  The structures were then placed back in their normal anatomic positions.  The rectus fascia was then closed with a #1 PDS in a running fashion to meet in the midline.  The deep layers were closed with a 2-0 Vicryl in a running fashion.  The skin was then reapproximated using a 4-0 Monocryl in a subcuticular fashion.  The wound was then cleaned.  Sterile dressings were applied. The patient tolerated the procedure well. Sponge and needle counts were correct. The patient was then awakened and extubated in the operating room and taken to the recovery room in good condition.    Manolo Mcleod,     Date: 12/7/2018 Time: 9:16 AM

## 2018-12-08 ENCOUNTER — PREP FOR SURGERY (OUTPATIENT)
Dept: OTHER | Facility: HOSPITAL | Age: 83
End: 2018-12-08

## 2018-12-08 DIAGNOSIS — M51.37 DEGENERATION OF LUMBAR OR LUMBOSACRAL INTERVERTEBRAL DISC: Primary | ICD-10-CM

## 2018-12-08 LAB
ANION GAP SERPL CALCULATED.3IONS-SCNC: 9 MMOL/L (ref 4–13)
BASOPHILS # BLD AUTO: 0.02 10*3/MM3 (ref 0–0.2)
BASOPHILS NFR BLD AUTO: 0.2 % (ref 0–2)
BUN BLD-MCNC: 13 MG/DL (ref 5–21)
BUN/CREAT SERPL: 22.4 (ref 7–25)
CALCIUM SPEC-SCNC: 8.1 MG/DL (ref 8.4–10.4)
CHLORIDE SERPL-SCNC: 96 MMOL/L (ref 98–110)
CO2 SERPL-SCNC: 29 MMOL/L (ref 24–31)
CREAT BLD-MCNC: 0.58 MG/DL (ref 0.5–1.4)
DEPRECATED RDW RBC AUTO: 42.7 FL (ref 40–54)
EOSINOPHIL # BLD AUTO: 0.02 10*3/MM3 (ref 0–0.7)
EOSINOPHIL NFR BLD AUTO: 0.2 % (ref 0–4)
ERYTHROCYTE [DISTWIDTH] IN BLOOD BY AUTOMATED COUNT: 12.3 % (ref 12–15)
GFR SERPL CREATININE-BSD FRML MDRD: 132 ML/MIN/1.73
GLUCOSE BLD-MCNC: 107 MG/DL (ref 70–100)
HCT VFR BLD AUTO: 30.1 % (ref 40–52)
HGB BLD-MCNC: 10.4 G/DL (ref 14–18)
IMM GRANULOCYTES # BLD: 0.06 10*3/MM3 (ref 0–0.03)
IMM GRANULOCYTES NFR BLD: 0.5 % (ref 0–5)
LYMPHOCYTES # BLD AUTO: 1.1 10*3/MM3 (ref 0.72–4.86)
LYMPHOCYTES NFR BLD AUTO: 8.6 % (ref 15–45)
MCH RBC QN AUTO: 32.5 PG (ref 28–32)
MCHC RBC AUTO-ENTMCNC: 34.6 G/DL (ref 33–36)
MCV RBC AUTO: 94.1 FL (ref 82–95)
MONOCYTES # BLD AUTO: 1.71 10*3/MM3 (ref 0.19–1.3)
MONOCYTES NFR BLD AUTO: 13.3 % (ref 4–12)
NEUTROPHILS # BLD AUTO: 9.94 10*3/MM3 (ref 1.87–8.4)
NEUTROPHILS NFR BLD AUTO: 77.2 % (ref 39–78)
NRBC BLD MANUAL-RTO: 0 /100 WBC (ref 0–0)
PLATELET # BLD AUTO: 248 10*3/MM3 (ref 130–400)
PMV BLD AUTO: 9.5 FL (ref 6–12)
POTASSIUM BLD-SCNC: 4.3 MMOL/L (ref 3.5–5.3)
RBC # BLD AUTO: 3.2 10*6/MM3 (ref 4.8–5.9)
SODIUM BLD-SCNC: 134 MMOL/L (ref 135–145)
WBC NRBC COR # BLD: 12.85 10*3/MM3 (ref 4.8–10.8)

## 2018-12-08 PROCEDURE — G8987 SELF CARE CURRENT STATUS: HCPCS | Performed by: OCCUPATIONAL THERAPIST

## 2018-12-08 PROCEDURE — 94799 UNLISTED PULMONARY SVC/PX: CPT

## 2018-12-08 PROCEDURE — 97165 OT EVAL LOW COMPLEX 30 MIN: CPT | Performed by: OCCUPATIONAL THERAPIST

## 2018-12-08 PROCEDURE — 80048 BASIC METABOLIC PNL TOTAL CA: CPT | Performed by: NURSE PRACTITIONER

## 2018-12-08 PROCEDURE — 85025 COMPLETE CBC W/AUTO DIFF WBC: CPT | Performed by: NURSE PRACTITIONER

## 2018-12-08 PROCEDURE — 99024 POSTOP FOLLOW-UP VISIT: CPT | Performed by: NEUROLOGICAL SURGERY

## 2018-12-08 PROCEDURE — 94760 N-INVAS EAR/PLS OXIMETRY 1: CPT

## 2018-12-08 PROCEDURE — G8988 SELF CARE GOAL STATUS: HCPCS | Performed by: OCCUPATIONAL THERAPIST

## 2018-12-08 PROCEDURE — 97116 GAIT TRAINING THERAPY: CPT

## 2018-12-08 RX ORDER — BUPIVACAINE HCL/0.9 % NACL/PF 0.1 %
2 PLASTIC BAG, INJECTION (ML) EPIDURAL ONCE
Status: CANCELLED | OUTPATIENT
Start: 2018-12-08 | End: 2018-12-08

## 2018-12-08 RX ADMIN — OXYCODONE HYDROCHLORIDE AND ACETAMINOPHEN 1 TABLET: 7.5; 325 TABLET ORAL at 18:09

## 2018-12-08 RX ADMIN — OXYCODONE HYDROCHLORIDE AND ACETAMINOPHEN 1 TABLET: 7.5; 325 TABLET ORAL at 08:18

## 2018-12-08 RX ADMIN — FUROSEMIDE 40 MG: 40 TABLET ORAL at 08:17

## 2018-12-08 RX ADMIN — SODIUM CHLORIDE, PRESERVATIVE FREE 3 ML: 5 INJECTION INTRAVENOUS at 21:30

## 2018-12-08 RX ADMIN — FINASTERIDE 5 MG: 5 TABLET, FILM COATED ORAL at 08:17

## 2018-12-08 RX ADMIN — OXYCODONE HYDROCHLORIDE AND ACETAMINOPHEN 1 TABLET: 7.5; 325 TABLET ORAL at 22:52

## 2018-12-08 RX ADMIN — PRIMIDONE 25 MG: 50 TABLET ORAL at 21:48

## 2018-12-08 RX ADMIN — SODIUM CHLORIDE 75 ML/HR: 9 INJECTION, SOLUTION INTRAVENOUS at 04:02

## 2018-12-08 RX ADMIN — DULOXETINE HYDROCHLORIDE 60 MG: 30 CAPSULE, DELAYED RELEASE ORAL at 08:17

## 2018-12-08 NOTE — THERAPY TREATMENT NOTE
Acute Care - Physical Therapy Treatment Note  [facility]     Patient Name: Chad Henriquez  : 1929  MRN: [id number]  Today's Date: 2018  Onset of Illness/Injury or Date of Surgery: 18  Date of Referral to PT: 18  Referring Physician: Dr. Falcon     Admit Date: 2018    Visit Dx:    ICD-10-CM ICD-9-CM   1. Spinal stenosis, lumbar region, with neurogenic claudication M48.062 724.03   2. Impaired functional mobility, balance, gait, and endurance Z74.09 V49.89   3. Decreased activities of daily living (ADL) R68.89 780.99     Patient Active Problem List   Diagnosis   • BPH with urinary obstruction   • Frequency of urination   • Nocturia   • OAB (overactive bladder)   • Non-smoker   • Normal body mass index (BMI)   • Spinal stenosis, lumbar region, with neurogenic claudication   • Left leg pain   • Bilateral hip pain   • Acute left-sided low back pain with left-sided sciatica   • Essential tremor   • BMI 21.0-21.9, adult   • Spinal stenosis       Therapy Treatment    Rehabilitation Treatment Summary     Row Name 18 1445 18 0758          Treatment Time/Intention    Discipline  physical therapy assistant  -KJ  physical therapy assistant  -CW     Document Type  wound care  -KJ  therapy note (daily note)  -CW2     Subjective Information  no complaints  -KJ  complains of;pain  -CW2     Mode of Treatment  physical therapy  -KJ  physical therapy  -CW2     Patient Effort  good  -KJ  --     Existing Precautions/Restrictions  brace worn when out of bed;fall  -KJ  brace worn when out of bed;fall;spinal  -CW2     Recorded by [KJ] Luz Montalvo, PTA 18 1514 [CW] Elsie Bosch, PTA 18 0759  [CW2] Elsie Bosch, PTA 18 1001     Row Name 18 1445 18 0756          Bed Mobility Assessment/Treatment    Sidelying-Sit [place] (Bed Mobility)  verbal cues;minimum assist (75% patient effort)  -KJ  --     Comment (Bed Mobility)  --  up in chair  -CW      Recorded by [KJ] Luz Montalvo, PTA 12/08/18 1514 [CW] Elsie Bosch, PTA 12/08/18 1001     Row Name 12/08/18 1445 12/08/18 0758          Sit-Stand Transfer    Sit-Stand [place] (Transfers)  verbal cues;minimum assist (75% patient effort)  -KJ  minimum assist (75% patient effort)  -CW     Assistive Device (Sit-Stand Transfers)  walker, front-wheeled  -KJ  walker, front-wheeled  -CW     Recorded by [KJ] Luz Montalvo, PTA 12/08/18 1514 [CW] Elsie Bosch, PTA 12/08/18 1001     Row Name 12/08/18 1445 12/08/18 0758          Stand-Sit Transfer    Stand-Sit [place] (Transfers)  verbal cues;contact guard;minimum assist (75% patient effort)  -KJ  minimum assist (75% patient effort)  -CW     Assistive Device (Stand-Sit Transfers)  walker, front-wheeled  -KJ  walker, front-wheeled  -CW     Recorded by [KJ] Luz Montalvo, PTA 12/08/18 1514 [CW] Elsie Bosch, PTA 12/08/18 1001     Row Name 12/08/18 1445 12/08/18 0758          Gait/Stairs Assessment/Training    [place] Level (Gait)  minimum assist (75% patient effort)  -KJ  contact guard;minimum assist (75% patient effort);verbal cues  -CW     Assistive Device (Gait)  walker, front-wheeled  -KJ  walker, front-wheeled  -CW     Distance in Feet (Gait)  75' x 2  -KJ  75  -CW     Pattern (Gait)  step-through  -KJ  step-through  -CW     Deviations/Abnormal Patterns (Gait)  stride length decreased;gait speed decreased;coty decreased  -KJ  coty decreased  -CW     Bilateral Gait Deviations  forward flexed posture  -KJ  forward flexed posture  -CW     Recorded by [KJ] Luz Montalvo, PTA 12/08/18 1514 [CW] Elsie Bosch, PTA 12/08/18 1001     Row Name 12/08/18 1445 12/08/18 0758          Positioning and Restraints    Pre-Treatment Position  sitting in chair/recliner  -KJ  sitting in chair/recliner  -CW     Post Treatment Position  chair  -KJ  chair  -CW     In Chair  --  sitting;call light within reach;encouraged to call for  assist;with family/caregiver  -CW     Recorded by [KJ] Luz Montalvo, PTA 12/08/18 1514 [CW] Elsie Bosch, PTA 12/08/18 1001     Row Name 12/08/18 1445 12/08/18 0758          Pain Scale: Numbers Pre/Post-Treatment    Pain Scale: Numbers, Pretreatment  4/10  -KJ  4/10  -CW     Pain Location  back  -KJ  back  -CW     Pain Intervention(s)  --  Ambulation/increased activity  -CW     Recorded by [KJ] Luz Montalvo, PTA 12/08/18 1514 [CW] Elsie Bosch, PTA 12/08/18 1001     Row Name                Wound 12/07/18 1109 back incision    Wound - Properties Group Date first assessed: 12/07/18 [SWETA] Time first assessed: 1109 [SWETA] Location: back [SWETA] Type: incision [SWETA] Recorded by:  [SWETA] Martell Oropeza RN 12/07/18 1109      User Key  (r) = Recorded By, (t) = Taken By, (c) = Cosigned By    Initials Name Effective Dates Discipline     Luz Montalvo, PTA 08/02/16 -  PT    CW Elsie Bosch, PTA 06/22/15 -  PT    Martell Garcia RN 08/02/16 -  Nurse          Wound 12/07/18 1109 back incision (Active)   Dressing Appearance moist drainage 12/8/2018  8:30 AM   Drainage Characteristics/Odor sanguineous 12/8/2018  8:30 AM   Drainage Amount small 12/8/2018  8:30 AM   Dressing Care, Wound dressing reinforced 12/7/2018  8:00 PM           Physical Therapy Education     Title: PT OT SLP Therapies (In Progress)     Topic: Physical Therapy (In Progress)     Point: Mobility training (Done)     Learning Progress Summary           Patient Acceptance, E,D, VU,DU by CW at 12/8/2018 10:05 AM    Comment:  transfers, gait, plan of care, benefits of activity    Acceptance, E, VU by MS at 12/7/2018  4:32 PM    Comment:  role of PT in his care, spinal restrictions                   Point: Body mechanics (Done)     Learning Progress Summary           Patient Acceptance, E,D, VU,DU by CW at 12/8/2018 10:05 AM    Comment:  transfers, gait, plan of care, benefits of activity                   Point: Precautions (Done)      Learning Progress Summary           Patient Acceptance, DAGOBERTO HAMLIN VU, DU by  at 12/8/2018 10:05 AM    Comment:  transfers, gait, plan of care, benefits of activity    YAKELIN Pompa VU by MS at 12/7/2018  4:32 PM    Comment:  role of PT in his care, spinal restrictions                               User Key     Initials Effective Dates Name Provider Type Discipline    MS 06/19/18 -  Sarah Rainey, PT, DPT, NCS Physical Therapist PT     06/22/15 -  Elsie Bosch PTA Physical Therapy Assistant PT                PT Recommendation and Plan        Outcome Measures     Row Name 12/08/18 1025 12/08/18 1000 12/07/18 1330       How much help from another person do you currently need...    Turning from your back to your side while in flat bed without using bedrails?  --  3  -CW  3  -MS    Moving from lying on back to sitting on the side of a flat bed without bedrails?  --  3  -CW  3  -MS    Moving to and from a bed to a chair (including a wheelchair)?  --  3  -CW  3  -MS    Standing up from a chair using your arms (e.g., wheelchair, bedside chair)?  --  3  -CW  3  -MS    Climbing 3-5 steps with a railing?  --  3  -CW  1  -MS    To walk in hospital room?  --  3  -CW  3  -MS    AM-PAC 6 Clicks Score  --  18  -CW  16  -MS       How much help from another is currently needed...    Putting on and taking off regular lower body clothing?  2  -JJ  --  --    Bathing (including washing, rinsing, and drying)  3  -JJ  --  --    Toileting (which includes using toilet bed pan or urinal)  3  -JJ  --  --    Putting on and taking off regular upper body clothing  3  -JJ  --  --    Taking care of personal grooming (such as brushing teeth)  3  -JJ  --  --    Eating meals  4  -JJ  --  --    Score  18  -JJ  --  --       Functional Assessment    Outcome Measure Options  AM-PAC 6 Clicks Daily Activity (OT)  -JJ  AM-PAC 6 Clicks Basic Mobility (PT)  -CW  AM-PAC 6 Clicks Basic Mobility (PT)  -MS      User Key  (r) = Recorded By, (t) = Taken  By, (c) = Cosigned By    Initials Name Provider Type    Sarah Betancourt R, PT, DPT, NCS Physical Therapist    Elsie Buck, PTA Physical Therapy Assistant    Ayana Rizvi, OTR/L Occupational Therapist         Time Calculation:   PT Charges     Row Name 12/08/18 1514 12/08/18 1006          Time Calculation    Start Time  1330  -KJ  0758  -CW     Stop Time  1344  -KJ  0821  -CW     Time Calculation (min)  14 min  -KJ  23 min  -CW     PT Received On  12/08/18  -KJ  12/08/18  -CW     PT Goal Re-Cert Due Date  12/17/18  -KJ  12/17/18  -CW        Time Calculation- PT    Total Timed Code Minutes- PT  14 minute(s)  -KJ  23 minute(s)  -CW        Timed Charges    51303 - Gait Training Minutes   --  23  -CW       User Key  (r) = Recorded By, (t) = Taken By, (c) = Cosigned By    Initials Name Provider Type    Luz Douglass, FRANCHESKA Physical Therapy Assistant    Elsie Bcuk PTA Physical Therapy Assistant        Therapy Suggested Charges     Code   Minutes Charges    33318 (CPT®) Hc Pt Neuromusc Re Education Ea 15 Min      72775 (CPT®) Hc Pt Ther Proc Ea 15 Min      68457 (CPT®) Hc Gait Training Ea 15 Min 23 2    60416 (CPT®) Hc Pt Therapeutic Act Ea 15 Min      68718 (CPT®) Hc Pt Manual Therapy Ea 15 Min      85027 (CPT®) Hc Pt Iontophoresis Ea 15 Min      55409 (CPT®) Hc Pt Elec Stim Ea-Per 15 Min      13710 (CPT®) Hc Pt Ultrasound Ea 15 Min      16073 (CPT®) Hc Pt Self Care/Mgmt/Train Ea 15 Min      45259 (CPT®) Hc Pt Prosthetic (S) Train Initial Encounter, Each 15 Min      28744 (CPT®) Hc Pt Orthotic(S)/Prosthetic(S) Encounter, Each 15 Min      18909 (CPT®) Hc Orthotic(S) Mgmt/Train Initial Encounter, Each 15min      Total  23 2        Therapy Charges for Today     Code Description Service Date Service Provider Modifiers Qty    30291526377 HC GAIT TRAINING EA 15 MIN 12/8/2018 Luz Montalvo, PTA GP, KX 1          PT G-Codes  Outcome Measure Options: AM-PAC 6 Clicks Daily Activity  (OT)  AM-PAC 6 Clicks Score: 18  Score: 18  Functional Limitation: Mobility: Walking and moving around  Mobility: Walking and Moving Around Current Status (): At least 40 percent but less than 60 percent impaired, limited or restricted  Mobility: Walking and Moving Around Goal Status (): At least 1 percent but less than 20 percent impaired, limited or restricted    Luz Montalvo, PTA  12/8/2018

## 2018-12-08 NOTE — PROGRESS NOTES
Chad Henriquez  89 y.o.      Chief complaint:   Back pain    Subjective  Patient with uncontrolled back pain this morning.  Tolerating all by mouth.    Temp:  [97.4 °F (36.3 °C)-98.6 °F (37 °C)] 97.6 °F (36.4 °C)  Heart Rate:  [59-89] 89  Resp:  [12-18] 18  BP: (122-183)/(47-67) 122/47  Arterial Line BP: (181-189)/(48-51) 189/49      Objective    Neurologic Exam     Mental Status   Oriented to person, place, and time.   Speech: speech is normal   Level of consciousness: alert  Knowledge: good.     Cranial Nerves   Cranial nerves II through XII intact.     Motor Exam   Muscle bulk: normal  Overall muscle tone: normal  Right arm pronator drift: absent  Left arm pronator drift: absent    Strength   Strength 5/5 throughout.     Sensory Exam   Light touch normal.   Pinprick normal.     Gait, Coordination, and Reflexes     Gait  Gait: normal    Reflexes   Reflexes 2+ except as noted.         Spinal stenosis, lumbar region, with neurogenic claudication    Spinal stenosis      Lab Results (last 24 hours)     Procedure Component Value Units Date/Time    Basic Metabolic Panel [[id number]]  (Abnormal) Collected:  12/08/18 0437    Specimen:  Blood Updated:  12/08/18 0529     Glucose 107 mg/dL      BUN 13 mg/dL      Creatinine 0.58 mg/dL      Sodium 134 mmol/L      Potassium 4.3 mmol/L      Chloride 96 mmol/L      CO2 29.0 mmol/L      Calcium 8.1 mg/dL      eGFR Non African Amer 132 mL/min/1.73      BUN/Creatinine Ratio 22.4     Anion Gap 9.0 mmol/L     Narrative:       The MDRD GFR formula is only valid for adults with stable renal function between ages 18 and 70.    CBC & Differential [[id number]] Collected:  12/08/18 0437    Specimen:  Blood Updated:  12/08/18 0516    Narrative:       The following orders were created for panel order CBC & Differential.  Procedure                               Abnormality         Status                     ---------                               -----------         ------                      CBC Auto Differential[[id number]]        Abnormal            Final result                 Please view results for these tests on the individual orders.    CBC Auto Differential [[id number]]  (Abnormal) Collected:  12/08/18 0437    Specimen:  Blood Updated:  12/08/18 0516     WBC 12.85 10*3/mm3      RBC 3.20 10*6/mm3      Hemoglobin 10.4 g/dL      Hematocrit 30.1 %      MCV 94.1 fL      MCH 32.5 pg      MCHC 34.6 g/dL      RDW 12.3 %      RDW-SD 42.7 fl      MPV 9.5 fL      Platelets 248 10*3/mm3      Neutrophil % 77.2 %      Lymphocyte % 8.6 %      Monocyte % 13.3 %      Eosinophil % 0.2 %      Basophil % 0.2 %      Immature Grans % 0.5 %      Neutrophils, Absolute 9.94 10*3/mm3      Lymphocytes, Absolute 1.10 10*3/mm3      Monocytes, Absolute 1.71 10*3/mm3      Eosinophils, Absolute 0.02 10*3/mm3      Basophils, Absolute 0.02 10*3/mm3      Immature Grans, Absolute 0.06 10*3/mm3      nRBC 0.0 /100 WBC     Tissue Pathology Exam [[id number]] Collected:  12/07/18 0901    Specimen:  Disc from Spine, Lumbar Updated:  12/07/18 1238              Plan:   Anterior lumbar fusion.  Plan to level upper lumbar fusion on Monday.  Mobilize with physical therapy.    Kj Falcon MD

## 2018-12-08 NOTE — PLAN OF CARE
Problem: Patient Care Overview  Goal: Plan of Care Review  Outcome: Ongoing (interventions implemented as appropriate)   12/08/18 4781   Coping/Psychosocial   Plan of Care Reviewed With patient   Plan of Care Review   Progress improving   OTHER   Outcome Summary Up with back brace and doing well. Using PO medication for pain control. Safety maintained. Continue to monitor.        Problem: Fall Risk (Adult)  Goal: Absence of Fall  Outcome: Ongoing (interventions implemented as appropriate)      Problem: Laminectomy/Foraminotomy/Discectomy (Adult)  Goal: Signs and Symptoms of Listed Potential Problems Will be Absent, Minimized or Managed (Laminectomy/Foraminotomy/Discectomy)  Outcome: Ongoing (interventions implemented as appropriate)

## 2018-12-08 NOTE — PLAN OF CARE
Problem: Patient Care Overview  Goal: Plan of Care Review  Outcome: Ongoing (interventions implemented as appropriate)   12/08/18 0818   Coping/Psychosocial   Plan of Care Reviewed With patient   Plan of Care Review   Progress no change   OTHER   Outcome Summary patient with no c/o back pain this shift, dressing to lower back with moderate drainage, dressing reinforced around 2300, no c/o numbness/tingling, pulses palpable, SCDs in use, up with assistance to chair, falk DCd this am        Problem: Fall Risk (Adult)  Goal: Identify Related Risk Factors and Signs and Symptoms  Outcome: Ongoing (interventions implemented as appropriate)    Goal: Absence of Fall  Outcome: Ongoing (interventions implemented as appropriate)

## 2018-12-08 NOTE — THERAPY TREATMENT NOTE
Acute Care - Physical Therapy Treatment Note  [facility]     Patient Name: Chad Henriquez  : 1929  MRN: [id number]  Today's Date: 2018  Onset of Illness/Injury or Date of Surgery: 18  Date of Referral to PT: 18  Referring Physician: Dr. Falcon     Admit Date: 2018    Visit Dx:    ICD-10-CM ICD-9-CM   1. Spinal stenosis, lumbar region, with neurogenic claudication M48.062 724.03   2. Impaired functional mobility, balance, gait, and endurance Z74.09 V49.89     Patient Active Problem List   Diagnosis   • BPH with urinary obstruction   • Frequency of urination   • Nocturia   • OAB (overactive bladder)   • Non-smoker   • Normal body mass index (BMI)   • Spinal stenosis, lumbar region, with neurogenic claudication   • Left leg pain   • Bilateral hip pain   • Acute left-sided low back pain with left-sided sciatica   • Essential tremor   • BMI 21.0-21.9, adult   • Spinal stenosis       Therapy Treatment    Rehabilitation Treatment Summary     Row Name 18 0758             Treatment Time/Intention    Discipline  physical therapy assistant  -CW      Document Type  therapy note (daily note)  -CW2      Subjective Information  complains of;pain  -CW2      Mode of Treatment  physical therapy  -CW2      Existing Precautions/Restrictions  brace worn when out of bed;fall;spinal  -CW2      Recorded by [CW] Elsie Bosch, PTA 18 0759  [CW2] Elsie Bosch, PTA 18 1001      Row Name 18 0758             Bed Mobility Assessment/Treatment    Comment (Bed Mobility)  up in chair  -CW      Recorded by [CW] Elsie Bosch, PTA 18 1001      Row Name 18 0758             Sit-Stand Transfer    Sit-Stand [place] (Transfers)  minimum assist (75% patient effort)  -CW      Assistive Device (Sit-Stand Transfers)  walker, front-wheeled  -CW      Recorded by [CW] Elsie Bosch PTA 18 1001      Row Name 18 0758             Stand-Sit Transfer     Stand-Sit [place] (Transfers)  minimum assist (75% patient effort)  -CW      Assistive Device (Stand-Sit Transfers)  walker, front-wheeled  -CW      Recorded by [CW] Elsie Bosch PTA 12/08/18 1001      Row Name 12/08/18 0758             Gait/Stairs Assessment/Training    [place] Level (Gait)  contact guard;minimum assist (75% patient effort);verbal cues  -CW      Assistive Device (Gait)  walker, front-wheeled  -CW      Distance in Feet (Gait)  75  -CW      Pattern (Gait)  step-through  -CW      Deviations/Abnormal Patterns (Gait)  coty decreased  -CW      Bilateral Gait Deviations  forward flexed posture  -CW      Recorded by [CW] Elsie Bosch PTA 12/08/18 1001      Row Name 12/08/18 0758             Positioning and Restraints    Pre-Treatment Position  sitting in chair/recliner  -CW      Post Treatment Position  chair  -CW      In Chair  sitting;call light within reach;encouraged to call for assist;with family/caregiver  -CW      Recorded by [CW] Elsie Bosch PTA 12/08/18 1001      Row Name 12/08/18 0758             Pain Scale: Numbers Pre/Post-Treatment    Pain Scale: Numbers, Pretreatment  4/10  -CW      Pain Location  back  -CW      Pain Intervention(s)  Ambulation/increased activity  -CW      Recorded by [CW] Elsie Bosch PTA 12/08/18 1001      Row Name                Wound 12/07/18 1109 back incision    Wound - Properties Group Date first assessed: 12/07/18 [SWETA] Time first assessed: 1109 [SWETA] Location: back [SWETA] Type: incision [SWETA] Recorded by:  [SWETA] Martell Oropeza RN 12/07/18 1109      User Key  (r) = Recorded By, (t) = Taken By, (c) = Cosigned By    Initials Name Effective Dates Discipline    CW Elsie Bosch PTA 06/22/15 -  PT    Martell Garcia RN 08/02/16 -  Nurse          Wound 12/07/18 1109 back incision (Active)   Dressing Appearance moist drainage 12/8/2018  8:30 AM   Drainage Characteristics/Odor sanguineous 12/8/2018  8:30 AM   Drainage Amount  small 12/8/2018  8:30 AM   Dressing Care, Wound dressing reinforced 12/7/2018  8:00 PM           Physical Therapy Education     Title: PT OT SLP Therapies (In Progress)     Topic: Physical Therapy (In Progress)     Point: Mobility training (Done)     Learning Progress Summary           Patient Acceptance, E,D, VU,DU by CW at 12/8/2018 10:05 AM    Comment:  transfers, gait, plan of care, benefits of activity    Acceptance, E, VU by MS at 12/7/2018  4:32 PM    Comment:  role of PT in his care, spinal restrictions                   Point: Body mechanics (Done)     Learning Progress Summary           Patient Acceptance, E,D, VU,DU by CW at 12/8/2018 10:05 AM    Comment:  transfers, gait, plan of care, benefits of activity                   Point: Precautions (Done)     Learning Progress Summary           Patient Acceptance, E,D, VU,DU by CW at 12/8/2018 10:05 AM    Comment:  transfers, gait, plan of care, benefits of activity    Acceptance, E, VU by MS at 12/7/2018  4:32 PM    Comment:  role of PT in his care, spinal restrictions                               User Key     Initials Effective Dates Name Provider Type Discipline    MS 06/19/18 -  Sarah Rainey, PT, DPT, NCS Physical Therapist PT     06/22/15 -  Elsie Bosch PTA Physical Therapy Assistant PT                PT Recommendation and Plan        Outcome Measures     Row Name 12/08/18 1000 12/07/18 1330          How much help from another person do you currently need...    Turning from your back to your side while in flat bed without using bedrails?  3  -CW  3  -MS     Moving from lying on back to sitting on the side of a flat bed without bedrails?  3  -CW  3  -MS     Moving to and from a bed to a chair (including a wheelchair)?  3  -CW  3  -MS     Standing up from a chair using your arms (e.g., wheelchair, bedside chair)?  3  -CW  3  -MS     Climbing 3-5 steps with a railing?  3  -CW  1  -MS     To walk in hospital room?  3  -CW  3  -MS     AM-PAC 6  Clicks Score  18  -CW  16  -MS        Functional Assessment    Outcome Measure Options  AM-PAC 6 Clicks Basic Mobility (PT)  -CW  AM-PAC 6 Clicks Basic Mobility (PT)  -MS       User Key  (r) = Recorded By, (t) = Taken By, (c) = Cosigned By    Initials Name Provider Type    Sarah Betancourt R, PT, DPT, NCS Physical Therapist    CW Elsie Bosch, PTA Physical Therapy Assistant         Time Calculation:   PT Charges     Row Name 12/08/18 1006             Time Calculation    Start Time  0758  -CW      Stop Time  0821  -CW      Time Calculation (min)  23 min  -CW      PT Received On  12/08/18  -CW      PT Goal Re-Cert Due Date  12/17/18  -CW         Time Calculation- PT    Total Timed Code Minutes- PT  23 minute(s)  -CW         Timed Charges    26153 - Gait Training Minutes   23  -CW        User Key  (r) = Recorded By, (t) = Taken By, (c) = Cosigned By    Initials Name Provider Type    Elsie Buck, PTA Physical Therapy Assistant        Therapy Suggested Charges     Code   Minutes Charges    62714 (CPT®) Hc Pt Neuromusc Re Education Ea 15 Min      65680 (CPT®) Hc Pt Ther Proc Ea 15 Min      26075 (CPT®) Hc Gait Training Ea 15 Min 23 2    97789 (CPT®) Hc Pt Therapeutic Act Ea 15 Min      79578 (CPT®) Hc Pt Manual Therapy Ea 15 Min      98215 (CPT®) Hc Pt Iontophoresis Ea 15 Min      30292 (CPT®) Hc Pt Elec Stim Ea-Per 15 Min      83743 (CPT®) Hc Pt Ultrasound Ea 15 Min      05193 (CPT®) Hc Pt Self Care/Mgmt/Train Ea 15 Min      46416 (CPT®) Hc Pt Prosthetic (S) Train Initial Encounter, Each 15 Min      16581 (CPT®) Hc Pt Orthotic(S)/Prosthetic(S) Encounter, Each 15 Min      05242 (CPT®) Hc Orthotic(S) Mgmt/Train Initial Encounter, Each 15min      Total  23 2        Therapy Charges for Today     Code Description Service Date Service Provider Modifiers Qty    69101488818 HC GAIT TRAINING EA 15 MIN 12/8/2018 Elsie Bosch PTA GP 2          PT G-Codes  Outcome Measure Options: AM-PAC 6 Clicks Basic  Mobility (PT)  AM-PAC 6 Clicks Score: 18  Functional Limitation: Mobility: Walking and moving around  Mobility: Walking and Moving Around Current Status (): At least 40 percent but less than 60 percent impaired, limited or restricted  Mobility: Walking and Moving Around Goal Status (): At least 1 percent but less than 20 percent impaired, limited or restricted    Elsie Bosch, PTA  12/8/2018

## 2018-12-08 NOTE — PLAN OF CARE
Problem: Patient Care Overview  Goal: Plan of Care Review  Outcome: Ongoing (interventions implemented as appropriate)   12/08/18 1028   Coping/Psychosocial   Plan of Care Reviewed With patient   Plan of Care Review   Progress improving   OTHER   Outcome Summary OT eval completed. Pt able to complete bed mobility with CGA and VC, while demoing and providing teach back of log rolling technqiues. Sit <> stand t/f from [facility] CGA and rwx. Amb from bed to hallway and back to bed with CGA and rwx. Pt c/o of LLE weakness during amb and L knee slightly buckled but pt was able to recover with Min A. Pt requires Max A for LBD due to spinal preucations. Pt would benefit from skilled OT services to increase I and safety with spinal precuations/education, adl, t/fs and mobility. Upon d/c from facility, OT recommends acute rehab vs Home with HH, pending progress.

## 2018-12-08 NOTE — PROGRESS NOTES
Discharge Planning Assessment  [facility]     Patient Name: Chad Henriquez  MRN: [id number]  Today's Date: 12/8/2018    Admit Date: 12/7/2018    Discharge Needs Assessment     Row Name 12/08/18 1628       Living Environment    Lives With  spouse    Current Living Arrangements  home/apartment/condo    Primary Care Provided by  self    Provides Primary Care For  no one    Family Caregiver if Needed  spouse    Quality of Family Relationships  helpful;involved;supportive    Able to Return to Prior Arrangements  yes       Resource/Environmental Concerns    Resource/Environmental Concerns  none    Transportation Concerns  car, none       Transition Planning    Patient/Family Anticipates Transition to  inpatient rehabilitation facility    Transportation Anticipated  family or friend will provide       Discharge Needs Assessment    Readmission Within the Last 30 Days  no previous admission in last 30 days    Concerns to be Addressed  discharge planning    Concerns Comments  PT. AND WIFE WANT [facility] AT D/C.    Equipment Currently Used at Home  cane, straight;shower chair    Equipment Needed After Discharge  none        Discharge Plan     Row Name 12/08/18 1631       Plan    Plan  [facility]; PENDING MD ORDER AND THERAPY RECOMMENDATION    Patient/Family in Agreement with Plan  yes    Plan Comments  PT. LIVES INDEPENDENTLY AT HOME WITH HIS WIFE.  HE STATES HE HAS RX INS. COVERAGE AND CAN AFFORD COPAY'S.  PT. AND WIFE STATE THAT MD ADVISED PRIOR TO SURGERY THAT HE WOULD GO TO [facility] ACUTE REH[facility] AT D/C.  WILL FOLLOW FOR SECOND SURGERY AND THERAPY RECOMMENDATIONS TO BETTER DETERMINE D/C DISPOSITION.          Destination      No service coordination in this encounter.      Durable Medical Equipment      No service coordination in this encounter.      Dialysis/Infusion      No service coordination in this encounter.      Home Medical Care      No service coordination in this encounter.      Community  Resources      No service coordination in this encounter.          Demographic Summary    No documentation.       Functional Status    No documentation.       Psychosocial    No documentation.       Abuse/Neglect    No documentation.       Legal    No documentation.       Substance Abuse    No documentation.       Patient Forms    No documentation.           CON Hernandez

## 2018-12-08 NOTE — THERAPY EVALUATION
Acute Care - Occupational Therapy Initial Evaluation  [facility]     Patient Name: Chad Henriquez  : 1929  MRN: [id number]  Today's Date: 2018  Onset of Illness/Injury or Date of Surgery: 18  Date of Referral to OT: 18  Referring Physician: Dr. Falcon     Admit Date: 2018       ICD-10-CM ICD-9-CM   1. Spinal stenosis, lumbar region, with neurogenic claudication M48.062 724.03   2. Impaired functional mobility, balance, gait, and endurance Z74.09 V49.89   3. Decreased activities of daily living (ADL) R68.89 780.99     Patient Active Problem List   Diagnosis   • BPH with urinary obstruction   • Frequency of urination   • Nocturia   • OAB (overactive bladder)   • Non-smoker   • Normal body mass index (BMI)   • Spinal stenosis, lumbar region, with neurogenic claudication   • Left leg pain   • Bilateral hip pain   • Acute left-sided low back pain with left-sided sciatica   • Essential tremor   • BMI 21.0-21.9, adult   • Spinal stenosis     Past Medical History:   Diagnosis Date   • Allergic rhinitis    • Anemia    • Arthritis    • BPH (benign prostatic hypertrophy) with urinary retention    • Cancer (CMS/HCC)     skin cancer   • Chronic back pain     RUNS DOWN BOTH LEGS   • Constipation    • Coronary artery disease    • Hard of hearing    • Heart attack (CMS/HCC)     NO MUSCLE DAMAGE - JUST OCCLUDED VALVE   • High cholesterol    • History of skin cancer     BILATERAL EARS   • History of transfusion        • Hypertension    • Inguinal hernia    • Leaky heart valve     DR CONCEPCION SAYS NOT ENOUGH TO BE OF CONCERN   • Other bursal cyst, right elbow    • PONV (postoperative nausea and vomiting)     has had scopalamine patch in past    • Sleep apnea     DOES NOT USE CPAP OR BIPAP   • Spinal stenosis of cervical region    • Spinal stenosis of lumbar region    • Tremors of nervous system    • Wears hearing aid     BILATERAL     Past Surgical History:   Procedure Laterality Date   •  APPENDECTOMY     • BACK SURGERY      X3   • CARDIAC SURGERY  08/08/1986    X1 BYPASS   • CORONARY ANGIOPLASTY WITH STENT PLACEMENT  1997    X3 STENTS   • HERNIA REPAIR Bilateral     x2          OT ASSESSMENT FLOWSHEET (last 72 hours)      Occupational Therapy Evaluation     Row Name 12/08/18 0736                   OT Evaluation Time/Intention    Subjective Information  complains of;weakness;fatigue;pain  -JJ        Document Type  evaluation  -JJ        Mode of Treatment  occupational therapy  -JJ        Comment  Eval completed at [id number]  -           General Information    Patient Profile Reviewed?  yes  -JJ        Onset of Illness/Injury or Date of Surgery  12/07/18  -J        Referring Physician  Dr. Falcon   -J        Patient Observations  alert;cooperative;agree to therapy  -JJ        Patient/Family Observations  family present in room   -JJ        General Observations of Patient  Pt sitting up in chair, alert and oriented and no apparent distress.   -JJ        Prior Level of Function  independent:;all household mobility;transfer;bed mobility;ADL's Cane for mobility   -JJ        Equipment Currently Used at Home  walker, rolling;cane, straight;bath bench  -JJ        Pertinent History of Current Functional Problem  ANTERIOR LUMBAR INTERBODY FUSION L5-S1 ANTERIOR LUMBAR INTERBODY FUSION L5-S1  -JJ        Existing Precautions/Restrictions  fall;spinal;brace worn when out of bed  -JJ        Limitations/Impairments  hearing  -JJ        Equipment Issued to Patient  gait belt;walker, front wheeled  -JJ        Risks Reviewed  patient:;LOB;nausea/vomiting;dizziness;increased discomfort;change in vital signs;increased drainage;lines disloged  -JJ        Benefits Reviewed  patient:;improve function;increase independence;increase strength;increase balance;decrease pain;decrease risk of DVT;improve skin integrity;increase knowledge  -JJ        Barriers to Rehab  physical barrier  -JJ           Relationship/Environment     Primary Source of Support/Comfort  spouse  -J        Name of Support/Comfort Primary Source  Cherri   -        Lives With  spouse  -        Family Caregiver if Needed  spouse  -           Resource/Environmental Concerns    Current Living Arrangements  home/apartment/condo  -           Home Main Entrance    Number of Stairs, Main Entrance  four  -J        Stair Railings, Main Entrance  railing on right side (ascending)  -           Cognitive Assessment/Interventions    Additional Documentation  Cognitive Assessment/Intervention (Group)  -           Cognitive Assessment/Intervention- PT/OT    Affect/Mental Status (Cognitive)  WNL  -        Orientation Status (Cognition)  oriented x 4  -        Follows Commands (Cognition)  WFL  -        Cognitive Function (Cognitive)  WFL  -        Personal Safety Interventions  fall prevention program maintained;gait belt;muscle strengthening facilitated;nonskid shoes/slippers when out of bed;supervised activity  -           Safety Issues, Functional Mobility    Impairments Affecting Function (Mobility)  balance;pain;strength  -           Bed Mobility Assessment/Treatment    Bed Mobility Assessment/Treatment  rolling left;sit-sidelying  -        Rolling Left [place] (Bed Mobility)  contact guard  -        Rolling Right [place] (Bed Mobility)  --  -        Sit-Sidelying [place] (Bed Mobility)  contact guard;verbal cues  -        Bed Mobility, Safety Issues  decreased use of legs for bridging/pushing;decreased use of arms for pushing/pulling  -        Assistive Device (Bed Mobility)  bed rails  -           Functional Mobility    Functional Mobility- Ind. Level  contact guard assist  -        Functional Mobility- Device  rolling walker  -        Functional Mobility- Comment  Amb from bed, down hallway and back to bed with CGA. c/o L knee weakness with slight buckle during amb.   -           Transfer Assessment/Treatment     Transfer Assessment/Treatment  sit-stand transfer;stand-sit transfer  -           Sit-Stand Transfer    Sit-Stand [place] (Transfers)  contact guard;verbal cues  -        Assistive Device (Sit-Stand Transfers)  walker, front-wheeled  -J           Stand-Sit Transfer    Stand-Sit [place] (Transfers)  contact guard;verbal cues  -        Assistive Device (Stand-Sit Transfers)  walker, front-wheeled  -J           ADL Assessment/Intervention    BADL Assessment/Intervention  lower body dressing;upper body dressing  -           Upper Body Dressing Assessment/Training    Upper Body Dressing [place] Level  doff;pajama/robe;don;minimum assist (75% patient effort);verbal cues LSO   -        Upper Body Dressing Position  edge of bed sitting  -           Lower Body Dressing Assessment/Training    Lower Body Dressing [place] Level  don;socks;maximum assist (25% patient effort)  -        Lower Body Dressing Position  edge of bed sitting  -           BADL Safety/Performance    Skilled BADL Treatment/Intervention  BADL process/adaptation training;cognitive/safety deficit modifications;adaptive equipment training;environmental modifications;compensatory training  -           General ROM    GENERAL ROM COMMENTS  BUE and BLE AROM WFL   -           MMT (Manual Muscle Testing)    General MMT Comments  BUE strength functionally 4/5   -           Motor Assessment/Interventions    Additional Documentation  Balance (Group)  -           Balance    Balance  static sitting balance;static standing balance;dynamic sitting balance;dynamic standing balance  -           Static Sitting Balance    Level of [place] (Unsupported Sitting, Static Balance)  independent  -        Sitting Position (Unsupported Sitting, Static Balance)  sitting on edge of bed  -           Dynamic Sitting Balance    Level of [place], Reaches Outside Midline (Sitting, Dynamic Balance)  contact guard assist  -         Sitting Position, Reaches Outside Midline (Sitting, Dynamic Balance)  sitting on edge of bed  -JJ           Static Standing Balance    Level of [place] (Supported Standing, Static Balance)  contact guard assist  -JJ        Assistive Device Utilized (Supported Standing, Static Balance)  rolling walker  -JJ           Dynamic Standing Balance    Level of [place], Reaches Outside Midline (Standing, Dynamic Balance)  contact guard assist  -JJ        Comment, Reaches Outside Midline (Standing, Dynamic Balance)  rwx  -JJ           Sensory Assessment/Intervention    Sensory General Assessment  no sensation deficits identified  -JJ           Positioning and Restraints    Pre-Treatment Position  sitting in chair/recliner  -JJ        Post Treatment Position  bed  -JJ        In Bed  fowlers;call light within reach;encouraged to call for assist;with family/caregiver;side rails up x2  -JJ           Pain Assessment    Additional Documentation  Pain Scale: Numbers Pre/Post-Treatment (Group)  -           Pain Scale: Numbers Pre/Post-Treatment    Pain Scale: Numbers, Pretreatment  2/10  -        Pain Location  back abdomen  -JJ        Pain Intervention(s)  Medication (See MAR);Repositioned;Ambulation/increased activity  -           Orthotics & Prosthetics Management    Orthosis Location  spinal orthosis  -J           Spinal Orthosis Management    Type (Spinal Orthosis)  LSO (lumbar sacral orthosis)  -J        Functional Design (Spinal Orthosis)  static orthosis  -J        Therapeutic Indications (Spinal Orthosis)  deformity prevention/reduction;pain management;post-op positioning/protection;rest/inflammation reduction;stabilization and support  -        Wearing Schedule (Spinal Orthosis)  wear when out of bed only  -JJ        Orthosis Training (Spinal Orthosis)  patient;all orthosis skills  -        Compliance/Wearing Issues (Spinal Orthosis)  patient/caregiver comprehend strategies  -J           Wound  12/07/18 1109 back incision    Wound - Properties Group Date first assessed: 12/07/18  -SWETA Time first assessed: 1109  -SWETA Location: back  -SWETA Type: incision  -SWETA       Plan of Care Review    Plan of Care Reviewed With  patient  -JJ           Clinical Impression (OT)    Date of Referral to OT  12/06/18  -JJ        OT Diagnosis  decreased adls  -JJ        Functional Level at Time of Evaluation (OT Eval)  good  -JJ        Patient/Family Goals Statement (OT Eval)  go home  -J        Criteria for Skilled Therapeutic Interventions Met (OT Eval)  yes;treatment indicated  -JJ        Rehab Potential (OT Eval)  good, to achieve stated therapy goals  -JJ        Therapy Frequency (OT Eval)  5 times/wk  -JJ        Predicted Duration of Therapy Intervention (Therapy Eval)  until d/c from facility   -J        Care Plan Review (OT)  evaluation/treatment results reviewed;care plan/treatment goals reviewed;risks/benefits reviewed;current/potential barriers reviewed;patient/other agree to care plan  -JJ        Anticipated Discharge Disposition (OT)  inpatient rehabilitation facility;home with home health  -J           Planned OT Interventions    Planned Therapy Interventions (OT Eval)  activity tolerance training;adaptive equipment training;BADL retraining;functional balance retraining;occupation/activity based interventions;patient/caregiver education/training;orthotic fabrication/fitting/training  -JJ           OT Goals    Dressing Goal Selection (OT)  dressing, OT goal 1  -JJ           Dressing Goal 1 (OT)    Activity/Assistive Device (Dressing Goal 1, OT)  lower body dressing  -JJ        [place]/Cues Needed (Dressing Goal 1, OT)  minimum assist (75% or more patient effort) DME PRN   -JJ        Time Frame (Dressing Goal 1, OT)  long term goal (LTG);by discharge  -JJ        Progress/Outcome (Dressing Goal 1, OT)  goal ongoing  -JJ           Patient Education Goal (OT)    Activity (Patient Education Goal, OT)  LSO wear  schedule/fitting/mgt, spinal precuations, adl modifications, adaptive equipment use, home/enviromental modifications.   -JJ        [place]/Cues/Accuracy (Memory Goal 2, OT)  independent;verbalizes understanding  -JJ        Time Frame (Patient Education Goal, OT)  long term goal (LTG);by discharge  -JJ        Progress/Outcome (Patient Education Goal, OT)  goal ongoing  -JJ           Living Environment    Home Accessibility  stairs to enter home;tub/shower is not walk in  -          User Key  (r) = Recorded By, (t) = Taken By, (c) = Cosigned By    Initials Name Effective Dates    Martell Garcia RN 08/02/16 -     JJ Ayana Mcintyre OTR/L 10/12/18 -          Occupational Therapy Education     Title: PT OT SLP Therapies (In Progress)     Topic: Occupational Therapy (In Progress)     Point: ADL training (Done)     Description: Instruct learner(s) on proper safety adaptation and remediation techniques during self care or transfers.   Instruct in proper use of assistive devices.    Learning Progress Summary           Patient Acceptance, E, VU,NR by ANA at 12/8/2018 10:27 AM    Comment:  Pt educated on the benefits and role of OT, POC, adl modifications, LSO wear schedule/fitting/mgt, spinal precuations, adaptive equipment use, home/environmental modifications.                   Point: Precautions (Done)     Description: Instruct learner(s) on prescribed precautions during self-care and functional transfers.    Learning Progress Summary           Patient Acceptance, E, VU,NR by ANA at 12/8/2018 10:27 AM    Comment:  Pt educated on the benefits and role of OT, POC, adl modifications, LSO wear schedule/fitting/mgt, spinal precuations, adaptive equipment use, home/environmental modifications.                   Point: Body mechanics (Done)     Description: Instruct learner(s) on proper positioning and spine alignment during self-care, functional mobility activities and/or exercises.    Learning Progress Summary            Patient Acceptance, E, VU,NR by ANA at 12/8/2018 10:27 AM    Comment:  Pt educated on the benefits and role of OT, POC, adl modifications, LSO wear schedule/fitting/mgt, spinal precuations, adaptive equipment use, home/environmental modifications.                               User Key     Initials Effective Dates Name Provider Type Discipline    ANA 10/12/18 -  Ayana Mcintyre, OTR/L Occupational Therapist OT                  OT Recommendation and Plan  Outcome Summary/Treatment Plan (OT)  Anticipated Discharge Disposition (OT): inpatient rehabilitation facility, home with home health  Planned Therapy Interventions (OT Eval): activity tolerance training, adaptive equipment training, BADL retraining, functional balance retraining, occupation/activity based interventions, patient/caregiver education/training, orthotic fabrication/fitting/training  Therapy Frequency (OT Eval): 5 times/wk  Plan of Care Review  Plan of Care Reviewed With: patient  Plan of Care Reviewed With: patient  Outcome Summary: OT eval completed. Pt able to complete bed mobility with CGA and VC, while demoing and providing teach back of log rolling technqiues. Sit <> stand t/f from BCS CGA and rwx. Amb from bed to hallway and back to bed with CGA and rwx. Pt c/o of LLE weakness during amb and L knee slightly buckled but pt was able to recover with Min A. Pt requires Max A for LBD due to spinal preucations. Pt would benefit from skilled OT services to increase I and safety with spinal precuations/education, adl, t/fs and mobility. Upon d/c from facility, OT recommends acute rehab vs Home with HH, pending progress.     Outcome Measures     Row Name 12/08/18 1025 12/08/18 1000 12/07/18 1330       How much help from another person do you currently need...    Turning from your back to your side while in flat bed without using bedrails?  --  3  -CW  3  -MS    Moving from lying on back to sitting on the side of a flat bed without bedrails?  --  3   -CW  3  -MS    Moving to and from a bed to a chair (including a wheelchair)?  --  3  -CW  3  -MS    Standing up from a chair using your arms (e.g., wheelchair, bedside chair)?  --  3  -CW  3  -MS    Climbing 3-5 steps with a railing?  --  3  -CW  1  -MS    To walk in hospital room?  --  3  -CW  3  -MS    AM-PAC 6 Clicks Score  --  18  -CW  16  -MS       How much help from another is currently needed...    Putting on and taking off regular lower body clothing?  2  -JJ  --  --    Bathing (including washing, rinsing, and drying)  3  -JJ  --  --    Toileting (which includes using toilet bed pan or urinal)  3  -JJ  --  --    Putting on and taking off regular upper body clothing  3  -JJ  --  --    Taking care of personal grooming (such as brushing teeth)  3  -JJ  --  --    Eating meals  4  -JJ  --  --    Score  18  -JJ  --  --       Functional Assessment    Outcome Measure Options  AM-PAC 6 Clicks Daily Activity (OT)  -JJ  AM-PAC 6 Clicks Basic Mobility (PT)  -CW  AM-PAC 6 Clicks Basic Mobility (PT)  -MS      User Key  (r) = Recorded By, (t) = Taken By, (c) = Cosigned By    Initials Name Provider Type    Sarah Betancourt MINO, PT, DPT, NCS Physical Therapist    Elsie Buck PTA Physical Therapy Assistant    Ayana Rizvi, DARRIUSR/L Occupational Therapist          Time Calculation:   Time Calculation- OT     Row Name 12/08/18 1026 12/08/18 1006          Time Calculation- OT    OT Start Time  0924 add 10 minutes for chart review   -  --     OT Stop Time  1008  -J  --     OT Time Calculation (min)  44 min  -  --     OT Received On  12/08/18  -J  --     OT Goal Re-Cert Due Date  12/18/18  -JCHAPINCITO  --        Timed Charges    83662 - Gait Training Minutes   --  23  -CW       User Key  (r) = Recorded By, (t) = Taken By, (c) = Cosigned By    Initials Name Provider Type    Elsie Buck PTA Physical Therapy Assistant    Ayana Rizvi, OTR/L Occupational Therapist        Therapy Suggested Charges      Code   Minutes Charges    None           Therapy Charges for Today     Code Description Service Date Service Provider Modifiers Qty    96107665309 HC OT SELFCARE CURRENT 12/8/2018 Ayana Mcintyre OTR/L CLAUDINE, CK 1    20021480259 HC OT SELFCARE PROJECTED 12/8/2018 Ayana Mcintyre OTR/L CLAUDINE, CJ 1    [id number]  OT EVAL LOW COMPLEXITY 4 12/8/2018 Ayana Mcintyre OTR/L GO, KX 1          OT G-codes  OT Professional Judgement Used?: Yes  OT Functional Scales Options: AM-PAC 6 Clicks Daily Activity (OT)  Score: 18  Functional Limitation: Self care  Self Care Current Status (): At least 40 percent but less than 60 percent impaired, limited or restricted  Self Care Goal Status (): At least 20 percent but less than 40 percent impaired, limited or restricted    ELENA Evans/JAH  12/8/2018

## 2018-12-09 PROCEDURE — 94760 N-INVAS EAR/PLS OXIMETRY 1: CPT

## 2018-12-09 PROCEDURE — 99024 POSTOP FOLLOW-UP VISIT: CPT | Performed by: NEUROLOGICAL SURGERY

## 2018-12-09 PROCEDURE — 94799 UNLISTED PULMONARY SVC/PX: CPT

## 2018-12-09 PROCEDURE — 97116 GAIT TRAINING THERAPY: CPT

## 2018-12-09 RX ADMIN — PRIMIDONE 25 MG: 50 TABLET ORAL at 20:50

## 2018-12-09 RX ADMIN — FINASTERIDE 5 MG: 5 TABLET, FILM COATED ORAL at 08:26

## 2018-12-09 RX ADMIN — OXYCODONE HYDROCHLORIDE AND ACETAMINOPHEN 1 TABLET: 7.5; 325 TABLET ORAL at 06:37

## 2018-12-09 RX ADMIN — BISACODYL 10 MG: 10 SUPPOSITORY RECTAL at 15:14

## 2018-12-09 RX ADMIN — DULOXETINE HYDROCHLORIDE 60 MG: 30 CAPSULE, DELAYED RELEASE ORAL at 08:26

## 2018-12-09 RX ADMIN — SODIUM CHLORIDE, PRESERVATIVE FREE 3 ML: 5 INJECTION INTRAVENOUS at 08:26

## 2018-12-09 RX ADMIN — OXYCODONE HYDROCHLORIDE AND ACETAMINOPHEN 1 TABLET: 7.5; 325 TABLET ORAL at 20:50

## 2018-12-09 RX ADMIN — SODIUM CHLORIDE, PRESERVATIVE FREE 3 ML: 5 INJECTION INTRAVENOUS at 20:50

## 2018-12-09 RX ADMIN — METOPROLOL SUCCINATE 25 MG: 25 TABLET, FILM COATED, EXTENDED RELEASE ORAL at 20:50

## 2018-12-09 NOTE — PROGRESS NOTES
Chad Henriquez  89 y.o.      Chief complaint:   Back pain     Subjective  No events, pain controlled, ambulating with PT    Temp:  [97.7 °F (36.5 °C)-98.7 °F (37.1 °C)] 97.7 °F (36.5 °C)  Heart Rate:  [] 82  Resp:  [16-18] 16  BP: (110-155)/(48-56) 138/56      Objective    Neurologic Exam     Mental Status   Oriented to person, place, and time.   Attention: normal.   Speech: speech is normal   Level of consciousness: alert  Knowledge: good.     Cranial Nerves   Cranial nerves II through XII intact.     Motor Exam   Muscle bulk: normal  Overall muscle tone: normal  Right arm pronator drift: absent  Left arm pronator drift: absent    Strength   Strength 5/5 throughout.     Sensory Exam   Light touch normal.   Pinprick normal.     Gait, Coordination, and Reflexes     Gait  Gait: normal    Coordination   Finger to nose coordination: normal    Tremor   Resting tremor: absent  Intention tremor: absent  Action tremor: absent    Reflexes   Reflexes 2+ except as noted.         Spinal stenosis, lumbar region, with neurogenic claudication    Spinal stenosis      Lab Results (last 24 hours)     ** No results found for the last 24 hours. **              Plan:   Anterior lumbar fusion.  Plan L3-4, 4-5 transforaminal lumbar interbody fusion tomorrow morning.    Kj Falcon MD

## 2018-12-09 NOTE — PLAN OF CARE
Problem: Patient Care Overview  Goal: Plan of Care Review  Outcome: Ongoing (interventions implemented as appropriate)   12/09/18 3063   Coping/Psychosocial   Plan of Care Reviewed With patient;family   Plan of Care Review   Progress improving   OTHER   Outcome Summary Doing very well with ambulation and mobility. Safety maintained. NPO after midnight for surgery tomorrow.        Problem: Fall Risk (Adult)  Goal: Absence of Fall  Outcome: Ongoing (interventions implemented as appropriate)      Problem: Laminectomy/Foraminotomy/Discectomy (Adult)  Goal: Signs and Symptoms of Listed Potential Problems Will be Absent, Minimized or Managed (Laminectomy/Foraminotomy/Discectomy)  Outcome: Ongoing (interventions implemented as appropriate)

## 2018-12-09 NOTE — THERAPY TREATMENT NOTE
Acute Care - Physical Therapy Treatment Note  [facility]     Patient Name: Chad Henriquez  : 1929  MRN: [id number]  Today's Date: 2018  Onset of Illness/Injury or Date of Surgery: 18  Date of Referral to PT: 18  Referring Physician: Dr. Falcon     Admit Date: 2018    Visit Dx:    ICD-10-CM ICD-9-CM   1. Spinal stenosis, lumbar region, with neurogenic claudication M48.062 724.03   2. Impaired functional mobility, balance, gait, and endurance Z74.09 V49.89   3. Decreased activities of daily living (ADL) R68.89 780.99     Patient Active Problem List   Diagnosis   • BPH with urinary obstruction   • Frequency of urination   • Nocturia   • OAB (overactive bladder)   • Non-smoker   • Normal body mass index (BMI)   • Spinal stenosis, lumbar region, with neurogenic claudication   • Left leg pain   • Bilateral hip pain   • Acute left-sided low back pain with left-sided sciatica   • Essential tremor   • BMI 21.0-21.9, adult   • Spinal stenosis       Therapy Treatment    Rehabilitation Treatment Summary     Row Name 18 1415 18 09          Treatment Time/Intention    Discipline  physical therapy assistant  -KJ  physical therapy assistant  -CW     Document Type  therapy note (daily note)  -KJ2  therapy note (daily note)  -CW     Subjective Information  no complaints  -KJ2  complains of;pain  -CW     Mode of Treatment  physical therapy  -KJ2  physical therapy  -CW     Patient Effort  good  -KJ2  --     Existing Precautions/Restrictions  brace worn when out of bed;fall  -KJ2  brace worn when out of bed;fall;spinal  -CW     Recorded by [KJ] Luz Montalvo, PTA 18 1426  [KJ2] Luz Montalvo, PTA 18 1437 [CW] Elsie Bosch, PTA 18 0939     Row Name 18 1415 18 09          Bed Mobility Assessment/Treatment    Sidelying-Sit [place] (Bed Mobility)  verbal cues;minimum assist (75% patient effort)  -KJ  --     Sit-Sidelying [place] (Bed Mobility)   verbal cues;contact guard  -KJ  --     Comment (Bed Mobility)  --  up in chair  -CW     Recorded by [KJ] Luz Montalvo, PTA 12/09/18 1437 [CW] Elsie Bosch, PTA 12/09/18 0939     Row Name 12/09/18 1415 12/09/18 0900          Sit-Stand Transfer    Sit-Stand [place] (Transfers)  verbal cues;minimum assist (75% patient effort)  -KJ  minimum assist (75% patient effort)  -CW     Assistive Device (Sit-Stand Transfers)  walker, front-wheeled  -KJ  walker, front-wheeled  -CW     Recorded by [KJ] Luz Montalvo, PTA 12/09/18 1437 [CW] Elsie Bosch, PTA 12/09/18 0939     Row Name 12/09/18 1415 12/09/18 0900          Stand-Sit Transfer    Stand-Sit [place] (Transfers)  verbal cues;contact guard;minimum assist (75% patient effort)  -KJ  contact guard;verbal cues  -CW     Assistive Device (Stand-Sit Transfers)  walker, front-wheeled  -KJ  walker, front-wheeled  -CW     Recorded by [KJ] Luz Montalvo, PTA 12/09/18 1437 [CW] Elsie Bosch, PTA 12/09/18 0939     Row Name 12/09/18 1415 12/09/18 0900          Gait/Stairs Assessment/Training    [place] Level (Gait)  minimum assist (75% patient effort)  -KJ  contact guard  -CW     Assistive Device (Gait)  walker, front-wheeled  -KJ  walker, front-wheeled  -CW     Distance in Feet (Gait)  85' x 2  -KJ  75 x 4 with standing rests  -CW     Pattern (Gait)  step-through  -KJ  step-through  -CW     Deviations/Abnormal Patterns (Gait)  stride length decreased;gait speed decreased;coty decreased  -KJ  coty decreased  -CW     Bilateral Gait Deviations  forward flexed posture  -KJ  forward flexed posture  -CW     Recorded by [KJ] Luz Montalvo, PTA 12/09/18 1437 [CW] Elsie Bosch, PTA 12/09/18 0939     Row Name 12/09/18 1415 12/09/18 0900          Positioning and Restraints    Pre-Treatment Position  in bed  -KJ  sitting in chair/recliner  -CW     Post Treatment Position  bed  -KJ  chair  -CW     In Chair  --  sitting;call light within  reach;encouraged to call for assist;with family/caregiver  -CW     Recorded by [KJ] Luz Montalvo, PTA 12/09/18 1437 [CW] Elsie Bosch, PTA 12/09/18 0939     Row Name 12/09/18 1415 12/09/18 0900          Pain Scale: Numbers Pre/Post-Treatment    Pain Scale: Numbers, Pretreatment  4/10  -KJ  1/10  -CW     Pain Location  back  -KJ  back  -CW     Pain Intervention(s)  --  Ambulation/increased activity  -CW     Recorded by [KJ] Luz Montalvo, PTA 12/09/18 1437 [CW] Elsie Bosch, PTA 12/09/18 0939     Row Name                Wound 12/07/18 1109 back incision    Wound - Properties Group Date first assessed: 12/07/18 [SWETA] Time first assessed: 1109 [SWETA] Location: back [SWETA] Type: incision [SWETA] Recorded by:  [SWETA] Martell Oropeza RN 12/07/18 1109    Row Name                [REMOVED] Wound 03/12/18 1124 posterior back incision    Wound - Properties Group Date first assessed: 03/12/18 [SWETA] Time first assessed: 1124 [SWETA] Present On Admission : no [SWETA] Orientation: posterior [SWETA] Location: back [SWETA] Type: incision [SWETA] Additional Comments: telfa, tape [SWETA] Resolution Date: 12/09/18 [SF] Resolution Time: 0632 [SF] Recorded by:  [SWETA] Martell Oropeza RN 03/12/18 1125 [SF] Margo Mast RN 12/09/18 0632    Row Name                [REMOVED] Wound 03/12/18 1259 back incision    Wound - Properties Group Date first assessed: 03/12/18 [SWETA] Time first assessed: 1259 [SWETA] Location: back [SWETA] Type: incision [SWETA] Resolution Date: 12/09/18 [SF] Resolution Time: 0632 [SF] Recorded by:  [SWETA] Martell Oropeza RN 03/12/18 1259 [SF] Margo Mast RN 12/09/18 0632      User Key  (r) = Recorded By, (t) = Taken By, (c) = Cosigned By    Initials Name Effective Dates Discipline    KJ Lzu Montalvo, PTA 08/02/16 -  PT    CW Elsie Bosch PTA 06/22/15 -  PT    SF Margo Mast RN 08/02/16 -  Nurse    SWETA Martell Oropeza RN 08/02/16 -  Nurse          Wound 12/07/18 1109 back incision (Active)   Dressing Appearance  intact 12/9/2018  7:53 AM   Drainage Characteristics/Odor sanguineous 12/8/2018  8:00 PM   Drainage Amount small 12/8/2018  8:00 PM           Physical Therapy Education     Title: PT OT SLP Therapies (In Progress)     Topic: Physical Therapy (In Progress)     Point: Mobility training (Done)     Learning Progress Summary           Patient Acceptance, E, VU,NR by CW at 12/9/2018  9:39 AM    Comment:  transfers, gait, body mechanics, plan of care    Acceptance, E,D, VU,DU by CW at 12/8/2018 10:05 AM    Comment:  transfers, gait, plan of care, benefits of activity    Acceptance, E, VU by MS at 12/7/2018  4:32 PM    Comment:  role of PT in his care, spinal restrictions                   Point: Body mechanics (Done)     Learning Progress Summary           Patient Acceptance, E, VU,NR by CW at 12/9/2018  9:39 AM    Comment:  transfers, gait, body mechanics, plan of care    Acceptance, E,D, VU,DU by CW at 12/8/2018 10:05 AM    Comment:  transfers, gait, plan of care, benefits of activity                   Point: Precautions (Done)     Learning Progress Summary           Patient Acceptance, E, VU,NR by CW at 12/9/2018  9:39 AM    Comment:  transfers, gait, body mechanics, plan of care    Acceptance, E,D, VU,DU by CW at 12/8/2018 10:05 AM    Comment:  transfers, gait, plan of care, benefits of activity    Acceptance, E, VU by MS at 12/7/2018  4:32 PM    Comment:  role of PT in his care, spinal restrictions                               User Key     Initials Effective Dates Name Provider Type Discipline    MS 06/19/18 -  Sarah Rainey, PT, DPT, NCS Physical Therapist PT    CW 06/22/15 -  Elsie Bosch PTA Physical Therapy Assistant PT                PT Recommendation and Plan        Outcome Measures     Row Name 12/09/18 0900 12/08/18 1025 12/08/18 1000       How much help from another person do you currently need...    Turning from your back to your side while in flat bed without using bedrails?  3  -CW  --  3  -CW     Moving from lying on back to sitting on the side of a flat bed without bedrails?  3  -CW  --  3  -CW    Moving to and from a bed to a chair (including a wheelchair)?  3  -CW  --  3  -CW    Standing up from a chair using your arms (e.g., wheelchair, bedside chair)?  3  -CW  --  3  -CW    Climbing 3-5 steps with a railing?  3  -CW  --  3  -CW    To walk in hospital room?  3  -CW  --  3  -CW    AM-PAC 6 Clicks Score  18  -CW  --  18  -CW       How much help from another is currently needed...    Putting on and taking off regular lower body clothing?  --  2  -JJ  --    Bathing (including washing, rinsing, and drying)  --  3  -JJ  --    Toileting (which includes using toilet bed pan or urinal)  --  3  -JJ  --    Putting on and taking off regular upper body clothing  --  3  -JJ  --    Taking care of personal grooming (such as brushing teeth)  --  3  -JJ  --    Eating meals  --  4  -JJ  --    Score  --  18  -JJ  --       Functional Assessment    Outcome Measure Options  AM-PAC 6 Clicks Basic Mobility (PT)  -CW  AM-PAC 6 Clicks Daily Activity (OT)  -JJ  AM-PAC 6 Clicks Basic Mobility (PT)  -CW    Row Name 12/07/18 1330             How much help from another person do you currently need...    Turning from your back to your side while in flat bed without using bedrails?  3  -MS      Moving from lying on back to sitting on the side of a flat bed without bedrails?  3  -MS      Moving to and from a bed to a chair (including a wheelchair)?  3  -MS      Standing up from a chair using your arms (e.g., wheelchair, bedside chair)?  3  -MS      Climbing 3-5 steps with a railing?  1  -MS      To walk in hospital room?  3  -MS      AM-PAC 6 Clicks Score  16  -MS         Functional Assessment    Outcome Measure Options  AM-PAC 6 Clicks Basic Mobility (PT)  -MS        User Key  (r) = Recorded By, (t) = Taken By, (c) = Cosigned By    Initials Name Provider Type    Sarah Betancourt, PT, DPT, NCS Physical Therapist    RADHA Bosch  Elsie TYSON PTA Physical Therapy Assistant    Ayana Rizvi, OTR/L Occupational Therapist         Time Calculation:   PT Charges     Row Name 12/09/18 1438 12/09/18 0942          Time Calculation    Start Time  1415  -KJ  0900  -CW     Stop Time  1438  -KJ  0923  -CW     Time Calculation (min)  23 min  -KJ  23 min  -CW     PT Received On  12/09/18  -KJ  12/09/18  -CW     PT Goal Re-Cert Due Date  12/17/18  -KJ  12/17/18  -CW        Time Calculation- PT    Total Timed Code Minutes- PT  23 minute(s)  -KJ  23 minute(s)  -CW        Timed Charges    47420 - Gait Training Minutes   --  23  -CW       User Key  (r) = Recorded By, (t) = Taken By, (c) = Cosigned By    Initials Name Provider Type    Luz Douglass, PTA Physical Therapy Assistant    Elsie Buck PTA Physical Therapy Assistant        Therapy Suggested Charges     Code   Minutes Charges    27846 (CPT®) Hc Pt Neuromusc Re Education Ea 15 Min      36054 (CPT®) Hc Pt Ther Proc Ea 15 Min      89104 (CPT®) Hc Gait Training Ea 15 Min 23 2    53405 (CPT®) Hc Pt Therapeutic Act Ea 15 Min      18366 (CPT®) Hc Pt Manual Therapy Ea 15 Min      53826 (CPT®) Hc Pt Iontophoresis Ea 15 Min      10720 (CPT®) Hc Pt Elec Stim Ea-Per 15 Min      26108 (CPT®) Hc Pt Ultrasound Ea 15 Min      16183 (CPT®) Hc Pt Self Care/Mgmt/Train Ea 15 Min      60351 (CPT®) Hc Pt Prosthetic (S) Train Initial Encounter, Each 15 Min      58811 (CPT®) Hc Pt Orthotic(S)/Prosthetic(S) Encounter, Each 15 Min      95727 (CPT®) Hc Orthotic(S) Mgmt/Train Initial Encounter, Each 15min      Total  23 2        Therapy Charges for Today     Code Description Service Date Service Provider Modifiers Qty    35839177474 HC GAIT TRAINING EA 15 MIN 12/8/2018 Luz Montalvo, PTA GP, KX 1    29842085918 HC GAIT TRAINING EA 15 MIN 12/9/2018 Luz Montalvo, PTA GP, KX 2          PT G-Codes  Outcome Measure Options: AM-PAC 6 Clicks Basic Mobility (PT)  AM-PAC 6 Clicks Score: 18  Score:  18  Functional Limitation: Mobility: Walking and moving around  Mobility: Walking and Moving Around Current Status (): At least 40 percent but less than 60 percent impaired, limited or restricted  Mobility: Walking and Moving Around Goal Status (): At least 1 percent but less than 20 percent impaired, limited or restricted    Luz Montalvo, PTA  12/9/2018

## 2018-12-09 NOTE — THERAPY TREATMENT NOTE
Acute Care - Physical Therapy Treatment Note  [facility]     Patient Name: Chad Henriquez  : 1929  MRN: [id number]  Today's Date: 2018  Onset of Illness/Injury or Date of Surgery: 18  Date of Referral to PT: 18  Referring Physician: Dr. Falcon     Admit Date: 2018    Visit Dx:    ICD-10-CM ICD-9-CM   1. Spinal stenosis, lumbar region, with neurogenic claudication M48.062 724.03   2. Impaired functional mobility, balance, gait, and endurance Z74.09 V49.89   3. Decreased activities of daily living (ADL) R68.89 780.99     Patient Active Problem List   Diagnosis   • BPH with urinary obstruction   • Frequency of urination   • Nocturia   • OAB (overactive bladder)   • Non-smoker   • Normal body mass index (BMI)   • Spinal stenosis, lumbar region, with neurogenic claudication   • Left leg pain   • Bilateral hip pain   • Acute left-sided low back pain with left-sided sciatica   • Essential tremor   • BMI 21.0-21.9, adult   • Spinal stenosis       Therapy Treatment    Rehabilitation Treatment Summary     Row Name 18             Treatment Time/Intention    Discipline  physical therapy assistant  -CW      Document Type  therapy note (daily note)  -CW      Subjective Information  complains of;pain  -CW      Mode of Treatment  physical therapy  -CW      Existing Precautions/Restrictions  brace worn when out of bed;fall;spinal  -CW      Recorded by [CW] Elsie Bosch PTA 18      Row Name 18             Bed Mobility Assessment/Treatment    Comment (Bed Mobility)  up in chair  -CW      Recorded by [CW] Elsie Bosch PTA 18      Row Name 18             Sit-Stand Transfer    Sit-Stand [place] (Transfers)  minimum assist (75% patient effort)  -CW      Assistive Device (Sit-Stand Transfers)  walker, front-wheeled  -CW      Recorded by [CW] Elsie Bosch PTA 18      Row Name 18             Stand-Sit  Transfer    Stand-Sit [place] (Transfers)  contact guard;verbal cues  -CW      Assistive Device (Stand-Sit Transfers)  walker, front-wheeled  -CW      Recorded by [CW] Elsie Bosch, PTA 12/09/18 0939      Row Name 12/09/18 0900             Gait/Stairs Assessment/Training    [place] Level (Gait)  contact guard  -CW      Assistive Device (Gait)  walker, front-wheeled  -CW      Distance in Feet (Gait)  75 x 4 with standing rests  -CW      Pattern (Gait)  step-through  -CW      Deviations/Abnormal Patterns (Gait)  coty decreased  -CW      Bilateral Gait Deviations  forward flexed posture  -CW      Recorded by [CW] Elise Bosch, PTA 12/09/18 0939      Row Name 12/09/18 0900             Positioning and Restraints    Pre-Treatment Position  sitting in chair/recliner  -CW      Post Treatment Position  chair  -CW      In Chair  sitting;call light within reach;encouraged to call for assist;with family/caregiver  -CW      Recorded by [CW] Elsie Bosch PTA 12/09/18 0939      Row Name 12/09/18 0900             Pain Scale: Numbers Pre/Post-Treatment    Pain Scale: Numbers, Pretreatment  1/10  -CW      Pain Location  back  -CW      Pain Intervention(s)  Ambulation/increased activity  -CW      Recorded by [CW] Elsie Bosch, PTA 12/09/18 0939      Row Name                Wound 12/07/18 1109 back incision    Wound - Properties Group Date first assessed: 12/07/18 [SWETA] Time first assessed: 1109 [SWETA] Location: back [SWETA] Type: incision [SWETA] Recorded by:  [SWETA] Martell Oropeza RN 12/07/18 1109    Row Name                [REMOVED] Wound 03/12/18 1124 posterior back incision    Wound - Properties Group Date first assessed: 03/12/18 [SWETA] Time first assessed: 1124 [SWETA] Present On Admission : no [SWETA] Orientation: posterior [SWETA] Location: back [SWETA] Type: incision [SWETA] Additional Comments: telfa, tape [SWETA] Resolution Date: 12/09/18 [SF] Resolution Time: 0632 [SF] Recorded by:  [SWETA] Martell Oropeza RN  03/12/18 1125 [SF] Margo Mast RN 12/09/18 0632    Row Name                [REMOVED] Wound 03/12/18 1259 back incision    Wound - Properties Group Date first assessed: 03/12/18 [SWETA] Time first assessed: 1259 [SWETA] Location: back [SWETA] Type: incision [SWETA] Resolution Date: 12/09/18 [SF] Resolution Time: 0632 [SF] Recorded by:  [SWETA] Martell Oropeza RN 03/12/18 1259 [SF] Margo Mast RN 12/09/18 0632      User Key  (r) = Recorded By, (t) = Taken By, (c) = Cosigned By    Initials Name Effective Dates Discipline    CW Elsie Bosch PTA 06/22/15 -  PT    SF Margo Mast RN 08/02/16 -  Nurse    SWETA Martell Oropeza RN 08/02/16 -  Nurse          Wound 12/07/18 1109 back incision (Active)   Dressing Appearance intact 12/9/2018  7:53 AM   Drainage Characteristics/Odor sanguineous 12/8/2018  8:00 PM   Drainage Amount small 12/8/2018  8:00 PM           Physical Therapy Education     Title: PT OT SLP Therapies (In Progress)     Topic: Physical Therapy (In Progress)     Point: Mobility training (Done)     Learning Progress Summary           Patient Acceptance, E, VU,NR by CW at 12/9/2018  9:39 AM    Comment:  transfers, gait, body mechanics, plan of care    Acceptance, E,D, VU,DU by CW at 12/8/2018 10:05 AM    Comment:  transfers, gait, plan of care, benefits of activity    Acceptance, E, VU by MS at 12/7/2018  4:32 PM    Comment:  role of PT in his care, spinal restrictions                   Point: Body mechanics (Done)     Learning Progress Summary           Patient Acceptance, E, VU,NR by CW at 12/9/2018  9:39 AM    Comment:  transfers, gait, body mechanics, plan of care    Acceptance, E,D, VU,DU by CW at 12/8/2018 10:05 AM    Comment:  transfers, gait, plan of care, benefits of activity                   Point: Precautions (Done)     Learning Progress Summary           Patient Acceptance, E, VU,NR by CW at 12/9/2018  9:39 AM    Comment:  transfers, gait, body mechanics, plan of care    Acceptance, E,D,  STACIA DACOSTA by CW at 12/8/2018 10:05 AM    Comment:  transfers, gait, plan of care, benefits of activity    YAKELIN Pompa VU by MS at 12/7/2018  4:32 PM    Comment:  role of PT in his care, spinal restrictions                               User Key     Initials Effective Dates Name Provider Type Discipline    MS 06/19/18 -  Sarah Rainey, PT, DPT, NCS Physical Therapist PT    CW 06/22/15 -  Elsie Bosch PTA Physical Therapy Assistant PT                PT Recommendation and Plan     Plan of Care Reviewed With: patient  Progress: improving  Outcome Summary: Pt up in chair upon enterinng room.  Min assist for sit to stand transfers.  Ambulated cga with rolling walker up to 75' at a time before requiring standing rest break.  Pain rated at 1/10 low back.  Plans for second sx tomorrow morning.  Outcome Measures     Row Name 12/09/18 0900 12/08/18 1025 12/08/18 1000       How much help from another person do you currently need...    Turning from your back to your side while in flat bed without using bedrails?  3  -CW  --  3  -CW    Moving from lying on back to sitting on the side of a flat bed without bedrails?  3  -CW  --  3  -CW    Moving to and from a bed to a chair (including a wheelchair)?  3  -CW  --  3  -CW    Standing up from a chair using your arms (e.g., wheelchair, bedside chair)?  3  -CW  --  3  -CW    Climbing 3-5 steps with a railing?  3  -CW  --  3  -CW    To walk in hospital room?  3  -CW  --  3  -CW    AM-PAC 6 Clicks Score  18  -CW  --  18  -CW       How much help from another is currently needed...    Putting on and taking off regular lower body clothing?  --  2  -JJ  --    Bathing (including washing, rinsing, and drying)  --  3  -JJ  --    Toileting (which includes using toilet bed pan or urinal)  --  3  -JJ  --    Putting on and taking off regular upper body clothing  --  3  -JJ  --    Taking care of personal grooming (such as brushing teeth)  --  3  -JJ  --    Eating meals  --  4  -JJ  --     Score  --  18  -JJ  --       Functional Assessment    Outcome Measure Options  AM-PAC 6 Clicks Basic Mobility (PT)  -CW  AM-PAC 6 Clicks Daily Activity (OT)  -  AM-PAC 6 Clicks Basic Mobility (PT)  -CW    Row Name 12/07/18 1330             How much help from another person do you currently need...    Turning from your back to your side while in flat bed without using bedrails?  3  -MS      Moving from lying on back to sitting on the side of a flat bed without bedrails?  3  -MS      Moving to and from a bed to a chair (including a wheelchair)?  3  -MS      Standing up from a chair using your arms (e.g., wheelchair, bedside chair)?  3  -MS      Climbing 3-5 steps with a railing?  1  -MS      To walk in hospital room?  3  -MS      AM-PAC 6 Clicks Score  16  -MS         Functional Assessment    Outcome Measure Options  AM-PAC 6 Clicks Basic Mobility (PT)  -MS        User Key  (r) = Recorded By, (t) = Taken By, (c) = Cosigned By    Initials Name Provider Type    Sarah Betancourt, PT, DPT, NCS Physical Therapist    Elsie Buck PTA Physical Therapy Assistant    Ayana Rizvi, OTR/L Occupational Therapist         Time Calculation:   PT Charges     Row Name 12/09/18 0942             Time Calculation    Start Time  0900  -CW      Stop Time  0923  -CW      Time Calculation (min)  23 min  -CW      PT Received On  12/09/18  -CW      PT Goal Re-Cert Due Date  12/17/18  -CW         Time Calculation- PT    Total Timed Code Minutes- PT  23 minute(s)  -CW         Timed Charges    67157 - Gait Training Minutes   23  -CW        User Key  (r) = Recorded By, (t) = Taken By, (c) = Cosigned By    Initials Name Provider Type    Elsie Buck PTA Physical Therapy Assistant        Therapy Suggested Charges     Code   Minutes Charges    25382 (CPT®) Hc Pt Neuromusc Re Education Ea 15 Min      22708 (CPT®) Hc Pt Ther Proc Ea 15 Min      60805 (CPT®) Hc Gait Training Ea 15 Min 23 2    45069 (CPT®) Hc Pt  Therapeutic Act Ea 15 Min      00892 (CPT®) Hc Pt Manual Therapy Ea 15 Min      36374 (CPT®) Hc Pt Iontophoresis Ea 15 Min      80823 (CPT®) Hc Pt Elec Stim Ea-Per 15 Min      97529 (CPT®) Hc Pt Ultrasound Ea 15 Min      20718 (CPT®) Hc Pt Self Care/Mgmt/Train Ea 15 Min      03157 (CPT®) Hc Pt Prosthetic (S) Train Initial Encounter, Each 15 Min      18045 (CPT®) Hc Pt Orthotic(S)/Prosthetic(S) Encounter, Each 15 Min      00148 (CPT®) Hc Orthotic(S) Mgmt/Train Initial Encounter, Each 15min      Total  23 2        Therapy Charges for Today     Code Description Service Date Service Provider Modifiers Qty    30698416156 HC GAIT TRAINING EA 15 MIN 12/8/2018 Elsie Bosch, [place] GP 2    29203599992 HC GAIT TRAINING EA 15 MIN 12/9/2018 Elsie Bosch PTA GP, KX 2          PT G-Codes  Outcome Measure Options: AM-PAC 6 Clicks Basic Mobility (PT)  AM-PAC 6 Clicks Score: 18  Score: 18  Functional Limitation: Mobility: Walking and moving around  Mobility: Walking and Moving Around Current Status (): At least 40 percent but less than 60 percent impaired, limited or restricted  Mobility: Walking and Moving Around Goal Status (): At least 1 percent but less than 20 percent impaired, limited or restricted    Elsie Bosch PTA  12/9/2018

## 2018-12-09 NOTE — PLAN OF CARE
Problem: Patient Care Overview  Goal: Plan of Care Review  Outcome: Ongoing (interventions implemented as appropriate)   12/09/18 0927   Coping/Psychosocial   Plan of Care Reviewed With patient   Plan of Care Review   Progress improving   OTHER   Outcome Summary Pt up in chair upon entering room. Min assist for sit to stand transfers. Ambulated cga with rolling walker up to 75' at a time before requiring standing rest break. Pain rated at 1/10 low back. Plans for second sx tomorrow morning.

## 2018-12-10 ENCOUNTER — ANESTHESIA (OUTPATIENT)
Dept: PERIOP | Facility: HOSPITAL | Age: 83
End: 2018-12-10

## 2018-12-10 ENCOUNTER — ANESTHESIA EVENT (OUTPATIENT)
Dept: PERIOP | Facility: HOSPITAL | Age: 83
End: 2018-12-10

## 2018-12-10 ENCOUNTER — APPOINTMENT (OUTPATIENT)
Dept: GENERAL RADIOLOGY | Facility: HOSPITAL | Age: 83
End: 2018-12-10

## 2018-12-10 PROBLEM — M51.379 DEGENERATION OF LUMBAR OR LUMBOSACRAL INTERVERTEBRAL DISC: Status: ACTIVE | Noted: 2018-10-31

## 2018-12-10 PROBLEM — M51.37 DEGENERATION OF LUMBAR OR LUMBOSACRAL INTERVERTEBRAL DISC: Status: ACTIVE | Noted: 2018-10-31

## 2018-12-10 LAB
ABO GROUP BLD: NORMAL
BLD GP AB SCN SERPL QL: NEGATIVE
CYTO UR: NORMAL
LAB AP CASE REPORT: NORMAL
PATH REPORT.FINAL DX SPEC: NORMAL
PATH REPORT.GROSS SPEC: NORMAL
RH BLD: NEGATIVE
T&S EXPIRATION DATE: NORMAL

## 2018-12-10 PROCEDURE — 25010000002 DEXAMETHASONE PER 1 MG: Performed by: NURSE ANESTHETIST, CERTIFIED REGISTERED

## 2018-12-10 PROCEDURE — 25010000002 ONDANSETRON PER 1 MG: Performed by: NURSE ANESTHETIST, CERTIFIED REGISTERED

## 2018-12-10 PROCEDURE — C1713 ANCHOR/SCREW BN/BN,TIS/BN: HCPCS | Performed by: NEUROLOGICAL SURGERY

## 2018-12-10 PROCEDURE — 63030 LAMOT DCMPRN NRV RT 1 LMBR: CPT | Performed by: NEUROLOGICAL SURGERY

## 2018-12-10 PROCEDURE — 86901 BLOOD TYPING SEROLOGIC RH(D): CPT | Performed by: NEUROLOGICAL SURGERY

## 2018-12-10 PROCEDURE — 22612 ARTHRD PST TQ 1NTRSPC LUMBAR: CPT | Performed by: NEUROLOGICAL SURGERY

## 2018-12-10 PROCEDURE — 25010000002 ONDANSETRON PER 1 MG: Performed by: ANESTHESIOLOGY

## 2018-12-10 PROCEDURE — 63035 LAMOT DCMPRN NRV RT EA ADDL: CPT | Performed by: NEUROLOGICAL SURGERY

## 2018-12-10 PROCEDURE — 0SB20ZZ EXCISION OF LUMBAR VERTEBRAL DISC, OPEN APPROACH: ICD-10-PCS | Performed by: NEUROLOGICAL SURGERY

## 2018-12-10 PROCEDURE — 88311 DECALCIFY TISSUE: CPT | Performed by: NEUROLOGICAL SURGERY

## 2018-12-10 PROCEDURE — 99024 POSTOP FOLLOW-UP VISIT: CPT | Performed by: NEUROLOGICAL SURGERY

## 2018-12-10 PROCEDURE — 86900 BLOOD TYPING SEROLOGIC ABO: CPT | Performed by: NEUROLOGICAL SURGERY

## 2018-12-10 PROCEDURE — 25010000002 PROPOFOL 10 MG/ML EMULSION: Performed by: NURSE ANESTHETIST, CERTIFIED REGISTERED

## 2018-12-10 PROCEDURE — 25010000002 CEFAZOLIN PER 500 MG: Performed by: NEUROLOGICAL SURGERY

## 2018-12-10 PROCEDURE — 22853 INSJ BIOMECHANICAL DEVICE: CPT | Performed by: NEUROLOGICAL SURGERY

## 2018-12-10 PROCEDURE — 86850 RBC ANTIBODY SCREEN: CPT | Performed by: NEUROLOGICAL SURGERY

## 2018-12-10 PROCEDURE — 22842 INSERT SPINE FIXATION DEVICE: CPT | Performed by: NEUROLOGICAL SURGERY

## 2018-12-10 PROCEDURE — 25010000002 SUCCINYLCHOLINE PER 20 MG: Performed by: NURSE ANESTHETIST, CERTIFIED REGISTERED

## 2018-12-10 PROCEDURE — 0SG0071 FUSION OF LUMBAR VERTEBRAL JOINT WITH AUTOLOGOUS TISSUE SUBSTITUTE, POSTERIOR APPROACH, POSTERIOR COLUMN, OPEN APPROACH: ICD-10-PCS | Performed by: NEUROLOGICAL SURGERY

## 2018-12-10 PROCEDURE — 25010000002 MORPHINE PER 10 MG: Performed by: ANESTHESIOLOGY

## 2018-12-10 PROCEDURE — 76380 CAT SCAN FOLLOW-UP STUDY: CPT

## 2018-12-10 PROCEDURE — 0SG10AJ FUSION OF 2 OR MORE LUMBAR VERTEBRAL JOINTS WITH INTERBODY FUSION DEVICE, POSTERIOR APPROACH, ANTERIOR COLUMN, OPEN APPROACH: ICD-10-PCS | Performed by: NEUROLOGICAL SURGERY

## 2018-12-10 PROCEDURE — 22614 ARTHRD PST TQ 1NTRSPC EA ADD: CPT | Performed by: NEUROLOGICAL SURGERY

## 2018-12-10 PROCEDURE — 88304 TISSUE EXAM BY PATHOLOGIST: CPT | Performed by: NEUROLOGICAL SURGERY

## 2018-12-10 DEVICE — MAS 54850016545 4.75 EXT TAB CANN 6.5X45
Type: IMPLANTABLE DEVICE | Site: EPIDURAL SPACE | Status: FUNCTIONAL
Brand: CD HORIZON® SOLERA® SPINAL SYSTEM

## 2018-12-10 DEVICE — SPACER 7770828 ELEVATE STD 28X8MM
Type: IMPLANTABLE DEVICE | Site: EPIDURAL SPACE | Status: FUNCTIONAL
Brand: ELEVATE™ SPINAL SYSTEM

## 2018-12-10 DEVICE — SCRW ST SOLERA VOYAGER BRKOFF TI 4.75MM: Type: IMPLANTABLE DEVICE | Site: EPIDURAL SPACE | Status: FUNCTIONAL

## 2018-12-10 DEVICE — HEMO ABS GELFOAM SPNG PORCN SZ100: Type: IMPLANTABLE DEVICE | Status: FUNCTIONAL

## 2018-12-10 RX ORDER — PROPOFOL 10 MG/ML
VIAL (ML) INTRAVENOUS AS NEEDED
Status: DISCONTINUED | OUTPATIENT
Start: 2018-12-10 | End: 2018-12-10 | Stop reason: SURG

## 2018-12-10 RX ORDER — SODIUM CHLORIDE, SODIUM LACTATE, POTASSIUM CHLORIDE, CALCIUM CHLORIDE 600; 310; 30; 20 MG/100ML; MG/100ML; MG/100ML; MG/100ML
20 INJECTION, SOLUTION INTRAVENOUS CONTINUOUS
Status: DISCONTINUED | OUTPATIENT
Start: 2018-12-10 | End: 2018-12-13 | Stop reason: HOSPADM

## 2018-12-10 RX ORDER — ROCURONIUM BROMIDE 10 MG/ML
INJECTION, SOLUTION INTRAVENOUS AS NEEDED
Status: DISCONTINUED | OUTPATIENT
Start: 2018-12-10 | End: 2018-12-10 | Stop reason: SURG

## 2018-12-10 RX ORDER — IPRATROPIUM BROMIDE AND ALBUTEROL SULFATE 2.5; .5 MG/3ML; MG/3ML
3 SOLUTION RESPIRATORY (INHALATION) ONCE AS NEEDED
Status: DISCONTINUED | OUTPATIENT
Start: 2018-12-10 | End: 2018-12-10 | Stop reason: HOSPADM

## 2018-12-10 RX ORDER — PHENYLEPHRINE HCL IN 0.9% NACL 0.8MG/10ML
SYRINGE (ML) INTRAVENOUS AS NEEDED
Status: DISCONTINUED | OUTPATIENT
Start: 2018-12-10 | End: 2018-12-10 | Stop reason: SURG

## 2018-12-10 RX ORDER — FLUMAZENIL 0.1 MG/ML
0.2 INJECTION INTRAVENOUS AS NEEDED
Status: DISCONTINUED | OUTPATIENT
Start: 2018-12-10 | End: 2018-12-10 | Stop reason: HOSPADM

## 2018-12-10 RX ORDER — OXYCODONE AND ACETAMINOPHEN 10; 325 MG/1; MG/1
1 TABLET ORAL ONCE AS NEEDED
Status: COMPLETED | OUTPATIENT
Start: 2018-12-10 | End: 2018-12-10

## 2018-12-10 RX ORDER — ONDANSETRON 2 MG/ML
INJECTION INTRAMUSCULAR; INTRAVENOUS AS NEEDED
Status: DISCONTINUED | OUTPATIENT
Start: 2018-12-10 | End: 2018-12-10 | Stop reason: SURG

## 2018-12-10 RX ORDER — BUPIVACAINE HCL/0.9 % NACL/PF 0.1 %
2 PLASTIC BAG, INJECTION (ML) EPIDURAL ONCE
Status: COMPLETED | OUTPATIENT
Start: 2018-12-10 | End: 2018-12-10

## 2018-12-10 RX ORDER — DEXAMETHASONE SODIUM PHOSPHATE 4 MG/ML
INJECTION, SOLUTION INTRA-ARTICULAR; INTRALESIONAL; INTRAMUSCULAR; INTRAVENOUS; SOFT TISSUE AS NEEDED
Status: DISCONTINUED | OUTPATIENT
Start: 2018-12-10 | End: 2018-12-10 | Stop reason: SURG

## 2018-12-10 RX ORDER — SUCCINYLCHOLINE CHLORIDE 20 MG/ML
INJECTION INTRAMUSCULAR; INTRAVENOUS AS NEEDED
Status: DISCONTINUED | OUTPATIENT
Start: 2018-12-10 | End: 2018-12-10 | Stop reason: SURG

## 2018-12-10 RX ORDER — BUPIVACAINE HCL/0.9 % NACL/PF 0.1 %
2 PLASTIC BAG, INJECTION (ML) EPIDURAL EVERY 8 HOURS
Status: COMPLETED | OUTPATIENT
Start: 2018-12-10 | End: 2018-12-12

## 2018-12-10 RX ORDER — SODIUM CHLORIDE 0.9 % (FLUSH) 0.9 %
3 SYRINGE (ML) INJECTION EVERY 12 HOURS SCHEDULED
Status: DISCONTINUED | OUTPATIENT
Start: 2018-12-10 | End: 2018-12-10 | Stop reason: HOSPADM

## 2018-12-10 RX ORDER — MORPHINE SULFATE 2 MG/ML
1 INJECTION, SOLUTION INTRAMUSCULAR; INTRAVENOUS EVERY 4 HOURS PRN
Status: DISCONTINUED | OUTPATIENT
Start: 2018-12-10 | End: 2018-12-13 | Stop reason: HOSPADM

## 2018-12-10 RX ORDER — SODIUM CHLORIDE, SODIUM LACTATE, POTASSIUM CHLORIDE, CALCIUM CHLORIDE 600; 310; 30; 20 MG/100ML; MG/100ML; MG/100ML; MG/100ML
100 INJECTION, SOLUTION INTRAVENOUS CONTINUOUS
Status: DISCONTINUED | OUTPATIENT
Start: 2018-12-10 | End: 2018-12-13 | Stop reason: HOSPADM

## 2018-12-10 RX ORDER — MAGNESIUM HYDROXIDE 1200 MG/15ML
LIQUID ORAL AS NEEDED
Status: DISCONTINUED | OUTPATIENT
Start: 2018-12-10 | End: 2018-12-10 | Stop reason: HOSPADM

## 2018-12-10 RX ORDER — METOPROLOL TARTRATE 5 MG/5ML
INJECTION INTRAVENOUS AS NEEDED
Status: DISCONTINUED | OUTPATIENT
Start: 2018-12-10 | End: 2018-12-10 | Stop reason: SURG

## 2018-12-10 RX ORDER — LIDOCAINE HYDROCHLORIDE 40 MG/ML
SOLUTION TOPICAL AS NEEDED
Status: DISCONTINUED | OUTPATIENT
Start: 2018-12-10 | End: 2018-12-10 | Stop reason: SURG

## 2018-12-10 RX ORDER — METOCLOPRAMIDE HYDROCHLORIDE 5 MG/ML
5 INJECTION INTRAMUSCULAR; INTRAVENOUS
Status: DISCONTINUED | OUTPATIENT
Start: 2018-12-10 | End: 2018-12-10 | Stop reason: HOSPADM

## 2018-12-10 RX ORDER — VASOPRESSIN 20 U/ML
INJECTION PARENTERAL AS NEEDED
Status: DISCONTINUED | OUTPATIENT
Start: 2018-12-10 | End: 2018-12-10 | Stop reason: SURG

## 2018-12-10 RX ORDER — SUFENTANIL CITRATE 50 UG/ML
INJECTION EPIDURAL; INTRAVENOUS AS NEEDED
Status: DISCONTINUED | OUTPATIENT
Start: 2018-12-10 | End: 2018-12-10 | Stop reason: SURG

## 2018-12-10 RX ORDER — SODIUM CHLORIDE 0.9 % (FLUSH) 0.9 %
1-10 SYRINGE (ML) INJECTION AS NEEDED
Status: DISCONTINUED | OUTPATIENT
Start: 2018-12-10 | End: 2018-12-10 | Stop reason: HOSPADM

## 2018-12-10 RX ORDER — HYDRALAZINE HYDROCHLORIDE 20 MG/ML
5 INJECTION INTRAMUSCULAR; INTRAVENOUS
Status: DISCONTINUED | OUTPATIENT
Start: 2018-12-10 | End: 2018-12-10 | Stop reason: HOSPADM

## 2018-12-10 RX ORDER — NALOXONE HCL 0.4 MG/ML
0.04 VIAL (ML) INJECTION AS NEEDED
Status: DISCONTINUED | OUTPATIENT
Start: 2018-12-10 | End: 2018-12-10 | Stop reason: HOSPADM

## 2018-12-10 RX ORDER — MEPERIDINE HYDROCHLORIDE 25 MG/ML
12.5 INJECTION INTRAMUSCULAR; INTRAVENOUS; SUBCUTANEOUS
Status: DISCONTINUED | OUTPATIENT
Start: 2018-12-10 | End: 2018-12-10 | Stop reason: HOSPADM

## 2018-12-10 RX ORDER — LIDOCAINE HYDROCHLORIDE 20 MG/ML
INJECTION, SOLUTION INFILTRATION; PERINEURAL AS NEEDED
Status: DISCONTINUED | OUTPATIENT
Start: 2018-12-10 | End: 2018-12-10 | Stop reason: SURG

## 2018-12-10 RX ORDER — LABETALOL HYDROCHLORIDE 5 MG/ML
5 INJECTION, SOLUTION INTRAVENOUS
Status: DISCONTINUED | OUTPATIENT
Start: 2018-12-10 | End: 2018-12-10 | Stop reason: HOSPADM

## 2018-12-10 RX ORDER — ONDANSETRON 2 MG/ML
4 INJECTION INTRAMUSCULAR; INTRAVENOUS AS NEEDED
Status: DISCONTINUED | OUTPATIENT
Start: 2018-12-10 | End: 2018-12-10 | Stop reason: HOSPADM

## 2018-12-10 RX ORDER — MORPHINE SULFATE 2 MG/ML
2 INJECTION, SOLUTION INTRAMUSCULAR; INTRAVENOUS
Status: DISCONTINUED | OUTPATIENT
Start: 2018-12-10 | End: 2018-12-10 | Stop reason: HOSPADM

## 2018-12-10 RX ORDER — ACETAMINOPHEN 500 MG
1000 TABLET ORAL ONCE
Status: COMPLETED | OUTPATIENT
Start: 2018-12-10 | End: 2018-12-10

## 2018-12-10 RX ORDER — SODIUM CHLORIDE 9 MG/ML
INJECTION, SOLUTION INTRAVENOUS AS NEEDED
Status: DISCONTINUED | OUTPATIENT
Start: 2018-12-10 | End: 2018-12-10 | Stop reason: HOSPADM

## 2018-12-10 RX ADMIN — Medication 2 G: at 08:15

## 2018-12-10 RX ADMIN — OXYCODONE HYDROCHLORIDE AND ACETAMINOPHEN 1 TABLET: 7.5; 325 TABLET ORAL at 17:29

## 2018-12-10 RX ADMIN — ROCURONIUM BROMIDE 10 MG: 10 INJECTION INTRAVENOUS at 08:17

## 2018-12-10 RX ADMIN — SODIUM CHLORIDE, POTASSIUM CHLORIDE, SODIUM LACTATE AND CALCIUM CHLORIDE: 600; 310; 30; 20 INJECTION, SOLUTION INTRAVENOUS at 11:39

## 2018-12-10 RX ADMIN — SODIUM CHLORIDE, POTASSIUM CHLORIDE, SODIUM LACTATE AND CALCIUM CHLORIDE 100 ML/HR: 600; 310; 30; 20 INJECTION, SOLUTION INTRAVENOUS at 21:17

## 2018-12-10 RX ADMIN — DEXAMETHASONE SODIUM PHOSPHATE 4 MG: 4 INJECTION, SOLUTION INTRAMUSCULAR; INTRAVENOUS at 09:30

## 2018-12-10 RX ADMIN — OXYCODONE HYDROCHLORIDE AND ACETAMINOPHEN 1 TABLET: 7.5; 325 TABLET ORAL at 23:17

## 2018-12-10 RX ADMIN — ONDANSETRON 4 MG: 2 INJECTION INTRAMUSCULAR; INTRAVENOUS at 13:05

## 2018-12-10 RX ADMIN — SODIUM CHLORIDE, PRESERVATIVE FREE 3 ML: 5 INJECTION INTRAVENOUS at 21:20

## 2018-12-10 RX ADMIN — MORPHINE SULFATE 2 MG: 2 INJECTION, SOLUTION INTRAMUSCULAR; INTRAVENOUS at 12:50

## 2018-12-10 RX ADMIN — SUCCINYLCHOLINE CHLORIDE 100 MG: 20 INJECTION, SOLUTION INTRAMUSCULAR; INTRAVENOUS at 08:17

## 2018-12-10 RX ADMIN — Medication 400 MCG: at 08:50

## 2018-12-10 RX ADMIN — PROPOFOL 50 MG: 10 INJECTION, EMULSION INTRAVENOUS at 08:17

## 2018-12-10 RX ADMIN — PRIMIDONE 25 MG: 50 TABLET ORAL at 21:19

## 2018-12-10 RX ADMIN — METOPROLOL TARTRATE 2.5 MG: 1 INJECTION, SOLUTION INTRAVENOUS at 11:35

## 2018-12-10 RX ADMIN — VASOPRESSIN 2 UNITS: 20 INJECTION INTRAVENOUS at 08:25

## 2018-12-10 RX ADMIN — SUFENTANIL CITRATE 10 MCG: 50 INJECTION, SOLUTION EPIDURAL; INTRAVENOUS at 10:52

## 2018-12-10 RX ADMIN — LIDOCAINE HYDROCHLORIDE 100 MG: 20 INJECTION, SOLUTION INFILTRATION; PERINEURAL at 08:17

## 2018-12-10 RX ADMIN — SUFENTANIL CITRATE 15 MCG: 50 INJECTION, SOLUTION EPIDURAL; INTRAVENOUS at 11:18

## 2018-12-10 RX ADMIN — MORPHINE SULFATE 2 MG: 2 INJECTION, SOLUTION INTRAMUSCULAR; INTRAVENOUS at 12:40

## 2018-12-10 RX ADMIN — METOPROLOL SUCCINATE 25 MG: 25 TABLET, FILM COATED, EXTENDED RELEASE ORAL at 21:18

## 2018-12-10 RX ADMIN — OXYCODONE HYDROCHLORIDE AND ACETAMINOPHEN 1 TABLET: 10; 325 TABLET ORAL at 13:01

## 2018-12-10 RX ADMIN — SODIUM CHLORIDE, POTASSIUM CHLORIDE, SODIUM LACTATE AND CALCIUM CHLORIDE 100 ML/HR: 600; 310; 30; 20 INJECTION, SOLUTION INTRAVENOUS at 08:05

## 2018-12-10 RX ADMIN — CEFAZOLIN SODIUM 2 G: 10 INJECTION, POWDER, FOR SOLUTION INTRAVENOUS at 23:21

## 2018-12-10 RX ADMIN — ACETAMINOPHEN 1000 MG: 500 TABLET ORAL at 08:09

## 2018-12-10 RX ADMIN — MORPHINE SULFATE 2 MG: 2 INJECTION, SOLUTION INTRAMUSCULAR; INTRAVENOUS at 12:30

## 2018-12-10 RX ADMIN — SUFENTANIL CITRATE 25 MCG: 50 INJECTION, SOLUTION EPIDURAL; INTRAVENOUS at 08:17

## 2018-12-10 RX ADMIN — LIDOCAINE HYDROCHLORIDE 1 EACH: 40 SOLUTION TOPICAL at 08:17

## 2018-12-10 RX ADMIN — OXYCODONE HYDROCHLORIDE AND ACETAMINOPHEN 1 TABLET: 7.5; 325 TABLET ORAL at 01:00

## 2018-12-10 RX ADMIN — ONDANSETRON HYDROCHLORIDE 4 MG: 2 SOLUTION INTRAMUSCULAR; INTRAVENOUS at 09:30

## 2018-12-10 RX ADMIN — EPHEDRINE SULFATE 15 MG: 50 INJECTION INTRAMUSCULAR; INTRAVENOUS; SUBCUTANEOUS at 08:50

## 2018-12-10 RX ADMIN — SODIUM CHLORIDE, POTASSIUM CHLORIDE, SODIUM LACTATE AND CALCIUM CHLORIDE: 600; 310; 30; 20 INJECTION, SOLUTION INTRAVENOUS at 09:20

## 2018-12-10 RX ADMIN — MORPHINE SULFATE 2 MG: 2 INJECTION, SOLUTION INTRAMUSCULAR; INTRAVENOUS at 13:00

## 2018-12-10 RX ADMIN — SODIUM CHLORIDE, POTASSIUM CHLORIDE, SODIUM LACTATE AND CALCIUM CHLORIDE 20 ML/HR: 600; 310; 30; 20 INJECTION, SOLUTION INTRAVENOUS at 08:07

## 2018-12-10 NOTE — ANESTHESIA POSTPROCEDURE EVALUATION
"Patient: Chad Henriquez    Procedure Summary     Date:  12/10/18 Room / Location:   [facility] OR  /  [facility]    Anesthesia Start:  0813 Anesthesia Stop:  1211    Procedure:  LUMBAR LAMINECTOMY TRANSFORAMINAL LUMBAR INTERBODY FUSION L34,45 (Left Spine Lumbar) Diagnosis:       Degeneration of lumbar or lumbosacral intervertebral disc      (Degeneration of lumbar or lumbosacral intervertebral disc [M51.37])    Surgeon:  Kj Falcon MD Provider:  MIKHAIL Garcia CRNA    Anesthesia Type:  general ASA Status:  3          Anesthesia Type: general  Last vitals  BP   170/65 (12/10/18 1354)   Temp   98.6 °F (37 °C) (12/10/18 1354)   Pulse   76 (12/10/18 1354)   Resp   20 (12/10/18 1354)     SpO2   95 % (12/10/18 1354)     Post Anesthesia Care and Evaluation    Patient location during evaluation: PACU  Patient participation: complete - patient participated  Level of consciousness: awake and alert  Pain management: adequate  Airway patency: patent  Anesthetic complications: No anesthetic complications  PONV Status: none  Cardiovascular status: acceptable and hemodynamically stable  Respiratory status: acceptable  Hydration status: acceptable    Comments: Blood pressure 170/65, pulse 76, temperature 98.6 °F (37 °C), temperature source Temporal, resp. rate 20, height 173 cm (68.11\"), weight 73.3 kg (161 lb 9.6 oz), SpO2 95 %.    Patient discharged from PACU based upon Colton score. Please see RN notes for further details      "

## 2018-12-10 NOTE — ANESTHESIA PROCEDURE NOTES
ANESTHESIA INTUBATION  Urgency: elective    Date/Time: 12/10/2018 8:17 AM  Airway not difficult    General Information and Staff    Patient location during procedure: OR  CRNA: Ridge Gannon CRNA    Indications and Patient Condition  Indications for airway management: airway protection    Preoxygenated: yes  MILS maintained throughout  Mask difficulty assessment: 1 - vent by mask    Final Airway Details  Final airway type: endotracheal airway      Successful airway: ETT  Cuffed: yes   Successful intubation technique: direct laryngoscopy  Endotracheal tube insertion site: oral  Blade: Vance  Blade size: 2  ETT size (mm): 9.0  Cormack-Lehane Classification: grade IIa - partial view of glottis  Placement verified by: chest auscultation   Cuff volume (mL): 10  Measured from: lips  ETT to lips (cm): 22  Number of attempts at approach: 1

## 2018-12-10 NOTE — PLAN OF CARE
Problem: Patient Care Overview  Goal: Plan of Care Review  Outcome: Ongoing (interventions implemented as appropriate)   12/10/18 8704   Coping/Psychosocial   Plan of Care Reviewed With patient   Plan of Care Review   Progress improving   OTHER   Outcome Summary patient with good bed mobility and ambulation improving, c/o back pain, prn pain meds given with relief, NPO since midnight for surgery this am, falk in place r/t retention and surgery scheduled, chlorhexadine bath to be given with gown and linen change       Problem: Fall Risk (Adult)  Goal: Identify Related Risk Factors and Signs and Symptoms  Outcome: Outcome(s) achieved Date Met: 12/10/18    Goal: Absence of Fall  Outcome: Ongoing (interventions implemented as appropriate)      Problem: Laminectomy/Foraminotomy/Discectomy (Adult)  Goal: Signs and Symptoms of Listed Potential Problems Will be Absent, Minimized or Managed (Laminectomy/Foraminotomy/Discectomy)  Outcome: Ongoing (interventions implemented as appropriate)

## 2018-12-10 NOTE — OP NOTE
Procedure Note  Preop Diagnosis: Degeneration of lumbar or lumbosacral intervertebral disc [M51.37]    Post-Op Diagnosis Codes:     * Degeneration of lumbar or lumbosacral intervertebral disc [M51.37]     Procedure Name: L3 4, 45 hemilaminectomy facetectomy left  L3 4, 45 discectomy  L3 4, 45 interbody fusion with peek and allograft and autograft  L3 4, 45, 5 S1 posterior pedicle screw instrumentation  Use of image guided stereotactic navigation    Indications:  A MRI of the lumbar spine revealed findings of severe degenerative disc disease and lumbar stenosis. The patient now presents for fusion and decompression after discussing therapeutic alternatives.          Surgeon: Kj Falcon MD     Assistants: None    Anesthesia: General endotracheal anesthesia    ASA Class: 3    Procedure Details   After obtaining informed consent, having the risks and benefits of the procedure explained including but not limited to infection, bleeding, paralysis, spinal fluid leak, bowel bladder incontinence, stroke, coma, and death.  The patient was brought to the operating.  He was given general anesthesia via an endotracheal tube.  He was flipped prone onto a Flako axis table.  The previous incisions to the left and right of midline were readily identified.  The patient was then prepped and draped in a standard sterile fashion.  The previous incision in the midline at about L2 was opened using Metzenbaum scissors.  The patient tracker clamp was then attached to the spinous process.  The patient then underwent an O arm navigation spin.  The previous paramedian incisions at L3 4 and 5 on the left and the right were then opened using Metzenbaum scissors.  A previous screw hole at L3 on the left was identified using image guided navigation.  A 6.5 mm screw was then placed through the pedicle at L3 on the left under image guided navigation.  A guided tap was then used to tap the pedicles at L 4 and L5, S1 on the left and L3, 4,  5, S1 on the right.  A series of image guided navigation screws were then placed through the pedicles at those levels using stereotactic navigation.  Once this was completed a navigated dilator probe was placed over the facet joint at L3 4 on the left a series of dilators were then deployed over this probe.  A rigid quadrant retractor system was then placed into the wound over the facet joint at L3 4 on the left.  The facet joint was then removed and a hemilaminotomy done using an image guided drill and a combination of 2 mm and 3 mm Kerrisons.  Once this was completed the disc space was identified.  The neural structures were identified using neurophysiologic monitoring.  The discectomy is then conducted using a bayoneted 15 blade scalpel a series of 8 mm and 9 mm disc qamar using under image guided navigation, pituitary rongeur, up-biting curettes.  Once the discectomy was completed the endplates were prepped using a cupped tooth navigated curet.  An 8 mm x 28 mm peek expandable interbody cage filled with autograft and allograft was then tapped into place under image guided navigation.  The rigid with quadrant retractor system was then repositioned over the L4-5 facet. The facet joint was then removed and a hemilaminotomy done using an image guided drill and a combination of 2 mm and 3 mm Kerrisons.  Once this was completed the disc space was identified.  The neural structures were identified using neurophysiologic monitoring.  The discectomy is then conducted using a bayoneted 15 blade scalpel a series of 8 mm and 9 mm disc qamar using under image guided navigation, pituitary rongeur, up-biting curettes.  Once the discectomy was completed the endplates were prepped using a cupped tooth navigated curet.  An 8 mm x 28 mm peek expandable interbody cage filled with autograft and allograft was then tapped into place under image guided navigation.  2x titanium alloy rods were selected and measured.  There were  percutaneously slid in through the heads of the screws on the left and the right.  The rods were then reduced into the heads of the screws and setscrews were placed over the heads of the screws.  A final O arm navigation spin showed good positioning of all interbody devices and hardware.  The setscrews were then final tightened and broken off.  All the wounds were then copiously irrigated with antibiotic solution there are inspected for hemostasis.  The deep tissues were then closed using a series of inverted interrupted 0 Vicryl sutures.  The subcutaneous and soft tissues were closed using a series of inverted interrupted 2-0 Vicryl sutures.  And the skin was closed using a running 4 Monocryl subcuticular.  All sponge needle and instrument counts were correct at the procedure.  The patient was extubated in stable condition returned recovery with about 200 mL of blood loss.    Findings:  Severe degenerative disc disease    Estimated Blood Loss:  200 mL           Drains:            Total IV Fluids: ml           Specimens:   ID Type Source Tests Collected by Time   A : L3-4, 4-5 Disc Spine, Lumbar TISSUE PATHOLOGY EXAM Kj Falcon MD 12/10/2018 0900              Implants:   Implant Name Type Inv. Item Serial No.  Lot No. LRB No. Used   GELFOAM PWDR 1GM - [id number] Implant GELFOAM PWDR 1GM  [employer]  Left 1   SPNG HEMO GELFOAM COLLGN  - [id number] Implant SPNG HEMO GELFOAM COLLGN   [employer]  Left 1   PUTTY DBM DEBBIE 2.5CC - [id number] - [id number] Implant PUTTY DBM DEBBIE 2.5CC [id number] [employer]  Left 1   PUTTY DBM DEBBIE 2.5CC - [id number] - [id number] Implant PUTTY DBM DEBBIE 2.5CC [id number] [employer]  Left 1   SPACR ELEVATE STD 28X8MM - [id number] Implant SPACR ELEVATE STD 28X8MM  [employer] [id number] Left 1   SPACR ELEVATE STD 28X8MM - [id number] Implant SPACR ELEVATE STD 28X8MM  [employer] [id number] Left 1   SCRW SOVEREIGN 5.5X30MM - [id number] Implant SCRW SOVEREIGN 5.5X30MM   [employer]  Left 3   SCRW CAITLYN SOLERA VOYAGER MAS 7.5X45MM - [id number] Implant SCRW CAITLYN SOLERA VOYAGER MAS 7.5X45MM  [employer]  Left 1   SCRW CAITLYN SOLERA VOYAGER MAS 6.5X45MM - [id number] Implant SCRW CAITLYN SOLERA VOYAGER MAS 6.5X45MM  [employer]  Left 2   SCRW CAITLYN SOLERA VOYAGER MAS 5.5X45MM - [id number] Implant SCRW CAITLYN SOLERA VOYAGER MAS 5.5X45MM  [employer]  Left 1   SCRW CAITLYN SOLERA VOYAGER MAS 7.5X45MM - [id number] Implant SCRW CAITLYN SOLERA VOYAGER MAS 7.5X45MM  [employer]  Left 2   SCRW CAITLYN SOLERA VOYAGER MAS 7.5X50MM - [id number] Implant SCRW CAITLYN SOLERA VOYAGER MAS 7.5X50MM  [employer]  Left 3   DAVID PERC 4.01L00ZM - [id number] Implant DAVID PERC [id number]  [employer]  Left 1   DAVID PERC 4.47E66EL - [id number] Implant DAVID PERC [id number]  [employer]  Left 1   SCRW ST SOLERA VOYAGER BRKOFF TI 4.75MM - [id number] Implant SCRW ST SOLERA VOYAGER BRKOFF TI 4.75MM  [employer]  Left 10              Complications:  None           Disposition: PACU - hemodynamically stable.           Condition: stable        Kj Falcon MD

## 2018-12-10 NOTE — PROGRESS NOTES
Chad Henriquez  89 y.o.      Chief complaint:   Back pain     Subjective  No events overnight, no complaints, pain controlled    Temp:  [97.4 °F (36.3 °C)-99 °F (37.2 °C)] 98.4 °F (36.9 °C)  Heart Rate:  [65-88] 70  Resp:  [16-22] 16  BP: (118-151)/(41-60) 141/50      Objective    Neurologic Exam  Neurologic Exam      Mental Status   Oriented to person, place, and time.   Attention: normal.   Speech: speech is normal   Level of consciousness: alert  Knowledge: good.      Cranial Nerves   Cranial nerves II through XII intact.      Motor Exam   Muscle bulk: normal  Overall muscle tone: normal  Right arm pronator drift: absent  Left arm pronator drift: absent     Strength   Strength 5/5 throughout.      Sensory Exam   Light touch normal.   Pinprick normal.      Gait, Coordination, and Reflexes      Gait  Gait: normal     Coordination   Finger to nose coordination: normal     Tremor   Resting tremor: absent  Intention tremor: absent  Action tremor: absent     Reflexes   Reflexes 2+ except as noted.            Spinal stenosis, lumbar region, with neurogenic claudication    Spinal stenosis    Degeneration of lumbar or lumbosacral intervertebral disc      Lab Results (last 24 hours)     ** No results found for the last 24 hours. **              Plan:   ALIF, plan Two level TLIF this am    Kj Falcon MD

## 2018-12-10 NOTE — PLAN OF CARE
Problem: Patient Care Overview  Goal: Plan of Care Review  Outcome: Ongoing (interventions implemented as appropriate)   12/10/18 1500   Coping/Psychosocial   Plan of Care Reviewed With patient   Plan of Care Review   Progress no change   OTHER   Outcome Summary Pt back to room 343 from PACU. Pt drowsy, while awake tearful and c/o alot of pain although falling back to sleep. Pt back incision site. small area drainage noted and marked. Pt med as ordered for pain. Pt family at bedside.       Problem: Fall Risk (Adult)  Goal: Absence of Fall  Outcome: Ongoing (interventions implemented as appropriate)      Problem: Laminectomy/Foraminotomy/Discectomy (Adult)  Goal: Signs and Symptoms of Listed Potential Problems Will be Absent, Minimized or Managed (Laminectomy/Foraminotomy/Discectomy)  Outcome: Ongoing (interventions implemented as appropriate)

## 2018-12-10 NOTE — ANESTHESIA PREPROCEDURE EVALUATION
Anesthesia Evaluation     Patient summary reviewed   history of anesthetic complications: PONV  NPO Solid Status: > 8 hours  NPO Liquid Status: > 8 hours           Airway   Mallampati: I  TM distance: >3 FB  Neck ROM: full  Dental          Pulmonary - normal exam    breath sounds clear to auscultation  (+) sleep apnea,   (-) asthma, recent URI, not a smoker  Cardiovascular - normal exam  Exercise tolerance: good (4-7 METS)    ECG reviewed  Patient on routine beta blocker and Beta blocker given within 24 hours of surgery  Rhythm: regular  Rate: normal    (+) hypertension well controlled, past MI () , CAD, cardiac stents (x3, ) hyperlipidemia,   (-) pacemaker, anginaCAB vessel, .    ROS comment: RBBB    Neuro/Psych  (-) seizures, TIA, CVA  GI/Hepatic/Renal/Endo    (-) GERD, liver disease, no renal disease, diabetes, hypothyroidism, hyperthyroidism    Musculoskeletal     Abdominal    Substance History      OB/GYN          Other   (+) arthritis                     Anesthesia Plan    ASA 3     general     intravenous induction   Anesthetic plan, all risks, benefits, and alternatives have been provided, discussed and informed consent has been obtained with: patient.  Use of blood products discussed with patient  Consented to blood products.

## 2018-12-10 NOTE — OP NOTE
Procedure Note  Preop Diagnosis: Degeneration of lumbar or lumbosacral intervertebral disc [M51.37]    Post-Op Diagnosis Codes:     * Degeneration of lumbar or lumbosacral intervertebral disc [M51.37]     Procedure Name: L5-S1 anterior discectomy  L5-S1 anterior interbody fusion with peek and BMP  L5-S1 anterior instrumentation.  Use of image guided stereotactic navigation  Indications:  A MRI the lumbar spine revealed findings of severe degenerative disc disease at L5-S1. The patient now presents for anterior lumbar fusion after discussing therapeutic alternatives.          Surgeon: Kj Falcon MD     Assistants: None    Anesthesia: General endotracheal anesthesia    ASA Class: 3    Procedure Details   After obtaining informed consent, having the risks and benefits of the procedure explained including but not limited to infection, bleeding, paralysis, spinal fluid leak, major bowel injury, major vascular injury, stroke, coma, and death.  The patient was brought to the operating.  He was given general anesthesia via an endotracheal tube.  His laid supine on operating table.  The left anterior abdomen was marked with an indelible marker for a preplanned incision based on fluoroscopic imaging.  The patient was then prepped and draped in a standard sterile fashion.  The opening of this case will be dictated by Dr. Mcleod.  Once the York-Blake retractor system was in place and we are looking at the anterior portions of the vertebral body of L5-S1 a marking pin was placed into the disc space.  Fluoroscopy confirm this was a correct level.  The discectomy was then conducted using a bayoneted 15 blade scalpel to incise the annulus and then a series of up-biting curettes pituitary rongeurs were used to remove the disc.  A large peek  interbody spacer was selected and size.  It was tapped into place under direct fluoroscopic guidance.  A series of screws were then used to anchor the interbody spacer to the  vertebral body of L5 and S1.  A cover plate was then placed over the heads of screws.  The wound was then copiously irrigated with antibiotic solution.  It was inspected for hemostasis.  Dr. Mcleod will then dictate the closure of this wound.  All sponge needle and insulin counts were correct at the end of this portion of the case.  A dressing was applied.  The patient was then flipped prone onto a Flako axis table.  The lumbar spine was then prepped and draped in a standard sterile fashion.  The midline incision was then made at about L2 using a 10 blade scalpel.  A patient tracker clamp was then applied to the spinous processes of L2.  An O arm navigation spin was then conducted.  Paramedian incisions were planned using image guided navigation.  The paramedian incisions were infiltrated Marcaine and epinephrine.  A 10 blade scalpel used to make an incision through the dermis and epidermis at each side.  Bovie cautery was then used to extend the incisions down to the subjacent soft tissues to level the lumbosacral fascia on each side.  A image guided tap was then used to tap the pedicle at L3 on the left.  A 6.5 mm screw was then placed into the pedicle on the left at L3.  A image guided tap was then being positioned appropriately at L4 on the left when there was a failure of the navigation computer.  The computer could not be restarted rebooted.  At that point the decision was made to cancel the case and closed the wounds and rescheduled the remainder of the operation.  The L3 screw on the left was then removed.  The wounds were then copiously irrigated with antibiotic solution.  There are inspected for hemostasis.  The subcutaneous tissues were closed using a series of inverted interrupted 2-0 Vicryl sutures.  The skin was closed using a running 4 Monocryl.  All sponge needle and enhancement counts were correct at this end of the procedure.  The patient was extubated in stable condition returned recovery with  about 100 mL of blood loss.  DNone            Total IV Fluids: ml           Specimens:   ID Type Source Tests Collected by Time   A : L3-4, 4-5 Disc Spine, Lumbar TISSUE PATHOLOGY EXAM Kj Falcon MD 12/10/2018 0900              Implants: Med[employer]onic Soverign Large 12mm 8 degree  [employer] 3x Soverign Screws [id number]            Complications:   none            Disposition: PACU - hemodynamically stable.           Condition: stable        Kj Falcon MD

## 2018-12-10 NOTE — CONSULTS
Pharmacy Consult - Drugs that may cause Urinary Retention    Received consult to review patient's profile to identify any medications that may cause urinary retention due to insertion of falk catheter.    Patient shows no sign of renal failure/dysfunction. The following current medications show possible adverse effect of urinary retention, but incidence is low for all listed ...    Duloxetine  Ondansetron  Oxycodone/Acetaminophen    Aaron Espinoza, PharmD  12/10/18 04:18

## 2018-12-11 PROCEDURE — G8987 SELF CARE CURRENT STATUS: HCPCS | Performed by: OCCUPATIONAL THERAPIST

## 2018-12-11 PROCEDURE — 25010000002 CEFAZOLIN PER 500 MG: Performed by: NEUROLOGICAL SURGERY

## 2018-12-11 PROCEDURE — 97164 PT RE-EVAL EST PLAN CARE: CPT | Performed by: PHYSICAL THERAPIST

## 2018-12-11 PROCEDURE — G8988 SELF CARE GOAL STATUS: HCPCS | Performed by: OCCUPATIONAL THERAPIST

## 2018-12-11 PROCEDURE — 97168 OT RE-EVAL EST PLAN CARE: CPT | Performed by: OCCUPATIONAL THERAPIST

## 2018-12-11 PROCEDURE — P9612 CATHETERIZE FOR URINE SPEC: HCPCS

## 2018-12-11 PROCEDURE — 94760 N-INVAS EAR/PLS OXIMETRY 1: CPT

## 2018-12-11 PROCEDURE — 97165 OT EVAL LOW COMPLEX 30 MIN: CPT | Performed by: OCCUPATIONAL THERAPIST

## 2018-12-11 PROCEDURE — 97116 GAIT TRAINING THERAPY: CPT

## 2018-12-11 PROCEDURE — 25010000002 ONDANSETRON PER 1 MG: Performed by: NURSE PRACTITIONER

## 2018-12-11 PROCEDURE — 94799 UNLISTED PULMONARY SVC/PX: CPT

## 2018-12-11 PROCEDURE — G8979 MOBILITY GOAL STATUS: HCPCS | Performed by: PHYSICAL THERAPIST

## 2018-12-11 PROCEDURE — G8978 MOBILITY CURRENT STATUS: HCPCS | Performed by: PHYSICAL THERAPIST

## 2018-12-11 PROCEDURE — 25010000002 MORPHINE PER 10 MG: Performed by: NEUROLOGICAL SURGERY

## 2018-12-11 PROCEDURE — 99024 POSTOP FOLLOW-UP VISIT: CPT | Performed by: NURSE PRACTITIONER

## 2018-12-11 RX ORDER — POLYETHYLENE GLYCOL 3350 17 G/17G
17 POWDER, FOR SOLUTION ORAL DAILY
Status: DISCONTINUED | OUTPATIENT
Start: 2018-12-11 | End: 2018-12-13 | Stop reason: HOSPADM

## 2018-12-11 RX ORDER — BISACODYL 10 MG
10 SUPPOSITORY, RECTAL RECTAL DAILY PRN
Status: DISCONTINUED | OUTPATIENT
Start: 2018-12-11 | End: 2018-12-12

## 2018-12-11 RX ADMIN — OXYCODONE HYDROCHLORIDE AND ACETAMINOPHEN 1 TABLET: 7.5; 325 TABLET ORAL at 03:42

## 2018-12-11 RX ADMIN — MORPHINE SULFATE 1 MG: 2 INJECTION, SOLUTION INTRAMUSCULAR; INTRAVENOUS at 02:24

## 2018-12-11 RX ADMIN — OXYCODONE HYDROCHLORIDE AND ACETAMINOPHEN 1 TABLET: 7.5; 325 TABLET ORAL at 16:29

## 2018-12-11 RX ADMIN — MORPHINE SULFATE 1 MG: 2 INJECTION, SOLUTION INTRAMUSCULAR; INTRAVENOUS at 11:41

## 2018-12-11 RX ADMIN — CEFAZOLIN SODIUM 2 G: 10 INJECTION, POWDER, FOR SOLUTION INTRAVENOUS at 06:26

## 2018-12-11 RX ADMIN — FUROSEMIDE 40 MG: 40 TABLET ORAL at 08:15

## 2018-12-11 RX ADMIN — OXYCODONE HYDROCHLORIDE AND ACETAMINOPHEN 1 TABLET: 7.5; 325 TABLET ORAL at 08:15

## 2018-12-11 RX ADMIN — POLYETHYLENE GLYCOL (3350) 17 G: 17 POWDER, FOR SOLUTION ORAL at 08:46

## 2018-12-11 RX ADMIN — FINASTERIDE 5 MG: 5 TABLET, FILM COATED ORAL at 08:15

## 2018-12-11 RX ADMIN — ONDANSETRON 4 MG: 2 INJECTION, SOLUTION INTRAMUSCULAR; INTRAVENOUS at 22:48

## 2018-12-11 RX ADMIN — SODIUM CHLORIDE, POTASSIUM CHLORIDE, SODIUM LACTATE AND CALCIUM CHLORIDE 100 ML/HR: 600; 310; 30; 20 INJECTION, SOLUTION INTRAVENOUS at 21:22

## 2018-12-11 RX ADMIN — PRIMIDONE 25 MG: 50 TABLET ORAL at 21:21

## 2018-12-11 RX ADMIN — CEFAZOLIN SODIUM 2 G: 10 INJECTION, POWDER, FOR SOLUTION INTRAVENOUS at 21:21

## 2018-12-11 RX ADMIN — METOPROLOL SUCCINATE 25 MG: 25 TABLET, FILM COATED, EXTENDED RELEASE ORAL at 21:21

## 2018-12-11 RX ADMIN — OXYCODONE HYDROCHLORIDE AND ACETAMINOPHEN 1 TABLET: 7.5; 325 TABLET ORAL at 23:29

## 2018-12-11 RX ADMIN — ONDANSETRON 4 MG: 2 INJECTION, SOLUTION INTRAMUSCULAR; INTRAVENOUS at 11:50

## 2018-12-11 RX ADMIN — CEFAZOLIN SODIUM 2 G: 10 INJECTION, POWDER, FOR SOLUTION INTRAVENOUS at 14:40

## 2018-12-11 RX ADMIN — SODIUM CHLORIDE, POTASSIUM CHLORIDE, SODIUM LACTATE AND CALCIUM CHLORIDE 100 ML/HR: 600; 310; 30; 20 INJECTION, SOLUTION INTRAVENOUS at 09:44

## 2018-12-11 RX ADMIN — DULOXETINE HYDROCHLORIDE 60 MG: 30 CAPSULE, DELAYED RELEASE ORAL at 08:15

## 2018-12-11 RX ADMIN — SODIUM CHLORIDE, PRESERVATIVE FREE 3 ML: 5 INJECTION INTRAVENOUS at 08:18

## 2018-12-11 RX ADMIN — MORPHINE SULFATE 1 MG: 2 INJECTION, SOLUTION INTRAMUSCULAR; INTRAVENOUS at 06:26

## 2018-12-11 NOTE — THERAPY RE-EVALUATION
Acute Care - Occupational Therapy Re-Evaluation  [facility]     Patient Name: Chad Henriquez  : 1929  MRN: [id number]  Today's Date: 2018  Onset of Illness/Injury or Date of Surgery: 18  Date of Referral to OT: 18  Referring Physician: Dr. Falcon    Admit Date: 2018       ICD-10-CM ICD-9-CM   1. Spinal stenosis, lumbar region, with neurogenic claudication M48.062 724.03   2. Impaired functional mobility, balance, gait, and endurance Z74.09 V49.89   3. Decreased activities of daily living (ADL) R68.89 780.99   4. Degeneration of lumbar or lumbosacral intervertebral disc M51.37 722.52     Patient Active Problem List   Diagnosis   • BPH with urinary obstruction   • Frequency of urination   • Nocturia   • OAB (overactive bladder)   • Non-smoker   • Normal body mass index (BMI)   • Spinal stenosis, lumbar region, with neurogenic claudication   • Left leg pain   • Bilateral hip pain   • Acute left-sided low back pain with left-sided sciatica   • Essential tremor   • BMI 21.0-21.9, adult   • Spinal stenosis   • Degeneration of lumbar or lumbosacral intervertebral disc     Past Medical History:   Diagnosis Date   • Allergic rhinitis    • Anemia    • Arthritis    • BPH (benign prostatic hypertrophy) with urinary retention    • Cancer (CMS/HCC)     skin cancer   • Chronic back pain     RUNS DOWN BOTH LEGS   • Constipation    • Coronary artery disease    • Hard of hearing    • Heart attack (CMS/HCC)     NO MUSCLE DAMAGE - JUST OCCLUDED VALVE   • High cholesterol    • History of skin cancer     BILATERAL EARS   • History of transfusion        • Hypertension    • Inguinal hernia    • Leaky heart valve     DR CONCEPCION SAYS NOT ENOUGH TO BE OF CONCERN   • Other bursal cyst, right elbow    • PONV (postoperative nausea and vomiting)     has had scopalamine patch in past    • Sleep apnea     DOES NOT USE CPAP OR BIPAP   • Spinal stenosis of cervical region    • Spinal stenosis of lumbar region     • Tremors of nervous system    • Wears hearing aid     BILATERAL     Past Surgical History:   Procedure Laterality Date   • APPENDECTOMY     • BACK SURGERY      X3   • CARDIAC SURGERY  08/08/1986    X1 BYPASS   • CORONARY ANGIOPLASTY WITH STENT PLACEMENT  1997    X3 STENTS   • HERNIA REPAIR Bilateral     x2          OT ASSESSMENT FLOWSHEET (last 72 hours)      Occupational Therapy Evaluation     Row Name 12/11/18 1109                   OT Evaluation Time/Intention    Subjective Information  complains of;pain;weakness  -        Document Type  evaluation  -        Mode of Treatment  occupational therapy  -           General Information    Patient Profile Reviewed?  yes  -        Onset of Illness/Injury or Date of Surgery  12/07/18  -        Referring Physician  Dr. Falcon  -        Patient Observations  alert;cooperative;agree to therapy  -        Patient/Family Observations  spouse present  -        General Observations of Patient  seated in chair, cont pulse ox, IVs, spouse present  -        Prior Level of Function  independent:;all household mobility;ADL's  -        Equipment Currently Used at Home  walker, rolling;cane, straight;bath bench  -        Pertinent History of Current Functional Problem  lami & fusion L3, L4  -        Existing Precautions/Restrictions  brace worn when out of bed;fall;spinal  -        Risks Reviewed  patient:;LOB;increased discomfort;dizziness  -        Benefits Reviewed  patient:;improve function;increase independence;increase strength;increase balance  -           Relationship/Environment    Primary Source of Support/Comfort  spouse  -        Lives With  spouse  -           Resource/Environmental Concerns    Current Living Arrangements  home/apartment/condo  -           Home Main Entrance    Number of Stairs, Main Entrance  four  -           Cognitive Assessment/Intervention- PT/OT    Affect/Mental Status (Cognitive)  WNL  -        Orientation  Status (Cognition)  person;place;situation  -        Follows Commands (Cognition)  WFL  -           Safety Issues, Functional Mobility    Impairments Affecting Function (Mobility)  balance;pain;strength  -           Bed Mobility Assessment/Treatment    Comment (Bed Mobility)  not tested pt. seated & chair & wished to return back to chair   -           Functional Mobility    Functional Mobility- Ind. Level  contact guard assist  -        Functional Mobility- Device  rolling walker  -        Functional Mobility- Safety Issues  sequencing ability decreased;step length decreased  -        Functional Mobility- Comment  in room to hallway  -           Transfer Assessment/Treatment    Transfer Assessment/Treatment  sit-stand transfer;stand-sit transfer  -           Sit-Stand Transfer    Sit-Stand [place] (Transfers)  verbal cues;minimum assist (75% patient effort)  -        Assistive Device (Sit-Stand Transfers)  walker, front-wheeled  -           Stand-Sit Transfer    Stand-Sit [place] (Transfers)  verbal cues;contact guard;minimum assist (75% patient effort)  -        Assistive Device (Stand-Sit Transfers)  walker, front-wheeled  -           ADL Assessment/Intervention    BADL Assessment/Intervention  lower body dressing;upper body dressing  -           Upper Body Dressing Assessment/Training    Upper Body Dressing [place] Level  don LSO  -        Upper Body Dressing Position  supported sitting  -           Lower Body Dressing Assessment/Training    Comment (Lower Body Dressing)  Pt. simulated sock ALISIA anticipate he will require Max A  -           BADL Safety/Performance    Impairments, BADL Safety/Performance  balance;endurance/activity tolerance;muscle tone abnormality  -           General ROM    GENERAL ROM COMMENTS  BUE AROM WFL  -           MMT (Manual Muscle Testing)    General MMT Comments  obs good fxl strength at BUEs no formal testing taken 2' lifting  restriction  -CH           Static Sitting Balance    Level of [place] (Supported Sitting, Static Balance)  independent  -CH        Sitting Position (Supported Sitting, Static Balance)  sitting in chair  -           Static Standing Balance    Level of [place] (Supported Standing, Static Balance)  contact guard assist  -        Assistive Device Utilized (Supported Standing, Static Balance)  rolling walker  -           Sensory Assessment/Intervention    Sensory General Assessment  no sensation deficits identified  -           Positioning and Restraints    Pre-Treatment Position  sitting in chair/recliner  -CH        Post Treatment Position  chair  -           Pain Scale: Numbers Pre/Post-Treatment    Pain Scale: Numbers, Pretreatment  2/10  -CH        Pain Scale: Numbers, Post-Treatment  6/10  -CH        Pain Location  back  -           Orthotics & Prosthetics Management    Orthosis Location  spinal orthosis  -        Additional Documentation  Orthosis Location (Row)  -           Spinal Orthosis Management    Type (Spinal Orthosis)  LSO (lumbar sacral orthosis)  -        Wearing Schedule (Spinal Orthosis)  wear when out of bed only  -        Orthosis Training (Spinal Orthosis)  patient and caregiver;activity limitations/precautions;purpose/goals of orthosis;donning/doffing orthosis;wearing schedule  -           Wound 12/07/18 1109 back incision    Wound - Properties Group Date first assessed: 12/07/18  -SWETA Time first assessed: 1109  -SWETA Location: back  -SWETA Type: incision  -SWETA       [REMOVED] Wound 03/12/18 1124 posterior back incision    Wound - Properties Group Date first assessed: 03/12/18  -SWETA Time first assessed: 1124  -SWETA Present On Admission : no  -SWETA Orientation: posterior  -SWETA Location: back  -SWETA Type: incision  -SWETA Additional Comments: telfa, tape  -SWETA Resolution Date: 12/09/18  -SF Resolution Time: 0632 -SF       [REMOVED] Wound 03/12/18 1259 back incision    Wound -  Properties Group Date first assessed: 03/12/18  -SWETA Time first assessed: 1259  -SWETA Location: back  -SWETA Type: incision  -SWETA Resolution Date: 12/09/18  -SF Resolution Time: 0632  -SF       Wound 12/10/18 1153 back incision    Wound - Properties Group Date first assessed: 12/10/18  -SWETA Time first assessed: 1153  -SWETA Location: back  -SWETA Type: incision  -SWETA       Wound 12/10/18 2005 lower abdomen incision;surgical    Wound - Properties Group Date first assessed: 12/10/18  -TR Time first assessed: 2005  -TR Orientation: lower  -TR Location: abdomen  -TR Type: incision;surgical  -TR       Plan of Care Review    Plan of Care Reviewed With  patient;spouse  -           Clinical Impression (OT)    Date of Referral to OT  12/11/18  -        OT Diagnosis  decline in ADLs  -        Patient/Family Goals Statement (OT Eval)  go to rehab  -        Criteria for Skilled Therapeutic Interventions Met (OT Eval)  yes;treatment indicated  -        Rehab Potential (OT Eval)  good, to achieve stated therapy goals  -        Therapy Frequency (OT Eval)  5 times/wk  -        Predicted Duration of Therapy Intervention (Therapy Eval)  until DC from this facility  -        Care Plan Review (OT)  evaluation/treatment results reviewed;care plan/treatment goals reviewed;risks/benefits reviewed;current/potential barriers reviewed;patient/other agree to care plan  -        Anticipated Discharge Disposition (OT)  inpatient rehabilitation facility;home with home health  -           Vital Signs    Pre SpO2 (%)  93  -CH        O2 Delivery Pre Treatment  room air  -CH        Post SpO2 (%)  95  -CH        O2 Delivery Post Treatment  room air  -           Planned OT Interventions    Planned Therapy Interventions (OT Eval)  activity tolerance training;adaptive equipment training;BADL retraining;functional balance retraining;occupation/activity based interventions;orthotic fabrication/fitting/training;patient/caregiver  education/training;transfer/mobility retraining  -           Living Environment    Home Accessibility  stairs to enter home;tub/shower is not walk in  -          User Key  (r) = Recorded By, (t) = Taken By, (c) = Cosigned By    Initials Name Effective Dates     Deepti Buckner TOMMIE, OTR/L 08/02/16 -     Margo Khalil, RN 08/02/16 -     Santa Vaughan RN 08/02/16 -     Martell Garcia RN 08/02/16 -          Occupational Therapy Education     Title: PT OT SLP Therapies (In Progress)     Topic: Occupational Therapy (In Progress)     Point: ADL training (Done)     Description: Instruct learner(s) on proper safety adaptation and remediation techniques during self care or transfers.   Instruct in proper use of assistive devices.    Learning Progress Summary           Patient Acceptance, E, VU,NR by ANA at 12/8/2018 10:27 AM    Comment:  Pt educated on the benefits and role of OT, POC, adl modifications, LSO wear schedule/fitting/mgt, spinal precuations, adaptive equipment use, home/environmental modifications.                   Point: Precautions (Done)     Description: Instruct learner(s) on prescribed precautions during self-care and functional transfers.    Learning Progress Summary           Patient Acceptance, E, VU,NR by ANA at 12/8/2018 10:27 AM    Comment:  Pt educated on the benefits and role of OT, POC, adl modifications, LSO wear schedule/fitting/mgt, spinal precuations, adaptive equipment use, home/environmental modifications.                   Point: Body mechanics (Done)     Description: Instruct learner(s) on proper positioning and spine alignment during self-care, functional mobility activities and/or exercises.    Learning Progress Summary           Patient Acceptance, E, VU,NR by ANA at 12/8/2018 10:27 AM    Comment:  Pt educated on the benefits and role of OT, POC, adl modifications, LSO wear schedule/fitting/mgt, spinal precuations, adaptive equipment use, home/environmental modifications.                                User Key     Initials Effective Dates Name Provider Type Discipline    J 10/12/18 -  Ayana Mcintyre, OTR/L Occupational Therapist OT                  OT Recommendation and Plan  Outcome Summary/Treatment Plan (OT)  Anticipated Discharge Disposition (OT): inpatient rehabilitation facility, home with home health  Planned Therapy Interventions (OT Eval): activity tolerance training, adaptive equipment training, BADL retraining, functional balance retraining, occupation/activity based interventions, orthotic fabrication/fitting/training, patient/caregiver education/training, transfer/mobility retraining  Therapy Frequency (OT Eval): 5 times/wk  Plan of Care Review  Plan of Care Reviewed With: patient, spouse  Plan of Care Reviewed With: patient, spouse  Outcome Summary: OT re-eval completed.  Pt. required Min A for sit to stand & ambulation.  He required Max A during simulated LBD.  He reports increased levels of pain during activity.  He wishes to go to rehab after this hospitalization.  Rec he cont OT tx & DC to inpatient rehab vs. SNF.    Outcome Measures     Row Name 12/11/18 1109 12/11/18 1108 12/09/18 0900       How much help from another person do you currently need...    Turning from your back to your side while in flat bed without using bedrails?  --  3  -MS  3  -CW    Moving from lying on back to sitting on the side of a flat bed without bedrails?  --  3  -MS  3  -CW    Moving to and from a bed to a chair (including a wheelchair)?  --  3  -MS  3  -CW    Standing up from a chair using your arms (e.g., wheelchair, bedside chair)?  --  3  -MS  3  -CW    Climbing 3-5 steps with a railing?  --  2  -MS  3  -CW    To walk in hospital room?  --  3  -MS  3  -CW    AM-PAC 6 Clicks Score  --  17  -MS  18  -CW       How much help from another is currently needed...    Putting on and taking off regular lower body clothing?  2  -CH  --  --    Bathing (including washing, rinsing, and  drying)  2  -CH  --  --    Toileting (which includes using toilet bed pan or urinal)  3  -CH  --  --    Putting on and taking off regular upper body clothing  3  -CH  --  --    Taking care of personal grooming (such as brushing teeth)  4  -CH  --  --    Eating meals  4  -CH  --  --    Score  18  -CH  --  --       Functional Assessment    Outcome Measure Options  AM-PAC 6 Clicks Daily Activity (OT)  -CH  AM-PAC 6 Clicks Basic Mobility (PT)  -MS  AM-PAC 6 Clicks Basic Mobility (PT)  -CW      User Key  (r) = Recorded By, (t) = Taken By, (c) = Cosigned By    Initials Name Provider Type    Deepti Smallwood, OTR/L Occupational Therapist    Sarah Betancourt, PT, DPT, NCS Physical Therapist    CW Elsie Bosch, PTA Physical Therapy Assistant          Time Calculation:   Time Calculation- OT     Row Name 12/11/18 1316 12/11/18 0950          Time Calculation- OT    OT Start Time  1109 add 10 min for chart review  -  --  -     OT Stop Time  1130  -  --  -     OT Time Calculation (min)  21 min  -  --  -     OT Received On  12/11/18  -  --  -     OT Goal Re-Cert Due Date  12/21/18  -  --       User Key  (r) = Recorded By, (t) = Taken By, (c) = Cosigned By    Initials Name Provider Type    Deepti Smallwood, OTR/L Occupational Therapist        Therapy Suggested Charges     Code   Minutes Charges    None           Therapy Charges for Today     Code Description Service Date Service Provider Modifiers Qty    [id number]  OT SELFCARE CURRENT 12/11/2018 Deepti Buckner OTR/L GO, CK 1    [id number]  OT SELFCARE PROJECTED 12/11/2018 Deepti Buckner OTR/L GO, CJ 1    [id number]  OT RE-EVAL 2 12/11/2018 Deepti Buckner OTR/L GO, KX 1          OT G-codes  OT Professional Judgement Used?: Yes  OT Functional Scales Options: AM-PAC 6 Clicks Daily Activity (OT)  Score: 18  Functional Limitation: Self care  Self Care Current Status (): At least 40 percent but less than 60 percent impaired, limited or  restricted  Self Care Goal Status (): At least 20 percent but less than 40 percent impaired, limited or restricted    Deepti Buckner, OTR/L  12/11/2018

## 2018-12-11 NOTE — PLAN OF CARE
Problem: Patient Care Overview  Goal: Plan of Care Review  Outcome: Ongoing (interventions implemented as appropriate)   12/11/18 8067   Coping/Psychosocial   Plan of Care Reviewed With patient;spouse   Plan of Care Review   Progress no change   OTHER   Outcome Summary OT re-eval completed. Pt. required Min A for sit to stand & ambulation. He required Max A during simulated LBD. He reports increased levels of pain during activity. He wishes to go to rehab after this hospitalization. Rec he cont OT tx & DC to inpatient rehab vs. SNF.

## 2018-12-11 NOTE — PROGRESS NOTES
Continued Stay Note  ELLYN Sheriff     Patient Name: Chad Henriquez  MRN: [id number]  Today's Date: 12/11/2018    Admit Date: 12/7/2018    Discharge Plan     Row Name 12/11/18 0812       Plan    Plan  Referral to [facility]     Patient/Family in Agreement with Plan  yes    Plan Comments  Patient and wife request referral to [facility]. SW faxed to TommieJackson in admissions at [phone]. Will follow for Randal to evaluate patient.                  FESTUS TerryW

## 2018-12-11 NOTE — PLAN OF CARE
Problem: Patient Care Overview  Goal: Plan of Care Review  Outcome: Ongoing (interventions implemented as appropriate)   12/11/18 1393   Coping/Psychosocial   Plan of Care Reviewed With patient   Plan of Care Review   Progress improving   OTHER   Outcome Summary Pt c/o pain w/ relief from PRN pain meds. Upx1 w/ walker and brace. abdominal incision CDI. Back incision intact w/ dried drainage marked. No numbness or tingling noted. Voiding.  VSS. Safety maintained.       Problem: Fall Risk (Adult)  Goal: Absence of Fall  Outcome: Ongoing (interventions implemented as appropriate)      Problem: Laminectomy/Foraminotomy/Discectomy (Adult)  Goal: Signs and Symptoms of Listed Potential Problems Will be Absent, Minimized or Managed (Laminectomy/Foraminotomy/Discectomy)  Outcome: Ongoing (interventions implemented as appropriate)

## 2018-12-11 NOTE — THERAPY RE-EVALUATION
Acute Care - Physical Therapy Re-Evaluation  [facility]     Patient Name: Chad Henriquez  : 1929  MRN: [id number]  Today's Date: 2018   Onset of Illness/Injury or Date of Surgery: 18  Date of Referral to PT: 18  Referring Physician: Dr. Falcon      Admit Date: 2018    Visit Dx:     ICD-10-CM ICD-9-CM   1. Spinal stenosis, lumbar region, with neurogenic claudication M48.062 724.03   2. Impaired functional mobility, balance, gait, and endurance Z74.09 V49.89   3. Decreased activities of daily living (ADL) R68.89 780.99   4. Degeneration of lumbar or lumbosacral intervertebral disc M51.37 722.52     Patient Active Problem List   Diagnosis   • BPH with urinary obstruction   • Frequency of urination   • Nocturia   • OAB (overactive bladder)   • Non-smoker   • Normal body mass index (BMI)   • Spinal stenosis, lumbar region, with neurogenic claudication   • Left leg pain   • Bilateral hip pain   • Acute left-sided low back pain with left-sided sciatica   • Essential tremor   • BMI 21.0-21.9, adult   • Spinal stenosis   • Degeneration of lumbar or lumbosacral intervertebral disc     Past Medical History:   Diagnosis Date   • Allergic rhinitis    • Anemia    • Arthritis    • BPH (benign prostatic hypertrophy) with urinary retention    • Cancer (CMS/HCC)     skin cancer   • Chronic back pain     RUNS DOWN BOTH LEGS   • Constipation    • Coronary artery disease    • Hard of hearing    • Heart attack (CMS/HCC)     NO MUSCLE DAMAGE - JUST OCCLUDED VALVE   • High cholesterol    • History of skin cancer     BILATERAL EARS   • History of transfusion        • Hypertension    • Inguinal hernia    • Leaky heart valve     DR CONCEPCION SAYS NOT ENOUGH TO BE OF CONCERN   • Other bursal cyst, right elbow    • PONV (postoperative nausea and vomiting)     has had scopalamine patch in past    • Sleep apnea     DOES NOT USE CPAP OR BIPAP   • Spinal stenosis of cervical region    • Spinal stenosis of  lumbar region    • Tremors of nervous system    • Wears hearing aid     BILATERAL     Past Surgical History:   Procedure Laterality Date   • APPENDECTOMY     • BACK SURGERY      X3   • CARDIAC SURGERY  08/08/1986    X1 BYPASS   • CORONARY ANGIOPLASTY WITH STENT PLACEMENT  1997    X3 STENTS   • HERNIA REPAIR Bilateral     x2        PT ASSESSMENT (last 12 hours)      Physical Therapy Evaluation     Row Name 12/11/18 1108          PT Evaluation Time/Intention    Subjective Information  complains of;pain;weakness;dyspnea  -MS     Document Type  re-evaluation  -MS     Mode of Treatment  physical therapy  -MS     Patient Effort  good  -MS     Row Name 12/11/18 1108          General Information    Patient Profile Reviewed?  yes  -MS     Onset of Illness/Injury or Date of Surgery  12/07/18  -MS     Referring Physician  Dr. Falcon  -MS     Patient Observations  alert;cooperative;agree to therapy  -MS     Patient/Family Observations  wife present   -MS     General Observations of Patient  pt sitting up in the chair  -MS     Pertinent History of Current Functional Problem  LUMBAR LAMINECTOMY TRANSFORAMINAL LUMBAR INTERBODY FUSION L34,45  -MS     Limitations/Impairments  hearing  -MS     Risks Reviewed  patient:;spouse/S.O.:;LOB;nausea/vomiting;dizziness;increased discomfort  -MS     Benefits Reviewed  patient:;spouse/S.O.:;improve function;increase independence;increase strength;increase balance;decrease pain;increase knowledge  -MS     Barriers to Rehab  physical barrier  -MS     Row Name 12/11/18 110          Cognitive Assessment/Intervention- PT/OT    Affect/Mental Status (Cognitive)  WNL  -MS     Orientation Status (Cognition)  oriented to;place;situation  -MS     Follows Commands (Cognition)  WFL  -MS     Personal Safety Interventions  fall prevention program maintained;gait belt;muscle strengthening facilitated;nonskid shoes/slippers when out of bed;supervised activity  -MS     Row Name 12/11/18 1109          Safety  Issues, Functional Mobility    Impairments Affecting Function (Mobility)  balance;pain;strength  -MS     Row Name 12/11/18 1108          Bed Mobility Assessment/Treatment    Comment (Bed Mobility)  ppt sitting up in chair, he did state that it was difficult to get out of bed  -MS     Row Name 12/11/18 1108          Transfer Assessment/Treatment    Transfer Assessment/Treatment  sit-stand transfer;stand-sit transfer  -MS     Sit-Stand [place] (Transfers)  contact guard;verbal cues;nonverbal cues (demo/gesture)  -MS     Stand-Sit [place] (Transfers)  minimum assist (75% patient effort);verbal cues;nonverbal cues (demo/gesture)  -MS     Row Name 12/11/18 1108          Sit-Stand Transfer    Assistive Device (Sit-Stand Transfers)  walker, front-wheeled  -MS     Row Name 12/11/18 1108          Stand-Sit Transfer    Assistive Device (Stand-Sit Transfers)  walker, front-wheeled  -MS     Row Name 12/11/18 1108          Gait/Stairs Assessment/Training    [place] Level (Gait)  contact guard  -MS     Assistive Device (Gait)  walker, front-wheeled  -MS     Distance in Feet (Gait)  40ft, forward flexed posture, left footcleared floor with each step  -MS     Row Name 12/11/18 1108          General ROM    GENERAL ROM COMMENTS  all extremities WFL  -MS     Row Name 12/11/18 1108          MMT (Manual Muscle Testing)    General MMT Comments  R great toe ext 3+/5, R dorsiflexion 3+/4. L great toe ext 4/5, L dorsiflexion 4+/5  -MS     Row Name 12/11/18 1108          Static Sitting Balance    Level of [place] (Supported Sitting, Static Balance)  independent  -MS     Sitting Position (Supported Sitting, Static Balance)  sitting in chair  -MS     Row Name 12/11/18 1108          Static Standing Balance    Level of [place] (Supported Standing, Static Balance)  contact guard assist  -MS     Assistive Device Utilized (Supported Standing, Static Balance)  rolling walker  -MS     Row Name 12/11/18 1108          Sensory  Assessment/Intervention    Sensory General Assessment  no sensation deficits identified  -MS     Row Name 12/11/18 1108          Pain Assessment    Additional Documentation  Pain Scale: Numbers Pre/Post-Treatment (Group)  -MS     Row Name 12/11/18 1108          Pain Scale: Numbers Pre/Post-Treatment    Pain Scale: Numbers, Pretreatment  2/10  -MS     Pain Scale: Numbers, Post-Treatment  6/10  -MS     Pain Location  back radiates down into B hips L>R  -MS     Pre/Post Treatment Pain Comment  increased pain with standing and walking  -MS     Pain Intervention(s)  Medication (See MAR);Repositioned;Ambulation/increased activity  -MS     Row Name 12/11/18 1108          Spinal Orthosis Management    Type (Spinal Orthosis)  LSO (lumbar sacral orthosis)  -MS     Wearing Schedule (Spinal Orthosis)  wear when out of bed only  -MS     Orthosis Training (Spinal Orthosis)  patient;caregiver;all orthosis skills  -MS     Compliance/Wearing Issues (Spinal Orthosis)  patient/caregiver comprehend strategies  -MS     Row Name             Wound 12/07/18 1109 back incision    Wound - Properties Group Date first assessed: 12/07/18  -SWETA Time first assessed: 1109  -SWETA Location: back  -SWETA Type: incision  -SWETA    Row Name             Wound 12/10/18 1153 back incision    Wound - Properties Group Date first assessed: 12/10/18  -SWETA Time first assessed: 1153  -SWETA Location: back  -SWETA Type: incision  -SWETA    Row Name             Wound 12/10/18 2005 lower abdomen incision;surgical    Wound - Properties Group Date first assessed: 12/10/18  -TR Time first assessed: 2005  -TR Orientation: lower  -TR Location: abdomen  -TR Type: incision;surgical  -TR    Row Name 12/11/18 1108          Plan of Care Review    Plan of Care Reviewed With  patient;spouse  -MS     Row Name 12/11/18 1108          Physical Therapy Clinical Impression    Date of Referral to PT  12/11/18  -MS     Patient/Family Goals Statement (PT Clinical Impression)  go to inpt rehab, walk  better with less pain  -MS     Criteria for Skilled Interventions Met (PT Clinical Impression)  yes;treatment indicated  -MS     Pathology/Pathophysiology Noted (Describe Specifically for Each System)  neuromuscular  -MS     Impairments Found (describe specific impairments)  gait, locomotion, and balance;motor function  -MS     Rehab Potential (PT Clinical Summary)  good, to achieve stated therapy goals  -MS     Predicted Duration of Therapy (PT)  until discharge  -MS     Care Plan Review (PT)  evaluation/treatment results reviewed;care plan/treatment goals reviewed;risks/benefits reviewed;current/potential barriers reviewed;patient/other agree to care plan  -MS     Care Plan Review, Other Participant (PT Clinical Impression)  spouse  -MS     Row Name 12/11/18 1108          Vital Signs    Pre SpO2 (%)  93  -MS     O2 Delivery Pre Treatment  room air  -MS     Post SpO2 (%)  95  -MS     O2 Delivery Post Treatment  room air  -MS     Row Name 12/11/18 1108          Positioning and Restraints    Post Treatment Position  chair  -MS     In Chair  sitting;call light within reach;encouraged to call for assist;with family/caregiver;with brace  -MS       User Key  (r) = Recorded By, (t) = Taken By, (c) = Cosigned By    Initials Name Provider Type    MS Sarah Rainey R, PT, DPT, NCS Physical Therapist    TR Santa Lopez RN Registered Nurse    Martell Garcia, RN Registered Nurse        Physical Therapy Education     Title: PT OT SLP Therapies (In Progress)     Topic: Physical Therapy (In Progress)     Point: Mobility training (In Progress)     Learning Progress Summary           Patient Acceptance, E, NR by MS at 12/11/2018 12:03 PM    Comment:  pt unable to repeat any spinal restrictions. knows to wear brace when out of bed    Acceptance, E, VU,NR by CW at 12/9/2018  9:39 AM    Comment:  transfers, gait, body mechanics, plan of care    Acceptance, E,D, VU,DU by CW at 12/8/2018 10:05 AM    Comment:  transfers, gait, plan  of care, benefits of activity    Acceptance, E, VU by MS at 12/7/2018  4:32 PM    Comment:  role of PT in his care, spinal restrictions                   Point: Body mechanics (In Progress)     Learning Progress Summary           Patient Acceptance, E, NR by MS at 12/11/2018 12:03 PM    Comment:  pt unable to repeat any spinal restrictions. knows to wear brace when out of bed    Acceptance, E, VU,NR by CW at 12/9/2018  9:39 AM    Comment:  transfers, gait, body mechanics, plan of care    Acceptance, E,D, VU,DU by CW at 12/8/2018 10:05 AM    Comment:  transfers, gait, plan of care, benefits of activity                   Point: Precautions (In Progress)     Learning Progress Summary           Patient Acceptance, E, NR by MS at 12/11/2018 12:03 PM    Comment:  pt unable to repeat any spinal restrictions. knows to wear brace when out of bed    Acceptance, E, VU,NR by CW at 12/9/2018  9:39 AM    Comment:  transfers, gait, body mechanics, plan of care    Acceptance, E,D, VU,DU by CW at 12/8/2018 10:05 AM    Comment:  transfers, gait, plan of care, benefits of activity    Acceptance, E, VU by MS at 12/7/2018  4:32 PM    Comment:  role of PT in his care, spinal restrictions                               User Key     Initials Effective Dates Name Provider Type Discipline    MS 06/19/18 -  Sarah Rainey, PT, DPT, NCS Physical Therapist PT     06/22/15 -  Elsie Bosch PTA Physical Therapy Assistant PT              PT Recommendation and Plan  Anticipated Discharge Disposition (PT): inpatient rehabilitation facility, home with assist(pending progress)  Planned Therapy Interventions (PT Eval): balance training, bed mobility training, gait training, neuromuscular re-education, orthotic fitting/training, patient/family education, postural re-education, ROM (range of motion), strengthening, transfer training  Therapy Frequency (PT Clinical Impression): 2 times/day  Outcome Summary/Treatment Plan (PT)  Anticipated  Discharge Disposition (PT): inpatient rehabilitation facility, home with assist(pending progress)  Plan of Care Reviewed With: patient  Progress: improving  Outcome Summary: PT re-evaluation completed due to patient having a second surgery yesterday. The patient is slightly weaker today with right dorsiflexion and still limited in the distance he can walk due to back pain. He now requires the use of a rolling walker which he used a cane prior to surgery. He is unable to get out of bed on his own and requires assist with standing and wallking. PT will continue to work on strengthening, gait, and pain control. Inpt rehab is recommended upon discharge at this time.   Outcome Measures     Row Name 12/11/18 1108 12/09/18 0900          How much help from another person do you currently need...    Turning from your back to your side while in flat bed without using bedrails?  3  -MS  3  -CW     Moving from lying on back to sitting on the side of a flat bed without bedrails?  3  -MS  3  -CW     Moving to and from a bed to a chair (including a wheelchair)?  3  -MS  3  -CW     Standing up from a chair using your arms (e.g., wheelchair, bedside chair)?  3  -MS  3  -CW     Climbing 3-5 steps with a railing?  2  -MS  3  -CW     To walk in hospital room?  3  -MS  3  -CW     AM-PAC 6 Clicks Score  17  -MS  18  -CW        Functional Assessment    Outcome Measure Options  AM-PAC 6 Clicks Basic Mobility (PT)  -MS  AM-PAC 6 Clicks Basic Mobility (PT)  -CW       User Key  (r) = Recorded By, (t) = Taken By, (c) = Cosigned By    Initials Name Provider Type    Sarah Betancourt R, PT, DPT, NCS Physical Therapist    CW Elsie Bosch PTA Physical Therapy Assistant         Time Calculation:   PT Charges     Row Name 12/11/18 1205             Time Calculation    Start Time  1108  -MS      Stop Time  1131  -MS      Time Calculation (min)  23 min  -MS      PT Received On  12/11/18  -MS      PT Goal Re-Cert Due Date  12/21/18  -MS         User Key  (r) = Recorded By, (t) = Taken By, (c) = Cosigned By    Initials Name Provider Type    MS Sarah Rainey, PT, DPT, NCS Physical Therapist        Therapy Suggested Charges     Code   Minutes Charges    61143 (CPT®) Hc Pt Neuromusc Re Education Ea 15 Min      72114 (CPT®) Hc Pt Ther Proc Ea 15 Min      80604 (CPT®) Hc Gait Training Ea 15 Min 23 2    36900 (CPT®) Hc Pt Therapeutic Act Ea 15 Min      49069 (CPT®) Hc Pt Manual Therapy Ea 15 Min      11971 (CPT®) Hc Pt Iontophoresis Ea 15 Min      01956 (CPT®) Hc Pt Elec Stim Ea-Per 15 Min      80057 (CPT®) Hc Pt Ultrasound Ea 15 Min      93822 (CPT®) Hc Pt Self Care/Mgmt/Train Ea 15 Min      77744 (CPT®) Hc Pt Prosthetic (S) Train Initial Encounter, Each 15 Min      87428 (CPT®) Hc Pt Orthotic(S)/Prosthetic(S) Encounter, Each 15 Min      44506 (CPT®) Hc Orthotic(S) Mgmt/Train Initial Encounter, Each 15min      Total  23 2        Therapy Charges for Today     Code Description Service Date Service Provider Modifiers Qty    46033320127 HC PT MOBILITY CURRENT 12/11/2018 Sarah Rainey, PT, DPT, NCS GP, CK 1    93608616315 HC PT MOBILITY PROJECTED 12/11/2018 Sarah Rainey, PT, DPT, NCS GP, CJ 1    48841645808 HC PT RE-EVAL ESTABLISHED PLAN 2 12/11/2018 Sarah Rainey, PT, DPT, NCS GP, KX 1          PT G-Codes  Outcome Measure Options: AM-PAC 6 Clicks Basic Mobility (PT)  AM-PAC 6 Clicks Score: 17  Score: 18  Functional Limitation: Mobility: Walking and moving around  Mobility: Walking and Moving Around Current Status (): At least 40 percent but less than 60 percent impaired, limited or restricted  Mobility: Walking and Moving Around Goal Status (): At least 20 percent but less than 40 percent impaired, limited or restricted      Sarah Rainey, PT, DPT, NCS  12/11/2018

## 2018-12-11 NOTE — DISCHARGE PLACEMENT REQUEST
"Call Back: Keerthi Kat, [phone]    Luisa Henriquez (89 y.o. Male)     Date of Birth Social Security Number Address Home Phone MRN    11/09/1929  [address] [facility] [address] [phone] [id number]    M[employer]onism Marital Status          Shinto        Admission Date Admission Type Admitting Provider Attending Provider Department, Room/Bed    12/7/18 Elective Kj Falcon MD Gruber, Thomas J, MD [facility] 3A, 343/1    Discharge Date Discharge Disposition Discharge Destination                       Attending Provider:  Kj Falcon MD    Allergies:  Fentanyl, Codeine    Isolation:  None   Infection:  None   Code Status:  CPR    Ht:  173 cm (68.11\")   Wt:  73.3 kg (161 lb 9.6 oz)    Admission Cmt:  None   Principal Problem:  Spinal stenosis, lumbar region, with neurogenic claudication [M48.062]                 Active Insurance as of 12/7/2018     Primary Coverage     Payor Plan Insurance Group Employer/Plan Group    [facility]      Payor Plan Address Payor Plan Phone Number Payor Plan Fax Number Effective Dates    [address] [phone]  11/1/1994 - None Entered    [facility] [id number]       Subscriber Name Subscriber Birth Date Member ID       LUISA HENRIQUEZ CHAPINCITO 11/9/1929 [id number]           Secondary Coverage     Payor Plan Insurance Group Employer/Plan Group    [employer]      Payor Plan Address Payor Plan Phone Number Payor Plan Fax Number Effective Dates    [address] [phone]  12/1/2013 - None Entered    [facility] [address]       Subscriber Name Subscriber Birth Date Member ID       BEAN HENRIQUEZDAYNA BECKHAM 11/9/1929 [id number]                 Emergency Contacts      (Rel.) Home Phone Work Phone Mobile Phone    Eva Henriquez (Spouse) [phone] -- [phone]            Insurance Information                MEDICARE/RAILRO[employer] MEDICARE Phone: [phone]    Subscriber: Luisa Henriquez " J Subscriber#: [id number]    Group#:  Precert#:         [employer] [employer] Phone: [phone]    Subscriber: Chad Henriquez Subscriber#: [id number]    Group#:  Precert#:

## 2018-12-11 NOTE — PROGRESS NOTES
Continued Stay Note   [place]     Patient Name: Chad Henriquez  MRN: [id number]  Today's Date: 12/11/2018    Admit Date: 12/7/2018    Discharge Plan     Row Name 12/11/18 1535       Plan    Plan  [facility]    Patient/Family in Agreement with Plan  yes    Plan Comments  Sadona with [facility] has accepted patient and can take tomorrow if patient is ready for discharge. SW notified EDIE Rahman.                  FESTUS TerryW

## 2018-12-11 NOTE — PLAN OF CARE
Problem: Patient Care Overview  Goal: Plan of Care Review  Outcome: Ongoing (interventions implemented as appropriate)   12/11/18 1203   Coping/Psychosocial   Plan of Care Reviewed With patient   Plan of Care Review   Progress improving   OTHER   Outcome Summary PT re-evaluation completed due to patient having a second surgery yesterday. The patient is slightly weaker today with right dorsiflexion and still limited in the distance he can walk due to back pain. He now requires the use of a rolling walker which he used a cane prior to surgery. He is unable to get out of bed on his own and requires assist with standing and wallking. PT will continue to work on strengthening, gait, and pain control. Inpt rehab is recommended upon discharge at this time.

## 2018-12-11 NOTE — PLAN OF CARE
Problem: Patient Care Overview  Goal: Plan of Care Review  Outcome: Ongoing (interventions implemented as appropriate)   12/11/18 0245   Coping/Psychosocial   Plan of Care Reviewed With patient   Plan of Care Review   Progress no change   OTHER   Outcome Summary Patient c/o pain, prn pain meds given as ordered. no neuro changes, A&O x4, OLMSTEAD, dressing to abd CDI, Dressing to back small dried drainage. VSS, Safety maintained.       Problem: Fall Risk (Adult)  Goal: Absence of Fall  Outcome: Ongoing (interventions implemented as appropriate)      Problem: Laminectomy/Foraminotomy/Discectomy (Adult)  Goal: Signs and Symptoms of Listed Potential Problems Will be Absent, Minimized or Managed (Laminectomy/Foraminotomy/Discectomy)  Outcome: Ongoing (interventions implemented as appropriate)

## 2018-12-11 NOTE — PROGRESS NOTES
"Chad Henriquez  89 y.o.      Chief complaint:   Back  And leg pain    Subjective  No events    Temp:  [97.5 °F (36.4 °C)-98.6 °F (37 °C)] 97.7 °F (36.5 °C)  Heart Rate:  [64-81] 70  Resp:  [14-20] 18  BP: (146-187)/(50-78) 149/57      Objective:  General Appearance:  Comfortable, well-appearing, in no acute distress and in pain.    Vital signs: (most recent): Blood pressure 149/57, pulse 70, temperature 97.7 °F (36.5 °C), temperature source Oral, resp. rate 18, height 173 cm (68.11\"), weight 73.3 kg (161 lb 9.6 oz), SpO2 98 %.  Vital signs are normal.  No fever.    Output: Producing urine.    HEENT: Normal HEENT exam.    Lungs:  Normal effort and normal respiratory rate.  He is not in respiratory distress.    Heart: Normal rate.  Regular rhythm.    Chest: Symmetric chest wall expansion.   Skin:  Warm and dry.    Abdomen: Abdomen is soft and distended.  Bowel sounds are normal.     Pulses: Distal pulses are intact.        Neurologic Exam     Mental Status   Oriented to person, place, and time.   Attention: normal. Concentration: normal.   Speech: speech is normal   Level of consciousness: alert    Cranial Nerves   Cranial nerves II through XII intact.     Motor Exam   Muscle bulk: normal    Strength   Strength 5/5 throughout.     Sensory Exam   Light touch normal.         Spinal stenosis, lumbar region, with neurogenic claudication    Spinal stenosis    Degeneration of lumbar or lumbosacral intervertebral disc      Lab Results (last 24 hours)     Procedure Component Value Units Date/Time    Tissue Pathology Exam [[id number]] Collected:  12/07/18 0901    Specimen:  Disc from Spine, Lumbar Updated:  12/10/18 1634     Case Report --     Surgical Pathology Report                         Case: [id number]                                  Authorizing Provider:  Kj Falcon MD       Collected:           12/07/2018 09:01 AM          Ordering Location:     [facility] OR  Received:            12/07/2018 12:38 " "PM          Pathologist:           Cayla Tierney MD                                                         Specimen:    Spine, Lumbar, L5-S1                                                                        Final Diagnosis --     Intravertebral disc, L5-S1, discectomy:  Fragments of benign cartilage with degenerative changes.       Gross Description --     One specimen is received in formalin labeled with the patient's name and date of birth.  The specimen is further designated as \"L5-S1\" and consists of multiple soft tissue and hard fragments ranging in size from 0.5 up to 2.4 cm in greatest dimension.  Representative sections are submitted for decalcification.       Microscopic Description --     Histologic sections demonstrate benign cartilage.  Degenerative changes are present.                  Plan:   Continue with physical therapy and occupational therapy.  We will have  see the patient for rehabilitation placement.  Continue with current medical plan.    EDIE Maloney    "

## 2018-12-12 LAB
BILIRUB UR QL STRIP: NEGATIVE
CLARITY UR: CLEAR
COLOR UR: YELLOW
GLUCOSE UR STRIP-MCNC: NEGATIVE MG/DL
HGB UR QL STRIP.AUTO: NEGATIVE
KETONES UR QL STRIP: ABNORMAL
LEUKOCYTE ESTERASE UR QL STRIP.AUTO: NEGATIVE
NITRITE UR QL STRIP: NEGATIVE
PH UR STRIP.AUTO: 6 [PH] (ref 5–8)
PROT UR QL STRIP: ABNORMAL
SP GR UR STRIP: >=1.03 (ref 1–1.03)
UROBILINOGEN UR QL STRIP: ABNORMAL

## 2018-12-12 PROCEDURE — 97535 SELF CARE MNGMENT TRAINING: CPT

## 2018-12-12 PROCEDURE — 97116 GAIT TRAINING THERAPY: CPT

## 2018-12-12 PROCEDURE — 99024 POSTOP FOLLOW-UP VISIT: CPT | Performed by: NURSE PRACTITIONER

## 2018-12-12 PROCEDURE — P9612 CATHETERIZE FOR URINE SPEC: HCPCS

## 2018-12-12 PROCEDURE — 25010000002 CEFAZOLIN PER 500 MG: Performed by: NEUROLOGICAL SURGERY

## 2018-12-12 PROCEDURE — 81003 URINALYSIS AUTO W/O SCOPE: CPT | Performed by: NEUROLOGICAL SURGERY

## 2018-12-12 RX ORDER — BISACODYL 10 MG
10 SUPPOSITORY, RECTAL RECTAL DAILY
Status: DISCONTINUED | OUTPATIENT
Start: 2018-12-12 | End: 2018-12-13 | Stop reason: HOSPADM

## 2018-12-12 RX ORDER — ALFUZOSIN HYDROCHLORIDE 10 MG/1
10 TABLET, EXTENDED RELEASE ORAL NIGHTLY
Status: DISCONTINUED | OUTPATIENT
Start: 2018-12-12 | End: 2018-12-13 | Stop reason: HOSPADM

## 2018-12-12 RX ADMIN — FINASTERIDE 5 MG: 5 TABLET, FILM COATED ORAL at 09:53

## 2018-12-12 RX ADMIN — POLYETHYLENE GLYCOL (3350) 17 G: 17 POWDER, FOR SOLUTION ORAL at 09:53

## 2018-12-12 RX ADMIN — SODIUM CHLORIDE, POTASSIUM CHLORIDE, SODIUM LACTATE AND CALCIUM CHLORIDE 100 ML/HR: 600; 310; 30; 20 INJECTION, SOLUTION INTRAVENOUS at 15:23

## 2018-12-12 RX ADMIN — BISACODYL 10 MG: 10 SUPPOSITORY RECTAL at 10:11

## 2018-12-12 RX ADMIN — SODIUM CHLORIDE, PRESERVATIVE FREE 3 ML: 5 INJECTION INTRAVENOUS at 09:53

## 2018-12-12 RX ADMIN — PRIMIDONE 25 MG: 50 TABLET ORAL at 23:22

## 2018-12-12 RX ADMIN — FUROSEMIDE 40 MG: 40 TABLET ORAL at 09:52

## 2018-12-12 RX ADMIN — CEFAZOLIN SODIUM 2 G: 10 INJECTION, POWDER, FOR SOLUTION INTRAVENOUS at 15:23

## 2018-12-12 RX ADMIN — SODIUM CHLORIDE, PRESERVATIVE FREE 3 ML: 5 INJECTION INTRAVENOUS at 23:23

## 2018-12-12 RX ADMIN — CEFAZOLIN SODIUM 2 G: 10 INJECTION, POWDER, FOR SOLUTION INTRAVENOUS at 06:19

## 2018-12-12 RX ADMIN — DULOXETINE HYDROCHLORIDE 60 MG: 30 CAPSULE, DELAYED RELEASE ORAL at 09:52

## 2018-12-12 RX ADMIN — ALFUZOSIN HYDROCHLORIDE 10 MG: 10 TABLET, EXTENDED RELEASE ORAL at 23:23

## 2018-12-12 RX ADMIN — METOPROLOL SUCCINATE 25 MG: 25 TABLET, FILM COATED, EXTENDED RELEASE ORAL at 23:22

## 2018-12-12 NOTE — PROGRESS NOTES
"Chad BECKHAM [place]  89 y.o.      Chief complaint:   Back pain    Subjective  Patient had an in out catheters done twice.  Still unable to void.  We will resume his home at of [facility].  We will place Garcia catheter if unable to void still    Temp:  [98 °F (36.7 °C)-99.1 °F (37.3 °C)] 98.4 °F (36.9 °C)  Heart Rate:  [69-89] 76  Resp:  [18-24] 18  BP: (142-165)/(47-56) 163/53      Objective:  General Appearance:  Comfortable, well-appearing, in no acute distress and in pain.    Vital signs: (most recent): Blood pressure 163/53, pulse 76, temperature 98.4 °F (36.9 °C), temperature source Oral, resp. rate 18, height 173 cm (68.11\"), weight 73.3 kg (161 lb 9.6 oz), SpO2 94 %.  Vital signs are normal.  No fever.    HEENT: Normal HEENT exam.    Lungs:  Normal effort and normal respiratory rate.  He is not in respiratory distress.    Heart: Normal rate.  Regular rhythm.    Chest: Symmetric chest wall expansion.   Skin:  Warm and dry.    Abdomen: Abdomen is soft and distended.  Bowel sounds are normal.     Pulses: Distal pulses are intact.        Neurologic Exam     Mental Status   Oriented to person.   Disoriented to place.   Attention: normal. Concentration: normal.   Speech: speech is normal   Level of consciousness: alert    Cranial Nerves   Cranial nerves II through XII intact.     Motor Exam   Muscle bulk: normal    Strength   Strength 5/5 throughout.     Sensory Exam   Light touch normal.         Spinal stenosis, lumbar region, with neurogenic claudication    Spinal stenosis    Degeneration of lumbar or lumbosacral intervertebral disc      Lab Results (last 24 hours)     Procedure Component Value Units Date/Time    Urinalysis With Culture If Indicated - Urine, Catheter In/Out [[id number]]  (Abnormal) Collected:  12/12/18 0223    Specimen:  Urine, Catheter In/Out Updated:  12/12/18 0235     Color, UA Yellow     Appearance, UA Clear     pH, UA 6.0     Specific Gravity, UA >=1.030     Glucose, UA Negative     Ketones, UA " Trace     Bilirubin, UA Negative     Blood, UA Negative     Protein, UA Trace     Leuk Esterase, UA Negative     Nitrite, UA Negative     Urobilinogen, UA 1.0 E.U./dL    Narrative:       Urine microscopic not indicated.    Tissue Pathology Exam [[id number]] Collected:  12/10/18 0900    Specimen:  Disc from Spine, Lumbar Updated:  12/11/18 0919              Plan:   Patient continues to do well.  Some confusion noted this morning.  Continue with physical therapy occupational therapy.  We will keep the patient here today to try manages some of these issues such as his urination.  If he has a good day today we will consider sending him to rehabilitation tomorrow.    Josiah Robles, APRN

## 2018-12-12 NOTE — THERAPY TREATMENT NOTE
Acute Care - Occupational Therapy Treatment Note  [facility]     Patient Name: Chad Henriquez  : 1929  MRN: [id number]  Today's Date: 2018  Onset of Illness/Injury or Date of Surgery: 18  Date of Referral to OT: 18  Referring Physician: Dr. Falcon    Admit Date: 2018       ICD-10-CM ICD-9-CM   1. Spinal stenosis, lumbar region, with neurogenic claudication M48.062 724.03   2. Impaired functional mobility, balance, gait, and endurance Z74.09 V49.89   3. Decreased activities of daily living (ADL) R68.89 780.99   4. Degeneration of lumbar or lumbosacral intervertebral disc M51.37 722.52     Patient Active Problem List   Diagnosis   • BPH with urinary obstruction   • Frequency of urination   • Nocturia   • OAB (overactive bladder)   • Non-smoker   • Normal body mass index (BMI)   • Spinal stenosis, lumbar region, with neurogenic claudication   • Left leg pain   • Bilateral hip pain   • Acute left-sided low back pain with left-sided sciatica   • Essential tremor   • BMI 21.0-21.9, adult   • Spinal stenosis   • Degeneration of lumbar or lumbosacral intervertebral disc     Past Medical History:   Diagnosis Date   • Allergic rhinitis    • Anemia    • Arthritis    • BPH (benign prostatic hypertrophy) with urinary retention    • Cancer (CMS/HCC)     skin cancer   • Chronic back pain     RUNS DOWN BOTH LEGS   • Constipation    • Coronary artery disease    • Hard of hearing    • Heart attack (CMS/HCC)     NO MUSCLE DAMAGE - JUST OCCLUDED VALVE   • High cholesterol    • History of skin cancer     BILATERAL EARS   • History of transfusion        • Hypertension    • Inguinal hernia    • Leaky heart valve     DR CONCEPCION SAYS NOT ENOUGH TO BE OF CONCERN   • Other bursal cyst, right elbow    • PONV (postoperative nausea and vomiting)     has had scopalamine patch in past    • Sleep apnea     DOES NOT USE CPAP OR BIPAP   • Spinal stenosis of cervical region    • Spinal stenosis of lumbar  region    • Tremors of nervous system    • Wears hearing aid     BILATERAL     Past Surgical History:   Procedure Laterality Date   • APPENDECTOMY     • BACK SURGERY      X3   • CARDIAC SURGERY  08/08/1986    X1 BYPASS   • CORONARY ANGIOPLASTY WITH STENT PLACEMENT  1997    X3 STENTS   • HERNIA REPAIR Bilateral     x2       Therapy Treatment    Rehabilitation Treatment Summary     Row Name 12/12/18 1055 12/12/18 0843          Treatment Time/Intention    Discipline  physical therapy assistant  -KJ  occupational therapy assistant  -TS     Document Type  therapy note (daily note)  -KJ  therapy note (daily note)  -TS     Subjective Information  no complaints  -KJ  no complaints  -TS2     Mode of Treatment  physical therapy  -KJ  --     Patient/Family Observations  spouse present  -KJ  spouse present   -TS2     Patient Effort  good  -KJ  good  -TS2     Comment  confused today; has not slept in 48 hours  -KJ  confusion, hallucinations  -TS2     Existing Precautions/Restrictions  brace worn when out of bed;fall;spinal  -KJ  brace worn when out of bed;fall;spinal  -TS2     Treatment Considerations/Comments  LSO  -KJ  LSO  -TS2     Recorded by [KJ] Luz Montalvo, PTA 12/12/18 1121 [TS] Kitty Villeda, ALVAREZ/L 12/12/18 0843  [TS2] Kitty Villeda, ALVAREZ/L 12/12/18 1324     Row Name 12/12/18 1055             Vital Signs    Post SpO2 (%)  93  -KJ      O2 Delivery Post Treatment  room air  -KJ      Recorded by [KJ] Luz Montalvo, PTA 12/12/18 1121      Row Name 12/12/18 0843             Cognitive Assessment/Intervention- PT/OT    Personal Safety Interventions  fall prevention program maintained;gait belt;nonskid shoes/slippers when out of bed  -TS      Recorded by [TS] Kitty Villeda, ALVAREZ/L 12/12/18 1324      Row Name 12/12/18 1055 12/12/18 0843          Bed Mobility Assessment/Treatment    Bed Mobility Assessment/Treatment  --  sidelying-sit  -TS     Sidelying-Sit [place] (Bed Mobility)  --   supervision;verbal cues  -TS     Sit-Sidelying [place] (Bed Mobility)  verbal cues;contact guard  -KJ  --     Assistive Device (Bed Mobility)  bed rails  -KJ  bed rails  -TS     Recorded by [KJ] Luz Montalvo, PTA 12/12/18 1121 [TS] Kitty Villeda ALVAREZ/L 12/12/18 1324     Row Name 12/12/18 0843             Functional Mobility    Functional Mobility- Ind. Level  contact guard assist  -TS      Functional Mobility- Device  rolling walker  -TS      Functional Mobility- Comment  in room  -TS      Recorded by [TS] Kitty Villeda ALVAREZ/L 12/12/18 1324      Row Name 12/12/18 0843             Transfer Assessment/Treatment    Transfer Assessment/Treatment  sit-stand transfer;stand-sit transfer  -TS      Recorded by [TS] Kitty Villeda ALVAREZ/L 12/12/18 1324      Row Name 12/12/18 1055 12/12/18 0843          Sit-Stand Transfer    Sit-Stand [place] (Transfers)  -- up in BR with nursing  -KJ  contact guard  -TS     Assistive Device (Sit-Stand Transfers)  --  walker, front-wheeled  -TS     Recorded by [KJ] Luz Montalvo, PTA 12/12/18 1121 [TS] Kitty Villeda ALVAREZ/L 12/12/18 1324     Row Name 12/12/18 1055 12/12/18 0843          Stand-Sit Transfer    Stand-Sit [place] (Transfers)  verbal cues;contact guard  -KJ  contact guard  -TS     Assistive Device (Stand-Sit Transfers)  --  walker, front-wheeled  -TS     Recorded by [KJ] Luz Montalvo, PTA 12/12/18 1121 [TS] Kitty Villeda ALVAREZ/L 12/12/18 1324     Row Name 12/12/18 1055             Gait/Stairs Assessment/Training    [place] Level (Gait)  verbal cues;contact guard;minimum assist (75% patient effort)  -KJ      Assistive Device (Gait)  walker, front-wheeled  -KJ      Distance in Feet (Gait)  45' x 2  -KJ      Pattern (Gait)  step-through  -KJ      Bilateral Gait Deviations  forward flexed posture  -KJ      Comment (Gait/Stairs)  cues for don/doffing LSO, cueing for gait mechanics/assistive device placement  -KJ       Recorded by [KJ] Luz Montalvo, PTA 12/12/18 1121      Row Name 12/12/18 0843             ADL Assessment/Intervention    BADL Assessment/Intervention  lower body dressing;upper body dressing  -TS      Recorded by [TS] Kitty Villeda COTA/L 12/12/18 1324      Row Name 12/12/18 0843             Upper Body Dressing Assessment/Training    Upper Body Dressing [place] Level  don;set up;moderate assist (50% patient effort) LSO  -TS      Upper Body Dressing Position  edge of bed sitting  -TS      Recorded by [TS] Kitty Villeda ALVAREZ/L 12/12/18 1324      Row Name 12/12/18 0843             Lower Body Dressing Assessment/Training    Lower Body Dressing [place] Level  don;undergarment;moderate assist (50% patient effort)  -TS      Lower Body Dressing Position  edge of bed sitting;supported standing  -TS      Recorded by [TS] Kitty Villeda COTA/L 12/12/18 1324      Row Name 12/12/18 1055 12/12/18 0843          Positioning and Restraints    Pre-Treatment Position  bathroom  -KJ  in bed  -TS     Post Treatment Position  bed  -KJ  chair  -TS     In Chair  --  sitting;call light within reach;encouraged to call for assist;with family/caregiver;with brace  -TS     Recorded by [KJ] Luz Montalvo, PTA 12/12/18 1121 [TS] Kitty Villeda ALVAREZ/L 12/12/18 1324     Row Name 12/12/18 1055 12/12/18 0843          Pain Scale: Numbers Pre/Post-Treatment    Pain Scale: Numbers, Pretreatment  3/10  -KJ  0/10 - no pain  -TS     Pain Scale: Numbers, Post-Treatment  3/10  -KJ  --     Pain Location  back  -KJ  --     Recorded by [KJ] Luz Montalvo, PTA 12/12/18 1121 [TS] Kitty Villeda ALVAREZ/L 12/12/18 1324     Row Name                Wound 12/07/18 1109 back incision    Wound - Properties Group Date first assessed: 12/07/18 [SWETA] Time first assessed: 1109 [SWETA] Location: back [SWETA] Type: incision [SWETA] Recorded by:  [SWETA] Martell Oropeza RN 12/07/18 1109    Row Name                Wound 12/10/18 1153 back  incision    Wound - Properties Group Date first assessed: 12/10/18 [SWETA] Time first assessed: 1153 [SWETA] Location: back [SWETA] Type: incision [SWETA] Recorded by:  [SWETA] Martell Oropeza RN 12/10/18 1153    Row Name                Wound 12/10/18 2005 lower abdomen incision;surgical    Wound - Properties Group Date first assessed: 12/10/18 [TR] Time first assessed: 2005 [TR] Orientation: lower [TR] Location: abdomen [TR] Type: incision;surgical [TR] Recorded by:  [TR] Santa Lopez RN 12/10/18 2014    Row Name 12/12/18 0843             Outcome Summary/Treatment Plan (OT)    Daily Summary of Progress (OT)  progress toward functional goals is good  -TS      Recorded by [TS] Kitty Villeda ALVAREZ/L 12/12/18 1324        User Key  (r) = Recorded By, (t) = Taken By, (c) = Cosigned By    Initials Name Effective Dates Discipline    KJ Luz Montalvo, PTA 08/02/16 -  PT    TS Kitty Villeda ALVAREZ/L 08/02/16 -  OT    TR Santa Lopez RN 08/02/16 -  Nurse    SWETA Martell Oropeza RN 08/02/16 -  Nurse        Wound 12/07/18 1109 back incision (Active)   Dressing Appearance intact;dried drainage 12/11/2018  9:20 PM   Drainage Characteristics/Odor sanguineous 12/11/2018  9:20 PM   Drainage Amount small 12/11/2018  9:20 PM   Dressing Care, Wound gauze 12/11/2018  9:20 PM       Wound 12/10/18 1153 back incision (Active)   Dressing Appearance intact;dried drainage 12/11/2018  9:20 PM   Drainage Characteristics/Odor sanguineous 12/11/2018  9:20 PM   Drainage Amount small 12/11/2018  9:20 PM   Dressing Care, Wound gauze 12/11/2018  9:20 PM       Wound 12/10/18 2005 lower abdomen incision;surgical (Active)   Dressing Appearance dry;intact;no drainage 12/11/2018  9:20 PM   Drainage Amount none 12/11/2018  9:20 PM   Dressing Care, Wound gauze 12/11/2018  9:20 PM       Occupational Therapy Education     Title: PT OT SLP Therapies (In Progress)     Topic: Occupational Therapy (In Progress)     Point: ADL training (Done)      Description: Instruct learner(s) on proper safety adaptation and remediation techniques during self care or transfers.   Instruct in proper use of assistive devices.    Learning Progress Summary           Patient Acceptance, E, VU,NR by ANA at 12/8/2018 10:27 AM    Comment:  Pt educated on the benefits and role of OT, POC, adl modifications, LSO wear schedule/fitting/mgt, spinal precuations, adaptive equipment use, home/environmental modifications.                   Point: Precautions (Done)     Description: Instruct learner(s) on prescribed precautions during self-care and functional transfers.    Learning Progress Summary           Patient Acceptance, E, VU,NR by ANA at 12/8/2018 10:27 AM    Comment:  Pt educated on the benefits and role of OT, POC, adl modifications, LSO wear schedule/fitting/mgt, spinal precuations, adaptive equipment use, home/environmental modifications.                   Point: Body mechanics (Done)     Description: Instruct learner(s) on proper positioning and spine alignment during self-care, functional mobility activities and/or exercises.    Learning Progress Summary           Patient Acceptance, E, VU,NR by ANA at 12/8/2018 10:27 AM    Comment:  Pt educated on the benefits and role of OT, POC, adl modifications, LSO wear schedule/fitting/mgt, spinal precuations, adaptive equipment use, home/environmental modifications.                               User Key     Initials Effective Dates Name Provider Type Discipline     10/12/18 -  Ayana Mcintyre OTR/L Occupational Therapist OT                OT Recommendation and Plan  Outcome Summary/Treatment Plan (OT)  Daily Summary of Progress (OT): progress toward functional goals is good  Daily Summary of Progress (OT): progress toward functional goals is good  Outcome Measures     Row Name 12/12/18 1300 12/11/18 1109 12/11/18 1108       How much help from another person do you currently need...    Turning from your back to your side while in  flat bed without using bedrails?  --  --  3  -MS    Moving from lying on back to sitting on the side of a flat bed without bedrails?  --  --  3  -MS    Moving to and from a bed to a chair (including a wheelchair)?  --  --  3  -MS    Standing up from a chair using your arms (e.g., wheelchair, bedside chair)?  --  --  3  -MS    Climbing 3-5 steps with a railing?  --  --  2  -MS    To walk in hospital room?  --  --  3  -MS    AM-PAC 6 Clicks Score  --  --  17  -MS       How much help from another is currently needed...    Putting on and taking off regular lower body clothing?  2  -TS  2  -CH  --    Bathing (including washing, rinsing, and drying)  2  -TS  2  -CH  --    Toileting (which includes using toilet bed pan or urinal)  3  -TS  3  -CH  --    Putting on and taking off regular upper body clothing  3  -TS  3  -CH  --    Taking care of personal grooming (such as brushing teeth)  4  -TS  4  -CH  --    Eating meals  4  -TS  4  -CH  --    Score  18  -TS  18  -CH  --       Functional Assessment    Outcome Measure Options  AM-PAC 6 Clicks Daily Activity (OT)  -TS  AM-PAC 6 Clicks Daily Activity (OT)  -CH  AM-PAC 6 Clicks Basic Mobility (PT)  -MS      User Key  (r) = Recorded By, (t) = Taken By, (c) = Cosigned By    Initials Name Provider Type     Deepti Buckner OTR/L Occupational Therapist     Kitty Villeda COTA/L Occupational Therapy Assistant    Sarah Betancourt, PT, DPT, NCS Physical Therapist           Time Calculation:   Time Calculation- OT     Row Name 12/12/18 1324             Time Calculation- OT    OT Start Time  0843  -TS      OT Stop Time  0921  -TS      OT Time Calculation (min)  38 min  -TS      Total Timed Code Minutes- OT  38 minute(s)  -TS      OT Received On  12/12/18  -TS         Timed Charges    45450 - OT Self Care/Mgmt Minutes  38  -TS        User Key  (r) = Recorded By, (t) = Taken By, (c) = Cosigned By    Initials Name Provider Type     Kitty Villeda COTA/JAH Occupational  Therapy Assistant           Therapy Suggested Charges     Code   Minutes Charges    27216 (CPT®) Hc Ot Neuromusc Re Education Ea 15 Min      17598 (CPT®) Hc Ot Ther Proc Ea 15 Min      69404 (CPT®) Hc Ot Therapeutic Act Ea 15 Min      10361 (CPT®) Hc Ot Manual Therapy Ea 15 Min      18018 (CPT®) Hc Ot Iontophoresis Ea 15 Min      88422 (CPT®) Hc Ot Elec Stim Ea-Per 15 Min      30310 (CPT®) Hc Ot Ultrasound Ea 15 Min      57099 (CPT®) Hc Ot Self Care/Mgmt/Train Ea 15 Min 38 3    Total  38 3        Therapy Charges for Today     Code Description Service Date Service Provider Modifiers Qty    32019135004 HC OT SELF CARE/MGMT/TRAIN EA 15 MIN 12/12/2018 Kitty Villeda COTA/L GO, KX 3          OT G-codes  OT Professional Judgement Used?: Yes  OT Functional Scales Options: AM-PAC 6 Clicks Daily Activity (OT)  Score: 18  Functional Limitation: Self care  Self Care Current Status (): At least 40 percent but less than 60 percent impaired, limited or restricted  Self Care Goal Status (): At least 20 percent but less than 40 percent impaired, limited or restricted    MYRA Deutsch  12/12/2018

## 2018-12-12 NOTE — PLAN OF CARE
Problem: Patient Care Overview  Goal: Plan of Care Review  Outcome: Ongoing (interventions implemented as appropriate)   12/12/18 1122   Coping/Psychosocial   Plan of Care Reviewed With patient   Plan of Care Review   Progress improving   OTHER   Outcome Summary PT tx completed. Pt up in BR several times today with nursing staff. Pt confused, probably from lack of sleep/anasthesia? CG sit<>stand with cueing, CG/Min A bed mobility, amb 40' with r wx CG/Min. Cueing for safety, postural correction, and gait mechanics. [facility]

## 2018-12-12 NOTE — SIGNIFICANT NOTE
12/12/18 1055   Treatment Time/Intention   Discipline physical therapy assistant   Document Type therapy note (daily note)   Subjective Information no complaints   Mode of Treatment physical therapy   Patient/Family Observations spouse present   Patient Effort good   Comment confused today; has not slept in 48 hours   Existing Precautions/Restrictions brace worn when out of bed;fall;spinal   Treatment Considerations/Comments LSO   Vital Signs   Post SpO2 (%) 93   O2 Delivery Post Treatment room air   Bed Mobility Assessment/Treatment   Sit-Sidelying [place] (Bed Mobility) verbal cues;contact guard   Assistive Device (Bed Mobility) bed rails   Sit-Stand Transfer   Sit-Stand [place] (Transfers) (up in BR with nursing)   Stand-Sit Transfer   Stand-Sit [place] (Transfers) verbal cues;contact guard   Gait/Stairs Assessment/Training   [place] Level (Gait) verbal cues;contact guard;minimum assist (75% patient effort)   Assistive Device (Gait) walker, front-wheeled   Distance in Feet (Gait) 45' x 2   Pattern (Gait) step-through   Bilateral Gait Deviations forward flexed posture   Comment (Gait/Stairs) cues for don/doffing LSO, cueing for gait mechanics/assistive device placement   Positioning and Restraints   Pre-Treatment Position bathroom   Post Treatment Position bed   Pain Scale: Numbers Pre/Post-Treatment   Pain Scale: Numbers, Pretreatment 3/10   Pain Location back   Pain Scale: Numbers, Post-Treatment 3/10

## 2018-12-12 NOTE — PLAN OF CARE
Problem: Patient Care Overview  Goal: Plan of Care Review  Outcome: Ongoing (interventions implemented as appropriate)   12/12/18 1517   Coping/Psychosocial   Plan of Care Reviewed With patient   Plan of Care Review   Progress no change   OTHER   Outcome Summary Pt still experiencing visual hallucinations which he believes to be real. When asked orientation questions pt answers correctly. No other neuro changes. Stood at side of bed in attempt to void, but pt unable. I and O cath at 0200, 200ml out. Pts family has brought in his medication that he takes at home to help with BPH. Awaiting provider order to be able to administer. Raj heels red but blanchable. SCDs on. Anterior drsg CDI, post drsg DI with small amt sero sang drainage     Goal: Individualization and Mutuality  Outcome: Ongoing (interventions implemented as appropriate)      Problem: Fall Risk (Adult)  Goal: Absence of Fall  Outcome: Ongoing (interventions implemented as appropriate)      Problem: Laminectomy/Foraminotomy/Discectomy (Adult)  Goal: Signs and Symptoms of Listed Potential Problems Will be Absent, Minimized or Managed (Laminectomy/Foraminotomy/Discectomy)  Outcome: Ongoing (interventions implemented as appropriate)

## 2018-12-12 NOTE — THERAPY TREATMENT NOTE
Acute Care - Physical Therapy Treatment Note  [facility]     Patient Name: Chad Henriquez  : 1929  MRN: [id number]  Today's Date: 2018  Onset of Illness/Injury or Date of Surgery: 18  Date of Referral to PT: 18  Referring Physician: Dr. Falcon    Admit Date: 2018    Visit Dx:    ICD-10-CM ICD-9-CM   1. Spinal stenosis, lumbar region, with neurogenic claudication M48.062 724.03   2. Impaired functional mobility, balance, gait, and endurance Z74.09 V49.89   3. Decreased activities of daily living (ADL) R68.89 780.99   4. Degeneration of lumbar or lumbosacral intervertebral disc M51.37 722.52     Patient Active Problem List   Diagnosis   • BPH with urinary obstruction   • Frequency of urination   • Nocturia   • OAB (overactive bladder)   • Non-smoker   • Normal body mass index (BMI)   • Spinal stenosis, lumbar region, with neurogenic claudication   • Left leg pain   • Bilateral hip pain   • Acute left-sided low back pain with left-sided sciatica   • Essential tremor   • BMI 21.0-21.9, adult   • Spinal stenosis   • Degeneration of lumbar or lumbosacral intervertebral disc       Therapy Treatment    Rehabilitation Treatment Summary     Row Name 18 1430 18 1055 18 0843       Treatment Time/Intention    Discipline  physical therapy assistant  -KJ  physical therapy assistant  -KJ  occupational therapy assistant  -TS    Document Type  therapy note (daily note)  -KJ  therapy note (daily note)  -KJ  therapy note (daily note)  -TS    Subjective Information  no complaints  -KJ  no complaints  -KJ  no complaints  -TS2    Mode of Treatment  physical therapy  -KJ  physical therapy  -KJ  --    Patient/Family Observations  --  spouse present  -KJ  spouse present   -TS2    Patient Effort  good  -KJ  good  -KJ  good  -TS2    Comment  confusion  -KJ  confused today; has not slept in 48 hours  -KJ  confusion, hallucinations  -TS2    Existing Precautions/Restrictions  brace worn when out  of bed;fall;spinal  -KJ  brace worn when out of bed;fall;spinal  -KJ  brace worn when out of bed;fall;spinal  -TS2    Treatment Considerations/Comments  LSO  -KJ  LSO  -KJ  LSO  -TS2    Recorded by [KJ] Luz Montalvo, PTA 12/12/18 1456 [KJ] Luz Montalvo, PTA 12/12/18 1121 [TS] Kitty Villeda ALVAREZ/L 12/12/18 0843  [TS2] Kitty Villeda ALVAREZ/L 12/12/18 1324    Row Name 12/12/18 1055             Vital Signs    Post SpO2 (%)  93  -KJ      O2 Delivery Post Treatment  room air  -KJ      Recorded by [KJ] Luz Montalvo, PTA 12/12/18 1121      Row Name 12/12/18 0843             Cognitive Assessment/Intervention- PT/OT    Personal Safety Interventions  fall prevention program maintained;gait belt;nonskid shoes/slippers when out of bed  -TS      Recorded by [TS] Kitty Villeda ALVAREZ/L 12/12/18 1324      Row Name 12/12/18 1430 12/12/18 1055 12/12/18 0843       Bed Mobility Assessment/Treatment    Bed Mobility Assessment/Treatment  --  --  sidelying-sit  -TS    Sidelying-Sit [place] (Bed Mobility)  verbal cues;minimum assist (75% patient effort)  -KJ  --  supervision;verbal cues  -TS    Sit-Sidelying [place] (Bed Mobility)  --  verbal cues;contact guard  -KJ  --    Assistive Device (Bed Mobility)  bed rails  -KJ  bed rails  -KJ  bed rails  -TS    Recorded by [KJ] Luz Montalvo, PTA 12/12/18 1456 [KJ] Luz Montalvo, PTA 12/12/18 1121 [TS] Kitty Villeda ALVAREZ/L 12/12/18 1324    Row Name 12/12/18 0843             Functional Mobility    Functional Mobility- Ind. Level  contact guard assist  -TS      Functional Mobility- Device  rolling walker  -TS      Functional Mobility- Comment  in room  -TS      Recorded by [TS] Kitty Villeda ALVAREZ/L 12/12/18 1324      Row Name 12/12/18 0843             Transfer Assessment/Treatment    Transfer Assessment/Treatment  sit-stand transfer;stand-sit transfer  -TS      Recorded by [TS] Kitty Villeda, KIM/L 12/12/18 1694      Row Name  12/12/18 1055 12/12/18 0843          Sit-Stand Transfer    Sit-Stand [place] (Transfers)  -- up in BR with nursing  -KJ  contact guard  -TS     Assistive Device (Sit-Stand Transfers)  --  walker, front-wheeled  -TS     Recorded by [KJ] Luz Montalvo, PTA 12/12/18 1121 [TS] Kitty Villeda COTA/L 12/12/18 1324     Row Name 12/12/18 1430 12/12/18 1055 12/12/18 0843       Stand-Sit Transfer    Stand-Sit [place] (Transfers)  verbal cues;contact guard  -KJ  verbal cues;contact guard  -KJ  contact guard  -TS    Assistive Device (Stand-Sit Transfers)  --  --  walker, front-wheeled  -TS    Recorded by [KJ] Luz Montalvo, PTA 12/12/18 1456 [KJ] Luz Montalvo, PTA 12/12/18 1121 [TS] Kitty Villeda ALVAREZ/L 12/12/18 1324    Row Name 12/12/18 1430 12/12/18 1055          Gait/Stairs Assessment/Training    [place] Level (Gait)  verbal cues;contact guard;minimum assist (75% patient effort)  -KJ  verbal cues;contact guard;minimum assist (75% patient effort)  -KJ     Assistive Device (Gait)  walker, front-wheeled  -KJ  walker, front-wheeled  -KJ     Distance in Feet (Gait)  45' x 2  -KJ  45' x 2  -KJ     Pattern (Gait)  step-through  -KJ  step-through  -KJ     Bilateral Gait Deviations  forward flexed posture  -KJ  forward flexed posture  -KJ     Comment (Gait/Stairs)  --  cues for don/doffing LSO, cueing for gait mechanics/assistive device placement  -KJ     Recorded by [KJ] Luz Montalvo, PTA 12/12/18 1456 [KJ] Luz Montalvo, PTA 12/12/18 1121     Row Name 12/12/18 0843             ADL Assessment/Intervention    BADL Assessment/Intervention  lower body dressing;upper body dressing  -TS      Recorded by [TS] Kitty Villeda COTA/L 12/12/18 1324      Row Name 12/12/18 0843             Upper Body Dressing Assessment/Training    Upper Body Dressing [place] Level  don;set up;moderate assist (50% patient effort) LSO  -TS      Upper Body Dressing Position  edge of bed sitting  -TS       Recorded by [TS] Kitty Villeda COTA/L 12/12/18 1324      Row Name 12/12/18 0843             Lower Body Dressing Assessment/Training    Lower Body Dressing [place] Level  don;undergarment;moderate assist (50% patient effort)  -TS      Lower Body Dressing Position  edge of bed sitting;supported standing  -TS      Recorded by [TS] Kitty Villeda COTA/L 12/12/18 1324      Row Name 12/12/18 1430 12/12/18 1055 12/12/18 0843       Positioning and Restraints    Pre-Treatment Position  in bed  -KJ  bathroom  -KJ  in bed  -TS    Post Treatment Position  chair  -KJ  bed  -KJ  chair  -TS    In Chair  --  --  sitting;call light within reach;encouraged to call for assist;with family/caregiver;with brace  -TS    Recorded by [KJ] Luz Montalvo, PTA 12/12/18 1456 [KJ] Luz Montalvo, PTA 12/12/18 1121 [TS] Kitty Villeda COTA/L 12/12/18 1324    Row Name 12/12/18 1430 12/12/18 1055 12/12/18 0843       Pain Scale: Numbers Pre/Post-Treatment    Pain Scale: Numbers, Pretreatment  3/10  -KJ  3/10  -KJ  0/10 - no pain  -TS    Pain Scale: Numbers, Post-Treatment  3/10  -KJ  3/10  -KJ  --    Pain Location  back  -KJ  back  -KJ  --    Recorded by [KJ] Luz Montalvo, PTA 12/12/18 1456 [KJ] Luz Montalvo, PTA 12/12/18 1121 [TS] Kitty Villeda COTA/L 12/12/18 1324    Row Name 12/12/18 0843             Spinal Orthosis Management    Orthosis Training (Spinal Orthosis)  patient and caregiver;donning/doffing orthosis;purpose/goals of orthosis;wearing schedule  -TS      Recorded by [TS] Kitty Villeda COTA/L 12/12/18 1326      Row Name                Wound 12/07/18 1109 back incision    Wound - Properties Group Date first assessed: 12/07/18 [SWETA] Time first assessed: 1109 [SWETA] Location: back [SWETA] Type: incision [SWETA] Recorded by:  [SWETA] Martell Oropeza RN 12/07/18 1109    Row Name                Wound 12/10/18 1153 back incision    Wound - Properties Group Date first assessed: 12/10/18 [SWETA] Time first  assessed: 1153 [SWETA] Location: back [SWETA] Type: incision [SWETA] Recorded by:  [SWETA] Martell Oropeza RN 12/10/18 1153    Row Name                Wound 12/10/18 2005 lower abdomen incision;surgical    Wound - Properties Group Date first assessed: 12/10/18 [TR] Time first assessed: 2005 [TR] Orientation: lower [TR] Location: abdomen [TR] Type: incision;surgical [TR] Recorded by:  [TR] Santa Lopez RN 12/10/18 2014    Row Name 12/12/18 0843             Outcome Summary/Treatment Plan (OT)    Daily Summary of Progress (OT)  progress toward functional goals is good  -TS      Recorded by [TS] Kitty Villeda ALVAREZ/L 12/12/18 1324        User Key  (r) = Recorded By, (t) = Taken By, (c) = Cosigned By    Initials Name Effective Dates Discipline    KJ Luz Montalvo, PTA 08/02/16 -  PT    TS Kitty Villeda ALVAREZ/L 08/02/16 -  OT    TR Santa Lopez RN 08/02/16 -  Nurse    SWETA Martell Oropeza RN 08/02/16 -  Nurse          Wound 12/07/18 1109 back incision (Active)   Dressing Appearance intact;dried drainage 12/12/2018  8:15 AM   Drainage Characteristics/Odor sanguineous 12/11/2018  9:20 PM   Drainage Amount small 12/12/2018  8:15 AM   Dressing Care, Wound gauze 12/12/2018  8:15 AM       Wound 12/10/18 1153 back incision (Active)   Dressing Appearance intact;dried drainage 12/12/2018  8:15 AM   Drainage Characteristics/Odor sanguineous 12/11/2018  9:20 PM   Drainage Amount small 12/12/2018  8:15 AM   Dressing Care, Wound gauze 12/11/2018  9:20 PM       Wound 12/10/18 2005 lower abdomen incision;surgical (Active)   Dressing Appearance dry;intact;no drainage 12/12/2018  8:15 AM   Drainage Amount none 12/12/2018  8:15 AM   Dressing Care, Wound gauze 12/11/2018  9:20 PM           Physical Therapy Education     Title: PT OT SLP Therapies (In Progress)     Topic: Physical Therapy (In Progress)     Point: Mobility training (In Progress)     Learning Progress Summary           Patient Acceptance, E, NR by MS at 12/11/2018  12:03 PM    Comment:  pt unable to repeat any spinal restrictions. knows to wear brace when out of bed    Acceptance, E, VU,NR by CW at 12/9/2018  9:39 AM    Comment:  transfers, gait, body mechanics, plan of care    Acceptance, E,D, VU,DU by CW at 12/8/2018 10:05 AM    Comment:  transfers, gait, plan of care, benefits of activity    Acceptance, E, VU by MS at 12/7/2018  4:32 PM    Comment:  role of PT in his care, spinal restrictions                   Point: Body mechanics (In Progress)     Learning Progress Summary           Patient Acceptance, E, NR by MS at 12/11/2018 12:03 PM    Comment:  pt unable to repeat any spinal restrictions. knows to wear brace when out of bed    Acceptance, E, VU,NR by CW at 12/9/2018  9:39 AM    Comment:  transfers, gait, body mechanics, plan of care    Acceptance, E,D, VU,DU by CW at 12/8/2018 10:05 AM    Comment:  transfers, gait, plan of care, benefits of activity                   Point: Precautions (In Progress)     Learning Progress Summary           Patient Acceptance, E, NR by MS at 12/11/2018 12:03 PM    Comment:  pt unable to repeat any spinal restrictions. knows to wear brace when out of bed    Acceptance, E, VU,NR by CW at 12/9/2018  9:39 AM    Comment:  transfers, gait, body mechanics, plan of care    Acceptance, E,D, VU,DU by CW at 12/8/2018 10:05 AM    Comment:  transfers, gait, plan of care, benefits of activity    Acceptance, E, VU by MS at 12/7/2018  4:32 PM    Comment:  role of PT in his care, spinal restrictions                               User Key     Initials Effective Dates Name Provider Type Discipline    MS 06/19/18 -  Sarah Rainey, PT, DPT, NCS Physical Therapist PT    CW 06/22/15 -  Elsie Bosch PTA Physical Therapy Assistant PT                PT Recommendation and Plan     Plan of Care Reviewed With: patient  Progress: improving  Outcome Summary: PT tx completed. Pt up in BR several times today with nursing staff. Pt confused, probably from  lack of sleep/anasthesia? CG sit<>stand with cueing, CG/Min A bed mobility, amb 40' with r wx CG/Min. Cueing for safety, postural correction, and gait mechanics. Recommend maybe [facility] rehab.  Outcome Measures     Row Name 12/12/18 1300 12/11/18 1109 12/11/18 1108       How much help from another person do you currently need...    Turning from your back to your side while in flat bed without using bedrails?  --  --  3  -MS    Moving from lying on back to sitting on the side of a flat bed without bedrails?  --  --  3  -MS    Moving to and from a bed to a chair (including a wheelchair)?  --  --  3  -MS    Standing up from a chair using your arms (e.g., wheelchair, bedside chair)?  --  --  3  -MS    Climbing 3-5 steps with a railing?  --  --  2  -MS    To walk in hospital room?  --  --  3  -MS    AM-PAC 6 Clicks Score  --  --  17  -MS       How much help from another is currently needed...    Putting on and taking off regular lower body clothing?  2  -TS  2  -CH  --    Bathing (including washing, rinsing, and drying)  2  -TS  2  -CH  --    Toileting (which includes using toilet bed pan or urinal)  3  -TS  3  -CH  --    Putting on and taking off regular upper body clothing  3  -TS  3  -CH  --    Taking care of personal grooming (such as brushing teeth)  4  -TS  4  -CH  --    Eating meals  4  -TS  4  -CH  --    Score  18  -TS  18  -CH  --       Functional Assessment    Outcome Measure Options  AM-PAC 6 Clicks Daily Activity (OT)  -TS  AM-PAC 6 Clicks Daily Activity (OT)  -CH  AM-PAC 6 Clicks Basic Mobility (PT)  -MS      User Key  (r) = Recorded By, (t) = Taken By, (c) = Cosigned By    Initials Name Provider Type    CH Deepti Buckner, OTR/L Occupational Therapist    TS Kitty Villeda, ALVAREZ/L Occupational Therapy Assistant    MS Sarah Rainey, PT, DPT, NCS Physical Therapist         Time Calculation:   PT Charges     Row Name 12/12/18 1456 12/12/18 1121          Time Calculation    Start Time  1430  -KJ  0802   -KJ     Stop Time  1456  -KJ  1121  -KJ     Time Calculation (min)  26 min  -KJ  26 min  -KJ     PT Received On  12/12/18  -KJ  12/12/18  -KJ     PT Goal Re-Cert Due Date  12/21/18  -KJ  12/21/18  -KJ        Time Calculation- PT    Total Timed Code Minutes- PT  26 minute(s)  -KJ  6 minute(s)  -KJ       User Key  (r) = Recorded By, (t) = Taken By, (c) = Cosigned By    Initials Name Provider Type    Luz Douglass, PTA Physical Therapy Assistant        Therapy Suggested Charges     Code   Minutes Charges    60973 (CPT®) Hc Pt Neuromusc Re Education Ea 15 Min      20885 (CPT®) Hc Pt Ther Proc Ea 15 Min      09180 (CPT®) Hc Gait Training Ea 15 Min 28 2    02698 (CPT®) Hc Pt Therapeutic Act Ea 15 Min      59328 (CPT®) Hc Pt Manual Therapy Ea 15 Min      94132 (CPT®) Hc Pt Iontophoresis Ea 15 Min      36397 (CPT®) Hc Pt Elec Stim Ea-Per 15 Min      81151 (CPT®) Hc Pt Ultrasound Ea 15 Min      78046 (CPT®) Hc Pt Self Care/Mgmt/Train Ea 15 Min      91618 (CPT®) Hc Pt Prosthetic (S) Train Initial Encounter, Each 15 Min      10415 (CPT®) Hc Pt Orthotic(S)/Prosthetic(S) Encounter, Each 15 Min      48084 (CPT®) Hc Orthotic(S) Mgmt/Train Initial Encounter, Each 15min      Total  28 2        Therapy Charges for Today     Code Description Service Date Service Provider Modifiers Qty    26061456420 HC GAIT TRAINING EA 15 MIN 12/12/2018 Luz Montalvo, PTA GP, KX 2    51327648777 HC GAIT TRAINING EA 15 MIN 12/12/2018 Luz Montalvo PTA GP, KX 2          PT G-Codes  Outcome Measure Options: AM-PAC 6 Clicks Daily Activity (OT)  AM-PAC 6 Clicks Score: 17  Score: 18  Functional Limitation: Mobility: Walking and moving around  Mobility: Walking and Moving Around Current Status (): At least 40 percent but less than 60 percent impaired, limited or restricted  Mobility: Walking and Moving Around Goal Status (): At least 20 percent but less than 40 percent impaired, limited or restricted    Luz Montalvo  PTA  12/12/2018

## 2018-12-13 ENCOUNTER — HOSPITAL ENCOUNTER (INPATIENT)
Age: 83
LOS: 15 days | Discharge: HOME HEALTH | DRG: 560 | End: 2018-12-28
Attending: PSYCHIATRY & NEUROLOGY | Admitting: PSYCHIATRY & NEUROLOGY
Payer: MEDICARE

## 2018-12-13 VITALS
DIASTOLIC BLOOD PRESSURE: 55 MMHG | HEIGHT: 68 IN | OXYGEN SATURATION: 95 % | BODY MASS INDEX: 24.49 KG/M2 | TEMPERATURE: 97.8 F | HEART RATE: 80 BPM | SYSTOLIC BLOOD PRESSURE: 140 MMHG | RESPIRATION RATE: 18 BRPM | WEIGHT: 161.6 LBS

## 2018-12-13 DIAGNOSIS — M51.36 LUMBAR DEGENERATIVE DISC DISEASE: Primary | ICD-10-CM

## 2018-12-13 DIAGNOSIS — M48.062 SPINAL STENOSIS OF LUMBAR REGION WITH NEUROGENIC CLAUDICATION: ICD-10-CM

## 2018-12-13 PROBLEM — M48.02 CERVICAL SPINAL STENOSIS: Status: ACTIVE | Noted: 2018-12-13

## 2018-12-13 PROCEDURE — 99024 POSTOP FOLLOW-UP VISIT: CPT | Performed by: NURSE PRACTITIONER

## 2018-12-13 PROCEDURE — 94664 DEMO&/EVAL PT USE INHALER: CPT

## 2018-12-13 PROCEDURE — 97116 GAIT TRAINING THERAPY: CPT

## 2018-12-13 PROCEDURE — 1180000000 HC REHAB R&B

## 2018-12-13 PROCEDURE — 6370000000 HC RX 637 (ALT 250 FOR IP): Performed by: PSYCHIATRY & NEUROLOGY

## 2018-12-13 RX ORDER — PRIMIDONE 50 MG/1
25 TABLET ORAL NIGHTLY
Status: DISCONTINUED | OUTPATIENT
Start: 2018-12-13 | End: 2018-12-28 | Stop reason: HOSPADM

## 2018-12-13 RX ORDER — BISACODYL 10 MG
10 SUPPOSITORY, RECTAL RECTAL DAILY PRN
Status: DISCONTINUED | OUTPATIENT
Start: 2018-12-13 | End: 2018-12-28 | Stop reason: HOSPADM

## 2018-12-13 RX ORDER — FERROUS SULFATE 325(65) MG
325 TABLET ORAL
Status: ON HOLD | COMMUNITY
End: 2018-12-28 | Stop reason: HOSPADM

## 2018-12-13 RX ORDER — FINASTERIDE 5 MG/1
5 TABLET, FILM COATED ORAL DAILY
Status: DISCONTINUED | OUTPATIENT
Start: 2018-12-13 | End: 2018-12-28 | Stop reason: HOSPADM

## 2018-12-13 RX ORDER — LIDOCAINE 4 G/G
1 PATCH TOPICAL DAILY
Status: DISCONTINUED | OUTPATIENT
Start: 2018-12-13 | End: 2018-12-28 | Stop reason: HOSPADM

## 2018-12-13 RX ORDER — FERROUS SULFATE 325(65) MG
325 TABLET ORAL
Status: DISCONTINUED | OUTPATIENT
Start: 2018-12-13 | End: 2018-12-28 | Stop reason: HOSPADM

## 2018-12-13 RX ORDER — PRAVASTATIN SODIUM 20 MG
40 TABLET ORAL DAILY
Status: DISCONTINUED | OUTPATIENT
Start: 2018-12-13 | End: 2018-12-28 | Stop reason: HOSPADM

## 2018-12-13 RX ORDER — ACETAMINOPHEN 325 MG/1
650 TABLET ORAL EVERY 4 HOURS PRN
Status: DISCONTINUED | OUTPATIENT
Start: 2018-12-13 | End: 2018-12-28 | Stop reason: HOSPADM

## 2018-12-13 RX ORDER — LANOLIN ALCOHOL/MO/W.PET/CERES
800 CREAM (GRAM) TOPICAL DAILY
Status: DISCONTINUED | OUTPATIENT
Start: 2018-12-13 | End: 2018-12-28 | Stop reason: HOSPADM

## 2018-12-13 RX ORDER — PRIMIDONE 50 MG/1
5 TABLET ORAL NIGHTLY
COMMUNITY

## 2018-12-13 RX ORDER — FUROSEMIDE 40 MG/1
40 TABLET ORAL DAILY
Status: ON HOLD | COMMUNITY
End: 2018-12-28 | Stop reason: HOSPADM

## 2018-12-13 RX ORDER — CHLORPHENIRAMINE MALEATE 4 MG/1
4 TABLET ORAL DAILY
Status: DISCONTINUED | OUTPATIENT
Start: 2018-12-13 | End: 2018-12-28 | Stop reason: HOSPADM

## 2018-12-13 RX ORDER — DIPHENHYDRAMINE HCL 25 MG
25 CAPSULE ORAL NIGHTLY
COMMUNITY
End: 2020-11-12

## 2018-12-13 RX ORDER — TRAMADOL HYDROCHLORIDE 50 MG/1
50 TABLET ORAL EVERY 6 HOURS PRN
Status: DISCONTINUED | OUTPATIENT
Start: 2018-12-13 | End: 2018-12-19

## 2018-12-13 RX ORDER — LIDOCAINE 50 MG/G
1 PATCH TOPICAL EVERY 24 HOURS
Status: ON HOLD | COMMUNITY
End: 2018-12-28 | Stop reason: HOSPADM

## 2018-12-13 RX ORDER — LYSINE 500 MG
500 TABLET ORAL DAILY
Status: DISCONTINUED | OUTPATIENT
Start: 2018-12-13 | End: 2018-12-13 | Stop reason: RX

## 2018-12-13 RX ORDER — ECHINACEA 400 MG
2000 CAPSULE ORAL DAILY
Status: DISCONTINUED | OUTPATIENT
Start: 2018-12-13 | End: 2018-12-13 | Stop reason: RX

## 2018-12-13 RX ORDER — FUROSEMIDE 40 MG/1
40 TABLET ORAL DAILY
Status: DISCONTINUED | OUTPATIENT
Start: 2018-12-13 | End: 2018-12-14

## 2018-12-13 RX ORDER — TRAMADOL HYDROCHLORIDE 50 MG/1
50 TABLET ORAL EVERY 6 HOURS PRN
Qty: 120 TABLET | Refills: 0 | Status: SHIPPED | OUTPATIENT
Start: 2018-12-13 | End: 2021-02-08

## 2018-12-13 RX ORDER — SODIUM PHOSPHATE, DIBASIC AND SODIUM PHOSPHATE, MONOBASIC 7; 19 G/133ML; G/133ML
1 ENEMA RECTAL
Status: ACTIVE | OUTPATIENT
Start: 2018-12-13 | End: 2018-12-13

## 2018-12-13 RX ORDER — DOCUSATE SODIUM 100 MG/1
100 CAPSULE, LIQUID FILLED ORAL 2 TIMES DAILY
Status: DISCONTINUED | OUTPATIENT
Start: 2018-12-13 | End: 2018-12-28 | Stop reason: HOSPADM

## 2018-12-13 RX ORDER — METOPROLOL SUCCINATE 25 MG/1
25 TABLET, EXTENDED RELEASE ORAL NIGHTLY
Status: DISCONTINUED | OUTPATIENT
Start: 2018-12-13 | End: 2018-12-28 | Stop reason: HOSPADM

## 2018-12-13 RX ORDER — POLYETHYLENE GLYCOL 3350 17 G/17G
17 POWDER, FOR SOLUTION ORAL DAILY
Status: DISCONTINUED | OUTPATIENT
Start: 2018-12-13 | End: 2018-12-28 | Stop reason: HOSPADM

## 2018-12-13 RX ORDER — DULOXETIN HYDROCHLORIDE 60 MG/1
60 CAPSULE, DELAYED RELEASE ORAL DAILY
Status: DISCONTINUED | OUTPATIENT
Start: 2018-12-13 | End: 2018-12-28 | Stop reason: HOSPADM

## 2018-12-13 RX ORDER — TAMSULOSIN HYDROCHLORIDE 0.4 MG/1
0.4 CAPSULE ORAL DAILY
Status: DISCONTINUED | OUTPATIENT
Start: 2018-12-13 | End: 2018-12-14 | Stop reason: ALTCHOICE

## 2018-12-13 RX ORDER — DIPHENHYDRAMINE HCL 25 MG
25 TABLET ORAL NIGHTLY
Status: DISCONTINUED | OUTPATIENT
Start: 2018-12-13 | End: 2018-12-28 | Stop reason: HOSPADM

## 2018-12-13 RX ORDER — METOPROLOL SUCCINATE 25 MG/1
25 TABLET, EXTENDED RELEASE ORAL NIGHTLY
Status: ON HOLD | COMMUNITY
End: 2018-12-28 | Stop reason: HOSPADM

## 2018-12-13 RX ADMIN — SODIUM CHLORIDE, PRESERVATIVE FREE 3 ML: 5 INJECTION INTRAVENOUS at 09:02

## 2018-12-13 RX ADMIN — FUROSEMIDE 40 MG: 40 TABLET ORAL at 08:59

## 2018-12-13 RX ADMIN — METOPROLOL SUCCINATE 25 MG: 25 TABLET, EXTENDED RELEASE ORAL at 20:18

## 2018-12-13 RX ADMIN — TRAMADOL HYDROCHLORIDE 50 MG: 50 TABLET, FILM COATED ORAL at 23:49

## 2018-12-13 RX ADMIN — DIPHENHYDRAMINE HCL 25 MG: 25 TABLET ORAL at 20:18

## 2018-12-13 RX ADMIN — ACETAMINOPHEN 650 MG: 325 TABLET, FILM COATED ORAL at 05:48

## 2018-12-13 RX ADMIN — DOCUSATE SODIUM 100 MG: 100 CAPSULE, LIQUID FILLED ORAL at 20:18

## 2018-12-13 RX ADMIN — PRIMIDONE 25 MG: 50 TABLET ORAL at 20:18

## 2018-12-13 RX ADMIN — TRAMADOL HYDROCHLORIDE 50 MG: 50 TABLET, FILM COATED ORAL at 19:03

## 2018-12-13 RX ADMIN — SODIUM CHLORIDE, POTASSIUM CHLORIDE, SODIUM LACTATE AND CALCIUM CHLORIDE 100 ML/HR: 600; 310; 30; 20 INJECTION, SOLUTION INTRAVENOUS at 02:02

## 2018-12-13 RX ADMIN — FINASTERIDE 5 MG: 5 TABLET, FILM COATED ORAL at 08:59

## 2018-12-13 RX ADMIN — DULOXETINE HYDROCHLORIDE 60 MG: 30 CAPSULE, DELAYED RELEASE ORAL at 08:59

## 2018-12-13 RX ADMIN — FERROUS SULFATE TAB 325 MG (65 MG ELEMENTAL FE) 325 MG: 325 (65 FE) TAB at 17:38

## 2018-12-13 ASSESSMENT — PAIN DESCRIPTION - PAIN TYPE
TYPE: ACUTE PAIN;CHRONIC PAIN
TYPE: ACUTE PAIN

## 2018-12-13 ASSESSMENT — PAIN DESCRIPTION - FREQUENCY: FREQUENCY: INTERMITTENT

## 2018-12-13 ASSESSMENT — PAIN SCALES - GENERAL
PAINLEVEL_OUTOF10: 4
PAINLEVEL_OUTOF10: 3
PAINLEVEL_OUTOF10: 4

## 2018-12-13 ASSESSMENT — PAIN DESCRIPTION - LOCATION
LOCATION: BACK;LEG
LOCATION: BACK

## 2018-12-13 ASSESSMENT — PAIN DESCRIPTION - PROGRESSION: CLINICAL_PROGRESSION: NOT CHANGED

## 2018-12-13 ASSESSMENT — PAIN DESCRIPTION - ORIENTATION: ORIENTATION: LOWER

## 2018-12-13 ASSESSMENT — PAIN DESCRIPTION - ONSET: ONSET: ON-GOING

## 2018-12-13 ASSESSMENT — PAIN DESCRIPTION - DESCRIPTORS: DESCRIPTORS: DULL;ACHING

## 2018-12-13 NOTE — PROGRESS NOTES
Continued Stay Note  [facility]     Patient Name: Chad Henriquez  MRN: [id number]  Today's Date: 12/13/2018    Admit Date: 12/7/2018    Discharge Plan     Row Name 12/13/18 0802       Plan    Plan  [facility]    Patient/Family in Agreement with Plan  yes    Final Discharge Disposition Code  62 - inpatient rehab facility    Final Note  [facility]. Discharge summary will be available to admissions via OrderWithMe. Patient's nurse will call report to [phone].            Expected Discharge Date and Time     Expected Discharge Date Expected Discharge Time    Dec 13, 2018             SARBJIT Terry

## 2018-12-13 NOTE — PROGRESS NOTES
Requires assist with 4-5 parts of dressing  GOAL: Dressing-Lower: 6  Toileting: 3 - Able to perform 2 tasks  GOAL: Toiletin  Bowel Level of Assistance: 5- Requires helper to provide or empty bedpan, bedside commode, or requires set-up/cues to move bowels  TRANSFERS  Bed, Chair, Wheel Chair: 3 - Requires 25-49% assistance to transfer  GOAL: Bed, Chair, Wheel Chair: 6  Toilet Transfer: 3 - Requires 25-49% assist getting off toilet  GOAL: Toilet: 6  Tub Transfer: 0 - Activity does not occur  Shower Transfer: 0 - Activity does not occur  SPHINCTER CONTROL  Bladder: 5 - Voids, but staff empties urinal/BSC and/or requires setup/sup/cues to urinate (e.g. timed voids/self cath) (no accidents, no touching) (help w/I&O cath)  Bladder Level of Assistance: 5- Requires helper to provide or empty urinal, bedside commode, or requires set-up/cues to empty bladder (ie. helper provides self-catheter supplies )  Bladder Frequency of Accidents: 5 - One accident (soiling linens or clothing) in past 7 days, includes bed pan or urinal spills by patient  Bowel: 5 - Requires setup/supervision/cues to manage device  Bowel Level of Assistance: 5- Requires helper to provide or empty bedpan, bedside commode, or requires set-up/cues to move bowels  Bowel Frequency of Accidents: 6 - No accidents, uses device like bedpan, diaper, bedside commode colostomy, or requires medication to manage bowels  LOCOMOTION  Primary Mode: Walk  GOAL: Walk/Wheel Chair: 6  Distance Walked: 15 FT  Distance Traveled in Wheel Chair: 25 FT  Walk: 1 - Total Assistance Walks < 50 feet OR requires two or more people OR patient performs < 25% of locomotion effort  Wheel Chair: 1 - Total Assistance Operates wheelchair < 50 feet OR requires two or more people OR patient performs < 25% of locomotion effort  Stairs: 0 - Activity Does not Occur ( 0 only for the admission assessment)  GOAL: Stairs: 5  COMMUNICATION  Comprehension: 6 - Complex ideas 90% or device (hearing

## 2018-12-13 NOTE — PLAN OF CARE
Problem: Patient Care Overview  Goal: Plan of Care Review  Outcome: Ongoing (interventions implemented as appropriate)   12/13/18 6153   Coping/Psychosocial   Plan of Care Reviewed With patient   Plan of Care Review   Progress improving   OTHER   Outcome Summary Per report from dayshift RN: Pt wife requested that if pt is sleeping, to let him sleep since it is possible his lack of sleep is contributing to his confusion/hallucinations. Pt has slept off and on all night. No further c/o hallucinations, though he is still not oriented to place. Has voided twice so far this shift (300 & 150ml). No neuro changes. Raj heels red but blanchable. No new drainage on ant or post drsgs.        Problem: Fall Risk (Adult)  Goal: Absence of Fall  Outcome: Ongoing (interventions implemented as appropriate)      Problem: Laminectomy/Foraminotomy/Discectomy (Adult)  Goal: Signs and Symptoms of Listed Potential Problems Will be Absent, Minimized or Managed (Laminectomy/Foraminotomy/Discectomy)  Outcome: Ongoing (interventions implemented as appropriate)

## 2018-12-13 NOTE — PROGRESS NOTES
188 Candace Littlejohn arrived to room # 818. Presented with: Spinal stenosis, s/p lumbar laminectomy  Mental Status: Patient is oriented and alert. Vitals:    12/13/18 1533   BP: (!) 158/50   Pulse: 78   Resp: 18   Temp: 97.7 °F (36.5 °C)   SpO2: 92%     Patient safety contract and falls prevention contract reviewed with patient Yes. Oriented Patient and Family to room. Call light within reach. Yes.   Needs, issues or concerns expressed at this time: no.      Electronically signed by Sheryl Corona RN on 12/13/2018 at 4:28 PM

## 2018-12-13 NOTE — THERAPY DISCHARGE NOTE
Acute Care - Physical Therapy Discharge Summary  [facility]       Patient Name: Chad Henriquez  : 1929  MRN: [id number]    Today's Date: 2018  Onset of Illness/Injury or Date of Surgery: 18    Date of Referral to PT: 18  Referring Physician: Dr. Falcon      Admit Date: 2018      PT Recommendation and Plan    Visit Dx:    ICD-10-CM ICD-9-CM   1. Spinal stenosis, lumbar region, with neurogenic claudication M48.062 724.03   2. Impaired functional mobility, balance, gait, and endurance Z74.09 V49.89   3. Decreased activities of daily living (ADL) R68.89 780.99   4. Degeneration of lumbar or lumbosacral intervertebral disc M51.37 722.52       Outcome Measures     Row Name 18 1300 18 1109 18 1108       How much help from another person do you currently need...    Turning from your back to your side while in flat bed without using bedrails?  --  --  3  -MS    Moving from lying on back to sitting on the side of a flat bed without bedrails?  --  --  3  -MS    Moving to and from a bed to a chair (including a wheelchair)?  --  --  3  -MS    Standing up from a chair using your arms (e.g., wheelchair, bedside chair)?  --  --  3  -MS    Climbing 3-5 steps with a railing?  --  --  2  -MS    To walk in hospital room?  --  --  3  -MS    AM-PAC 6 Clicks Score  --  --  17  -MS       How much help from another is currently needed...    Putting on and taking off regular lower body clothing?  2  -TS  2  -CH  --    Bathing (including washing, rinsing, and drying)  2  -TS  2  -CH  --    Toileting (which includes using toilet bed pan or urinal)  3  -TS  3  -CH  --    Putting on and taking off regular upper body clothing  3  -TS  3  -CH  --    Taking care of personal grooming (such as brushing teeth)  4  -TS  4  -CH  --    Eating meals  4  -TS  4  -CH  --    Score  18  -TS  18  -CH  --       Functional Assessment    Outcome Measure Options  AM-PAC 6 Clicks Daily Activity (OT)  -TS  AM-PAC 6  Clicks Daily Activity (OT)  -CH  AM-PAC 6 Clicks Basic Mobility (PT)  -MS      User Key  (r) = Recorded By, (t) = Taken By, (c) = Cosigned By    Initials Name Provider Type    CH Deepti Buckner, OTR/L Occupational Therapist    TS Kitty Villeda, ALVAREZ/L Occupational Therapy Assistant    MS Sarah Rainey R, PT, DPT, NCS Physical Therapist          PT Charges     Row Name 12/13/18 1119             Time Calculation    Start Time  0950  -KJ      Stop Time  1016  -KJ      Time Calculation (min)  26 min  -KJ      PT Received On  12/13/18  -KJ      PT Goal Re-Cert Due Date  12/21/18  -KJ         Time Calculation- PT    Total Timed Code Minutes- PT  26 minute(s)  -KJ        User Key  (r) = Recorded By, (t) = Taken By, (c) = Cosigned By    Initials Name Provider Type    KJ Luz Montalvo, PTA Physical Therapy Assistant        Therapy Suggested Charges     Code   Minutes Charges    90903 (CPT®) Hc Pt Neuromusc Re Education Ea 15 Min      88965 (CPT®) Hc Pt Ther Proc Ea 15 Min      95980 (CPT®) Hc Gait Training Ea 15 Min 28 2    08548 (CPT®) Hc Pt Therapeutic Act Ea 15 Min      56563 (CPT®) Hc Pt Manual Therapy Ea 15 Min      13599 (CPT®) Hc Pt Iontophoresis Ea 15 Min      03651 (CPT®) Hc Pt Elec Stim Ea-Per 15 Min      67087 (CPT®) Hc Pt Ultrasound Ea 15 Min      97012 (CPT®) Hc Pt Self Care/Mgmt/Train Ea 15 Min      62656 (CPT®) Hc Pt Prosthetic (S) Train Initial Encounter, Each 15 Min      70524 (CPT®) Hc Pt Orthotic(S)/Prosthetic(S) Encounter, Each 15 Min      45455 (CPT®) Hc Orthotic(S) Mgmt/Train Initial Encounter, Each 15min      Total  28 2          Rehab Goal Summary     Row Name 12/13/18 1500             Physical Therapy Goals    Bed Mobility Goal Selection (PT)  bed mobility, PT goal 1  -SWETA      Transfer Goal Selection (PT)  transfer, PT goal 1  -SWETA      Gait Training Goal Selection (PT)  gait training, PT goal 1  -SWETA      Stairs Goal Selection (PT)  stairs, PT goal 1  -SWETA         Bed Mobility Goal 1 (PT)     Activity/Assistive Device (Bed Mobility Goal 1, PT)  bed mobility activities, all  -SWETA      [place] Level/Cues Needed (Bed Mobility Goal 1, PT)  independent  -SWETA      Time Frame (Bed Mobility Goal 1, PT)  long term goal (LTG);by discharge  -SWETA      Progress/Outcomes (Bed Mobility Goal 1, PT)  goal not met  -SWETA         Transfer Goal 1 (PT)    Activity/Assistive Device (Transfer Goal 1, PT)  sit-to-stand/stand-to-sit;bed-to-chair/chair-to-bed;walker, rolling  -SWETA      [place] Level/Cues Needed (Transfer Goal 1, PT)  conditional independence  -SWETA      Time Frame (Transfer Goal 1, PT)  long term goal (LTG);by discharge  -SWETA      Progress/Outcome (Transfer Goal 1, PT)  goal not met  -SWETA         Gait Training Goal 1 (PT)    Activity/Assistive Device (Gait Training Goal 1, PT)  gait (walking locomotion);assistive device use;decrease fall risk;forward stepping;improve balance and speed;increase endurance/gait distance;walker, rolling  -SWETA      [place] Level (Gait Training Goal 1, PT)  conditional independence  -SWETA      Distance (Gait Goal 1, PT)  100ft  -SWETA      Time Frame (Gait Training Goal 1, PT)  long term goal (LTG);by discharge  -SWETA      Progress/Outcome (Gait Training Goal 1, PT)  goal not met  -SWETA         Stairs Goal 1 (PT)    Activity/Assistive Device (Stairs Goal 1, PT)  ascending stairs;descending stairs;using handrail, right;decrease fall risk;step-to-step  -SWETA      [place] Level/Cues Needed (Stairs Goal 1, PT)  supervision required  -SWETA      Number of Stairs (Stairs Goal 1, PT)  5  -SWETA      Time Frame (Stairs Goal 1, PT)  long term goal (LTG);by discharge  -SWETA      Progress/Outcome (Stairs Goal 1, PT)  goal not met  -SWETA        User Key  (r) = Recorded By, (t) = Taken By, (c) = Cosigned By    Initials Name Provider Type Discipline    Chavez Freeman, PTA Physical Therapy Assistant PT              PT Discharge Summary  Anticipated Discharge Disposition (PT): inpatient rehabilitation  facility  Reason for Discharge: Discharge from facility  Outcomes Achieved: Refer to plan of care for updates on goals achieved  Discharge Destination: Inpatient rehabilitation facility      Chavez Cox, PTA   12/13/2018

## 2018-12-13 NOTE — DISCHARGE SUMMARY
Date of Discharge:  12/13/2018    Discharge Diagnosis: Lumbar disc degeneration, lumbar stenosis    Presenting Problem/History of Present Illness  Spinal stenosis, lumbar region, with neurogenic claudication [M48.062]  Spinal stenosis [M48.00]       Hospital Course  Patient is a 89 y.o. male presented with back and lower extremity pain.  He went to the operating room initially on 12/07/2018 for an L5-S1 ALIF and then back to the operating room on 12/10/2018 for a L 3-4, 4-5T LIF with laminectomy.  The patient tolerated procedure well.  He did have some difficulty with urination however he is voiding spontaneously at this time.  He did have some confusion but this is improving at this time.  He is ambulating with physical therapy.  He is still complaining of some back pain but overall he does appear to be doing very well and improving.  He is tolerating by mouth.  It is felt this and the patient is stable and suitable to be discharged to [facility] rehabilitation facility.  He no longer requires any dressings over the incisions.  This can be cleaned with soap and water.  He can participate in any activity that the therapy department feels appropriate.  He does need to wear brace whenever he is out of bed or sitting upright.  He can have tramadol to help with the pain.    Procedures Performed  Procedure(s):  LUMBAR LAMINECTOMY TRANSFORAMINAL LUMBAR INTERBODY FUSION L34,45       Consults:   Consults     No orders found from 11/8/2018 to 12/8/2018.            Condition on Discharge:  Stable    Vital Signs  Temp:  [97.6 °F (36.4 °C)-99.1 °F (37.3 °C)] 97.6 °F (36.4 °C)  Heart Rate:  [75-88] 86  Resp:  [18-24] 18  BP: (151-175)/(51-61) 151/51    Physical Exam:   Physical Exam   Constitutional: He appears well-developed and well-nourished.   HENT:   Head: Normocephalic.   Eyes: EOM are normal. Pupils are equal, round, and reactive to light.   Neck: Normal range of motion.   Pulmonary/Chest: Effort normal.   Musculoskeletal:  Normal range of motion.   Neurological: He is alert. He has normal strength and normal reflexes. No cranial nerve deficit or sensory deficit. Gait normal. GCS eye subscore is 4. GCS verbal subscore is 5. GCS motor subscore is 6.   Skin: Skin is warm.   Incision clean dry and intact.   Psychiatric: He has a normal mood and affect. His speech is normal and behavior is normal. Thought content normal.        Neurologic Exam     Mental Status   Oriented to person.   Oriented to place.   Disoriented to year.   Attention: decreased. Concentration: decreased.   Speech: speech is normal   Level of consciousness: alert    Cranial Nerves   Cranial nerves II through XII intact.     CN III, IV, VI   Pupils are equal, round, and reactive to light.  Extraocular motions are normal.     Motor Exam   Muscle bulk: normal    Strength   Strength 5/5 throughout.     Sensory Exam   Light touch normal.          Discharge Disposition  Rehab Facility or Unit (DC - External)    Discharge Medications     Discharge Medications      Continue These Medications      Instructions Start Date   alfuzosin 10 MG 24 hr tablet  Commonly known as:  UROXATRAL   10 mg, Oral, Daily      chlorpheniramine 4 MG tablet  Commonly known as:  CHLOR-TRIMETON   4 mg, Oral, Daily      [employer] FOLIC ACID 800 MCG tablet  Generic drug:  folic acid   Take 800 mcg by mouth daily.        diphenhydrAMINE 25 mg capsule  Commonly known as:  BENADRYL   25 mg, Oral, Nightly      DULoxetine 60 MG capsule  Commonly known as:  CYMBALTA   60 mg, Oral, Daily      ferrous sulfate 325 (65 FE) MG tablet   325 mg, Oral, 3 Times Daily With Meals      finasteride 5 MG tablet  Commonly known as:  PROSCAR   5 mg, Oral, Daily      Flaxseed Oil 1000 MG capsule   540mg takes 4 tablets by mouth daily      furosemide 40 MG tablet  Commonly known as:  LASIX   40 mg, Oral, Daily      L-Lysine 500 MG tablet tablet   1 tablet, Oral, Daily      lidocaine 5 %  Commonly known as:  LIDODERM   1 patch,  Transdermal, Every 24 Hours, Remove & Discard patch within 12 hours or as directed by MD       metoprolol succinate XL 25 MG 24 hr tablet  Commonly known as:  TOPROL-XL   25 mg, Oral, Nightly      pravastatin 40 MG tablet  Commonly known as:  PRAVACHOL   Take 40 mg by mouth daily.        primidone 50 MG tablet  Commonly known as:  MYSOLINE   Take 1/2 tablet at bedtime         Stop These Medications    oxyCODONE-acetaminophen 7.5-325 MG per tablet  Commonly known as:  PERCOCET            Discharge Diet:   Diet Instructions     Diet: Regular; Thin      Discharge Diet:  Regular    Fluid Consistency:  Thin          Activity at Discharge:   Activity Instructions     Discharge Activity Restrictions      1) No driving for 1 week and no longer taking narcotics.   2) Wear back brace when out of bed or sitting in upright chair  3) May shower / sponge bathe in 72 hours.  4) Do not lift, push, or pull          Follow-up Appointments  No future appointments.  Additional Instructions for the Follow-ups that You Need to Schedule     Call MD With Problems / Concerns   As directed      Call with worsening back or leg pain, drainage from wound, fever, or difficulty walking    Order Comments:  Call with worsening back or leg pain, drainage from wound, fever, or difficulty walking          Discharge Follow-up with Specialty: job hernandez np; 3 Weeks   As directed      Specialty:  job hernandez np    Follow Up:  3 Weeks               Test Results Pending at Discharge   Order Current Status    Tissue Pathology Exam In process           EDIE Maloney  12/13/18  7:25 AM    Time: Discharge 30 min

## 2018-12-13 NOTE — THERAPY TREATMENT NOTE
Acute Care - Physical Therapy Treatment Note  [facility]     Patient Name: Chad Henriquez  : 1929  MRN: [id number]  Today's Date: 2018  Onset of Illness/Injury or Date of Surgery: 18  Date of Referral to PT: 18  Referring Physician: Dr. Falcon    Admit Date: 2018    Visit Dx:    ICD-10-CM ICD-9-CM   1. Spinal stenosis, lumbar region, with neurogenic claudication M48.062 724.03   2. Impaired functional mobility, balance, gait, and endurance Z74.09 V49.89   3. Decreased activities of daily living (ADL) R68.89 780.99   4. Degeneration of lumbar or lumbosacral intervertebral disc M51.37 722.52     Patient Active Problem List   Diagnosis   • BPH with urinary obstruction   • Frequency of urination   • Nocturia   • OAB (overactive bladder)   • Non-smoker   • Normal body mass index (BMI)   • Spinal stenosis, lumbar region, with neurogenic claudication   • Left leg pain   • Bilateral hip pain   • Acute left-sided low back pain with left-sided sciatica   • Essential tremor   • BMI 21.0-21.9, adult   • Spinal stenosis   • Degeneration of lumbar or lumbosacral intervertebral disc       Therapy Treatment    Rehabilitation Treatment Summary     Row Name 18 0950             Treatment Time/Intention    Discipline  physical therapy assistant  -KJ      Document Type  therapy note (daily note)  -KJ2      Subjective Information  no complaints  -KJ2      Mode of Treatment  physical therapy  -KJ2      Patient/Family Observations  sons present  -KJ2      Patient Effort  good  -KJ2      Comment  confusion is better  -KJ2      Existing Precautions/Restrictions  brace worn when out of bed  -KJ2      Treatment Considerations/Comments  LSO  -KJ2      Recorded by [KJ] Luz Montalvo, PTA 18 1008  [KJ2] Luz Montalvo, PTA 18 1119      Row Name 18 0950             Sit-Stand Transfer    Sit-Stand [place] (Transfers)  supervision  -KJ      Assistive Device (Sit-Stand Transfers)   walker, front-wheeled  -KJ      Recorded by [KJ] Luz Montalvo, [facility] 12/13/18 1119      Row Name 12/13/18 0950             Stand-Sit Transfer    Stand-Sit [place] (Transfers)  supervision;verbal cues  -KJ      Recorded by [KJ] Luz Montalvo, [facility] 12/13/18 1119      Row Name 12/13/18 0950             Gait/Stairs Assessment/Training    [place] Level (Gait)  verbal cues;contact guard  -KJ      Assistive Device (Gait)  walker, front-wheeled  -KJ      Distance in Feet (Gait)  50' x 2  -KJ      Bilateral Gait Deviations  forward flexed posture  -KJ      Recorded by [KJ] Luz Montalvo, [facility] 12/13/18 1119      Row Name 12/13/18 0950             Positioning and Restraints    Pre-Treatment Position  sitting in chair/recliner  -KJ      Post Treatment Position  chair  -KJ      Recorded by [KJ] Luz Montalvo, [facility] 12/13/18 1119      Row Name 12/13/18 0950             Pain Scale: Numbers Pre/Post-Treatment    Pain Scale: Numbers, Pretreatment  4/10  -KJ      Pain Scale: Numbers, Post-Treatment  4/10  -KJ      Recorded by [KJ] Luz Montalvo, [facility] 12/13/18 1119      Row Name 12/13/18 0950             Spinal Orthosis Management    Type (Spinal Orthosis)  LSO (lumbar sacral orthosis)  -KJ      Recorded by [KJ] Luz Montalvo, [facility] 12/13/18 1119      Row Name                Wound 12/07/18 1109 back incision    Wound - Properties Group Date first assessed: 12/07/18 [SWETA] Time first assessed: 1109 [SWETA] Location: back [SWETA] Type: incision [SWETA] Recorded by:  [SWETA] Martell Oropeza RN 12/07/18 1109    Row Name                Wound 12/10/18 1153 back incision    Wound - Properties Group Date first assessed: 12/10/18 [SWETA] Time first assessed: 1153 [SWETA] Location: back [SWETA] Type: incision [SWETA] Recorded by:  [SWETA] Martell Oropeza RN 12/10/18 1153    Row Name                Wound 12/10/18 2005 lower abdomen incision;surgical    Wound - Properties Group Date first assessed: 12/10/18 [TR] Time first assessed: 2005 [TR] Orientation: lower  [TR] Location: abdomen [TR] Type: incision;surgical [TR] Recorded by:  [TR] Santa Lopez RN 12/10/18 2014      User Key  (r) = Recorded By, (t) = Taken By, (c) = Cosigned By    Initials Name Effective Dates Discipline    Luz Douglass, FRANCHESKA 08/02/16 -  PT    TR Santa Lopez RN 08/02/16 -  Nurse    SWETA Martell Oropeza RN 08/02/16 -  Nurse          Wound 12/07/18 1109 back incision (Active)   Dressing Appearance intact;dried drainage 12/13/2018  8:15 AM   Drainage Amount none 12/13/2018  8:15 AM   Dressing Care, Wound gauze 12/12/2018 11:23 PM       Wound 12/10/18 1153 back incision (Active)   Dressing Appearance intact;dry;no drainage 12/13/2018  8:15 AM   Drainage Amount none 12/13/2018  8:15 AM   Dressing Care, Wound gauze 12/12/2018 11:23 PM       Wound 12/10/18 2005 lower abdomen incision;surgical (Active)   Dressing Appearance dry;intact;no drainage 12/13/2018  8:15 AM   Drainage Amount none 12/13/2018  8:15 AM   Dressing Care, Wound gauze 12/12/2018 11:23 PM           Physical Therapy Education     Title: PT OT SLP Therapies (In Progress)     Topic: Physical Therapy (In Progress)     Point: Mobility training (In Progress)     Learning Progress Summary           Patient Acceptance, E, NR by MS at 12/11/2018 12:03 PM    Comment:  pt unable to repeat any spinal restrictions. knows to wear brace when out of bed    Acceptance, E, VU,NR by CW at 12/9/2018  9:39 AM    Comment:  transfers, gait, body mechanics, plan of care    Acceptance, E,D, VU,DU by CW at 12/8/2018 10:05 AM    Comment:  transfers, gait, plan of care, benefits of activity    Acceptance, E, VU by MS at 12/7/2018  4:32 PM    Comment:  role of PT in his care, spinal restrictions                   Point: Body mechanics (In Progress)     Learning Progress Summary           Patient Acceptance, E, NR by MS at 12/11/2018 12:03 PM    Comment:  pt unable to repeat any spinal restrictions. knows to wear brace when out of bed    Acceptance, E, VU,NR  by CW at 12/9/2018  9:39 AM    Comment:  transfers, gait, body mechanics, plan of care    Acceptance, E,D, VU,DU by CW at 12/8/2018 10:05 AM    Comment:  transfers, gait, plan of care, benefits of activity                   Point: Precautions (In Progress)     Learning Progress Summary           Patient Acceptance, E, NR by MS at 12/11/2018 12:03 PM    Comment:  pt unable to repeat any spinal restrictions. knows to wear brace when out of bed    Acceptance, E, VU,NR by CW at 12/9/2018  9:39 AM    Comment:  transfers, gait, body mechanics, plan of care    Acceptance, E,D, VU,DU by CW at 12/8/2018 10:05 AM    Comment:  transfers, gait, plan of care, benefits of activity    Acceptance, E, VU by MS at 12/7/2018  4:32 PM    Comment:  role of PT in his care, spinal restrictions                               User Key     Initials Effective Dates Name Provider Type Discipline    MS 06/19/18 -  Sarah Rainey, PT, DPT, NCS Physical Therapist PT     06/22/15 -  Elsie Bosch PTA Physical Therapy Assistant PT                PT Recommendation and Plan     Plan of Care Reviewed With: patient  Progress: improving  Outcome Summary: PT tx completed. Pt up in BR several times today with nursing staff. Pt confused, probably from lack of sleep/anasthesia? CG sit<>stand with cueing, CG/Min A bed mobility, amb 40' with r wx CG/Min. Cueing for safety, postural correction, and gait mechanics. Recommend maybe [facility] rehab.  Outcome Measures     Row Name 12/12/18 1300 12/11/18 1109 12/11/18 1108       How much help from another person do you currently need...    Turning from your back to your side while in flat bed without using bedrails?  --  --  3  -MS    Moving from lying on back to sitting on the side of a flat bed without bedrails?  --  --  3  -MS    Moving to and from a bed to a chair (including a wheelchair)?  --  --  3  -MS    Standing up from a chair using your arms (e.g., wheelchair, bedside chair)?  --  --  3  -MS     Climbing 3-5 steps with a railing?  --  --  2  -MS    To walk in hospital room?  --  --  3  -MS    AM-PAC 6 Clicks Score  --  --  17  -MS       How much help from another is currently needed...    Putting on and taking off regular lower body clothing?  2  -TS  2  -CH  --    Bathing (including washing, rinsing, and drying)  2  -TS  2  -CH  --    Toileting (which includes using toilet bed pan or urinal)  3  -TS  3  -CH  --    Putting on and taking off regular upper body clothing  3  -TS  3  -CH  --    Taking care of personal grooming (such as brushing teeth)  4  -TS  4  -CH  --    Eating meals  4  -TS  4  -CH  --    Score  18  -TS  18  -CH  --       Functional Assessment    Outcome Measure Options  AM-PAC 6 Clicks Daily Activity (OT)  -TS  AM-PAC 6 Clicks Daily Activity (OT)  -CH  AM-PAC 6 Clicks Basic Mobility (PT)  -MS      User Key  (r) = Recorded By, (t) = Taken By, (c) = Cosigned By    Initials Name Provider Type    CH Deepti Buckner, OTR/L Occupational Therapist    TS Kitty Villeda, ALVAREZ/L Occupational Therapy Assistant    MS Sarah Rainey, PT, DPT, NCS Physical Therapist         Time Calculation:   PT Charges     Row Name 12/13/18 1119             Time Calculation    Start Time  0950  -KJ      Stop Time  1016  -KJ      Time Calculation (min)  26 min  -KJ      PT Received On  12/13/18  -KJ      PT Goal Re-Cert Due Date  12/21/18  -KJ         Time Calculation- PT    Total Timed Code Minutes- PT  26 minute(s)  -KJ        User Key  (r) = Recorded By, (t) = Taken By, (c) = Cosigned By    Initials Name Provider Type    Luz Douglass, PTA Physical Therapy Assistant        Therapy Suggested Charges     Code   Minutes Charges    14915 (CPT®) Hc Pt Neuromusc Re Education Ea 15 Min      29312 (CPT®) Hc Pt Ther Proc Ea 15 Min      98459 (CPT®) Hc Gait Training Ea 15 Min 28 2    63977 (CPT®) Hc Pt Therapeutic Act Ea 15 Min      37790 (CPT®) Hc Pt Manual Therapy Ea 15 Min      32389 (CPT®) Hc Pt Iontophoresis  Ea 15 Min      72030 (CPT®) Hc Pt Elec Stim Ea-Per 15 Min      41818 (CPT®) Hc Pt Ultrasound Ea 15 Min      12590 (CPT®) Hc Pt Self Care/Mgmt/Train Ea 15 Min      69874 (CPT®) Hc Pt Prosthetic (S) Train Initial Encounter, Each 15 Min      87303 (CPT®) Hc Pt Orthotic(S)/Prosthetic(S) Encounter, Each 15 Min      43549 (CPT®) Hc Orthotic(S) Mgmt/Train Initial Encounter, Each 15min      Total  28 2        Therapy Charges for Today     Code Description Service Date Service Provider Modifiers Qty    19369032926 HC GAIT TRAINING EA 15 MIN 12/12/2018 Luz Montalvo, PTA GP, KX 2    50598601001 HC GAIT TRAINING EA 15 MIN 12/12/2018 Luz Montalvo, PTA GP, KX 2    15953846091 HC GAIT TRAINING EA 15 MIN 12/13/2018 Luz Montalvo, PTA GP, KX 2          PT G-Codes  Outcome Measure Options: AM-PAC 6 Clicks Daily Activity (OT)  AM-PAC 6 Clicks Score: 17  Score: 18  Functional Limitation: Mobility: Walking and moving around  Mobility: Walking and Moving Around Current Status (): At least 40 percent but less than 60 percent impaired, limited or restricted  Mobility: Walking and Moving Around Goal Status (): At least 20 percent but less than 40 percent impaired, limited or restricted    Luz Montalvo, PTA  12/13/2018

## 2018-12-14 PROBLEM — M48.062 SPINAL STENOSIS OF LUMBAR REGION WITH NEUROGENIC CLAUDICATION: Status: ACTIVE | Noted: 2018-12-14

## 2018-12-14 LAB
ABO/RH: NORMAL
ANTIBODY SCREEN: NORMAL
BACTERIA: NEGATIVE /HPF
BASOPHILS ABSOLUTE: 0 K/UL (ref 0–0.2)
BASOPHILS RELATIVE PERCENT: 0.4 % (ref 0–1)
BILIRUBIN URINE: NEGATIVE
BLOOD BANK DISPENSE STATUS: NORMAL
BLOOD BANK PRODUCT CODE: NORMAL
BLOOD, URINE: NEGATIVE
BPU ID: NORMAL
CLARITY: CLEAR
COLOR: YELLOW
DESCRIPTION BLOOD BANK: NORMAL
EOSINOPHILS ABSOLUTE: 0.3 K/UL (ref 0–0.6)
EOSINOPHILS RELATIVE PERCENT: 3.3 % (ref 0–5)
EPITHELIAL CELLS, UA: 1 /HPF (ref 0–5)
FERRITIN: 436.8 NG/ML (ref 30–400)
FOLATE: 19.1 NG/ML (ref 4.5–32.2)
GLUCOSE URINE: NEGATIVE MG/DL
HCT VFR BLD CALC: 20.9 % (ref 42–52)
HCT VFR BLD CALC: 24.4 % (ref 42–52)
HEMOGLOBIN: 6.9 G/DL (ref 14–18)
HYALINE CASTS: 1 /HPF (ref 0–8)
IRON SATURATION: 24 % (ref 14–50)
IRON: 41 UG/DL (ref 59–158)
KETONES, URINE: NEGATIVE MG/DL
LEUKOCYTE ESTERASE, URINE: ABNORMAL
LYMPHOCYTES ABSOLUTE: 1.4 K/UL (ref 1.1–4.5)
LYMPHOCYTES RELATIVE PERCENT: 14.5 % (ref 20–40)
MCH RBC QN AUTO: 32.1 PG (ref 27–31)
MCHC RBC AUTO-ENTMCNC: 33 G/DL (ref 33–37)
MCV RBC AUTO: 97.2 FL (ref 80–94)
MONOCYTES ABSOLUTE: 1.1 K/UL (ref 0–0.9)
MONOCYTES RELATIVE PERCENT: 12.1 % (ref 0–10)
NEUTROPHILS ABSOLUTE: 6.5 K/UL (ref 1.5–7.5)
NEUTROPHILS RELATIVE PERCENT: 69 % (ref 50–65)
NITRITE, URINE: NEGATIVE
PDW BLD-RTO: 12.5 % (ref 11.5–14.5)
PH UA: 7.5
PLATELET # BLD: 240 K/UL (ref 130–400)
PMV BLD AUTO: 9.1 FL (ref 9.4–12.4)
PREALBUMIN: 8 MG/DL (ref 20–40)
PROTEIN UA: 30 MG/DL
RBC # BLD: 2.15 M/UL (ref 4.7–6.1)
RBC UA: 1 /HPF (ref 0–4)
RETICULOCYTE ABSOLUTE COUNT: 0.1 M/UL (ref 0.03–0.12)
RETICULOCYTE COUNT PCT: 3.87 % (ref 0.5–1.5)
SPECIFIC GRAVITY UA: 1.01
TOTAL IRON BINDING CAPACITY: 168 UG/DL (ref 250–400)
URINE REFLEX TO CULTURE: YES
UROBILINOGEN, URINE: 4 E.U./DL
VITAMIN B-12: 488 PG/ML (ref 211–946)
WBC # BLD: 9.4 K/UL (ref 4.8–10.8)
WBC UA: 1 /HPF (ref 0–5)

## 2018-12-14 PROCEDURE — 87086 URINE CULTURE/COLONY COUNT: CPT

## 2018-12-14 PROCEDURE — 86850 RBC ANTIBODY SCREEN: CPT

## 2018-12-14 PROCEDURE — 36430 TRANSFUSION BLD/BLD COMPNT: CPT

## 2018-12-14 PROCEDURE — 6370000000 HC RX 637 (ALT 250 FOR IP): Performed by: PSYCHIATRY & NEUROLOGY

## 2018-12-14 PROCEDURE — 83550 IRON BINDING TEST: CPT

## 2018-12-14 PROCEDURE — 83540 ASSAY OF IRON: CPT

## 2018-12-14 PROCEDURE — 1180000000 HC REHAB R&B

## 2018-12-14 PROCEDURE — 2580000003 HC RX 258: Performed by: PSYCHIATRY & NEUROLOGY

## 2018-12-14 PROCEDURE — 99223 1ST HOSP IP/OBS HIGH 75: CPT | Performed by: PSYCHIATRY & NEUROLOGY

## 2018-12-14 PROCEDURE — 86901 BLOOD TYPING SEROLOGIC RH(D): CPT

## 2018-12-14 PROCEDURE — 85025 COMPLETE CBC W/AUTO DIFF WBC: CPT

## 2018-12-14 PROCEDURE — 36415 COLL VENOUS BLD VENIPUNCTURE: CPT

## 2018-12-14 PROCEDURE — 86923 COMPATIBILITY TEST ELECTRIC: CPT

## 2018-12-14 PROCEDURE — 81001 URINALYSIS AUTO W/SCOPE: CPT

## 2018-12-14 PROCEDURE — 82746 ASSAY OF FOLIC ACID SERUM: CPT

## 2018-12-14 PROCEDURE — 82607 VITAMIN B-12: CPT

## 2018-12-14 PROCEDURE — 84134 ASSAY OF PREALBUMIN: CPT

## 2018-12-14 PROCEDURE — 82728 ASSAY OF FERRITIN: CPT

## 2018-12-14 PROCEDURE — 86900 BLOOD TYPING SEROLOGIC ABO: CPT

## 2018-12-14 PROCEDURE — P9016 RBC LEUKOCYTES REDUCED: HCPCS

## 2018-12-14 PROCEDURE — 85045 AUTOMATED RETICULOCYTE COUNT: CPT

## 2018-12-14 RX ORDER — ALFUZOSIN HYDROCHLORIDE 10 MG/1
10 TABLET, EXTENDED RELEASE ORAL NIGHTLY
Status: DISCONTINUED | OUTPATIENT
Start: 2018-12-14 | End: 2018-12-28 | Stop reason: HOSPADM

## 2018-12-14 RX ORDER — TEMAZEPAM 15 MG/1
15 CAPSULE ORAL NIGHTLY PRN
Status: DISCONTINUED | OUTPATIENT
Start: 2018-12-14 | End: 2018-12-28 | Stop reason: HOSPADM

## 2018-12-14 RX ORDER — 0.9 % SODIUM CHLORIDE 0.9 %
250 INTRAVENOUS SOLUTION INTRAVENOUS ONCE
Status: COMPLETED | OUTPATIENT
Start: 2018-12-14 | End: 2018-12-14

## 2018-12-14 RX ORDER — GABAPENTIN 100 MG/1
200 CAPSULE ORAL NIGHTLY
Status: DISCONTINUED | OUTPATIENT
Start: 2018-12-14 | End: 2018-12-15

## 2018-12-14 RX ORDER — ALFUZOSIN HYDROCHLORIDE 10 MG/1
10 TABLET, EXTENDED RELEASE ORAL
Status: DISCONTINUED | OUTPATIENT
Start: 2018-12-15 | End: 2018-12-14

## 2018-12-14 RX ADMIN — FERROUS SULFATE TAB 325 MG (65 MG ELEMENTAL FE) 325 MG: 325 (65 FE) TAB at 08:33

## 2018-12-14 RX ADMIN — DULOXETINE 60 MG: 60 CAPSULE, DELAYED RELEASE ORAL at 21:02

## 2018-12-14 RX ADMIN — CHLORPHENIRAMINE MALEATE 4 MG: 4 TABLET ORAL at 08:33

## 2018-12-14 RX ADMIN — TRAMADOL HYDROCHLORIDE 50 MG: 50 TABLET, FILM COATED ORAL at 11:11

## 2018-12-14 RX ADMIN — DIPHENHYDRAMINE HCL 25 MG: 25 TABLET ORAL at 20:53

## 2018-12-14 RX ADMIN — FERROUS SULFATE TAB 325 MG (65 MG ELEMENTAL FE) 325 MG: 325 (65 FE) TAB at 17:11

## 2018-12-14 RX ADMIN — SODIUM CHLORIDE 250 ML: 9 INJECTION, SOLUTION INTRAVENOUS at 10:51

## 2018-12-14 RX ADMIN — TEMAZEPAM 15 MG: 15 CAPSULE ORAL at 21:04

## 2018-12-14 RX ADMIN — METOPROLOL SUCCINATE 25 MG: 25 TABLET, EXTENDED RELEASE ORAL at 20:52

## 2018-12-14 RX ADMIN — FERROUS SULFATE TAB 325 MG (65 MG ELEMENTAL FE) 325 MG: 325 (65 FE) TAB at 13:50

## 2018-12-14 RX ADMIN — FOLIC ACID TAB 400 MCG 800 MCG: 400 TAB at 08:32

## 2018-12-14 RX ADMIN — PRIMIDONE 25 MG: 50 TABLET ORAL at 20:53

## 2018-12-14 RX ADMIN — DOCUSATE SODIUM 100 MG: 100 CAPSULE, LIQUID FILLED ORAL at 20:53

## 2018-12-14 RX ADMIN — FINASTERIDE 5 MG: 5 TABLET, FILM COATED ORAL at 08:32

## 2018-12-14 RX ADMIN — ALFUZOSIN HYDROCHLORIDE 10 MG: 10 TABLET ORAL at 20:53

## 2018-12-14 RX ADMIN — POLYETHYLENE GLYCOL 3350 17 G: 17 POWDER, FOR SOLUTION ORAL at 08:32

## 2018-12-14 RX ADMIN — FUROSEMIDE 40 MG: 40 TABLET ORAL at 08:32

## 2018-12-14 RX ADMIN — PRAVASTATIN SODIUM 40 MG: 20 TABLET ORAL at 08:32

## 2018-12-14 RX ADMIN — DOCUSATE SODIUM 100 MG: 100 CAPSULE, LIQUID FILLED ORAL at 08:33

## 2018-12-14 RX ADMIN — TAMSULOSIN HYDROCHLORIDE 0.4 MG: 0.4 CAPSULE ORAL at 08:32

## 2018-12-14 ASSESSMENT — PAIN SCALES - GENERAL
PAINLEVEL_OUTOF10: 0
PAINLEVEL_OUTOF10: 2
PAINLEVEL_OUTOF10: 1

## 2018-12-14 ASSESSMENT — ENCOUNTER SYMPTOMS
ABDOMINAL PAIN: 0
STRIDOR: 0
DIARRHEA: 0
SHORTNESS OF BREATH: 0
WHEEZING: 0
SORE THROAT: 0
BLOOD IN STOOL: 0
CONSTIPATION: 0
COUGH: 0
COLOR CHANGE: 0
NAUSEA: 0
EYE DISCHARGE: 1
BACK PAIN: 1

## 2018-12-14 NOTE — H&P
day of the patient's admission to the inpatient rehabilitation facility    Expected duration and frequency therapy: 180 minutes per day, 5 days per week    Interdisciplinary team: The patient demonstrates the need for an interdisciplinary team for active management of the following medical issues including ataxia, motor planning, balance, disease management, elimination, endurance, family training, education, independent ADLs, pain management, precautions, range of motion, safety, strength, and transfers    I have reviewed the preadmission screening documents and concur with the findings. I believe the patient meets criteria and is sufficiently stable to allow participation in the program. This requires an intensive level of therapy, close medical supervision, and an interdisciplinary team approach provided through an individualized plan of care. I approve admitting this patient for an intensive inpatient rehabilitation program.      Georgia Zhang.  Elijah Lara MD

## 2018-12-14 NOTE — THERAPY DISCHARGE NOTE
Acute Care - Occupational Therapy Discharge Summary  [facility]     Patient Name: Chad Henriquez  : 1929  MRN: [id number]    Today's Date: 2018  Onset of Illness/Injury or Date of Surgery: 18    Date of Referral to OT: 18  Referring Physician: Dr. Falcon      Admit Date: 2018        OT Recommendation and Plan    Visit Dx:    ICD-10-CM ICD-9-CM   1. Spinal stenosis, lumbar region, with neurogenic claudication M48.062 724.03   2. Impaired functional mobility, balance, gait, and endurance Z74.09 V49.89   3. Decreased activities of daily living (ADL) R68.89 780.99   4. Degeneration of lumbar or lumbosacral intervertebral disc M51.37 722.52               Rehab Goal Summary     Row Name 18 0700             Dressing Goal 1 (OT)    Activity/Assistive Device (Dressing Goal 1, OT)  lower body dressing  -TS      Castaner/Cues Needed (Dressing Goal 1, OT)  minimum assist (75% or more patient effort)  -TS      Time Frame (Dressing Goal 1, OT)  long term goal (LTG);by discharge  -TS      Progress/Outcome (Dressing Goal 1, OT)  goal not met  -TS         Patient Education Goal (OT)    Activity (Patient Education Goal, OT)  LSO wear schedule/fitting/mgt, spinal precuations, adl modifications, adaptive equipment use, home/enviromental modifications.   -TS      Castaner/Cues/Accuracy (Memory Goal 2, OT)  independent;verbalizes understanding  -TS      Time Frame (Patient Education Goal, OT)  long term goal (LTG);by discharge  -TS      Progress/Outcome (Patient Education Goal, OT)  goal not met  -TS        User Key  (r) = Recorded By, (t) = Taken By, (c) = Cosigned By    Initials Name Provider Type Discipline    TS Kitty Villeda, KIM/L Occupational Therapy Assistant OT          Outcome Measures     Row Name 18 1300 18 1109 18 1108       How much help from another person do you currently need...    Turning from your back to your side while in flat bed without using  bedrails?  --  --  3  -MS    Moving from lying on back to sitting on the side of a flat bed without bedrails?  --  --  3  -MS    Moving to and from a bed to a chair (including a wheelchair)?  --  --  3  -MS    Standing up from a chair using your arms (e.g., wheelchair, bedside chair)?  --  --  3  -MS    Climbing 3-5 steps with a railing?  --  --  2  -MS    To walk in hospital room?  --  --  3  -MS    AM-PAC 6 Clicks Score  --  --  17  -MS       How much help from another is currently needed...    Putting on and taking off regular lower body clothing?  2  -TS  2  -CH  --    Bathing (including washing, rinsing, and drying)  2  -TS  2  -CH  --    Toileting (which includes using toilet bed pan or urinal)  3  -TS  3  -CH  --    Putting on and taking off regular upper body clothing  3  -TS  3  -CH  --    Taking care of personal grooming (such as brushing teeth)  4  -TS  4  -CH  --    Eating meals  4  -TS  4  -CH  --    Score  18  -TS  18  -CH  --       Functional Assessment    Outcome Measure Options  AM-PAC 6 Clicks Daily Activity (OT)  -TS  AM-PAC 6 Clicks Daily Activity (OT)  -CH  AM-PAC 6 Clicks Basic Mobility (PT)  -MS      User Key  (r) = Recorded By, (t) = Taken By, (c) = Cosigned By    Initials Name Provider Type    CH Deepti Buckner, OTR/L Occupational Therapist    TS Kitty Villeda, ALVAREZ/L Occupational Therapy Assistant    Sarah Betancourt, PT, DPT, NCS Physical Therapist          Therapy Suggested Charges     Code   Minutes Charges    62042 (CPT®) Hc Ot Neuromusc Re Education Ea 15 Min      75844 (CPT®) Hc Ot Ther Proc Ea 15 Min      12998 (CPT®) Hc Ot Therapeutic Act Ea 15 Min      29585 (CPT®) Hc Ot Manual Therapy Ea 15 Min      28006 (CPT®) Hc Ot Iontophoresis Ea 15 Min      58736 (CPT®) Hc Ot Elec Stim Ea-Per 15 Min      73072 (CPT®) Hc Ot Ultrasound Ea 15 Min      67117 (CPT®) Hc Ot Self Care/Mgmt/Train Ea 15 Min 38 3    Total  38 3              OT Discharge Summary  Reason for Discharge:  Discharge from facility  Outcomes Achieved: Refer to plan of care for updates on goals achieved  Discharge Destination: Inpatient rehabilitation facility      MYRA Deutsch  12/14/2018

## 2018-12-14 NOTE — PLAN OF CARE
Problem: Safety:  Goal: Free from accidental physical injury  Free from accidental physical injury   Outcome: Ongoing    Goal: Free from intentional harm  Free from intentional harm   Outcome: Ongoing      Problem: Daily Care:  Goal: Daily care needs are met  Daily care needs are met   Outcome: Ongoing

## 2018-12-14 NOTE — PROGRESS NOTES
Nutrition Assessment    Type and Reason for Visit: Initial (rehab)    Nutrition Recommendations: ONS BID    Nutrition Assessment: Pt is at nutritional risk r/t poor appetite s/p surgery. Requests ONS, will order BID. Preferences noted. Malnutrition Assessment:  · Malnutrition Status: No malnutrition  · Context: Acute illness or injury  · Findings of the 6 clinical characteristics of malnutrition (Minimum of 2 out of 6 clinical characteristics is required to make the diagnosis of moderate or severe Protein Calorie Malnutrition based on AND/ASPEN Guidelines):  1. Energy Intake- ,      2. Weight Loss-No significant weight loss,    3. Fat Loss-No significant subcutaneous fat loss,    4. Muscle Loss-No significant muscle mass loss,    5. Fluid Accumulation-No significant fluid accumulation,    6.  Strength-     Nutrition Risk Level:  Moderate    Nutrient Needs:  · Estimated Daily Total Kcal: 1351-0691  · Estimated Daily Protein (g):   · Estimated Daily Total Fluid (ml/day): 3042-3014    Nutrition Diagnosis:   · Problem: Inadequate oral intake  · Etiology: related to Acute injury/trauma     Signs and symptoms:  as evidenced by Intake 25-50%    Objective Information:  · Nutrition-Focused Physical Findings: missing teeth - has partials  · Wound Type: None  · Current Nutrition Therapies:  · Oral Diet Orders: General   · Oral Diet intake: 26-50%  · Oral Nutrition Supplement (ONS) Orders: None  · Anthropometric Measures:  · Ht: 5' 8\" (172.7 cm)   · Admission Body Wt: 170 lb (77.1 kg)  · Usual Body Wt: 160 lb (72.6 kg)  · Ideal Body Wt: 154 lb (69.9 kg), % Ideal Body     · BMI Classification: BMI 25.0 - 29.9 Overweight    Nutrition Interventions:   Continue current diet, Start ONS  Continued Inpatient Monitoring    Nutrition Evaluation:   · Evaluation: Goals set   · Goals: PO 50% or more, wt stable    · Monitoring: Meal Intake, Supplement Intake, Weight, Pertinent Labs      Electronically signed by Nichol Escobar

## 2018-12-14 NOTE — PROGRESS NOTES
Mihaela Acosta has been placed on a medical hold by their doctor due to low hemoglobin. Therapy will resume when deemed medically appropriate.

## 2018-12-14 NOTE — PROGRESS NOTES
..4 Eyes Skin Assessment    Chandana Maxwell is being assessed upon: Admission    I agree that I, Vandana De Jesus'S Harrison, along with Tacho Canchola Rn (either 2 RN's or 1 LPN and 1 RN) have performed a thorough Head to Toe Skin Assessment on the patient. ALL assessment sites listed below have been assessed. Areas assessed by both nurses:     [x]   Head, Face, and Ears   [x]   Shoulders, Back, and Chest  [x]   Arms, Elbows, and Hands   [x]   Coccyx, Sacrum, and Ischium  [x]   Legs, Feet, and Heels    Does the Patient have Skin Breakdown?  No    Bonilla Prevention initiated: No  Wound Care Orders initiated: No    WOC nurse consulted for Pressure Injury (Stage 3,4, Unstageable, DTI, NWPT, and Complex wounds) and New or Established Ostomies: No        Primary Nurse eSignature: Vandana Jose M MARTHA Terry on 12/13/2018 at 9:57 PM      Co-Signer eSignature: Electronically signed by Neal Quinteros RN on 12/13/18 at 10:43 PM

## 2018-12-14 NOTE — PLAN OF CARE
Problem: Falls - Risk of:  Goal: Will remain free from falls  Will remain free from falls   Outcome: Ongoing      Problem: Pain:  Goal: Pain level will decrease  Pain level will decrease   Outcome: Ongoing      Problem: Safety:  Goal: Free from accidental physical injury  Free from accidental physical injury   Outcome: Ongoing      Problem: Daily Care:  Goal: Daily care needs are met  Daily care needs are met   Outcome: Ongoing      Problem: Discharge Planning:  Goal: Patients continuum of care needs are met  Patients continuum of care needs are met   Outcome: Ongoing

## 2018-12-15 LAB
BASOPHILS ABSOLUTE: 0 K/UL (ref 0–0.2)
BASOPHILS RELATIVE PERCENT: 0.3 % (ref 0–1)
EOSINOPHILS ABSOLUTE: 0.3 K/UL (ref 0–0.6)
EOSINOPHILS RELATIVE PERCENT: 3 % (ref 0–5)
GLUCOSE BLD-MCNC: 122 MG/DL (ref 70–99)
GLUCOSE BLD-MCNC: 122 MG/DL (ref 70–99)
GLUCOSE BLD-MCNC: 98 MG/DL (ref 70–99)
HCT VFR BLD CALC: 25.5 % (ref 42–52)
HEMOGLOBIN: 8.7 G/DL (ref 14–18)
LYMPHOCYTES ABSOLUTE: 1.4 K/UL (ref 1.1–4.5)
LYMPHOCYTES RELATIVE PERCENT: 13.9 % (ref 20–40)
MCH RBC QN AUTO: 32.1 PG (ref 27–31)
MCHC RBC AUTO-ENTMCNC: 34.1 G/DL (ref 33–37)
MCV RBC AUTO: 94.1 FL (ref 80–94)
MONOCYTES ABSOLUTE: 1.3 K/UL (ref 0–0.9)
MONOCYTES RELATIVE PERCENT: 12.6 % (ref 0–10)
NEUTROPHILS ABSOLUTE: 6.8 K/UL (ref 1.5–7.5)
NEUTROPHILS RELATIVE PERCENT: 69.1 % (ref 50–65)
PDW BLD-RTO: 13.9 % (ref 11.5–14.5)
PERFORMED ON: ABNORMAL
PERFORMED ON: ABNORMAL
PERFORMED ON: NORMAL
PLATELET # BLD: 297 K/UL (ref 130–400)
PMV BLD AUTO: 8.6 FL (ref 9.4–12.4)
RBC # BLD: 2.71 M/UL (ref 4.7–6.1)
WBC # BLD: 9.9 K/UL (ref 4.8–10.8)

## 2018-12-15 PROCEDURE — 97110 THERAPEUTIC EXERCISES: CPT

## 2018-12-15 PROCEDURE — 97535 SELF CARE MNGMENT TRAINING: CPT

## 2018-12-15 PROCEDURE — 99232 SBSQ HOSP IP/OBS MODERATE 35: CPT | Performed by: PSYCHIATRY & NEUROLOGY

## 2018-12-15 PROCEDURE — 1180000000 HC REHAB R&B

## 2018-12-15 PROCEDURE — 82948 REAGENT STRIP/BLOOD GLUCOSE: CPT

## 2018-12-15 PROCEDURE — 97161 PT EVAL LOW COMPLEX 20 MIN: CPT

## 2018-12-15 PROCEDURE — 6370000000 HC RX 637 (ALT 250 FOR IP): Performed by: PSYCHIATRY & NEUROLOGY

## 2018-12-15 PROCEDURE — 97116 GAIT TRAINING THERAPY: CPT

## 2018-12-15 PROCEDURE — 36415 COLL VENOUS BLD VENIPUNCTURE: CPT

## 2018-12-15 PROCEDURE — G8988 SELF CARE GOAL STATUS: HCPCS

## 2018-12-15 PROCEDURE — 97166 OT EVAL MOD COMPLEX 45 MIN: CPT

## 2018-12-15 PROCEDURE — G8987 SELF CARE CURRENT STATUS: HCPCS

## 2018-12-15 PROCEDURE — 85025 COMPLETE CBC W/AUTO DIFF WBC: CPT

## 2018-12-15 RX ADMIN — TRAMADOL HYDROCHLORIDE 50 MG: 50 TABLET, FILM COATED ORAL at 09:50

## 2018-12-15 RX ADMIN — PRIMIDONE 25 MG: 50 TABLET ORAL at 20:50

## 2018-12-15 RX ADMIN — TEMAZEPAM 15 MG: 15 CAPSULE ORAL at 20:50

## 2018-12-15 RX ADMIN — FERROUS SULFATE TAB 325 MG (65 MG ELEMENTAL FE) 325 MG: 325 (65 FE) TAB at 18:03

## 2018-12-15 RX ADMIN — DOCUSATE SODIUM 100 MG: 100 CAPSULE, LIQUID FILLED ORAL at 08:42

## 2018-12-15 RX ADMIN — TRAMADOL HYDROCHLORIDE 50 MG: 50 TABLET, FILM COATED ORAL at 18:03

## 2018-12-15 RX ADMIN — PRAVASTATIN SODIUM 40 MG: 20 TABLET ORAL at 08:42

## 2018-12-15 RX ADMIN — FINASTERIDE 5 MG: 5 TABLET, FILM COATED ORAL at 08:42

## 2018-12-15 RX ADMIN — DULOXETINE 60 MG: 60 CAPSULE, DELAYED RELEASE ORAL at 08:41

## 2018-12-15 RX ADMIN — DIPHENHYDRAMINE HCL 25 MG: 25 TABLET ORAL at 20:50

## 2018-12-15 RX ADMIN — CHLORPHENIRAMINE MALEATE 4 MG: 4 TABLET ORAL at 08:42

## 2018-12-15 RX ADMIN — FOLIC ACID TAB 400 MCG 800 MCG: 400 TAB at 08:41

## 2018-12-15 RX ADMIN — ALFUZOSIN HYDROCHLORIDE 10 MG: 10 TABLET ORAL at 20:50

## 2018-12-15 RX ADMIN — FERROUS SULFATE TAB 325 MG (65 MG ELEMENTAL FE) 325 MG: 325 (65 FE) TAB at 12:59

## 2018-12-15 RX ADMIN — METOPROLOL SUCCINATE 25 MG: 25 TABLET, EXTENDED RELEASE ORAL at 20:50

## 2018-12-15 RX ADMIN — DOCUSATE SODIUM 100 MG: 100 CAPSULE, LIQUID FILLED ORAL at 20:50

## 2018-12-15 RX ADMIN — FERROUS SULFATE TAB 325 MG (65 MG ELEMENTAL FE) 325 MG: 325 (65 FE) TAB at 08:41

## 2018-12-15 ASSESSMENT — PAIN SCALES - GENERAL
PAINLEVEL_OUTOF10: 5
PAINLEVEL_OUTOF10: 7
PAINLEVEL_OUTOF10: 2
PAINLEVEL_OUTOF10: 8
PAINLEVEL_OUTOF10: 3
PAINLEVEL_OUTOF10: 0

## 2018-12-15 ASSESSMENT — ENCOUNTER SYMPTOMS
COUGH: 0
BACK PAIN: 1
BLOOD IN STOOL: 0
ABDOMINAL PAIN: 0
COLOR CHANGE: 0
SORE THROAT: 0
NAUSEA: 0
WHEEZING: 0
EYE DISCHARGE: 0
CONSTIPATION: 0
STRIDOR: 0
SHORTNESS OF BREATH: 0
DIARRHEA: 0

## 2018-12-15 ASSESSMENT — PAIN DESCRIPTION - PAIN TYPE: TYPE: ACUTE PAIN;SURGICAL PAIN

## 2018-12-15 ASSESSMENT — PAIN DESCRIPTION - LOCATION: LOCATION: BACK;LEG

## 2018-12-15 ASSESSMENT — PAIN DESCRIPTION - ORIENTATION: ORIENTATION: LEFT;RIGHT

## 2018-12-15 NOTE — PROGRESS NOTES
FT  STAIRS     FIM SCORES    CURRENT  Bed, Chair, Wheel Chair: 3 - Requires 25-49% assistance to transfer  Walk: 1 - Total Assistance Walks < 50 feet OR requires two or more people OR patient performs < 25% of locomotion effort  Wheel Chair: 1 - Total Assistance Operates wheelchair < 50 feet OR requires two or more people OR patient performs < 25% of locomotion effort  Stairs: 0 - Activity Does not Occur ( 0 only for the admission assessment)  ADMIT  Bed, Chair, Wheel Chair: 3 - Requires 25-49% assistance to transfer   Walk: 1 - Total Assistance Walks < 50 feet OR requires two or more people OR patient performs < 25% of locomotion effort   Wheel Chair: 1 - Total Assistance Operates wheelchair < 50 feet OR requires two or more people OR patient performs < 25% of locomotion effort   Stairs: 0 - Activity Does not Occur ( 0 only for the admission assessment)      GOALS  GOAL: Bed, Chair, Wheel Chair: 6  GOAL: Walk/Wheel Chair: 6  GOAL: Stairs: 5     GOALS:  Short term goals  Time Frame for Short term goals: 1 WEEK  Short term goal 1: TF FIM 4  Short term goal 2: AMB FIM 2  Short term goal 3: WC FIM 2  Short term goal 4: STEP FIM 1  Short term goal 5: HEP SBA    Long term goals  Time Frame for Long term goals : 2-3 WEEKS  Long term goal 1: TF FIM 6  Long term goal 2: AMB FIM 6  Long term goal 3: WC FIM 6   Long term goal 4: STEP FIM 5E  Long term goal 5: HEP INDEP  HOME LIVING:  Lives With: Spouse  Type of Home: House  Home Layout: Two level, Performs ADL's on one level  Home Access: Stairs to enter with rails (5)        ASSESSMENT (IMPAIRMENTS/BARRIERS): Body structures, Functions, Activity limitations: Decreased functional mobility , Decreased ROM, Decreased ADL status, Decreased strength, Decreased safe awareness, Decreased cognition, Decreased endurance, Decreased sensation, Decreased coordination, Decreased balance  Assessment: GREATEST WEAKNESS LEFT VS RIGHT. IMPAIRED LEFT SENSATION. LIMITED BY FATIGUE.   Activity Tolerance: Patient limited by fatigue, Patient limited by pain, Patient limited by endurance     EDUCATION:  Patient Education: LOG ROLL TO/FROM  Barriers to Learning: [facility]  PLAN:  Plan  Times per week: 5-6  Times per day: Twice a day  Plan weeks: 2-3  Current Treatment Recommendations: Strengthening, ROM, Balance Training, Functional Mobility Training, Transfer Training, Endurance Training, Wheelchair Mobility Training, Gait Training, Stair training, Home Exercise Program, Safety Education & Training, Patient/Caregiver Education & Training  PATIENT REQUIRES AND IS REASONABLY EXPECTED TO ACTIVELY PARTICIPATE IN AT LEAST 3 HOURS OF INTENSIVE THERAPY PER DAY AT LEAST 5 DAYS PER WEEK, AND BE EXPECTED TO MAKE MEASURABLE IMPROVEMENT THAT WILL BE OF PRACTICAL VALUE TO Liza GROSS Kev.    PATIENT GOAL FOR REHAB:  RETURN TO PRIOR LEVEL OF FUNCTION   DISCHARGE PLANS:  Discharge Recommendations: Continue to assess pending progress  ELOS:  2-3 WEEKS        IRF/JOE    ROLLING  Roll Left and Right  Assistance Needed: Supervision or touching assistance  CARE Score: 4  SIT TO SUPINE  Sit to Lying  Assistance Needed: Partial/moderate assistance  CARE Score: 3  SUPINE TO SIT  Lying to Sitting on Side of Bed  Assistance Needed: Supervision or touching assistance  CARE Score: 4  SIT TO STAND  Sit to Stand  Assistance Needed: Partial/moderate assistance  CARE Score: 3  TRANSFERS  Chair/Bed-to-Chair Transfer  Assistance Needed: Partial/moderate assistance  CARE Score: 3  CAR TRANSFERS  Car Transfer  Reason if not Attempted: Not attempted due to medical condition or safety concerns  CARE Score: 88  WALK 10 FT  Walk 10 Feet  Assistance Needed: Supervision or touching assistance  CARE Score: 4  WALK 50 FT  Walk 50 Feet with Two Turns  Assistance Needed: Dependent  CARE Score: 1  WALK 150 FT  Walk 150 Feet  Assistance Needed: Dependent  CARE Score: 1  WALK 10 FT UNEVEN SURFACES  Walking

## 2018-12-15 NOTE — PROGRESS NOTES
12/15/18 1300   Bed Mobility   Rolling Stand by assistance   Supine to Sit Stand by assistance   Sit to Supine Contact guard assistance   Transfers   Sit to Stand Moderate Assistance;Minimal Assistance   Ambulation 1   Device Rolling Walker   Other Apparatus (LSO)   Assistance Contact guard assistance   Quality of Gait VERBAL CUES TO KEEP FROM MOVING LEFT FOOT AND RW AT SAME TIME. Distance 35 FT   Other exercises   Other exercises 1 SITTING BILAT LE EX WITH MANUAL RESISTANCE X10   Conditions Requiring Skilled Therapeutic Intervention   Body structures, Functions, Activity limitations Decreased functional mobility ; Decreased ROM; Decreased ADL status; Decreased strength;Decreased safe awareness;Decreased cognition;Decreased endurance;Decreased sensation;Decreased coordination;Decreased balance

## 2018-12-15 NOTE — PROGRESS NOTES
Patient:   Eileen Rodriguez  MR#:    [id number]   Room:    2441/251-43   YOB: 1929  Date of Progress Note: 12/15/2018  Time of Note                           7:47 AM  Consulting Physician:   Oswald Holder M.D. Attending Physician:  Oswald Holder MD     CHIEF COMPLAINT:  Low back pain         Subjective: This 80 y.o. male  admitted to [facility] on 12/7/18 with chronic back and LLE pain. He has had previous dorsal column stimulator trial w/Dr. Ave Nieves without much relief. He has had multilevel laminectomy which improved initially, however has gradually worsened. He has had increasing back and L leg pain. MRI of the lumbar spine revealed findings of severe degenerative disc disease at L5-S1. On 12/7/18 he underwent an anterior lumbar interbody fusion of L5-S1 by Dr. Keyana Gaitan. On 12/10/18 he went back to OR for a L5-S1 anterior discectomy by Dr. Jeffrey Buitrago. Pt tolerated procedure well. His 02 sats were a little low so he is currently on 2L of O2 per nc. He has been having some problems w/urinary retention and has had to have in & out cath twice in the past 24hrs. He has some mild confusion after taking his pain meds. He is medically stable and is participating w/therapy. No complaints this morning  REVIEW OF SYSTEMS:  Constitutional: No fevers No chills  Neck:No stiffness  Respiratory: No shortness of breath  Cardiovascular: No chest pain No palpitations  Gastrointestinal: No abdominal pain    Genitourinary: No Dysuria  Neurological: No headache, no confusion      PHYSICAL EXAM:  BP (!) 165/69   Pulse 66   Temp 98.3 °F (36.8 °C) (Temporal)   Resp 18   Ht 5' 8\" (1.727 m)   Wt 160 lb 3.2 oz (72.7 kg)   SpO2 95%   BMI 24.36 kg/m²     Constitutional: he appears well-developed and well-nourished.    Eyes - conjunctiva normal.  Pupils react to light  Ear, nose, throat -hearing intact to finger rub No scars, masses, or lesions over external nose or ears, no atrophy of tongue  Neck-symmetric, no

## 2018-12-16 LAB — URINE CULTURE, ROUTINE: NORMAL

## 2018-12-16 PROCEDURE — 6370000000 HC RX 637 (ALT 250 FOR IP): Performed by: PSYCHIATRY & NEUROLOGY

## 2018-12-16 PROCEDURE — 1180000000 HC REHAB R&B

## 2018-12-16 RX ADMIN — FERROUS SULFATE TAB 325 MG (65 MG ELEMENTAL FE) 325 MG: 325 (65 FE) TAB at 09:55

## 2018-12-16 RX ADMIN — FINASTERIDE 5 MG: 5 TABLET, FILM COATED ORAL at 09:54

## 2018-12-16 RX ADMIN — FERROUS SULFATE TAB 325 MG (65 MG ELEMENTAL FE) 325 MG: 325 (65 FE) TAB at 17:49

## 2018-12-16 RX ADMIN — TRAMADOL HYDROCHLORIDE 50 MG: 50 TABLET, FILM COATED ORAL at 16:24

## 2018-12-16 RX ADMIN — METOPROLOL SUCCINATE 25 MG: 25 TABLET, EXTENDED RELEASE ORAL at 20:54

## 2018-12-16 RX ADMIN — ALFUZOSIN HYDROCHLORIDE 10 MG: 10 TABLET ORAL at 20:54

## 2018-12-16 RX ADMIN — PRAVASTATIN SODIUM 40 MG: 20 TABLET ORAL at 09:55

## 2018-12-16 RX ADMIN — FERROUS SULFATE TAB 325 MG (65 MG ELEMENTAL FE) 325 MG: 325 (65 FE) TAB at 12:54

## 2018-12-16 RX ADMIN — FOLIC ACID TAB 400 MCG 800 MCG: 400 TAB at 09:54

## 2018-12-16 RX ADMIN — POLYETHYLENE GLYCOL 3350 17 G: 17 POWDER, FOR SOLUTION ORAL at 09:55

## 2018-12-16 RX ADMIN — DIPHENHYDRAMINE HCL 25 MG: 25 TABLET ORAL at 20:54

## 2018-12-16 RX ADMIN — TRAMADOL HYDROCHLORIDE 50 MG: 50 TABLET, FILM COATED ORAL at 22:31

## 2018-12-16 RX ADMIN — DULOXETINE 60 MG: 60 CAPSULE, DELAYED RELEASE ORAL at 09:55

## 2018-12-16 RX ADMIN — CHLORPHENIRAMINE MALEATE 4 MG: 4 TABLET ORAL at 09:55

## 2018-12-16 RX ADMIN — DOCUSATE SODIUM 100 MG: 100 CAPSULE, LIQUID FILLED ORAL at 20:54

## 2018-12-16 RX ADMIN — PRIMIDONE 25 MG: 50 TABLET ORAL at 20:54

## 2018-12-16 RX ADMIN — DOCUSATE SODIUM 100 MG: 100 CAPSULE, LIQUID FILLED ORAL at 09:55

## 2018-12-16 ASSESSMENT — PAIN SCALES - GENERAL
PAINLEVEL_OUTOF10: 2
PAINLEVEL_OUTOF10: 4
PAINLEVEL_OUTOF10: 1
PAINLEVEL_OUTOF10: 4

## 2018-12-16 NOTE — PLAN OF CARE
Problem: Falls - Risk of:  Goal: Will remain free from falls  Will remain free from falls   Outcome: Ongoing  Will remain free of injury from fall. Continue to use call light for any assistance needed. Bed alarm on. Goal: Absence of physical injury  Absence of physical injury   Outcome: Ongoing  Will remain free of injury from fall. Continue to use call light for any assistance needed. Bed alarm on. Problem: Pain:  Goal: Pain level will decrease  Pain level will decrease   Outcome: Ongoing  Patient will state pain improved to tolerable level with use of pain medication. Goal: Control of acute pain  Control of acute pain   Outcome: Ongoing  Patient will state pain improved to tolerable level with use of pain medication. Goal: Control of chronic pain  Control of chronic pain   Outcome: Ongoing  Patient will state pain improved to tolerable level with use of pain medication. Problem: Safety:  Goal: Free from accidental physical injury  Free from accidental physical injury   Outcome: Ongoing  Will remain free of injury from fall. Continue to use call light for any assistance needed. Bed alarm on. Goal: Free from intentional harm  Free from intentional harm   Outcome: Ongoing  Will remain free of injury from fall. Continue to use call light for any assistance needed. Bed alarm on.     Problem: Daily Care:  Goal: Daily care needs are met  Daily care needs are met   Outcome: Ongoing      Problem: Discharge Planning:  Goal: Patients continuum of care needs are met  Patients continuum of care needs are met   Outcome: Ongoing

## 2018-12-17 LAB
ANION GAP SERPL CALCULATED.3IONS-SCNC: 7 MMOL/L (ref 7–19)
BASOPHILS ABSOLUTE: 0 K/UL (ref 0–0.2)
BASOPHILS RELATIVE PERCENT: 0.3 % (ref 0–1)
BUN BLDV-MCNC: 13 MG/DL (ref 8–23)
CALCIUM SERPL-MCNC: 8.1 MG/DL (ref 8.8–10.2)
CHLORIDE BLD-SCNC: 93 MMOL/L (ref 98–111)
CO2: 28 MMOL/L (ref 22–29)
CREAT SERPL-MCNC: 0.5 MG/DL (ref 0.5–1.2)
CYTO UR: NORMAL
EOSINOPHILS ABSOLUTE: 0.3 K/UL (ref 0–0.6)
EOSINOPHILS RELATIVE PERCENT: 3.6 % (ref 0–5)
GFR NON-AFRICAN AMERICAN: >60
GLUCOSE BLD-MCNC: 98 MG/DL (ref 74–109)
HCT VFR BLD CALC: 26.3 % (ref 42–52)
HEMOGLOBIN: 9 G/DL (ref 14–18)
LAB AP CASE REPORT: NORMAL
LYMPHOCYTES ABSOLUTE: 1.5 K/UL (ref 1.1–4.5)
LYMPHOCYTES RELATIVE PERCENT: 16.4 % (ref 20–40)
MCH RBC QN AUTO: 32.8 PG (ref 27–31)
MCHC RBC AUTO-ENTMCNC: 34.2 G/DL (ref 33–37)
MCV RBC AUTO: 96 FL (ref 80–94)
MONOCYTES ABSOLUTE: 1 K/UL (ref 0–0.9)
MONOCYTES RELATIVE PERCENT: 11.4 % (ref 0–10)
NEUTROPHILS ABSOLUTE: 6.1 K/UL (ref 1.5–7.5)
NEUTROPHILS RELATIVE PERCENT: 66.8 % (ref 50–65)
OCCULT BLOOD QC: NORMAL
OCCULT BLOOD SCREENING: NORMAL
PATH REPORT.FINAL DX SPEC: NORMAL
PATH REPORT.GROSS SPEC: NORMAL
PDW BLD-RTO: 13.3 % (ref 11.5–14.5)
PLATELET # BLD: 340 K/UL (ref 130–400)
PMV BLD AUTO: 8.4 FL (ref 9.4–12.4)
POTASSIUM REFLEX MAGNESIUM: 4.4 MMOL/L (ref 3.5–5)
RBC # BLD: 2.74 M/UL (ref 4.7–6.1)
SODIUM BLD-SCNC: 128 MMOL/L (ref 136–145)
WBC # BLD: 9.2 K/UL (ref 4.8–10.8)

## 2018-12-17 PROCEDURE — 85025 COMPLETE CBC W/AUTO DIFF WBC: CPT

## 2018-12-17 PROCEDURE — 36415 COLL VENOUS BLD VENIPUNCTURE: CPT

## 2018-12-17 PROCEDURE — 99232 SBSQ HOSP IP/OBS MODERATE 35: CPT | Performed by: PSYCHIATRY & NEUROLOGY

## 2018-12-17 PROCEDURE — 97110 THERAPEUTIC EXERCISES: CPT

## 2018-12-17 PROCEDURE — 1180000000 HC REHAB R&B

## 2018-12-17 PROCEDURE — 6370000000 HC RX 637 (ALT 250 FOR IP): Performed by: PSYCHIATRY & NEUROLOGY

## 2018-12-17 PROCEDURE — 80048 BASIC METABOLIC PNL TOTAL CA: CPT

## 2018-12-17 PROCEDURE — G0328 FECAL BLOOD SCRN IMMUNOASSAY: HCPCS

## 2018-12-17 PROCEDURE — 97530 THERAPEUTIC ACTIVITIES: CPT

## 2018-12-17 PROCEDURE — 97535 SELF CARE MNGMENT TRAINING: CPT

## 2018-12-17 PROCEDURE — 97116 GAIT TRAINING THERAPY: CPT

## 2018-12-17 RX ADMIN — CHLORPHENIRAMINE MALEATE 4 MG: 4 TABLET ORAL at 07:17

## 2018-12-17 RX ADMIN — PRAVASTATIN SODIUM 40 MG: 20 TABLET ORAL at 07:17

## 2018-12-17 RX ADMIN — ALFUZOSIN HYDROCHLORIDE 10 MG: 10 TABLET ORAL at 21:23

## 2018-12-17 RX ADMIN — DOCUSATE SODIUM 100 MG: 100 CAPSULE, LIQUID FILLED ORAL at 07:17

## 2018-12-17 RX ADMIN — FOLIC ACID TAB 400 MCG 800 MCG: 400 TAB at 07:17

## 2018-12-17 RX ADMIN — FERROUS SULFATE TAB 325 MG (65 MG ELEMENTAL FE) 325 MG: 325 (65 FE) TAB at 13:48

## 2018-12-17 RX ADMIN — DULOXETINE 60 MG: 60 CAPSULE, DELAYED RELEASE ORAL at 07:17

## 2018-12-17 RX ADMIN — DIPHENHYDRAMINE HCL 25 MG: 25 TABLET ORAL at 21:23

## 2018-12-17 RX ADMIN — FINASTERIDE 5 MG: 5 TABLET, FILM COATED ORAL at 07:17

## 2018-12-17 RX ADMIN — TEMAZEPAM 15 MG: 15 CAPSULE ORAL at 21:24

## 2018-12-17 RX ADMIN — TRAMADOL HYDROCHLORIDE 50 MG: 50 TABLET, FILM COATED ORAL at 07:17

## 2018-12-17 RX ADMIN — DOCUSATE SODIUM 100 MG: 100 CAPSULE, LIQUID FILLED ORAL at 21:24

## 2018-12-17 RX ADMIN — TRAMADOL HYDROCHLORIDE 50 MG: 50 TABLET, FILM COATED ORAL at 15:09

## 2018-12-17 RX ADMIN — METOPROLOL SUCCINATE 25 MG: 25 TABLET, EXTENDED RELEASE ORAL at 21:24

## 2018-12-17 RX ADMIN — FERROUS SULFATE TAB 325 MG (65 MG ELEMENTAL FE) 325 MG: 325 (65 FE) TAB at 07:17

## 2018-12-17 RX ADMIN — FERROUS SULFATE TAB 325 MG (65 MG ELEMENTAL FE) 325 MG: 325 (65 FE) TAB at 16:45

## 2018-12-17 RX ADMIN — PRIMIDONE 25 MG: 50 TABLET ORAL at 21:24

## 2018-12-17 ASSESSMENT — PAIN DESCRIPTION - LOCATION
LOCATION: NECK
LOCATION: NECK;THROAT
LOCATION: NECK

## 2018-12-17 ASSESSMENT — ENCOUNTER SYMPTOMS
STRIDOR: 0
WHEEZING: 0
NAUSEA: 0
COLOR CHANGE: 0
DIARRHEA: 0
BLOOD IN STOOL: 0
BACK PAIN: 1
CONSTIPATION: 0
SORE THROAT: 0
ABDOMINAL PAIN: 0
COUGH: 0
EYE DISCHARGE: 0
SHORTNESS OF BREATH: 0

## 2018-12-17 ASSESSMENT — PAIN DESCRIPTION - FREQUENCY: FREQUENCY: INTERMITTENT

## 2018-12-17 ASSESSMENT — PAIN DESCRIPTION - ORIENTATION
ORIENTATION: ANTERIOR
ORIENTATION: ANTERIOR

## 2018-12-17 ASSESSMENT — PAIN DESCRIPTION - PAIN TYPE
TYPE: ACUTE PAIN

## 2018-12-17 ASSESSMENT — PAIN SCALES - GENERAL
PAINLEVEL_OUTOF10: 2
PAINLEVEL_OUTOF10: 3
PAINLEVEL_OUTOF10: 3
PAINLEVEL_OUTOF10: 7
PAINLEVEL_OUTOF10: 5

## 2018-12-17 ASSESSMENT — PAIN DESCRIPTION - DESCRIPTORS: DESCRIPTORS: ACHING;DULL

## 2018-12-17 ASSESSMENT — PAIN DESCRIPTION - PROGRESSION: CLINICAL_PROGRESSION: NOT CHANGED

## 2018-12-17 NOTE — PROGRESS NOTES
Patient:   Chiquita Kern  MR#:    [id number]   Room:    8480/776-98   YOB: 1929  Date of Progress Note: 12/17/2018  Time of Note                           7:33 AM  Consulting Physician:   Steve Del Angel M.D. Attending Physician:  Steve Del Angel MD     CHIEF COMPLAINT:  Low back pain         Subjective: This 80 y.o. male  admitted to [facility] on 12/7/18 with chronic back and LLE pain. He has had previous dorsal column stimulator trial w/Dr. Jayde Silva without much relief. He has had multilevel laminectomy which improved initially, however has gradually worsened. He has had increasing back and L leg pain. MRI of the lumbar spine revealed findings of severe degenerative disc disease at L5-S1. On 12/7/18 he underwent an anterior lumbar interbody fusion of L5-S1 by Dr. Grossman Reas Dr. S[place] . On 12/10/18 he went back to OR for a L5-S1 anterior discectomy by Dr. Liat Mack. Pt tolerated procedure well. His 02 sats were a little low so he is currently on 2L of O2 per nc. He has been having some problems w/urinary retention and has had to have in & out cath twice in the past 24hrs. He has some mild confusion after taking his pain meds. He is medically stable and is participating w/therapy. No complaints today  REVIEW OF SYSTEMS:  Constitutional: No fevers No chills  Neck:No stiffness  Respiratory: No shortness of breath  Cardiovascular: No chest pain No palpitations  Gastrointestinal: No abdominal pain    Genitourinary: No Dysuria  Neurological: No headache, no confusion      PHYSICAL EXAM:  BP (!) 162/62   Pulse 63   Temp 98.1 °F (36.7 °C) (Temporal)   Resp 18   Ht 5' 8\" (1.727 m)   Wt 160 lb 3.2 oz (72.7 kg)   SpO2 95%   BMI 24.36 kg/m²     Constitutional: he appears well-developed and well-nourished.    Eyes - conjunctiva normal.  Pupils react to light  Ear, nose, throat -hearing intact to finger rub No scars, masses, or lesions over external nose or ears, no atrophy of tongue  Neck-symmetric, no masses noted, no jugular vein distension  Respiration- chest wall appears symmetric, good expansion,   normal effort without use of accessory muscles  Cardiovascular- RRR  Musculoskeletal - no significant wasting of muscles noted, no bony deformities, gait no gross ataxia  Extremities-no clubbing, cyanosis or edema  Skin - warm, dry, and intact. No rash, erythema, or pallor. Psychiatric - mood, affect, and behavior appear normal.      Neurology  NEUROLOGICAL EXAM:      Mental status   Awake, alert, fluent oriented x 3 appropriate affect  Attention and concentration appear appropriate  Recent and remote memory appears unremarkable  Speech normal without dysarthria  No clear issues with language       Cranial Nerves   CN II- Visual fields grossly unremarkable  CN III, IV,VI-EOMI, No nystagmus, conjugate eye movements, no ptosis  CN VII-no facial assymetry  CN VIII-Hearing intact   CN IX and X- Palate elevates in midline  CN XI-good shoulder shrug  CN XII-Tongue midline with no fasciculations or fibrillations          Motor function  Antigravity x 4     Sensory function Intact to light touch     Cerebellar F-N intact     Tremor None present     Gait                  Not tested           Nursing/pcp notes, imaging,labs and vitals reviewed.      PT,OT and/or speech notes reviewed    Lab Results   Component Value Date    WBC 9.2 12/17/2018    HGB 9.0 (L) 12/17/2018    HCT 26.3 (L) 12/17/2018    MCV 96.0 (H) 12/17/2018     12/17/2018     Lab Results   Component Value Date     (L) 12/17/2018    K 4.4 12/17/2018    CL 93 (L) 12/17/2018    CO2 28 12/17/2018    BUN 13 12/17/2018    CREATININE 0.5 12/17/2018    GLUCOSE 98 12/17/2018    CALCIUM 8.1 (L) 12/17/2018    PROT 6.1 (L) 10/15/2018    LABALBU 4.2 10/15/2018    BILITOT 0.4 10/15/2018    ALKPHOS 65 10/15/2018    AST 16 10/15/2018    ALT 18 10/15/2018    LABGLOM >60 12/17/2018   No results found for: INRNo results found for: PHENYTOIN, ESR, CRP    12/15/18 1300 Bed Mobility   Rolling Stand by assistance   Supine to Sit Stand by assistance   Sit to Supine Contact guard assistance   Transfers   Sit to Stand Moderate Assistance;Minimal Assistance   Ambulation 1   Device Rolling Walker   Other Apparatus (LSO)   Assistance Contact guard assistance   Quality of Gait VERBAL CUES TO KEEP FROM MOVING LEFT FOOT AND RW AT SAME TIME. Distance 35 FT   Other exercises   Other exercises 1 SITTING BILAT LE EX WITH MANUAL RESISTANCE X10   Conditions Requiring Skilled Therapeutic Intervention   Body structures, Functions, Activity limitations Decreased functional mobility ; Decreased ROM; Decreased ADL status; Decreased strength;Decreased safe awareness;Decreased cognition;Decreased endurance;Decreased sensation;Decreased coordination;Decreased balance      ADL  Feeding: Independent  Grooming: Independent  UE Bathing: Supervision  LE Bathing: Moderate assistance  UE Dressing: Supervision  LE Dressing:  (Min A to don pants with reacher to knee level)  Toileting: Minimal assistance  Balance  Sitting Balance: Supervision  Standing Balance: Minimal assistance (Min A initially for posterior loss of balance)  Standing Balance  Sit to stand: Minimal assistance  Tub Transfers  Tub - Transfer To: Transfer tub bench  Tub Transfers: Minimal assistance  Bed mobility  Rolling to Left: Contact guard assistance  Supine to Sit: Minimal assistance  Transfers  Stand Step Transfers: Minimal assistance  Sit to stand: Minimal assistance  Transfer Comments: With r.w..  initial posterior loss of balance  Cognition  Overall Cognitive Status: WNL  LUE AROM (degrees)  LUE AROM : WNL  RUE AROM (degrees)  RUE AROM : WNL     Assessment   Performance deficits / Impairments: Decreased functional mobility ; Decreased endurance;Decreased balance;Decreased strength;Decreased ADL status  Assessment: Pt. able to transfer to tub bench and back to bed with Onesimo  Prognosis: Good  Decision Making: Medium Complexity  REQUIRES OT FOLLOW UP: Yes  Activity Tolerance  Activity Tolerance: Patient Tolerated treatment well                 RECORD REVIEW: Previous medical records, medications were reviewed at today's visit    IMPRESSION:  1. Lumbar stenosis with claudication status post L5-S1 discectomy and fusion-pain control/PT/OT  2. Essential hypertension-continue current medications and monitoring  3. Acute blood loss anemia. Status post transfusion while here. Hemoglobin steady. 4. GI-bowel regimen  5. DVT prophylaxis-SCDs  6.  Urinary retention-continue to monitor     Staff this week

## 2018-12-17 NOTE — PROGRESS NOTES
Occupational Therapy  Facility/Department: [facility] 8 REHAB UNIT  Daily Treatment Note  NAME: Yessica Mariano  : 1929  MRN: [id number]    Date of Service: 2018    Discharge Recommendations:          Patient Diagnosis(es): There were no encounter diagnoses. has a past medical history of Arthritis; Blind; CAD (coronary artery disease); Hyperlipidemia; Hypertension; Low back pain radiating to right leg; Lumbar facet arthropathy ([address] Lumbar radiculopathy intermittent; Osteoarthritis; Peripheral neuropathy; Right foot drop; and Tremor. has a past surgical history that includes Diagnostic Cardiac Cath Lab Procedure; Appendectomy; hernia repair; back surgery; Cardiac surgery; and Neck surgery. Restrictions  Restrictions/Precautions  Restrictions/Precautions: Fall Risk, Surgical Protocols  Required Braces or Orthoses?: (P) Yes  Lower Extremity Weight Bearing Restrictions  Right Lower Extremity Weight Bearing: (P) Weight Bearing As Tolerated  Left Lower Extremity Weight Bearing: (P) Weight Bearing As Tolerated  Required Braces or Orthoses  Spinal: (P) Lumbar Corset  Spinal Other: (P) brace when OOB  Position Activity Restriction  Spinal Precautions: (P) No Bending, No Lifting, No Twisting  Subjective   General  Chart Reviewed: Yes  Patient assessed for rehabilitation services?: Yes  Additional Pertinent Hx: Pt. eval on hold yesterday due to low hemoglobin. Cleared to resume today.   Family / Caregiver Present: Yes  Diagnosis: anterior lumbar interbody fusion of L5-S1,L5-S1 anterior discectomy   Objective            18 0820   Balance   Sitting Balance Supervision   Standing Balance Minimal assistance   Standing Balance   Sit to stand Minimal assistance   Functional Mobility   Functional - Mobility Device Rolling Walker   Activity To/from bathroom   Assist Level Minimal assistance          18 0820   SELF-CARE   Grooming 5   Bathing 3  (spongebath)   Dressing-Upper 5  (mod A to don brace)

## 2018-12-17 NOTE — PROGRESS NOTES
12/17/18 1509 12/17/18 1527 12/17/18 1530   Pain Assessment   Pain Assessment 0-10 --  --    Pain Level --  --  --    Pain Type Acute pain --  --    Pain Location Neck; Throat --  --    Bed Mobility   Rolling --  Stand by assistance --    Supine to Sit --  Stand by assistance --    Sit to Supine --  Stand by assistance --    Scooting --  Supervision --    Transfers   Sit to Stand --  --  Stand by assistance;Contact guard assistance   Stand to sit --  --  Stand by assistance;Contact guard assistance   Ambulation   WB Status --  --  --    Ambulation 1   Surface --  --  --    Device --  --  --    Assistance --  --  --    Quality of Gait --  --  --    Distance --  --  --    Wheelchair Activities   Wheelchair Type --  --  --    Other exercises   Other exercises 1 --  --  --    Conditions Requiring Skilled Therapeutic Intervention   Body structures, Functions, Activity limitations --  --  --    Assessment --  --  --    Prognosis --  --  --    Decision Making --  --  --    Discharge Recommendations --  --  --    Activity Tolerance   Activity Tolerance --  --  --    Activity Tolerance --  --  --        12/17/18 1531 12/17/18 1534 12/17/18 1535   Pain Assessment   Pain Assessment --  --  --    Pain Level --  --  --    Pain Type --  --  --    Pain Location --  --  --    Bed Mobility   Rolling --  --  --    Supine to Sit --  --  --    Sit to Supine --  --  --    Scooting --  --  --    Transfers   Sit to Stand --  --  --    Stand to sit --  --  --    Ambulation   WB Status WBAT --  --    Ambulation 1   Surface level tile --  --    Device Rolling Walker --  --    Assistance Contact guard assistance --  --    Quality of Gait Verbal and tactile cues needed for proper sequencing of step to to lead with L LE --  --    Distance 100 FT --  --    Wheelchair Activities   Wheelchair Type Standard --  --    Other exercises   Other exercises 1 --  Sit to Stand with verbal and tactile cues for weight shift and to let go of arm bars.  Adriel Cummings

## 2018-12-17 NOTE — PROGRESS NOTES
Supervision/Modified I with item retrieval  Long term goal 4: Modified I with LB bathing with AE       Therapy Time   Individual Concurrent Group Co-treatment   Time In 1515         Time Out 1600         Minutes 45         Timed Code Treatment Minutes: [address] Miller, OT

## 2018-12-17 NOTE — PATIENT CARE CONFERENCE
2: AMB FIM 2  Short term goal 3: WC FIM 2  Short term goal 4: STEP FIM 1  Short term goal 5: HEP SBA    Long term goals  Time Frame for Long term goals : 2-3 WEEKS  Long term goal 1: TF FIM 6  Long term goal 2: AMB FIM 6  Long term goal 3: WC FIM 6   Long term goal 4: STEP FIM 5E  Long term goal 5: HEP INDEP    ASSESSMENT:  Assessment: Patient is requiring less cueing for releasing arm rests when standing and for anterior weight shifts. Patient had improved endurance today due to report of improved sleep last night. Patient requires verbal cues to  feet up when performing side steps.     SPEECH THERAPY: N/A      OCCUPATIONAL THERAPY    OT FIM PROGRESS   ADMIT SCORE CURRENT SCORE GOAL   EATING Eatin - Patient feeds self Eatin - Patient feeds self GOAL: Eatin   GROOMING Groomin - Patient independent with all grooming tasks Groomin - Requires setup/cues to do all tasks GOAL: Groomin   BATHING Bathing: 3 - Able to bathe 5-7 areas (spongebath) Bathing: 3 - Able to bathe 5-7 areas (spongebath)     UPPER EXTREMITY DRESSING Dressing-Upper: 5 - Requires setup/supervision/cues and/or requires assist with presthesis/brace only Dressing-Upper: 5 - Requires setup/supervision/cues and/or requires assist with presthesis/brace only (mod A to don brace) GOAL: Dressing-Upper: 7   LOWER EXTREMITY DRESSING Dressing-Lower: 2 - Requires assist with 4-5 parts of dressing Dressing-Lower: 6 - Independent with device/prosthesis (Sock/slip-on Shoe dressing using L-H reacher, L-H [place] horn, and sock-aid.) GOAL: Dressing-Lower: 6   TOILETING Toileting: 3 - Able to perform 2 tasks Toiletin - Requires steadying assistance only GOAL: Toiletin   TOILET TRANSFER Toilet Transfer: 3 - Requires 25-49% assist getting off toilet Toilet Transfer: 4 - Requires steadying assistance only < 25% assist (RW) GOAL: Toilet: 6   TUB/SHOWER TRANSFER Primary Mode: Tub  Tub Transfer: 0 - Activity does not occur      Primary Mode: Tub  Tub Transfer: 5 - Supervision, set-up, cues        Primary Mode: Tub            Cognition:  Comprehension: 6 - Complex ideas 90% or device (hearing aid/glasses)   Expression: 7 - Patient expresses complex ideas/needs  Social Interaction: 7 - Patient has appropriate behavior/relations 100% of the time  Problem Solvin - Patient able to solve simple/routine tasks       UE Functioning:  WNLs    Pain Assessment:  Pain Level: 4  Pain Location: Back    STGs:  Short term goals  Time Frame for Short term goals: 1 week  Short term goal 1: MET  Short term goal 2: MET  Short term goal 3: Supervision with seated LB ADLs with AE  Short term goal 4: CGA with clothing mgmt. in standing  Short term goal 5: CGA with item retrieval from walker level    LTGs:  Long term goals  Time Frame for Long term goals : 3 weeks  Long term goal 1: Modified I with toilet and tub bench tfers  Long term goal 2: Modified I with LB dsg with AE  Long term goal 3: Supervision/Modified I with item retrieval  Long term goal 4: Modified I with LB bathing with AE    Assessment:  Decreased functional mobility ; Decreased endurance; Decreased balance; Decreased strength; Decreased ADL status            NUTRITION  Current Wt: Weight: 160 lb 3.2 oz (72.7 kg) / Body mass index is 24.36 kg/m². Admission Wt: Admission Body Weight: 170 lb (77.1 kg)  Oral Diet Orders: General   Oral Nutrition Supplement (ONS) Orders: Standard High Calorie Oral Supplement   PO intake: %  Please see nutrition note for details. NURSING    FIMS:  Bladder  Level of Assistance: Bladder Level of Assistance: 4- Requires Minimal assistance to manage or place device, patient performs 75% or more of the bladder management tasks  Frequency of Accidents: Bladder Frequency of Accidents: 6 - No accidents,uses device like urinal, bedpan, absorbant pad, or requires medication to manage bladder    Bowel  Level of Assistance:  Bowel Level of Assistance: 4- Requires Minimal established interdisciplinary plan of care as documented within the medical record of Lara Horne

## 2018-12-17 NOTE — PROGRESS NOTES
Mercedes Laurent M.D.  Gera Benítez M.D. MORGAN Olson      Internal Medicine Progress Note    12/17/2018   12:37 PM    Name:  Jordi Morel  MRN:    [id number]     Acct:     [de-identified]   Room:  [address] [place] [address] Day: 4     Admit Date: 12/13/2018  3:18 PM  PCP: Yan Gunn MD    Subjective:     C/C: back pain, weakness    Interval History: Status: improved. Up to chair eating lunch. Wife at bedside. Did not sleep well overnight. Progressing with therapy. Tolerating treatment thus far. Pain well controlled. Counts stable. Sodium down to 128 - will recheck in am.        Review of Systems   Constitutional: Positive for fatigue. Negative for chills and fever. HENT: Negative for congestion and sore throat. Eyes: Negative for discharge. Respiratory: Negative for cough, shortness of breath, wheezing and stridor. Gastrointestinal: Negative for abdominal pain, blood in stool, constipation, diarrhea and nausea. Endocrine: Negative for cold intolerance. Genitourinary: Negative for dysuria, flank pain, frequency, hematuria and urgency. Musculoskeletal: Positive for arthralgias, back pain and myalgias. Skin: Negative for color change. Allergic/Immunologic: Negative for environmental allergies. Neurological: Negative for dizziness, seizures, weakness and headaches. Hematological: Negative for adenopathy. Psychiatric/Behavioral: Negative for suicidal ideas. The patient is not nervous/anxious. Medications: Allergies:    Allergies   Allergen Reactions    Codeine     Fentanyl Other (See Comments)     Fentanyl patchs, caused patient to become confused and anxious per family       Current Meds:     temazepam (RESTORIL) capsule 15 mg Nightly PRN   alfuzosin (UROXATRAL) extended release tablet 10 mg Nightly   folic acid (FOLVITE) tablet 800 mcg Daily   pravastatin (PRAVACHOL) tablet 40 mg Daily   finasteride (PROSCAR) tablet 5 mg Daily   chlorpheniramine

## 2018-12-18 LAB
ANION GAP SERPL CALCULATED.3IONS-SCNC: 7 MMOL/L (ref 7–19)
BUN BLDV-MCNC: 13 MG/DL (ref 8–23)
CALCIUM SERPL-MCNC: 8.2 MG/DL (ref 8.8–10.2)
CHLORIDE BLD-SCNC: 94 MMOL/L (ref 98–111)
CO2: 28 MMOL/L (ref 22–29)
CREAT SERPL-MCNC: 0.5 MG/DL (ref 0.5–1.2)
GFR NON-AFRICAN AMERICAN: >60
GLUCOSE BLD-MCNC: 100 MG/DL (ref 74–109)
POTASSIUM SERPL-SCNC: 4.5 MMOL/L (ref 3.5–5)
SODIUM BLD-SCNC: 129 MMOL/L (ref 136–145)

## 2018-12-18 PROCEDURE — 97530 THERAPEUTIC ACTIVITIES: CPT

## 2018-12-18 PROCEDURE — 99232 SBSQ HOSP IP/OBS MODERATE 35: CPT | Performed by: PSYCHIATRY & NEUROLOGY

## 2018-12-18 PROCEDURE — 1180000000 HC REHAB R&B

## 2018-12-18 PROCEDURE — 6370000000 HC RX 637 (ALT 250 FOR IP): Performed by: PSYCHIATRY & NEUROLOGY

## 2018-12-18 PROCEDURE — 36415 COLL VENOUS BLD VENIPUNCTURE: CPT

## 2018-12-18 PROCEDURE — 97116 GAIT TRAINING THERAPY: CPT

## 2018-12-18 PROCEDURE — 97110 THERAPEUTIC EXERCISES: CPT

## 2018-12-18 PROCEDURE — 80048 BASIC METABOLIC PNL TOTAL CA: CPT

## 2018-12-18 PROCEDURE — 97535 SELF CARE MNGMENT TRAINING: CPT

## 2018-12-18 RX ADMIN — METOPROLOL SUCCINATE 25 MG: 25 TABLET, EXTENDED RELEASE ORAL at 21:22

## 2018-12-18 RX ADMIN — FINASTERIDE 5 MG: 5 TABLET, FILM COATED ORAL at 07:24

## 2018-12-18 RX ADMIN — FOLIC ACID TAB 400 MCG 800 MCG: 400 TAB at 07:24

## 2018-12-18 RX ADMIN — PRIMIDONE 25 MG: 50 TABLET ORAL at 21:30

## 2018-12-18 RX ADMIN — TEMAZEPAM 15 MG: 15 CAPSULE ORAL at 21:22

## 2018-12-18 RX ADMIN — FERROUS SULFATE TAB 325 MG (65 MG ELEMENTAL FE) 325 MG: 325 (65 FE) TAB at 07:24

## 2018-12-18 RX ADMIN — POLYETHYLENE GLYCOL 3350 17 G: 17 POWDER, FOR SOLUTION ORAL at 07:24

## 2018-12-18 RX ADMIN — DOCUSATE SODIUM 100 MG: 100 CAPSULE, LIQUID FILLED ORAL at 07:24

## 2018-12-18 RX ADMIN — DULOXETINE 60 MG: 60 CAPSULE, DELAYED RELEASE ORAL at 07:24

## 2018-12-18 RX ADMIN — PRAVASTATIN SODIUM 40 MG: 20 TABLET ORAL at 07:24

## 2018-12-18 RX ADMIN — DOCUSATE SODIUM 100 MG: 100 CAPSULE, LIQUID FILLED ORAL at 21:22

## 2018-12-18 RX ADMIN — CHLORPHENIRAMINE MALEATE 4 MG: 4 TABLET ORAL at 07:24

## 2018-12-18 RX ADMIN — DIPHENHYDRAMINE HCL 25 MG: 25 TABLET ORAL at 21:22

## 2018-12-18 RX ADMIN — ALFUZOSIN HYDROCHLORIDE 10 MG: 10 TABLET ORAL at 21:22

## 2018-12-18 RX ADMIN — FERROUS SULFATE TAB 325 MG (65 MG ELEMENTAL FE) 325 MG: 325 (65 FE) TAB at 12:23

## 2018-12-18 RX ADMIN — TRAMADOL HYDROCHLORIDE 50 MG: 50 TABLET, FILM COATED ORAL at 14:53

## 2018-12-18 RX ADMIN — FERROUS SULFATE TAB 325 MG (65 MG ELEMENTAL FE) 325 MG: 325 (65 FE) TAB at 17:33

## 2018-12-18 ASSESSMENT — ENCOUNTER SYMPTOMS
COUGH: 0
CONSTIPATION: 0
SHORTNESS OF BREATH: 0
DIARRHEA: 0
STRIDOR: 0
ABDOMINAL PAIN: 0
SORE THROAT: 0
BLOOD IN STOOL: 0
NAUSEA: 0
EYE DISCHARGE: 0
BACK PAIN: 1
WHEEZING: 0
COLOR CHANGE: 0

## 2018-12-18 ASSESSMENT — PAIN SCALES - GENERAL
PAINLEVEL_OUTOF10: 4
PAINLEVEL_OUTOF10: 4
PAINLEVEL_OUTOF10: 6
PAINLEVEL_OUTOF10: 0

## 2018-12-18 ASSESSMENT — PAIN DESCRIPTION - FREQUENCY
FREQUENCY: INTERMITTENT
FREQUENCY: INTERMITTENT

## 2018-12-18 ASSESSMENT — PAIN DESCRIPTION - LOCATION
LOCATION: BACK

## 2018-12-18 ASSESSMENT — PAIN DESCRIPTION - ORIENTATION
ORIENTATION: LOWER
ORIENTATION: MID;LOWER
ORIENTATION: MID;LOWER

## 2018-12-18 ASSESSMENT — PAIN DESCRIPTION - PAIN TYPE
TYPE: ACUTE PAIN
TYPE: ACUTE PAIN

## 2018-12-18 ASSESSMENT — PAIN DESCRIPTION - PROGRESSION: CLINICAL_PROGRESSION: NOT CHANGED

## 2018-12-18 ASSESSMENT — PAIN DESCRIPTION - DESCRIPTORS
DESCRIPTORS: DULL;ACHING
DESCRIPTORS: ACHING;DULL

## 2018-12-18 NOTE — PLAN OF CARE
Problem: Falls - Risk of:  Goal: Will remain free from falls  Will remain free from falls   Outcome: Ongoing  Bed alarm set, call light within reach, side rails up times two    Problem: Pain:  Goal: Pain level will decrease  Pain level will decrease   Outcome: Ongoing  Patient states pain within control    Problem: Safety:  Goal: Free from accidental physical injury  Free from accidental physical injury   Outcome: Ongoing      Problem: Daily Care:  Goal: Daily care needs are met  Daily care needs are met   Outcome: Ongoing      Problem: Discharge Planning:  Goal: Patients continuum of care needs are met  Patients continuum of care needs are met   Outcome: Ongoing

## 2018-12-18 NOTE — PROGRESS NOTES
Occupational Therapy  Facility/Department: [facility] 8 REHAB UNIT  Daily Treatment Note  NAME: Lety Forrest  : 1929  MRN: [id number]    Date of Service: 2018    Discharge Recommendations:  Continue to assess pending progress  OT Equipment Recommendations  Other: Has handicap height toilet and TTB    Patient Diagnosis(es): There were no encounter diagnoses. has a past medical history of Arthritis; Blind; CAD (coronary artery disease); Hyperlipidemia; Hypertension; Low back pain radiating to right leg; Lumbar facet arthropathy ([address] Lumbar radiculopathy intermittent; Osteoarthritis; Peripheral neuropathy; Right foot drop; and Tremor. has a past surgical history that includes Diagnostic Cardiac Cath Lab Procedure; Appendectomy; hernia repair; back surgery; Cardiac surgery; and Neck surgery. Restrictions  Restrictions/Precautions  Restrictions/Precautions: Fall Risk, Surgical Protocols  Required Braces or Orthoses?: Yes  Lower Extremity Weight Bearing Restrictions  Right Lower Extremity Weight Bearing: Weight Bearing As Tolerated  Left Lower Extremity Weight Bearing: Weight Bearing As Tolerated  Required Braces or Orthoses  Spinal: Lumbar Corset  Spinal Other: brace when OOB  Position Activity Restriction  Spinal Precautions: No Bending, No Lifting, No Twisting  Subjective   General  Chart Reviewed: Yes  Patient assessed for rehabilitation services?: Yes  Additional Pertinent Hx: Pt. eval on hold yesterday due to low hemoglobin. Cleared to resume today.   Response to previous treatment: Patient with no complaints from previous session  Family / Caregiver Present: Yes (Son present at beginning of treatment session.)  Diagnosis: anterior lumbar interbody fusion of L5-S1,L5-S1 anterior discectomy   Pre Treatment Pain Screening  Intervention List: Patient able to continue with treatment  Pain Assessment  Patient Currently in Pain: Yes  Pain Assessment: 0-10  Pain Level: 4  Pain Type: Acute pain  Pain

## 2018-12-18 NOTE — PROGRESS NOTES
3 sets     Assessment   Performance deficits / Impairments: Decreased functional mobility ; Decreased endurance;Decreased balance;Decreased strength;Decreased ADL status  Assessment: Tolerated BUE Therapeutic Exercise well  Treatment Diagnosis: anterior lumbar interbody fusion of L5-S1,L5-S1 anterior discectomy   Prognosis: Good  Activity Tolerance  Activity Tolerance: Patient Tolerated treatment well  Safety Devices  Type of devices: Left in bed;Bed alarm in place (Spouse at bedside)             12/17/18 1509 12/17/18 1527 12/17/18 1530   Pain Assessment   Pain Assessment 0-10 --  --    Pain Level --  --  --    Pain Type Acute pain --  --    Pain Location Neck; Throat --  --    Bed Mobility   Rolling --  Stand by assistance --    Supine to Sit --  Stand by assistance --    Sit to Supine --  Stand by assistance --    Scooting --  Supervision --    Transfers   Sit to Stand --  --  Stand by assistance;Contact guard assistance   Stand to sit --  --  Stand by assistance;Contact guard assistance   Ambulation   WB Status --  --  --    Ambulation 1   Surface --  --  --    Device --  --  --    Assistance --  --  --    Quality of Gait --  --  --    Distance --  --  --    Wheelchair Activities   Wheelchair Type --  --  --    Other exercises   Other exercises 1 --  --  --    Conditions Requiring Skilled Therapeutic Intervention   Body structures, Functions, Activity limitations --  --  --    Assessment --  --  --    Prognosis --  --  --    Decision Making --  --  --    Discharge Recommendations --  --  --    Activity Tolerance   Activity Tolerance --  --  --    Activity Tolerance --  --  --             RECORD REVIEW: Previous medical records, medications were reviewed at today's visit    IMPRESSION:  1. Lumbar stenosis with claudication status post L5-S1 discectomy and fusion-pain control/PT/OT  2. Essential hypertension-continue current medications and monitoring  3. Acute blood loss anemia. Status post transfusion while here. Hemoglobin steady. 4. GI-bowel regimen  5. DVT prophylaxis-SCDs  6.  Urinary retention-continue to monitor  Continue current therapy   Staff this week

## 2018-12-18 NOTE — PROGRESS NOTES
term goals: 1 week  Short term goal 1: MET  Short term goal 2: MET  Short term goal 3: Supervision with seated LB ADLs with AE  Short term goal 4: CGA with clothing mgmt.  in standing  Short term goal 5: CGA with item retrieval from walker level  Long term goals  Time Frame for Long term goals : 3 weeks  Long term goal 1: Modified I with toilet and tub bench tfers  Long term goal 2: Modified I with LB dsg with AE  Long term goal 3: Supervision/Modified I with item retrieval  Long term goal 4: Modified I with LB bathing with AE       Therapy Time   Individual Concurrent Group Co-treatment   Time In 1100         Time Out 1200         Minutes 60         Timed Code Treatment Minutes: [address] OT

## 2018-12-18 NOTE — PROGRESS NOTES
12/18/18 0913 12/18/18 0930 12/18/18 1100   Restrictions/Precautions   Restrictions/Precautions --  --  Fall Risk;Surgical Protocols   Required Braces or Orthoses? --  --  Yes   Lower Extremity Weight Bearing Restrictions   Right Lower Extremity Weight Bearing --  --  Weight Bearing As Tolerated   Left Lower Extremity Weight Bearing --  --  Weight Bearing As Tolerated   Required Braces or Orthoses   Spinal --  --  Lumbar Corset   Spinal Other --  --  brace when OOB   Position Activity Restriction   Spinal Precautions --  --  No Bending; No Lifting; No Twisting   Pain Screening   Patient Currently in Pain --  --  Yes   Pain Assessment   Pain Assessment --  --  0-10   Pain Level --  --  4   Pain Type --  --  Acute pain   Pain Location --  --  Back   Pain Orientation --  --  Mid;Lower   Pain Descriptors --  --  Aching;Dull   Pain Frequency --  --  Intermittent   Clinical Progression --  --  Not changed   Pain Intervention(s) --  --  Medication (see eMar)   Ambulation   Ambulation? Yes --  --    WB Status WBAT --  --    More Ambulation? Yes --  --    Ambulation 1   Surface level tile --  --    Device Rolling Walker --  --    Assistance Contact guard assistance --  --    Quality of Gait Minimal verbal tactile cues for proper sequencing of step to leading with L LE during stance phase --  --    Distance 75 FT x 2 --  --    Other exercises   Other exercises 1 --  Sit to stand x 4 with minimal verbal and tactile cueing to release hand  on L side and to shift weight anteriorly;  Side steps in // bars x 2 with cues for upright posture and to  feet when stepping --

## 2018-12-19 PROCEDURE — 97535 SELF CARE MNGMENT TRAINING: CPT

## 2018-12-19 PROCEDURE — 97116 GAIT TRAINING THERAPY: CPT

## 2018-12-19 PROCEDURE — 99233 SBSQ HOSP IP/OBS HIGH 50: CPT | Performed by: PSYCHIATRY & NEUROLOGY

## 2018-12-19 PROCEDURE — 97530 THERAPEUTIC ACTIVITIES: CPT

## 2018-12-19 PROCEDURE — 97110 THERAPEUTIC EXERCISES: CPT

## 2018-12-19 PROCEDURE — 6370000000 HC RX 637 (ALT 250 FOR IP): Performed by: PSYCHIATRY & NEUROLOGY

## 2018-12-19 PROCEDURE — 1180000000 HC REHAB R&B

## 2018-12-19 RX ORDER — OXYCODONE HYDROCHLORIDE AND ACETAMINOPHEN 5; 325 MG/1; MG/1
1 TABLET ORAL EVERY 6 HOURS PRN
Status: DISCONTINUED | OUTPATIENT
Start: 2018-12-19 | End: 2018-12-21

## 2018-12-19 RX ADMIN — DIPHENHYDRAMINE HCL 25 MG: 25 TABLET ORAL at 21:22

## 2018-12-19 RX ADMIN — ALFUZOSIN HYDROCHLORIDE 10 MG: 10 TABLET ORAL at 21:22

## 2018-12-19 RX ADMIN — FINASTERIDE 5 MG: 5 TABLET, FILM COATED ORAL at 08:42

## 2018-12-19 RX ADMIN — DULOXETINE 60 MG: 60 CAPSULE, DELAYED RELEASE ORAL at 08:43

## 2018-12-19 RX ADMIN — METOPROLOL SUCCINATE 25 MG: 25 TABLET, EXTENDED RELEASE ORAL at 21:22

## 2018-12-19 RX ADMIN — TEMAZEPAM 15 MG: 15 CAPSULE ORAL at 21:23

## 2018-12-19 RX ADMIN — DOCUSATE SODIUM 100 MG: 100 CAPSULE, LIQUID FILLED ORAL at 21:22

## 2018-12-19 RX ADMIN — FERROUS SULFATE TAB 325 MG (65 MG ELEMENTAL FE) 325 MG: 325 (65 FE) TAB at 08:41

## 2018-12-19 RX ADMIN — CHLORPHENIRAMINE MALEATE 4 MG: 4 TABLET ORAL at 08:41

## 2018-12-19 RX ADMIN — FERROUS SULFATE TAB 325 MG (65 MG ELEMENTAL FE) 325 MG: 325 (65 FE) TAB at 12:44

## 2018-12-19 RX ADMIN — FOLIC ACID TAB 400 MCG 800 MCG: 400 TAB at 08:42

## 2018-12-19 RX ADMIN — OXYCODONE AND ACETAMINOPHEN 1 TABLET: 5; 325 TABLET ORAL at 17:53

## 2018-12-19 RX ADMIN — POLYETHYLENE GLYCOL 3350 17 G: 17 POWDER, FOR SOLUTION ORAL at 11:33

## 2018-12-19 RX ADMIN — DOCUSATE SODIUM 100 MG: 100 CAPSULE, LIQUID FILLED ORAL at 08:42

## 2018-12-19 RX ADMIN — PRIMIDONE 25 MG: 50 TABLET ORAL at 21:22

## 2018-12-19 RX ADMIN — PRAVASTATIN SODIUM 40 MG: 20 TABLET ORAL at 08:41

## 2018-12-19 RX ADMIN — OXYCODONE AND ACETAMINOPHEN 1 TABLET: 5; 325 TABLET ORAL at 08:43

## 2018-12-19 RX ADMIN — FERROUS SULFATE TAB 325 MG (65 MG ELEMENTAL FE) 325 MG: 325 (65 FE) TAB at 16:20

## 2018-12-19 ASSESSMENT — PAIN DESCRIPTION - LOCATION: LOCATION: BACK

## 2018-12-19 ASSESSMENT — PAIN SCALES - GENERAL
PAINLEVEL_OUTOF10: 2
PAINLEVEL_OUTOF10: 3
PAINLEVEL_OUTOF10: 4
PAINLEVEL_OUTOF10: 0
PAINLEVEL_OUTOF10: 4

## 2018-12-19 ASSESSMENT — ENCOUNTER SYMPTOMS
EYE DISCHARGE: 0
DIARRHEA: 0
BLOOD IN STOOL: 0
SORE THROAT: 0
COUGH: 0
NAUSEA: 0
SHORTNESS OF BREATH: 0
COLOR CHANGE: 0
ABDOMINAL PAIN: 0
CONSTIPATION: 0
WHEEZING: 0
STRIDOR: 0
BACK PAIN: 1

## 2018-12-19 ASSESSMENT — PAIN DESCRIPTION - PAIN TYPE: TYPE: ACUTE PAIN

## 2018-12-19 NOTE — PROGRESS NOTES
tongue  Neck-symmetric, no masses noted, no jugular vein distension  Respiration- chest wall appears symmetric, good expansion,   normal effort without use of accessory muscles  Cardiovascular- RRR  Musculoskeletal - no significant wasting of muscles noted, no bony deformities, gait no gross ataxia  Extremities-no clubbing, cyanosis or edema  Skin - warm, dry, and intact. No rash, erythema, or pallor. Psychiatric - mood, affect, and behavior appear normal.      Neurology  NEUROLOGICAL EXAM:      Mental status   Awake, alert, fluent oriented x 3 appropriate affect  Attention and concentration appear appropriate  Recent and remote memory appears unremarkable  Speech normal without dysarthria  No clear issues with language       Cranial Nerves   CN II- Visual fields grossly unremarkable  CN III, IV,VI-EOMI, No nystagmus, conjugate eye movements, no ptosis  CN VII-no facial assymetry  CN VIII-Hearing intact   CN IX and X- Palate elevates in midline  CN XI-good shoulder shrug  CN XII-Tongue midline with no fasciculations or fibrillations          Motor function  Antigravity x 4     Sensory function Intact to light touch     Cerebellar F-N intact     Tremor None present     Gait                  Not tested           Nursing/pcp notes, imaging,labs and vitals reviewed.      PT,OT and/or speech notes reviewed    Lab Results   Component Value Date    WBC 9.2 12/17/2018    HGB 9.0 (L) 12/17/2018    HCT 26.3 (L) 12/17/2018    MCV 96.0 (H) 12/17/2018     12/17/2018     Lab Results   Component Value Date     (L) 12/18/2018    K 4.5 12/18/2018    CL 94 (L) 12/18/2018    CO2 28 12/18/2018    BUN 13 12/18/2018    CREATININE 0.5 12/18/2018    GLUCOSE 100 12/18/2018    CALCIUM 8.2 (L) 12/18/2018    PROT 6.1 (L) 10/15/2018    LABALBU 4.2 10/15/2018    BILITOT 0.4 10/15/2018    ALKPHOS 65 10/15/2018    AST 16 10/15/2018    ALT 18 10/15/2018    LABGLOM >60 12/18/2018   No results found for: INRNo results found for:

## 2018-12-19 NOTE — PROGRESS NOTES
alert and oriented to person, place, and time. Skin: Skin is warm and dry. Incisions c/d/i   Psychiatric: He has a normal mood and affect. His behavior is normal. Judgment and thought content normal.   Nursing note and vitals reviewed. 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 Multifactorial anemia  2. Low back pain s/p lumbar fusion/laminectomy  3. HTN  4. Insomnia  5. Iron deficiency  6. HLD    Continue current treatment. Monitor counts. Increase activity. Continue pain control efforts. Maintain patient safety. Aggressive therapies.      Agree with above and have examined      Electronically signed by MORGAN Arredondo NP on 12/19/2018 at 12:13 PM

## 2018-12-19 NOTE — PROGRESS NOTES
Occupational Therapy  Facility/Department: [facility] 8 REHAB UNIT  Daily Treatment Note  NAME: Talita Farah  : 1929  MRN: [id number]    Date of Service: 2018    Discharge Recommendations:  Home with Home health OT       Patient Diagnosis(es): There were no encounter diagnoses. has a past medical history of Arthritis; Blind; CAD (coronary artery disease); Hyperlipidemia; Hypertension; Low back pain radiating to right leg; Lumbar facet arthropathy ([address] Lumbar radiculopathy intermittent; Osteoarthritis; Peripheral neuropathy; Right foot drop; and Tremor. has a past surgical history that includes Diagnostic Cardiac Cath Lab Procedure; Appendectomy; hernia repair; back surgery; Cardiac surgery; and Neck surgery. Restrictions  Restrictions/Precautions  Restrictions/Precautions: Fall Risk, Surgical Protocols  Required Braces or Orthoses?: Yes  Lower Extremity Weight Bearing Restrictions  Right Lower Extremity Weight Bearing: Weight Bearing As Tolerated  Left Lower Extremity Weight Bearing: Weight Bearing As Tolerated  Required Braces or Orthoses  Spinal: Lumbar Corset  Spinal Other: brace when OOB  Position Activity Restriction  Spinal Precautions: No Bending, No Lifting, No Twisting  Subjective   General  Chart Reviewed: Yes  Patient assessed for rehabilitation services?: Yes  Additional Pertinent Hx: Pt. eval on hold yesterday due to low hemoglobin. Cleared to resume today.   Response to previous treatment: Patient with no complaints from previous session  Family / Caregiver Present: Yes (Wife present at beginning of treatment session.)  Diagnosis: anterior lumbar interbody fusion of L5-S1,L5-S1 anterior discectomy       Objective    Balance  Sitting Balance: Supervision  Standing Balance: Stand by assistance  Standing Balance  Sit to stand: Stand by assistance  Stand to sit: Stand by assistance  Comment: Min A from rocking recliner, CGA from couch transfer   Functional Mobility  Functional - Mobility

## 2018-12-19 NOTE — PROGRESS NOTES
Occupational Therapy  Facility/Department: [facility] 8 REHAB UNIT  Daily Treatment Note  NAME: Chiquita Kern  : 1929  MRN: [id number]    Date of Service: 2018    Discharge Recommendations:  Continue to assess pending progress       Patient Diagnosis(es): There were no encounter diagnoses. has a past medical history of Arthritis; Blind; CAD (coronary artery disease); Hyperlipidemia; Hypertension; Low back pain radiating to right leg; Lumbar facet arthropathy ([address] Lumbar radiculopathy intermittent; Osteoarthritis; Peripheral neuropathy; Right foot drop; and Tremor. has a past surgical history that includes Diagnostic Cardiac Cath Lab Procedure; Appendectomy; hernia repair; back surgery; Cardiac surgery; and Neck surgery. Restrictions  Restrictions/Precautions  Restrictions/Precautions: Fall Risk, Surgical Protocols  Required Braces or Orthoses?: Yes  Lower Extremity Weight Bearing Restrictions  Right Lower Extremity Weight Bearing: Weight Bearing As Tolerated  Left Lower Extremity Weight Bearing: Weight Bearing As Tolerated  Required Braces or Orthoses  Spinal: Lumbar Corset  Spinal Other: brace when OOB  Position Activity Restriction  Spinal Precautions: No Bending, No Lifting, No Twisting  Subjective   General  Chart Reviewed: Yes  Patient assessed for rehabilitation services?: Yes  Additional Pertinent Hx: Pt. eval on hold yesterday due to low hemoglobin. Cleared to resume today.   Response to previous treatment: Patient with no complaints from previous session  Family / Caregiver Present: Yes (Wife present at beginning of treatment session.)  Diagnosis: anterior lumbar interbody fusion of L5-S1,L5-S1 anterior discectomy   Pain Assessment  Patient Currently in Pain: Yes  Pain Assessment: 0-10  Pain Level: 0  Pain Type: Acute pain  Pain Location: Back  Pain Intervention(s): Medication (see eMar)  Vital Signs  Patient Currently in Pain: Yes   Orientation     Objective    Balance  Sitting Balance:

## 2018-12-20 LAB
ANION GAP SERPL CALCULATED.3IONS-SCNC: 14 MMOL/L (ref 7–19)
BASOPHILS ABSOLUTE: 0.1 K/UL (ref 0–0.2)
BASOPHILS RELATIVE PERCENT: 0.5 % (ref 0–1)
BUN BLDV-MCNC: 16 MG/DL (ref 8–23)
CALCIUM SERPL-MCNC: 8.3 MG/DL (ref 8.8–10.2)
CHLORIDE BLD-SCNC: 92 MMOL/L (ref 98–111)
CO2: 23 MMOL/L (ref 22–29)
CREAT SERPL-MCNC: 0.6 MG/DL (ref 0.5–1.2)
EOSINOPHILS ABSOLUTE: 0.3 K/UL (ref 0–0.6)
EOSINOPHILS RELATIVE PERCENT: 2.7 % (ref 0–5)
GFR NON-AFRICAN AMERICAN: >60
GLUCOSE BLD-MCNC: 110 MG/DL (ref 74–109)
HCT VFR BLD CALC: 28.5 % (ref 42–52)
HEMOGLOBIN: 9.3 G/DL (ref 14–18)
LYMPHOCYTES ABSOLUTE: 1.5 K/UL (ref 1.1–4.5)
LYMPHOCYTES RELATIVE PERCENT: 15.6 % (ref 20–40)
MCH RBC QN AUTO: 31.8 PG (ref 27–31)
MCHC RBC AUTO-ENTMCNC: 32.6 G/DL (ref 33–37)
MCV RBC AUTO: 97.6 FL (ref 80–94)
MONOCYTES ABSOLUTE: 1.1 K/UL (ref 0–0.9)
MONOCYTES RELATIVE PERCENT: 12.1 % (ref 0–10)
NEUTROPHILS ABSOLUTE: 6.1 K/UL (ref 1.5–7.5)
NEUTROPHILS RELATIVE PERCENT: 65.5 % (ref 50–65)
PDW BLD-RTO: 13.6 % (ref 11.5–14.5)
PLATELET # BLD: 385 K/UL (ref 130–400)
PMV BLD AUTO: 8.8 FL (ref 9.4–12.4)
POTASSIUM REFLEX MAGNESIUM: 4.6 MMOL/L (ref 3.5–5)
RBC # BLD: 2.92 M/UL (ref 4.7–6.1)
SODIUM BLD-SCNC: 129 MMOL/L (ref 136–145)
WBC # BLD: 9.3 K/UL (ref 4.8–10.8)

## 2018-12-20 PROCEDURE — 97116 GAIT TRAINING THERAPY: CPT

## 2018-12-20 PROCEDURE — 1180000000 HC REHAB R&B

## 2018-12-20 PROCEDURE — 36415 COLL VENOUS BLD VENIPUNCTURE: CPT

## 2018-12-20 PROCEDURE — 80048 BASIC METABOLIC PNL TOTAL CA: CPT

## 2018-12-20 PROCEDURE — 97110 THERAPEUTIC EXERCISES: CPT

## 2018-12-20 PROCEDURE — 85025 COMPLETE CBC W/AUTO DIFF WBC: CPT

## 2018-12-20 PROCEDURE — 97530 THERAPEUTIC ACTIVITIES: CPT

## 2018-12-20 PROCEDURE — 99232 SBSQ HOSP IP/OBS MODERATE 35: CPT | Performed by: PSYCHIATRY & NEUROLOGY

## 2018-12-20 PROCEDURE — 6370000000 HC RX 637 (ALT 250 FOR IP): Performed by: PSYCHIATRY & NEUROLOGY

## 2018-12-20 RX ADMIN — FERROUS SULFATE TAB 325 MG (65 MG ELEMENTAL FE) 325 MG: 325 (65 FE) TAB at 12:25

## 2018-12-20 RX ADMIN — FERROUS SULFATE TAB 325 MG (65 MG ELEMENTAL FE) 325 MG: 325 (65 FE) TAB at 17:16

## 2018-12-20 RX ADMIN — ALFUZOSIN HYDROCHLORIDE 10 MG: 10 TABLET ORAL at 20:31

## 2018-12-20 RX ADMIN — PRAVASTATIN SODIUM 40 MG: 20 TABLET ORAL at 08:49

## 2018-12-20 RX ADMIN — ACETAMINOPHEN 650 MG: 325 TABLET ORAL at 03:15

## 2018-12-20 RX ADMIN — OXYCODONE AND ACETAMINOPHEN 1 TABLET: 5; 325 TABLET ORAL at 21:58

## 2018-12-20 RX ADMIN — PRIMIDONE 25 MG: 50 TABLET ORAL at 20:31

## 2018-12-20 RX ADMIN — CHLORPHENIRAMINE MALEATE 4 MG: 4 TABLET ORAL at 08:49

## 2018-12-20 RX ADMIN — FERROUS SULFATE TAB 325 MG (65 MG ELEMENTAL FE) 325 MG: 325 (65 FE) TAB at 08:49

## 2018-12-20 RX ADMIN — DIPHENHYDRAMINE HCL 25 MG: 25 TABLET ORAL at 20:30

## 2018-12-20 RX ADMIN — METOPROLOL SUCCINATE 25 MG: 25 TABLET, EXTENDED RELEASE ORAL at 20:30

## 2018-12-20 RX ADMIN — OXYCODONE AND ACETAMINOPHEN 1 TABLET: 5; 325 TABLET ORAL at 08:48

## 2018-12-20 RX ADMIN — DOCUSATE SODIUM 100 MG: 100 CAPSULE, LIQUID FILLED ORAL at 20:30

## 2018-12-20 RX ADMIN — TEMAZEPAM 15 MG: 15 CAPSULE ORAL at 20:30

## 2018-12-20 RX ADMIN — DULOXETINE 60 MG: 60 CAPSULE, DELAYED RELEASE ORAL at 08:49

## 2018-12-20 RX ADMIN — OXYCODONE AND ACETAMINOPHEN 1 TABLET: 5; 325 TABLET ORAL at 15:25

## 2018-12-20 RX ADMIN — FOLIC ACID TAB 400 MCG 800 MCG: 400 TAB at 08:49

## 2018-12-20 RX ADMIN — FINASTERIDE 5 MG: 5 TABLET, FILM COATED ORAL at 08:49

## 2018-12-20 ASSESSMENT — PAIN SCALES - GENERAL
PAINLEVEL_OUTOF10: 0
PAINLEVEL_OUTOF10: 3
PAINLEVEL_OUTOF10: 5
PAINLEVEL_OUTOF10: 5
PAINLEVEL_OUTOF10: 7
PAINLEVEL_OUTOF10: 4
PAINLEVEL_OUTOF10: 5

## 2018-12-20 NOTE — PROGRESS NOTES
Patient:   Mihaela Acosta  MR#:    [id number]   Room:    [facility]   YOB: 1929  Date of Progress Note: 12/20/2018  Time of Note                           7:52 AM  Consulting Physician:   Angie Nicholas M.D. Attending Physician:  Angie Nicholas MD     CHIEF COMPLAINT:  Low back pain         Subjective: This 80 y.o. male  admitted to [facility] on 12/7/18 with chronic back and LLE pain. He has had previous dorsal column stimulator trial w/Dr. Natalia Barragan without much relief. He has had multilevel laminectomy which improved initially, however has gradually worsened. He has had increasing back and L leg pain. MRI of the lumbar spine revealed findings of severe degenerative disc disease at L5-S1. On 12/7/18 he underwent an anterior lumbar interbody fusion of L5-S1 by Dr. Charis English. On 12/10/18 he went back to OR for a L5-S1 anterior discectomy by Dr. Ector Trimble. Pt tolerated procedure well. His 02 sats were a little low so he is currently on 2L of O2 per nc. He has been having some problems w/urinary retention and has had to have in & out cath twice in the past 24hrs. He has some mild confusion after taking his pain meds. He is medically stable and is participating w/therapy. Confusion resolved with the cessation of tramadol. Did not sleep well last night. REVIEW OF SYSTEMS:  Constitutional: No fevers No chills  Neck:No stiffness  Respiratory: No shortness of breath  Cardiovascular: No chest pain No palpitations  Gastrointestinal: No abdominal pain    Genitourinary: No Dysuria  Neurological: No headache, no confusion      PHYSICAL EXAM:  BP (!) 159/60   Pulse 66   Temp 97.8 °F (36.6 °C) (Temporal)   Resp 16   Ht 5' 8\" (1.727 m)   Wt 160 lb 3.2 oz (72.7 kg)   SpO2 94%   BMI 24.36 kg/m²     Constitutional: he appears well-developed and well-nourished.    Eyes - conjunctiva normal.  Pupils react to light  Ear, nose, throat -hearing intact to finger rub No scars, masses, or lesions over external nose or ears, no atrophy of tongue  Neck-symmetric, no masses noted, no jugular vein distension  Respiration- chest wall appears symmetric, good expansion,   normal effort without use of accessory muscles  Cardiovascular- RRR  Musculoskeletal - no significant wasting of muscles noted, no bony deformities, gait no gross ataxia  Extremities-no clubbing, cyanosis or edema  Skin - warm, dry, and intact. No rash, erythema, or pallor. Psychiatric - mood, affect, and behavior appear normal.      Neurology  NEUROLOGICAL EXAM:      Mental status   Awake, alert, fluent oriented x 3 appropriate affect  Attention and concentration appear appropriate  Recent and remote memory appears unremarkable  Speech normal without dysarthria  No clear issues with language       Cranial Nerves   CN II- Visual fields grossly unremarkable  CN III, IV,VI-EOMI, No nystagmus, conjugate eye movements, no ptosis  CN VII-no facial assymetry  CN VIII-Hearing intact   CN IX and X- Palate elevates in midline  CN XI-good shoulder shrug  CN XII-Tongue midline with no fasciculations or fibrillations          Motor function  Antigravity x 4     Sensory function Intact to light touch     Cerebellar F-N intact     Tremor None present     Gait                  Not tested           Nursing/pcp notes, imaging,labs and vitals reviewed.      PT,OT and/or speech notes reviewed    Lab Results   Component Value Date    WBC 9.3 12/20/2018    HGB 9.3 (L) 12/20/2018    HCT 28.5 (L) 12/20/2018    MCV 97.6 (H) 12/20/2018     12/20/2018     Lab Results   Component Value Date     (L) 12/20/2018    K 4.6 12/20/2018    CL 92 (L) 12/20/2018    CO2 23 12/20/2018    BUN 16 12/20/2018    CREATININE 0.6 12/20/2018    GLUCOSE 110 (H) 12/20/2018    CALCIUM 8.3 (L) 12/20/2018    PROT 6.1 (L) 10/15/2018    LABALBU 4.2 10/15/2018    BILITOT 0.4 10/15/2018    ALKPHOS 65 10/15/2018    AST 16 10/15/2018    ALT 18 10/15/2018    LABGLOM >60 12/20/2018   No results found for: INRNo results found for: PHENYTOIN, ESR, CRP       PHYSICAL THERAPY  STRENGTH  Strength RLE  Strength RLE: Exception  Comment: GROSSLY 4-/5  Strength LLE  Strength LLE: Exception  Comment: GROSSLY 3+/5  ROM  AROM RLE (degrees)  RLE AROM: Exceptions  RLE General AROM: DECREASED DF  AROM LLE (degrees)  LLE AROM : Exceptions  LLE General AROM: DECREASED DF  BED MOBILITY  Bed Mobility  Rolling: Stand by assistance  Supine to Sit: Stand by assistance  Sit to Supine: Stand by assistance  Scooting: Supervision  TRANSFERS  Transfers  Sit to Stand: Stand by assistance, Contact guard assistance  Stand to sit: Stand by assistance, Contact guard assistance  Bed to Chair: Stand by assistance, Contact guard assistance  WHEELCHAIR PROPULSION  Propulsion 1  Method: CHRIS SMALL  Level of Assistance: Minimal assistance, Contact guard assistance  Description/ Details: SHORT, SLOW STROKES  Distance: 25 FT  AMBULATION  Ambulation 1  Surface: level tile  Device: Rolling Walker  Other Apparatus:  (lso)  Assistance: Contact guard assistance  Quality of Gait: Required less verbal cues for sequencing. Patient fixed mistakes 2x without need for PT cues hwne going out of sequence. Distance: 76'  Comments: Patient reported increase in pain from a \"3.5/10\" at beginning of session in low back, to a \"5/10\" after gait and sit to stands. Subjective   General  Chart Reviewed: Yes  Patient assessed for rehabilitation services?: Yes  Additional Pertinent Hx: Pt. eval on hold yesterday due to low hemoglobin. Cleared to resume today.   Response to previous treatment: Patient with no complaints from previous session  Family / Caregiver Present: Yes (Wife present at beginning of treatment session.)  Diagnosis: anterior lumbar interbody fusion of L5-S1,L5-S1 anterior discectomy   Pre Treatment Pain Screening  Intervention List: Patient able to continue with treatment  Pain Assessment  Patient Currently in Pain: Yes  Pain Assessment: 0-10  Pain Level: GI-bowel regimen  5. DVT prophylaxis-SCDs  6. Urinary retention-continue to monitor  7. Confusion- varies. Probably tramadol. Stopped. Try low dose oxycodone, he has had this before with difficulty.       ELOS:12/26, wednesday

## 2018-12-20 NOTE — PROGRESS NOTES
assistance  Standing Balance  Sit to stand: Stand by assistance  Stand to sit: Stand by assistance  Functional Mobility  Functional - Mobility Device: Rolling Walker  Activity: To/from bathroom  Assist Level: Stand by assistance     Transfers  Sit to stand: Stand by assistance  Stand to sit: Stand by assistance        Type of ROM/Therapeutic Exercise  Type of ROM/Therapeutic Exercise: David  Comment: 10#; 4 sets of 15      Assessment   Performance deficits / Impairments: Decreased functional mobility ; Decreased endurance;Decreased balance;Decreased strength;Decreased ADL status  Assessment: Ordered BSC from [address] [place] [address].   Treatment Diagnosis: anterior lumbar interbody fusion of L5-S1,L5-S1 anterior discectomy   Patient Education: DME   Safety Devices  Safety Devices in place: Yes  Type of devices: Call light within reach; Chair alarm in place          Plan   Plan  Times per week: 5  Times per day: Twice a day  Specific instructions for Next Treatment: Toileting (bowel hygiene)   Current Treatment Recommendations: Strengthening, Self-Care / ADL, Functional Mobility Training, Patient/Caregiver Education & Training, Home Management Training       OutComes Score       12/20/18 1445   TRANSFERS   Toilet Transfer 5  (SBA )       Goals  Short term goals  Time Frame for Short term goals: 1 week  Short term goal 1: MET  Short term goal 2: MET  Short term goal 3: Supervision with seated LB ADLs with AE  Short term goal 4: CGA with clothing mgmt.  in standing  Short term goal 5: CGA with item retrieval from walker level  Long term goals  Time Frame for Long term goals : 3 weeks  Long term goal 1: Modified I with toilet and tub bench tfers  Long term goal 2: Modified I with LB dsg with AE  Long term goal 3: Supervision/Modified I with item retrieval  Long term goal 4: Modified I with LB bathing with AE       Therapy Time   Individual Concurrent Group Co-treatment   Time In 1445         Time Out 1525         Minutes 40 Timed Code Treatment Minutes: [address] [facility], OT

## 2018-12-20 NOTE — PLAN OF CARE
Problem: Falls - Risk of:  Goal: Will remain free from falls  Will remain free from falls   Outcome: Ongoing      Problem: Pain:  Goal: Pain level will decrease  Pain level will decrease   Outcome: Ongoing      Problem: Safety:  Goal: Free from accidental physical injury  Free from accidental physical injury   Outcome: Ongoing      Problem: Daily Care:  Goal: Daily care needs are met  Daily care needs are met   Outcome: Ongoing      Problem: Discharge Planning:  Goal: Patients continuum of care needs are met  Patients continuum of care needs are met   Outcome: Ongoing      Problem: Mood - Altered:  Goal: Mood stable  Mood stable   Outcome: Ongoing      Problem:  Activity:  Goal: Physical symptoms of sleep deprivation will improve  Physical symptoms of sleep deprivation will improve   Outcome: Ongoing

## 2018-12-20 NOTE — PLAN OF CARE
Problem: Falls - Risk of:  Goal: Will remain free from falls  Will remain free from falls   Outcome: Ongoing    Goal: Absence of physical injury  Absence of physical injury   Outcome: Ongoing      Problem: Pain:  Goal: Pain level will decrease  Pain level will decrease   Outcome: Ongoing    Goal: Control of acute pain  Control of acute pain   Outcome: Ongoing    Goal: Control of chronic pain  Control of chronic pain   Outcome: Ongoing      Problem: Safety:  Goal: Free from accidental physical injury  Free from accidental physical injury   Outcome: Ongoing    Goal: Free from intentional harm  Free from intentional harm   Outcome: Ongoing      Problem: Daily Care:  Goal: Daily care needs are met  Daily care needs are met   Outcome: Ongoing      Problem: Discharge Planning:  Goal: Patients continuum of care needs are met  Patients continuum of care needs are met   Outcome: Ongoing      Problem: Mood - Altered:  Goal: Mood stable  Mood stable   Outcome: Ongoing      Problem:  Activity:  Goal: Physical symptoms of sleep deprivation will improve  Physical symptoms of sleep deprivation will improve   Outcome: Ongoing

## 2018-12-21 ENCOUNTER — APPOINTMENT (OUTPATIENT)
Dept: GENERAL RADIOLOGY | Age: 83
DRG: 560 | End: 2018-12-21
Attending: PSYCHIATRY & NEUROLOGY
Payer: MEDICARE

## 2018-12-21 PROCEDURE — 99232 SBSQ HOSP IP/OBS MODERATE 35: CPT | Performed by: PSYCHIATRY & NEUROLOGY

## 2018-12-21 PROCEDURE — 1180000000 HC REHAB R&B

## 2018-12-21 PROCEDURE — 97535 SELF CARE MNGMENT TRAINING: CPT

## 2018-12-21 PROCEDURE — 72100 X-RAY EXAM L-S SPINE 2/3 VWS: CPT

## 2018-12-21 PROCEDURE — 6370000000 HC RX 637 (ALT 250 FOR IP): Performed by: PSYCHIATRY & NEUROLOGY

## 2018-12-21 PROCEDURE — 97530 THERAPEUTIC ACTIVITIES: CPT

## 2018-12-21 PROCEDURE — 97110 THERAPEUTIC EXERCISES: CPT

## 2018-12-21 PROCEDURE — 97116 GAIT TRAINING THERAPY: CPT

## 2018-12-21 RX ORDER — OXYCODONE AND ACETAMINOPHEN 7.5; 325 MG/1; MG/1
1 TABLET ORAL EVERY 6 HOURS PRN
Status: DISCONTINUED | OUTPATIENT
Start: 2018-12-21 | End: 2018-12-28 | Stop reason: HOSPADM

## 2018-12-21 RX ORDER — CYCLOBENZAPRINE HCL 5 MG
5 TABLET ORAL NIGHTLY
Status: DISCONTINUED | OUTPATIENT
Start: 2018-12-21 | End: 2018-12-28 | Stop reason: HOSPADM

## 2018-12-21 RX ADMIN — FINASTERIDE 5 MG: 5 TABLET, FILM COATED ORAL at 08:59

## 2018-12-21 RX ADMIN — FOLIC ACID TAB 400 MCG 800 MCG: 400 TAB at 08:58

## 2018-12-21 RX ADMIN — FERROUS SULFATE TAB 325 MG (65 MG ELEMENTAL FE) 325 MG: 325 (65 FE) TAB at 08:58

## 2018-12-21 RX ADMIN — DOCUSATE SODIUM 100 MG: 100 CAPSULE, LIQUID FILLED ORAL at 08:58

## 2018-12-21 RX ADMIN — OXYCODONE HYDROCHLORIDE AND ACETAMINOPHEN 1 TABLET: 7.5; 325 TABLET ORAL at 20:46

## 2018-12-21 RX ADMIN — DOCUSATE SODIUM 100 MG: 100 CAPSULE, LIQUID FILLED ORAL at 20:46

## 2018-12-21 RX ADMIN — METOPROLOL SUCCINATE 25 MG: 25 TABLET, EXTENDED RELEASE ORAL at 20:46

## 2018-12-21 RX ADMIN — DULOXETINE 60 MG: 60 CAPSULE, DELAYED RELEASE ORAL at 08:59

## 2018-12-21 RX ADMIN — OXYCODONE AND ACETAMINOPHEN 1 TABLET: 5; 325 TABLET ORAL at 05:51

## 2018-12-21 RX ADMIN — FERROUS SULFATE TAB 325 MG (65 MG ELEMENTAL FE) 325 MG: 325 (65 FE) TAB at 17:03

## 2018-12-21 RX ADMIN — ALFUZOSIN HYDROCHLORIDE 10 MG: 10 TABLET ORAL at 20:46

## 2018-12-21 RX ADMIN — OXYCODONE HYDROCHLORIDE AND ACETAMINOPHEN 1 TABLET: 7.5; 325 TABLET ORAL at 14:43

## 2018-12-21 RX ADMIN — TEMAZEPAM 15 MG: 15 CAPSULE ORAL at 20:46

## 2018-12-21 RX ADMIN — PRAVASTATIN SODIUM 40 MG: 20 TABLET ORAL at 08:59

## 2018-12-21 RX ADMIN — CYCLOBENZAPRINE HYDROCHLORIDE 5 MG: 5 TABLET, FILM COATED ORAL at 20:46

## 2018-12-21 RX ADMIN — CHLORPHENIRAMINE MALEATE 4 MG: 4 TABLET ORAL at 08:59

## 2018-12-21 RX ADMIN — FERROUS SULFATE TAB 325 MG (65 MG ELEMENTAL FE) 325 MG: 325 (65 FE) TAB at 12:32

## 2018-12-21 RX ADMIN — DIPHENHYDRAMINE HCL 25 MG: 25 TABLET ORAL at 20:46

## 2018-12-21 RX ADMIN — PRIMIDONE 25 MG: 50 TABLET ORAL at 20:46

## 2018-12-21 RX ADMIN — POLYETHYLENE GLYCOL 3350 17 G: 17 POWDER, FOR SOLUTION ORAL at 08:58

## 2018-12-21 ASSESSMENT — PAIN DESCRIPTION - ORIENTATION: ORIENTATION: LOWER

## 2018-12-21 ASSESSMENT — ENCOUNTER SYMPTOMS
SHORTNESS OF BREATH: 0
WHEEZING: 0
NAUSEA: 0
COUGH: 0
DIARRHEA: 0
CONSTIPATION: 0
ABDOMINAL PAIN: 0
COLOR CHANGE: 0
BLOOD IN STOOL: 0
BACK PAIN: 1
EYE DISCHARGE: 0
SORE THROAT: 0
STRIDOR: 0

## 2018-12-21 ASSESSMENT — PAIN SCALES - GENERAL
PAINLEVEL_OUTOF10: 6
PAINLEVEL_OUTOF10: 4
PAINLEVEL_OUTOF10: 4
PAINLEVEL_OUTOF10: 6
PAINLEVEL_OUTOF10: 5
PAINLEVEL_OUTOF10: 4
PAINLEVEL_OUTOF10: 3

## 2018-12-21 ASSESSMENT — PAIN DESCRIPTION - FREQUENCY: FREQUENCY: INTERMITTENT

## 2018-12-21 ASSESSMENT — PAIN DESCRIPTION - LOCATION: LOCATION: BACK

## 2018-12-21 ASSESSMENT — PAIN DESCRIPTION - DESCRIPTORS: DESCRIPTORS: ACHING

## 2018-12-21 ASSESSMENT — PAIN DESCRIPTION - PROGRESSION: CLINICAL_PROGRESSION: NOT CHANGED

## 2018-12-21 ASSESSMENT — PAIN DESCRIPTION - PAIN TYPE: TYPE: ACUTE PAIN

## 2018-12-21 NOTE — PROGRESS NOTES
10/15/2018    LABGLOM >60 12/20/2018   No results found for: INRNo results found for: PHENYTOIN, ESR, CRP         Patient Diagnosis(es): There were no encounter diagnoses.       has a past medical history of Arthritis; Blind; CAD (coronary artery disease); Hyperlipidemia; Hypertension; Low back pain radiating to right leg; Lumbar facet arthropathy ([address] Lumbar radiculopathy intermittent; Osteoarthritis; Peripheral neuropathy; Right foot drop; and Tremor. has a past surgical history that includes Diagnostic Cardiac Cath Lab Procedure; Appendectomy; hernia repair; back surgery; Cardiac surgery; and Neck surgery.     Restrictions  Restrictions/Precautions  Restrictions/Precautions: Fall Risk, Surgical Protocols  Required Braces or Orthoses?: Yes  Lower Extremity Weight Bearing Restrictions  Right Lower Extremity Weight Bearing: Weight Bearing As Tolerated  Left Lower Extremity Weight Bearing: Weight Bearing As Tolerated  Required Braces or Orthoses  Spinal: Lumbar Corset  Spinal Other: brace when OOB  Position Activity Restriction  Spinal Precautions: No Bending, No Lifting, No Twisting  Subjective   General  Chart Reviewed: Yes  Patient assessed for rehabilitation services?: Yes  Additional Pertinent Hx: Pt. eval on hold yesterday due to low hemoglobin. Cleared to resume today.   Response to previous treatment: Patient with no complaints from previous session  Family / Caregiver Present: Yes (Wife present at beginning of treatment session.)  Diagnosis: anterior lumbar interbody fusion of L5-S1,L5-S1 anterior discectomy       Objective    Balance  Sitting Balance: Supervision  Standing Balance: Stand by assistance  Standing Balance  Sit to stand: Stand by assistance  Stand to sit: Stand by assistance  Functional Mobility  Functional - Mobility Device: Rolling Walker  Activity: To/from bathroom  Assist Level: Stand by assistance  Transfers  Sit to stand: Stand by assistance  Stand to sit: Stand by assistance  Type of

## 2018-12-21 NOTE — PROGRESS NOTES
Occupational Therapy     12/21/18 0815   General   Diagnosis anterior lumbar interbody fusion of L5-S1,L5-S1 anterior discectomy    Pain Assessment   Patient Currently in Pain Yes   Pain Assessment 0-10   Pain Level 4   Pain Type Acute pain   Pain Location Back   Pain Orientation Lower   Pain Descriptors Aching   Pain Frequency Intermittent   Clinical Progression Not changed   Pain Intervention(s) Medication (see eMar)   Response to Pain Intervention Patient Satisfied   ADL   LE Dressing Supervision;Setup   Balance   Standing Balance Stand by assistance   Standing Balance   Sit to stand Supervision   Stand to sit Supervision   Functional Mobility   Functional - Mobility Device Rolling Walker   Activity Retrieve items;Transport items   Assist Level Stand by assistance   Functional Mobility Comments Homemaking task in kitchen, some cues for tech to follow back protocol when reaching for items below waist.   Type of ROM/Therapeutic Exercise   Type of ROM/Therapeutic Exercise David   Comment 10# 3 sets x 20. Short term goals   Short term goal 3 MET   Short term goal 4 MET   Short term goal 5 MET   Assessment   Performance deficits / Impairments Decreased functional mobility ; Decreased endurance;Decreased balance;Decreased strength;Decreased ADL status   Treatment Diagnosis anterior lumbar interbody fusion of L5-S1,L5-S1 anterior discectomy    Prognosis Good   Patient Education Homemaking tasks with RW while following back protocol. Timed Code Treatment Minutes 45 Minutes   Activity Tolerance   Activity Tolerance Patient Tolerated treatment well   Safety Devices   Safety Devices in place Yes   Type of devices Call light within reach; Chair alarm in place; Left in chair   Plan   Current Treatment Recommendations Strengthening;Self-Care / ADL; Functional Mobility Training;Patient/Caregiver Education & Training;Home Management Training

## 2018-12-21 NOTE — PROGRESS NOTES
12/21/18 1515   Bed Mobility   Rolling Stand by assistance   Supine to Sit Stand by assistance   Sit to Supine Stand by assistance   Transfers   Sit to Stand Stand by assistance   Stand to sit Stand by assistance   Bed to Chair Stand by assistance   Other exercises   Other exercises 1 SIDESTEPPING/BACKWARDS WALKING X24 FT EACH   Conditions Requiring Skilled Therapeutic Intervention   Body structures, Functions, Activity limitations Decreased functional mobility ; Decreased ADL status   Assessment WIFE OK'D FOR AMB IN ROOM WITH PATIENT.

## 2018-12-22 PROCEDURE — 97530 THERAPEUTIC ACTIVITIES: CPT

## 2018-12-22 PROCEDURE — 99232 SBSQ HOSP IP/OBS MODERATE 35: CPT | Performed by: PSYCHIATRY & NEUROLOGY

## 2018-12-22 PROCEDURE — 97110 THERAPEUTIC EXERCISES: CPT

## 2018-12-22 PROCEDURE — 1180000000 HC REHAB R&B

## 2018-12-22 PROCEDURE — 97116 GAIT TRAINING THERAPY: CPT

## 2018-12-22 PROCEDURE — 6370000000 HC RX 637 (ALT 250 FOR IP): Performed by: PSYCHIATRY & NEUROLOGY

## 2018-12-22 RX ADMIN — FINASTERIDE 5 MG: 5 TABLET, FILM COATED ORAL at 07:50

## 2018-12-22 RX ADMIN — DOCUSATE SODIUM 100 MG: 100 CAPSULE, LIQUID FILLED ORAL at 07:50

## 2018-12-22 RX ADMIN — CHLORPHENIRAMINE MALEATE 4 MG: 4 TABLET ORAL at 07:50

## 2018-12-22 RX ADMIN — DULOXETINE 60 MG: 60 CAPSULE, DELAYED RELEASE ORAL at 07:50

## 2018-12-22 RX ADMIN — CYCLOBENZAPRINE HYDROCHLORIDE 5 MG: 5 TABLET, FILM COATED ORAL at 20:30

## 2018-12-22 RX ADMIN — FERROUS SULFATE TAB 325 MG (65 MG ELEMENTAL FE) 325 MG: 325 (65 FE) TAB at 07:50

## 2018-12-22 RX ADMIN — PRIMIDONE 25 MG: 50 TABLET ORAL at 20:30

## 2018-12-22 RX ADMIN — FOLIC ACID TAB 400 MCG 800 MCG: 400 TAB at 07:50

## 2018-12-22 RX ADMIN — TEMAZEPAM 15 MG: 15 CAPSULE ORAL at 21:15

## 2018-12-22 RX ADMIN — OXYCODONE HYDROCHLORIDE AND ACETAMINOPHEN 1 TABLET: 7.5; 325 TABLET ORAL at 21:15

## 2018-12-22 RX ADMIN — METOPROLOL SUCCINATE 25 MG: 25 TABLET, EXTENDED RELEASE ORAL at 20:30

## 2018-12-22 RX ADMIN — OXYCODONE HYDROCHLORIDE AND ACETAMINOPHEN 1 TABLET: 7.5; 325 TABLET ORAL at 05:25

## 2018-12-22 RX ADMIN — FERROUS SULFATE TAB 325 MG (65 MG ELEMENTAL FE) 325 MG: 325 (65 FE) TAB at 11:54

## 2018-12-22 RX ADMIN — PRAVASTATIN SODIUM 40 MG: 20 TABLET ORAL at 07:50

## 2018-12-22 RX ADMIN — FERROUS SULFATE TAB 325 MG (65 MG ELEMENTAL FE) 325 MG: 325 (65 FE) TAB at 17:03

## 2018-12-22 RX ADMIN — ALFUZOSIN HYDROCHLORIDE 10 MG: 10 TABLET ORAL at 20:30

## 2018-12-22 RX ADMIN — DOCUSATE SODIUM 100 MG: 100 CAPSULE, LIQUID FILLED ORAL at 20:29

## 2018-12-22 RX ADMIN — DIPHENHYDRAMINE HCL 25 MG: 25 TABLET ORAL at 20:30

## 2018-12-22 RX ADMIN — OXYCODONE HYDROCHLORIDE AND ACETAMINOPHEN 1 TABLET: 7.5; 325 TABLET ORAL at 15:08

## 2018-12-22 ASSESSMENT — PAIN SCALES - GENERAL
PAINLEVEL_OUTOF10: 3
PAINLEVEL_OUTOF10: 7
PAINLEVEL_OUTOF10: 0
PAINLEVEL_OUTOF10: 7
PAINLEVEL_OUTOF10: 4
PAINLEVEL_OUTOF10: 0
PAINLEVEL_OUTOF10: 5

## 2018-12-22 ASSESSMENT — ENCOUNTER SYMPTOMS
ABDOMINAL PAIN: 0
BLOOD IN STOOL: 0
COUGH: 0
WHEEZING: 0
DIARRHEA: 0
NAUSEA: 0
BACK PAIN: 1
SORE THROAT: 0
EYE DISCHARGE: 0
SHORTNESS OF BREATH: 0
STRIDOR: 0
COLOR CHANGE: 0
CONSTIPATION: 0

## 2018-12-22 ASSESSMENT — PAIN DESCRIPTION - LOCATION: LOCATION: BACK

## 2018-12-22 ASSESSMENT — PAIN DESCRIPTION - ORIENTATION: ORIENTATION: LOWER

## 2018-12-22 ASSESSMENT — PAIN DESCRIPTION - PAIN TYPE: TYPE: ACUTE PAIN

## 2018-12-22 ASSESSMENT — PAIN DESCRIPTION - PROGRESSION: CLINICAL_PROGRESSION: NOT CHANGED

## 2018-12-22 ASSESSMENT — PAIN DESCRIPTION - ONSET: ONSET: ON-GOING

## 2018-12-22 ASSESSMENT — PAIN DESCRIPTION - DESCRIPTORS: DESCRIPTORS: OTHER (COMMENT)

## 2018-12-22 ASSESSMENT — PAIN DESCRIPTION - FREQUENCY: FREQUENCY: CONTINUOUS

## 2018-12-22 NOTE — PROGRESS NOTES
Patient:   Lenka Mckeon  MR#:    [id number]   Room:    [address] [place] [address]   YOB: 1929  Date of Progress Note: 12/22/2018  Time of Note                           7:23 AM  Consulting Physician:   Zack Hahn M.D. Attending Physician:  Zack Hahn MD     CHIEF COMPLAINT:  Low back pain         Subjective: This 80 y.o. male  admitted to [facility] on 12/7/18 with chronic back and LLE pain. He has had previous dorsal column stimulator trial w/Dr. Darwin Brown without much relief. He has had multilevel laminectomy which improved initially, however has gradually worsened. He has had increasing back and L leg pain. MRI of the lumbar spine revealed findings of severe degenerative disc disease at L5-S1. On 12/7/18 he underwent an anterior lumbar interbody fusion of L5-S1 by Dr. Ashley eLong. On 12/10/18 he went back to OR for a L5-S1 anterior discectomy by Dr. Susan Deluna. Pt tolerated procedure well. His 02 sats were a little low so he is currently on 2L of O2 per nc. He has been having some problems w/urinary retention and has had to have in & out cath twice in the past 24hrs. He has some mild confusion after taking his pain meds. He is medically stable and is participating w/therapy. Back doing better. Slept better  REVIEW OF SYSTEMS:  Constitutional: No fevers No chills  Neck:No stiffness  Respiratory: No shortness of breath  Cardiovascular: No chest pain No palpitations  Gastrointestinal: No abdominal pain    Genitourinary: No Dysuria  Neurological: No headache, no confusion      PHYSICAL EXAM:  BP (!) 148/64   Pulse 58   Temp 97.4 °F (36.3 °C) (Temporal)   Resp 16   Ht 5' 8\" (1.727 m)   Wt 160 lb 3.2 oz (72.7 kg)   SpO2 91%   BMI 24.36 kg/m²     Constitutional: he appears well-developed and well-nourished.    Eyes - conjunctiva normal.  Pupils react to light  Ear, nose, throat -hearing intact to finger rub No scars, masses, or lesions over external nose or ears, no atrophy of

## 2018-12-22 NOTE — PROGRESS NOTES
12/20/18 1100   Restrictions/Precautions   Restrictions/Precautions Fall Risk;Surgical Protocols   Required Braces or Orthoses   Spinal Lumbar Corset   Spinal Other brace when OOB   Position Activity Restriction   Spinal Precautions No Bending; No Lifting; No Twisting   General   Diagnosis anterior lumbar interbody fusion of L5-S1,L5-S1 anterior discectomy    Subjective   Subjective Pt states he helps his wife with chores around the house. Has not been able to do the yardwork this year due to back pain. ADL   Additional Comments able to don LSO with cues only   Balance   Sitting Balance Independent   Standing Balance Contact guard assistance   Standing Balance   Time 4 mins   Activity laundry task including hanging  clothes   Sit to stand Contact guard assistance   Stand to sit Contact guard assistance   Comment cues for positioning whild reaching   Functional Mobility   Functional - Mobility Device Rolling Walker   Assist Level Contact guard assistance   Bed mobility   Rolling to Left Stand by assistance   Supine to Sit Stand by assistance   Comment good log roll technique   Type of ROM/Therapeutic Exercise   Type of ROM/Therapeutic Exercise Free weights  (2#)   Assessment   Performance deficits / Impairments Decreased functional mobility ; Decreased endurance;Decreased balance;Decreased strength;Decreased ADL status; Decreased high-level IADLs   Timed Code Treatment Minutes 60 Minutes   Activity Tolerance   Activity Tolerance Patient Tolerated treatment well   Safety Devices   Safety Devices in place Yes   Type of devices Call light within reach; Left in chair

## 2018-12-23 PROCEDURE — 97116 GAIT TRAINING THERAPY: CPT

## 2018-12-23 PROCEDURE — 97110 THERAPEUTIC EXERCISES: CPT

## 2018-12-23 PROCEDURE — 1180000000 HC REHAB R&B

## 2018-12-23 PROCEDURE — 6370000000 HC RX 637 (ALT 250 FOR IP): Performed by: PSYCHIATRY & NEUROLOGY

## 2018-12-23 RX ADMIN — DOCUSATE SODIUM 100 MG: 100 CAPSULE, LIQUID FILLED ORAL at 20:26

## 2018-12-23 RX ADMIN — DOCUSATE SODIUM 100 MG: 100 CAPSULE, LIQUID FILLED ORAL at 08:03

## 2018-12-23 RX ADMIN — PRAVASTATIN SODIUM 40 MG: 20 TABLET ORAL at 08:03

## 2018-12-23 RX ADMIN — OXYCODONE HYDROCHLORIDE AND ACETAMINOPHEN 1 TABLET: 7.5; 325 TABLET ORAL at 22:01

## 2018-12-23 RX ADMIN — POLYETHYLENE GLYCOL 3350 17 G: 17 POWDER, FOR SOLUTION ORAL at 08:03

## 2018-12-23 RX ADMIN — FINASTERIDE 5 MG: 5 TABLET, FILM COATED ORAL at 08:03

## 2018-12-23 RX ADMIN — DIPHENHYDRAMINE HCL 25 MG: 25 TABLET ORAL at 20:26

## 2018-12-23 RX ADMIN — PRIMIDONE 25 MG: 50 TABLET ORAL at 20:26

## 2018-12-23 RX ADMIN — DULOXETINE 60 MG: 60 CAPSULE, DELAYED RELEASE ORAL at 08:03

## 2018-12-23 RX ADMIN — FERROUS SULFATE TAB 325 MG (65 MG ELEMENTAL FE) 325 MG: 325 (65 FE) TAB at 08:03

## 2018-12-23 RX ADMIN — METOPROLOL SUCCINATE 25 MG: 25 TABLET, EXTENDED RELEASE ORAL at 20:26

## 2018-12-23 RX ADMIN — OXYCODONE HYDROCHLORIDE AND ACETAMINOPHEN 1 TABLET: 7.5; 325 TABLET ORAL at 05:07

## 2018-12-23 RX ADMIN — FERROUS SULFATE TAB 325 MG (65 MG ELEMENTAL FE) 325 MG: 325 (65 FE) TAB at 16:08

## 2018-12-23 RX ADMIN — FERROUS SULFATE TAB 325 MG (65 MG ELEMENTAL FE) 325 MG: 325 (65 FE) TAB at 11:45

## 2018-12-23 RX ADMIN — TEMAZEPAM 15 MG: 15 CAPSULE ORAL at 22:01

## 2018-12-23 RX ADMIN — ALFUZOSIN HYDROCHLORIDE 10 MG: 10 TABLET ORAL at 20:26

## 2018-12-23 RX ADMIN — CYCLOBENZAPRINE HYDROCHLORIDE 5 MG: 5 TABLET, FILM COATED ORAL at 20:26

## 2018-12-23 RX ADMIN — FOLIC ACID TAB 400 MCG 800 MCG: 400 TAB at 08:03

## 2018-12-23 RX ADMIN — CHLORPHENIRAMINE MALEATE 4 MG: 4 TABLET ORAL at 08:03

## 2018-12-23 RX ADMIN — OXYCODONE HYDROCHLORIDE AND ACETAMINOPHEN 1 TABLET: 7.5; 325 TABLET ORAL at 16:08

## 2018-12-23 ASSESSMENT — ENCOUNTER SYMPTOMS
WHEEZING: 0
DIARRHEA: 0
STRIDOR: 0
NAUSEA: 0
EYE DISCHARGE: 0
SHORTNESS OF BREATH: 0
COUGH: 0
ABDOMINAL PAIN: 0
CONSTIPATION: 0
BACK PAIN: 1
SORE THROAT: 0
BLOOD IN STOOL: 0
COLOR CHANGE: 0

## 2018-12-23 ASSESSMENT — PAIN SCALES - GENERAL
PAINLEVEL_OUTOF10: 0
PAINLEVEL_OUTOF10: 7
PAINLEVEL_OUTOF10: 0
PAINLEVEL_OUTOF10: 6
PAINLEVEL_OUTOF10: 7
PAINLEVEL_OUTOF10: 0

## 2018-12-23 ASSESSMENT — PAIN DESCRIPTION - PROGRESSION: CLINICAL_PROGRESSION: NOT CHANGED

## 2018-12-23 NOTE — PROGRESS NOTES
12/23/18 0900   Bed Mobility   Rolling Stand by assistance   Supine to Sit Stand by assistance   Sit to Supine Stand by assistance   Transfers   Sit to Stand Stand by assistance   Stand to sit Stand by assistance   Car Transfer Stand by assistance   Ambulation 1   Device Rolling Walker   Other Apparatus (LSO)   Assistance Stand by assistance   Quality of Gait VCS FOR ERECT POSTURE, FORWARD GAZE.   (INTERMITTENT RIGHT LE INSTABILITY WITH NO LOB.  )   Distance 250 FT   Ambulation 2   Device 2 Rolling Walker   Assistance 2 Stand by assistance   Quality of Gait 2 CONTINUED FLEXED FORWARD POSTURE. INTERMITTENT LEFT KNEE BUCKLE WITHOUT LOSS OF BALANC. E   Distance 250 FT   Stairs   # Steps  12   Rails Bilateral   Assistance Stand by assistance   Other exercises   Other exercises 1 CONE /PUT DOWN ON HALLWAY RAIS X100 FT   Conditions Requiring Skilled Therapeutic Intervention   Body structures, Functions, Activity limitations Decreased functional mobility ; Decreased ADL status

## 2018-12-24 LAB
ANION GAP SERPL CALCULATED.3IONS-SCNC: 7 MMOL/L (ref 7–19)
BASOPHILS ABSOLUTE: 0.1 K/UL (ref 0–0.2)
BASOPHILS RELATIVE PERCENT: 0.7 % (ref 0–1)
BUN BLDV-MCNC: 16 MG/DL (ref 8–23)
CALCIUM SERPL-MCNC: 8.6 MG/DL (ref 8.8–10.2)
CHLORIDE BLD-SCNC: 95 MMOL/L (ref 98–111)
CO2: 28 MMOL/L (ref 22–29)
CREAT SERPL-MCNC: 0.5 MG/DL (ref 0.5–1.2)
EOSINOPHILS ABSOLUTE: 0.2 K/UL (ref 0–0.6)
EOSINOPHILS RELATIVE PERCENT: 3.2 % (ref 0–5)
GFR NON-AFRICAN AMERICAN: >60
GLUCOSE BLD-MCNC: 99 MG/DL (ref 74–109)
HCT VFR BLD CALC: 29.1 % (ref 42–52)
HEMOGLOBIN: 9.4 G/DL (ref 14–18)
LYMPHOCYTES ABSOLUTE: 1.3 K/UL (ref 1.1–4.5)
LYMPHOCYTES RELATIVE PERCENT: 18.8 % (ref 20–40)
MCH RBC QN AUTO: 32 PG (ref 27–31)
MCHC RBC AUTO-ENTMCNC: 32.3 G/DL (ref 33–37)
MCV RBC AUTO: 99 FL (ref 80–94)
MONOCYTES ABSOLUTE: 0.9 K/UL (ref 0–0.9)
MONOCYTES RELATIVE PERCENT: 13.4 % (ref 0–10)
NEUTROPHILS ABSOLUTE: 4.3 K/UL (ref 1.5–7.5)
NEUTROPHILS RELATIVE PERCENT: 62.6 % (ref 50–65)
PDW BLD-RTO: 13.6 % (ref 11.5–14.5)
PLATELET # BLD: 340 K/UL (ref 130–400)
PMV BLD AUTO: 8.2 FL (ref 9.4–12.4)
POTASSIUM REFLEX MAGNESIUM: 4.7 MMOL/L (ref 3.5–5)
RBC # BLD: 2.94 M/UL (ref 4.7–6.1)
SODIUM BLD-SCNC: 130 MMOL/L (ref 136–145)
WBC # BLD: 6.9 K/UL (ref 4.8–10.8)

## 2018-12-24 PROCEDURE — 97110 THERAPEUTIC EXERCISES: CPT

## 2018-12-24 PROCEDURE — 85025 COMPLETE CBC W/AUTO DIFF WBC: CPT

## 2018-12-24 PROCEDURE — 99232 SBSQ HOSP IP/OBS MODERATE 35: CPT | Performed by: PSYCHIATRY & NEUROLOGY

## 2018-12-24 PROCEDURE — 97530 THERAPEUTIC ACTIVITIES: CPT

## 2018-12-24 PROCEDURE — 6370000000 HC RX 637 (ALT 250 FOR IP): Performed by: PSYCHIATRY & NEUROLOGY

## 2018-12-24 PROCEDURE — 1180000000 HC REHAB R&B

## 2018-12-24 PROCEDURE — 6360000002 HC RX W HCPCS: Performed by: PSYCHIATRY & NEUROLOGY

## 2018-12-24 PROCEDURE — 97116 GAIT TRAINING THERAPY: CPT

## 2018-12-24 PROCEDURE — 36415 COLL VENOUS BLD VENIPUNCTURE: CPT

## 2018-12-24 PROCEDURE — 80048 BASIC METABOLIC PNL TOTAL CA: CPT

## 2018-12-24 RX ORDER — METHYLPREDNISOLONE 4 MG/1
4 TABLET ORAL NIGHTLY
Status: DISCONTINUED | OUTPATIENT
Start: 2018-12-26 | End: 2018-12-26

## 2018-12-24 RX ORDER — METHYLPREDNISOLONE 4 MG/1
4 TABLET ORAL
Status: DISCONTINUED | OUTPATIENT
Start: 2018-12-25 | End: 2018-12-26

## 2018-12-24 RX ORDER — METHYLPREDNISOLONE 4 MG/1
8 TABLET ORAL
Status: COMPLETED | OUTPATIENT
Start: 2018-12-24 | End: 2018-12-24

## 2018-12-24 RX ORDER — METHYLPREDNISOLONE 4 MG/1
4 TABLET ORAL SEE ADMIN INSTRUCTIONS
Status: DISCONTINUED | OUTPATIENT
Start: 2018-12-24 | End: 2018-12-24 | Stop reason: SDUPTHER

## 2018-12-24 RX ORDER — METHYLPREDNISOLONE 4 MG/1
8 TABLET ORAL NIGHTLY
Status: COMPLETED | OUTPATIENT
Start: 2018-12-25 | End: 2018-12-25

## 2018-12-24 RX ORDER — GABAPENTIN 100 MG/1
100 CAPSULE ORAL NIGHTLY
Status: DISCONTINUED | OUTPATIENT
Start: 2018-12-24 | End: 2018-12-28 | Stop reason: HOSPADM

## 2018-12-24 RX ADMIN — CYCLOBENZAPRINE HYDROCHLORIDE 5 MG: 5 TABLET, FILM COATED ORAL at 20:26

## 2018-12-24 RX ADMIN — TEMAZEPAM 15 MG: 15 CAPSULE ORAL at 20:26

## 2018-12-24 RX ADMIN — DULOXETINE 60 MG: 60 CAPSULE, DELAYED RELEASE ORAL at 09:04

## 2018-12-24 RX ADMIN — ALFUZOSIN HYDROCHLORIDE 10 MG: 10 TABLET ORAL at 20:26

## 2018-12-24 RX ADMIN — CHLORPHENIRAMINE MALEATE 4 MG: 4 TABLET ORAL at 09:04

## 2018-12-24 RX ADMIN — FERROUS SULFATE TAB 325 MG (65 MG ELEMENTAL FE) 325 MG: 325 (65 FE) TAB at 09:04

## 2018-12-24 RX ADMIN — FERROUS SULFATE TAB 325 MG (65 MG ELEMENTAL FE) 325 MG: 325 (65 FE) TAB at 12:09

## 2018-12-24 RX ADMIN — METHYLPREDNISOLONE 8 MG: 4 TABLET ORAL at 16:56

## 2018-12-24 RX ADMIN — DOCUSATE SODIUM 100 MG: 100 CAPSULE, LIQUID FILLED ORAL at 09:04

## 2018-12-24 RX ADMIN — PRIMIDONE 25 MG: 50 TABLET ORAL at 20:26

## 2018-12-24 RX ADMIN — OXYCODONE HYDROCHLORIDE AND ACETAMINOPHEN 1 TABLET: 7.5; 325 TABLET ORAL at 20:26

## 2018-12-24 RX ADMIN — METHYLPREDNISOLONE 8 MG: 4 TABLET ORAL at 12:09

## 2018-12-24 RX ADMIN — FOLIC ACID TAB 400 MCG 800 MCG: 400 TAB at 09:04

## 2018-12-24 RX ADMIN — METOPROLOL SUCCINATE 25 MG: 25 TABLET, EXTENDED RELEASE ORAL at 20:26

## 2018-12-24 RX ADMIN — DIPHENHYDRAMINE HCL 25 MG: 25 TABLET ORAL at 20:26

## 2018-12-24 RX ADMIN — METHYLPREDNISOLONE 8 MG: 4 TABLET ORAL at 09:07

## 2018-12-24 RX ADMIN — GABAPENTIN 100 MG: 100 CAPSULE ORAL at 20:26

## 2018-12-24 RX ADMIN — OXYCODONE HYDROCHLORIDE AND ACETAMINOPHEN 1 TABLET: 7.5; 325 TABLET ORAL at 14:22

## 2018-12-24 RX ADMIN — FERROUS SULFATE TAB 325 MG (65 MG ELEMENTAL FE) 325 MG: 325 (65 FE) TAB at 16:56

## 2018-12-24 RX ADMIN — OXYCODONE HYDROCHLORIDE AND ACETAMINOPHEN 1 TABLET: 7.5; 325 TABLET ORAL at 04:02

## 2018-12-24 RX ADMIN — FINASTERIDE 5 MG: 5 TABLET, FILM COATED ORAL at 09:04

## 2018-12-24 RX ADMIN — DOCUSATE SODIUM 100 MG: 100 CAPSULE, LIQUID FILLED ORAL at 20:26

## 2018-12-24 RX ADMIN — PRAVASTATIN SODIUM 40 MG: 20 TABLET ORAL at 09:04

## 2018-12-24 ASSESSMENT — PAIN DESCRIPTION - PROGRESSION
CLINICAL_PROGRESSION: NOT CHANGED
CLINICAL_PROGRESSION: NOT CHANGED

## 2018-12-24 ASSESSMENT — PAIN SCALES - GENERAL
PAINLEVEL_OUTOF10: 0
PAINLEVEL_OUTOF10: 1
PAINLEVEL_OUTOF10: 0
PAINLEVEL_OUTOF10: 5
PAINLEVEL_OUTOF10: 8
PAINLEVEL_OUTOF10: 6

## 2018-12-24 NOTE — PROGRESS NOTES
step x 2, backwards walking x 3.  --    Conditions Requiring Skilled Therapeutic Intervention   Body structures, Functions, Activity limitations --  --  Decreased functional mobility ; Decreased ADL status   Assessment --  --  Improved mobility and confidence with use of rolator. Pt reported some pain when stretching R hamstring, this was abandonded. Pt requires cues for step height during side steps.     Treatment Diagnosis --  --  interference with activity   Prognosis --  --  Good   Decision Making --  --  Low Complexity   Discharge Recommendations --  --  Continue to assess pending progress

## 2018-12-24 NOTE — PROGRESS NOTES
Patient:   Angelica Patrick  MR#:    [id number]   Room:    [facility]/620-   YOB: 1929  Date of Progress Note: 12/24/2018  Time of Note                           7:37 AM  Consulting Physician:   Latonia Hooper M.D. Attending Physician:  Latonia Hooper MD     CHIEF COMPLAINT:  Low back pain         Subjective: This 80 y.o. male  admitted to [facility] on 12/7/18 with chronic back and LLE pain. He has had previous dorsal column stimulator trial w/Dr. Shadia Trent without much relief. He has had multilevel laminectomy which improved initially, however has gradually worsened. He has had increasing back and L leg pain. MRI of the lumbar spine revealed findings of severe degenerative disc disease at L5-S1. On 12/7/18 he underwent an anterior lumbar interbody fusion of L5-S1 by Dr. Sheldon Herrera. On 12/10/18 he went back to OR for a L5-S1 anterior discectomy by Dr. Simi Macdonald. Pt tolerated procedure well. His 02 sats were a little low so he is currently on 2L of O2 per nc. He has been having some problems w/urinary retention and has had to have in & out cath twice in the past 24hrs. He has some mild confusion after taking his pain meds. He is medically stable and is participating w/therapy. Still c/o right thigh pain  REVIEW OF SYSTEMS:  Constitutional: No fevers No chills  Neck:No stiffness  Respiratory: No shortness of breath  Cardiovascular: No chest pain No palpitations  Gastrointestinal: No abdominal pain    Genitourinary: No Dysuria  Neurological: No headache, no confusion      PHYSICAL EXAM:  BP (!) 183/67   Pulse 66   Temp 97.6 °F (36.4 °C) (Temporal)   Resp 18   Ht 5' 8\" (1.727 m)   Wt 159 lb 0.2 oz (72.1 kg)   SpO2 95%   BMI 24.18 kg/m²     Constitutional: he appears well-developed and well-nourished.    Eyes - conjunctiva normal.  Pupils react to light  Ear, nose, throat -hearing intact to finger rub No scars, masses, or lesions over external nose or ears, no atrophy of tongue  Neck-symmetric, no 1100   Bed Mobility   Rolling Modified independent   Supine to Sit Modified independent   Sit to Supine Modified independent   Transfers   Sit to Stand Supervision   Stand to sit Supervision   Ambulation 1   Device Rollator   Other Apparatus (LSO)   Assistance Stand by assistance   Quality of Gait INITIALLY VERBAL CUES REQUIRED TO AVOID OVEREXTENDED UPPER EXTREMITIES, BUT THEN OVERALL IMPROVEMENT IN POSTURE VS RW   Distance 250 FT X2   Comments PRACTICED TURN TO SIT AND SIT TO STAND FROM ROLLATOR   Other exercises   Other exercises 1 SIDESTEPPING/BACKWARDS WALKING 25 FTX4   Conditions Requiring Skilled Therapeutic Intervention   Body structures, Functions, Activity limitations Decreased functional mobility ; Decreased ADL status   Assessment IMPROVED POSTURE AND QUALITY OF GAIT WITH ROLLATOR VS RW.         12/22/18 1300   Restrictions/Precautions   Restrictions/Precautions Fall Risk;Surgical Protocols   Required Braces or Orthoses   Spinal Lumbar Corset   Spinal Other brace when out of  bed    Position Activity Restriction   Spinal Precautions No Bending; No Lifting; No Twisting   General   Diagnosis anterior lumbar interbody fusion of L5-S1,L5-S1 anterior discectomy    Balance   Sitting Balance Independent   Standing Balance Stand by assistance   Standing Balance   Time 3 mins   Activity dynamic 1 hand reaching act   Sit to stand Supervision   Stand to sit Supervision   Functional Mobility   Functional - Mobility Device Rolling Walker   Activity Other   Assist Level Stand by assistance   Type of ROM/Therapeutic Exercise   Type of ROM/Therapeutic Exercise AROM   Comment Medium resistant Tband   Assessment   Performance deficits / Impairments Decreased functional mobility ; Decreased endurance;Decreased balance;Decreased strength;Decreased ADL status   Timed Code Treatment Minutes 45 Minutes   Activity Tolerance   Activity Tolerance Patient Tolerated treatment well   Safety Devices   Safety Devices in place Yes   Type of devices Left in chair;Call light within reach  (spouse present)             Lumbar xray unremarkable  RECORD REVIEW: Previous medical records, medications were reviewed at today's visit    IMPRESSION:  1. Lumbar stenosis with claudication status post L5-S1 discectomy and fusion-pain control/PT/OT  2. Essential hypertension-continue current medications and monitoring  3. Acute blood loss anemia. Status post transfusion while here. Hemoglobin steady. 4. GI-bowel regimen  5. DVT prophylaxis-SCDs  6.  Urinary retention-continue to monitor    Add medrol dose pack  ELOS:12/26, wednesday

## 2018-12-25 PROCEDURE — 6360000002 HC RX W HCPCS: Performed by: PSYCHIATRY & NEUROLOGY

## 2018-12-25 PROCEDURE — 6370000000 HC RX 637 (ALT 250 FOR IP): Performed by: PSYCHIATRY & NEUROLOGY

## 2018-12-25 PROCEDURE — 6370000000 HC RX 637 (ALT 250 FOR IP)

## 2018-12-25 PROCEDURE — 1180000000 HC REHAB R&B

## 2018-12-25 PROCEDURE — 6360000002 HC RX W HCPCS

## 2018-12-25 RX ADMIN — ALFUZOSIN HYDROCHLORIDE 10 MG: 10 TABLET ORAL at 20:08

## 2018-12-25 RX ADMIN — METOPROLOL SUCCINATE 25 MG: 25 TABLET, EXTENDED RELEASE ORAL at 20:09

## 2018-12-25 RX ADMIN — METHYLPREDNISOLONE 8 MG: 4 TABLET ORAL at 20:09

## 2018-12-25 RX ADMIN — PRIMIDONE 25 MG: 50 TABLET ORAL at 20:09

## 2018-12-25 RX ADMIN — DOCUSATE SODIUM 100 MG: 100 CAPSULE, LIQUID FILLED ORAL at 20:09

## 2018-12-25 RX ADMIN — TEMAZEPAM 15 MG: 15 CAPSULE ORAL at 21:10

## 2018-12-25 RX ADMIN — OXYCODONE HYDROCHLORIDE AND ACETAMINOPHEN 1 TABLET: 7.5; 325 TABLET ORAL at 23:01

## 2018-12-25 RX ADMIN — DIPHENHYDRAMINE HCL 25 MG: 25 TABLET ORAL at 20:09

## 2018-12-25 RX ADMIN — GABAPENTIN 100 MG: 100 CAPSULE ORAL at 20:10

## 2018-12-25 RX ADMIN — METHYLPREDNISOLONE 4 MG: 4 TABLET ORAL at 05:35

## 2018-12-25 RX ADMIN — CYCLOBENZAPRINE HYDROCHLORIDE 5 MG: 5 TABLET, FILM COATED ORAL at 20:10

## 2018-12-25 RX ADMIN — OXYCODONE HYDROCHLORIDE AND ACETAMINOPHEN 1 TABLET: 7.5; 325 TABLET ORAL at 17:12

## 2018-12-25 ASSESSMENT — PAIN SCALES - GENERAL
PAINLEVEL_OUTOF10: 5
PAINLEVEL_OUTOF10: 5
PAINLEVEL_OUTOF10: 7

## 2018-12-25 ASSESSMENT — PAIN DESCRIPTION - PROGRESSION
CLINICAL_PROGRESSION: NOT CHANGED
CLINICAL_PROGRESSION: NOT CHANGED

## 2018-12-26 PROCEDURE — 6360000002 HC RX W HCPCS: Performed by: PSYCHIATRY & NEUROLOGY

## 2018-12-26 PROCEDURE — 97535 SELF CARE MNGMENT TRAINING: CPT

## 2018-12-26 PROCEDURE — 99233 SBSQ HOSP IP/OBS HIGH 50: CPT | Performed by: PSYCHIATRY & NEUROLOGY

## 2018-12-26 PROCEDURE — 97110 THERAPEUTIC EXERCISES: CPT

## 2018-12-26 PROCEDURE — 97530 THERAPEUTIC ACTIVITIES: CPT

## 2018-12-26 PROCEDURE — 97116 GAIT TRAINING THERAPY: CPT

## 2018-12-26 PROCEDURE — 6370000000 HC RX 637 (ALT 250 FOR IP): Performed by: PSYCHIATRY & NEUROLOGY

## 2018-12-26 PROCEDURE — 1180000000 HC REHAB R&B

## 2018-12-26 RX ORDER — MELOXICAM 7.5 MG/1
7.5 TABLET ORAL DAILY
Status: DISCONTINUED | OUTPATIENT
Start: 2018-12-26 | End: 2018-12-28 | Stop reason: HOSPADM

## 2018-12-26 RX ADMIN — FERROUS SULFATE TAB 325 MG (65 MG ELEMENTAL FE) 325 MG: 325 (65 FE) TAB at 17:44

## 2018-12-26 RX ADMIN — CYCLOBENZAPRINE HYDROCHLORIDE 5 MG: 5 TABLET, FILM COATED ORAL at 21:34

## 2018-12-26 RX ADMIN — FINASTERIDE 5 MG: 5 TABLET, FILM COATED ORAL at 07:49

## 2018-12-26 RX ADMIN — PRAVASTATIN SODIUM 40 MG: 20 TABLET ORAL at 07:49

## 2018-12-26 RX ADMIN — FOLIC ACID TAB 400 MCG 800 MCG: 400 TAB at 07:49

## 2018-12-26 RX ADMIN — FERROUS SULFATE TAB 325 MG (65 MG ELEMENTAL FE) 325 MG: 325 (65 FE) TAB at 09:14

## 2018-12-26 RX ADMIN — POLYETHYLENE GLYCOL 3350 17 G: 17 POWDER, FOR SOLUTION ORAL at 07:49

## 2018-12-26 RX ADMIN — DIPHENHYDRAMINE HCL 25 MG: 25 TABLET ORAL at 21:34

## 2018-12-26 RX ADMIN — METOPROLOL SUCCINATE 25 MG: 25 TABLET, EXTENDED RELEASE ORAL at 21:33

## 2018-12-26 RX ADMIN — ALFUZOSIN HYDROCHLORIDE 10 MG: 10 TABLET ORAL at 21:34

## 2018-12-26 RX ADMIN — DULOXETINE 60 MG: 60 CAPSULE, DELAYED RELEASE ORAL at 07:49

## 2018-12-26 RX ADMIN — OXYCODONE HYDROCHLORIDE AND ACETAMINOPHEN 1 TABLET: 7.5; 325 TABLET ORAL at 07:49

## 2018-12-26 RX ADMIN — PRIMIDONE 25 MG: 50 TABLET ORAL at 21:33

## 2018-12-26 RX ADMIN — DOCUSATE SODIUM 100 MG: 100 CAPSULE, LIQUID FILLED ORAL at 07:49

## 2018-12-26 RX ADMIN — GABAPENTIN 100 MG: 100 CAPSULE ORAL at 21:34

## 2018-12-26 RX ADMIN — METHYLPREDNISOLONE 4 MG: 4 TABLET ORAL at 05:34

## 2018-12-26 RX ADMIN — DOCUSATE SODIUM 100 MG: 100 CAPSULE, LIQUID FILLED ORAL at 21:34

## 2018-12-26 RX ADMIN — MELOXICAM 7.5 MG: 7.5 TABLET ORAL at 09:18

## 2018-12-26 RX ADMIN — CHLORPHENIRAMINE MALEATE 4 MG: 4 TABLET ORAL at 07:49

## 2018-12-26 ASSESSMENT — PAIN SCALES - GENERAL
PAINLEVEL_OUTOF10: 6
PAINLEVEL_OUTOF10: 0
PAINLEVEL_OUTOF10: 6

## 2018-12-26 ASSESSMENT — ENCOUNTER SYMPTOMS
ABDOMINAL PAIN: 0
WHEEZING: 0
COLOR CHANGE: 0
STRIDOR: 0
EYE DISCHARGE: 0
NAUSEA: 0
COUGH: 0
DIARRHEA: 0
SHORTNESS OF BREATH: 0
BACK PAIN: 1
SORE THROAT: 0
BLOOD IN STOOL: 0
CONSTIPATION: 0

## 2018-12-26 NOTE — PLAN OF CARE
Problem: Falls - Risk of:  Goal: Absence of physical injury  Absence of physical injury   Outcome: Ongoing  calllight use encouraged    Problem: Pain:  Goal: Pain level will decrease  Pain level will decrease   Outcome: Ongoing  Receiving prn pain meds. Goal: Control of acute pain  Control of acute pain   Outcome: Ongoing    Goal: Control of chronic pain  Control of chronic pain   Outcome: Ongoing      Problem: Safety:  Goal: Free from accidental physical injury  Free from accidental physical injury   Outcome: Ongoing  calllight use encouraged  Goal: Free from intentional harm  Free from intentional harm   Outcome: Ongoing      Problem: Daily Care:  Goal: Daily care needs are met  Daily care needs are met   Outcome: Ongoing      Problem: Discharge Planning:  Goal: Patients continuum of care needs are met  Patients continuum of care needs are met   Outcome: Ongoing      Problem: Mood - Altered:  Goal: Mood stable  Mood stable   Outcome: Ongoing  No signs depression this shift    Problem:  Activity:  Goal: Physical symptoms of sleep deprivation will improve  Physical symptoms of sleep deprivation will improve   Outcome: Ongoing

## 2018-12-26 NOTE — PLAN OF CARE
Problem: Falls - Risk of:  Goal: Will remain free from falls  Will remain free from falls   Outcome: Ongoing  No falls this shift, personal alarm on . Bed alarm on  Goal: Absence of physical injury  Absence of physical injury   Outcome: Ongoing      Problem: Pain:  Goal: Pain level will decrease  Pain level will decrease   Outcome: Ongoing    Goal: Control of acute pain  Control of acute pain   Outcome: Ongoing    Goal: Control of chronic pain  Control of chronic pain   Outcome: Ongoing      Problem: Safety:  Goal: Free from accidental physical injury  Free from accidental physical injury   Outcome: Ongoing    Goal: Free from intentional harm  Free from intentional harm   Outcome: Ongoing      Problem: Daily Care:  Goal: Daily care needs are met  Daily care needs are met   Outcome: Ongoing      Problem: Discharge Planning:  Goal: Patients continuum of care needs are met  Patients continuum of care needs are met   Outcome: Ongoing      Problem:  Activity:  Goal: Physical symptoms of sleep deprivation will improve  Physical symptoms of sleep deprivation will improve   Outcome: Ongoing

## 2018-12-26 NOTE — PROGRESS NOTES
Balance  Time: Greater than 8 minutes  Activity: 1-2 handed static standing task   Sit to stand: Modified independent  Stand to sit: Modified independent  Functional Mobility  Functional - Mobility Device: 4-Wheeled Walker  Activity: To/from bathroom; Other (Used 4 wheeled walker from Room 818 to OT gym)  Assist Level: Modified independent   Functional Mobility Comments: RW  Toilet Transfers  Toilet - Technique: Ambulating  Equipment Used: Grab bars  Toilet Transfer: Modified independent  Toilet Transfers Comments: RW  Wheelchair [employer] Transfers  Wheelchair/Bed - Technique: Ambulating  Equipment Used:  Other (To/From [place] [employer])  Level of Asssistance: Modified independent   Wheelchair Transfers Comments: 4-Wheeled Walker     Transfers  Stand Step Transfers: Modified independent  Sit to stand: Modified independent  Stand to sit: Modified independent  Transfer Comments: Uses 4-wheeled walker for mobility tasks     Assessment   Assessment: Reviewed Educational handout \"Do and Don'ts with back precautions  Treatment Diagnosis: anterior lumbar interbody fusion of L5-S1,L5-S1 anterior discectomy   Prognosis: Good  Activity Tolerance  Activity Tolerance: Patient Tolerated treatment well  Safety Devices  Type of devices: Left in chair;Call light within reach (Wife at bedsides)          [address] per week: 5  Times per day: Twice a day  Specific instructions for Next Treatment: Toileting (bowel hygiene)   Current Treatment Recommendations: Functional Mobility Training, Home Management Training      Goals  Short term goals  Time Frame for Short term goals: 1 week  Short term goal 1: MET  Short term goal 2: MET  Short term goal 3: MET  Short term goal 4: MET  Short term goal 5: MET  Long term goals  Time Frame for Long term goals : 3 weeks  Long term goal 1: MET   Long term goal 2: MET   Long term goal 3: MET   Long term goal 4: MET        Therapy Time   Individual Concurrent Group Co-treatment   Time In 1000

## 2018-12-27 LAB
ANION GAP SERPL CALCULATED.3IONS-SCNC: 12 MMOL/L (ref 7–19)
BASOPHILS ABSOLUTE: 0.1 K/UL (ref 0–0.2)
BASOPHILS RELATIVE PERCENT: 0.7 % (ref 0–1)
BUN BLDV-MCNC: 17 MG/DL (ref 8–23)
CALCIUM SERPL-MCNC: 8.8 MG/DL (ref 8.8–10.2)
CHLORIDE BLD-SCNC: 94 MMOL/L (ref 98–111)
CO2: 26 MMOL/L (ref 22–29)
CREAT SERPL-MCNC: 0.5 MG/DL (ref 0.5–1.2)
EOSINOPHILS ABSOLUTE: 0.2 K/UL (ref 0–0.6)
EOSINOPHILS RELATIVE PERCENT: 2.2 % (ref 0–5)
GFR NON-AFRICAN AMERICAN: >60
GLUCOSE BLD-MCNC: 106 MG/DL (ref 74–109)
HCT VFR BLD CALC: 31.3 % (ref 42–52)
HEMOGLOBIN: 10.2 G/DL (ref 14–18)
LYMPHOCYTES ABSOLUTE: 1.3 K/UL (ref 1.1–4.5)
LYMPHOCYTES RELATIVE PERCENT: 18.1 % (ref 20–40)
MCH RBC QN AUTO: 32.1 PG (ref 27–31)
MCHC RBC AUTO-ENTMCNC: 32.6 G/DL (ref 33–37)
MCV RBC AUTO: 98.4 FL (ref 80–94)
MONOCYTES ABSOLUTE: 0.7 K/UL (ref 0–0.9)
MONOCYTES RELATIVE PERCENT: 10.6 % (ref 0–10)
NEUTROPHILS ABSOLUTE: 4.7 K/UL (ref 1.5–7.5)
NEUTROPHILS RELATIVE PERCENT: 67.8 % (ref 50–65)
PDW BLD-RTO: 13.6 % (ref 11.5–14.5)
PLATELET # BLD: 297 K/UL (ref 130–400)
PMV BLD AUTO: 8.3 FL (ref 9.4–12.4)
POTASSIUM REFLEX MAGNESIUM: 4.7 MMOL/L (ref 3.5–5)
RBC # BLD: 3.18 M/UL (ref 4.7–6.1)
SODIUM BLD-SCNC: 132 MMOL/L (ref 136–145)
WBC # BLD: 6.9 K/UL (ref 4.8–10.8)

## 2018-12-27 PROCEDURE — 80048 BASIC METABOLIC PNL TOTAL CA: CPT

## 2018-12-27 PROCEDURE — 99232 SBSQ HOSP IP/OBS MODERATE 35: CPT | Performed by: PSYCHIATRY & NEUROLOGY

## 2018-12-27 PROCEDURE — 1180000000 HC REHAB R&B

## 2018-12-27 PROCEDURE — 97116 GAIT TRAINING THERAPY: CPT

## 2018-12-27 PROCEDURE — 97530 THERAPEUTIC ACTIVITIES: CPT

## 2018-12-27 PROCEDURE — 97110 THERAPEUTIC EXERCISES: CPT

## 2018-12-27 PROCEDURE — 6370000000 HC RX 637 (ALT 250 FOR IP): Performed by: PSYCHIATRY & NEUROLOGY

## 2018-12-27 PROCEDURE — 36415 COLL VENOUS BLD VENIPUNCTURE: CPT

## 2018-12-27 PROCEDURE — 85025 COMPLETE CBC W/AUTO DIFF WBC: CPT

## 2018-12-27 RX ADMIN — FOLIC ACID TAB 400 MCG 800 MCG: 400 TAB at 07:44

## 2018-12-27 RX ADMIN — PRIMIDONE 25 MG: 50 TABLET ORAL at 20:48

## 2018-12-27 RX ADMIN — DIPHENHYDRAMINE HCL 25 MG: 25 TABLET ORAL at 20:48

## 2018-12-27 RX ADMIN — TEMAZEPAM 15 MG: 15 CAPSULE ORAL at 20:47

## 2018-12-27 RX ADMIN — TEMAZEPAM 15 MG: 15 CAPSULE ORAL at 03:35

## 2018-12-27 RX ADMIN — FERROUS SULFATE TAB 325 MG (65 MG ELEMENTAL FE) 325 MG: 325 (65 FE) TAB at 07:44

## 2018-12-27 RX ADMIN — ALFUZOSIN HYDROCHLORIDE 10 MG: 10 TABLET ORAL at 20:47

## 2018-12-27 RX ADMIN — DULOXETINE 60 MG: 60 CAPSULE, DELAYED RELEASE ORAL at 07:44

## 2018-12-27 RX ADMIN — GABAPENTIN 100 MG: 100 CAPSULE ORAL at 20:47

## 2018-12-27 RX ADMIN — FINASTERIDE 5 MG: 5 TABLET, FILM COATED ORAL at 07:49

## 2018-12-27 RX ADMIN — DOCUSATE SODIUM 100 MG: 100 CAPSULE, LIQUID FILLED ORAL at 07:44

## 2018-12-27 RX ADMIN — POLYETHYLENE GLYCOL 3350 17 G: 17 POWDER, FOR SOLUTION ORAL at 07:49

## 2018-12-27 RX ADMIN — DOCUSATE SODIUM 100 MG: 100 CAPSULE, LIQUID FILLED ORAL at 20:48

## 2018-12-27 RX ADMIN — OXYCODONE HYDROCHLORIDE AND ACETAMINOPHEN 1 TABLET: 7.5; 325 TABLET ORAL at 03:35

## 2018-12-27 RX ADMIN — CHLORPHENIRAMINE MALEATE 4 MG: 4 TABLET ORAL at 07:44

## 2018-12-27 RX ADMIN — OXYCODONE HYDROCHLORIDE AND ACETAMINOPHEN 1 TABLET: 7.5; 325 TABLET ORAL at 10:19

## 2018-12-27 RX ADMIN — METOPROLOL SUCCINATE 25 MG: 25 TABLET, EXTENDED RELEASE ORAL at 20:48

## 2018-12-27 RX ADMIN — PRAVASTATIN SODIUM 40 MG: 20 TABLET ORAL at 07:44

## 2018-12-27 RX ADMIN — CYCLOBENZAPRINE HYDROCHLORIDE 5 MG: 5 TABLET, FILM COATED ORAL at 20:47

## 2018-12-27 RX ADMIN — OXYCODONE HYDROCHLORIDE AND ACETAMINOPHEN 1 TABLET: 7.5; 325 TABLET ORAL at 20:48

## 2018-12-27 RX ADMIN — FERROUS SULFATE TAB 325 MG (65 MG ELEMENTAL FE) 325 MG: 325 (65 FE) TAB at 12:46

## 2018-12-27 RX ADMIN — MELOXICAM 7.5 MG: 7.5 TABLET ORAL at 07:44

## 2018-12-27 RX ADMIN — FERROUS SULFATE TAB 325 MG (65 MG ELEMENTAL FE) 325 MG: 325 (65 FE) TAB at 17:17

## 2018-12-27 ASSESSMENT — PAIN SCALES - GENERAL
PAINLEVEL_OUTOF10: 6
PAINLEVEL_OUTOF10: 2
PAINLEVEL_OUTOF10: 3
PAINLEVEL_OUTOF10: 6
PAINLEVEL_OUTOF10: 5
PAINLEVEL_OUTOF10: 3
PAINLEVEL_OUTOF10: 5
PAINLEVEL_OUTOF10: 7
PAINLEVEL_OUTOF10: 0
PAINLEVEL_OUTOF10: 5

## 2018-12-27 ASSESSMENT — ENCOUNTER SYMPTOMS
SORE THROAT: 0
CONSTIPATION: 0
BACK PAIN: 1
NAUSEA: 0
WHEEZING: 0
DIARRHEA: 0
SHORTNESS OF BREATH: 0
COLOR CHANGE: 0
STRIDOR: 0
ABDOMINAL PAIN: 0
EYE DISCHARGE: 0
COUGH: 0
BLOOD IN STOOL: 0

## 2018-12-27 ASSESSMENT — PAIN DESCRIPTION - LOCATION
LOCATION: BACK
LOCATION: LEG

## 2018-12-27 ASSESSMENT — PAIN DESCRIPTION - DESCRIPTORS
DESCRIPTORS: ACHING;SHOOTING
DESCRIPTORS: ACHING;SHOOTING

## 2018-12-27 ASSESSMENT — PAIN DESCRIPTION - ONSET: ONSET: ON-GOING

## 2018-12-27 ASSESSMENT — PAIN DESCRIPTION - PROGRESSION
CLINICAL_PROGRESSION: NOT CHANGED
CLINICAL_PROGRESSION: NOT CHANGED

## 2018-12-27 ASSESSMENT — PAIN DESCRIPTION - ORIENTATION
ORIENTATION: RIGHT;UPPER;LOWER
ORIENTATION: LOWER

## 2018-12-27 ASSESSMENT — PAIN DESCRIPTION - PAIN TYPE
TYPE: ACUTE PAIN
TYPE: ACUTE PAIN

## 2018-12-27 ASSESSMENT — PAIN DESCRIPTION - FREQUENCY
FREQUENCY: CONTINUOUS
FREQUENCY: CONTINUOUS

## 2018-12-27 NOTE — PLAN OF CARE
Problem: Falls - Risk of:  Goal: Will remain free from falls  Will remain free from falls   Outcome: Ongoing  Patient independent transfers in room, will continue to monitor for safety. Goal: Absence of physical injury  Absence of physical injury   Outcome: Ongoing      Problem: Pain:  Goal: Pain level will decrease  Pain level will decrease   Outcome: Ongoing    Goal: Control of acute pain  Control of acute pain   Outcome: Ongoing  Patient pain has remained under control with use of pain medicine as ordered for pain control. Goal: Control of chronic pain  Control of chronic pain   Outcome: Ongoing      Problem: Mood - Altered:  Goal: Mood stable  Mood stable   Outcome: Ongoing      Problem:  Activity:  Goal: Physical symptoms of sleep deprivation will improve  Physical symptoms of sleep deprivation will improve   Outcome: Ongoing

## 2018-12-27 NOTE — PROGRESS NOTES
Edgar Michelle  [id number]     12/27/18 7782 12/27/18 0940 12/27/18 0941   General   Response To Previous Treatment Patient with no complaints from previous session. --  --    Family / Caregiver Present No --  --    Subjective   Subjective Pt reports that his back is hurting some this morning, but agreed to therapy. --  --    Pain Screening   Patient Currently in Pain Yes --  --    Pain Assessment   Pain Assessment 0-10 --  --    Pain Level 5 --  --    Pain Type Acute pain --  --    Pain Location Back --  --    Pain Orientation Lower --  --    Pain Descriptors Aching; Shooting --  --    Pain Frequency Continuous --  --    Pain Onset On-going --  --    Clinical Progression Not changed --  --    Patient's Stated Pain Goal No pain --  --    Pain Intervention(s) Medication (see eMar) --  --    Response to Pain Intervention Patient Satisfied --  --    Multiple Pain Sites No --  --    Pre Treatment Pain Screening   Pain at present 5 --  --    Scale Used Numeric Score --  --    Intervention List Patient able to continue with treatment --  --    Orientation   Overall Orientation Status --  WNL --    Bed Mobility   Rolling --  Modified independent --    Supine to Sit --  Modified independent --    Sit to Supine --  Modified independent --    Transfers   Sit to Stand --  Modified independent --    Stand to sit --  Modified independent --    Bed to Chair --  Modified independent --    Stand Pivot Transfers --  Modified independent --    Ambulation   Ambulation? --  --  Yes   Ambulation 1   Surface --  --  level tile   Device --  --  Rollator   Other Apparatus --  --  (LSO)   Assistance --  --  Modified Independent   Quality of Gait --  --  Pt showed slight forward flex posture. Pt showed good pace and proper technique w/ Rollator.    Distance --  --  400'x2   Exercises   Comments --  --  --    Conditions Requiring Skilled Therapeutic Intervention   Body structures, Functions, Activity limitations --  --  --    Assessment --  --

## 2018-12-27 NOTE — PROGRESS NOTES
no masses noted, no jugular vein distension  Respiration- chest wall appears symmetric, good expansion,   normal effort without use of accessory muscles  Cardiovascular- RRR  Musculoskeletal - no significant wasting of muscles noted, no bony deformities, gait no gross ataxia  Extremities-no clubbing, cyanosis or edema  Skin - warm, dry, and intact. No rash, erythema, or pallor. Psychiatric - mood, affect, and behavior appear normal.      Neurology  NEUROLOGICAL EXAM:      Mental status   Awake, alert, fluent oriented x 3 appropriate affect  Attention and concentration appear appropriate  Recent and remote memory appears unremarkable  Speech normal without dysarthria  No clear issues with language       Cranial Nerves   CN II- Visual fields grossly unremarkable  CN III, IV,VI-EOMI, No nystagmus, conjugate eye movements, no ptosis  CN VII-no facial assymetry  CN VIII-Hearing intact   CN IX and X- Palate elevates in midline  CN XI-good shoulder shrug  CN XII-Tongue midline with no fasciculations or fibrillations          Motor function  Antigravity x 4     Sensory function Intact to light touch     Cerebellar F-N intact     Tremor None present     Gait                  Not tested           Nursing/pcp notes, imaging,labs and vitals reviewed.      PT,OT and/or speech notes reviewed    Lab Results   Component Value Date    WBC 6.9 12/27/2018    HGB 10.2 (L) 12/27/2018    HCT 31.3 (L) 12/27/2018    MCV 98.4 (H) 12/27/2018     12/27/2018     Lab Results   Component Value Date     (L) 12/27/2018    K 4.7 12/27/2018    CL 94 (L) 12/27/2018    CO2 26 12/27/2018    BUN 17 12/27/2018    CREATININE 0.5 12/27/2018    GLUCOSE 106 12/27/2018    CALCIUM 8.8 12/27/2018    PROT 6.1 (L) 10/15/2018    LABALBU 4.2 10/15/2018    BILITOT 0.4 10/15/2018    ALKPHOS 65 10/15/2018    AST 16 10/15/2018    ALT 18 10/15/2018    LABGLOM >60 12/27/2018   No results found for: INRNo results found for: PHENYTOIN, ESR, CRP     PHYSICAL THERAPY  STRENGTH  Strength RLE  Strength RLE: Exception  Comment: GROSSLY 4-/5  Strength LLE  Strength LLE: Exception  Comment: GROSSLY 3+/5  ROM  AROM RLE (degrees)  RLE AROM: Exceptions  RLE General AROM: DECREASED DF  AROM LLE (degrees)  LLE AROM : Exceptions  LLE General AROM: DECREASED DF  BED MOBILITY  Bed Mobility  Rolling: Modified independent  Supine to Sit: Modified independent  Sit to Supine: Modified independent  Scooting: Supervision  TRANSFERS  Transfers  Sit to Stand: Supervision  Stand to sit: Supervision  Bed to Chair: Stand by assistance  Car Transfer: Stand by assistance  WHEELCHAIR PROPULSION  Propulsion 1  Method: CHRIS SMALL  Level of Assistance: Minimal assistance, Contact guard assistance  Description/ Details: SHORT, SLOW STROKES  Distance: 25 FT  AMBULATION  Ambulation 1  Surface: level tile  Device: Rollator  Other Apparatus:  (LSO)  Assistance: Stand by assistance  Quality of Gait: Pt requires vcs for upright posture, emphasizing neck extension for upright neutral neck. Distance: 250 FT  Comments: PRACTICED TURN TO SIT AND SIT TO STAND FROM ROLLATOR      General  Chart Reviewed: Yes  Patient assessed for rehabilitation services?: Yes  Additional Pertinent Hx: Pt. eval on hold yesterday due to low hemoglobin. Cleared to resume today.   Response to previous treatment: Patient with no complaints from previous session  Family / Caregiver Present: Yes (Wife present at beginning of treatment session)  Diagnosis: anterior lumbar interbody fusion of L5-S1,L5-S1 anterior discectomy   Subjective  Subjective: Pt able to state 3/3 back precautions  Pain Assessment  Patient Currently in Pain: No  Vital Signs  Patient Currently in Pain: No   Objective    Balance  Sitting Balance: Modified independent   Standing Balance: Modified independent   Standing Balance  Time: Greater than 8 minutes  Activity: 1-2 handed static standing task   Sit to stand: Modified independent  Stand to sit: Modified

## 2018-12-28 VITALS
RESPIRATION RATE: 18 BRPM | BODY MASS INDEX: 24.1 KG/M2 | DIASTOLIC BLOOD PRESSURE: 61 MMHG | HEART RATE: 60 BPM | HEIGHT: 68 IN | SYSTOLIC BLOOD PRESSURE: 164 MMHG | OXYGEN SATURATION: 92 % | TEMPERATURE: 96.8 F | WEIGHT: 159.01 LBS

## 2018-12-28 PROCEDURE — 97110 THERAPEUTIC EXERCISES: CPT

## 2018-12-28 PROCEDURE — 97530 THERAPEUTIC ACTIVITIES: CPT

## 2018-12-28 PROCEDURE — 6370000000 HC RX 637 (ALT 250 FOR IP): Performed by: PSYCHIATRY & NEUROLOGY

## 2018-12-28 PROCEDURE — 97116 GAIT TRAINING THERAPY: CPT

## 2018-12-28 PROCEDURE — 99239 HOSP IP/OBS DSCHRG MGMT >30: CPT | Performed by: PSYCHIATRY & NEUROLOGY

## 2018-12-28 RX ORDER — MELOXICAM 7.5 MG/1
7.5 TABLET ORAL DAILY
Qty: 30 TABLET | Refills: 3 | Status: SHIPPED | OUTPATIENT
Start: 2018-12-28 | End: 2020-11-12

## 2018-12-28 RX ORDER — GABAPENTIN 100 MG/1
CAPSULE ORAL
Qty: 90 CAPSULE | Refills: 0 | Status: SHIPPED | OUTPATIENT
Start: 2018-12-28 | End: 2021-05-17

## 2018-12-28 RX ORDER — FINASTERIDE 5 MG/1
5 TABLET, FILM COATED ORAL DAILY
Qty: 30 TABLET | Refills: 3 | Status: SHIPPED | OUTPATIENT
Start: 2018-12-28

## 2018-12-28 RX ORDER — METOPROLOL SUCCINATE 25 MG/1
25 TABLET, EXTENDED RELEASE ORAL NIGHTLY
Qty: 30 TABLET | Refills: 3 | Status: SHIPPED | OUTPATIENT
Start: 2018-12-28 | End: 2019-05-09 | Stop reason: DRUGHIGH

## 2018-12-28 RX ORDER — FERROUS SULFATE 325(65) MG
325 TABLET ORAL
Qty: 90 TABLET | Refills: 3 | Status: SHIPPED | OUTPATIENT
Start: 2018-12-28 | End: 2020-11-12

## 2018-12-28 RX ORDER — PSEUDOEPHEDRINE HCL 30 MG
100 TABLET ORAL 2 TIMES DAILY
Qty: 60 CAPSULE | Refills: 0 | Status: SHIPPED | OUTPATIENT
Start: 2018-12-28

## 2018-12-28 RX ORDER — CYCLOBENZAPRINE HCL 5 MG
5 TABLET ORAL NIGHTLY
Qty: 30 TABLET | Refills: 0 | Status: SHIPPED | OUTPATIENT
Start: 2018-12-28 | End: 2019-01-07

## 2018-12-28 RX ORDER — OXYCODONE AND ACETAMINOPHEN 7.5; 325 MG/1; MG/1
1 TABLET ORAL EVERY 6 HOURS PRN
Qty: 40 TABLET | Refills: 0 | Status: SHIPPED | OUTPATIENT
Start: 2018-12-28 | End: 2018-12-31

## 2018-12-28 RX ORDER — ALFUZOSIN HYDROCHLORIDE 10 MG/1
10 TABLET, EXTENDED RELEASE ORAL NIGHTLY
Qty: 30 TABLET | Refills: 3 | Status: SHIPPED | OUTPATIENT
Start: 2018-12-28

## 2018-12-28 RX ORDER — TEMAZEPAM 15 MG/1
15 CAPSULE ORAL NIGHTLY PRN
Qty: 20 CAPSULE | Refills: 0 | Status: SHIPPED | OUTPATIENT
Start: 2018-12-28 | End: 2019-01-11

## 2018-12-28 RX ADMIN — DULOXETINE 60 MG: 60 CAPSULE, DELAYED RELEASE ORAL at 07:30

## 2018-12-28 RX ADMIN — OXYCODONE HYDROCHLORIDE AND ACETAMINOPHEN 1 TABLET: 7.5; 325 TABLET ORAL at 04:00

## 2018-12-28 RX ADMIN — FOLIC ACID TAB 400 MCG 800 MCG: 400 TAB at 07:30

## 2018-12-28 RX ADMIN — PRAVASTATIN SODIUM 40 MG: 20 TABLET ORAL at 07:30

## 2018-12-28 RX ADMIN — FINASTERIDE 5 MG: 5 TABLET, FILM COATED ORAL at 07:29

## 2018-12-28 RX ADMIN — CHLORPHENIRAMINE MALEATE 4 MG: 4 TABLET ORAL at 07:29

## 2018-12-28 RX ADMIN — POLYETHYLENE GLYCOL 3350 17 G: 17 POWDER, FOR SOLUTION ORAL at 07:29

## 2018-12-28 RX ADMIN — DOCUSATE SODIUM 100 MG: 100 CAPSULE, LIQUID FILLED ORAL at 07:29

## 2018-12-28 RX ADMIN — FERROUS SULFATE TAB 325 MG (65 MG ELEMENTAL FE) 325 MG: 325 (65 FE) TAB at 07:30

## 2018-12-28 RX ADMIN — MELOXICAM 7.5 MG: 7.5 TABLET ORAL at 07:30

## 2018-12-28 ASSESSMENT — PAIN SCALES - GENERAL
PAINLEVEL_OUTOF10: 0
PAINLEVEL_OUTOF10: 0
PAINLEVEL_OUTOF10: 6
PAINLEVEL_OUTOF10: 0
PAINLEVEL_OUTOF10: 6

## 2018-12-28 NOTE — PROGRESS NOTES
finasteride (PROSCAR) tablet 5 mg Daily   chlorpheniramine (CHLOR-TRIMETON) tablet 4 mg Daily   DULoxetine (CYMBALTA) extended release capsule 60 mg Daily   diphenhydrAMINE (BENADRYL) tablet 25 mg Nightly   ferrous sulfate tablet 325 mg TID WC   lidocaine 4 % external patch 1 patch Daily   primidone (MYSOLINE) tablet 25 mg Nightly   metoprolol succinate (TOPROL XL) extended release tablet 25 mg Nightly   acetaminophen (TYLENOL) tablet 650 mg Q4H PRN   magnesium hydroxide (MILK OF MAGNESIA) 400 MG/5ML suspension 30 mL Daily PRN   docusate sodium (COLACE) capsule 100 mg BID   bisacodyl (DULCOLAX) suppository 10 mg Daily PRN   polyethylene glycol (GLYCOLAX) packet 17 g Daily       Data:     Code Status:  Full Code    Vitals:  BP (!) 132/58   Pulse 66   Temp 97.8 °F (36.6 °C)   Resp 18   Ht 5' 8\" (1.727 m)   Wt 159 lb 0.2 oz (72.1 kg)   SpO2 95%   BMI 24.18 kg/m²   Temp (24hrs), Av.5 °F (36.4 °C), Min:97.1 °F (36.2 °C), Max:97.8 °F (36.6 °C)             I/O (24Hr): Intake/Output Summary (Last 24 hours) at 18  Last data filed at 18 1926   Gross per 24 hour   Intake             1080 ml   Output                0 ml   Net             1080 ml       Labs:  Lab Results   Component Value Date/Time    Sodium 132 (L) 2018 02:47 AM    Potassium reflex Magnesium 4.7 2018 02:47 AM    Chloride 94 (L) 2018 02:47 AM    CO2 26 2018 02:47 AM    BUN 17 2018 02:47 AM    CREATININE 0.5 2018 02:47 AM    Glucose 106 2018 02:47 AM    Anion Gap 12 2018 02:47 AM     Lab Results   Component Value Date/Time    WBC 6.9 2018 02:47 AM    RBC 3.18 (L) 2018 02:47 AM    Hemoglobin 10.2 (L) 2018 02:47 AM    Hematocrit 31.3 (L) 2018 02:47 AM    Platelets 187  02:47 AM                                          Physical Examination:        Physical Exam   Constitutional: He is oriented to person, place, and time.  He appears well-developed and

## 2018-12-28 NOTE — PROGRESS NOTES
Occupational Therapy     12/28/18 1000   Pain Assessment   Patient Currently in Pain No   Pain Assessment 0-10   Pain Level 0   Balance   Standing Balance Modified independent    Standing Balance   Sit to stand Modified independent   Stand to sit Modified independent   Functional Mobility   Functional - Mobility Device 4-Wheeled Walker   Activity Other   Assist Level Modified independent    Functional Mobility Comments Laundry tasks in ADL apartment. Type of ROM/Therapeutic Exercise   Type of ROM/Therapeutic Exercise Free weights   Comment Free weights: BUE 1 set x 15 reps, all planes. Independent with HEP. Assessment   Performance deficits / Impairments Decreased functional mobility ; Decreased high-level IADLs   Treatment Diagnosis anterior lumbar interbody fusion of L5-S1,L5-S1 anterior discectomy    Prognosis Good   Patient Education Independent with HEP.    Timed Code Treatment Minutes 60 Minutes   Activity Tolerance   Activity Tolerance Patient Tolerated treatment well   Safety Devices   Safety Devices in place N/A  (Up ad leora.)   Type of devices Call light within reach   Plan   Current Treatment Recommendations Functional Mobility Training;Home Management Training

## 2018-12-31 ENCOUNTER — CARE COORDINATION (OUTPATIENT)
Dept: CASE MANAGEMENT | Age: 83
End: 2018-12-31

## 2018-12-31 NOTE — DISCHARGE SUMMARY
Occupational Therapy Discharge Summary         Date: 2018  Patient Name: Ashwin Crook        MRN: [id number]    : 1929  (80 y.o.)  Gender: male      Diagnosis: 2 STAGE FUSION DISCECTOMY L5-S1  Restrictions/Precautions  Restrictions/Precautions: Up Ad Vianca, Surgical Protocols  Required Braces or Orthoses?: Yes      Discharge Date: 18    ADL Discharge FIM Scores:  Eatin - Patient feeds self  Groomin - Patient independent with all grooming tasks  Bathin - Able to bathe all 10 areas with device  Dressing-Upper: 7 - Patient independently dresses upper body  Dressing-Lower: 6 - Independent with device/prosthesis (AE )  Toiletin - Requires device (grab bar/walker/etc.)  Toilet Transfer: 6 - Independent with device (grab bar/walker/slide bar)  Tub Transfer: 6 - Modified independence (TTB )  Shower Transfer: 6 - Modified independence      Comprehension: 6 - Complex ideas 90% or device (hearing aid/glasses)  Expression: 7 - Patient expresses complex ideas/needs  Social Interaction: 7 - Patient has appropriate behavior/relations 100% of the time  Problem Solvin - Patient able to solve simple/routine tasks  Memory: 5 - Patient requires prompting with stress/unfamiliar situations    UE Functioning:   WNLs    Home Management:  Functional Mobility  Functional - Mobility Device: 4-Wheeled Walker  Activity: Other  Assist Level: Modified independent   Functional Mobility Comments: Laundry tasks in ADL apartment. Adaptive Equipment/DME Status:  Bathroom Equipment: Tub transfer bench  Home Equipment: Rolling walker, Cane  Ordered RW with seat    Pain Assessment:  Pain Level: 0  Pain Location: Leg    Remaining Problems:  Decreased functional mobility ;  Decreased high-level IADLs    STGs:  Short term goals  Time Frame for Short term goals: 1 week  Short term goal 1: CGA with toilet tfers  Short term goal 2: CGA with tub bench tfers  Short term goal 3: Supervision with seated LB ADLs with
status: Full Code   Activity: activity as tolerated  Diet: Dietary Nutrition Supplements: Standard High Calorie Oral Supplement  DIET GENERAL;    Wound Care: as directed  Equipment: as per therapy      Dileep Rocha MD    At least 35 minutes were spent in discharging the patient

## 2019-01-04 ENCOUNTER — CARE COORDINATION (OUTPATIENT)
Dept: CASE MANAGEMENT | Age: 84
End: 2019-01-04

## 2019-01-07 ENCOUNTER — OFFICE VISIT (OUTPATIENT)
Dept: NEUROSURGERY | Facility: CLINIC | Age: 84
End: 2019-01-07

## 2019-01-07 ENCOUNTER — HOSPITAL ENCOUNTER (OUTPATIENT)
Dept: GENERAL RADIOLOGY | Facility: HOSPITAL | Age: 84
Discharge: HOME | End: 2019-01-07
Admitting: NURSE PRACTITIONER

## 2019-01-07 VITALS
BODY MASS INDEX: 24.54 KG/M2 | HEIGHT: 68 IN | SYSTOLIC BLOOD PRESSURE: 130 MMHG | DIASTOLIC BLOOD PRESSURE: 60 MMHG | WEIGHT: 161.9 LBS

## 2019-01-07 DIAGNOSIS — Z78.9 NON-SMOKER: ICD-10-CM

## 2019-01-07 DIAGNOSIS — M48.062 SPINAL STENOSIS, LUMBAR REGION, WITH NEUROGENIC CLAUDICATION: Primary | ICD-10-CM

## 2019-01-07 DIAGNOSIS — M51.37 DEGENERATION OF LUMBAR OR LUMBOSACRAL INTERVERTEBRAL DISC: ICD-10-CM

## 2019-01-07 PROCEDURE — 99024 POSTOP FOLLOW-UP VISIT: CPT | Performed by: NURSE PRACTITIONER

## 2019-01-07 PROCEDURE — 72110 X-RAY EXAM L-2 SPINE 4/>VWS: CPT

## 2019-01-07 RX ORDER — GABAPENTIN 100 MG/1
CAPSULE ORAL
COMMUNITY
Start: 2018-12-28 | End: 2019-01-28

## 2019-01-07 RX ORDER — PSEUDOEPHEDRINE HCL 30 MG
100 TABLET ORAL
COMMUNITY
Start: 2018-12-28 | End: 2021-03-30

## 2019-01-07 RX ORDER — OXYCODONE AND ACETAMINOPHEN 7.5; 325 MG/1; MG/1
1 TABLET ORAL EVERY 6 HOURS PRN
COMMUNITY
End: 2019-01-07 | Stop reason: SDUPTHER

## 2019-01-07 RX ORDER — MELOXICAM 7.5 MG/1
7.5 TABLET ORAL
COMMUNITY
Start: 2018-12-28 | End: 2021-02-08

## 2019-01-07 RX ORDER — OXYCODONE AND ACETAMINOPHEN 7.5; 325 MG/1; MG/1
1 TABLET ORAL 2 TIMES DAILY
Qty: 60 TABLET | Refills: 0 | Status: SHIPPED | OUTPATIENT
Start: 2019-01-07 | End: 2021-02-08

## 2019-01-07 RX ORDER — TEMAZEPAM 15 MG/1
15 CAPSULE ORAL
COMMUNITY
Start: 2018-12-28 | End: 2019-01-11

## 2019-01-07 NOTE — PROGRESS NOTES
"    Chief complaint:   Chief Complaint   Patient presents with   • Back & leg pain     patient is here for postop follow up; patient went to the operating room initially on 12/07/2018 for an L5-S1 ALIF and then back to the operating room on 12/10/2018 for a L 3-4, 4-5T LIF with laminectomy.        Subjective     HPI: This is a 89-year-old male gentleman went to the operating room on 12/07/2018 for an L5-S1 ALIF and then on 12/10/2018 for an L3-4, 4-5 hemilaminectomy and foraminotomy and L3 through S1 posterior pedicle screw instrumentation.  He is here in follow-up today.  He states overall he feels like he is doing about 50% better.  He is still having some back pain and states that it is worse in the morning whenever he first gets up.  He is no longer having any left lower extremity pain but does have some pain in his right lower extremity.  He says this is not as bad as it was prior to surgery and also it is not as bad as his left leg was.  He remains in the brace.  He states that he is taking pain medication a couple times a day.  Denies any bowel or bladder incontinence.  He is using a walker.  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    Review of Systems   Musculoskeletal: Positive for back pain.   Neurological: Negative.          Objective      Vital Signs  /60 (BP Location: Right arm, Patient Position: Sitting)   Ht 173 cm (68.11\")   Wt 73.4 kg (161 lb 14.4 oz)   BMI 24.54 kg/m²     Physical Exam   Constitutional: He is oriented to person, place, and time. He appears well-developed and well-nourished.   HENT:   Head: Normocephalic.   Eyes: EOM are normal. Pupils are equal, round, and reactive to light.   Neck: Normal range of motion.   Pulmonary/Chest: Effort normal.   Musculoskeletal: Normal range of motion.        Lumbar back: He exhibits pain.   Neurological: He is alert and oriented to person, place, and time. He has normal strength and normal reflexes. No cranial nerve deficit or " sensory deficit. Gait normal. GCS eye subscore is 4. GCS verbal subscore is 5. GCS motor subscore is 6.   Skin: Skin is warm.   Abdomen and back incisions clean dry and intact.   Psychiatric: He has a normal mood and affect. His speech is normal and behavior is normal. Thought content normal.       Results Review: No new imaging          Assessment/Plan: At this point we will refill the patient's pain medication.  We will continue to follow-up with him regarding his back.  I will send him for set of x-rays for his back to see can come out of the brace.  We will have him follow-up with Dr. Falcon in 6-8 weeks.  BMI shows that he is at a healthy weight.  He is a nonsmoker.         Chad was seen today for back & leg pain.    Diagnoses and all orders for this visit:    Spinal stenosis, lumbar region, with neurogenic claudication    Degeneration of lumbar or lumbosacral intervertebral disc    Non-smoker        I discussed the patients findings and my recommendations with patient  EDIE Maloney  01/07/19  9:40 AM

## 2019-01-15 ENCOUNTER — CARE COORDINATION (OUTPATIENT)
Dept: CASE MANAGEMENT | Age: 84
End: 2019-01-15

## 2019-01-21 DIAGNOSIS — M48.062 SPINAL STENOSIS, LUMBAR REGION, WITH NEUROGENIC CLAUDICATION: Primary | ICD-10-CM

## 2019-01-21 DIAGNOSIS — M79.605 LEFT LEG PAIN: ICD-10-CM

## 2019-01-22 ENCOUNTER — TELEPHONE (OUTPATIENT)
Dept: NEUROSURGERY | Facility: CLINIC | Age: 84
End: 2019-01-22

## 2019-01-22 NOTE — TELEPHONE ENCOUNTER
----- Message from Kj Falcon MD sent at 1/21/2019  5:07 PM CST -----  He can come out of the brace  ----- Message -----  From: Alayna Arriola CMA  Sent: 1/21/2019   3:24 PM  To: Kj Falcon MD        ----- Message -----  From: Josiah Robles APRN  Sent: 1/21/2019   2:58 PM  To: Alayna Arriola CMA    We need to ask kory. I thought I sent him message and havent heard back so we can ask tomorrow  ----- Message -----  From: Alayna Arriola CMA  Sent: 1/21/2019   2:44 PM  To: EDIE Coulter, Elena Sosa MA    Wife called and said they never heard from you about his xrays and he is still in the brace.    Need a call to let them know about the brace.    chi

## 2019-01-31 ENCOUNTER — HOSPITAL ENCOUNTER (OUTPATIENT)
Dept: PHYSICAL THERAPY | Facility: HOSPITAL | Age: 84
Setting detail: THERAPIES SERIES
Discharge: HOME | End: 2019-01-31
Attending: NEUROLOGICAL SURGERY

## 2019-01-31 DIAGNOSIS — M51.37 DEGENERATION OF LUMBAR OR LUMBOSACRAL INTERVERTEBRAL DISC: Primary | ICD-10-CM

## 2019-01-31 DIAGNOSIS — M54.42 ACUTE LEFT-SIDED LOW BACK PAIN WITH LEFT-SIDED SCIATICA: ICD-10-CM

## 2019-01-31 DIAGNOSIS — M48.062 SPINAL STENOSIS, LUMBAR REGION, WITH NEUROGENIC CLAUDICATION: ICD-10-CM

## 2019-01-31 PROCEDURE — 97161 PT EVAL LOW COMPLEX 20 MIN: CPT | Performed by: PHYSICAL THERAPIST

## 2019-02-06 ENCOUNTER — HOSPITAL ENCOUNTER (OUTPATIENT)
Dept: PHYSICAL THERAPY | Facility: HOSPITAL | Age: 84
Setting detail: THERAPIES SERIES
Discharge: HOME | End: 2019-02-06
Attending: NEUROLOGICAL SURGERY

## 2019-02-06 DIAGNOSIS — M48.062 SPINAL STENOSIS, LUMBAR REGION, WITH NEUROGENIC CLAUDICATION: ICD-10-CM

## 2019-02-06 DIAGNOSIS — M51.37 DEGENERATION OF LUMBAR OR LUMBOSACRAL INTERVERTEBRAL DISC: Primary | ICD-10-CM

## 2019-02-06 DIAGNOSIS — M54.42 ACUTE LEFT-SIDED LOW BACK PAIN WITH LEFT-SIDED SCIATICA: ICD-10-CM

## 2019-02-06 PROCEDURE — 97116 GAIT TRAINING THERAPY: CPT | Performed by: PHYSICAL THERAPIST

## 2019-02-06 PROCEDURE — 97140 MANUAL THERAPY 1/> REGIONS: CPT | Performed by: PHYSICAL THERAPIST

## 2019-02-06 PROCEDURE — 97110 THERAPEUTIC EXERCISES: CPT | Performed by: PHYSICAL THERAPIST

## 2019-02-06 NOTE — THERAPY TREATMENT NOTE
Outpatient Physical Therapy Ortho Treatment Note   [place]     Patient Name: Chad Henriquez  : 1929  MRN: [id number]  Today's Date: 2019      Visit Date: 2019    Visit Dx:    ICD-10-CM ICD-9-CM   1. Degeneration of lumbar or lumbosacral intervertebral disc M51.37 722.52   2. Acute left-sided low back pain with left-sided sciatica M54.42 724.2     724.3   3. Spinal stenosis, lumbar region, with neurogenic claudication M48.062 724.03       Patient Active Problem List   Diagnosis   • BPH with urinary obstruction   • Frequency of urination   • Nocturia   • OAB (overactive bladder)   • Non-smoker   • Normal body mass index (BMI)   • Spinal stenosis, lumbar region, with neurogenic claudication   • Left leg pain   • Bilateral hip pain   • Acute left-sided low back pain with left-sided sciatica   • Essential tremor   • BMI 21.0-21.9, adult   • Spinal stenosis   • Degeneration of lumbar or lumbosacral intervertebral disc        Past Medical History:   Diagnosis Date   • Allergic rhinitis    • Anemia    • Arthritis    • BPH (benign prostatic hypertrophy) with urinary retention    • Cancer (CMS/HCC)     skin cancer   • Chronic back pain     RUNS DOWN BOTH LEGS   • Constipation    • Coronary artery disease    • Hard of hearing    • Heart attack (CMS/HCC)     NO MUSCLE DAMAGE - JUST OCCLUDED VALVE   • High cholesterol    • History of skin cancer     BILATERAL EARS   • History of transfusion        • Hypertension    • Inguinal hernia    • Leaky heart valve     DR CONCEPCION SAYS NOT ENOUGH TO BE OF CONCERN   • Other bursal cyst, right elbow    • PONV (postoperative nausea and vomiting)     has had scopalamine patch in past    • Sleep apnea     DOES NOT USE CPAP OR BIPAP   • Spinal stenosis of cervical region    • Spinal stenosis of lumbar region    • Tremors of nervous system    • Wears hearing aid     BILATERAL        Past Surgical History:   Procedure Laterality Date   • ANTERIOR LUMBAR EXPOSURE  N/A 12/7/2018    Procedure: ANTERIOR LUMBAR EXPOSURE;  Surgeon: Manolo Mcleod DO;  Location:  [facility];  Service: Vascular   • APPENDECTOMY     • BACK SURGERY      X3   • CARDIAC SURGERY  08/08/1986    X1 BYPASS   • CORONARY ANGIOPLASTY WITH STENT PLACEMENT  1997    X3 STENTS   • HERNIA REPAIR Bilateral     x2   • INGUINAL HERNIA REPAIR Right 6/22/2017    Procedure: RIGHT INGUINAL HERNIA REPAIR AND EXCISION OF CYST RIGHT ELBOW ;  Surgeon: Jackelyn Arriola MD;  Location:  [facility];  Service:    • LUMBAR LAMINECTOMY N/A 3/12/2018    Procedure: LUMBAR LAMINECTOMY WITHOUT FUSION L3-4,4-5;  Surgeon: Kj Falcon MD;  Location:  [facility];  Service: Neurosurgery   • LUMBAR LAMINECTOMY ANTERIOR LUMBAR INTERBODY FUSION N/A 12/7/2018    Procedure: ANTERIOR LUMBAR INTERBODY FUSION L5-S1;  Surgeon: Kj Falcon MD;  Location:  [facility];  Service: Neurosurgery   • LUMBAR LAMINECTOMY WITH FUSION Left 12/10/2018    Procedure: LUMBAR LAMINECTOMY TRANSFORAMINAL LUMBAR INTERBODY FUSION L34,45;  Surgeon: Kj Falcon MD;  Location:  [facility];  Service: Neurosurgery   • LUMBAR LAMINECTOMY WITH FUSION Left 12/7/2018    Procedure: LUMBAR LAMINECTOMY TRANSFORAMINAL LUMBAR INTERBODY FUSION LEFT L3-4, L4-5 QUADRANT RETRACTOR;  Surgeon: Kj Falcon MD;  Location:  [facility];  Service: Neurosurgery                       PT Assessment/Plan     Row Name 02/06/19 1300          PT Assessment    Assessment Comments  Pt tolerated treatment session well, he demonstrates decreased hip mobility with his greatest limitation in ER and Extension. He presents with stiff thoracic mobility, getting his hips and throacic spine moving better should decrease the strain on his lumbar spine. He is scheduled to recieve his AFO tomorrow which should help conserve energy in his LE's allowing him to increase his activity level.  -WJ        PT Plan    PT Plan Comments  Continue to work on his thoracic and hip mobility. Progress with hip  stability exercises as tolerated. He should have his AFO next session and progression with gait training  will begin.  -WJ       User Key  (r) = Recorded By, (t) = Taken By, (c) = Cosigned By    Initials Name Provider Type    Benji Rodriguez PT DPT Physical Therapist              Exercises     Row Name 02/06/19 1300             Subjective Comments    Subjective Comments  Pt reports that he is doing pretty well, his pain is managable.  -WJ         Subjective Pain    Able to rate subjective pain?  yes  -WJ      Pre-Treatment Pain Level  1  -WJ         Total Minutes    25820 - Gait Training Minutes   10  -WJ      21613 - PT Therapeutic Exercise Minutes  21  -WJ      91006 - PT Manual Therapy Minutes  25  -WJ         Exercise 1    Exercise Name 1  BKFO  -WJ      Cueing 1  Verbal;Tactile  -WJ      Sets 1  2  -WJ      Reps 1  10  -WJ         Exercise 2    Exercise Name 2  standing hip abduction and extension  -WJ      Cueing 2  Verbal;Tactile  -WJ      Sets 2  1  -WJ      Reps 2  10   -WJ      Additional Comments  each leg  -WJ         Exercise 3    Exercise Name 3  ambulated in the hallway without  ft   -WJ      Additional Comments  focused on erect posture and clearing R foot from the floor.  -WJ         Exercise 4    Exercise Name 4  Ambulated 150 ft with SPC   -WJ      Additional Comments  focused on erect posture and clearing R foot from the floor.  -WJ         Exercise 5    Exercise Name 5  educated pt and wife on condition, benefit of AFO, HEP, and increased activity levels at home.  -WJ        User Key  (r) = Recorded By, (t) = Taken By, (c) = Cosigned By    Initials Name Provider Type    Benji Rodriguez PT DPT Physical Therapist                        Manual Rx (last 36 hours)      Manual Treatments     Row Name 02/06/19 1300             Total Minutes    52021 - PT Manual Therapy Minutes  25  -WJ         Manual Rx 1    Manual Rx 1 Location  Hip ROM all planes  -WJ      Manual Rx 1 Type  stretching   -WJ      Manual Rx 1 Grade  min-mod OP  -WJ      Manual Rx 1 Duration  20  -WJ         Manual Rx 2    Manual Rx 2 Location  prone thoracic PA's  -WJ      Manual Rx 2 Type  CPA's  -WJ      Manual Rx 2 Grade  2  -WJ      Manual Rx 2 Duration  5  -WJ        User Key  (r) = Recorded By, (t) = Taken By, (c) = Cosigned By    Initials Name Provider Type    Benji Rodriguez, PT DPT Physical Therapist          PT OP Goals     Row Name 02/06/19 1300          Long Term Goals    LTG Date to Achieve  03/14/19  -WJ     LTG 1  Improve demond hip extension to 10 deg  -WJ     LTG 1 Progress  Ongoing  -WJ     LTG 2  Able to walk community distances with no assistive device with right AFO  -WJ     LTG 2 Progress  Ongoing  -WJ     LTG 2 Progress Comments  Pt reported that he would be recieving AFO tomorrow  -WJ     LTG 3  Reports on falls or tripping x 1 week while wearing his AFO  -WJ     LTG 3 Progress  Ongoing  -WJ     LTG 4  SLS demond legs x 10 sec with hip stability  -WJ     LTG 4 Progress  Ongoing  -WJ       User Key  (r) = Recorded By, (t) = Taken By, (c) = Cosigned By    Initials Name Provider Type    Benji Rodriguez PT DPT Physical Therapist          Therapy Education  Education Details: standing hip abduction/extension  Given: HEP, Symptoms/condition management, Posture/body mechanics  Program: New, Reinforced  How Provided: Verbal, Demonstration  Provided to: Patient, Caregiver  Level of Understanding: Teach back education performed, Verbalized, Demonstrated              Time Calculation:   Start Time: 1300  Stop Time: 1356  Time Calculation (min): 56 min  Total Timed Code Minutes- PT: 56 minute(s)  Therapy Suggested Charges     Code   Minutes Charges    15561 (CPT®) Hc Pt Neuromusc Re Education Ea 15 Min      22917 (CPT®) Hc Pt Ther Proc Ea 15 Min 21 1    07591 (CPT®) Hc Gait Training Ea 15 Min 10 1    10484 (CPT®) Hc Pt Therapeutic Act Ea 15 Min      81723 (CPT®) Hc Pt Manual Therapy Ea 15 Min 25 2    27890 (CPT®) Hc Pt  Ther Massage- Per 15 Min      18361 (CPT®) Hc Pt Iontophoresis Ea 15 Min      75542 (CPT®) Hc Pt Elec Stim Ea-Per 15 Min      24719 (CPT®) Hc Pt Ultrasound Ea 15 Min      78598 (CPT®) Hc Pt Self Care/Mgmt/Train Ea 15 Min      42323 (CPT®) Hc Pt Prosthetic (S) Train Initial Encounter, Each 15 Min      48362 (CPT®) Hc Orthotic(S) Mgmt/Train Initial Encounter, Each 15min      81823 (CPT®) Hc Pt Aquatic Therapy Ea 15 Min      99228 (CPT®) Hc Pt Orthotic(S)/Prosthetic(S) Encounter, Each 15 Min       (CPT®) Hc Pt Electrical Stim Unattended      Total  56 4        Therapy Charges for Today     Code Description Service Date Service Provider Modifiers Qty    90622896449 HC PT THER PROC EA 15 MIN 2/6/2019 Benji Mir, PT DPT GP 1    99819911244 HC GAIT TRAINING EA 15 MIN 2/6/2019 Bneji Mir, PT DPT GP 1    47758976646 HC PT MANUAL THERAPY EA 15 MIN 2/6/2019 Benji Mir, PT DPT GP 2                    Benji Mir, PT DPT  2/6/2019

## 2019-02-08 ENCOUNTER — HOSPITAL ENCOUNTER (OUTPATIENT)
Dept: PHYSICAL THERAPY | Facility: HOSPITAL | Age: 84
Setting detail: THERAPIES SERIES
Discharge: HOME | End: 2019-02-08
Attending: NEUROLOGICAL SURGERY

## 2019-02-08 DIAGNOSIS — M51.37 DEGENERATION OF LUMBAR OR LUMBOSACRAL INTERVERTEBRAL DISC: Primary | ICD-10-CM

## 2019-02-08 DIAGNOSIS — M54.42 ACUTE LEFT-SIDED LOW BACK PAIN WITH LEFT-SIDED SCIATICA: ICD-10-CM

## 2019-02-08 DIAGNOSIS — M48.062 SPINAL STENOSIS, LUMBAR REGION, WITH NEUROGENIC CLAUDICATION: ICD-10-CM

## 2019-02-08 PROCEDURE — 97110 THERAPEUTIC EXERCISES: CPT | Performed by: PHYSICAL THERAPIST

## 2019-02-08 PROCEDURE — 97140 MANUAL THERAPY 1/> REGIONS: CPT | Performed by: PHYSICAL THERAPIST

## 2019-02-08 NOTE — THERAPY TREATMENT NOTE
Outpatient Physical Therapy Ortho Treatment Note   [place]     Patient Name: Chad Henriquez  : 1929  MRN: [id number]  Today's Date: 2019      Visit Date: 2019    Visit Dx:    ICD-10-CM ICD-9-CM   1. Degeneration of lumbar or lumbosacral intervertebral disc M51.37 722.52   2. Acute left-sided low back pain with left-sided sciatica M54.42 724.2     724.3   3. Spinal stenosis, lumbar region, with neurogenic claudication M48.062 724.03       Patient Active Problem List   Diagnosis   • BPH with urinary obstruction   • Frequency of urination   • Nocturia   • OAB (overactive bladder)   • Non-smoker   • Normal body mass index (BMI)   • Spinal stenosis, lumbar region, with neurogenic claudication   • Left leg pain   • Bilateral hip pain   • Acute left-sided low back pain with left-sided sciatica   • Essential tremor   • BMI 21.0-21.9, adult   • Spinal stenosis   • Degeneration of lumbar or lumbosacral intervertebral disc        Past Medical History:   Diagnosis Date   • Allergic rhinitis    • Anemia    • Arthritis    • BPH (benign prostatic hypertrophy) with urinary retention    • Cancer (CMS/HCC)     skin cancer   • Chronic back pain     RUNS DOWN BOTH LEGS   • Constipation    • Coronary artery disease    • Hard of hearing    • Heart attack (CMS/HCC)     NO MUSCLE DAMAGE - JUST OCCLUDED VALVE   • High cholesterol    • History of skin cancer     BILATERAL EARS   • History of transfusion        • Hypertension    • Inguinal hernia    • Leaky heart valve     DR CONCEPCION SAYS NOT ENOUGH TO BE OF CONCERN   • Other bursal cyst, right elbow    • PONV (postoperative nausea and vomiting)     has had scopalamine patch in past    • Sleep apnea     DOES NOT USE CPAP OR BIPAP   • Spinal stenosis of cervical region    • Spinal stenosis of lumbar region    • Tremors of nervous system    • Wears hearing aid     BILATERAL        Past Surgical History:   Procedure Laterality Date   • ANTERIOR LUMBAR EXPOSURE  N/A 12/7/2018    Procedure: ANTERIOR LUMBAR EXPOSURE;  Surgeon: Manolo Mcleod DO;  Location:  [facility];  Service: Vascular   • APPENDECTOMY     • BACK SURGERY      X3   • CARDIAC SURGERY  08/08/1986    X1 BYPASS   • CORONARY ANGIOPLASTY WITH STENT PLACEMENT  1997    X3 STENTS   • HERNIA REPAIR Bilateral     x2   • INGUINAL HERNIA REPAIR Right 6/22/2017    Procedure: RIGHT INGUINAL HERNIA REPAIR AND EXCISION OF CYST RIGHT ELBOW ;  Surgeon: Jackelyn Arriola MD;  Location:  [facility];  Service:    • LUMBAR LAMINECTOMY N/A 3/12/2018    Procedure: LUMBAR LAMINECTOMY WITHOUT FUSION L3-4,4-5;  Surgeon: Kj Falcon MD;  Location:  [facility];  Service: Neurosurgery   • LUMBAR LAMINECTOMY ANTERIOR LUMBAR INTERBODY FUSION N/A 12/7/2018    Procedure: ANTERIOR LUMBAR INTERBODY FUSION L5-S1;  Surgeon: Kj Falcon MD;  Location:  [facility];  Service: Neurosurgery   • LUMBAR LAMINECTOMY WITH FUSION Left 12/10/2018    Procedure: LUMBAR LAMINECTOMY TRANSFORAMINAL LUMBAR INTERBODY FUSION L34,45;  Surgeon: Kj Falcon MD;  Location:  [facility];  Service: Neurosurgery   • LUMBAR LAMINECTOMY WITH FUSION Left 12/7/2018    Procedure: LUMBAR LAMINECTOMY TRANSFORAMINAL LUMBAR INTERBODY FUSION LEFT L3-4, L4-5 QUADRANT RETRACTOR;  Surgeon: Kj Falcon MD;  Location:  [facility];  Service: Neurosurgery                       PT Assessment/Plan     Row Name 02/08/19 1452          PT Assessment    Assessment Comments  Pt did well with therapy this date, his hip ER improved following manual stretches. Fatigue was noted in hip abductors during the last couple of reps in his last sets. With side stepping he had the most diffiuclty clearing the R foot when leading with the L. He was not able to get his AFO but should have it next Friday.  -WJ        PT Plan    PT Plan Comments  Work on hip and thoracic mobility, progress with LE strengthening and proprioceptive activity. When he recieves his AFO incorporate additional gait  training.  -WJ       User Key  (r) = Recorded By, (t) = Taken By, (c) = Cosigned By    Initials Name Provider Type    Benji Rodriguez PT DPT Physical Therapist              Exercises     Row Name 02/08/19 1500 02/08/19 1452          Subjective Comments    Subjective Comments  --  Pt reports no changes since last treatment session. He reports that he did not recieve his brace because it did not fit properly. The plan is for him to get his new AFO next Friday.   -WJ        Subjective Pain    Able to rate subjective pain?  --  yes  -WJ     Pre-Treatment Pain Level  --  1  -WJ        Total Minutes    49540 - PT Therapeutic Exercise Minutes  --  32  -WJ     57261 - PT Manual Therapy Minutes  --  20  -WJ        Exercise 1    Exercise Name 1  --  -WJ  hooklying SLR  -WJ     Cueing 1  --  -WJ  Verbal;Tactile  -WJ     Sets 1  --  -WJ  2  -WJ     Reps 1  --  -WJ  10  -WJ     Additional Comments  --  -WJ  YTB  -WJ        Exercise 2    Exercise Name 2  --  B clamshells  -WJ     Cueing 2  --  Verbal;Tactile  -WJ     Sets 2  --  2  -WJ     Reps 2  --  15  -WJ     Additional Comments  --  YTB  -WJ        Exercise 3    Exercise Name 3  --  side stepping 20 ft  -WJ     Cueing 3  --  Verbal;Tactile  -WJ     Sets 3  --  4  -WJ        Exercise 4    Exercise Name 4  --  tandem balance  -WJ     Cueing 4  --  Verbal;Tactile  -WJ     Sets 4  --  2 sets R leg anterior and R leg posterior  -WJ     Time 4  --  30 secs each  -WJ       User Key  (r) = Recorded By, (t) = Taken By, (c) = Cosigned By    Initials Name Provider Type    Benji Rodriguez PT DPT Physical Therapist                        Manual Rx (last 36 hours)      Manual Treatments     Row Name 02/08/19 1452             Total Minutes    42030 - PT Manual Therapy Minutes  20  -WJ         Manual Rx 1    Manual Rx 1 Location  Hip ROM all planes  -WJ      Manual Rx 1 Type  stretching   -WJ      Manual Rx 1 Grade  min-mod OP  -WJ      Manual Rx 1 Duration  20  -WJ        User Key   (r) = Recorded By, (t) = Taken By, (c) = Cosigned By    Initials Name Provider Type    Benji Rodriguez PT DPT Physical Therapist          PT OP Goals     Row Name 02/08/19 1452          Long Term Goals    LTG Date to Achieve  03/14/19  -WJ     LTG 1  Improve demond hip extension to 10 deg  -WJ     LTG 1 Progress  Ongoing  -WJ     LTG 2  Able to walk community distances with no assistive device with right AFO  -WJ     LTG 2 Progress  Ongoing  -WJ     LTG 3  Reports on falls or tripping x 1 week while wearing his AFO  -WJ     LTG 3 Progress  Ongoing  -WJ     LTG 4  SLS demond legs x 10 sec with hip stability  -WJ     LTG 4 Progress  Ongoing  -WJ     LTG 4 Progress Comments  tandem stance for approx 30 sec with min A   -WJ        Time Calculation    PT Goal Re-Cert Due Date  03/02/19  -WJ       User Key  (r) = Recorded By, (t) = Taken By, (c) = Cosigned By    Initials Name Provider Type    Benji Rodriguez PT DPT Physical Therapist          Therapy Education  Given: HEP, Symptoms/condition management, Posture/body mechanics  Program: Reinforced  How Provided: Verbal, Demonstration  Provided to: Patient, Caregiver  Level of Understanding: Teach back education performed, Verbalized, Demonstrated              Time Calculation:   Start Time: 1452  Stop Time: 1544  Time Calculation (min): 52 min  Total Timed Code Minutes- PT: 52 minute(s)  Therapy Suggested Charges     Code   Minutes Charges    95118 (CPT®) Hc Pt Neuromusc Re Education Ea 15 Min      61045 (CPT®) Hc Pt Ther Proc Ea 15 Min 32 2    55764 (CPT®) Hc Gait Training Ea 15 Min      92670 (CPT®) Hc Pt Therapeutic Act Ea 15 Min      49099 (CPT®) Hc Pt Manual Therapy Ea 15 Min 20 1    06817 (CPT®) Hc Pt Ther Massage- Per 15 Min      07881 (CPT®) Hc Pt Iontophoresis Ea 15 Min      82633 (CPT®) Hc Pt Elec Stim Ea-Per 15 Min      01689 (CPT®) Hc Pt Ultrasound Ea 15 Min      75070 (CPT®) Hc Pt Self Care/Mgmt/Train Ea 15 Min      71877 (CPT®) Hc Pt Prosthetic (S) Train  Initial Encounter, Each 15 Min      88438 (CPT®) Hc Orthotic(S) Mgmt/Train Initial Encounter, Each 15min      20051 (CPT®) Hc Pt Aquatic Therapy Ea 15 Min      40586 (CPT®) Hc Pt Orthotic(S)/Prosthetic(S) Encounter, Each 15 Min       (CPT®) Hc Pt Electrical Stim Unattended      Total  52 3        Therapy Charges for Today     Code Description Service Date Service Provider Modifiers Qty    45984087265 HC PT THER PROC EA 15 MIN 2/8/2019 Benji Mir, PT DPT GP 2    86794767114 HC PT MANUAL THERAPY EA 15 MIN 2/8/2019 Benji Mir, PT DPT GP 1                    Benji Mir, PT DPT  2/8/2019

## 2019-02-13 ENCOUNTER — HOSPITAL ENCOUNTER (OUTPATIENT)
Dept: PHYSICAL THERAPY | Facility: HOSPITAL | Age: 84
Setting detail: THERAPIES SERIES
Discharge: HOME | End: 2019-02-13
Attending: NEUROLOGICAL SURGERY

## 2019-02-13 DIAGNOSIS — M51.37 DEGENERATION OF LUMBAR OR LUMBOSACRAL INTERVERTEBRAL DISC: Primary | ICD-10-CM

## 2019-02-13 DIAGNOSIS — M54.42 ACUTE LEFT-SIDED LOW BACK PAIN WITH LEFT-SIDED SCIATICA: ICD-10-CM

## 2019-02-13 DIAGNOSIS — M48.062 SPINAL STENOSIS, LUMBAR REGION, WITH NEUROGENIC CLAUDICATION: ICD-10-CM

## 2019-02-13 PROCEDURE — 97110 THERAPEUTIC EXERCISES: CPT

## 2019-02-13 PROCEDURE — 97140 MANUAL THERAPY 1/> REGIONS: CPT

## 2019-02-13 NOTE — THERAPY TREATMENT NOTE
Outpatient Physical Therapy Ortho Treatment Note   [place]     Patient Name: Chad Henriquez  : 1929  MRN: [id number]  Today's Date: 2019      Visit Date: 2019    Visit Dx:    ICD-10-CM ICD-9-CM   1. Degeneration of lumbar or lumbosacral intervertebral disc M51.37 722.52   2. Acute left-sided low back pain with left-sided sciatica M54.42 724.2     724.3   3. Spinal stenosis, lumbar region, with neurogenic claudication M48.062 724.03       Patient Active Problem List   Diagnosis   • BPH with urinary obstruction   • Frequency of urination   • Nocturia   • OAB (overactive bladder)   • Non-smoker   • Normal body mass index (BMI)   • Spinal stenosis, lumbar region, with neurogenic claudication   • Left leg pain   • Bilateral hip pain   • Acute left-sided low back pain with left-sided sciatica   • Essential tremor   • BMI 21.0-21.9, adult   • Spinal stenosis   • Degeneration of lumbar or lumbosacral intervertebral disc        Past Medical History:   Diagnosis Date   • Allergic rhinitis    • Anemia    • Arthritis    • BPH (benign prostatic hypertrophy) with urinary retention    • Cancer (CMS/HCC)     skin cancer   • Chronic back pain     RUNS DOWN BOTH LEGS   • Constipation    • Coronary artery disease    • Hard of hearing    • Heart attack (CMS/HCC)     NO MUSCLE DAMAGE - JUST OCCLUDED VALVE   • High cholesterol    • History of skin cancer     BILATERAL EARS   • History of transfusion        • Hypertension    • Inguinal hernia    • Leaky heart valve     DR CONCEPCION SAYS NOT ENOUGH TO BE OF CONCERN   • Other bursal cyst, right elbow    • PONV (postoperative nausea and vomiting)     has had scopalamine patch in past    • Sleep apnea     DOES NOT USE CPAP OR BIPAP   • Spinal stenosis of cervical region    • Spinal stenosis of lumbar region    • Tremors of nervous system    • Wears hearing aid     BILATERAL        Past Surgical History:   Procedure Laterality Date   • ANTERIOR LUMBAR EXPOSURE  N/A 12/7/2018    Procedure: ANTERIOR LUMBAR EXPOSURE;  Surgeon: Manolo Mcleod DO;  Location:  [facility];  Service: Vascular   • APPENDECTOMY     • BACK SURGERY      X3   • CARDIAC SURGERY  08/08/1986    X1 BYPASS   • CORONARY ANGIOPLASTY WITH STENT PLACEMENT  1997    X3 STENTS   • HERNIA REPAIR Bilateral     x2   • INGUINAL HERNIA REPAIR Right 6/22/2017    Procedure: RIGHT INGUINAL HERNIA REPAIR AND EXCISION OF CYST RIGHT ELBOW ;  Surgeon: Jackelyn Arriola MD;  Location:  [facility];  Service:    • LUMBAR LAMINECTOMY N/A 3/12/2018    Procedure: LUMBAR LAMINECTOMY WITHOUT FUSION L3-4,4-5;  Surgeon: Kj Falcon MD;  Location:  [facility];  Service: Neurosurgery   • LUMBAR LAMINECTOMY ANTERIOR LUMBAR INTERBODY FUSION N/A 12/7/2018    Procedure: ANTERIOR LUMBAR INTERBODY FUSION L5-S1;  Surgeon: Kj Falcon MD;  Location:  [facility];  Service: Neurosurgery   • LUMBAR LAMINECTOMY WITH FUSION Left 12/10/2018    Procedure: LUMBAR LAMINECTOMY TRANSFORAMINAL LUMBAR INTERBODY FUSION L34,45;  Surgeon: Kj Falcon MD;  Location:  [facility];  Service: Neurosurgery   • LUMBAR LAMINECTOMY WITH FUSION Left 12/7/2018    Procedure: LUMBAR LAMINECTOMY TRANSFORAMINAL LUMBAR INTERBODY FUSION LEFT L3-4, L4-5 QUADRANT RETRACTOR;  Surgeon: Kj Falcon MD;  Location:  [facility];  Service: Neurosurgery                       PT Assessment/Plan     Row Name 02/13/19 1301          PT Assessment    Assessment Comments  Patient has tightness in bilateral hips into all ranges.  He ambulated with hips in flexion and forward trunk posture.  When he stood up at the end of treatment he began to fall posteriorly but was able to control descent to the table into a sitting position.  -SWETA        PT Plan    PT Plan Comments  Continue to work on stretching and improving mobility of each hip.  Continue to progress hip stability and carry over into balance training.  -SWETA       User Key  (r) = Recorded By, (t) = Taken By, (c) = Cosigned By     Initials Name Provider Type    Lj Tovar, FRANCHESKA Physical Therapy Assistant              Exercises     Row Name 02/13/19 1301 02/13/19 1258          Subjective Comments    Subjective Comments  Patient reports he is about the same. He continues to be having pain when he is active.  -SWETA  --  -SWETA        Subjective Pain    Able to rate subjective pain?  yes  -SWETA  --     Pre-Treatment Pain Level  1  -SWETA  --     Post-Treatment Pain Level  1  -SWETA  --        Total Minutes    06021 - PT Therapeutic Exercise Minutes  21  -SWETA  --     48226 - PT Manual Therapy Minutes  28  -SWETA  --        Exercise 1    Exercise Name 1  hooklying SLR  -SWETA  --     Cueing 1  Verbal;Tactile  -SWETA  --     Sets 1  2  -SWETA  --     Reps 1  10  -SWETA  --     Additional Comments  each leg, red band anchored at thigh and then ankle   -SWETA  --        Exercise 2    Exercise Name 2  clams  -SWETA  --     Cueing 2  Verbal;Tactile  -SWETA  --     Sets 2  2  -SWETA  --     Reps 2  10  -SWETA  --     Additional Comments  each leg, red band, added to HEP   -SWETA  --       User Key  (r) = Recorded By, (t) = Taken By, (c) = Cosigned By    Initials Name Provider Type    Lj Tovar, FRANCHESKA Physical Therapy Assistant                        Manual Rx (last 36 hours)      Manual Treatments     Row Name 02/13/19 1301             Total Minutes    97468 - PT Manual Therapy Minutes  28  -SWETA         Manual Rx 1    Manual Rx 1 Location  Hip ROM flexion and IR/ER  -SWETA      Manual Rx 1 Type  stretching   -SWETA      Manual Rx 1 Grade  min-mod OP  -SWETA      Manual Rx 1 Duration  20  -SWETA         Manual Rx 2    Manual Rx 2 Location  bilateral hip inferior/lateral glides with belt   -SWETA      Manual Rx 2 Grade  1-2 oscillatory   -SWETA      Manual Rx 2 Duration  4 minutes each leg (8 total)  -SWETA        User Key  (r) = Recorded By, (t) = Taken By, (c) = Cosigned By    Initials Name Provider Type    Lj Tovar, FRANCHESKA Physical Therapy Assistant          PT OP Goals     Row Name 02/13/19  1301          Long Term Goals    LTG Date to Achieve  03/14/19  -SWETA     LTG 1  Improve demond hip extension to 10 deg  -SWETA     LTG 1 Progress  Ongoing  -SWETA     LTG 2  Able to walk community distances with no assistive device with right AFO  -SWETA     LTG 2 Progress  Ongoing  -SWETA     LTG 3  Reports on falls or tripping x 1 week while wearing his AFO  -SWETA     LTG 3 Progress  Ongoing  -SWETA     LTG 3 Progress Comments  he did fall back into sitting once he stood up after the treatment session, but was able to control the descent to the table  -SWETA     LTG 4  SLS demond legs x 10 sec with hip stability  -SWETA     LTG 4 Progress  Ongoing  -SWETA        Time Calculation    PT Goal Re-Cert Due Date  03/02/19  -SWETA       User Key  (r) = Recorded By, (t) = Taken By, (c) = Cosigned By    Initials Name Provider Type    Lj Tovar, PTA Physical Therapy Assistant          Therapy Education  Education Details: sidelying clams with red band each leg 10 x 3 every other day   Given: HEP  Program: New  How Provided: Verbal, Demonstration, Written  Provided to: Patient, Other (comment)(wife)  Level of Understanding: Teach back education performed, Verbalized, Demonstrated              Time Calculation:   Start Time: 1301  Stop Time: 1350  Time Calculation (min): 49 min  Total Timed Code Minutes- PT: 49 minute(s)  Therapy Suggested Charges     Code   Minutes Charges    57429 (CPT®) Hc Pt Neuromusc Re Education Ea 15 Min      53741 (CPT®) Hc Pt Ther Proc Ea 15 Min 21 1    89629 (CPT®) Hc Gait Training Ea 15 Min      05841 (CPT®) Hc Pt Therapeutic Act Ea 15 Min      99234 (CPT®) Hc Pt Manual Therapy Ea 15 Min 28 2    16783 (CPT®) Hc Pt Ther Massage- Per 15 Min      99321 (CPT®) Hc Pt Iontophoresis Ea 15 Min      09070 (CPT®) Hc Pt Elec Stim Ea-Per 15 Min      18335 (CPT®) Hc Pt Ultrasound Ea 15 Min      67111 (CPT®) Hc Pt Self Care/Mgmt/Train Ea 15 Min      12226 (CPT®) Hc Pt Prosthetic (S) Train Initial Encounter, Each 15 Min      00427 (CPT®)  Hc Orthotic(S) Mgmt/Train Initial Encounter, Each 15min      74350 (CPT®) Hc Pt Aquatic Therapy Ea 15 Min      67441 (CPT®) Hc Pt Orthotic(S)/Prosthetic(S) Encounter, Each 15 Min       (CPT®) Hc Pt Electrical Stim Unattended      Total  49 3        Therapy Charges for Today     Code Description Service Date Service Provider Modifiers Qty    63276623822 HC PT THER PROC EA 15 MIN 2/13/2019 Lj Mercado, PTA GP 1    62239407866 HC PT MANUAL THERAPY EA 15 MIN 2/13/2019 Lj Mercado, PTA GP 2                    Lj Mercado, PTA  2/13/2019

## 2019-02-15 ENCOUNTER — HOSPITAL ENCOUNTER (OUTPATIENT)
Dept: PHYSICAL THERAPY | Facility: HOSPITAL | Age: 84
Setting detail: THERAPIES SERIES
Discharge: HOME | End: 2019-02-15
Attending: NEUROLOGICAL SURGERY

## 2019-02-15 DIAGNOSIS — M54.42 ACUTE LEFT-SIDED LOW BACK PAIN WITH LEFT-SIDED SCIATICA: ICD-10-CM

## 2019-02-15 DIAGNOSIS — M48.062 SPINAL STENOSIS, LUMBAR REGION, WITH NEUROGENIC CLAUDICATION: ICD-10-CM

## 2019-02-15 DIAGNOSIS — M51.37 DEGENERATION OF LUMBAR OR LUMBOSACRAL INTERVERTEBRAL DISC: Primary | ICD-10-CM

## 2019-02-15 PROCEDURE — 97110 THERAPEUTIC EXERCISES: CPT

## 2019-02-15 PROCEDURE — 97140 MANUAL THERAPY 1/> REGIONS: CPT

## 2019-02-15 NOTE — THERAPY TREATMENT NOTE
Outpatient Physical Therapy Ortho Treatment Note   [place]     Patient Name: Chad Henriquez  : 1929  MRN: [id number]  Today's Date: 2/15/2019      Visit Date: 02/15/2019    Visit Dx:    ICD-10-CM ICD-9-CM   1. Degeneration of lumbar or lumbosacral intervertebral disc M51.37 722.52   2. Acute left-sided low back pain with left-sided sciatica M54.42 724.2     724.3   3. Spinal stenosis, lumbar region, with neurogenic claudication M48.062 724.03       Patient Active Problem List   Diagnosis   • BPH with urinary obstruction   • Frequency of urination   • Nocturia   • OAB (overactive bladder)   • Non-smoker   • Normal body mass index (BMI)   • Spinal stenosis, lumbar region, with neurogenic claudication   • Left leg pain   • Bilateral hip pain   • Acute left-sided low back pain with left-sided sciatica   • Essential tremor   • BMI 21.0-21.9, adult   • Spinal stenosis   • Degeneration of lumbar or lumbosacral intervertebral disc        Past Medical History:   Diagnosis Date   • Allergic rhinitis    • Anemia    • Arthritis    • BPH (benign prostatic hypertrophy) with urinary retention    • Cancer (CMS/HCC)     skin cancer   • Chronic back pain     RUNS DOWN BOTH LEGS   • Constipation    • Coronary artery disease    • Hard of hearing    • Heart attack (CMS/HCC)     NO MUSCLE DAMAGE - JUST OCCLUDED VALVE   • High cholesterol    • History of skin cancer     BILATERAL EARS   • History of transfusion        • Hypertension    • Inguinal hernia    • Leaky heart valve     DR CONCEPCION SAYS NOT ENOUGH TO BE OF CONCERN   • Other bursal cyst, right elbow    • PONV (postoperative nausea and vomiting)     has had scopalamine patch in past    • Sleep apnea     DOES NOT USE CPAP OR BIPAP   • Spinal stenosis of cervical region    • Spinal stenosis of lumbar region    • Tremors of nervous system    • Wears hearing aid     BILATERAL        Past Surgical History:   Procedure Laterality Date   • ANTERIOR LUMBAR EXPOSURE  N/A 12/7/2018    Procedure: ANTERIOR LUMBAR EXPOSURE;  Surgeon: Manolo Mcleod DO;  Location:  [facility];  Service: Vascular   • APPENDECTOMY     • BACK SURGERY      X3   • CARDIAC SURGERY  08/08/1986    X1 BYPASS   • CORONARY ANGIOPLASTY WITH STENT PLACEMENT  1997    X3 STENTS   • HERNIA REPAIR Bilateral     x2   • INGUINAL HERNIA REPAIR Right 6/22/2017    Procedure: RIGHT INGUINAL HERNIA REPAIR AND EXCISION OF CYST RIGHT ELBOW ;  Surgeon: Jackelyn Arriola MD;  Location:  [facility];  Service:    • LUMBAR LAMINECTOMY N/A 3/12/2018    Procedure: LUMBAR LAMINECTOMY WITHOUT FUSION L3-4,4-5;  Surgeon: Kj Falcon MD;  Location:  [facility];  Service: Neurosurgery   • LUMBAR LAMINECTOMY ANTERIOR LUMBAR INTERBODY FUSION N/A 12/7/2018    Procedure: ANTERIOR LUMBAR INTERBODY FUSION L5-S1;  Surgeon: Kj Falcon MD;  Location:  [facility];  Service: Neurosurgery   • LUMBAR LAMINECTOMY WITH FUSION Left 12/10/2018    Procedure: LUMBAR LAMINECTOMY TRANSFORAMINAL LUMBAR INTERBODY FUSION L34,45;  Surgeon: Kj Falcon MD;  Location:  [facility];  Service: Neurosurgery   • LUMBAR LAMINECTOMY WITH FUSION Left 12/7/2018    Procedure: LUMBAR LAMINECTOMY TRANSFORAMINAL LUMBAR INTERBODY FUSION LEFT L3-4, L4-5 QUADRANT RETRACTOR;  Surgeon: Kj Falcon MD;  Location:  [facility];  Service: Neurosurgery                       PT Assessment/Plan     Row Name 02/15/19 1149          PT Assessment    Assessment Comments  His B hip mobility and flexibility  is very restricted but the restrictions did reduce with intervention today. His SC was two notches too low so I adjusted it to the proper height and it helped him improve his posture.  -EC        PT Plan    PT Plan Comments  Continue to work on hip mobility and include some basic core strengthening activities.  -EC       User Key  (r) = Recorded By, (t) = Taken By, (c) = Cosigned By    Initials Name Provider Type    EC Augustus Dugan, PTA Physical Therapy Assistant     "          Exercises     Row Name 02/15/19 0900             Subjective Comments    Subjective Comments  He reports \"it's almost no pain\"  -EC         Subjective Pain    Able to rate subjective pain?  yes  -EC      Pre-Treatment Pain Level  1 .5  -EC      Post-Treatment Pain Level  1 .5  -EC         Total Minutes    53974 - PT Therapeutic Exercise Minutes  29  -EC      88532 - PT Manual Therapy Minutes  14  -EC         Exercise 1    Exercise Name 1  B HF stretches in side   -EC      Reps 1  3  -EC      Time 1  30 sec  -EC         Exercise 2    Exercise Name 2  B hip ER/IR stretches with intermittent inferior lateral glides  -EC         Exercise 3    Exercise Name 3  B LE unilateral Star Excursion with B FTT, cones and 2 inch step  -EC      Reps 3  10  -EC        User Key  (r) = Recorded By, (t) = Taken By, (c) = Cosigned By    Initials Name Provider Type    Augustus Bar PTA Physical Therapy Assistant                        Manual Rx (last 36 hours)      Manual Treatments     Row Name 02/15/19 0900             Total Minutes    05400 - PT Manual Therapy Minutes  14  -EC         Manual Rx 1    Manual Rx 1 Location  B lower lumbar region   -EC      Manual Rx 1 Type  STM in R sidelying  -EC      Manual Rx 1 Duration  14  -EC        User Key  (r) = Recorded By, (t) = Taken By, (c) = Cosigned By    Initials Name Provider Type    Augustus Bar PTA Physical Therapy Assistant          PT OP Goals     Row Name 02/15/19 0930          Long Term Goals    LTG Date to Achieve  03/14/19  -EC     LTG 1  Improve demond hip extension to 10 deg  -EC     LTG 1 Progress  Ongoing  -EC     LTG 2  Able to walk community distances with no assistive device with right AFO  -EC     LTG 2 Progress  Ongoing  -EC     LTG 2 Progress Comments  adjusted his SC to the appropriate height  -EC     LTG 3  Reports on falls or tripping x 1 week while wearing his AFO  -EC     LTG 3 Progress  Ongoing  -EC     LTG 4  SLS demond legs x 10 sec with hip " stability  -EC     LTG 4 Progress  Ongoing  -EC        Time Calculation    PT Goal Re-Cert Due Date  03/02/19  -EC       User Key  (r) = Recorded By, (t) = Taken By, (c) = Cosigned By    Initials Name Provider Type    EC Augustus Dugan PTA Physical Therapy Assistant          Therapy Education  Given: Symptoms/condition management, Posture/body mechanics, Fall prevention and home safety, Mobility training  How Provided: Verbal, Demonstration  Provided to: Patient  Level of Understanding: Verbalized, Demonstrated              Time Calculation:   Start Time: 0937  Stop Time: 1020  Time Calculation (min): 43 min  Total Timed Code Minutes- PT: 43 minute(s)  Therapy Suggested Charges     Code   Minutes Charges    02265 (CPT®) Hc Pt Neuromusc Re Education Ea 15 Min      16257 (CPT®) Hc Pt Ther Proc Ea 15 Min 29 2    32468 (CPT®) Hc Gait Training Ea 15 Min      65383 (CPT®) Hc Pt Therapeutic Act Ea 15 Min      61570 (CPT®) Hc Pt Manual Therapy Ea 15 Min 14 1    99930 (CPT®) Hc Pt Ther Massage- Per 15 Min      10477 (CPT®) Hc Pt Iontophoresis Ea 15 Min      22952 (CPT®) Hc Pt Elec Stim Ea-Per 15 Min      54027 (CPT®) Hc Pt Ultrasound Ea 15 Min      34706 (CPT®) Hc Pt Self Care/Mgmt/Train Ea 15 Min      73865 (CPT®) Hc Pt Prosthetic (S) Train Initial Encounter, Each 15 Min      31687 (CPT®) Hc Orthotic(S) Mgmt/Train Initial Encounter, Each 15min      51452 (CPT®) Hc Pt Aquatic Therapy Ea 15 Min      04677 (CPT®) Hc Pt Orthotic(S)/Prosthetic(S) Encounter, Each 15 Min       (CPT®) Hc Pt Electrical Stim Unattended      Total  43 3        Therapy Charges for Today     Code Description Service Date Service Provider Modifiers Qty    07026078284 HC PT THER PROC EA 15 MIN 2/15/2019 Augustus Dugan PTA GP 2    06943576617 HC PT MANUAL THERAPY EA 15 MIN 2/15/2019 Augustus Dugan PTA GP 1                    Augustus Dugan PTA  2/15/2019

## 2019-02-20 ENCOUNTER — HOSPITAL ENCOUNTER (OUTPATIENT)
Dept: PHYSICAL THERAPY | Facility: HOSPITAL | Age: 84
Setting detail: THERAPIES SERIES
Discharge: HOME | End: 2019-02-20
Attending: NEUROLOGICAL SURGERY

## 2019-02-20 DIAGNOSIS — M48.062 SPINAL STENOSIS, LUMBAR REGION, WITH NEUROGENIC CLAUDICATION: ICD-10-CM

## 2019-02-20 DIAGNOSIS — M54.42 ACUTE LEFT-SIDED LOW BACK PAIN WITH LEFT-SIDED SCIATICA: ICD-10-CM

## 2019-02-20 DIAGNOSIS — M51.37 DEGENERATION OF LUMBAR OR LUMBOSACRAL INTERVERTEBRAL DISC: Primary | ICD-10-CM

## 2019-02-20 PROCEDURE — 97140 MANUAL THERAPY 1/> REGIONS: CPT

## 2019-02-20 PROCEDURE — 97110 THERAPEUTIC EXERCISES: CPT

## 2019-02-20 NOTE — THERAPY TREATMENT NOTE
Outpatient Physical Therapy Ortho Treatment Note   [place]     Patient Name: Chad Henriquez  : 1929  MRN: [id number]  Today's Date: 2019      Visit Date: 2019    Visit Dx:    ICD-10-CM ICD-9-CM   1. Degeneration of lumbar or lumbosacral intervertebral disc M51.37 722.52   2. Acute left-sided low back pain with left-sided sciatica M54.42 724.2     724.3   3. Spinal stenosis, lumbar region, with neurogenic claudication M48.062 724.03       Patient Active Problem List   Diagnosis   • BPH with urinary obstruction   • Frequency of urination   • Nocturia   • OAB (overactive bladder)   • Non-smoker   • Normal body mass index (BMI)   • Spinal stenosis, lumbar region, with neurogenic claudication   • Left leg pain   • Bilateral hip pain   • Acute left-sided low back pain with left-sided sciatica   • Essential tremor   • BMI 21.0-21.9, adult   • Spinal stenosis   • Degeneration of lumbar or lumbosacral intervertebral disc        Past Medical History:   Diagnosis Date   • Allergic rhinitis    • Anemia    • Arthritis    • BPH (benign prostatic hypertrophy) with urinary retention    • Cancer (CMS/HCC)     skin cancer   • Chronic back pain     RUNS DOWN BOTH LEGS   • Constipation    • Coronary artery disease    • Hard of hearing    • Heart attack (CMS/HCC)     NO MUSCLE DAMAGE - JUST OCCLUDED VALVE   • High cholesterol    • History of skin cancer     BILATERAL EARS   • History of transfusion        • Hypertension    • Inguinal hernia    • Leaky heart valve     DR CONCEPCION SAYS NOT ENOUGH TO BE OF CONCERN   • Other bursal cyst, right elbow    • PONV (postoperative nausea and vomiting)     has had scopalamine patch in past    • Sleep apnea     DOES NOT USE CPAP OR BIPAP   • Spinal stenosis of cervical region    • Spinal stenosis of lumbar region    • Tremors of nervous system    • Wears hearing aid     BILATERAL        Past Surgical History:   Procedure Laterality Date   • ANTERIOR LUMBAR EXPOSURE  N/A 12/7/2018    Procedure: ANTERIOR LUMBAR EXPOSURE;  Surgeon: Manolo Mcleod DO;  Location:  [facility];  Service: Vascular   • APPENDECTOMY     • BACK SURGERY      X3   • CARDIAC SURGERY  08/08/1986    X1 BYPASS   • CORONARY ANGIOPLASTY WITH STENT PLACEMENT  1997    X3 STENTS   • HERNIA REPAIR Bilateral     x2   • INGUINAL HERNIA REPAIR Right 6/22/2017    Procedure: RIGHT INGUINAL HERNIA REPAIR AND EXCISION OF CYST RIGHT ELBOW ;  Surgeon: Jackelyn Arriola MD;  Location:  [facility];  Service:    • LUMBAR LAMINECTOMY N/A 3/12/2018    Procedure: LUMBAR LAMINECTOMY WITHOUT FUSION L3-4,4-5;  Surgeon: Kj Falcon MD;  Location:  [facility];  Service: Neurosurgery   • LUMBAR LAMINECTOMY ANTERIOR LUMBAR INTERBODY FUSION N/A 12/7/2018    Procedure: ANTERIOR LUMBAR INTERBODY FUSION L5-S1;  Surgeon: Kj Falcon MD;  Location:  [facility];  Service: Neurosurgery   • LUMBAR LAMINECTOMY WITH FUSION Left 12/10/2018    Procedure: LUMBAR LAMINECTOMY TRANSFORAMINAL LUMBAR INTERBODY FUSION L34,45;  Surgeon: Kj Falcon MD;  Location:  [facility];  Service: Neurosurgery   • LUMBAR LAMINECTOMY WITH FUSION Left 12/7/2018    Procedure: LUMBAR LAMINECTOMY TRANSFORAMINAL LUMBAR INTERBODY FUSION LEFT L3-4, L4-5 QUADRANT RETRACTOR;  Surgeon: Kj Falcon MD;  Location:  [facility];  Service: Neurosurgery                       PT Assessment/Plan     Row Name 02/20/19 1023          PT Assessment    Assessment Comments  Pt recieved his AFO on friday and reports that this is helping him; however he is having some pain on his shin from the AFO. We addressed this by adding some extra cushion to the front strap and educated him on skin checks with his new AFO. Continued today to work on improving his B hip mobility as he continues to be tight bilaterally. Also continued with BLE strengthening and gait training. His gait has improved with AFO; however he still required cues for posture.   -TR        PT Plan    PT Plan Comments   Continue to check skin around AFO. Progress with hip mobility and gait training.   -TR       User Key  (r) = Recorded By, (t) = Taken By, (c) = Cosigned By    Initials Name Provider Type    Rachael Herzog PTA Physical Therapy Assistant              Exercises     Row Name 02/20/19 1023             Subjective Comments    Subjective Comments  He rpeorts that he is having some pain with his new AFO and it is hurting his shin.   -TR         Subjective Pain    Able to rate subjective pain?  yes  -TR      Pre-Treatment Pain Level  0  -TR      Post-Treatment Pain Level  0  -TR         Total Minutes    54338 - PT Therapeutic Exercise Minutes  23  -TR      83305 - PT Manual Therapy Minutes  15  -TR         Exercise 1    Exercise Name 1  Assessed skin at AFO and dressed strap to prevent skin breakdown  -TR      Cueing 1  Verbal  -TR      Time 1  7 min   -TR         Exercise 2    Exercise Name 2  B SKFO against red band   -TR      Cueing 2  Verbal  -TR      Sets 2  2  -TR      Reps 2  15  -TR         Exercise 3    Exercise Name 3  Seated marching against red band   -TR      Cueing 3  Verbal  -TR      Sets 3  3  -TR      Time 3  1 minn each   -TR        User Key  (r) = Recorded By, (t) = Taken By, (c) = Cosigned By    Initials Name Provider Type    Rachael Herzog PTA Physical Therapy Assistant                        Manual Rx (last 36 hours)      Manual Treatments     Row Name 02/20/19 1023             Total Minutes    29836 - PT Manual Therapy Minutes  15  -TR         Manual Rx 1    Manual Rx 1 Location  B lower lumbar region   -TR      Manual Rx 1 Type  STM in R sidelying   -TR      Manual Rx 1 Grade  mod  -TR      Manual Rx 1 Duration  15  -TR        User Key  (r) = Recorded By, (t) = Taken By, (c) = Cosigned By    Initials Name Provider Type    Rachael Herzog PTA Physical Therapy Assistant          PT OP Goals     Row Name 02/20/19 1023          Long Term Goals    LTG Date to Achieve  03/14/19   -TR     LTG 1  Improve dmeond hip extension to 10 deg  -TR     LTG 1 Progress  Ongoing  -TR     LTG 2  Able to walk community distances with no assistive device with right AFO  -TR     LTG 2 Progress  Ongoing  -TR     LTG 2 Progress Comments  Still utillizing SC at this time   -TR     LTG 3  Reports on falls or tripping x 1 week while wearing his AFO  -TR     LTG 3 Progress  Ongoing  -TR     LTG 3 Progress Comments  Pt has not had any falls since recieving his AFO on friday   -TR     LTG 4  SLS demond legs x 10 sec with hip stability  -TR     LTG 4 Progress  Ongoing  -TR        Time Calculation    PT Goal Re-Cert Due Date  03/02/19  -TR       User Key  (r) = Recorded By, (t) = Taken By, (c) = Cosigned By    Initials Name Provider Type    Rachael Herzog, FRANCHESKA Physical Therapy Assistant          Therapy Education  Education Details: Skin care checks on AFO.   Given: Symptoms/condition management  Program: Reinforced  How Provided: Verbal, Demonstration  Provided to: Patient  Level of Understanding: Verbalized, Demonstrated              Time Calculation:   Start Time: 1023  Stop Time: 1108  Time Calculation (min): 45 min  PT Non-Billable Time (min): 7 min  Total Timed Code Minutes- PT: 38 minute(s)  Therapy Suggested Charges     Code   Minutes Charges    10089 (CPT®) Hc Pt Neuromusc Re Education Ea 15 Min      07971 (CPT®) Hc Pt Ther Proc Ea 15 Min 23 2    07967 (CPT®) Hc Gait Training Ea 15 Min      82690 (CPT®) Hc Pt Therapeutic Act Ea 15 Min      50445 (CPT®) Hc Pt Manual Therapy Ea 15 Min 15 1    82845 (CPT®) Hc Pt Ther Massage- Per 15 Min      34327 (CPT®) Hc Pt Iontophoresis Ea 15 Min      67467 (CPT®) Hc Pt Elec Stim Ea-Per 15 Min      68946 (CPT®) Hc Pt Ultrasound Ea 15 Min      45964 (CPT®) Hc Pt Self Care/Mgmt/Train Ea 15 Min      28271 (CPT®) Hc Pt Prosthetic (S) Train Initial Encounter, Each 15 Min      20885 (CPT®) Hc Orthotic(S) Mgmt/Train Initial Encounter, Each 15min      39473 (CPT®) Hc Pt Aquatic  Therapy Ea 15 Min      39766 (CPT®) Hc Pt Orthotic(S)/Prosthetic(S) Encounter, Each 15 Min       (CPT®) Hc Pt Electrical Stim Unattended      Total  38 3        Therapy Charges for Today     Code Description Service Date Service Provider Modifiers Qty    13393873019 HC PT THER PROC EA 15 MIN 2/20/2019 Rachael Mckeon, [place] GP 2    14710903619 HC PT MANUAL THERAPY EA 15 MIN 2/20/2019 Rachael Mckeon, [place] GP 1                    Rachael Mckeon PTA  2/20/2019

## 2019-02-22 ENCOUNTER — HOSPITAL ENCOUNTER (OUTPATIENT)
Dept: PHYSICAL THERAPY | Facility: HOSPITAL | Age: 84
Setting detail: THERAPIES SERIES
Discharge: HOME | End: 2019-02-22
Attending: NEUROLOGICAL SURGERY

## 2019-02-22 DIAGNOSIS — M48.062 SPINAL STENOSIS, LUMBAR REGION, WITH NEUROGENIC CLAUDICATION: ICD-10-CM

## 2019-02-22 DIAGNOSIS — M51.37 DEGENERATION OF LUMBAR OR LUMBOSACRAL INTERVERTEBRAL DISC: Primary | ICD-10-CM

## 2019-02-22 DIAGNOSIS — M54.42 ACUTE LEFT-SIDED LOW BACK PAIN WITH LEFT-SIDED SCIATICA: ICD-10-CM

## 2019-02-22 PROCEDURE — 97110 THERAPEUTIC EXERCISES: CPT | Performed by: PHYSICAL THERAPIST

## 2019-02-22 PROCEDURE — 97140 MANUAL THERAPY 1/> REGIONS: CPT | Performed by: PHYSICAL THERAPIST

## 2019-02-22 NOTE — THERAPY TREATMENT NOTE
Outpatient Physical Therapy Ortho Treatment Note   [place]     Patient Name: Chad Henriquez  : 1929  MRN: [id number]  Today's Date: 2019      Visit Date: 2019    Visit Dx:    ICD-10-CM ICD-9-CM   1. Degeneration of lumbar or lumbosacral intervertebral disc M51.37 722.52   2. Acute left-sided low back pain with left-sided sciatica M54.42 724.2     724.3   3. Spinal stenosis, lumbar region, with neurogenic claudication M48.062 724.03       Patient Active Problem List   Diagnosis   • BPH with urinary obstruction   • Frequency of urination   • Nocturia   • OAB (overactive bladder)   • Non-smoker   • Normal body mass index (BMI)   • Spinal stenosis, lumbar region, with neurogenic claudication   • Left leg pain   • Bilateral hip pain   • Acute left-sided low back pain with left-sided sciatica   • Essential tremor   • BMI 21.0-21.9, adult   • Spinal stenosis   • Degeneration of lumbar or lumbosacral intervertebral disc        Past Medical History:   Diagnosis Date   • Allergic rhinitis    • Anemia    • Arthritis    • BPH (benign prostatic hypertrophy) with urinary retention    • Cancer (CMS/HCC)     skin cancer   • Chronic back pain     RUNS DOWN BOTH LEGS   • Constipation    • Coronary artery disease    • Hard of hearing    • Heart attack (CMS/HCC)     NO MUSCLE DAMAGE - JUST OCCLUDED VALVE   • High cholesterol    • History of skin cancer     BILATERAL EARS   • History of transfusion        • Hypertension    • Inguinal hernia    • Leaky heart valve     DR CONCEPCION SAYS NOT ENOUGH TO BE OF CONCERN   • Other bursal cyst, right elbow    • PONV (postoperative nausea and vomiting)     has had scopalamine patch in past    • Sleep apnea     DOES NOT USE CPAP OR BIPAP   • Spinal stenosis of cervical region    • Spinal stenosis of lumbar region    • Tremors of nervous system    • Wears hearing aid     BILATERAL        Past Surgical History:   Procedure Laterality Date   • ANTERIOR LUMBAR EXPOSURE  N/A 12/7/2018    Procedure: ANTERIOR LUMBAR EXPOSURE;  Surgeon: Manolo Mcleod DO;  Location:  [facility];  Service: Vascular   • APPENDECTOMY     • BACK SURGERY      X3   • CARDIAC SURGERY  08/08/1986    X1 BYPASS   • CORONARY ANGIOPLASTY WITH STENT PLACEMENT  1997    X3 STENTS   • HERNIA REPAIR Bilateral     x2   • INGUINAL HERNIA REPAIR Right 6/22/2017    Procedure: RIGHT INGUINAL HERNIA REPAIR AND EXCISION OF CYST RIGHT ELBOW ;  Surgeon: Jackelyn Arriola MD;  Location:  [facility];  Service:    • LUMBAR LAMINECTOMY N/A 3/12/2018    Procedure: LUMBAR LAMINECTOMY WITHOUT FUSION L3-4,4-5;  Surgeon: Kj Falcon MD;  Location:  [facility];  Service: Neurosurgery   • LUMBAR LAMINECTOMY ANTERIOR LUMBAR INTERBODY FUSION N/A 12/7/2018    Procedure: ANTERIOR LUMBAR INTERBODY FUSION L5-S1;  Surgeon: Kj Falcon MD;  Location:  [facility];  Service: Neurosurgery   • LUMBAR LAMINECTOMY WITH FUSION Left 12/10/2018    Procedure: LUMBAR LAMINECTOMY TRANSFORAMINAL LUMBAR INTERBODY FUSION L34,45;  Surgeon: Kj Falcon MD;  Location:  [facility];  Service: Neurosurgery   • LUMBAR LAMINECTOMY WITH FUSION Left 12/7/2018    Procedure: LUMBAR LAMINECTOMY TRANSFORAMINAL LUMBAR INTERBODY FUSION LEFT L3-4, L4-5 QUADRANT RETRACTOR;  Surgeon: Kj Falcon MD;  Location:  [facility];  Service: Neurosurgery                       PT Assessment/Plan     Row Name 02/22/19 1100          PT Assessment    Assessment Comments  The AFO is making a difference with his gait and the change to the strap has helped.   -TB        PT Plan    PT Plan Comments  Cont with flexibility and hip stability to improve his gait.   -TB       User Key  (r) = Recorded By, (t) = Taken By, (c) = Cosigned By    Initials Name Provider Type    TB Luis Moe, PT Physical Therapist              Exercises     Row Name 02/22/19 1100             Subjective Comments    Subjective Comments  He says he doing pretty well. He hardly has any pain. They changed the  pads on his AFO. That made a big difference.   -TB         Subjective Pain    Pre-Treatment Pain Level  0  -TB         Total Minutes    96427 - PT Therapeutic Exercise Minutes  15  -TB      99135 - PT Manual Therapy Minutes  25  -TB         Exercise 1    Exercise Name 1  SLS hip abd/ext demond  -TB      Sets 1  2  -TB      Time 1  15-20 sec hold each set  -TB        User Key  (r) = Recorded By, (t) = Taken By, (c) = Cosigned By    Initials Name Provider Type    TB Luis Moe, PT Physical Therapist                        Manual Rx (last 36 hours)      Manual Treatments     Row Name 02/22/19 1100             Total Minutes    30188 - PT Manual Therapy Minutes  25  -TB         Manual Rx 1    Manual Rx 1 Location  prone thoracic kyphosis  -TB      Manual Rx 1 Type  ext mob with foam roll  -TB      Manual Rx 1 Duration  15  -TB         Manual Rx 2    Manual Rx 2 Location  prone demond hip ext stretch  -TB      Manual Rx 2 Type  1/2 foam roll under distal thigh  -TB      Manual Rx 2 Grade  PA OP at hip  -TB      Manual Rx 2 Duration  5 min each leg  -TB        User Key  (r) = Recorded By, (t) = Taken By, (c) = Cosigned By    Initials Name Provider Type    TB Luis Moe, PT Physical Therapist          PT OP Goals     Row Name 02/22/19 1100          Long Term Goals    LTG Date to Achieve  03/14/19  -TB     LTG 1  Improve demond hip extension to 10 deg  -TB     LTG 1 Progress  Ongoing  -TB     LTG 2  Able to walk community distances with no assistive device with right AFO  -TB     LTG 2 Progress  Ongoing  -TB     LTG 2 Progress Comments  walked through the community yesterday, using cane  -TB     LTG 3  Reports on falls or tripping x 1 week while wearing his AFO  -TB     LTG 3 Progress  Ongoing  -TB     LTG 4  SLS demond legs x 10 sec with hip stability  -TB     LTG 4 Progress  Ongoing  -TB        Time Calculation    PT Goal Re-Cert Due Date  03/02/19  -TB       User Key  (r) = Recorded By, (t) = Taken By, (c) = Cosigned By     Initials Name Provider Type    TB Luis Moe, PT Physical Therapist          Therapy Education  Education Details: SLS with hip abd/ext   Given: Symptoms/condition management  Program: Reinforced  How Provided: Verbal, Demonstration  Provided to: Patient  Level of Understanding: Verbalized, Demonstrated              Time Calculation:   Start Time: 1105  Stop Time: 1145  Time Calculation (min): 40 min  Total Timed Code Minutes- PT: 40 minute(s)  Therapy Suggested Charges     Code   Minutes Charges    97164 (CPT®) Hc Pt Neuromusc Re Education Ea 15 Min      55989 (CPT®) Hc Pt Ther Proc Ea 15 Min 15 1    84219 (CPT®) Hc Gait Training Ea 15 Min      64426 (CPT®) Hc Pt Therapeutic Act Ea 15 Min      61728 (CPT®) Hc Pt Manual Therapy Ea 15 Min 25 2    52590 (CPT®) Hc Pt Ther Massage- Per 15 Min      89599 (CPT®) Hc Pt Iontophoresis Ea 15 Min      39044 (CPT®) Hc Pt Elec Stim Ea-Per 15 Min      53839 (CPT®) Hc Pt Ultrasound Ea 15 Min      22413 (CPT®) Hc Pt Self Care/Mgmt/Train Ea 15 Min      11835 (CPT®) Hc Pt Prosthetic (S) Train Initial Encounter, Each 15 Min      50851 (CPT®) Hc Orthotic(S) Mgmt/Train Initial Encounter, Each 15min      22338 (CPT®) Hc Pt Aquatic Therapy Ea 15 Min      88657 (CPT®) Hc Pt Orthotic(S)/Prosthetic(S) Encounter, Each 15 Min       (CPT®) Hc Pt Electrical Stim Unattended      Total  40 3        Therapy Charges for Today     Code Description Service Date Service Provider Modifiers Qty    75838351526 HC PT THER PROC EA 15 MIN 2/22/2019 Luis Moe, PT GP 1    76644695920 HC PT MANUAL THERAPY EA 15 MIN 2/22/2019 Luis Moe, PT GP 2                    Luis Moe, PT  2/22/2019

## 2019-02-27 ENCOUNTER — HOSPITAL ENCOUNTER (OUTPATIENT)
Dept: PHYSICAL THERAPY | Facility: HOSPITAL | Age: 84
Setting detail: THERAPIES SERIES
Discharge: HOME | End: 2019-02-27
Attending: NEUROLOGICAL SURGERY

## 2019-02-27 DIAGNOSIS — M48.062 SPINAL STENOSIS, LUMBAR REGION, WITH NEUROGENIC CLAUDICATION: ICD-10-CM

## 2019-02-27 DIAGNOSIS — M54.42 ACUTE LEFT-SIDED LOW BACK PAIN WITH LEFT-SIDED SCIATICA: ICD-10-CM

## 2019-02-27 DIAGNOSIS — M51.37 DEGENERATION OF LUMBAR OR LUMBOSACRAL INTERVERTEBRAL DISC: Primary | ICD-10-CM

## 2019-02-27 PROCEDURE — 97110 THERAPEUTIC EXERCISES: CPT | Performed by: PHYSICAL THERAPIST

## 2019-02-27 PROCEDURE — 97140 MANUAL THERAPY 1/> REGIONS: CPT | Performed by: PHYSICAL THERAPIST

## 2019-02-27 NOTE — THERAPY PROGRESS REPORT/RE-CERT
Outpatient Physical Therapy Ortho Progress Note   [place]     Patient Name: Chad Henriquez  : 1929  MRN: [id number]  Today's Date: 2019      Visit Date: 2019    Visit Dx:    ICD-10-CM ICD-9-CM   1. Degeneration of lumbar or lumbosacral intervertebral disc M51.37 722.52   2. Acute left-sided low back pain with left-sided sciatica M54.42 724.2     724.3   3. Spinal stenosis, lumbar region, with neurogenic claudication M48.062 724.03       Patient Active Problem List   Diagnosis   • BPH with urinary obstruction   • Frequency of urination   • Nocturia   • OAB (overactive bladder)   • Non-smoker   • Normal body mass index (BMI)   • Spinal stenosis, lumbar region, with neurogenic claudication   • Left leg pain   • Bilateral hip pain   • Acute left-sided low back pain with left-sided sciatica   • Essential tremor   • BMI 21.0-21.9, adult   • Spinal stenosis   • Degeneration of lumbar or lumbosacral intervertebral disc        Past Medical History:   Diagnosis Date   • Allergic rhinitis    • Anemia    • Arthritis    • BPH (benign prostatic hypertrophy) with urinary retention    • Cancer (CMS/HCC)     skin cancer   • Chronic back pain     RUNS DOWN BOTH LEGS   • Constipation    • Coronary artery disease    • Hard of hearing    • Heart attack (CMS/HCC)     NO MUSCLE DAMAGE - JUST OCCLUDED VALVE   • High cholesterol    • History of skin cancer     BILATERAL EARS   • History of transfusion        • Hypertension    • Inguinal hernia    • Leaky heart valve     DR CONCEPCION SAYS NOT ENOUGH TO BE OF CONCERN   • Other bursal cyst, right elbow    • PONV (postoperative nausea and vomiting)     has had scopalamine patch in past    • Sleep apnea     DOES NOT USE CPAP OR BIPAP   • Spinal stenosis of cervical region    • Spinal stenosis of lumbar region    • Tremors of nervous system    • Wears hearing aid     BILATERAL        Past Surgical History:   Procedure Laterality Date   • ANTERIOR LUMBAR EXPOSURE  N/A 12/7/2018    Procedure: ANTERIOR LUMBAR EXPOSURE;  Surgeon: Manolo Mcleod DO;  Location:  [facility];  Service: Vascular   • APPENDECTOMY     • BACK SURGERY      X3   • CARDIAC SURGERY  08/08/1986    X1 BYPASS   • CORONARY ANGIOPLASTY WITH STENT PLACEMENT  1997    X3 STENTS   • HERNIA REPAIR Bilateral     x2   • INGUINAL HERNIA REPAIR Right 6/22/2017    Procedure: RIGHT INGUINAL HERNIA REPAIR AND EXCISION OF CYST RIGHT ELBOW ;  Surgeon: Jackelyn Arriola MD;  Location:  [facility];  Service:    • LUMBAR LAMINECTOMY N/A 3/12/2018    Procedure: LUMBAR LAMINECTOMY WITHOUT FUSION L3-4,4-5;  Surgeon: Kj Falcon MD;  Location:  [facility];  Service: Neurosurgery   • LUMBAR LAMINECTOMY ANTERIOR LUMBAR INTERBODY FUSION N/A 12/7/2018    Procedure: ANTERIOR LUMBAR INTERBODY FUSION L5-S1;  Surgeon: Kj Falcon MD;  Location:  [facility];  Service: Neurosurgery   • LUMBAR LAMINECTOMY WITH FUSION Left 12/10/2018    Procedure: LUMBAR LAMINECTOMY TRANSFORAMINAL LUMBAR INTERBODY FUSION L34,45;  Surgeon: Kj Falcon MD;  Location:  [facility];  Service: Neurosurgery   • LUMBAR LAMINECTOMY WITH FUSION Left 12/7/2018    Procedure: LUMBAR LAMINECTOMY TRANSFORAMINAL LUMBAR INTERBODY FUSION LEFT L3-4, L4-5 QUADRANT RETRACTOR;  Surgeon: Kj Falcon MD;  Location:  [facility];  Service: Neurosurgery                       PT Assessment/Plan     Row Name 02/27/19 1300          PT Assessment    Functional Limitations  Impaired gait;Limitation in home management;Limitations in community activities;Limitations in functional capacity and performance;Performance in leisure activities  -TB     Impairments  Gait;Posture;Range of motion;Pain;Muscle strength;Joint mobility  -TB     Assessment Comments  He feels like he is about 40-50% improved. He is still quite weak in his hips and is having lower lumbar pain with walking. Much of this could be attributed to the trendelenberg gait he shows with his hip weakness. This is the area  we need to continue to focus on.  -TB     Rehab Potential  Excellent  -TB     Patient/caregiver participated in establishment of treatment plan and goals  Yes  -TB     Patient would benefit from skilled therapy intervention  Yes  -TB        PT Plan    PT Frequency  2x/week  -TB     Predicted Duration of Therapy Intervention (Therapy Eval)  4 weeks  -TB     Planned CPT's?  PT THER PROC EA 15 MIN: 91525;PT THER ACT EA 15 MIN: 50418;PT MANUAL THERAPY EA 15 MIN: 53207;PT GAIT TRAINING EA 15 MIN: 27344;PT ELECTRICAL STIM UNATTEND: ;PT ELECTRICAL STIM ATTD EA 15 MIN: 59223  -TB     PT Plan Comments  Cont to work on hip extension and thoracic extension flexibility and progress more with core and hip stability.  -TB       User Key  (r) = Recorded By, (t) = Taken By, (c) = Cosigned By    Initials Name Provider Type    Luis Morrow, PT Physical Therapist              Exercises     Row Name 02/27/19 1300             Subjective Comments    Subjective Comments  He says he feels about the same. He still has pain with walking.  -TB         Subjective Pain    Pre-Treatment Pain Level  2  -TB         Total Minutes    92784 - PT Therapeutic Exercise Minutes  20  -TB      19703 - PT Manual Therapy Minutes  20  -TB         Exercise 2    Exercise Name 2  standing side stepping against red ttubing at ankles  -TB      Time 2  5  -TB      Additional Comments  used Cybex arms to steady him  -TB         Exercise 3    Exercise Name 3  standing hip ext against red ttubing at ankles  -TB      Sets 3  3  -TB      Time 3  30 sec hold each  -TB      Additional Comments  HHA on mat  -TB         Exercise 4    Exercise Name 4  seated hip ER/abd against red ttubing around ankles.  -TB      Reps 4  10  -TB      Time 4  10 sec hold each  -TB        User Key  (r) = Recorded By, (t) = Taken By, (c) = Cosigned By    Initials Name Provider Type    Luis Morrow, PT Physical Therapist                        Manual Rx (last 36 hours)       Manual Treatments     Row Name 02/27/19 1300             Total Minutes    82472 - PT Manual Therapy Minutes  20  -TB         Manual Rx 1    Manual Rx 1 Location  prone thoracic kyphosis  -TB      Manual Rx 1 Type  ext mob manual OP  -TB      Manual Rx 1 Duration  10  -TB         Manual Rx 2    Manual Rx 2 Location  prone demond hip ext stretch  -TB      Manual Rx 2 Type  towel roll under distal thigh  -TB      Manual Rx 2 Grade  PA OP at hip  -TB      Manual Rx 2 Duration  5 min each leg  -TB        User Key  (r) = Recorded By, (t) = Taken By, (c) = Cosigned By    Initials Name Provider Type    TB Luis Moe, PT Physical Therapist          PT OP Goals     Row Name 02/27/19 1300          Long Term Goals    LTG Date to Achieve  03/14/19  -TB     LTG 1  Improve demond hip extension to 10 deg  -TB     LTG 1 Progress  Ongoing  -TB     LTG 1 Progress Comments  to 5 deg  -TB     LTG 2  Able to walk community distances with no assistive device with right AFO  -TB     LTG 2 Progress  Ongoing  -TB     LTG 2 Progress Comments  using SC  -TB     LTG 3  Reports on falls or tripping x 1 week while wearing his AFO  -TB     LTG 3 Progress  Ongoing  -TB     LTG 3 Progress Comments  Hasn't fallen since around his surgery  -TB     LTG 4  SLS demond legs x 10 sec with hip stability  -TB     LTG 4 Progress  Ongoing  -TB     LTG 4 Progress Comments  <1 sec on right; 2 sec on left  -TB        Time Calculation    PT Goal Re-Cert Due Date  03/29/19  -TB       User Key  (r) = Recorded By, (t) = Taken By, (c) = Cosigned By    Initials Name Provider Type    TB Luis Moe, PT Physical Therapist          Therapy Education  Given: Symptoms/condition management  Program: Reinforced  How Provided: Verbal, Demonstration  Provided to: Patient  Level of Understanding: Verbalized, Demonstrated              Time Calculation:   Start Time: 1305  Stop Time: 1345  Time Calculation (min): 40 min  Total Timed Code Minutes- PT: 40 minute(s)  Therapy  Suggested Charges     Code   Minutes Charges    75515 (CPT®) Hc Pt Neuromusc Re Education Ea 15 Min      53688 (CPT®) Hc Pt Ther Proc Ea 15 Min 20 2    56939 (CPT®) Hc Gait Training Ea 15 Min      29631 (CPT®) Hc Pt Therapeutic Act Ea 15 Min      90323 (CPT®) Hc Pt Manual Therapy Ea 15 Min 20 1    10944 (CPT®) Hc Pt Ther Massage- Per 15 Min      08055 (CPT®) Hc Pt Iontophoresis Ea 15 Min      20053 (CPT®) Hc Pt Elec Stim Ea-Per 15 Min      75075 (CPT®) Hc Pt Ultrasound Ea 15 Min      84977 (CPT®) Hc Pt Self Care/Mgmt/Train Ea 15 Min      31308 (CPT®) Hc Pt Prosthetic (S) Train Initial Encounter, Each 15 Min      44975 (CPT®) Hc Orthotic(S) Mgmt/Train Initial Encounter, Each 15min      48035 (CPT®) Hc Pt Aquatic Therapy Ea 15 Min      03120 (CPT®) Hc Pt Orthotic(S)/Prosthetic(S) Encounter, Each 15 Min       (CPT®) Hc Pt Electrical Stim Unattended      Total  40 3        Therapy Charges for Today     Code Description Service Date Service Provider Modifiers Qty    64801880452 HC PT THER PROC EA 15 MIN 2/27/2019 Luis Moe, PT GP 2    72785496053 HC PT MANUAL THERAPY EA 15 MIN 2/27/2019 Luis Moe, PT GP 1                    Luis Moe, PT  2/27/2019

## 2019-03-01 ENCOUNTER — HOSPITAL ENCOUNTER (OUTPATIENT)
Dept: PHYSICAL THERAPY | Facility: HOSPITAL | Age: 84
Setting detail: THERAPIES SERIES
Discharge: HOME | End: 2019-03-01
Attending: NEUROLOGICAL SURGERY

## 2019-03-01 DIAGNOSIS — M54.42 ACUTE LEFT-SIDED LOW BACK PAIN WITH LEFT-SIDED SCIATICA: ICD-10-CM

## 2019-03-01 DIAGNOSIS — M51.37 DEGENERATION OF LUMBAR OR LUMBOSACRAL INTERVERTEBRAL DISC: Primary | ICD-10-CM

## 2019-03-01 DIAGNOSIS — M48.062 SPINAL STENOSIS, LUMBAR REGION, WITH NEUROGENIC CLAUDICATION: ICD-10-CM

## 2019-03-01 PROCEDURE — 97140 MANUAL THERAPY 1/> REGIONS: CPT

## 2019-03-01 PROCEDURE — 97110 THERAPEUTIC EXERCISES: CPT

## 2019-03-01 NOTE — THERAPY TREATMENT NOTE
Outpatient Physical Therapy Ortho Treatment Note   [place]     Patient Name: Chad Henriquez  : 1929  MRN: [id number]  Today's Date: 3/1/2019      Visit Date: 2019    Visit Dx:    ICD-10-CM ICD-9-CM   1. Degeneration of lumbar or lumbosacral intervertebral disc M51.37 722.52   2. Acute left-sided low back pain with left-sided sciatica M54.42 724.2     724.3   3. Spinal stenosis, lumbar region, with neurogenic claudication M48.062 724.03       Patient Active Problem List   Diagnosis   • BPH with urinary obstruction   • Frequency of urination   • Nocturia   • OAB (overactive bladder)   • Non-smoker   • Normal body mass index (BMI)   • Spinal stenosis, lumbar region, with neurogenic claudication   • Left leg pain   • Bilateral hip pain   • Acute left-sided low back pain with left-sided sciatica   • Essential tremor   • BMI 21.0-21.9, adult   • Spinal stenosis   • Degeneration of lumbar or lumbosacral intervertebral disc        Past Medical History:   Diagnosis Date   • Allergic rhinitis    • Anemia    • Arthritis    • BPH (benign prostatic hypertrophy) with urinary retention    • Cancer (CMS/HCC)     skin cancer   • Chronic back pain     RUNS DOWN BOTH LEGS   • Constipation    • Coronary artery disease    • Hard of hearing    • Heart attack (CMS/HCC)     NO MUSCLE DAMAGE - JUST OCCLUDED VALVE   • High cholesterol    • History of skin cancer     BILATERAL EARS   • History of transfusion        • Hypertension    • Inguinal hernia    • Leaky heart valve     DR CONCEPCION SAYS NOT ENOUGH TO BE OF CONCERN   • Other bursal cyst, right elbow    • PONV (postoperative nausea and vomiting)     has had scopalamine patch in past    • Sleep apnea     DOES NOT USE CPAP OR BIPAP   • Spinal stenosis of cervical region    • Spinal stenosis of lumbar region    • Tremors of nervous system    • Wears hearing aid     BILATERAL        Past Surgical History:   Procedure Laterality Date   • ANTERIOR LUMBAR EXPOSURE  N/A 12/7/2018    Procedure: ANTERIOR LUMBAR EXPOSURE;  Surgeon: Manolo Mcleod DO;  Location:  [facility];  Service: Vascular   • APPENDECTOMY     • BACK SURGERY      X3   • CARDIAC SURGERY  08/08/1986    X1 BYPASS   • CORONARY ANGIOPLASTY WITH STENT PLACEMENT  1997    X3 STENTS   • HERNIA REPAIR Bilateral     x2   • INGUINAL HERNIA REPAIR Right 6/22/2017    Procedure: RIGHT INGUINAL HERNIA REPAIR AND EXCISION OF CYST RIGHT ELBOW ;  Surgeon: Jackelyn Arriola MD;  Location:  [facility];  Service:    • LUMBAR LAMINECTOMY N/A 3/12/2018    Procedure: LUMBAR LAMINECTOMY WITHOUT FUSION L3-4,4-5;  Surgeon: Kj Falcon MD;  Location:  [facility];  Service: Neurosurgery   • LUMBAR LAMINECTOMY ANTERIOR LUMBAR INTERBODY FUSION N/A 12/7/2018    Procedure: ANTERIOR LUMBAR INTERBODY FUSION L5-S1;  Surgeon: Kj Falcon MD;  Location:  [facility];  Service: Neurosurgery   • LUMBAR LAMINECTOMY WITH FUSION Left 12/10/2018    Procedure: LUMBAR LAMINECTOMY TRANSFORAMINAL LUMBAR INTERBODY FUSION L34,45;  Surgeon: Kj Falcon MD;  Location:  [facility];  Service: Neurosurgery   • LUMBAR LAMINECTOMY WITH FUSION Left 12/7/2018    Procedure: LUMBAR LAMINECTOMY TRANSFORAMINAL LUMBAR INTERBODY FUSION LEFT L3-4, L4-5 QUADRANT RETRACTOR;  Surgeon: Kj Falcon MD;  Location:  [facility];  Service: Neurosurgery                       PT Assessment/Plan     Row Name 03/01/19 1300          PT Assessment    Assessment Comments  Continued today to focus on thoracic mobility and anterior hip mobility to decrease the strain on his lumbar spine. Progressed with standing B hip strengthening today and pt tolerated this well without any increase in pain. He did have slight soreness post treatment but no increase in pain. He continuess to demonstrate trendelenburg with gait due to decreased hip strength.   -TR        PT Plan    PT Plan Comments  Progress with hip and core strengtening and bolster HEP.   -TR       User Key  (r) = Recorded By,  (t) = Taken By, (c) = Cosigned By    Initials Name Provider Type    Rachael Herzog PTA Physical Therapy Assistant              Exercises     Row Name 03/01/19 1300             Subjective Comments    Subjective Comments  Pt reports that his brace has rubbed his skin off on his shin, he reports that he normally wears the brace all tha but might take it off when he gets home today due to the sore. He reports compliance with HEP.   -TR         Subjective Pain    Able to rate subjective pain?  yes  -TR      Pre-Treatment Pain Level  0  -TR         Total Minutes    69222 - PT Therapeutic Exercise Minutes  30  -TR      43545 - PT Manual Therapy Minutes  15  -TR         Exercise 1    Exercise Name 1  Assessed R shin   -TR      Cueing 1  Verbal;Tactile  -TR      Additional Comments  applied new bandage and adjusted front strap pads   -TR         Exercise 2    Exercise Name 2  B anterior hip stretch in Kj test position.   -TR      Cueing 2  Verbal  -TR      Sets 2  1  -TR      Time 2  3 min each LE   -TR         Exercise 3    Exercise Name 3  Standing hip extension against red band   -TR      Cueing 3  Verbal  -TR      Sets 3  2  -TR      Reps 3  10  -TR      Additional Comments  bilaterally   -TR         Exercise 4    Exercise Name 4  Standing hip abduction against red band   -TR      Cueing 4  Verbal  -TR      Sets 4  2  -TR      Reps 4  10  -TR      Additional Comments  bilaterally   -TR         Exercise 5    Exercise Name 5  B Hip abduction against red band   -TR      Cueing 5  Verbal  -TR      Sets 5  2  -TR      Reps 5  15  -TR        User Key  (r) = Recorded By, (t) = Taken By, (c) = Cosigned By    Initials Name Provider Type    Rachael Herzog PTA Physical Therapy Assistant                        Manual Rx (last 36 hours)      Manual Treatments     Row Name 03/01/19 1300             Total Minutes    11490 - PT Manual Therapy Minutes  15  -TR         Manual Rx 1    Manual Rx 1 Location  Thoracic  and lumbar paraspinals   -TR      Manual Rx 1 Type  IASTM with blue foam miryam r  -TR      Manual Rx 1 Grade  mod  -TR      Manual Rx 1 Duration  15  -TR        User Key  (r) = Recorded By, (t) = Taken By, (c) = Cosigned By    Initials Name Provider Type    Rachael Herzog PTA Physical Therapy Assistant          PT OP Goals     Row Name 03/01/19 1300          Long Term Goals    LTG Date to Achieve  03/14/19  -TR     LTG 1  Improve demond hip extension to 10 deg  -TR     LTG 1 Progress  Ongoing  -TR     LTG 1 Progress Comments  Continues to improve with stretching   -TR     LTG 2  Able to walk community distances with no assistive device with right AFO  -TR     LTG 2 Progress  Ongoing  -TR     LTG 3  Reports on falls or tripping x 1 week while wearing his AFO  -TR     LTG 3 Progress  Ongoing  -TR     LTG 4  SLS demond legs x 10 sec with hip stability  -TR     LTG 4 Progress  Ongoing  -TR        Time Calculation    PT Goal Re-Cert Due Date  03/29/19  -TR       User Key  (r) = Recorded By, (t) = Taken By, (c) = Cosigned By    Initials Name Provider Type    Rachael Herzog PTA Physical Therapy Assistant          Therapy Education  Given: HEP, Symptoms/condition management  Program: Reinforced  How Provided: Verbal  Provided to: Patient  Level of Understanding: Verbalized              Time Calculation:   Start Time: 1300  Stop Time: 1345  Time Calculation (min): 45 min  Total Timed Code Minutes- PT: 45 minute(s)  Therapy Suggested Charges     Code   Minutes Charges    38045 (CPT®) Hc Pt Neuromusc Re Education Ea 15 Min      26190 (CPT®) Hc Pt Ther Proc Ea 15 Min 30 2    75740 (CPT®) Hc Gait Training Ea 15 Min      08645 (CPT®) Hc Pt Therapeutic Act Ea 15 Min      51616 (CPT®) Hc Pt Manual Therapy Ea 15 Min 15 1    38601 (CPT®) Hc Pt Ther Massage- Per 15 Min      09789 (CPT®) Hc Pt Iontophoresis Ea 15 Min      13092 (CPT®) Hc Pt Elec Stim Ea-Per 15 Min      57483 (CPT®) Hc Pt Ultrasound Ea 15 Min      87627  (CPT®) Hc Pt Self Care/Mgmt/Train Ea 15 Min      30537 (CPT®) Hc Pt Prosthetic (S) Train Initial Encounter, Each 15 Min      00201 (CPT®) Hc Orthotic(S) Mgmt/Train Initial Encounter, Each 15min      72635 (CPT®) Hc Pt Aquatic Therapy Ea 15 Min      92863 (CPT®) Hc Pt Orthotic(S)/Prosthetic(S) Encounter, Each 15 Min       (CPT®) Hc Pt Electrical Stim Unattended      Total  45 3        Therapy Charges for Today     Code Description Service Date Service Provider Modifiers Qty    07765911593 HC PT THER PROC EA 15 MIN 3/1/2019 Rachael Mckeon, PTA GP 2    15779160977 HC PT MANUAL THERAPY EA 15 MIN 3/1/2019 Rachael Mckeon, PTA GP 1                    Rachael Mckeon, [place]  3/1/2019

## 2019-03-06 ENCOUNTER — HOSPITAL ENCOUNTER (OUTPATIENT)
Dept: PHYSICAL THERAPY | Facility: HOSPITAL | Age: 84
Setting detail: THERAPIES SERIES
Discharge: HOME | End: 2019-03-06
Attending: NEUROLOGICAL SURGERY

## 2019-03-06 DIAGNOSIS — M54.42 ACUTE LEFT-SIDED LOW BACK PAIN WITH LEFT-SIDED SCIATICA: ICD-10-CM

## 2019-03-06 DIAGNOSIS — M51.37 DEGENERATION OF LUMBAR OR LUMBOSACRAL INTERVERTEBRAL DISC: Primary | ICD-10-CM

## 2019-03-06 DIAGNOSIS — M48.062 SPINAL STENOSIS, LUMBAR REGION, WITH NEUROGENIC CLAUDICATION: ICD-10-CM

## 2019-03-06 PROCEDURE — 97140 MANUAL THERAPY 1/> REGIONS: CPT

## 2019-03-06 PROCEDURE — 97110 THERAPEUTIC EXERCISES: CPT

## 2019-03-06 NOTE — THERAPY TREATMENT NOTE
Outpatient Physical Therapy Ortho Treatment Note   [place]     Patient Name: Chad Henriquez  : 1929  MRN: [id number]  Today's Date: 3/6/2019      Visit Date: 2019    Visit Dx:    ICD-10-CM ICD-9-CM   1. Degeneration of lumbar or lumbosacral intervertebral disc M51.37 722.52   2. Acute left-sided low back pain with left-sided sciatica M54.42 724.2     724.3   3. Spinal stenosis, lumbar region, with neurogenic claudication M48.062 724.03       Patient Active Problem List   Diagnosis   • BPH with urinary obstruction   • Frequency of urination   • Nocturia   • OAB (overactive bladder)   • Non-smoker   • Normal body mass index (BMI)   • Spinal stenosis, lumbar region, with neurogenic claudication   • Left leg pain   • Bilateral hip pain   • Acute left-sided low back pain with left-sided sciatica   • Essential tremor   • BMI 21.0-21.9, adult   • Spinal stenosis   • Degeneration of lumbar or lumbosacral intervertebral disc        Past Medical History:   Diagnosis Date   • Allergic rhinitis    • Anemia    • Arthritis    • BPH (benign prostatic hypertrophy) with urinary retention    • Cancer (CMS/HCC)     skin cancer   • Chronic back pain     RUNS DOWN BOTH LEGS   • Constipation    • Coronary artery disease    • Hard of hearing    • Heart attack (CMS/HCC)     NO MUSCLE DAMAGE - JUST OCCLUDED VALVE   • High cholesterol    • History of skin cancer     BILATERAL EARS   • History of transfusion        • Hypertension    • Inguinal hernia    • Leaky heart valve     DR CONCEPCION SAYS NOT ENOUGH TO BE OF CONCERN   • Other bursal cyst, right elbow    • PONV (postoperative nausea and vomiting)     has had scopalamine patch in past    • Sleep apnea     DOES NOT USE CPAP OR BIPAP   • Spinal stenosis of cervical region    • Spinal stenosis of lumbar region    • Tremors of nervous system    • Wears hearing aid     BILATERAL        Past Surgical History:   Procedure Laterality Date   • ANTERIOR LUMBAR EXPOSURE  N/A 12/7/2018    Procedure: ANTERIOR LUMBAR EXPOSURE;  Surgeon: Manolo Mcleod DO;  Location:  [facility];  Service: Vascular   • APPENDECTOMY     • BACK SURGERY      X3   • CARDIAC SURGERY  08/08/1986    X1 BYPASS   • CORONARY ANGIOPLASTY WITH STENT PLACEMENT  1997    X3 STENTS   • HERNIA REPAIR Bilateral     x2   • INGUINAL HERNIA REPAIR Right 6/22/2017    Procedure: RIGHT INGUINAL HERNIA REPAIR AND EXCISION OF CYST RIGHT ELBOW ;  Surgeon: Jackelyn Arriola MD;  Location:  [facility];  Service:    • LUMBAR LAMINECTOMY N/A 3/12/2018    Procedure: LUMBAR LAMINECTOMY WITHOUT FUSION L3-4,4-5;  Surgeon: Kj Falcon MD;  Location:  [facility];  Service: Neurosurgery   • LUMBAR LAMINECTOMY ANTERIOR LUMBAR INTERBODY FUSION N/A 12/7/2018    Procedure: ANTERIOR LUMBAR INTERBODY FUSION L5-S1;  Surgeon: Kj Falcon MD;  Location:  [facility];  Service: Neurosurgery   • LUMBAR LAMINECTOMY WITH FUSION Left 12/10/2018    Procedure: LUMBAR LAMINECTOMY TRANSFORAMINAL LUMBAR INTERBODY FUSION L34,45;  Surgeon: Kj Falcon MD;  Location:  [facility];  Service: Neurosurgery   • LUMBAR LAMINECTOMY WITH FUSION Left 12/7/2018    Procedure: LUMBAR LAMINECTOMY TRANSFORAMINAL LUMBAR INTERBODY FUSION LEFT L3-4, L4-5 QUADRANT RETRACTOR;  Surgeon: Kj Falcon MD;  Location:  [facility];  Service: Neurosurgery                       PT Assessment/Plan     Row Name 03/06/19 1105          PT Assessment    Assessment Comments  Patient continues to have increased thoracic kyphosis, which places unwanted mechanical load on his lumbar spine.  He did perform standing activities to strengthen functional hip extension and abduction strength.  He does continue to ambulate with a straight cane.  He is going to  today for a follow up for his AFO due to it causing redness on his right shin.  -SWETA        PT Plan    PT Plan Comments  Continue to work on loosening his thoracic and hip mobility/flexibility and continue with functional  "strengthening.  -SWETA       User Key  (r) = Recorded By, (t) = Taken By, (c) = Cosigned By    Initials Name Provider Type    Lj Tovar PTA Physical Therapy Assistant              Exercises     Row Name 03/06/19 1105             Subjective Comments    Subjective Comments  Patient reports he is about the same. He thinks he is getting a little  better,  but it is slow.  He reports he still has reddness underneath the strap of his AFO, but he is going to  today for them to look at it.  He reports it has not gotten worse, but continues to be red.  His wife placed ointment on it this morning,, and it is covered with a bandage.  -SWETA         Subjective Pain    Able to rate subjective pain?  yes  -SWETA      Pre-Treatment Pain Level  1  -SWETA      Post-Treatment Pain Level  1  -SWETA         Total Minutes    78298 - PT Therapeutic Exercise Minutes  25  -SWETA      16535 - PT Manual Therapy Minutes  15  -SWETA         Exercise 1    Exercise Name 1  B anterior hip stretch in Kj test position.   -SWETA      Cueing 1  Verbal;Tactile  -SWETA      Time 1  3 min each LE   -SWETA         Exercise 2    Exercise Name 2  Standing hip extension against red band  standing on 2\" step   -WSETA      Cueing 2  Verbal;Tactile  -SWETA      Sets 2  2  -SWETA      Reps 2  10  -SWETA      Additional Comments  bilaterally   -SWETA         Exercise 3    Exercise Name 3  side steps   -SWETA      Cueing 3  Verbal;Tactile  -SWETA      Sets 3  2 each directioni   -SWETA      Reps 3  8 steps each   -SWETA        User Key  (r) = Recorded By, (t) = Taken By, (c) = Cosigned By    Initials Name Provider Type    Lj Tovar PTA Physical Therapy Assistant                        Manual Rx (last 36 hours)      Manual Treatments     Row Name 03/06/19 1105             Total Minutes    23850 - PT Manual Therapy Minutes  15  -SWETA         Manual Rx 1    Manual Rx 1 Location  Thoracic and lumbar paraspinals   -SWETA      Manual Rx 1 Type  IASTM with pink roller   -SWETA      Manual Rx 1 Grade  " min-mod   -SWETA      Manual Rx 1 Duration  10  -SWETA         Manual Rx 2    Manual Rx 2 Location  prone thoracic kyphosis  -SWETA      Manual Rx 2 Type  extension mobilizations   -SWETA      Manual Rx 2 Grade  1-2   -SWETA      Manual Rx 2 Duration  5 minutes   -SWETA        User Key  (r) = Recorded By, (t) = Taken By, (c) = Cosigned By    Initials Name Provider Type    Lj Tovar PTA Physical Therapy Assistant          PT OP Goals     Row Name 03/06/19 1105          Long Term Goals    LTG Date to Achieve  03/14/19  -SWETA     LTG 1  Improve demond hip extension to 10 deg  -SWETA     LTG 1 Progress  Ongoing  -SWETA     LTG 1 Progress Comments  continued to stretch today   -SWETA     LTG 2  Able to walk community distances with no assistive device with right AFO  -SWETA     LTG 2 Progress  Ongoing  -SWETA     LTG 2 Progress Comments  continues to have cane   -SWETA     LTG 3  Reports on falls or tripping x 1 week while wearing his AFO  -SWETA     LTG 3 Progress  Ongoing  -SWETA     LTG 4  SLS demond legs x 10 sec with hip stability  -SWETA     LTG 4 Progress  Ongoing  -SWETA        Time Calculation    PT Goal Re-Cert Due Date  03/29/19  -SWETA       User Key  (r) = Recorded By, (t) = Taken By, (c) = Cosigned By    Initials Name Provider Type    Lj Tovar PTA Physical Therapy Assistant          Therapy Education  Given: HEP  Program: Reinforced  How Provided: Verbal  Provided to: Patient  Level of Understanding: Verbalized              Time Calculation:   Start Time: 1105  Stop Time: 1145  Time Calculation (min): 40 min  Total Timed Code Minutes- PT: 40 minute(s)  Therapy Suggested Charges     Code   Minutes Charges    75126 (CPT®) Hc Pt Neuromusc Re Education Ea 15 Min      87035 (CPT®) Hc Pt Ther Proc Ea 15 Min 25 2    02712 (CPT®) Hc Gait Training Ea 15 Min      79370 (CPT®) Hc Pt Therapeutic Act Ea 15 Min      34759 (CPT®) Hc Pt Manual Therapy Ea 15 Min 15 1    69231 (CPT®) Hc Pt Ther Massage- Per 15 Min      42234 (CPT®) Hc Pt Iontophoresis Ea 15  Min      58549 (CPT®) Hc Pt Elec Stim Ea-Per 15 Min      53048 (CPT®) Hc Pt Ultrasound Ea 15 Min      67665 (CPT®) Hc Pt Self Care/Mgmt/Train Ea 15 Min      30807 (CPT®) Hc Pt Prosthetic (S) Train Initial Encounter, Each 15 Min      30890 (CPT®) Hc Orthotic(S) Mgmt/Train Initial Encounter, Each 15min      28494 (CPT®) Hc Pt Aquatic Therapy Ea 15 Min      86654 (CPT®) Hc Pt Orthotic(S)/Prosthetic(S) Encounter, Each 15 Min       (CPT®) Hc Pt Electrical Stim Unattended      Total  40 3        Therapy Charges for Today     Code Description Service Date Service Provider Modifiers Qty    28649634184 HC PT THER PROC EA 15 MIN 3/6/2019 Lj Mercado, PTA GP 2    80462202987 HC PT MANUAL THERAPY EA 15 MIN 3/6/2019 Lj Mercado, PTA GP 1                    Lj Mercado PTA  3/6/2019

## 2019-03-08 ENCOUNTER — HOSPITAL ENCOUNTER (OUTPATIENT)
Dept: PHYSICAL THERAPY | Facility: HOSPITAL | Age: 84
Setting detail: THERAPIES SERIES
Discharge: HOME | End: 2019-03-08
Attending: NEUROLOGICAL SURGERY

## 2019-03-08 DIAGNOSIS — M48.062 SPINAL STENOSIS, LUMBAR REGION, WITH NEUROGENIC CLAUDICATION: ICD-10-CM

## 2019-03-08 DIAGNOSIS — M51.37 DEGENERATION OF LUMBAR OR LUMBOSACRAL INTERVERTEBRAL DISC: Primary | ICD-10-CM

## 2019-03-08 DIAGNOSIS — M54.42 ACUTE LEFT-SIDED LOW BACK PAIN WITH LEFT-SIDED SCIATICA: ICD-10-CM

## 2019-03-08 PROCEDURE — 97140 MANUAL THERAPY 1/> REGIONS: CPT | Performed by: PHYSICAL THERAPIST

## 2019-03-08 PROCEDURE — 97110 THERAPEUTIC EXERCISES: CPT | Performed by: PHYSICAL THERAPIST

## 2019-03-08 NOTE — THERAPY TREATMENT NOTE
Outpatient Physical Therapy Ortho Treatment Note   [place]     Patient Name: Chad Henriquez  : 1929  MRN: [id number]  Today's Date: 3/8/2019      Visit Date: 2019    Visit Dx:    ICD-10-CM ICD-9-CM   1. Degeneration of lumbar or lumbosacral intervertebral disc M51.37 722.52   2. Acute left-sided low back pain with left-sided sciatica M54.42 724.2     724.3   3. Spinal stenosis, lumbar region, with neurogenic claudication M48.062 724.03       Patient Active Problem List   Diagnosis   • BPH with urinary obstruction   • Frequency of urination   • Nocturia   • OAB (overactive bladder)   • Non-smoker   • Normal body mass index (BMI)   • Spinal stenosis, lumbar region, with neurogenic claudication   • Left leg pain   • Bilateral hip pain   • Acute left-sided low back pain with left-sided sciatica   • Essential tremor   • BMI 21.0-21.9, adult   • Spinal stenosis   • Degeneration of lumbar or lumbosacral intervertebral disc        Past Medical History:   Diagnosis Date   • Allergic rhinitis    • Anemia    • Arthritis    • BPH (benign prostatic hypertrophy) with urinary retention    • Cancer (CMS/HCC)     skin cancer   • Chronic back pain     RUNS DOWN BOTH LEGS   • Constipation    • Coronary artery disease    • Hard of hearing    • Heart attack (CMS/HCC)     NO MUSCLE DAMAGE - JUST OCCLUDED VALVE   • High cholesterol    • History of skin cancer     BILATERAL EARS   • History of transfusion        • Hypertension    • Inguinal hernia    • Leaky heart valve     DR CONCEPCION SAYS NOT ENOUGH TO BE OF CONCERN   • Other bursal cyst, right elbow    • PONV (postoperative nausea and vomiting)     has had scopalamine patch in past    • Sleep apnea     DOES NOT USE CPAP OR BIPAP   • Spinal stenosis of cervical region    • Spinal stenosis of lumbar region    • Tremors of nervous system    • Wears hearing aid     BILATERAL        Past Surgical History:   Procedure Laterality Date   • ANTERIOR LUMBAR EXPOSURE  N/A 12/7/2018    Procedure: ANTERIOR LUMBAR EXPOSURE;  Surgeon: Manolo Mcleod DO;  Location:  [facility];  Service: Vascular   • APPENDECTOMY     • BACK SURGERY      X3   • CARDIAC SURGERY  08/08/1986    X1 BYPASS   • CORONARY ANGIOPLASTY WITH STENT PLACEMENT  1997    X3 STENTS   • HERNIA REPAIR Bilateral     x2   • INGUINAL HERNIA REPAIR Right 6/22/2017    Procedure: RIGHT INGUINAL HERNIA REPAIR AND EXCISION OF CYST RIGHT ELBOW ;  Surgeon: Jackelyn Arriola MD;  Location:  [facility];  Service:    • LUMBAR LAMINECTOMY N/A 3/12/2018    Procedure: LUMBAR LAMINECTOMY WITHOUT FUSION L3-4,4-5;  Surgeon: Kj Falcon MD;  Location:  [facility];  Service: Neurosurgery   • LUMBAR LAMINECTOMY ANTERIOR LUMBAR INTERBODY FUSION N/A 12/7/2018    Procedure: ANTERIOR LUMBAR INTERBODY FUSION L5-S1;  Surgeon: Kj Falcon MD;  Location:  [facility];  Service: Neurosurgery   • LUMBAR LAMINECTOMY WITH FUSION Left 12/10/2018    Procedure: LUMBAR LAMINECTOMY TRANSFORAMINAL LUMBAR INTERBODY FUSION L34,45;  Surgeon: Kj Falcon MD;  Location:  [facility];  Service: Neurosurgery   • LUMBAR LAMINECTOMY WITH FUSION Left 12/7/2018    Procedure: LUMBAR LAMINECTOMY TRANSFORAMINAL LUMBAR INTERBODY FUSION LEFT L3-4, L4-5 QUADRANT RETRACTOR;  Surgeon: Kj Falcon MD;  Location:  [facility];  Service: Neurosurgery                       PT Assessment/Plan     Row Name 03/08/19 1100          PT Assessment    Assessment Comments  His hips are still quite weak so we focused more on this today. His pain is staying low.   -TB        PT Plan    PT Plan Comments  Cont to emphasize hip stability.  -TB       User Key  (r) = Recorded By, (t) = Taken By, (c) = Cosigned By    Initials Name Provider Type    TB Luis Moe, PT Physical Therapist              Exercises     Row Name 03/08/19 1105 03/08/19 1100          Subjective Comments    Subjective Comments  He feels like he is 60% improved. His walking and balance are better. He feels like  he still needs to work on his back and hips.   -TB  --        Subjective Pain    Pre-Treatment Pain Level  0  -TB  --        Total Minutes    39893 - PT Therapeutic Exercise Minutes  --  25  -TB     19302 - PT Manual Therapy Minutes  --  20  -TB        Exercise 1    Exercise Name 1  standing side stepping against yellow ttubing  -TB  --     Cueing 1  Verbal;Tactile;Demo  -TB  --     Time 1  5 min  -TB  --     Additional Comments  // bars and mirror for balance and cuing  -TB  --        Exercise 2    Exercise Name 2  standing hip airplane agaist yellow ttube  -TB  --     Reps 2  10 demond  -TB  --     Time 2  5-10 sec hold  -TB  --        Exercise 3    Exercise Name 3  standing mini squats sustained  -TB  --     Reps 3  6  -TB  --     Time 3  10 sec each  -TB  --     Additional Comments  // bars and mirrors used   -TB  --        Exercise 4    Exercise Name 4  seated hip abd/IR against yellow ttubing  -TB  --     Reps 4  10  -TB  --     Time 4  10 sec hold  -TB  --        Exercise 5    Exercise Name 5  seated hip ER against yellow tube  -TB  --     Reps 5  10 demond  -TB  --     Time 5  5-10 sec hold  -TB  --       User Key  (r) = Recorded By, (t) = Taken By, (c) = Cosigned By    Initials Name Provider Type    TB Luis Moe, PT Physical Therapist                        Manual Rx (last 36 hours)      Manual Treatments     Row Name 03/08/19 1100             Total Minutes    01445 - PT Manual Therapy Minutes  20  -TB         Manual Rx 1    Manual Rx 1 Location  Thoracic and lumbar paraspinals   -TB      Manual Rx 1 Type  IASTM with pink roller   -TB      Manual Rx 1 Grade  min-mod   -TB      Manual Rx 1 Duration  10  -TB         Manual Rx 2    Manual Rx 2 Location  prone thoracic kyphosis  -TB      Manual Rx 2 Type  extension mobilizations   -TB      Manual Rx 2 Grade  1-2   -TB      Manual Rx 2 Duration  10 minutes   -TB        User Key  (r) = Recorded By, (t) = Taken By, (c) = Cosigned By    Initials Name Provider Type     TB Luis oMe, PT Physical Therapist          PT OP Goals     Row Name 03/08/19 1100          Long Term Goals    LTG Date to Achieve  03/14/19  -TB     LTG 1  Improve demond hip extension to 10 deg  -TB     LTG 1 Progress  Ongoing  -TB     LTG 2  Able to walk community distances with no assistive device with right AFO  -TB     LTG 2 Progress  Ongoing  -TB     LTG 2 Progress Comments  still with SC  -TB     LTG 3  Reports on falls or tripping x 1 week while wearing his AFO  -TB     LTG 3 Progress  Ongoing  -TB     LTG 4  SLS demond legs x 10 sec with hip stability  -TB     LTG 4 Progress  Ongoing  -TB        Time Calculation    PT Goal Re-Cert Due Date  03/29/19  -TB       User Key  (r) = Recorded By, (t) = Taken By, (c) = Cosigned By    Initials Name Provider Type    Luis Morrow, PT Physical Therapist          Therapy Education  Given: HEP  Program: Reinforced  How Provided: Verbal  Provided to: Patient  Level of Understanding: Verbalized              Time Calculation:   Start Time: 1100  Stop Time: 1145  Time Calculation (min): 45 min  Total Timed Code Minutes- PT: 45 minute(s)  Therapy Suggested Charges     Code   Minutes Charges    29285 (CPT®) Hc Pt Neuromusc Re Education Ea 15 Min      01523 (CPT®) Hc Pt Ther Proc Ea 15 Min 25 2    30125 (CPT®) Hc Gait Training Ea 15 Min      19930 (CPT®) Hc Pt Therapeutic Act Ea 15 Min      85988 (CPT®) Hc Pt Manual Therapy Ea 15 Min 20 1    72251 (CPT®) Hc Pt Ther Massage- Per 15 Min      26571 (CPT®) Hc Pt Iontophoresis Ea 15 Min      00415 (CPT®) Hc Pt Elec Stim Ea-Per 15 Min      58539 (CPT®) Hc Pt Ultrasound Ea 15 Min      56124 (CPT®) Hc Pt Self Care/Mgmt/Train Ea 15 Min      50533 (CPT®) Hc Pt Prosthetic (S) Train Initial Encounter, Each 15 Min      24927 (CPT®) Hc Orthotic(S) Mgmt/Train Initial Encounter, Each 15min      55587 (CPT®) Hc Pt Aquatic Therapy Ea 15 Min      74404 (CPT®) Hc Pt Orthotic(S)/Prosthetic(S) Encounter, Each 15 Min       (CPT®) Hc  Pt Electrical Stim Unattended      Total  45 3        Therapy Charges for Today     Code Description Service Date Service Provider Modifiers Qty    20318933809 HC PT THER PROC EA 15 MIN 3/8/2019 Luis Moe, PT GP 2    27864751610 HC PT MANUAL THERAPY EA 15 MIN 3/8/2019 Luis Moe, PT GP 1                    Luis Moe, PT  3/8/2019

## 2019-03-11 ENCOUNTER — OFFICE VISIT (OUTPATIENT)
Dept: NEUROSURGERY | Facility: CLINIC | Age: 84
End: 2019-03-11

## 2019-03-11 VITALS
SYSTOLIC BLOOD PRESSURE: 144 MMHG | WEIGHT: 161 LBS | DIASTOLIC BLOOD PRESSURE: 78 MMHG | HEIGHT: 68 IN | BODY MASS INDEX: 24.4 KG/M2

## 2019-03-11 DIAGNOSIS — Z78.9 NON-SMOKER: ICD-10-CM

## 2019-03-11 DIAGNOSIS — M48.062 SPINAL STENOSIS, LUMBAR REGION, WITH NEUROGENIC CLAUDICATION: Primary | ICD-10-CM

## 2019-03-11 PROBLEM — IMO0001 NORMAL BODY MASS INDEX (BMI): Status: RESOLVED | Noted: 2018-02-21 | Resolved: 2019-03-11

## 2019-03-11 PROCEDURE — 99213 OFFICE O/P EST LOW 20 MIN: CPT | Performed by: NEUROLOGICAL SURGERY

## 2019-03-11 NOTE — PROGRESS NOTES
SUBJECTIVE:  Patient ID: Chad Henriquez is a 89 y.o. male is here today for follow-up.    Chief Complaint: Back pain  Chief Complaint   Patient presents with   • back & leg pain     patient went to the operating room initially on 12/07/2018 for an L5-S1 ALIF and then back to the operating room on 12/10/2018 for a L 3-4, 4-5 TLIF with laminectomy.       HPI  89-year-old gentleman underwent a 3 level lumbar fusion about 2 months ago for intractable back and lower extremity radiculopathy.  He is here in follow-up.  He is doing very well.  He is walking with the minor assistance of a cane.  He has no pain complaints at this point.  He is been participating in a dedicated course of physical therapy.  He is doing well with the AFO.  Right now is very satisfied with the results of the surgery.    The following portions of the patient's history were reviewed and updated as appropriate: allergies, current medications, past family history, past medical history, past social history, past surgical history and problem list.    OBJECTIVE:    Review of Systems   All other systems reviewed and are negative.         Physical Exam   Constitutional: He is oriented to person, place, and time.   Neurological: He is oriented to person, place, and time. He has a normal Finger-Nose-Finger Test.   Psychiatric: His speech is normal.       Neurologic Exam     Mental Status   Oriented to person, place, and time.   Attention: normal.   Speech: speech is normal   Level of consciousness: alert  Knowledge: good.     Cranial Nerves   Cranial nerves II through XII intact.     Motor Exam   Muscle bulk: normal  Overall muscle tone: normal  Right arm pronator drift: absent  Left arm pronator drift: absent    Strength   Strength 5/5 except as noted. Right foot drop     Sensory Exam   Light touch normal.   Pinprick normal.     Gait, Coordination, and Reflexes     Gait  Gait: (Mildly unsteady but good posture and step length)    Coordination   Finger to  nose coordination: normal    Tremor   Intention tremor: present    Reflexes   Reflexes 2+ except as noted.       Independent Review of Radiographic Studies:       ASSESSMENT/PLAN:  Chad is doing very well after his lumbar fusion.  He is having no pain issues.  Physical therapy still.  I did recommend that he wean himself off Cymbalta.  He is taking primidone for his essential tremor.  He says that 50 mg once a day did get a good response.  We will increase that to 100 once a day.  We will see him in follow-up in 3 months.      1. Spinal stenosis, lumbar region, with neurogenic claudication    2. Non-smoker    3. BMI 24.0-24.9, adult            Return in about 3 months (around 6/11/2019) for follow up w/DR GOODWIN.      Kj Goodwin MD

## 2019-03-13 ENCOUNTER — HOSPITAL ENCOUNTER (OUTPATIENT)
Dept: PHYSICAL THERAPY | Facility: HOSPITAL | Age: 84
Setting detail: THERAPIES SERIES
Discharge: HOME | End: 2019-03-13
Attending: NEUROLOGICAL SURGERY

## 2019-03-13 PROCEDURE — 97110 THERAPEUTIC EXERCISES: CPT

## 2019-03-13 PROCEDURE — 97140 MANUAL THERAPY 1/> REGIONS: CPT

## 2019-03-13 NOTE — THERAPY TREATMENT NOTE
Outpatient Physical Therapy Ortho Treatment Note   [facility]     Patient Name: Chad Henriquez  : 1929  MRN: [id number]  Today's Date: 3/13/2019      Visit Date: 2019    Visit Dx:  No diagnosis found.    Patient Active Problem List   Diagnosis   • BPH with urinary obstruction   • Frequency of urination   • Nocturia   • OAB (overactive bladder)   • Non-smoker   • Spinal stenosis, lumbar region, with neurogenic claudication   • Left leg pain   • Bilateral hip pain   • Acute left-sided low back pain with left-sided sciatica   • Essential tremor   • BMI 21.0-21.9, adult   • Spinal stenosis   • Degeneration of lumbar or lumbosacral intervertebral disc   • BMI 24.0-24.9, adult        Past Medical History:   Diagnosis Date   • Allergic rhinitis    • Anemia    • Arthritis    • BPH (benign prostatic hypertrophy) with urinary retention    • Cancer (CMS/HCC)     skin cancer   • Chronic back pain     RUNS DOWN BOTH LEGS   • Constipation    • Coronary artery disease    • Hard of hearing    • Heart attack (CMS/HCC)     NO MUSCLE DAMAGE - JUST OCCLUDED VALVE   • High cholesterol    • History of skin cancer     BILATERAL EARS   • History of transfusion        • Hypertension    • Inguinal hernia    • Leaky heart valve     DR CONCEPCION SAYS NOT ENOUGH TO BE OF CONCERN   • Other bursal cyst, right elbow    • PONV (postoperative nausea and vomiting)     has had scopalamine patch in past    • Sleep apnea     DOES NOT USE CPAP OR BIPAP   • Spinal stenosis of cervical region    • Spinal stenosis of lumbar region    • Tremors of nervous system    • Wears hearing aid     BILATERAL        Past Surgical History:   Procedure Laterality Date   • ANTERIOR LUMBAR EXPOSURE N/A 2018    Procedure: ANTERIOR LUMBAR EXPOSURE;  Surgeon: Manolo Mcleod DO;  Location: [facility];  Service: Vascular   • APPENDECTOMY     • BACK SURGERY      X3   • CARDIAC SURGERY  08/08/1986    X1 BYPASS   • CORONARY ANGIOPLASTY WITH STENT  PLACEMENT  1997    X3 STENTS   • HERNIA REPAIR Bilateral     x2   • INGUINAL HERNIA REPAIR Right 6/22/2017    Procedure: RIGHT INGUINAL HERNIA REPAIR AND EXCISION OF CYST RIGHT ELBOW ;  Surgeon: Jackelyn Arriola MD;  Location:  [facility];  Service:    • LUMBAR LAMINECTOMY N/A 3/12/2018    Procedure: LUMBAR LAMINECTOMY WITHOUT FUSION L3-4,4-5;  Surgeon: Kj Falcon MD;  Location:  [facility];  Service: Neurosurgery   • LUMBAR LAMINECTOMY ANTERIOR LUMBAR INTERBODY FUSION N/A 12/7/2018    Procedure: ANTERIOR LUMBAR INTERBODY FUSION L5-S1;  Surgeon: Kj Falcon MD;  Location:  [facility];  Service: Neurosurgery   • LUMBAR LAMINECTOMY WITH FUSION Left 12/10/2018    Procedure: LUMBAR LAMINECTOMY TRANSFORAMINAL LUMBAR INTERBODY FUSION L34,45;  Surgeon: Kj Falcon MD;  Location:  [facility];  Service: Neurosurgery   • LUMBAR LAMINECTOMY WITH FUSION Left 12/7/2018    Procedure: LUMBAR LAMINECTOMY TRANSFORAMINAL LUMBAR INTERBODY FUSION LEFT L3-4, L4-5 QUADRANT RETRACTOR;  Surgeon: Kj Falcon MD;  Location:  [facility];  Service: Neurosurgery                       PT Assessment/Plan     Row Name 03/13/19 1109          PT Assessment    Assessment Comments  Pt still stiff throughout his thoracic spine but this improving.  Pt had difficulty with sit to stands today and was unable to stand from regular chair without the use of his hands. We worked on this today and he tends to place more weight on his RLE with sit to stands so we worked on improving this. Swicthed his cane to his R hand due to LLE weakness.   -TR        PT Plan    PT Plan Comments  Continue to work on sit to stands and hip stability.  -TR       User Key  (r) = Recorded By, (t) = Taken By, (c) = Cosigned By    Initials Name Provider Type    Rachael Herzog PTA Physical Therapy Assistant              Exercises     Row Name 03/13/19 1102             Subjective Comments    Subjective Comments  Pt reports feeling the same   -TR          Subjective Pain    Able to rate subjective pain?  yes  -TR      Pre-Treatment Pain Level  1  -TR      Subjective Pain Comment  Low back   -TR         Total Minutes    14127 - PT Therapeutic Exercise Minutes  20  -TR      10091 - PT Manual Therapy Minutes  25  -TR         Exercise 1    Exercise Name 1  standing side stepping against yellow ttubing  -TR      Cueing 1  Verbal;Tactile;Demo  -TR      Sets 1  4  -TR      Time 1  15'  -TR      Additional Comments  No UE Assist for balance   -TR         Exercise 2    Exercise Name 2  Sit to stands with no UE assist   -TR      Cueing 2  Verbal  -TR      Reps 2  3  -TR      Additional Comments  attempted from red chair but pt unable   -TR         Exercise 3    Exercise Name 3  Sit to stands from blue mat table   -TR      Cueing 3  Verbal  -TR      Sets 3  2  -TR      Reps 3  10  -TR      Time 3  focus on equal weight shifting and form   -TR      Additional Comments  lowered table on second set   -TR         Exercise 4    Exercise Name 4  Gait training in hallway switching his cane to R hand sine his weakness is in his LLE.   -TR        User Key  (r) = Recorded By, (t) = Taken By, (c) = Cosigned By    Initials Name Provider Type    Rachael Herzog, PTA Physical Therapy Assistant                        Manual Rx (last 36 hours)      Manual Treatments     Row Name 03/13/19 1105             Total Minutes    25826 - PT Manual Therapy Minutes  25  -TR         Manual Rx 1    Manual Rx 1 Location  Thoracic and lumbar paraspinals   -TR      Manual Rx 1 Type  IASTM with blue foam roller   -TR      Manual Rx 1 Grade  mod  -TR      Manual Rx 1 Duration  10  -TR         Manual Rx 2    Manual Rx 2 Location  prone thoracic kyphosis  -TR      Manual Rx 2 Type  extension mobilizations   -TR      Manual Rx 2 Grade  2-3  -TR      Manual Rx 2 Duration  15  -TR        User Key  (r) = Recorded By, (t) = Taken By, (c) = Cosigned By    Initials Name Provider Type    Rachael Herzog,  PTA Physical Therapy Assistant          PT OP Goals     Row Name 03/13/19 1105          Long Term Goals    LTG Date to Achieve  03/14/19  -TR     LTG 1  Improve demond hip extension to 10 deg  -TR     LTG 1 Progress  Ongoing  -TR     LTG 2  Able to walk community distances with no assistive device with right AFO  -TR     LTG 2 Progress  Ongoing  -TR     LTG 3  Reports on falls or tripping x 1 week while wearing his AFO  -TR     LTG 3 Progress  Ongoing  -TR     LTG 3 Progress Comments  Pt denies any recent falls   -TR     LTG 4  SLS demond legs x 10 sec with hip stability  -TR     LTG 4 Progress  Ongoing  -TR        Time Calculation    PT Goal Re-Cert Due Date  03/29/19  -TR       User Key  (r) = Recorded By, (t) = Taken By, (c) = Cosigned By    Initials Name Provider Type    Rahcael Herzog PTA Physical Therapy Assistant          Therapy Education  Education Details: Using cane in R hand since his weakness is in LLE   Given: HEP  Program: New  How Provided: Verbal, Demonstration  Provided to: Patient  Level of Understanding: Verbalized, Demonstrated              Time Calculation:   Start Time: 1105  Stop Time: 1150  Time Calculation (min): 45 min  Total Timed Code Minutes- PT: 45 minute(s)  Therapy Suggested Charges     Code   Minutes Charges    85278 (CPT®) Hc Pt Neuromusc Re Education Ea 15 Min      91112 (CPT®) Hc Pt Ther Proc Ea 15 Min 20 1    31260 (CPT®) Hc Gait Training Ea 15 Min      16914 (CPT®) Hc Pt Therapeutic Act Ea 15 Min      16562 (CPT®) Hc Pt Manual Therapy Ea 15 Min 25 2    81635 (CPT®) Hc Pt Ther Massage- Per 15 Min      18672 (CPT®) Hc Pt Iontophoresis Ea 15 Min      06530 (CPT®) Hc Pt Elec Stim Ea-Per 15 Min      86896 (CPT®) Hc Pt Ultrasound Ea 15 Min      23758 (CPT®) Hc Pt Self Care/Mgmt/Train Ea 15 Min      81901 (CPT®) Hc Pt Prosthetic (S) Train Initial Encounter, Each 15 Min      58572 (CPT®) Hc Orthotic(S) Mgmt/Train Initial Encounter, Each 15min      86860 (CPT®) Hc Pt Aquatic Therapy  Ea 15 Min      59823 (CPT®) Hc Pt Orthotic(S)/Prosthetic(S) Encounter, Each 15 Min       (CPT®) Hc Pt Electrical Stim Unattended      Total  45 3        Therapy Charges for Today     Code Description Service Date Service Provider Modifiers Qty    10801837677 HC PT THER PROC EA 15 MIN 3/13/2019 Rachael Mckeon, PTA GP 1    42288375799 HC PT MANUAL THERAPY EA 15 MIN 3/13/2019 Rachael Mckeon PTA GP 2                    Rachael Mckeon PTA  3/13/2019

## 2019-03-15 ENCOUNTER — HOSPITAL ENCOUNTER (OUTPATIENT)
Dept: PHYSICAL THERAPY | Facility: HOSPITAL | Age: 84
Setting detail: THERAPIES SERIES
Discharge: HOME | End: 2019-03-15
Attending: NEUROLOGICAL SURGERY

## 2019-03-15 DIAGNOSIS — M54.42 ACUTE LEFT-SIDED LOW BACK PAIN WITH LEFT-SIDED SCIATICA: ICD-10-CM

## 2019-03-15 DIAGNOSIS — M48.062 SPINAL STENOSIS, LUMBAR REGION, WITH NEUROGENIC CLAUDICATION: ICD-10-CM

## 2019-03-15 DIAGNOSIS — M51.37 DEGENERATION OF LUMBAR OR LUMBOSACRAL INTERVERTEBRAL DISC: Primary | ICD-10-CM

## 2019-03-15 PROCEDURE — 97110 THERAPEUTIC EXERCISES: CPT | Performed by: PHYSICAL THERAPIST

## 2019-03-15 PROCEDURE — 97140 MANUAL THERAPY 1/> REGIONS: CPT | Performed by: PHYSICAL THERAPIST

## 2019-03-15 NOTE — THERAPY TREATMENT NOTE
Outpatient Physical Therapy Ortho Treatment Note   [facility]     Patient Name: Chad Henriquez  : 1929  MRN: [id number]  Today's Date: 3/15/2019      Visit Date: 03/15/2019    Visit Dx:    ICD-10-CM ICD-9-CM   1. Degeneration of lumbar or lumbosacral intervertebral disc M51.37 722.52   2. Acute left-sided low back pain with left-sided sciatica M54.42 724.2     724.3   3. Spinal stenosis, lumbar region, with neurogenic claudication M48.062 724.03       Patient Active Problem List   Diagnosis   • BPH with urinary obstruction   • Frequency of urination   • Nocturia   • OAB (overactive bladder)   • Non-smoker   • Spinal stenosis, lumbar region, with neurogenic claudication   • Left leg pain   • Bilateral hip pain   • Acute left-sided low back pain with left-sided sciatica   • Essential tremor   • BMI 21.0-21.9, adult   • Spinal stenosis   • Degeneration of lumbar or lumbosacral intervertebral disc   • BMI 24.0-24.9, adult        Past Medical History:   Diagnosis Date   • Allergic rhinitis    • Anemia    • Arthritis    • BPH (benign prostatic hypertrophy) with urinary retention    • Cancer (CMS/HCC)     skin cancer   • Chronic back pain     RUNS DOWN BOTH LEGS   • Constipation    • Coronary artery disease    • Hard of hearing    • Heart attack (CMS/HCC)     NO MUSCLE DAMAGE - JUST OCCLUDED VALVE   • High cholesterol    • History of skin cancer     BILATERAL EARS   • History of transfusion        • Hypertension    • Inguinal hernia    • Leaky heart valve     DR CONCEPCION SAYS NOT ENOUGH TO BE OF CONCERN   • Other bursal cyst, right elbow    • PONV (postoperative nausea and vomiting)     has had scopalamine patch in past    • Sleep apnea     DOES NOT USE CPAP OR BIPAP   • Spinal stenosis of cervical region    • Spinal stenosis of lumbar region    • Tremors of nervous system    • Wears hearing aid     BILATERAL        Past Surgical History:   Procedure Laterality Date   • ANTERIOR LUMBAR EXPOSURE N/A  12/7/2018    Procedure: ANTERIOR LUMBAR EXPOSURE;  Surgeon: Manolo Mcleod DO;  Location:  [facility];  Service: Vascular   • APPENDECTOMY     • BACK SURGERY      X3   • CARDIAC SURGERY  08/08/1986    X1 BYPASS   • CORONARY ANGIOPLASTY WITH STENT PLACEMENT  1997    X3 STENTS   • HERNIA REPAIR Bilateral     x2   • INGUINAL HERNIA REPAIR Right 6/22/2017    Procedure: RIGHT INGUINAL HERNIA REPAIR AND EXCISION OF CYST RIGHT ELBOW ;  Surgeon: Jackelyn Arriola MD;  Location:  [facility];  Service:    • LUMBAR LAMINECTOMY N/A 3/12/2018    Procedure: LUMBAR LAMINECTOMY WITHOUT FUSION L3-4,4-5;  Surgeon: Kj Falcon MD;  Location:  [facility];  Service: Neurosurgery   • LUMBAR LAMINECTOMY ANTERIOR LUMBAR INTERBODY FUSION N/A 12/7/2018    Procedure: ANTERIOR LUMBAR INTERBODY FUSION L5-S1;  Surgeon: Kj Falcon MD;  Location:  [facility];  Service: Neurosurgery   • LUMBAR LAMINECTOMY WITH FUSION Left 12/10/2018    Procedure: LUMBAR LAMINECTOMY TRANSFORAMINAL LUMBAR INTERBODY FUSION L34,45;  Surgeon: Kj Falcon MD;  Location:  [facility];  Service: Neurosurgery   • LUMBAR LAMINECTOMY WITH FUSION Left 12/7/2018    Procedure: LUMBAR LAMINECTOMY TRANSFORAMINAL LUMBAR INTERBODY FUSION LEFT L3-4, L4-5 QUADRANT RETRACTOR;  Surgeon: Kj Falcon MD;  Location:  [facility];  Service: Neurosurgery                       PT Assessment/Plan     Row Name 03/15/19 1300          PT Assessment    Assessment Comments  His hip stabiltiy is still quite shaky so he needs his cane. He feels like he's doing better.  -TB        PT Plan    PT Plan Comments  cont emphasis on hip stabiliyt  -TB       User Key  (r) = Recorded By, (t) = Taken By, (c) = Cosigned By    Initials Name Provider Type    TB Luis Moe, PT Physical Therapist              Exercises     Row Name 03/15/19 1300             Subjective Comments    Subjective Comments  He says he's doing pretty well. He says he feels like he's a little stronger.   -TB          "Subjective Pain    Pre-Treatment Pain Level  0  -TB         Total Minutes    29510 - PT Therapeutic Exercise Minutes  25  -TB      11378 - PT Manual Therapy Minutes  20  -TB         Exercise 1    Exercise Name 1  seated hip abd/IR against red ttube  -TB      Reps 1  15  -TB      Time 1  10 sec hold  -TB         Exercise 2    Exercise Name 2  seated hip ER against red ttube  -TB      Reps 2  10  -TB      Time 2  10 sec  -TB      Additional Comments  demond  -TB         Exercise 3    Exercise Name 3  squatted side stepping against red ttube  -TB      Time 3  4 min  -TB      Additional Comments  cued to slow down and balance and control hips  -TB         Exercise 4    Exercise Name 4  standing hip ext against red ttube  -TB      Sets 4  2  -TB      Reps 4  8  -TB      Time 4  5 sec hold  -TB      Additional Comments  UE asst on mat table  -TB        User Key  (r) = Recorded By, (t) = Taken By, (c) = Cosigned By    Initials Name Provider Type    TB Luis Moe, PT Physical Therapist                        Manual Rx (last 36 hours)      Manual Treatments     Row Name 03/15/19 1300             Total Minutes    67994 - PT Manual Therapy Minutes  20  -TB         Manual Rx 1    Manual Rx 1 Location  --  -TB      Manual Rx 1 Type  --  -TB      Manual Rx 1 Grade  --  -TB      Manual Rx 1 Duration  --  -TB         Manual Rx 2    Manual Rx 2 Location  prone thoracic kyphosis  -TB      Manual Rx 2 Type  extension mobilizations   -TB      Manual Rx 2 Grade  2-3  -TB      Manual Rx 2 Duration  15  -TB         Manual Rx 3    Manual Rx 3 Location  prone hip extension stretch  -TB      Manual Rx 3 Type  OP at prox thigh  -TB      Manual Rx 3 Grade  distal thigh over 4\" bolster  -TB      Manual Rx 3 Duration  5 min each  -TB        User Key  (r) = Recorded By, (t) = Taken By, (c) = Cosigned By    Initials Name Provider Type    TB Luis Moe, PT Physical Therapist          PT OP Goals     Row Name 03/15/19 1300          Long " Term Goals    LTG Date to Achieve  03/14/19  -TB     LTG 1  Improve demond hip extension to 10 deg  -TB     LTG 1 Progress  Ongoing  -TB     LTG 2  Able to walk community distances with no assistive device with right AFO  -TB     LTG 2 Progress  Ongoing  -TB     LTG 2 Progress Comments  still needs cane  -TB     LTG 3  Reports on falls or tripping x 1 week while wearing his AFO  -TB     LTG 3 Progress  Ongoing  -TB     LTG 4  SLS demond legs x 10 sec with hip stability  -TB     LTG 4 Progress  Ongoing  -TB        Time Calculation    PT Goal Re-Cert Due Date  03/29/19  -TB       User Key  (r) = Recorded By, (t) = Taken By, (c) = Cosigned By    Initials Name Provider Type    Luis Morrow, PT Physical Therapist          Therapy Education  Given: HEP  Program: New  How Provided: Verbal, Demonstration  Provided to: Patient  Level of Understanding: Verbalized, Demonstrated              Time Calculation:   Start Time: 1300  Stop Time: 1345  Time Calculation (min): 45 min  Total Timed Code Minutes- PT: 45 minute(s)  Therapy Suggested Charges     Code   Minutes Charges    57379 (CPT®) Hc Pt Neuromusc Re Education Ea 15 Min      42958 (CPT®) Hc Pt Ther Proc Ea 15 Min 25 2    01206 (CPT®) Hc Gait Training Ea 15 Min      90177 (CPT®) Hc Pt Therapeutic Act Ea 15 Min      81899 (CPT®) Hc Pt Manual Therapy Ea 15 Min 20 1    28512 (CPT®) Hc Pt Ther Massage- Per 15 Min      52603 (CPT®) Hc Pt Iontophoresis Ea 15 Min      60044 (CPT®) Hc Pt Elec Stim Ea-Per 15 Min      03750 (CPT®) Hc Pt Ultrasound Ea 15 Min      90256 (CPT®) Hc Pt Self Care/Mgmt/Train Ea 15 Min      88725 (CPT®) Hc Pt Prosthetic (S) Train Initial Encounter, Each 15 Min      20109 (CPT®) Hc Orthotic(S) Mgmt/Train Initial Encounter, Each 15min      66469 (CPT®) Hc Pt Aquatic Therapy Ea 15 Min      30342 (CPT®) Hc Pt Orthotic(S)/Prosthetic(S) Encounter, Each 15 Min       (CPT®) Hc Pt Electrical Stim Unattended      Total  45 3        Therapy Charges for Today      Code Description Service Date Service Provider Modifiers Qty    65498907660  PT THER PROC EA 15 MIN 3/15/2019 Luis Moe, PT GP 2    56934330452 HC PT MANUAL THERAPY EA 15 MIN 3/15/2019 Luis Moe, PT GP 1                    Luis Moe, PT  3/15/2019

## 2019-03-20 ENCOUNTER — HOSPITAL ENCOUNTER (OUTPATIENT)
Dept: PHYSICAL THERAPY | Facility: HOSPITAL | Age: 84
Setting detail: THERAPIES SERIES
Discharge: HOME | End: 2019-03-20
Attending: NEUROLOGICAL SURGERY

## 2019-03-20 DIAGNOSIS — M51.37 DEGENERATION OF LUMBAR OR LUMBOSACRAL INTERVERTEBRAL DISC: Primary | ICD-10-CM

## 2019-03-20 DIAGNOSIS — M54.42 ACUTE LEFT-SIDED LOW BACK PAIN WITH LEFT-SIDED SCIATICA: ICD-10-CM

## 2019-03-20 PROCEDURE — 97110 THERAPEUTIC EXERCISES: CPT

## 2019-03-20 PROCEDURE — 97140 MANUAL THERAPY 1/> REGIONS: CPT

## 2019-03-20 NOTE — THERAPY TREATMENT NOTE
Outpatient Physical Therapy Ortho Treatment Note  [facility]     Patient Name: Chad Henriquez  : 1929  MRN: [id number]  Today's Date: 3/20/2019      Visit Date: 2019    Visit Dx:    ICD-10-CM ICD-9-CM   1. Degeneration of lumbar or lumbosacral intervertebral disc M51.37 722.52   2. Acute left-sided low back pain with left-sided sciatica M54.42 724.2     724.3       Patient Active Problem List   Diagnosis   • BPH with urinary obstruction   • Frequency of urination   • Nocturia   • OAB (overactive bladder)   • Non-smoker   • Spinal stenosis, lumbar region, with neurogenic claudication   • Left leg pain   • Bilateral hip pain   • Acute left-sided low back pain with left-sided sciatica   • Essential tremor   • BMI 21.0-21.9, adult   • Spinal stenosis   • Degeneration of lumbar or lumbosacral intervertebral disc   • BMI 24.0-24.9, adult        Past Medical History:   Diagnosis Date   • Allergic rhinitis    • Anemia    • Arthritis    • BPH (benign prostatic hypertrophy) with urinary retention    • Cancer (CMS/HCC)     skin cancer   • Chronic back pain     RUNS DOWN BOTH LEGS   • Constipation    • Coronary artery disease    • Hard of hearing    • Heart attack (CMS/HCC)     NO MUSCLE DAMAGE - JUST OCCLUDED VALVE   • High cholesterol    • History of skin cancer     BILATERAL EARS   • History of transfusion        • Hypertension    • Inguinal hernia    • Leaky heart valve     DR CONCEPCION SAYS NOT ENOUGH TO BE OF CONCERN   • Other bursal cyst, right elbow    • PONV (postoperative nausea and vomiting)     has had scopalamine patch in past    • Sleep apnea     DOES NOT USE CPAP OR BIPAP   • Spinal stenosis of cervical region    • Spinal stenosis of lumbar region    • Tremors of nervous system    • Wears hearing aid     BILATERAL        Past Surgical History:   Procedure Laterality Date   • ANTERIOR LUMBAR EXPOSURE N/A 2018    Procedure: ANTERIOR LUMBAR EXPOSURE;  Surgeon: Manolo Mcleod DO;   Location:  [facility];  Service: Vascular   • APPENDECTOMY     • BACK SURGERY      X3   • CARDIAC SURGERY  08/08/1986    X1 BYPASS   • CORONARY ANGIOPLASTY WITH STENT PLACEMENT  1997    X3 STENTS   • HERNIA REPAIR Bilateral     x2   • INGUINAL HERNIA REPAIR Right 6/22/2017    Procedure: RIGHT INGUINAL HERNIA REPAIR AND EXCISION OF CYST RIGHT ELBOW ;  Surgeon: Jackelyn Arriola MD;  Location:  [facility];  Service:    • LUMBAR LAMINECTOMY N/A 3/12/2018    Procedure: LUMBAR LAMINECTOMY WITHOUT FUSION L3-4,4-5;  Surgeon: Kj Falcon MD;  Location:  [facility];  Service: Neurosurgery   • LUMBAR LAMINECTOMY ANTERIOR LUMBAR INTERBODY FUSION N/A 12/7/2018    Procedure: ANTERIOR LUMBAR INTERBODY FUSION L5-S1;  Surgeon: Kj Falcon MD;  Location:  [facility];  Service: Neurosurgery   • LUMBAR LAMINECTOMY WITH FUSION Left 12/10/2018    Procedure: LUMBAR LAMINECTOMY TRANSFORAMINAL LUMBAR INTERBODY FUSION L34,45;  Surgeon: Kj Falcon MD;  Location:  [facility];  Service: Neurosurgery   • LUMBAR LAMINECTOMY WITH FUSION Left 12/7/2018    Procedure: LUMBAR LAMINECTOMY TRANSFORAMINAL LUMBAR INTERBODY FUSION LEFT L3-4, L4-5 QUADRANT RETRACTOR;  Surgeon: Kj Falcon MD;  Location:  [facility];  Service: Neurosurgery                       PT Assessment/Plan     Row Name 03/20/19 1303          PT Assessment    Assessment Comments  Pt presents with increased pain and soreness today due to a fall yesterday in his kitchen. Due to his increased pain today we focused more on manual therapy and soft tissue. He did have some relief in his pain post treatment. Continued with hip strengthening today but did not progress due to increased pain.   -TR        PT Plan    PT Plan Comments  If his pain is better and he has not had any other falls continue to focus on hip stability and strengthening   -TR       User Key  (r) = Recorded By, (t) = Taken By, (c) = Cosigned By    Initials Name Provider Type    Rachael Herzog, FRAN[place]KA  Physical Therapy Assistant            Exercises     Row Name 03/20/19 1303             Subjective Comments    Subjective Comments  Pt reports falling in the [place] yesterday. He reports being bent over picking something off of the floor and fell forward and to the L landing on his L shoulder. He reports that his back is really sore today.   -TR         Subjective Pain    Able to rate subjective pain?  yes  -TR      Pre-Treatment Pain Level  3  -TR      Subjective Pain Comment  R low back .   -TR         Total Minutes    52911 - PT Therapeutic Exercise Minutes  12  -TR      25119 - PT Manual Therapy Minutes  30  -TR         Exercise 1    Exercise Name 1  Seated marching against red band   -TR      Cueing 1  Verbal  -TR      Sets 1  3  -TR      Time 1  1 min each   -TR         Exercise 2    Exercise Name 2  Standing hip abduction against red band   -TR      Cueing 2  Verbal  -TR      Sets 2  2  -TR      Reps 2  15  -TR      Additional Comments  BLE   -TR        User Key  (r) = Recorded By, (t) = Taken By, (c) = Cosigned By    Initials Name Provider Type    TR Rachael Mckeon, FRAN[place]KA Physical Therapy Assistant                      Manual Rx (last 36 hours)      Manual Treatments     Row Name 03/20/19 1303             Total Minutes    38187 - PT Manual Therapy Minutes  30  -TR         Manual Rx 1    Manual Rx 1 Location  Thoracic and lumbar paraspinals R>L  -TR      Manual Rx 1 Type  IASTM with pink foam roller   -TR      Manual Rx 1 Grade  min  -TR      Manual Rx 1 Duration  12  -TR         Manual Rx 2    Manual Rx 2 Location  prone thoracic kyphosis  -TR      Manual Rx 2 Type  extension mobilizations   -TR      Manual Rx 2 Grade  2-3  -TR      Manual Rx 2 Duration  10  -TR         Manual Rx 3    Manual Rx 3 Location  B SKTC and DKTC stretches  -TR      Manual Rx 3 Type  min OP  -TR      Manual Rx 3 Duration  8 min total   -TR        User Key  (r) = Recorded By, (t) = Taken By, (c) = Cosigned By    Initials Name  Provider Type    Rachael Herzog PTA Physical Therapy Assistant          PT OP Goals     Row Name 03/20/19 1303          Long Term Goals    LTG Date to Achieve  03/14/19  -TR     LTG 1  Improve demond hip extension to 10 deg  -TR     LTG 1 Progress  Ongoing  -TR     LTG 2  Able to walk community distances with no assistive device with right AFO  -TR     LTG 2 Progress  Ongoing  -TR     LTG 3  Reports on falls or tripping x 1 week while wearing his AFO  -TR     LTG 3 Progress  Ongoing  -TR     LTG 3 Progress Comments  Pt had a fall yesterday in his kitchen while wearing AFO   -TR     LTG 4  SLS demond legs x 10 sec with hip stability  -TR     LTG 4 Progress  Ongoing  -TR        Time Calculation    PT Goal Re-Cert Due Date  03/29/19  -TR       User Key  (r) = Recorded By, (t) = Taken By, (c) = Cosigned By    Initials Name Provider Type    Rachael Herzog PTA Physical Therapy Assistant          Therapy Education  Education Details: Fall prevention   Given: Fall prevention and home safety  Program: Reinforced  How Provided: Verbal  Provided to: Patient  Level of Understanding: Verbalized              Time Calculation:   Start Time: 1303  Stop Time: 1345  Time Calculation (min): 42 min  Total Timed Code Minutes- PT: 42 minute(s)  Therapy Charges for Today     Code Description Service Date Service Provider Modifiers Qty    21132239487 HC PT THER PROC EA 15 MIN 3/20/2019 Rachael Mckeon PTA GP 1    27911724638 HC PT MANUAL THERAPY EA 15 MIN 3/20/2019 Rachael Mckeon PTA GP 2                    Rachael Mckeon PTA  3/20/2019

## 2019-03-22 ENCOUNTER — HOSPITAL ENCOUNTER (OUTPATIENT)
Dept: PHYSICAL THERAPY | Facility: HOSPITAL | Age: 84
Setting detail: THERAPIES SERIES
Discharge: HOME | End: 2019-03-22
Attending: NEUROLOGICAL SURGERY

## 2019-03-22 DIAGNOSIS — M48.062 SPINAL STENOSIS, LUMBAR REGION, WITH NEUROGENIC CLAUDICATION: ICD-10-CM

## 2019-03-22 DIAGNOSIS — M51.37 DEGENERATION OF LUMBAR OR LUMBOSACRAL INTERVERTEBRAL DISC: Primary | ICD-10-CM

## 2019-03-22 DIAGNOSIS — M54.42 ACUTE LEFT-SIDED LOW BACK PAIN WITH LEFT-SIDED SCIATICA: ICD-10-CM

## 2019-03-22 DIAGNOSIS — M79.605 LEFT LEG PAIN: ICD-10-CM

## 2019-03-22 DIAGNOSIS — M25.551 BILATERAL HIP PAIN: ICD-10-CM

## 2019-03-22 DIAGNOSIS — M25.552 BILATERAL HIP PAIN: ICD-10-CM

## 2019-03-22 PROCEDURE — 97140 MANUAL THERAPY 1/> REGIONS: CPT | Performed by: PHYSICAL THERAPIST

## 2019-03-22 NOTE — THERAPY PROGRESS REPORT/RE-CERT
Outpatient Physical Therapy Ortho Progress Note   [facility]     Patient Name: Chad Henriquez  : 1929  MRN: [id number]  Today's Date: 3/22/2019      Visit Date: 2019    Visit Dx:    ICD-10-CM ICD-9-CM   1. Degeneration of lumbar or lumbosacral intervertebral disc M51.37 722.52   2. Acute left-sided low back pain with left-sided sciatica M54.42 724.2     724.3   3. Spinal stenosis, lumbar region, with neurogenic claudication M48.062 724.03   4. Left leg pain M79.605 729.5   5. Bilateral hip pain M25.551 719.45    M25.552        Patient Active Problem List   Diagnosis   • BPH with urinary obstruction   • Frequency of urination   • Nocturia   • OAB (overactive bladder)   • Non-smoker   • Spinal stenosis, lumbar region, with neurogenic claudication   • Left leg pain   • Bilateral hip pain   • Acute left-sided low back pain with left-sided sciatica   • Essential tremor   • BMI 21.0-21.9, adult   • Spinal stenosis   • Degeneration of lumbar or lumbosacral intervertebral disc   • BMI 24.0-24.9, adult        Past Medical History:   Diagnosis Date   • Allergic rhinitis    • Anemia    • Arthritis    • BPH (benign prostatic hypertrophy) with urinary retention    • Cancer (CMS/HCC)     skin cancer   • Chronic back pain     RUNS DOWN BOTH LEGS   • Constipation    • Coronary artery disease    • Hard of hearing    • Heart attack (CMS/HCC)     NO MUSCLE DAMAGE - JUST OCCLUDED VALVE   • High cholesterol    • History of skin cancer     BILATERAL EARS   • History of transfusion        • Hypertension    • Inguinal hernia    • Leaky heart valve     DR CONCEPCION SAYS NOT ENOUGH TO BE OF CONCERN   • Other bursal cyst, right elbow    • PONV (postoperative nausea and vomiting)     has had scopalamine patch in past    • Sleep apnea     DOES NOT USE CPAP OR BIPAP   • Spinal stenosis of cervical region    • Spinal stenosis of lumbar region    • Tremors of nervous system    • Wears hearing aid     BILATERAL        Past  Surgical History:   Procedure Laterality Date   • ANTERIOR LUMBAR EXPOSURE N/A 12/7/2018    Procedure: ANTERIOR LUMBAR EXPOSURE;  Surgeon: Manolo Mcleod DO;  Location:  [facility];  Service: Vascular   • APPENDECTOMY     • BACK SURGERY      X3   • CARDIAC SURGERY  08/08/1986    X1 BYPASS   • CORONARY ANGIOPLASTY WITH STENT PLACEMENT  1997    X3 STENTS   • HERNIA REPAIR Bilateral     x2   • INGUINAL HERNIA REPAIR Right 6/22/2017    Procedure: RIGHT INGUINAL HERNIA REPAIR AND EXCISION OF CYST RIGHT ELBOW ;  Surgeon: Jackelyn Arriola MD;  Location:  [facility];  Service:    • LUMBAR LAMINECTOMY N/A 3/12/2018    Procedure: LUMBAR LAMINECTOMY WITHOUT FUSION L3-4,4-5;  Surgeon: Kj Falcon MD;  Location:  [facility];  Service: Neurosurgery   • LUMBAR LAMINECTOMY ANTERIOR LUMBAR INTERBODY FUSION N/A 12/7/2018    Procedure: ANTERIOR LUMBAR INTERBODY FUSION L5-S1;  Surgeon: Kj Falcon MD;  Location:  [facility];  Service: Neurosurgery   • LUMBAR LAMINECTOMY WITH FUSION Left 12/10/2018    Procedure: LUMBAR LAMINECTOMY TRANSFORAMINAL LUMBAR INTERBODY FUSION L34,45;  Surgeon: Kj Falcon MD;  Location:  [facility];  Service: Neurosurgery   • LUMBAR LAMINECTOMY WITH FUSION Left 12/7/2018    Procedure: LUMBAR LAMINECTOMY TRANSFORAMINAL LUMBAR INTERBODY FUSION LEFT L3-4, L4-5 QUADRANT RETRACTOR;  Surgeon: Kj Falcon MD;  Location:  [facility];  Service: Neurosurgery                       PT Assessment/Plan     Row Name 03/22/19 1300          PT Assessment    Functional Limitations  Impaired gait;Limitation in home management;Limitations in community activities;Limitations in functional capacity and performance;Performance in leisure activities  -TB     Impairments  Gait;Posture;Range of motion;Pain;Muscle strength;Joint mobility  -TB     Assessment Comments  His wife was just diagnosed with breast cancer, so he is unsure about what his availabiliity will be for continuing PT. With his fall, he showed he's not at  the point of being ready for d/c. At this point, we have focused on stretching as he showed demond hip flexor contractures and a kyphotic posture. We have progressed to more hip stability exs.   -TB     Rehab Potential  Good  -TB     Patient/caregiver participated in establishment of treatment plan and goals  Yes  -TB     Patient would benefit from skilled therapy intervention  Yes  -TB        PT Plan    PT Frequency  1x/week;2x/week  -TB     Predicted Duration of Therapy Intervention (Therapy Eval)  4-6 weeks  -TB     Planned CPT's?  PT THER PROC EA 15 MIN: 14930;PT THER ACT EA 15 MIN: 04564;PT MANUAL THERAPY EA 15 MIN: 00482;PT GAIT TRAINING EA 15 MIN: 38701;PT ELECTRICAL STIM UNATTEND: ;PT ELECTRICAL STIM ATTD EA 15 MIN: 75067  -TB     PT Plan Comments  Plan to work on hip stability and balance and cont with PT. We will need to work around his wife's schedule.  -TB       User Key  (r) = Recorded By, (t) = Taken By, (c) = Cosigned By    Initials Name Provider Type    Luis Morrow, PT Physical Therapist            Exercises     Row Name 03/22/19 1300             Subjective Comments    Subjective Comments  He says his back on the left is still hurting. He hasn't fallen since he was here last.   -TB         Subjective Pain    Pre-Treatment Pain Level  2  -TB         Total Minutes    43871 - PT Manual Therapy Minutes  45  -TB        User Key  (r) = Recorded By, (t) = Taken By, (c) = Cosigned By    Initials Name Provider Type    Luis Morrow, PT Physical Therapist                      Manual Rx (last 36 hours)      Manual Treatments     Row Name 03/22/19 1300             Total Minutes    75170 - PT Manual Therapy Minutes  45  -TB         Manual Rx 1    Manual Rx 1 Location  sidelying left lower lumbar  -TB      Manual Rx 1 Type  STM  -TB      Manual Rx 1 Duration  15  -TB         Manual Rx 2    Manual Rx 2 Location  left sidelying LS manual distraction  -TB      Manual Rx 2 Duration  10  -TB          Manual Rx 3    Manual Rx 3 Location  supine left hip and ham stretch  -TB      Manual Rx 3 Duration  15  -TB        User Key  (r) = Recorded By, (t) = Taken By, (c) = Cosigned By    Initials Name Provider Type    TB Luis Moe, PT Physical Therapist          PT OP Goals     Row Name 03/22/19 1300          Long Term Goals    LTG Date to Achieve  03/14/19  -TB     LTG 1  Improve demond hip extension to 10 deg  -TB     LTG 1 Progress  Ongoing  -TB     LTG 1 Progress Comments  to 0 deg  -TB     LTG 2  Able to walk community distances with no assistive device with right AFO  -TB     LTG 2 Progress  Ongoing  -TB     LTG 2 Progress Comments  stilll needs cane  -TB     LTG 3  Reports on falls or tripping x 1 week while wearing his AFO  -TB     LTG 3 Progress  Ongoing  -TB     LTG 3 Progress Comments  fell this past week and increased back pain.  -TB     LTG 4  SLS demond legs x 10 sec with hip stability  -TB     LTG 4 Progress  Ongoing  -TB     LTG 4 Progress Comments  <1 sec due to hip weakness  -TB        Time Calculation    PT Goal Re-Cert Due Date  03/29/19  -TB       User Key  (r) = Recorded By, (t) = Taken By, (c) = Cosigned By    Initials Name Provider Type    TB Luis Moe, PT Physical Therapist          Therapy Education  Given: Fall prevention and home safety  Program: Reinforced  How Provided: Verbal  Provided to: Patient  Level of Understanding: Verbalized              Time Calculation:   Start Time: 1300  Stop Time: 1345  Time Calculation (min): 45 min  Therapy Charges for Today     Code Description Service Date Service Provider Modifiers Qty    11402021903 HC PT MANUAL THERAPY EA 15 MIN 3/22/2019 Luis Moe, PT GP 3                    Luis Moe, PT  3/22/2019

## 2019-03-27 ENCOUNTER — HOSPITAL ENCOUNTER (OUTPATIENT)
Dept: PHYSICAL THERAPY | Facility: HOSPITAL | Age: 84
Setting detail: THERAPIES SERIES
Discharge: HOME | End: 2019-03-27
Attending: NEUROLOGICAL SURGERY

## 2019-03-27 DIAGNOSIS — M51.37 DEGENERATION OF LUMBAR OR LUMBOSACRAL INTERVERTEBRAL DISC: Primary | ICD-10-CM

## 2019-03-27 DIAGNOSIS — M54.42 ACUTE LEFT-SIDED LOW BACK PAIN WITH LEFT-SIDED SCIATICA: ICD-10-CM

## 2019-03-27 PROCEDURE — 97140 MANUAL THERAPY 1/> REGIONS: CPT

## 2019-03-27 PROCEDURE — 97110 THERAPEUTIC EXERCISES: CPT

## 2019-03-27 NOTE — THERAPY TREATMENT NOTE
Outpatient Physical Therapy Ortho Treatment Note  [facility]     Patient Name: Chad Henriquez  : 1929  MRN: [id number]  Today's Date: 3/27/2019      Visit Date: 2019    Visit Dx:    ICD-10-CM ICD-9-CM   1. Degeneration of lumbar or lumbosacral intervertebral disc M51.37 722.52   2. Acute left-sided low back pain with left-sided sciatica M54.42 724.2     724.3       Patient Active Problem List   Diagnosis   • BPH with urinary obstruction   • Frequency of urination   • Nocturia   • OAB (overactive bladder)   • Non-smoker   • Spinal stenosis, lumbar region, with neurogenic claudication   • Left leg pain   • Bilateral hip pain   • Acute left-sided low back pain with left-sided sciatica   • Essential tremor   • BMI 21.0-21.9, adult   • Spinal stenosis   • Degeneration of lumbar or lumbosacral intervertebral disc   • BMI 24.0-24.9, adult        Past Medical History:   Diagnosis Date   • Allergic rhinitis    • Anemia    • Arthritis    • BPH (benign prostatic hypertrophy) with urinary retention    • Cancer (CMS/HCC)     skin cancer   • Chronic back pain     RUNS DOWN BOTH LEGS   • Constipation    • Coronary artery disease    • Hard of hearing    • Heart attack (CMS/HCC)     NO MUSCLE DAMAGE - JUST OCCLUDED VALVE   • High cholesterol    • History of skin cancer     BILATERAL EARS   • History of transfusion        • Hypertension    • Inguinal hernia    • Leaky heart valve     DR CONCEPCION SAYS NOT ENOUGH TO BE OF CONCERN   • Other bursal cyst, right elbow    • PONV (postoperative nausea and vomiting)     has had scopalamine patch in past    • Sleep apnea     DOES NOT USE CPAP OR BIPAP   • Spinal stenosis of cervical region    • Spinal stenosis of lumbar region    • Tremors of nervous system    • Wears hearing aid     BILATERAL        Past Surgical History:   Procedure Laterality Date   • ANTERIOR LUMBAR EXPOSURE N/A 2018    Procedure: ANTERIOR LUMBAR EXPOSURE;  Surgeon: Manolo Mcleod DO;   Location:  [facility];  Service: Vascular   • APPENDECTOMY     • BACK SURGERY      X3   • CARDIAC SURGERY  08/08/1986    X1 BYPASS   • CORONARY ANGIOPLASTY WITH STENT PLACEMENT  1997    X3 STENTS   • HERNIA REPAIR Bilateral     x2   • INGUINAL HERNIA REPAIR Right 6/22/2017    Procedure: RIGHT INGUINAL HERNIA REPAIR AND EXCISION OF CYST RIGHT ELBOW ;  Surgeon: Jackelyn Arriola MD;  Location:  [facility];  Service:    • LUMBAR LAMINECTOMY N/A 3/12/2018    Procedure: LUMBAR LAMINECTOMY WITHOUT FUSION L3-4,4-5;  Surgeon: Kj Falcon MD;  Location:  [facility];  Service: Neurosurgery   • LUMBAR LAMINECTOMY ANTERIOR LUMBAR INTERBODY FUSION N/A 12/7/2018    Procedure: ANTERIOR LUMBAR INTERBODY FUSION L5-S1;  Surgeon: Kj Falcon MD;  Location:  [facility];  Service: Neurosurgery   • LUMBAR LAMINECTOMY WITH FUSION Left 12/10/2018    Procedure: LUMBAR LAMINECTOMY TRANSFORAMINAL LUMBAR INTERBODY FUSION L34,45;  Surgeon: Kj Falcon MD;  Location:  [facility];  Service: Neurosurgery   • LUMBAR LAMINECTOMY WITH FUSION Left 12/7/2018    Procedure: LUMBAR LAMINECTOMY TRANSFORAMINAL LUMBAR INTERBODY FUSION LEFT L3-4, L4-5 QUADRANT RETRACTOR;  Surgeon: Kj Falcon MD;  Location:  [facility];  Service: Neurosurgery                       PT Assessment/Plan     Row Name 03/27/19 1300          PT Assessment    Assessment Comments  Progressed with strengthening of B hips today and worked on sit to stands again. He still struggles with standing wihtout the use of his hands and was given this as his new HEP component today. Hia anterior hip flexibility was improved today and this was apparent in his gait as well.   -TR        PT Plan    PT Plan Comments  Continue to work on hip stability and strengthening. He scheduled 2 more visits next week but would not like to schedule any more until after his wifes appiontment on April 8th   -TR       User Key  (r) = Recorded By, (t) = Taken By, (c) = Cosigned By    Initials Name Provider  Type    TR Rachael Mckeon PTA Physical Therapy Assistant            Exercises     Row Name 03/27/19 1300             Subjective Comments    Subjective Comments  He reports no falls since last visist and that his pain is fair   -TR         Subjective Pain    Able to rate subjective pain?  yes  -TR      Pre-Treatment Pain Level  1  -TR         Total Minutes    76026 - PT Therapeutic Exercise Minutes  30  -TR      14847 - PT Manual Therapy Minutes  15  -TR         Exercise 1    Exercise Name 1  Standing wall ball hip abd/flex with 45 cm SB   -TR      Cueing 1  Verbal  -TR      Sets 1  2  -TR      Reps 1  10  -TR      Additional Comments  BLE   -TR         Exercise 2    Exercise Name 2  B anterior hip stretches off edge of mat table  -TR      Cueing 2  Verbal  -TR      Time 2  6 (3 min each)  -TR      Additional Comments  min OP   -TR         Exercise 3    Exercise Name 3  Resisted gait against green band   -TR      Cueing 3  Verbal  -TR      Sets 3  4  -TR      Time 3  20'  -TR         Exercise 4    Exercise Name 4  standing hip ext against red band   -TR      Sets 4  2  -TR      Reps 4  5  -TR      Time 4  5 sec hold  -TR        User Key  (r) = Recorded By, (t) = Taken By, (c) = Cosigned By    Initials Name Provider Type    Rachael Herzog PTA Physical Therapy Assistant                      Manual Rx (last 36 hours)      Manual Treatments     Row Name 03/27/19 1300             Total Minutes    57245 - PT Manual Therapy Minutes  15  -TR         Manual Rx 1    Manual Rx 1 Location  Thoracic paraspinals   -TR      Manual Rx 1 Type  IASTM with blue foam   -TR      Manual Rx 1 Grade  mod  -TR      Manual Rx 1 Duration  10  -TR         Manual Rx 2    Manual Rx 2 Location  Prone thoracic   -TR      Manual Rx 2 Type  extension mobilizations   -TR      Manual Rx 2 Grade  2-3  -TR      Manual Rx 2 Duration  5  -TR        User Key  (r) = Recorded By, (t) = Taken By, (c) = Cosigned By    Initials Name Provider  Type    Rachael Herzog PTA Physical Therapy Assistant          PT OP Goals     Row Name 03/27/19 1300          Long Term Goals    LTG Date to Achieve  03/14/19  -TR     LTG 1  Improve demond hip extension to 10 deg  -TR     LTG 1 Progress  Ongoing  -TR     LTG 2  Able to walk community distances with no assistive device with right AFO  -TR     LTG 2 Progress  Ongoing  -TR     LTG 2 Progress Comments  Pt is still using cane   -TR     LTG 3  Reports on falls or tripping x 1 week while wearing his AFO  -TR     LTG 3 Progress  Ongoing  -TR     LTG 4  SLS demond legs x 10 sec with hip stability  -TR     LTG 4 Progress  Ongoing  -TR        Time Calculation    PT Goal Re-Cert Due Date  03/29/19  -TR       User Key  (r) = Recorded By, (t) = Taken By, (c) = Cosigned By    Initials Name Provider Type    Rachael Herzog PTA Physical Therapy Assistant          Therapy Education  Education Details: Sit to stand without the use of his hands.  Given: HEP, Symptoms/condition management  Program: New  How Provided: Verbal  Provided to: Patient  Level of Understanding: Verbalized, Demonstrated              Time Calculation:   Start Time: 1300  Stop Time: 1345  Time Calculation (min): 45 min  Total Timed Code Minutes- PT: 45 minute(s)  Therapy Charges for Today     Code Description Service Date Service Provider Modifiers Qty    48129149720 HC PT THER PROC EA 15 MIN 3/27/2019 Rachael Mckeon PTA GP 2    31484734464 HC PT MANUAL THERAPY EA 15 MIN 3/27/2019 Rachael Mckeon PTA GP 1                    Rachael Mckeon PTA  3/27/2019

## 2019-03-29 ENCOUNTER — HOSPITAL ENCOUNTER (OUTPATIENT)
Dept: PHYSICAL THERAPY | Facility: HOSPITAL | Age: 84
Setting detail: THERAPIES SERIES
Discharge: HOME | End: 2019-03-29
Attending: NEUROLOGICAL SURGERY

## 2019-03-29 DIAGNOSIS — M48.062 SPINAL STENOSIS, LUMBAR REGION, WITH NEUROGENIC CLAUDICATION: ICD-10-CM

## 2019-03-29 DIAGNOSIS — M25.552 BILATERAL HIP PAIN: ICD-10-CM

## 2019-03-29 DIAGNOSIS — M51.37 DEGENERATION OF LUMBAR OR LUMBOSACRAL INTERVERTEBRAL DISC: Primary | ICD-10-CM

## 2019-03-29 DIAGNOSIS — M54.42 ACUTE LEFT-SIDED LOW BACK PAIN WITH LEFT-SIDED SCIATICA: ICD-10-CM

## 2019-03-29 DIAGNOSIS — M25.551 BILATERAL HIP PAIN: ICD-10-CM

## 2019-03-29 DIAGNOSIS — M79.605 LEFT LEG PAIN: ICD-10-CM

## 2019-03-29 PROCEDURE — 97110 THERAPEUTIC EXERCISES: CPT | Performed by: PHYSICAL THERAPIST

## 2019-03-29 NOTE — THERAPY TREATMENT NOTE
Outpatient Physical Therapy Ortho Treatment Note  [facility]     Patient Name: Chad Henriquez  : 1929  MRN: [id number]  Today's Date: 3/29/2019      Visit Date: 2019    Visit Dx:    ICD-10-CM ICD-9-CM   1. Degeneration of lumbar or lumbosacral intervertebral disc M51.37 722.52   2. Acute left-sided low back pain with left-sided sciatica M54.42 724.2     724.3   3. Spinal stenosis, lumbar region, with neurogenic claudication M48.062 724.03   4. Left leg pain M79.605 729.5   5. Bilateral hip pain M25.551 719.45    M25.552        Patient Active Problem List   Diagnosis   • BPH with urinary obstruction   • Frequency of urination   • Nocturia   • OAB (overactive bladder)   • Non-smoker   • Spinal stenosis, lumbar region, with neurogenic claudication   • Left leg pain   • Bilateral hip pain   • Acute left-sided low back pain with left-sided sciatica   • Essential tremor   • BMI 21.0-21.9, adult   • Spinal stenosis   • Degeneration of lumbar or lumbosacral intervertebral disc   • BMI 24.0-24.9, adult        Past Medical History:   Diagnosis Date   • Allergic rhinitis    • Anemia    • Arthritis    • BPH (benign prostatic hypertrophy) with urinary retention    • Cancer (CMS/HCC)     skin cancer   • Chronic back pain     RUNS DOWN BOTH LEGS   • Constipation    • Coronary artery disease    • Hard of hearing    • Heart attack (CMS/HCC)     NO MUSCLE DAMAGE - JUST OCCLUDED VALVE   • High cholesterol    • History of skin cancer     BILATERAL EARS   • History of transfusion        • Hypertension    • Inguinal hernia    • Leaky heart valve     DR CONCEPCION SAYS NOT ENOUGH TO BE OF CONCERN   • Other bursal cyst, right elbow    • PONV (postoperative nausea and vomiting)     has had scopalamine patch in past    • Sleep apnea     DOES NOT USE CPAP OR BIPAP   • Spinal stenosis of cervical region    • Spinal stenosis of lumbar region    • Tremors of nervous system    • Wears hearing aid     BILATERAL         Past Surgical History:   Procedure Laterality Date   • ANTERIOR LUMBAR EXPOSURE N/A 12/7/2018    Procedure: ANTERIOR LUMBAR EXPOSURE;  Surgeon: Manolo Mcleod DO;  Location:  [facility];  Service: Vascular   • APPENDECTOMY     • BACK SURGERY      X3   • CARDIAC SURGERY  08/08/1986    X1 BYPASS   • CORONARY ANGIOPLASTY WITH STENT PLACEMENT  1997    X3 STENTS   • HERNIA REPAIR Bilateral     x2   • INGUINAL HERNIA REPAIR Right 6/22/2017    Procedure: RIGHT INGUINAL HERNIA REPAIR AND EXCISION OF CYST RIGHT ELBOW ;  Surgeon: Jackelyn Arriola MD;  Location:  [facility];  Service:    • LUMBAR LAMINECTOMY N/A 3/12/2018    Procedure: LUMBAR LAMINECTOMY WITHOUT FUSION L3-4,4-5;  Surgeon: Kj Falcon MD;  Location:  [facility];  Service: Neurosurgery   • LUMBAR LAMINECTOMY ANTERIOR LUMBAR INTERBODY FUSION N/A 12/7/2018    Procedure: ANTERIOR LUMBAR INTERBODY FUSION L5-S1;  Surgeon: Kj Falcon MD;  Location:  [facility];  Service: Neurosurgery   • LUMBAR LAMINECTOMY WITH FUSION Left 12/10/2018    Procedure: LUMBAR LAMINECTOMY TRANSFORAMINAL LUMBAR INTERBODY FUSION L34,45;  Surgeon: Kj Falcon MD;  Location:  [facility];  Service: Neurosurgery   • LUMBAR LAMINECTOMY WITH FUSION Left 12/7/2018    Procedure: LUMBAR LAMINECTOMY TRANSFORAMINAL LUMBAR INTERBODY FUSION LEFT L3-4, L4-5 QUADRANT RETRACTOR;  Surgeon: Kj Falcon MD;  Location:  [facility];  Service: Neurosurgery                       PT Assessment/Plan     Row Name 03/29/19 1300          PT Assessment    Assessment Comments  We are focusing more on hip and postural stability today. His hips are still quite weak which affects his balance  -TB        PT Plan    PT Plan Comments  cont progressive hip and posture stability  -TB       User Key  (r) = Recorded By, (t) = Taken By, (c) = Cosigned By    Initials Name Provider Type    TB Luis Moe, PT Physical Therapist            Exercises     Row Name 03/29/19 1300             Subjective Comments     "Subjective Comments  pain central lower lumbar  -TB         Subjective Pain    Pre-Treatment Pain Level  1  -TB         Total Minutes    34325 - PT Therapeutic Exercise Minutes  45  -TB         Exercise 1    Exercise Name 1  demond passive hip stretches all planes including hams  -TB      Time 1  5 min each  -TB         Exercise 2    Exercise Name 2  seated hip abd/ER against green ttube while also doing W's against green tband  -TB      Sets 2  3  -TB      Reps 2  8  -TB      Time 2  5 sec hold  -TB         Exercise 3    Exercise Name 3  standing hip ext against green ttube  -TB      Sets 3  1  -TB      Reps 3  10 each  -TB      Time 3  10 sec hold  -TB      Additional Comments  alternated legs  -TB         Exercise 4    Exercise Name 4  TRX bar demond press and row  -TB      Sets 4  1  -TB      Reps 4  20 reps each arm, each direction  -TB      Additional Comments  cued to slow down and focus on balance and technique; stoo about 10' away from clip  -TB         Exercise 5    Exercise Name 5  side step up onto 6\" box  -TB      Sets 5  2  -TB      Reps 5  15 each leg  -TB      Additional Comments  Cybex arms used to asst  -TB        User Key  (r) = Recorded By, (t) = Taken By, (c) = Cosigned By    Initials Name Provider Type    TB Luis Moe, PT Physical Therapist                       PT OP Goals     Row Name 03/29/19 1300          Long Term Goals    LTG Date to Achieve  03/14/19  -TB     LTG 1  Improve demond hip extension to 10 deg  -TB     LTG 1 Progress  Ongoing  -TB     LTG 2  Able to walk community distances with no assistive device with right AFO  -TB     LTG 2 Progress  Ongoing  -TB     LTG 2 Progress Comments  still needs cane  -TB     LTG 3  Reports on falls or tripping x 1 week while wearing his AFO  -TB     LTG 3 Progress  Ongoing  -TB     LTG 3 Progress Comments  no falls since last visit  -TB     LTG 4  SLS demond legs x 10 sec with hip stability  -TB     LTG 4 Progress  Ongoing  -TB        Time Calculation "    PT Goal Re-Cert Due Date  04/21/19  -TB       User Key  (r) = Recorded By, (t) = Taken By, (c) = Cosigned By    Initials Name Provider Type    TB Luis Moe, PT Physical Therapist          Therapy Education  Education Details: w's  Program: New  How Provided: Verbal  Provided to: Patient  Level of Understanding: Verbalized, Demonstrated              Time Calculation:   Start Time: 1300  Stop Time: 1345  Time Calculation (min): 45 min  Total Timed Code Minutes- PT: 45 minute(s)  Therapy Charges for Today     Code Description Service Date Service Provider Modifiers Qty    81578852097  PT THER PROC EA 15 MIN 3/29/2019 Luis Moe, PT GP 3                    Luis Moe, PT  3/29/2019

## 2019-04-02 ENCOUNTER — HOSPITAL ENCOUNTER (OUTPATIENT)
Dept: PHYSICAL THERAPY | Facility: HOSPITAL | Age: 84
Setting detail: THERAPIES SERIES
Discharge: HOME | End: 2019-04-02
Attending: NEUROLOGICAL SURGERY

## 2019-04-02 DIAGNOSIS — M51.37 DEGENERATION OF LUMBAR OR LUMBOSACRAL INTERVERTEBRAL DISC: Primary | ICD-10-CM

## 2019-04-02 DIAGNOSIS — M54.42 ACUTE LEFT-SIDED LOW BACK PAIN WITH LEFT-SIDED SCIATICA: ICD-10-CM

## 2019-04-02 PROCEDURE — 97110 THERAPEUTIC EXERCISES: CPT

## 2019-04-02 NOTE — THERAPY TREATMENT NOTE
Outpatient Physical Therapy Ortho Treatment Note  [facility]     Patient Name: Chad Henriquez  : 1929  MRN: [id number]  Today's Date: 2019      Visit Date: 2019    Visit Dx:    ICD-10-CM ICD-9-CM   1. Degeneration of lumbar or lumbosacral intervertebral disc M51.37 722.52   2. Acute left-sided low back pain with left-sided sciatica M54.42 724.2     724.3       Patient Active Problem List   Diagnosis   • BPH with urinary obstruction   • Frequency of urination   • Nocturia   • OAB (overactive bladder)   • Non-smoker   • Spinal stenosis, lumbar region, with neurogenic claudication   • Left leg pain   • Bilateral hip pain   • Acute left-sided low back pain with left-sided sciatica   • Essential tremor   • BMI 21.0-21.9, adult   • Spinal stenosis   • Degeneration of lumbar or lumbosacral intervertebral disc   • BMI 24.0-24.9, adult        Past Medical History:   Diagnosis Date   • Allergic rhinitis    • Anemia    • Arthritis    • BPH (benign prostatic hypertrophy) with urinary retention    • Cancer (CMS/HCC)     skin cancer   • Chronic back pain     RUNS DOWN BOTH LEGS   • Constipation    • Coronary artery disease    • Hard of hearing    • Heart attack (CMS/HCC)     NO MUSCLE DAMAGE - JUST OCCLUDED VALVE   • High cholesterol    • History of skin cancer     BILATERAL EARS   • History of transfusion        • Hypertension    • Inguinal hernia    • Leaky heart valve     DR CONCEPCION SAYS NOT ENOUGH TO BE OF CONCERN   • Other bursal cyst, right elbow    • PONV (postoperative nausea and vomiting)     has had scopalamine patch in past    • Sleep apnea     DOES NOT USE CPAP OR BIPAP   • Spinal stenosis of cervical region    • Spinal stenosis of lumbar region    • Tremors of nervous system    • Wears hearing aid     BILATERAL        Past Surgical History:   Procedure Laterality Date   • ANTERIOR LUMBAR EXPOSURE N/A 2018    Procedure: ANTERIOR LUMBAR EXPOSURE;  Surgeon: Manolo Mcleod DO;   Location:  [facility];  Service: Vascular   • APPENDECTOMY     • BACK SURGERY      X3   • CARDIAC SURGERY  08/08/1986    X1 BYPASS   • CORONARY ANGIOPLASTY WITH STENT PLACEMENT  1997    X3 STENTS   • HERNIA REPAIR Bilateral     x2   • INGUINAL HERNIA REPAIR Right 6/22/2017    Procedure: RIGHT INGUINAL HERNIA REPAIR AND EXCISION OF CYST RIGHT ELBOW ;  Surgeon: Jackelyn Arriola MD;  Location:  [facility];  Service:    • LUMBAR LAMINECTOMY N/A 3/12/2018    Procedure: LUMBAR LAMINECTOMY WITHOUT FUSION L3-4,4-5;  Surgeon: Kj Falcon MD;  Location:  [facility];  Service: Neurosurgery   • LUMBAR LAMINECTOMY ANTERIOR LUMBAR INTERBODY FUSION N/A 12/7/2018    Procedure: ANTERIOR LUMBAR INTERBODY FUSION L5-S1;  Surgeon: Kj Falcon MD;  Location:  [facility];  Service: Neurosurgery   • LUMBAR LAMINECTOMY WITH FUSION Left 12/10/2018    Procedure: LUMBAR LAMINECTOMY TRANSFORAMINAL LUMBAR INTERBODY FUSION L34,45;  Surgeon: Kj Falcon MD;  Location:  [facility];  Service: Neurosurgery   • LUMBAR LAMINECTOMY WITH FUSION Left 12/7/2018    Procedure: LUMBAR LAMINECTOMY TRANSFORAMINAL LUMBAR INTERBODY FUSION LEFT L3-4, L4-5 QUADRANT RETRACTOR;  Surgeon: Kj Falcon MD;  Location:  [facility];  Service: Neurosurgery                       PT Assessment/Plan     Row Name 04/02/19 6019          PT Assessment    Assessment Comments  Worked on hip strengthening , hip flexibility, and BLE endrance today. He fatigued quickly with LE bike and struggled with LLE balance. His O2 and HR were WNL with all exercise but he did have some SOA with LE bike.   -TR        PT Plan    PT Plan Comments  COntinue with hip strengthening, flexibility, and enduraance.   -TR       User Key  (r) = Recorded By, (t) = Taken By, (c) = Cosigned By    Initials Name Provider Type    Rachael Herzog PTA Physical Therapy Assistant            Exercises     Row Name 04/02/19 0130             Subjective Comments    Subjective Comments  Pt reports that  "he is having a hard time because his wife has been depressed and he doesn't feel like he is making any progress at this time. He rpeorts constant low back back at a 1/10.   -TR         Subjective Pain    Able to rate subjective pain?  yes  -TR      Pre-Treatment Pain Level  1  -TR      Subjective Pain Comment  R lowback   -TR         Total Minutes    87434 - PT Therapeutic Exercise Minutes  42  -TR         Exercise 1    Exercise Name 1  Bilateral passive Hip stretches for IR/ER and hamstrings   -TR      Time 1  10  -TR         Exercise 2    Exercise Name 2  Step ups onto 8\" step   -TR      Cueing 2  Verbal  -TR      Sets 2  1  -TR      Reps 2  10 each   -TR      Additional Comments  BLE   -TR         Exercise 3    Exercise Name 3  Step down from 8\" step onto blue foam   -TR      Cueing 3  Verbal  -TR      Sets 3  1  -TR      Reps 3  10 each   -TR      Additional Comments  8\" step with LLE step downs and 4\" step with RLE step downs   -TR         Exercise 4    Exercise Name 4  SCIFIT LE bike   -TR      Cueing 4  Verbal  -TR      Time 4  5 minutes   -TR      Additional Comments  resistance 5   -TR         Exercise 5    Exercise Name 5  SCIFIT LE bike   -TR      Cueing 5  Verbal  -TR      Time 5  5 min  -TR      Additional Comments  resistance 3.5   -TR         Exercise 6    Exercise Name 6  Tandem stance on foam   -TR      Cueing 6  Verbal  -TR      Sets 6  2  -TR      Time 6  30 seconds   -TR      Additional Comments  3-4 seconds befor erequiring UE assist   -TR        User Key  (r) = Recorded By, (t) = Taken By, (c) = Cosigned By    Initials Name Provider Type    TR Rachael Mckeon, PTA Physical Therapy Assistant                       PT OP Goals     Row Name 04/02/19 1348          Long Term Goals    LTG Date to Achieve  03/14/19  -TR     LTG 1  Improve demond hip extension to 10 deg  -TR     LTG 1 Progress  Ongoing  -TR     LTG 2  Able to walk community distances with no assistive device with right AFO  -TR     " LTG 2 Progress  Ongoing  -TR     LTG 3  Reports on falls or tripping x 1 week while wearing his AFO  -TR     LTG 3 Progress  Ongoing  -TR     LTG 4  SLS demond legs x 10 sec with hip stability  -TR     LTG 4 Progress  Ongoing  -TR     LTG 4 Progress Comments  worked on balance today but he struggles with LLE balance   -TR        Time Calculation    PT Goal Re-Cert Due Date  04/21/19  -TR       User Key  (r) = Recorded By, (t) = Taken By, (c) = Cosigned By    Initials Name Provider Type    TR Rachael Mckeon PTA Physical Therapy Assistant          Therapy Education  Given: Symptoms/condition management, HEP  Program: Reinforced  How Provided: Verbal  Provided to: Patient  Level of Understanding: Verbalized              Time Calculation:   Start Time: 1348  Stop Time: 1430  Time Calculation (min): 42 min  Total Timed Code Minutes- PT: 42 minute(s)  Therapy Charges for Today     Code Description Service Date Service Provider Modifiers Qty    40703224291 HC PT THER PROC EA 15 MIN 4/2/2019 Rachael Mckeon PTA GP 3                    Rachael Mckeon PTA  4/2/2019

## 2019-04-05 ENCOUNTER — HOSPITAL ENCOUNTER (OUTPATIENT)
Dept: PHYSICAL THERAPY | Facility: HOSPITAL | Age: 84
Setting detail: THERAPIES SERIES
Discharge: HOME | End: 2019-04-05
Attending: NEUROLOGICAL SURGERY

## 2019-04-05 DIAGNOSIS — M54.42 ACUTE LEFT-SIDED LOW BACK PAIN WITH LEFT-SIDED SCIATICA: ICD-10-CM

## 2019-04-05 DIAGNOSIS — M51.37 DEGENERATION OF LUMBAR OR LUMBOSACRAL INTERVERTEBRAL DISC: Primary | ICD-10-CM

## 2019-04-05 PROCEDURE — 97110 THERAPEUTIC EXERCISES: CPT

## 2019-04-05 PROCEDURE — 97140 MANUAL THERAPY 1/> REGIONS: CPT

## 2019-04-05 NOTE — THERAPY TREATMENT NOTE
Outpatient Physical Therapy Ortho Treatment Note  [facility]     Patient Name: Chad Henriquez  : 1929  MRN: [id number]  Today's Date: 2019      Visit Date: 2019    Visit Dx:    ICD-10-CM ICD-9-CM   1. Degeneration of lumbar or lumbosacral intervertebral disc M51.37 722.52   2. Acute left-sided low back pain with left-sided sciatica M54.42 724.2     724.3       Patient Active Problem List   Diagnosis   • BPH with urinary obstruction   • Frequency of urination   • Nocturia   • OAB (overactive bladder)   • Non-smoker   • Spinal stenosis, lumbar region, with neurogenic claudication   • Left leg pain   • Bilateral hip pain   • Acute left-sided low back pain with left-sided sciatica   • Essential tremor   • BMI 21.0-21.9, adult   • Spinal stenosis   • Degeneration of lumbar or lumbosacral intervertebral disc   • BMI 24.0-24.9, adult        Past Medical History:   Diagnosis Date   • Allergic rhinitis    • Anemia    • Arthritis    • BPH (benign prostatic hypertrophy) with urinary retention    • Cancer (CMS/HCC)     skin cancer   • Chronic back pain     RUNS DOWN BOTH LEGS   • Constipation    • Coronary artery disease    • Hard of hearing    • Heart attack (CMS/HCC)     NO MUSCLE DAMAGE - JUST OCCLUDED VALVE   • High cholesterol    • History of skin cancer     BILATERAL EARS   • History of transfusion        • Hypertension    • Inguinal hernia    • Leaky heart valve     DR CONCEPCION SAYS NOT ENOUGH TO BE OF CONCERN   • Other bursal cyst, right elbow    • PONV (postoperative nausea and vomiting)     has had scopalamine patch in past    • Sleep apnea     DOES NOT USE CPAP OR BIPAP   • Spinal stenosis of cervical region    • Spinal stenosis of lumbar region    • Tremors of nervous system    • Wears hearing aid     BILATERAL        Past Surgical History:   Procedure Laterality Date   • ANTERIOR LUMBAR EXPOSURE N/A 2018    Procedure: ANTERIOR LUMBAR EXPOSURE;  Surgeon: Manolo Mcleod DO;   Location:  [facility];  Service: Vascular   • APPENDECTOMY     • BACK SURGERY      X3   • CARDIAC SURGERY  08/08/1986    X1 BYPASS   • CORONARY ANGIOPLASTY WITH STENT PLACEMENT  1997    X3 STENTS   • HERNIA REPAIR Bilateral     x2   • INGUINAL HERNIA REPAIR Right 6/22/2017    Procedure: RIGHT INGUINAL HERNIA REPAIR AND EXCISION OF CYST RIGHT ELBOW ;  Surgeon: Jackelyn Arriola MD;  Location:  [facility];  Service:    • LUMBAR LAMINECTOMY N/A 3/12/2018    Procedure: LUMBAR LAMINECTOMY WITHOUT FUSION L3-4,4-5;  Surgeon: Kj Falcon MD;  Location:  [facility];  Service: Neurosurgery   • LUMBAR LAMINECTOMY ANTERIOR LUMBAR INTERBODY FUSION N/A 12/7/2018    Procedure: ANTERIOR LUMBAR INTERBODY FUSION L5-S1;  Surgeon: Kj Falcon MD;  Location:  [facility];  Service: Neurosurgery   • LUMBAR LAMINECTOMY WITH FUSION Left 12/10/2018    Procedure: LUMBAR LAMINECTOMY TRANSFORAMINAL LUMBAR INTERBODY FUSION L34,45;  Surgeon: Kj Falcon MD;  Location:  [facility];  Service: Neurosurgery   • LUMBAR LAMINECTOMY WITH FUSION Left 12/7/2018    Procedure: LUMBAR LAMINECTOMY TRANSFORAMINAL LUMBAR INTERBODY FUSION LEFT L3-4, L4-5 QUADRANT RETRACTOR;  Surgeon: Kj Falcon MD;  Location:  [facility];  Service: Neurosurgery                       PT Assessment/Plan     Row Name 04/05/19 9650          PT Assessment    Assessment Comments  Started treatment by addressing pt's continued decreased mobility. It did slightly improve post session. Progressed with LE strengtheening and balance. He still struggles with tandem stance having 1 LOB, requiring min assist to correct.   -TR        PT Plan    PT Plan Comments  Continue to work on improvin gbalance and gait.   -TR       User Key  (r) = Recorded By, (t) = Taken By, (c) = Cosigned By    Initials Name Provider Type    Rachael Herzog PTA Physical Therapy Assistant            Exercises     Row Name 04/05/19 9705             Subjective Pain    Able to rate subjective pain?  yes  " -TR      Pre-Treatment Pain Level  1  -TR      Subjective Pain Comment  R low back   -TR         Total Minutes    74995 - PT Therapeutic Exercise Minutes  23  -TR      02654 - PT Manual Therapy Minutes  20  -TR         Exercise 1    Exercise Name 1  Sidesteps up onto 6\" block   -TR      Cueing 1  Verbal  -TR      Sets 1  2  -TR      Reps 1  15  -TR      Additional Comments  Both sides   -TR         Exercise 2    Exercise Name 2  Mini lunges onto round BOSU   -TR      Cueing 2  Verbal  -TR      Sets 2  2  -TR      Reps 2  10  -TR      Additional Comments  BLE   -TR         Exercise 3    Exercise Name 3  Seated W's pizza carry  -TR      Cueing 3  Verbal  -TR      Sets 3  2  -TR      Reps 3  10  -TR      Additional Comments  Added (reviewed) for HEP   -TR         Exercise 4    Exercise Name 4  Ball toss with 45 cm SB  at trampoline.   -TR      Cueing 4  Verbal  -TR      Sets 4  4  -TR      Reps 4  15  -TR      Additional Comments  Progressed to tandem stance   -TR        User Key  (r) = Recorded By, (t) = Taken By, (c) = Cosigned By    Initials Name Provider Type    Rachael Herzog PTA Physical Therapy Assistant                      Manual Rx (last 36 hours)      Manual Treatments     Row Name 04/05/19 1433             Total Minutes    12033 - PT Manual Therapy Minutes  20  -TR         Manual Rx 1    Manual Rx 1 Location  Thoracic paraspinals   -TR      Manual Rx 1 Type  IASTM with blue foam   -TR      Manual Rx 1 Grade  mod  -TR      Manual Rx 1 Duration  10  -TR         Manual Rx 2    Manual Rx 2 Location  Prone thoracic   -TR      Manual Rx 2 Type  extension mobilizations   -TR      Manual Rx 2 Grade  2-3  -TR      Manual Rx 2 Duration  10  -TR        User Key  (r) = Recorded By, (t) = Taken By, (c) = Cosigned By    Initials Name Provider Type    Rachael Herzog PTA Physical Therapy Assistant          PT OP Goals     Row Name 04/05/19 1433          Long Term Goals    LTG Date to Achieve  03/14/19  " -TR     LTG 1  Improve demond hip extension to 10 deg  -TR     LTG 1 Progress  Ongoing  -TR     LTG 2  Able to walk community distances with no assistive device with right AFO  -TR     LTG 2 Progress  Ongoing  -TR     LTG 3  Reports on falls or tripping x 1 week while wearing his AFO  -TR     LTG 3 Progress  Ongoing  -TR     LTG 3 Progress Comments  Pt denies any falls   -TR     LTG 4  SLS demond legs x 10 sec with hip stability  -TR     LTG 4 Progress  Ongoing  -TR        Time Calculation    PT Goal Re-Cert Due Date  04/21/19  -TR       User Key  (r) = Recorded By, (t) = Taken By, (c) = Cosigned By    Initials Name Provider Type    TR Rachael Mckeon PTA Physical Therapy Assistant          Therapy Education  Education Details: Reviewed W's and issued handout as he was not performing.   Given: HEP  Program: Reinforced  How Provided: Verbal, Demonstration, Written  Provided to: Patient  Level of Understanding: Verbalized, Demonstrated              Time Calculation:   Start Time: 1433  Stop Time: 1516  Time Calculation (min): 43 min  Total Timed Code Minutes- PT: 43 minute(s)  Therapy Charges for Today     Code Description Service Date Service Provider Modifiers Qty    38330804388 HC PT THER PROC EA 15 MIN 4/5/2019 Rachael Mckeon PTA GP 2    86726237085 HC PT MANUAL THERAPY EA 15 MIN 4/5/2019 Rachael Mckeon PTA GP 1                    Rachael Mckeon PTA  4/5/2019

## 2019-04-10 ENCOUNTER — HOSPITAL ENCOUNTER (OUTPATIENT)
Dept: PHYSICAL THERAPY | Facility: HOSPITAL | Age: 84
Setting detail: THERAPIES SERIES
Discharge: HOME | End: 2019-04-10
Attending: NEUROLOGICAL SURGERY

## 2019-04-10 DIAGNOSIS — M48.062 SPINAL STENOSIS, LUMBAR REGION, WITH NEUROGENIC CLAUDICATION: ICD-10-CM

## 2019-04-10 DIAGNOSIS — M54.42 ACUTE LEFT-SIDED LOW BACK PAIN WITH LEFT-SIDED SCIATICA: ICD-10-CM

## 2019-04-10 DIAGNOSIS — M79.605 LEFT LEG PAIN: ICD-10-CM

## 2019-04-10 DIAGNOSIS — M25.551 BILATERAL HIP PAIN: ICD-10-CM

## 2019-04-10 DIAGNOSIS — M51.37 DEGENERATION OF LUMBAR OR LUMBOSACRAL INTERVERTEBRAL DISC: Primary | ICD-10-CM

## 2019-04-10 DIAGNOSIS — M25.552 BILATERAL HIP PAIN: ICD-10-CM

## 2019-04-10 PROCEDURE — 97110 THERAPEUTIC EXERCISES: CPT | Performed by: PHYSICAL THERAPIST

## 2019-04-10 NOTE — THERAPY TREATMENT NOTE
Outpatient Physical Therapy Ortho Treatment Note  [facility]     Patient Name: Chad Henriquez  : 1929  MRN: [id number]  Today's Date: 4/10/2019      Visit Date: 04/10/2019    Visit Dx:    ICD-10-CM ICD-9-CM   1. Degeneration of lumbar or lumbosacral intervertebral disc M51.37 722.52   2. Acute left-sided low back pain with left-sided sciatica M54.42 724.2     724.3   3. Spinal stenosis, lumbar region, with neurogenic claudication M48.062 724.03   4. Left leg pain M79.605 729.5   5. Bilateral hip pain M25.551 719.45    M25.552        Patient Active Problem List   Diagnosis   • BPH with urinary obstruction   • Frequency of urination   • Nocturia   • OAB (overactive bladder)   • Non-smoker   • Spinal stenosis, lumbar region, with neurogenic claudication   • Left leg pain   • Bilateral hip pain   • Acute left-sided low back pain with left-sided sciatica   • Essential tremor   • BMI 21.0-21.9, adult   • Spinal stenosis   • Degeneration of lumbar or lumbosacral intervertebral disc   • BMI 24.0-24.9, adult        Past Medical History:   Diagnosis Date   • Allergic rhinitis    • Anemia    • Arthritis    • BPH (benign prostatic hypertrophy) with urinary retention    • Cancer (CMS/HCC)     skin cancer   • Chronic back pain     RUNS DOWN BOTH LEGS   • Constipation    • Coronary artery disease    • Hard of hearing    • Heart attack (CMS/HCC)     NO MUSCLE DAMAGE - JUST OCCLUDED VALVE   • High cholesterol    • History of skin cancer     BILATERAL EARS   • History of transfusion        • Hypertension    • Inguinal hernia    • Leaky heart valve     DR CONCEPCION SAYS NOT ENOUGH TO BE OF CONCERN   • Other bursal cyst, right elbow    • PONV (postoperative nausea and vomiting)     has had scopalamine patch in past    • Sleep apnea     DOES NOT USE CPAP OR BIPAP   • Spinal stenosis of cervical region    • Spinal stenosis of lumbar region    • Tremors of nervous system    • Wears hearing aid     BILATERAL         Past Surgical History:   Procedure Laterality Date   • ANTERIOR LUMBAR EXPOSURE N/A 12/7/2018    Procedure: ANTERIOR LUMBAR EXPOSURE;  Surgeon: Manolo Mcleod DO;  Location:  [facility];  Service: Vascular   • APPENDECTOMY     • BACK SURGERY      X3   • CARDIAC SURGERY  08/08/1986    X1 BYPASS   • CORONARY ANGIOPLASTY WITH STENT PLACEMENT  1997    X3 STENTS   • HERNIA REPAIR Bilateral     x2   • INGUINAL HERNIA REPAIR Right 6/22/2017    Procedure: RIGHT INGUINAL HERNIA REPAIR AND EXCISION OF CYST RIGHT ELBOW ;  Surgeon: Jackelyn Arriola MD;  Location:  [facility];  Service:    • LUMBAR LAMINECTOMY N/A 3/12/2018    Procedure: LUMBAR LAMINECTOMY WITHOUT FUSION L3-4,4-5;  Surgeon: Kj Falcon MD;  Location:  [facility];  Service: Neurosurgery   • LUMBAR LAMINECTOMY ANTERIOR LUMBAR INTERBODY FUSION N/A 12/7/2018    Procedure: ANTERIOR LUMBAR INTERBODY FUSION L5-S1;  Surgeon: Kj Falcon MD;  Location:  [facility];  Service: Neurosurgery   • LUMBAR LAMINECTOMY WITH FUSION Left 12/10/2018    Procedure: LUMBAR LAMINECTOMY TRANSFORAMINAL LUMBAR INTERBODY FUSION L34,45;  Surgeon: Kj Falcon MD;  Location:  [facility];  Service: Neurosurgery   • LUMBAR LAMINECTOMY WITH FUSION Left 12/7/2018    Procedure: LUMBAR LAMINECTOMY TRANSFORAMINAL LUMBAR INTERBODY FUSION LEFT L3-4, L4-5 QUADRANT RETRACTOR;  Surgeon: Kj Falcon MD;  Location:  [facility];  Service: Neurosurgery                       PT Assessment/Plan     Row Name 04/10/19 1400          PT Assessment    Assessment Comments  He still shows some hip weakness but is more steady without the cane for short distances.   -TB        PT Plan    PT Plan Comments  cont with emphasis on hip stability and progress toward d/c.   -TB       User Key  (r) = Recorded By, (t) = Taken By, (c) = Cosigned By    Initials Name Provider Type    TB Luis Moe, PT Physical Therapist            Exercises     Row Name 04/10/19 1400             Subjective Comments     Subjective Comments  He feels like his balance is improving some but not where it should be.   -TB         Subjective Pain    Pre-Treatment Pain Level  1  -TB         Total Minutes    05890 - PT Therapeutic Exercise Minutes  45  -TB         Exercise 1    Exercise Name 1  seated hip abd/IR against green ttube  -TB      Time 1  4  -TB         Exercise 2    Exercise Name 2  seated hip ER against green ttube  -TB      Time 2  3 min each  -TB         Exercise 3    Exercise Name 3  SLS using TRX straps to steady him  -TB      Sets 3  3 sets each legs  -TB      Time 3  2 min each set  -TB      Additional Comments  added hip abd and ext to SLS  -TB         Exercise 4    Exercise Name 4  TRX alternating forward lunges  -TB      Reps 4  2  -TB      Time 4  1 min  -TB         Exercise 5    Exercise Name 5  TRX alternating side lunges  -TB      Reps 5  2  -TB      Time 5  1 min each  -TB         Exercise 6    Exercise Name 6  standing TRX rip  shoulder press cord on one side  -TB      Sets 6  1  -TB      Reps 6  10  -TB        User Key  (r) = Recorded By, (t) = Taken By, (c) = Cosigned By    Initials Name Provider Type    TB Luis Moe, PT Physical Therapist                       PT OP Goals     Row Name 04/10/19 1400          Long Term Goals    LTG Date to Achieve  03/14/19  -TB     LTG 1  Improve demond hip extension to 10 deg  -TB     LTG 1 Progress  Ongoing  -TB     LTG 2  Able to walk community distances with no assistive device with right AFO  -TB     LTG 2 Progress  Ongoing  -TB     LTG 2 Progress Comments  still needs cane but can walk short distances without it  -TB     LTG 3  Reports on falls or tripping x 1 week while wearing his AFO  -TB     LTG 3 Progress  Ongoing  -TB     LTG 4  SLS demond legs x 10 sec with hip stability  -TB     LTG 4 Progress  Ongoing  -TB        Time Calculation    PT Goal Re-Cert Due Date  04/21/19  -TB       User Key  (r) = Recorded By, (t) = Taken By, (c) = Cosigned By    Initials  Name Provider Type    TB Luis Moe, PT Physical Therapist          Therapy Education  Given: HEP  Program: Reinforced  How Provided: Verbal, Demonstration, Written  Provided to: Patient  Level of Understanding: Verbalized, Demonstrated              Time Calculation:   Start Time: 1400  Stop Time: 1445  Time Calculation (min): 45 min  Total Timed Code Minutes- PT: 45 minute(s)  Therapy Charges for Today     Code Description Service Date Service Provider Modifiers Qty    77096995899 HC PT THER PROC EA 15 MIN 4/10/2019 Luis Moe, PT GP 3                    Luis Moe, PT  4/10/2019

## 2019-04-12 ENCOUNTER — HOSPITAL ENCOUNTER (OUTPATIENT)
Dept: PHYSICAL THERAPY | Facility: HOSPITAL | Age: 84
Setting detail: THERAPIES SERIES
Discharge: HOME | End: 2019-04-12
Attending: NEUROLOGICAL SURGERY

## 2019-04-12 DIAGNOSIS — M51.37 DEGENERATION OF LUMBAR OR LUMBOSACRAL INTERVERTEBRAL DISC: Primary | ICD-10-CM

## 2019-04-12 DIAGNOSIS — M54.42 ACUTE LEFT-SIDED LOW BACK PAIN WITH LEFT-SIDED SCIATICA: ICD-10-CM

## 2019-04-12 PROCEDURE — 97110 THERAPEUTIC EXERCISES: CPT

## 2019-04-12 NOTE — THERAPY TREATMENT NOTE
Outpatient Physical Therapy Ortho Treatment Note  [facility]     Patient Name: Chad Henriquez  : 1929  MRN: [id number]  Today's Date: 2019      Visit Date: 2019    Visit Dx:    ICD-10-CM ICD-9-CM   1. Degeneration of lumbar or lumbosacral intervertebral disc M51.37 722.52   2. Acute left-sided low back pain with left-sided sciatica M54.42 724.2     724.3       Patient Active Problem List   Diagnosis   • BPH with urinary obstruction   • Frequency of urination   • Nocturia   • OAB (overactive bladder)   • Non-smoker   • Spinal stenosis, lumbar region, with neurogenic claudication   • Left leg pain   • Bilateral hip pain   • Acute left-sided low back pain with left-sided sciatica   • Essential tremor   • BMI 21.0-21.9, adult   • Spinal stenosis   • Degeneration of lumbar or lumbosacral intervertebral disc   • BMI 24.0-24.9, adult        Past Medical History:   Diagnosis Date   • Allergic rhinitis    • Anemia    • Arthritis    • BPH (benign prostatic hypertrophy) with urinary retention    • Cancer (CMS/HCC)     skin cancer   • Chronic back pain     RUNS DOWN BOTH LEGS   • Constipation    • Coronary artery disease    • Hard of hearing    • Heart attack (CMS/HCC)     NO MUSCLE DAMAGE - JUST OCCLUDED VALVE   • High cholesterol    • History of skin cancer     BILATERAL EARS   • History of transfusion        • Hypertension    • Inguinal hernia    • Leaky heart valve     DR CONCEPCION SAYS NOT ENOUGH TO BE OF CONCERN   • Other bursal cyst, right elbow    • PONV (postoperative nausea and vomiting)     has had scopalamine patch in past    • Sleep apnea     DOES NOT USE CPAP OR BIPAP   • Spinal stenosis of cervical region    • Spinal stenosis of lumbar region    • Tremors of nervous system    • Wears hearing aid     BILATERAL        Past Surgical History:   Procedure Laterality Date   • ANTERIOR LUMBAR EXPOSURE N/A 2018    Procedure: ANTERIOR LUMBAR EXPOSURE;  Surgeon: Manolo Mcleod DO;   Location:  [facility];  Service: Vascular   • APPENDECTOMY     • BACK SURGERY      X3   • CARDIAC SURGERY  08/08/1986    X1 BYPASS   • CORONARY ANGIOPLASTY WITH STENT PLACEMENT  1997    X3 STENTS   • HERNIA REPAIR Bilateral     x2   • INGUINAL HERNIA REPAIR Right 6/22/2017    Procedure: RIGHT INGUINAL HERNIA REPAIR AND EXCISION OF CYST RIGHT ELBOW ;  Surgeon: Jackelyn Arriola MD;  Location:  [facility];  Service:    • LUMBAR LAMINECTOMY N/A 3/12/2018    Procedure: LUMBAR LAMINECTOMY WITHOUT FUSION L3-4,4-5;  Surgeon: Kj Falcon MD;  Location:  [facility];  Service: Neurosurgery   • LUMBAR LAMINECTOMY ANTERIOR LUMBAR INTERBODY FUSION N/A 12/7/2018    Procedure: ANTERIOR LUMBAR INTERBODY FUSION L5-S1;  Surgeon: Kj Falcon MD;  Location:  [facility];  Service: Neurosurgery   • LUMBAR LAMINECTOMY WITH FUSION Left 12/10/2018    Procedure: LUMBAR LAMINECTOMY TRANSFORAMINAL LUMBAR INTERBODY FUSION L34,45;  Surgeon: Kj Falcon MD;  Location:  [facility];  Service: Neurosurgery   • LUMBAR LAMINECTOMY WITH FUSION Left 12/7/2018    Procedure: LUMBAR LAMINECTOMY TRANSFORAMINAL LUMBAR INTERBODY FUSION LEFT L3-4, L4-5 QUADRANT RETRACTOR;  Surgeon: Kj Falcon MD;  Location:  [facility];  Service: Neurosurgery                       PT Assessment/Plan     Row Name 04/12/19 0802          PT Assessment    Assessment Comments  Pt was unsteady with balance activties today. He seemed distratced by the fact that his wife starts chemo today, I think this effected his treatment slightly. We did progress activity today with less rest breaks which he tolerated well.   -TR        PT Plan    PT Plan Comments  Continue with balance and hip stability.   -TR       User Key  (r) = Recorded By, (t) = Taken By, (c) = Cosigned By    Initials Name Provider Type    Rachael Herzog, PTA Physical Therapy Assistant            Exercises     Row Name 04/12/19 0802             Subjective Comments    Subjective Comments  Pt reports  mornings are his hardest time as he wakes up with slight pain and stiffness.   -TR         Subjective Pain    Able to rate subjective pain?  yes  -TR      Pre-Treatment Pain Level  1  -TR      Subjective Pain Comment  R low back   -TR         Total Minutes    53809 - PT Therapeutic Exercise Minutes  43  -TR         Exercise 1    Exercise Name 1  Shuttle press Single leg  -TR      Cueing 1  Verbal  -TR      Sets 1  2 sets each LE   -TR      Time 1  2 min each   -TR      Additional Comments  4 cords LLE   -TR         Exercise 2    Exercise Name 2  NBOS on foam with ball toss to the trampoline   -TR      Cueing 2  Verbal;Tactile  -TR      Sets 2  2  -TR      Reps 2  15  -TR         Exercise 3    Exercise Name 3  Modified tandem stance ball toss at trampoline with 45 cm SB   -TR      Cueing 3  Verbal  -TR      Sets 3  2  -TR      Reps 3  10  -TR         Exercise 4    Exercise Name 4  Tandem stance at [employer] with external pertubations in varying directions   -TR      Cueing 4  Verbal  -TR      Sets 4  4  -TR      Time 4  30 seconds each   -TR      Additional Comments  alteranting leading LE   -TR         Exercise 5    Exercise Name 5  Resisted gait with blue band   -TR      Cueing 5  Verbal  -TR      Sets 5  2  -TR      Time 5  20'  -TR         Exercise 6    Exercise Name 6  Resisted retrograde ambulation   -TR      Cueing 6  Verbal  -TR      Sets 6  2  -TR      Time 6  20'  -TR         Exercise 7    Exercise Name 7  Forward mini lunges at TRX straps  -TR      Cueing 7  Verbal  -TR      Sets 7  1  -TR      Reps 7  10  -TR      Additional Comments  BLE   -TR         Exercise 8    Exercise Name 8  Side lunges at TRX straps   -TR      Cueing 8  Verbal  -TR      Sets 8  1  -TR      Reps 8  10  -TR      Additional Comments  allteranting   -TR         Exercise 9    Exercise Name 9  Seated W's   -TR      Cueing 9  Verbal  -TR      Sets 9  2  -TR      Reps 9  10  -TR        User Key  (r) = Recorded By, (t) = Taken By, (c) =  Cosigned By    Initials Name Provider Type    Rachael Herzog PTA Physical Therapy Assistant                       PT OP Goals     Row Name 04/12/19 0802          Long Term Goals    LTG Date to Achieve  03/14/19  -TR     LTG 1  Improve demond hip extension to 10 deg  -TR     LTG 1 Progress  Ongoing  -TR     LTG 2  Able to walk community distances with no assistive device with right AFO  -TR     LTG 2 Progress  Ongoing  -TR     LTG 2 Progress Comments  Pt walked 400' without cane today and required occasional cues   -TR     LTG 3  Reports on falls or tripping x 1 week while wearing his AFO  -TR     LTG 3 Progress  Ongoing  -TR     LTG 4  SLS demond legs x 10 sec with hip stability  -TR     LTG 4 Progress  Ongoing  -TR        Time Calculation    PT Goal Re-Cert Due Date  04/21/19  -TR       User Key  (r) = Recorded By, (t) = Taken By, (c) = Cosigned By    Initials Name Provider Type    Rachael Herzog PTA Physical Therapy Assistant          Therapy Education  Given: HEP  Program: Reinforced  How Provided: Verbal  Provided to: Patient  Level of Understanding: Verbalized              Time Calculation:   Start Time: 0802  Stop Time: 0845  Time Calculation (min): 43 min  Total Timed Code Minutes- PT: 43 minute(s)  Therapy Charges for Today     Code Description Service Date Service Provider Modifiers Qty    68650438519 HC PT THER PROC EA 15 MIN 4/12/2019 Rachael Mckeon PTA GP 3                    Rachael Mckeon PTA  4/12/2019

## 2019-04-15 NOTE — THERAPY EVALUATION
Outpatient Physical Therapy Ortho Initial Evaluation   [facility]     Patient Name: Chad Henriquez  : 1929  MRN: [id number]  Today's Date: 2019      Visit Date: 2019    Patient Active Problem List   Diagnosis   • BPH with urinary obstruction   • Frequency of urination   • Nocturia   • OAB (overactive bladder)   • Non-smoker   • Normal body mass index (BMI)   • Spinal stenosis, lumbar region, with neurogenic claudication   • Left leg pain   • Bilateral hip pain   • Acute left-sided low back pain with left-sided sciatica   • Essential tremor   • BMI 21.0-21.9, adult   • Spinal stenosis   • Degeneration of lumbar or lumbosacral intervertebral disc        Past Medical History:   Diagnosis Date   • Allergic rhinitis    • Anemia    • Arthritis    • BPH (benign prostatic hypertrophy) with urinary retention    • Cancer (CMS/HCC)     skin cancer   • Chronic back pain     RUNS DOWN BOTH LEGS   • Constipation    • Coronary artery disease    • Hard of hearing    • Heart attack (CMS/HCC)     NO MUSCLE DAMAGE - JUST OCCLUDED VALVE   • High cholesterol    • History of skin cancer     BILATERAL EARS   • History of transfusion        • Hypertension    • Inguinal hernia    • Leaky heart valve     DR CONCEPCION SAYS NOT ENOUGH TO BE OF CONCERN   • Other bursal cyst, right elbow    • PONV (postoperative nausea and vomiting)     has had scopalamine patch in past    • Sleep apnea     DOES NOT USE CPAP OR BIPAP   • Spinal stenosis of cervical region    • Spinal stenosis of lumbar region    • Tremors of nervous system    • Wears hearing aid     BILATERAL        Past Surgical History:   Procedure Laterality Date   • ANTERIOR LUMBAR EXPOSURE N/A 2018    Procedure: ANTERIOR LUMBAR EXPOSURE;  Surgeon: Manolo Mcleod DO;  Location: [facility];  Service: Vascular   • APPENDECTOMY     • BACK SURGERY      X3   • CARDIAC SURGERY  08/08/1986    X1 BYPASS   • CORONARY ANGIOPLASTY WITH STENT PLACEMENT  1997    X3  STENTS   • HERNIA REPAIR Bilateral     x2   • INGUINAL HERNIA REPAIR Right 6/22/2017    Procedure: RIGHT INGUINAL HERNIA REPAIR AND EXCISION OF CYST RIGHT ELBOW ;  Surgeon: Jackelyn Arriola MD;  Location:  [facility];  Service:    • LUMBAR LAMINECTOMY N/A 3/12/2018    Procedure: LUMBAR LAMINECTOMY WITHOUT FUSION L3-4,4-5;  Surgeon: Kj Falcon MD;  Location:  [facility];  Service: Neurosurgery   • LUMBAR LAMINECTOMY ANTERIOR LUMBAR INTERBODY FUSION N/A 12/7/2018    Procedure: ANTERIOR LUMBAR INTERBODY FUSION L5-S1;  Surgeon: Kj Falcon MD;  Location:  [facility];  Service: Neurosurgery   • LUMBAR LAMINECTOMY WITH FUSION Left 12/10/2018    Procedure: LUMBAR LAMINECTOMY TRANSFORAMINAL LUMBAR INTERBODY FUSION L34,45;  Surgeon: Kj Falcon MD;  Location:  [facility];  Service: Neurosurgery   • LUMBAR LAMINECTOMY WITH FUSION Left 12/7/2018    Procedure: LUMBAR LAMINECTOMY TRANSFORAMINAL LUMBAR INTERBODY FUSION LEFT L3-4, L4-5 QUADRANT RETRACTOR;  Surgeon: Kj Falcon MD;  Location:  [facility];  Service: Neurosurgery       Visit Dx:     ICD-10-CM ICD-9-CM   1. Degeneration of lumbar or lumbosacral intervertebral disc M51.37 722.52   2. Acute left-sided low back pain with left-sided sciatica M54.42 724.2     724.3   3. Spinal stenosis, lumbar region, with neurogenic claudication M48.062 724.03       Patient History     Row Name 01/31/19 1330             History    Chief Complaint  Pain;Muscle weakness;Difficulty Walking;Difficulty with daily activities  -TB      Type of Pain  Back pain;Lower Extremity / Leg  -TB      Date Current Problem(s) Began  12/07/18  -TB      Brief Description of Current Complaint  He had a long h/o back pain. He had back surgery last March 2018 which helped some but then the pain returned worse than it had been. He tried pain management, acupuncture, but it didn't help it. He had a spinal fusion from L3-S1. L5/S1 was an ALYSON and on 12/10, they did a TLIF with posterior rods. He says  this helped his left leg pain but he's still having problems with his right leg.  He says the surgery helped his demond LE pain. He still has come LBP. He says his drop foot on his right started after he had back surgery in [facility], but it's worse now. He has an AFO ordered but hasn't been contacted to see if it's in yet.   -TB      Patient/Caregiver Goals  Relieve pain;Return to prior level of function;Improve strength  -TB      Patient's Rating of General Health  Good  -TB      Hand Dominance  left-handed  -TB      How has patient tried to help current problem?  acupuncture, pain management  -TB         Pain     Pain Location  Back  -TB      Pain at Present  1  -TB      Pain at Best  1  -TB      Pain at Worst  2  -TB      Pain Frequency  Constant/continuous  -TB         Fall Risk Assessment    Any falls in the past year:  Yes  -TB      Number of falls reported in the last 12 months  3  -TB      Factors that contributed to the fall:  -- leg buckled  -TB         Services    Prior Rehab/Home Health Experiences  Yes  -TB      When was the prior experience with Rehab/Home Health  after surgery  -TB      Where was the prior experience with Rehab/Home Health  [facility]  -TB      Are you currently receiving Home Health services  No  -TB      Do you plan to receive Home Health services in the near future  No  -TB         Daily Activities    Primary Language  English  -TB      How does patient learn best?  Listening;Demonstration  -TB      Teaching needs identified  Home Exercise Program;Management of Condition;Falls Prevention  -TB      Patient is concerned about/has problems with  Performing home management (household chores, shopping, care of dependents);Difficulty with self care (i.e. bathing, dressing, toileting:;Walking  -TB      Does patient have problems with the following?  Anxiety;Panic Attack  -TB      Barriers to learning  Hearing  -TB      Pt Participated in POC and Goals  Yes  -TB         Safety    Are you  being hurt, hit, or frightened by anyone at home or in your life?  No  -TB      Are you being neglected by a caregiver  No  -TB        User Key  (r) = Recorded By, (t) = Taken By, (c) = Cosigned By    Initials Name Provider Type    TB Luis Moe PT Physical Therapist          PT Ortho     Row Name 01/31/19 1300       Posture/Observations    Posture/Observations Comments  spine rests in kyphotic posture  -TB       Quarter Clearing    Quarter Clearing  Lower Quarter Clearing  -TB       DTR- Lower Quarter Clearing    Patellar tendon (L2-4)  Right:;Left:;0- No response  -TB    Achilles tendon (S1-2)  Bilateral:;0- No response  -TB       Sensory Screen for Light Touch- Lower Quarter Clearing    L1 (inguinal area)  Bilateral:;Intact  -TB    L2 (anterior mid thigh)  Bilateral:;Intact  -TB    L3 (distal anterior thigh)  Bilateral:;Intact  -TB    L4 (medial lower leg/foot)  Bilateral:;Intact  -TB    L5 (lateral lower leg/great toe)  Bilateral:;Intact  -TB    S1 (bottom of foot)  Bilateral:;Intact  -TB       Myotomal Screen- Lower Quarter Clearing    Hip flexion (L2)  WNL  -TB    Knee extension (L3)  WNL  -TB    Ankle DF (L4)  Right:;0 (Absent);Left:;WNL  -TB    Great toe extension (L5)  Bilateral:;WNL  -TB    Ankle PF (S1)  Bilateral:;WNL  -TB    Knee flexion (S2)  Bilateral:;WNL  -TB       Lumbar ROM Screen- Lower Quarter Clearing    Lumbar Flexion  Unable to perform did not test due to surgery  -TB    Lumbar Extension  Unable to perform did not test due to surgery  -TB       Special Tests/Palpation    Special Tests/Palpation  Lumbar/SI  -TB       Lumbar/SI Special Tests    Trendelenburg Test (Gluteus Medius Weakness)  Bilateral:;Positive  -TB       Lumbosacral Palpation    Lumbosacral Palpation?  Yes  -TB    SI  -- not tender  -TB    Piriformis  -- not tender  -TB    Erector Spinae (Paraspinals)  -- not tender  -TB       General ROM    RT Lower Ext  Rt Hip Extension  -TB    LT Lower Ext  Lt Hip Extension  -TB        Right Lower Ext    Rt Hip Extension PROM  to 0 deg  -TB       Left Lower Ext    Lt Hip Extension PROM  to 0 deg  -TB       MMT (Manual Muscle Testing)    General MMT Comments  right ankle eversion: 0/5  -TB       Gait/Stairs Assessment/Training    Comment (Gait/Stairs)  walks with a SC; he shows drop foot on the right and dragged his toes once almost tripping him  -TB      User Key  (r) = Recorded By, (t) = Taken By, (c) = Cosigned By    Initials Name Provider Type    TB Luis Moe, PT Physical Therapist                      Therapy Education  Education Details: SLS with UE support  Given: HEP, Posture/body mechanics, Mobility training  Program: New  How Provided: Verbal, Demonstration  Provided to: Patient, Caregiver  Level of Understanding: Teach back education performed, Verbalized, Demonstrated     PT OP Goals     Row Name 01/31/19 1300          Long Term Goals    LTG Date to Achieve  03/14/19  -TB     LTG 1  Improve demond hip extension to 10 deg  -TB     LTG 1 Progress  New  -TB     LTG 2  Able to walk community distances with no assistive device with right AFO  -TB     LTG 2 Progress  New  -TB     LTG 3  Reports on falls or tripping x 1 week while wearing his AFO  -TB     LTG 3 Progress  New  -TB     LTG 4  SLS demond legs x 10 sec with hip stability  -TB     LTG 4 Progress  New  -TB        Time Calculation    PT Goal Re-Cert Due Date  03/02/19  -TB       User Key  (r) = Recorded By, (t) = Taken By, (c) = Cosigned By    Initials Name Provider Type    TB Luis Moe, PT Physical Therapist          PT Assessment/Plan     Row Name 01/31/19 1300          PT Assessment    Functional Limitations  Impaired gait;Limitation in home management;Limitations in community activities;Limitations in functional capacity and performance;Performance in leisure activities  -TB     Impairments  Gait;Posture;Range of motion;Pain;Muscle strength;Joint mobility  -TB     Assessment Comments  He is doing very well after his back  fusion. He has an expected amount of discomfort in his lumbar and no radicular symptoms. He does have right drop foot that may very well be permanent as it was there before his surgery. He is supposed to get an AFO soon which should make a huge difference. Until then, I advised him to not push walking for distance too much now as he is at risk for tripping.   -TB     Rehab Potential  Excellent  -TB     Patient/caregiver participated in establishment of treatment plan and goals  Yes  -TB     Patient would benefit from skilled therapy intervention  Yes  -TB        PT Plan    PT Frequency  2x/week  -TB     Predicted Duration of Therapy Intervention (Therapy Eval)  4-6 weeks  -TB     Planned CPT's?  PT EVAL LOW COMPLEXITY: 23224;PT THER PROC EA 15 MIN: 04760;PT THER ACT EA 15 MIN: 12579;PT MANUAL THERAPY EA 15 MIN: 21924;PT GAIT TRAINING EA 15 MIN: 63067;PT ELECTRICAL STIM UNATTEND: ;PT ELECTRICAL STIM ATTD EA 15 MIN: 36663  -TB     PT Plan Comments  We will focus early on thoracic ext and hip mobility to allow him to stand and walk more erect. We will also work on hip stability. When he AFO arrives, we will work on gait training to normalize his gait pattern.   -TB       User Key  (r) = Recorded By, (t) = Taken By, (c) = Cosigned By    Initials Name Provider Type    TB Luis Moe, PT Physical Therapist                                        Time Calculation:     Therapy Suggested Charges     Code   Minutes Charges    None             Start Time: 1300  Stop Time: 1400  Time Calculation (min): 60 min     Therapy Charges for Today     Code Description Service Date Service Provider Modifiers Qty    86093375362  PT EVAL LOW COMPLEXITY 4 1/31/2019 Luis Moe, PT GP 1                    Luis Moe, PT  1/31/2019       significant other

## 2019-04-17 ENCOUNTER — HOSPITAL ENCOUNTER (OUTPATIENT)
Dept: PHYSICAL THERAPY | Facility: HOSPITAL | Age: 84
Setting detail: THERAPIES SERIES
Discharge: HOME | End: 2019-04-17
Attending: NEUROLOGICAL SURGERY

## 2019-04-17 DIAGNOSIS — M25.551 BILATERAL HIP PAIN: ICD-10-CM

## 2019-04-17 DIAGNOSIS — M48.062 SPINAL STENOSIS, LUMBAR REGION, WITH NEUROGENIC CLAUDICATION: ICD-10-CM

## 2019-04-17 DIAGNOSIS — M51.37 DEGENERATION OF LUMBAR OR LUMBOSACRAL INTERVERTEBRAL DISC: Primary | ICD-10-CM

## 2019-04-17 DIAGNOSIS — M25.552 BILATERAL HIP PAIN: ICD-10-CM

## 2019-04-17 DIAGNOSIS — M79.605 LEFT LEG PAIN: ICD-10-CM

## 2019-04-17 DIAGNOSIS — M54.42 ACUTE LEFT-SIDED LOW BACK PAIN WITH LEFT-SIDED SCIATICA: ICD-10-CM

## 2019-04-17 PROCEDURE — 97110 THERAPEUTIC EXERCISES: CPT | Performed by: PHYSICAL THERAPIST

## 2019-04-17 NOTE — THERAPY TREATMENT NOTE
"    Outpatient Physical Therapy Pelvic Health Treatment Note  [facility]     Patient Name: Chad Henriquez  : 1929  MRN: [id number]  Today's Date: 2019        Visit Date: 2019    Visit Dx:    ICD-10-CM ICD-9-CM   1. Degeneration of lumbar or lumbosacral intervertebral disc M51.37 722.52   2. Acute left-sided low back pain with left-sided sciatica M54.42 724.2     724.3   3. Spinal stenosis, lumbar region, with neurogenic claudication M48.062 724.03   4. Left leg pain M79.605 729.5   5. Bilateral hip pain M25.551 719.45    M25.552        Patient Active Problem List   Diagnosis   • BPH with urinary obstruction   • Frequency of urination   • Nocturia   • OAB (overactive bladder)   • Non-smoker   • Spinal stenosis, lumbar region, with neurogenic claudication   • Left leg pain   • Bilateral hip pain   • Acute left-sided low back pain with left-sided sciatica   • Essential tremor   • BMI 21.0-21.9, adult   • Spinal stenosis   • Degeneration of lumbar or lumbosacral intervertebral disc   • BMI 24.0-24.9, adult                         PT Assessment/Plan     Row Name 19 1400          PT Assessment    Assessment Comments  Patient required SBA or contact guard assist intermittently during tandem stance, bilateral stance of blue foam and SLS L on ground.  He is very pleasant and cooperative with treatment plan.    -KR        PT Plan    PT Plan Comments  Continue to progress core and lumbar stabilization, LE strengthening and balance activities to reach goals of treatment plan.   -ZACH       User Key  (r) = Recorded By, (t) = Taken By, (c) = Cosigned By    Initials Name Provider Type    Alicia Kiser, PT DPT Physical Therapist            Exercises     Row Name 19 1400             Subjective Comments    Subjective Comments  Patient reports fairly constant low back pain that is relieved with lying down.  He states, \"I can walk better now after coming to therapy here.\"  -ZACH         Subjective Pain    " Able to rate subjective pain?  yes  -KR      Pre-Treatment Pain Level  1  -KR      Post-Treatment Pain Level  1  -KR      Subjective Pain Comment  cental low back  -KR         Total Minutes    27718 - PT Therapeutic Exercise Minutes  48  -KR         Exercise 1    Exercise Name 1  Shuttle press; single leg  -KR      Cueing 1  Verbal  -KR      Sets 1  2 sets each LE  -KR      Reps 1  30 reps each  -KR      Additional Comments  4 cords  -KR         Exercise 2    Exercise Name 2  Modified tandem stance ball toss at trampoline w/ 45 cm SB  -KR      Cueing 2  Verbal;Tactile  -KR      Sets 2  2  -KR      Reps 2  15  -KR      Additional Comments  SBA for safety  -KR         Exercise 3    Exercise Name 3  Resisted gait w/blue band  -KR      Cueing 3  Verbal  -KR      Additional Comments  square around clinic  -KR         Exercise 4    Exercise Name 4  resisted retrograde ambulation w/ blue band  -KR      Cueing 4  Verbal  -KR      Sets 4  1  -KR      Time 4  50'  -KR         Exercise 5    Exercise Name 5  Step overs glut med strengthening  -KR      Cueing 5  Verbal  -KR      Sets 5  1  -KR      Time 5  50'  -KR      Additional Comments  50' to R and L  -KR         Exercise 6    Exercise Name 6  Bilateral stance on blue foam  -KR      Cueing 6  Verbal;Tactile  -KR      Reps 6  6  -KR      Time 6  30 sec  -KR         Exercise 7    Exercise Name 7  SLS on ground   -KR      Cueing 7  Verbal;Tactile  -KR      Reps 7  6  -KR      Time 7  8 sec   -KR      Additional Comments  moderate sway  -KR         Exercise 8    Exercise Name 8  Standing UE diagnonals red t-band  -KR      Cueing 8  Verbal  -KR      Sets 8  1  -KR      Reps 8  6  -KR      Additional Comments  TA activation  -KR        User Key  (r) = Recorded By, (t) = Taken By, (c) = Cosigned By    Initials Name Provider Type    Alicia Kiser, PT DPT Physical Therapist                      PT OP Goals     Row Name 04/17/19 1400          Long Term Goals    LTG Date to Achieve   03/14/19  -KR     LTG 1  Improve demond hip extension to 10 deg  -KR     LTG 1 Progress  Met  -KR     LTG 2  Able to walk community distances with no assistive device with right AFO  -KR     LTG 2 Progress  Ongoing  -KR     LTG 3  Reports on falls or tripping x 1 week while wearing his AFO  -KR     LTG 3 Progress  Ongoing  -KR     LTG 4  SLS demond legs x 10 sec with hip stability  -KR     LTG 4 Progress  Ongoing  -KR        Time Calculation    PT Goal Re-Cert Due Date  04/21/19  -KR       User Key  (r) = Recorded By, (t) = Taken By, (c) = Cosigned By    Initials Name Provider Type    Alicia Kiser, PT DPT Physical Therapist           Therapy Education  Education Details: encouraged use of SPC in community and activation of transverse abdominous muscle during walking and activities at home  Given: HEP  Program: Reinforced  How Provided: Verbal  Provided to: Patient  Level of Understanding: Verbalized              Time Calculation:   Start Time: 1400  Stop Time: 1448  Time Calculation (min): 48 min  Total Timed Code Minutes- PT: 48 minute(s)  Therapy Charges for Today     Code Description Service Date Service Provider Modifiers Qty    [id number] HC PT THER PROC EA 15 MIN 4/17/2019 Alicia Albright, PT DPT GP 3                    Alicia Albright, PT DPT  4/17/2019

## 2019-04-19 ENCOUNTER — HOSPITAL ENCOUNTER (OUTPATIENT)
Dept: PHYSICAL THERAPY | Facility: HOSPITAL | Age: 84
Setting detail: THERAPIES SERIES
Discharge: HOME | End: 2019-04-19
Attending: NEUROLOGICAL SURGERY

## 2019-04-19 DIAGNOSIS — M25.552 BILATERAL HIP PAIN: ICD-10-CM

## 2019-04-19 DIAGNOSIS — M51.37 DEGENERATION OF LUMBAR OR LUMBOSACRAL INTERVERTEBRAL DISC: Primary | ICD-10-CM

## 2019-04-19 DIAGNOSIS — M25.551 BILATERAL HIP PAIN: ICD-10-CM

## 2019-04-19 DIAGNOSIS — M54.42 ACUTE LEFT-SIDED LOW BACK PAIN WITH LEFT-SIDED SCIATICA: ICD-10-CM

## 2019-04-19 DIAGNOSIS — M79.605 LEFT LEG PAIN: ICD-10-CM

## 2019-04-19 DIAGNOSIS — M48.062 SPINAL STENOSIS, LUMBAR REGION, WITH NEUROGENIC CLAUDICATION: ICD-10-CM

## 2019-04-19 PROCEDURE — 97110 THERAPEUTIC EXERCISES: CPT | Performed by: PHYSICAL THERAPIST

## 2019-04-19 NOTE — THERAPY TREATMENT NOTE
Outpatient Physical Therapy Ortho Treatment Note  [facility]     Patient Name: Chad Henriquez  : 1929  MRN: [id number]  Today's Date: 2019      Visit Date: 2019    Visit Dx:    ICD-10-CM ICD-9-CM   1. Degeneration of lumbar or lumbosacral intervertebral disc M51.37 722.52   2. Acute left-sided low back pain with left-sided sciatica M54.42 724.2     724.3   3. Spinal stenosis, lumbar region, with neurogenic claudication M48.062 724.03   4. Left leg pain M79.605 729.5   5. Bilateral hip pain M25.551 719.45    M25.552        Patient Active Problem List   Diagnosis   • BPH with urinary obstruction   • Frequency of urination   • Nocturia   • OAB (overactive bladder)   • Non-smoker   • Spinal stenosis, lumbar region, with neurogenic claudication   • Left leg pain   • Bilateral hip pain   • Acute left-sided low back pain with left-sided sciatica   • Essential tremor   • BMI 21.0-21.9, adult   • Spinal stenosis   • Degeneration of lumbar or lumbosacral intervertebral disc   • BMI 24.0-24.9, adult        Past Medical History:   Diagnosis Date   • Allergic rhinitis    • Anemia    • Arthritis    • BPH (benign prostatic hypertrophy) with urinary retention    • Cancer (CMS/HCC)     skin cancer   • Chronic back pain     RUNS DOWN BOTH LEGS   • Constipation    • Coronary artery disease    • Hard of hearing    • Heart attack (CMS/HCC)     NO MUSCLE DAMAGE - JUST OCCLUDED VALVE   • High cholesterol    • History of skin cancer     BILATERAL EARS   • History of transfusion        • Hypertension    • Inguinal hernia    • Leaky heart valve     DR CONCEPCION SAYS NOT ENOUGH TO BE OF CONCERN   • Other bursal cyst, right elbow    • PONV (postoperative nausea and vomiting)     has had scopalamine patch in past    • Sleep apnea     DOES NOT USE CPAP OR BIPAP   • Spinal stenosis of cervical region    • Spinal stenosis of lumbar region    • Tremors of nervous system    • Wears hearing aid     BILATERAL         Past Surgical History:   Procedure Laterality Date   • ANTERIOR LUMBAR EXPOSURE N/A 12/7/2018    Procedure: ANTERIOR LUMBAR EXPOSURE;  Surgeon: Manolo Mcleod DO;  Location:  [facility];  Service: Vascular   • APPENDECTOMY     • BACK SURGERY      X3   • CARDIAC SURGERY  08/08/1986    X1 BYPASS   • CORONARY ANGIOPLASTY WITH STENT PLACEMENT  1997    X3 STENTS   • HERNIA REPAIR Bilateral     x2   • INGUINAL HERNIA REPAIR Right 6/22/2017    Procedure: RIGHT INGUINAL HERNIA REPAIR AND EXCISION OF CYST RIGHT ELBOW ;  Surgeon: Jackelyn Arriola MD;  Location:  [facility];  Service:    • LUMBAR LAMINECTOMY N/A 3/12/2018    Procedure: LUMBAR LAMINECTOMY WITHOUT FUSION L3-4,4-5;  Surgeon: Kj Falcon MD;  Location:  [facility];  Service: Neurosurgery   • LUMBAR LAMINECTOMY ANTERIOR LUMBAR INTERBODY FUSION N/A 12/7/2018    Procedure: ANTERIOR LUMBAR INTERBODY FUSION L5-S1;  Surgeon: Kj Falcon MD;  Location:  [facility];  Service: Neurosurgery   • LUMBAR LAMINECTOMY WITH FUSION Left 12/10/2018    Procedure: LUMBAR LAMINECTOMY TRANSFORAMINAL LUMBAR INTERBODY FUSION L34,45;  Surgeon: Kj Falcon MD;  Location:  [facility];  Service: Neurosurgery   • LUMBAR LAMINECTOMY WITH FUSION Left 12/7/2018    Procedure: LUMBAR LAMINECTOMY TRANSFORAMINAL LUMBAR INTERBODY FUSION LEFT L3-4, L4-5 QUADRANT RETRACTOR;  Surgeon: Kj Falcon MD;  Location:  [facility];  Service: Neurosurgery                       PT Assessment/Plan     Row Name 04/19/19 1044          PT Assessment    Assessment Comments  Today was Mr. Henriquez last scheduled treatment session, although he could benefit from continued therapy he wishes to be placed on hold for now as his wife is undergoing chemo treatments and he he unsure about their upcoming schedule. He has made great improvements with therapy and is very pleased with his results. He has met two of his goals and partially met another while one goal is left unmet. His gait mechanics, safety and  strength has improved since beginning therapy. We contnue to encourage him to use his straight cane for increased saftey and stability when out in the community. We will be happy to work with him in the future if needed.   -WJ        PT Plan    PT Plan Comments  We will place on hold for now as he is unsure of his schedule due to his wife undergoing chemo treatments  -WJ       User Key  (r) = Recorded By, (t) = Taken By, (c) = Cosigned By    Initials Name Provider Type    WBenji Blair, PT DPT Physical Therapist            Exercises     Row Name 04/19/19 1044 04/19/19 1000          Subjective Comments    Subjective Comments  Pt reports that he is doing much better since beginning therapy.  -WJ  --        Subjective Pain    Able to rate subjective pain?  yes  -WJ  --  -WJ     Pre-Treatment Pain Level  0  -WJ  --        Total Minutes    24747 - PT Therapeutic Exercise Minutes  46  -WJ  --        Exercise 1    Exercise Name 1  assessed all goals   -WJ  --        Exercise 2    Exercise Name 2  standing abduction with red can-do   -WJ  --     Cueing 2  Verbal;Tactile  -WJ  --     Sets 2  1  -WJ  --     Reps 2  10  -WJ  --     Additional Comments  each leg  -WJ  --        Exercise 3    Exercise Name 3  standing hip extension with red can-do  -WJ  --     Cueing 3  Verbal;Tactile  -WJ  --     Sets 3  1  -WJ  --     Reps 3  10  -WJ  --     Additional Comments  each leg  -WJ  --        Exercise 4    Exercise Name 4  shuttle press  -WJ  --     Cueing 4  Verbal  -WJ  --     Sets 4  2  -WJ  --     Reps 4  15  -WJ  --     Additional Comments  5 cords  -WJ  --        Exercise 5    Exercise Name 5  SL shuttle press  -WJ  --     Cueing 5  Verbal  -WJ  --     Sets 5  2  -WJ  --     Reps 5  15  -WJ  --     Additional Comments  5 cords  -WJ  --        Exercise 6    Exercise Name 6  step over cones   -WJ  --     Cueing 6  Verbal;Tactile  -WJ  --     Sets 6  4  -WJ  --     Additional Comments  5 cones  -WJ  --        Exercise 7     Exercise Name 7  side steps over cones   -WJ  --     Cueing 7  Verbal;Tactile  -WJ  --     Sets 7  2  -WJ  --     Additional Comments  5 cones   -WJ  --       User Key  (r) = Recorded By, (t) = Taken By, (c) = Cosigned By    Initials Name Provider Type    WILEYBenji Blair, PT DPT Physical Therapist                       PT OP Goals     Row Name 04/19/19 1044          Long Term Goals    LTG Date to Achieve  03/14/19  -WJ     LTG 1  Improve demond hip extension to 10 deg  -WJ     LTG 1 Progress  Met  -WJ     LTG 2  Able to walk community distances with no assistive device with right AFO  -WJ     LTG 2 Progress  Partially Met  -WJ     LTG 2 Progress Comments  Pt ambulated approx 500 ft today without AD, however recommended continued use of SC when in the community.  -WJ     LTG 3  Reports on falls or tripping x 1 week while wearing his AFO  -WJ     LTG 3 Progress  Met  -WJ     LTG 3 Progress Comments  Pt reports no falls withing the past several months  -WJ     LTG 4  SLS demond legs x 10 sec with hip stability  -WJ     LTG 4 Progress  Not Met  -WJ     LTG 4 Progress Comments  3-4 secs on both sides   -WJ        Time Calculation    PT Goal Re-Cert Due Date  05/19/19  -WJ       User Key  (r) = Recorded By, (t) = Taken By, (c) = Cosigned By    Initials Name Provider Type    JUSTYNA Benji Mir PT DPT Physical Therapist          Therapy Education  Education Details: encouraged continued use of cane in the community.  Given: HEP, Symptoms/condition management  Program: Reinforced  How Provided: Verbal  Provided to: Patient  Level of Understanding: Verbalized              Time Calculation:   Start Time: 1044  Stop Time: 1130  Time Calculation (min): 46 min  Total Timed Code Minutes- PT: 46 minute(s)  Therapy Charges for Today     Code Description Service Date Service Provider Modifiers Qty    52614225968 HC PT THER PROC EA 15 MIN 4/19/2019 Benji Mir, PT DPT GP 3                    Benji Mir PT DPT  4/19/2019

## 2019-04-24 ENCOUNTER — HOSPITAL ENCOUNTER (OUTPATIENT)
Dept: PHYSICAL THERAPY | Facility: HOSPITAL | Age: 84
Setting detail: THERAPIES SERIES
Discharge: HOME | End: 2019-04-24
Attending: NEUROLOGICAL SURGERY

## 2019-04-24 DIAGNOSIS — M51.37 DEGENERATION OF LUMBAR OR LUMBOSACRAL INTERVERTEBRAL DISC: Primary | ICD-10-CM

## 2019-04-24 DIAGNOSIS — M54.42 ACUTE LEFT-SIDED LOW BACK PAIN WITH LEFT-SIDED SCIATICA: ICD-10-CM

## 2019-04-24 PROCEDURE — 97140 MANUAL THERAPY 1/> REGIONS: CPT

## 2019-04-24 PROCEDURE — 97110 THERAPEUTIC EXERCISES: CPT

## 2019-04-24 NOTE — THERAPY TREATMENT NOTE
Outpatient Physical Therapy Ortho Treatment Note  [facility]     Patient Name: Chad Henriquez  : 1929  MRN: [id number]  Today's Date: 2019      Visit Date: 2019    Visit Dx:    ICD-10-CM ICD-9-CM   1. Degeneration of lumbar or lumbosacral intervertebral disc M51.37 722.52   2. Acute left-sided low back pain with left-sided sciatica M54.42 724.2     724.3       Patient Active Problem List   Diagnosis   • BPH with urinary obstruction   • Frequency of urination   • Nocturia   • OAB (overactive bladder)   • Non-smoker   • Spinal stenosis, lumbar region, with neurogenic claudication   • Left leg pain   • Bilateral hip pain   • Acute left-sided low back pain with left-sided sciatica   • Essential tremor   • BMI 21.0-21.9, adult   • Spinal stenosis   • Degeneration of lumbar or lumbosacral intervertebral disc   • BMI 24.0-24.9, adult        Past Medical History:   Diagnosis Date   • Allergic rhinitis    • Anemia    • Arthritis    • BPH (benign prostatic hypertrophy) with urinary retention    • Cancer (CMS/HCC)     skin cancer   • Chronic back pain     RUNS DOWN BOTH LEGS   • Constipation    • Coronary artery disease    • Hard of hearing    • Heart attack (CMS/HCC)     NO MUSCLE DAMAGE - JUST OCCLUDED VALVE   • High cholesterol    • History of skin cancer     BILATERAL EARS   • History of transfusion        • Hypertension    • Inguinal hernia    • Leaky heart valve     DR CONCEPCION SAYS NOT ENOUGH TO BE OF CONCERN   • Other bursal cyst, right elbow    • PONV (postoperative nausea and vomiting)     has had scopalamine patch in past    • Sleep apnea     DOES NOT USE CPAP OR BIPAP   • Spinal stenosis of cervical region    • Spinal stenosis of lumbar region    • Tremors of nervous system    • Wears hearing aid     BILATERAL        Past Surgical History:   Procedure Laterality Date   • ANTERIOR LUMBAR EXPOSURE N/A 2018    Procedure: ANTERIOR LUMBAR EXPOSURE;  Surgeon: Manolo Mcleod DO;   Location:  [facility];  Service: Vascular   • APPENDECTOMY     • BACK SURGERY      X3   • CARDIAC SURGERY  08/08/1986    X1 BYPASS   • CORONARY ANGIOPLASTY WITH STENT PLACEMENT  1997    X3 STENTS   • HERNIA REPAIR Bilateral     x2   • INGUINAL HERNIA REPAIR Right 6/22/2017    Procedure: RIGHT INGUINAL HERNIA REPAIR AND EXCISION OF CYST RIGHT ELBOW ;  Surgeon: Jackelyn Arriola MD;  Location:  [facility];  Service:    • LUMBAR LAMINECTOMY N/A 3/12/2018    Procedure: LUMBAR LAMINECTOMY WITHOUT FUSION L3-4,4-5;  Surgeon: Kj Falcon MD;  Location:  [facility];  Service: Neurosurgery   • LUMBAR LAMINECTOMY ANTERIOR LUMBAR INTERBODY FUSION N/A 12/7/2018    Procedure: ANTERIOR LUMBAR INTERBODY FUSION L5-S1;  Surgeon: Kj Falcon MD;  Location:  [facility];  Service: Neurosurgery   • LUMBAR LAMINECTOMY WITH FUSION Left 12/10/2018    Procedure: LUMBAR LAMINECTOMY TRANSFORAMINAL LUMBAR INTERBODY FUSION L34,45;  Surgeon: Kj Falcon MD;  Location:  [facility];  Service: Neurosurgery   • LUMBAR LAMINECTOMY WITH FUSION Left 12/7/2018    Procedure: LUMBAR LAMINECTOMY TRANSFORAMINAL LUMBAR INTERBODY FUSION LEFT L3-4, L4-5 QUADRANT RETRACTOR;  Surgeon: Kj Falcon MD;  Location:  [facility];  Service: Neurosurgery                       PT Assessment/Plan     Row Name 04/24/19 1300          PT Assessment    Assessment Comments  Pt has returned today after being on hold for a short five days. He was placed on hold due to his wifes chemo schedule but reports he is ready to return and has the scheduled figured out. He reports a fall out of the tub this morning and some soreness. We started with soft tissue due to this. He did not have any tender points today. COntinued with strengthening but did not progress HEP. We did discuss importance of complaince with HEP at home.   -TR        PT Plan    PT Plan Comments  COntinue to work on strengthening and balance   -TR       User Key  (r) = Recorded By, (t) = Taken By, (c) =  "Cosigned By    Initials Name Provider Type    TR Rachael Mckeon, PTA Physical Therapy Assistant            Exercises     Row Name 04/24/19 1300             Subjective Comments    Subjective Comments  Pt reports that he feel out of the shower this morning, taking the shower curtain and angelia with him and landingin between the shower and the toilet. He reports some soreness in his R shoulder. Pt reports trying to find time to do his exercises however he has not been very compliant.   -TR         Subjective Pain    Able to rate subjective pain?  yes  -TR      Pre-Treatment Pain Level  1  -TR      Subjective Pain Comment  Low back   -TR         Total Minutes    58013 - PT Therapeutic Exercise Minutes  30  -TR      41289 - PT Manual Therapy Minutes  15  -TR         Exercise 1    Exercise Name 1  Seated B hip abduction against green band   -TR      Cueing 1  Verbal  -TR      Sets 1  2  -TR      Reps 1  20  -TR         Exercise 2    Exercise Name 2  Standing marching against green band   -TR      Cueing 2  Verbal  -TR      Sets 2  2  -TR      Reps 2  20  -TR         Exercise 3    Exercise Name 3  Sidetepping against green band   -TR      Cueing 3  Verbal  -TR      Sets 3  4  -TR      Reps 3  20'  -TR         Exercise 4    Exercise Name 4  Forward walking against green nabd   -TR      Cueing 4  Verbal  -TR      Sets 4  2  -TR      Reps 4  20'  -TR         Exercise 5    Exercise Name 5  Monster walks against greenband   -TR      Cueing 5  Verbal  -TR      Sets 5  2  -TR      Reps 5  20'  -TR         Exercise 6    Exercise Name 6  Stepping over 2\" steps   -TR      Cueing 6  Verbal  -TR      Sets 6  6  -TR      Reps 6  6 steps   -TR      Additional Comments  step to pattern and then step through pattern   -TR         Exercise 7    Exercise Name 7  Side stepping over cones   -TR      Cueing 7  Verbal  -TR      Sets 7  4  -TR      Time 7  6 steps   -TR         Exercise 8    Exercise Name 8  Karaoke walking with HHA   -TR   "    Cueing 8  Verbal  -TR      Sets 8  2  -TR      Reps 8  20'  -TR      Additional Comments  B HHA   -TR        User Key  (r) = Recorded By, (t) = Taken By, (c) = Cosigned By    Initials Name Provider Type    Rachael Herzog PTA Physical Therapy Assistant                      Manual Rx (last 36 hours)      Manual Treatments     Row Name 04/24/19 1300             Total Minutes    82741 - PT Manual Therapy Minutes  15  -TR         Manual Rx 1    Manual Rx 1 Location  Lumbar and thoracic paraspinals and R hip  -TR      Manual Rx 1 Type  IASTM with pink foam roller   -TR      Manual Rx 1 Grade  mod  -TR      Manual Rx 1 Duration  15  -TR        User Key  (r) = Recorded By, (t) = Taken By, (c) = Cosigned By    Initials Name Provider Type    Rachael Herzog PTA Physical Therapy Assistant          PT OP Goals     Row Name 04/24/19 1300          Long Term Goals    LTG Date to Achieve  03/14/19  -TR     LTG 1  Improve demond hip extension to 10 deg  -TR     LTG 1 Progress  Met  -TR     LTG 2  Able to walk community distances with no assistive device with right AFO  -TR     LTG 2 Progress  Partially Met  -TR     LTG 3  Reports on falls or tripping x 1 week while wearing his AFO  -TR     LTG 3 Progress  Ongoing  -TR     LTG 3 Progress Comments  Fell out of the shower this morning.   -TR     LTG 4  SLS demond legs x 10 sec with hip stability  -TR     LTG 4 Progress  Ongoing  -TR        Time Calculation    PT Goal Re-Cert Due Date  05/19/19  -TR       User Key  (r) = Recorded By, (t) = Taken By, (c) = Cosigned By    Initials Name Provider Type    Rachael Herzog PTA Physical Therapy Assistant          Therapy Education  Given: HEP, Symptoms/condition management  Program: Reinforced  How Provided: Verbal  Provided to: Patient  Level of Understanding: Verbalized              Time Calculation:   Start Time: 1300  Stop Time: 1345  Time Calculation (min): 45 min  Total Timed Code Minutes- PT: 45  minute(s)  Therapy Charges for Today     Code Description Service Date Service Provider Modifiers Qty    53337555916 HC PT THER PROC EA 15 MIN 4/24/2019 Rachael Mckeon, FRAN[place] GP 2    75406602324 HC PT MANUAL THERAPY EA 15 MIN 4/24/2019 Rachael Mckeon, FR[place] GP 1                    Rachael Mckeon PTA  4/24/2019

## 2019-04-26 ENCOUNTER — HOSPITAL ENCOUNTER (OUTPATIENT)
Dept: PHYSICAL THERAPY | Facility: HOSPITAL | Age: 84
Setting detail: THERAPIES SERIES
Discharge: HOME | End: 2019-04-26
Attending: NEUROLOGICAL SURGERY

## 2019-04-26 DIAGNOSIS — M48.062 SPINAL STENOSIS, LUMBAR REGION, WITH NEUROGENIC CLAUDICATION: ICD-10-CM

## 2019-04-26 DIAGNOSIS — M51.37 DEGENERATION OF LUMBAR OR LUMBOSACRAL INTERVERTEBRAL DISC: Primary | ICD-10-CM

## 2019-04-26 DIAGNOSIS — M54.42 ACUTE LEFT-SIDED LOW BACK PAIN WITH LEFT-SIDED SCIATICA: ICD-10-CM

## 2019-04-26 PROCEDURE — 97110 THERAPEUTIC EXERCISES: CPT

## 2019-04-26 NOTE — THERAPY TREATMENT NOTE
Outpatient Physical Therapy Ortho Treatment Note   [facility]     Patient Name: Chad Henriquez  : 1929  MRN: [id number]  Today's Date: 2019      Visit Date: 2019    Visit Dx:    ICD-10-CM ICD-9-CM   1. Degeneration of lumbar or lumbosacral intervertebral disc M51.37 722.52   2. Acute left-sided low back pain with left-sided sciatica M54.42 724.2     724.3   3. Spinal stenosis, lumbar region, with neurogenic claudication M48.062 724.03       Patient Active Problem List   Diagnosis   • BPH with urinary obstruction   • Frequency of urination   • Nocturia   • OAB (overactive bladder)   • Non-smoker   • Spinal stenosis, lumbar region, with neurogenic claudication   • Left leg pain   • Bilateral hip pain   • Acute left-sided low back pain with left-sided sciatica   • Essential tremor   • BMI 21.0-21.9, adult   • Spinal stenosis   • Degeneration of lumbar or lumbosacral intervertebral disc   • BMI 24.0-24.9, adult        Past Medical History:   Diagnosis Date   • Allergic rhinitis    • Anemia    • Arthritis    • BPH (benign prostatic hypertrophy) with urinary retention    • Cancer (CMS/HCC)     skin cancer   • Chronic back pain     RUNS DOWN BOTH LEGS   • Constipation    • Coronary artery disease    • Hard of hearing    • Heart attack (CMS/HCC)     NO MUSCLE DAMAGE - JUST OCCLUDED VALVE   • High cholesterol    • History of skin cancer     BILATERAL EARS   • History of transfusion        • Hypertension    • Inguinal hernia    • Leaky heart valve     DR CONCEPCION SAYS NOT ENOUGH TO BE OF CONCERN   • Other bursal cyst, right elbow    • PONV (postoperative nausea and vomiting)     has had scopalamine patch in past    • Sleep apnea     DOES NOT USE CPAP OR BIPAP   • Spinal stenosis of cervical region    • Spinal stenosis of lumbar region    • Tremors of nervous system    • Wears hearing aid     BILATERAL        Past Surgical History:   Procedure Laterality Date   • ANTERIOR LUMBAR EXPOSURE N/A  12/7/2018    Procedure: ANTERIOR LUMBAR EXPOSURE;  Surgeon: Manolo Mcleod DO;  Location:  [facility];  Service: Vascular   • APPENDECTOMY     • BACK SURGERY      X3   • CARDIAC SURGERY  08/08/1986    X1 BYPASS   • CORONARY ANGIOPLASTY WITH STENT PLACEMENT  1997    X3 STENTS   • HERNIA REPAIR Bilateral     x2   • INGUINAL HERNIA REPAIR Right 6/22/2017    Procedure: RIGHT INGUINAL HERNIA REPAIR AND EXCISION OF CYST RIGHT ELBOW ;  Surgeon: Jackelyn Arriola MD;  Location:  [facility];  Service:    • LUMBAR LAMINECTOMY N/A 3/12/2018    Procedure: LUMBAR LAMINECTOMY WITHOUT FUSION L3-4,4-5;  Surgeon: Kj Falcon MD;  Location:  [facility];  Service: Neurosurgery   • LUMBAR LAMINECTOMY ANTERIOR LUMBAR INTERBODY FUSION N/A 12/7/2018    Procedure: ANTERIOR LUMBAR INTERBODY FUSION L5-S1;  Surgeon: Kj Falcon MD;  Location:  [facility];  Service: Neurosurgery   • LUMBAR LAMINECTOMY WITH FUSION Left 12/10/2018    Procedure: LUMBAR LAMINECTOMY TRANSFORAMINAL LUMBAR INTERBODY FUSION L34,45;  Surgeon: Kj Falcon MD;  Location:  [facility];  Service: Neurosurgery   • LUMBAR LAMINECTOMY WITH FUSION Left 12/7/2018    Procedure: LUMBAR LAMINECTOMY TRANSFORAMINAL LUMBAR INTERBODY FUSION LEFT L3-4, L4-5 QUADRANT RETRACTOR;  Surgeon: Kj Falcon MD;  Location:  [facility];  Service: Neurosurgery                       PT Assessment/Plan     Row Name 04/26/19 1300          PT Assessment    Assessment Comments  Progresed today wiht endurance, BLE, strengthening, and balance activties. Pt did not show any signs of fatigue but did require occasional rest breaks. His balance is slowly improving but he still requires HHA at times and cues for pace. Progressed HEP for balance and SLS stability.   -TR        PT Plan    PT Plan Comments  Progress with strengthening and challangeing balance activties.   -TR       User Key  (r) = Recorded By, (t) = Taken By, (c) = Cosigned By    Initials Name Provider Type    Rachael Herzog  "FRANCHESKA [place] Physical Therapy Assistant            Exercises     Row Name 04/26/19 1300             Subjective Comments    Subjective Comments  He denies any change in symptoms since fall but does report having a samll bruise on his R hip. He rpeorts having some pre cancerous sking tags frozen off.   -TR         Subjective Pain    Able to rate subjective pain?  yes  -TR      Pre-Treatment Pain Level  0  -TR      Post-Treatment Pain Level  0  -TR         Total Minutes    82884 - PT Therapeutic Exercise Minutes  45  -TR         Exercise 1    Exercise Name 1  SCIFIT LE bike   -TR      Cueing 1  Verbal  -TR      Sets 1  seat #9  -TR      Reps 1  pedals #5  -TR      Time 1  Level 4  -TR      Additional Comments  10 minutes   -TR         Exercise 2    Exercise Name 2  Stepping over 2\" blocks step to and steop through pattern   -TR      Cueing 2  Verbal  -TR      Sets 2  4 sets each pattern   -TR      Reps 2  6 steps   -TR         Exercise 3    Exercise Name 3  Side stepping over 2\" blocks step to patterns   -TR      Cueing 3  Verbal  -TR      Sets 3  4  -TR      Reps 3  6 steps   -TR         Exercise 4    Exercise Name 4  Figure 8 around 6 steps   -TR      Cueing 4  Verbal  -TR      Sets 4  4  -TR         Exercise 5    Exercise Name 5  Airplanes at table   -TR      Cueing 5  Verbal  -TR      Sets 5  1  -TR      Reps 5  10  -TR      Additional Comments  BLE   -TR         Exercise 6    Exercise Name 6  Karoke with 1 HHA   -TR      Cueing 6  Verbal  -TR      Sets 6  4  -TR      Reps 6  20\"  -TR      Additional Comments  alternating forward and back   -TR         Exercise 7    Exercise Name 7  Tandem walking   -TR      Cueing 7  Verbal;Tactile  -TR      Sets 7  1  -TR      Reps 7  20'  -TR      Additional Comments  1 HHA   -TR         Exercise 8    Exercise Name 8  Retrograde walking   -TR      Cueing 8  Verbal  -TR      Sets 8  2  -TR      Reps 8  20'  -TR        User Key  (r) = Recorded By, (t) = Taken By, (c) = Cosigned By "    Initials Name Provider Type    Rachael Herzog PTA Physical Therapy Assistant                       PT OP Goals     Row Name 04/26/19 1300          Long Term Goals    LTG Date to Achieve  03/14/19  -TR     LTG 1  Improve demond hip extension to 10 deg  -TR     LTG 1 Progress  Met  -TR     LTG 2  Able to walk community distances with no assistive device with right AFO  -TR     LTG 2 Progress  Partially Met  -TR     LTG 3  Reports on falls or tripping x 1 week while wearing his AFO  -TR     LTG 3 Progress  Ongoing  -TR     LTG 4  SLS demond legs x 10 sec with hip stability  -TR     LTG 4 Progress  Ongoing  -TR     LTG 4 Progress Comments  4-5 seconds   -TR        Time Calculation    PT Goal Re-Cert Due Date  05/19/19  -TR       User Key  (r) = Recorded By, (t) = Taken By, (c) = Cosigned By    Initials Name Provider Type    Rachael Herzog PTA Physical Therapy Assistant          Therapy Education  Education Details: Airplanes at counter with HHA   Given: HEP, Symptoms/condition management  Program: New  How Provided: Verbal, Demonstration, Written  Provided to: Patient  Level of Understanding: Verbalized, Demonstrated              Time Calculation:   Start Time: 1300  Stop Time: 1345  Time Calculation (min): 45 min  Total Timed Code Minutes- PT: 45 minute(s)  Therapy Charges for Today     Code Description Service Date Service Provider Modifiers Qty    96981442681 HC PT THER PROC EA 15 MIN 4/26/2019 Rachael Mckeon PTA GP 3                    Rachael Mckeon PTA  4/26/2019

## 2019-04-30 ENCOUNTER — HOSPITAL ENCOUNTER (OUTPATIENT)
Dept: PHYSICAL THERAPY | Facility: HOSPITAL | Age: 84
Setting detail: THERAPIES SERIES
Discharge: HOME | End: 2019-04-30
Attending: NEUROLOGICAL SURGERY

## 2019-04-30 DIAGNOSIS — M51.37 DEGENERATION OF LUMBAR OR LUMBOSACRAL INTERVERTEBRAL DISC: Primary | ICD-10-CM

## 2019-04-30 DIAGNOSIS — M54.42 ACUTE LEFT-SIDED LOW BACK PAIN WITH LEFT-SIDED SCIATICA: ICD-10-CM

## 2019-04-30 PROCEDURE — 97110 THERAPEUTIC EXERCISES: CPT

## 2019-05-02 ENCOUNTER — HOSPITAL ENCOUNTER (OUTPATIENT)
Dept: PHYSICAL THERAPY | Facility: HOSPITAL | Age: 84
Setting detail: THERAPIES SERIES
Discharge: HOME | End: 2019-05-02
Attending: NEUROLOGICAL SURGERY

## 2019-05-02 DIAGNOSIS — M25.552 BILATERAL HIP PAIN: ICD-10-CM

## 2019-05-02 DIAGNOSIS — M54.42 ACUTE LEFT-SIDED LOW BACK PAIN WITH LEFT-SIDED SCIATICA: ICD-10-CM

## 2019-05-02 DIAGNOSIS — M51.37 DEGENERATION OF LUMBAR OR LUMBOSACRAL INTERVERTEBRAL DISC: Primary | ICD-10-CM

## 2019-05-02 DIAGNOSIS — M48.062 SPINAL STENOSIS, LUMBAR REGION, WITH NEUROGENIC CLAUDICATION: ICD-10-CM

## 2019-05-02 DIAGNOSIS — M25.551 BILATERAL HIP PAIN: ICD-10-CM

## 2019-05-02 DIAGNOSIS — M79.605 LEFT LEG PAIN: ICD-10-CM

## 2019-05-02 PROCEDURE — 97110 THERAPEUTIC EXERCISES: CPT | Performed by: PHYSICAL THERAPIST

## 2019-05-02 NOTE — THERAPY TREATMENT NOTE
Outpatient Physical Therapy Ortho Treatment Note  [facility]     Patient Name: Chad Henriquez  : 1929  MRN: [id number]  Today's Date: 2019      Visit Date: 2019    Visit Dx:    ICD-10-CM ICD-9-CM   1. Degeneration of lumbar or lumbosacral intervertebral disc M51.37 722.52   2. Acute left-sided low back pain with left-sided sciatica M54.42 724.2     724.3   3. Spinal stenosis, lumbar region, with neurogenic claudication M48.062 724.03   4. Left leg pain M79.605 729.5   5. Bilateral hip pain M25.551 719.45    M25.552        Patient Active Problem List   Diagnosis   • BPH with urinary obstruction   • Frequency of urination   • Nocturia   • OAB (overactive bladder)   • Non-smoker   • Spinal stenosis, lumbar region, with neurogenic claudication   • Left leg pain   • Bilateral hip pain   • Acute left-sided low back pain with left-sided sciatica   • Essential tremor   • BMI 21.0-21.9, adult   • Spinal stenosis   • Degeneration of lumbar or lumbosacral intervertebral disc   • BMI 24.0-24.9, adult        Past Medical History:   Diagnosis Date   • Allergic rhinitis    • Anemia    • Arthritis    • BPH (benign prostatic hypertrophy) with urinary retention    • Cancer (CMS/HCC)     skin cancer   • Chronic back pain     RUNS DOWN BOTH LEGS   • Constipation    • Coronary artery disease    • Hard of hearing    • Heart attack (CMS/HCC)     NO MUSCLE DAMAGE - JUST OCCLUDED VALVE   • High cholesterol    • History of skin cancer     BILATERAL EARS   • History of transfusion        • Hypertension    • Inguinal hernia    • Leaky heart valve     DR CONCEPCION SAYS NOT ENOUGH TO BE OF CONCERN   • Other bursal cyst, right elbow    • PONV (postoperative nausea and vomiting)     has had scopalamine patch in past    • Sleep apnea     DOES NOT USE CPAP OR BIPAP   • Spinal stenosis of cervical region    • Spinal stenosis of lumbar region    • Tremors of nervous system    • Wears hearing aid     BILATERAL        Past  Surgical History:   Procedure Laterality Date   • ANTERIOR LUMBAR EXPOSURE N/A 12/7/2018    Procedure: ANTERIOR LUMBAR EXPOSURE;  Surgeon: Manolo Mcleod DO;  Location:  [facility];  Service: Vascular   • APPENDECTOMY     • BACK SURGERY      X3   • CARDIAC SURGERY  08/08/1986    X1 BYPASS   • CORONARY ANGIOPLASTY WITH STENT PLACEMENT  1997    X3 STENTS   • HERNIA REPAIR Bilateral     x2   • INGUINAL HERNIA REPAIR Right 6/22/2017    Procedure: RIGHT INGUINAL HERNIA REPAIR AND EXCISION OF CYST RIGHT ELBOW ;  Surgeon: Jackelyn Arriola MD;  Location:  [facility];  Service:    • LUMBAR LAMINECTOMY N/A 3/12/2018    Procedure: LUMBAR LAMINECTOMY WITHOUT FUSION L3-4,4-5;  Surgeon: Kj Falcon MD;  Location:  [facility];  Service: Neurosurgery   • LUMBAR LAMINECTOMY ANTERIOR LUMBAR INTERBODY FUSION N/A 12/7/2018    Procedure: ANTERIOR LUMBAR INTERBODY FUSION L5-S1;  Surgeon: Kj Falcon MD;  Location:  [facility];  Service: Neurosurgery   • LUMBAR LAMINECTOMY WITH FUSION Left 12/10/2018    Procedure: LUMBAR LAMINECTOMY TRANSFORAMINAL LUMBAR INTERBODY FUSION L34,45;  Surgeon: Kj Falcon MD;  Location:  [facility];  Service: Neurosurgery   • LUMBAR LAMINECTOMY WITH FUSION Left 12/7/2018    Procedure: LUMBAR LAMINECTOMY TRANSFORAMINAL LUMBAR INTERBODY FUSION LEFT L3-4, L4-5 QUADRANT RETRACTOR;  Surgeon: Kj Falcon MD;  Location:  [facility];  Service: Neurosurgery                       PT Assessment/Plan     Row Name 05/02/19 1300          PT Assessment    Assessment Comments  Pt tolerated treatment session well, he demonstrated increased endurance today performing multiple standing exercises before requiring a rest break. He did demonstrate increased difficulty with tandem stance activitiy with the L leg psoterior requiring min-mod A at times. He also had a couple of instances of LOB while weaving in and out of cones that required therapist assist for balance.  -WJ        PT Plan    PT Plan Comments  Ruddynue  to progress with LE strengtheing, balance and endurance activities as tolerated.  -WJ       User Key  (r) = Recorded By, (t) = Taken By, (c) = Cosigned By    Initials Name Provider Type    WBenji Blair, PT DPT Physical Therapist            Exercises     Row Name 05/02/19 1310 05/02/19 1300          Subjective Comments    Subjective Comments  --  Pt reports no chnages since last session, however he more energy today.  -WJ        Subjective Pain    Able to rate subjective pain?  --  yes  -WJ     Pre-Treatment Pain Level  --  0  -WJ        Total Minutes    93188 - PT Therapeutic Exercise Minutes  45  -WJ  --        Exercise 1    Exercise Name 1  --  standing hip abduction with red can-do  -WJ     Cueing 1  --  Verbal  -WJ     Sets 1  --  2  -WJ     Reps 1  --  10  -WJ        Exercise 2    Exercise Name 2  --  Figure 8 around cones   -WJ     Cueing 2  --  Verbal;Tactile  -WJ     Sets 2  --  4   -WJ     Time 2  --  5 cones  -WJ        Exercise 3    Exercise Name 3  --  cone pick ups  -WJ     Cueing 3  --  Verbal;Tactile  -WJ     Sets 3  --  2  -WJ     Time 3  --  5 cones  -WJ        Exercise 4    Exercise Name 4  --  heel to toe walking  -WJ     Cueing 4  --  Verbal;Tactile  -WJ     Reps 4  --  4  -WJ     Additional Comments  --  15 ft  -WJ        Exercise 5    Exercise Name 5  --  side steps  -WJ     Cueing 5  --  Verbal;Tactile  -WJ     Reps 5  --  4  -WJ     Additional Comments  --  15 ft  -WJ        Exercise 6    Exercise Name 6  --  45 cm ball toss on rebounder with blue foam and feet together   -WJ     Cueing 6  --  Verbal;Tactile  -WJ     Sets 6  --  3  -WJ     Reps 6  --  15  -WJ        Exercise 7    Exercise Name 7  --  45 cm ball toss, tandem stance  -WJ     Cueing 7  --  Verbal  -WJ     Sets 7  --  3  -WJ     Reps 7  --  10  -WJ        Exercise 8    Exercise Name 8  --  shuttle press  -WJ     Cueing 8  --  Verbal  -WJ     Sets 8  --  3  -WJ     Reps 8  --  15  -WJ     Additional Comments  --  6 bands   -WJ        Exercise 9    Exercise Name 9  --  SL press on shuttle press  -WJ     Cueing 9  --  Verbal  -WJ     Sets 9  --  2  -WJ     Reps 9  --  15  -WJ     Additional Comments  --  5 bands  -WJ        Exercise 10    Exercise Name 10  --  toe taps on 8 in step  -WJ     Cueing 10  --  Verbal;Tactile  -WJ     Sets 10  --  1  -WJ     Reps 10  --  20  -WJ       User Key  (r) = Recorded By, (t) = Taken By, (c) = Cosigned By    Initials Name Provider Type    Benji Rdoriguez, PT DPT Physical Therapist                       PT OP Goals     Row Name 05/02/19 1310          Long Term Goals    LTG Date to Achieve  03/14/19  -WJ     LTG 1  Improve demond hip extension to 10 deg  -WJ     LTG 1 Progress  Met  -WJ     LTG 2  Able to walk community distances with no assistive device with right AFO  -WJ     LTG 2 Progress  Met  -WJ     LTG 3  Reports on falls or tripping x 1 week while wearing his AFO  -WJ     LTG 3 Progress  Ongoing  -WJ     LTG 3 Progress Comments  no falls since last session   -WJ     LTG 4  SLS demond legs x 10 sec with hip stability  -WJ     LTG 4 Progress  Ongoing  -WJ        Time Calculation    PT Goal Re-Cert Due Date  05/19/19  -WJ       User Key  (r) = Recorded By, (t) = Taken By, (c) = Cosigned By    Initials Name Provider Type    Benji Rodriguez, PT DPT Physical Therapist          Therapy Education  Given: HEP  Program: Reinforced  How Provided: Verbal  Provided to: Patient  Level of Understanding: Verbalized              Time Calculation:   Start Time: 1300  Stop Time: 1345  Time Calculation (min): 45 min  Total Timed Code Minutes- PT: 45 minute(s)  Therapy Charges for Today     Code Description Service Date Service Provider Modifiers Qty    02819042575 HC PT THER PROC EA 15 MIN 5/2/2019 Benji Mir, PT DPT GP 3                    Benji Mir, PT DPT  5/2/2019

## 2019-05-07 ENCOUNTER — HOSPITAL ENCOUNTER (OUTPATIENT)
Dept: PHYSICAL THERAPY | Facility: HOSPITAL | Age: 84
Setting detail: THERAPIES SERIES
Discharge: HOME | End: 2019-05-07
Attending: NEUROLOGICAL SURGERY

## 2019-05-07 DIAGNOSIS — M25.551 BILATERAL HIP PAIN: ICD-10-CM

## 2019-05-07 DIAGNOSIS — M51.37 DEGENERATION OF LUMBAR OR LUMBOSACRAL INTERVERTEBRAL DISC: Primary | ICD-10-CM

## 2019-05-07 DIAGNOSIS — M79.605 LEFT LEG PAIN: ICD-10-CM

## 2019-05-07 DIAGNOSIS — M48.062 SPINAL STENOSIS, LUMBAR REGION, WITH NEUROGENIC CLAUDICATION: ICD-10-CM

## 2019-05-07 DIAGNOSIS — M25.552 BILATERAL HIP PAIN: ICD-10-CM

## 2019-05-07 DIAGNOSIS — M54.42 ACUTE LEFT-SIDED LOW BACK PAIN WITH LEFT-SIDED SCIATICA: ICD-10-CM

## 2019-05-07 PROCEDURE — 97110 THERAPEUTIC EXERCISES: CPT | Performed by: PHYSICAL THERAPIST

## 2019-05-07 NOTE — THERAPY TREATMENT NOTE
Outpatient Physical Therapy Ortho Treatment Note  [facility]     Patient Name: Chad Henriquez  : 1929  MRN: [id number]  Today's Date: 2019      Visit Date: 2019    Visit Dx:    ICD-10-CM ICD-9-CM   1. Degeneration of lumbar or lumbosacral intervertebral disc M51.37 722.52   2. Acute left-sided low back pain with left-sided sciatica M54.42 724.2     724.3   3. Spinal stenosis, lumbar region, with neurogenic claudication M48.062 724.03   4. Left leg pain M79.605 729.5   5. Bilateral hip pain M25.551 719.45    M25.552        Patient Active Problem List   Diagnosis   • BPH with urinary obstruction   • Frequency of urination   • Nocturia   • OAB (overactive bladder)   • Non-smoker   • Spinal stenosis, lumbar region, with neurogenic claudication   • Left leg pain   • Bilateral hip pain   • Acute left-sided low back pain with left-sided sciatica   • Essential tremor   • BMI 21.0-21.9, adult   • Spinal stenosis   • Degeneration of lumbar or lumbosacral intervertebral disc   • BMI 24.0-24.9, adult        Past Medical History:   Diagnosis Date   • Allergic rhinitis    • Anemia    • Arthritis    • BPH (benign prostatic hypertrophy) with urinary retention    • Cancer (CMS/HCC)     skin cancer   • Chronic back pain     RUNS DOWN BOTH LEGS   • Constipation    • Coronary artery disease    • Hard of hearing    • Heart attack (CMS/HCC)     NO MUSCLE DAMAGE - JUST OCCLUDED VALVE   • High cholesterol    • History of skin cancer     BILATERAL EARS   • History of transfusion        • Hypertension    • Inguinal hernia    • Leaky heart valve     DR CONCEPCION SAYS NOT ENOUGH TO BE OF CONCERN   • Other bursal cyst, right elbow    • PONV (postoperative nausea and vomiting)     has had scopalamine patch in past    • Sleep apnea     DOES NOT USE CPAP OR BIPAP   • Spinal stenosis of cervical region    • Spinal stenosis of lumbar region    • Tremors of nervous system    • Wears hearing aid     BILATERAL        Past  Surgical History:   Procedure Laterality Date   • ANTERIOR LUMBAR EXPOSURE N/A 12/7/2018    Procedure: ANTERIOR LUMBAR EXPOSURE;  Surgeon: Manolo Mcleod DO;  Location:  [facility];  Service: Vascular   • APPENDECTOMY     • BACK SURGERY      X3   • CARDIAC SURGERY  08/08/1986    X1 BYPASS   • CORONARY ANGIOPLASTY WITH STENT PLACEMENT  1997    X3 STENTS   • HERNIA REPAIR Bilateral     x2   • INGUINAL HERNIA REPAIR Right 6/22/2017    Procedure: RIGHT INGUINAL HERNIA REPAIR AND EXCISION OF CYST RIGHT ELBOW ;  Surgeon: Jackelyn Arriola MD;  Location:  [facility];  Service:    • LUMBAR LAMINECTOMY N/A 3/12/2018    Procedure: LUMBAR LAMINECTOMY WITHOUT FUSION L3-4,4-5;  Surgeon: Kj Falcon MD;  Location:  [facility];  Service: Neurosurgery   • LUMBAR LAMINECTOMY ANTERIOR LUMBAR INTERBODY FUSION N/A 12/7/2018    Procedure: ANTERIOR LUMBAR INTERBODY FUSION L5-S1;  Surgeon: Kj Falcon MD;  Location:  [facility];  Service: Neurosurgery   • LUMBAR LAMINECTOMY WITH FUSION Left 12/10/2018    Procedure: LUMBAR LAMINECTOMY TRANSFORAMINAL LUMBAR INTERBODY FUSION L34,45;  Surgeon: Kj Falcon MD;  Location:  [facility];  Service: Neurosurgery   • LUMBAR LAMINECTOMY WITH FUSION Left 12/7/2018    Procedure: LUMBAR LAMINECTOMY TRANSFORAMINAL LUMBAR INTERBODY FUSION LEFT L3-4, L4-5 QUADRANT RETRACTOR;  Surgeon: Kj Falcon MD;  Location:  [facility];  Service: Neurosurgery                       PT Assessment/Plan     Row Name 05/07/19 1338          PT Assessment    Assessment Comments  He reports no pain today or recent falls. He tolerated the treatment well, but did fatigue with ambulation outside on varying surfaces. He needed min A at times with uneven surfaces. He also continues to be limited with SLS.   -KR        PT Plan    PT Plan Comments  Conitnue to work with his tolerance to activity, balance and strength. Progress with balance on various surfaces.   -KR       User Key  (r) = Recorded By, (t) = Taken By, (c)  "= Cosigned By    Initials Name Provider Type    Tata Sorenson, PT DPT Physical Therapist            Exercises     Row Name 05/07/19 1338             Subjective Comments    Subjective Comments  He reports no pain, no recent falls or trips.   -KR         Subjective Pain    Able to rate subjective pain?  yes  -KR      Pre-Treatment Pain Level  0  -KR      Post-Treatment Pain Level  0  -KR         Total Minutes    97967 - PT Therapeutic Exercise Minutes  52  -KR         Exercise 1    Exercise Name 1  shuttle press  -KR      Cueing 1  Verbal  -KR      Sets 1  2    -KR      Time 1  6 minutes  -KR      Additional Comments  s  -KR         Exercise 2    Exercise Name 2  shuttle press SL  -KR      Cueing 2  Verbal  -KR      Sets 2  1  -KR      Time 2  2 min each leg  -KR         Exercise 3    Exercise Name 3  ambulation outside, up/down driveway and in grass. increased high guard and decreased arm swing with uneven surfaces.   -KR      Additional Comments  also demonstrated   -KR         Exercise 4    Exercise Name 4  SLS at // bars  -KR      Additional Comments  needed two finger assist  -KR         Exercise 5    Exercise Name 5  tandem stance on grey foam  -KR      Additional Comments  needed two finger assist  -KR         Exercise 6    Exercise Name 6  tandem forward/back walking in hallway  -KR      Reps 6  2 laps  -KR      Additional Comments  needed min A at times to correct  -KR         Exercise 7    Exercise Name 7  step ups/downs on blue therafoam  -KR      Reps 7  10  -KR         Exercise 8    Exercise Name 8  standing taps oon 6\" step  -KR      Sets 8  2  -KR      Reps 8  10 each leg  -KR      Additional Comments  min A on 2-3 occassions  -KR        User Key  (r) = Recorded By, (t) = Taken By, (c) = Cosigned By    Initials Name Provider Type    Tata Sorenson PT DPT Physical Therapist                       PT OP Goals     Row Name 05/07/19 4345          Long Term Goals    LTG Date to Achieve  " 03/14/19  -KR     LTG 1  Improve demond hip extension to 10 deg  -KR     LTG 1 Progress  Met  -KR     LTG 2  Able to walk community distances with no assistive device with right AFO  -KR     LTG 2 Progress  Met  -KR     LTG 3  Reports on falls or tripping x 1 week while wearing his AFO  -KR     LTG 3 Progress  Ongoing  -KR     LTG 3 Progress Comments  reports none  -KR     LTG 4  SLS demond legs x 10 sec with hip stability  -KR     LTG 4 Progress  Ongoing  -KR     LTG 4 Progress Comments  able to perform stair steps   -KR        Time Calculation    PT Goal Re-Cert Due Date  05/19/19  -KR       User Key  (r) = Recorded By, (t) = Taken By, (c) = Cosigned By    Initials Name Provider Type    Tata Sorenson, PT DPT Physical Therapist          Therapy Education  Education Details: gait mechanics/posture  Given: HEP, Symptoms/condition management, Posture/body mechanics  Program: Reinforced  How Provided: Verbal  Provided to: Patient  Level of Understanding: Verbalized              Time Calculation:   Start Time: 1338  Stop Time: 1430  Time Calculation (min): 52 min  Total Timed Code Minutes- PT: 52 minute(s)  Therapy Charges for Today     Code Description Service Date Service Provider Modifiers Qty    75693194010 HC PT THER PROC EA 15 MIN 5/7/2019 Tata Cuevas PT DPT GP 3                    Tata Cuevas PT DPT  5/7/2019

## 2019-05-09 ENCOUNTER — OFFICE VISIT (OUTPATIENT)
Dept: CARDIOLOGY | Age: 84
End: 2019-05-09
Payer: MEDICARE

## 2019-05-09 ENCOUNTER — HOSPITAL ENCOUNTER (OUTPATIENT)
Dept: PHYSICAL THERAPY | Facility: HOSPITAL | Age: 84
Setting detail: THERAPIES SERIES
Discharge: HOME | End: 2019-05-09
Attending: NEUROLOGICAL SURGERY

## 2019-05-09 VITALS
HEIGHT: 68 IN | DIASTOLIC BLOOD PRESSURE: 68 MMHG | BODY MASS INDEX: 24.86 KG/M2 | HEART RATE: 76 BPM | SYSTOLIC BLOOD PRESSURE: 138 MMHG | WEIGHT: 164 LBS

## 2019-05-09 DIAGNOSIS — M54.42 ACUTE LEFT-SIDED LOW BACK PAIN WITH LEFT-SIDED SCIATICA: ICD-10-CM

## 2019-05-09 DIAGNOSIS — M51.37 DEGENERATION OF LUMBAR OR LUMBOSACRAL INTERVERTEBRAL DISC: Primary | ICD-10-CM

## 2019-05-09 DIAGNOSIS — I25.10 CORONARY ARTERY DISEASE INVOLVING NATIVE CORONARY ARTERY OF NATIVE HEART WITHOUT ANGINA PECTORIS: ICD-10-CM

## 2019-05-09 DIAGNOSIS — M25.552 BILATERAL HIP PAIN: ICD-10-CM

## 2019-05-09 DIAGNOSIS — I10 ESSENTIAL HYPERTENSION: Primary | ICD-10-CM

## 2019-05-09 DIAGNOSIS — M79.605 LEFT LEG PAIN: ICD-10-CM

## 2019-05-09 DIAGNOSIS — M25.551 BILATERAL HIP PAIN: ICD-10-CM

## 2019-05-09 DIAGNOSIS — M48.062 SPINAL STENOSIS, LUMBAR REGION, WITH NEUROGENIC CLAUDICATION: ICD-10-CM

## 2019-05-09 PROCEDURE — G8598 ASA/ANTIPLAT THER USED: HCPCS | Performed by: INTERNAL MEDICINE

## 2019-05-09 PROCEDURE — 1123F ACP DISCUSS/DSCN MKR DOCD: CPT | Performed by: INTERNAL MEDICINE

## 2019-05-09 PROCEDURE — 99213 OFFICE O/P EST LOW 20 MIN: CPT | Performed by: INTERNAL MEDICINE

## 2019-05-09 PROCEDURE — 4040F PNEUMOC VAC/ADMIN/RCVD: CPT | Performed by: INTERNAL MEDICINE

## 2019-05-09 PROCEDURE — G8420 CALC BMI NORM PARAMETERS: HCPCS | Performed by: INTERNAL MEDICINE

## 2019-05-09 PROCEDURE — G8427 DOCREV CUR MEDS BY ELIG CLIN: HCPCS | Performed by: INTERNAL MEDICINE

## 2019-05-09 PROCEDURE — 97110 THERAPEUTIC EXERCISES: CPT

## 2019-05-09 PROCEDURE — 1036F TOBACCO NON-USER: CPT | Performed by: INTERNAL MEDICINE

## 2019-05-09 ASSESSMENT — ENCOUNTER SYMPTOMS
RESPIRATORY NEGATIVE: 1
VOMITING: 0
EYES NEGATIVE: 1
DIARRHEA: 0
GASTROINTESTINAL NEGATIVE: 1
NAUSEA: 0
SHORTNESS OF BREATH: 0

## 2019-05-09 NOTE — PROGRESS NOTES
[facility] Progress Note                            Date:  5/9/2019  Patient: Dennis Gonsalez  Age:  80 y.o., 11/9/1929      Reason for evaluation:         SUBJECTIVE:    Seen today follow-up assessment for ischemic heart disease previous CABG 1986. Remains moderately active but he has difficulty getting around due to being off balance quite a bit and underlying neuropathy. Denies excessive dyspnea denies chest pain does not take nitroglycerin no new cardiovascular complaints. Review of Systems   Constitutional: Negative. Negative for chills, fever and unexpected weight change. HENT: Negative. Eyes: Negative. Respiratory: Negative. Negative for shortness of breath. Cardiovascular: Negative. Negative for chest pain. Gastrointestinal: Negative. Negative for diarrhea, nausea and vomiting. Endocrine: Negative. Genitourinary: Negative. Musculoskeletal: Negative. Skin: Negative. Neurological: Negative. All other systems reviewed and are negative. OBJECTIVE:     /68   Pulse 76   Ht 5' 8\" (1.727 m)   Wt 164 lb (74.4 kg)   BMI 24.94 kg/m²     Labs:   CBC: No results for input(s): WBC, HGB, HCT, PLT in the last 72 hours. BMP:No results for input(s): NA, K, CO2, BUN, CREATININE, LABGLOM, GLUCOSE in the last 72 hours. BNP: No results for input(s): BNP in the last 72 hours. PT/INR: No results for input(s): PROTIME, INR in the last 72 hours. APTT:No results for input(s): APTT in the last 72 hours. CARDIAC ENZYMES:No results for input(s): CKTOTAL, CKMB, CKMBINDEX, TROPONINI in the last 72 hours. FASTING LIPID PANEL:No results found for: HDL, LDLDIRECT, LDLCALC, TRIG  LIVER PROFILE:No results for input(s): AST, ALT, LABALBU in the last 72 hours.         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 April Day, 3/2018   [address] 43      CABG    DIAGNOSTIC CARDIAC CATH LAB PROCEDURE      Stent to mid and distal RCA 1997    HERNIA REPAIR      NECK SURGERY       Family History   Problem Relation Age of Onset    High Blood Pressure Mother     Stroke Mother     Coronary Art Dis Father      Allergies   Allergen Reactions    Codeine     Fentanyl Other (See Comments)     Fentanyl patchs, caused patient to become confused and anxious per family     Current Outpatient Medications   Medication Sig Dispense Refill    aspirin 81 MG tablet Take 81 mg by mouth daily      metoprolol tartrate (LOPRESSOR) 25 MG tablet Take 25 mg by mouth 2 times daily      meloxicam (MOBIC) 7.5 MG tablet Take 1 tablet by mouth daily 30 tablet 3    ferrous sulfate 325 (65 Fe) MG tablet Take 1 tablet by mouth 3 times daily (with meals) 90 tablet 3    docusate sodium (COLACE, DULCOLAX) 100 MG CAPS Take 100 mg by mouth 2 times daily 60 capsule 0    alfuzosin (UROXATRAL) 10 MG extended release tablet Take 1 tablet by mouth nightly 30 tablet 3    finasteride (PROSCAR) 5 MG tablet Take 1 tablet by mouth daily 30 tablet 3    diphenhydrAMINE (BENADRYL) 25 MG capsule Take 25 mg by mouth nightly      primidone (MYSOLINE) 50 MG tablet Take 25 mg by mouth nightly      DULoxetine (CYMBALTA) 60 MG extended release capsule Take 60 mg by mouth daily      chlorpheniramine (CHLOR-TRIMETON) 4 MG tablet Take 4 mg by mouth daily       Flaxseed, Linseed, (FLAXSEED OIL) 1000 MG CAPS Take 2,000 mg by mouth daily       pravastatin (PRAVACHOL) 40 MG tablet Take 40 mg by mouth daily.         folic acid ([facility] FOLIC ACID) 966 MCG tablet Take 800 mcg by mouth daily.  L-Lysine 500 MG TABS Take 500 mg by mouth daily.  gabapentin (NEURONTIN) 100 MG capsule Take 1-2 at night for leg pain. 90 capsule 0     No current facility-administered medications for this visit. Social History     Socioeconomic History    Marital status:      Spouse name: Not on file    Number of children: Not on file    Years of education: Not on file    Highest education level: Not on file   Occupational History    Not on file   Social Needs    Financial resource strain: Not on file    Food insecurity:     Worry: Not on file     Inability: Not on file    Transportation needs:     Medical: Not on file     Non-medical: Not on file   Tobacco Use    Smoking status: Former Smoker     Packs/day: 2.00     Years: 6.00     Pack years: 12.00     Types: Cigarettes    Smokeless tobacco: Never Used   Substance and Sexual Activity    Alcohol use: No    Drug use: No    Sexual activity: Yes     Partners: Female     Comment: He is . Lifestyle    Physical activity:     Days per week: Not on file     Minutes per session: Not on file    Stress: Not on file   Relationships    Social connections:     Talks on phone: Not on file     Gets together: Not on file     Attends Yarsanism service: Not on file     Active member of club or organization: Not on file     Attends meetings of clubs or organizations: Not on file     Relationship status: Not on file    Intimate partner violence:     Fear of current or ex partner: Not on file     Emotionally abused: Not on file     Physically abused: Not on file     Forced sexual activity: Not on file   Other Topics Concern    Not on file   Social History Narrative    Not on file       Physical Examination:  /68   Pulse 76   Ht 5' 8\" (1.727 m)   Wt 164 lb (74.4 kg)   BMI 24.94 kg/m²   Physical Exam   Constitutional: He appears well-developed and well-nourished. Neck: No JVD present.  Carotid bruit is not present. Cardiovascular: Normal rate, regular rhythm and normal heart sounds. Exam reveals no gallop and no friction rub. No murmur heard. Pulmonary/Chest: Effort normal and breath sounds normal. No respiratory distress. He has no wheezes. He has no rales. Abdominal: He exhibits no distension. There is no tenderness. Musculoskeletal: He exhibits no edema. Lymphadenopathy:     He has no cervical adenopathy. Skin: Skin is warm and dry. Vitals reviewed. ASSESSMENT:     Diagnosis Orders   1. Essential hypertension     2. Coronary artery disease involving native coronary artery of native heart without angina pectoris         PLAN:  No orders of the defined types were placed in this encounter. No orders of the defined types were placed in this encounter. 1. Continue present medications  2. Recommend follow-up assessment in one year    Return in about 1 year (around 5/9/2020). Mary Hawk MD 5/9/2019 10:55 AM    [facility]      Thisdictation was generated by voice recognition computer software. Although all attempts are made to edit the dictation for accuracy, there may be errors in the transcription that are not intended.

## 2019-05-09 NOTE — THERAPY TREATMENT NOTE
Outpatient Physical Therapy Ortho Treatment Note  [facility]     Patient Name: Chad Henriquez  : 1929  MRN: [id number]  Today's Date: 2019      Visit Date: 2019    Visit Dx:    ICD-10-CM ICD-9-CM   1. Degeneration of lumbar or lumbosacral intervertebral disc M51.37 722.52   2. Acute left-sided low back pain with left-sided sciatica M54.42 724.2     724.3   3. Spinal stenosis, lumbar region, with neurogenic claudication M48.062 724.03   4. Left leg pain M79.605 729.5   5. Bilateral hip pain M25.551 719.45    M25.552        Patient Active Problem List   Diagnosis   • BPH with urinary obstruction   • Frequency of urination   • Nocturia   • OAB (overactive bladder)   • Non-smoker   • Spinal stenosis, lumbar region, with neurogenic claudication   • Left leg pain   • Bilateral hip pain   • Acute left-sided low back pain with left-sided sciatica   • Essential tremor   • BMI 21.0-21.9, adult   • Spinal stenosis   • Degeneration of lumbar or lumbosacral intervertebral disc   • BMI 24.0-24.9, adult        Past Medical History:   Diagnosis Date   • Allergic rhinitis    • Anemia    • Arthritis    • BPH (benign prostatic hypertrophy) with urinary retention    • Cancer (CMS/HCC)     skin cancer   • Chronic back pain     RUNS DOWN BOTH LEGS   • Constipation    • Coronary artery disease    • Hard of hearing    • Heart attack (CMS/HCC)     NO MUSCLE DAMAGE - JUST OCCLUDED VALVE   • High cholesterol    • History of skin cancer     BILATERAL EARS   • History of transfusion        • Hypertension    • Inguinal hernia    • Leaky heart valve     DR CONCEPCION SAYS NOT ENOUGH TO BE OF CONCERN   • Other bursal cyst, right elbow    • PONV (postoperative nausea and vomiting)     has had scopalamine patch in past    • Sleep apnea     DOES NOT USE CPAP OR BIPAP   • Spinal stenosis of cervical region    • Spinal stenosis of lumbar region    • Tremors of nervous system    • Wears hearing aid     BILATERAL        Past  Surgical History:   Procedure Laterality Date   • ANTERIOR LUMBAR EXPOSURE N/A 12/7/2018    Procedure: ANTERIOR LUMBAR EXPOSURE;  Surgeon: Manolo Mcleod DO;  Location:  [facility];  Service: Vascular   • APPENDECTOMY     • BACK SURGERY      X3   • CARDIAC SURGERY  08/08/1986    X1 BYPASS   • CORONARY ANGIOPLASTY WITH STENT PLACEMENT  1997    X3 STENTS   • HERNIA REPAIR Bilateral     x2   • INGUINAL HERNIA REPAIR Right 6/22/2017    Procedure: RIGHT INGUINAL HERNIA REPAIR AND EXCISION OF CYST RIGHT ELBOW ;  Surgeon: Jackelyn Arriola MD;  Location:  [facility];  Service:    • LUMBAR LAMINECTOMY N/A 3/12/2018    Procedure: LUMBAR LAMINECTOMY WITHOUT FUSION L3-4,4-5;  Surgeon: Kj Falcon MD;  Location:  [facility];  Service: Neurosurgery   • LUMBAR LAMINECTOMY ANTERIOR LUMBAR INTERBODY FUSION N/A 12/7/2018    Procedure: ANTERIOR LUMBAR INTERBODY FUSION L5-S1;  Surgeon: Kj Falcon MD;  Location:  [facility];  Service: Neurosurgery   • LUMBAR LAMINECTOMY WITH FUSION Left 12/10/2018    Procedure: LUMBAR LAMINECTOMY TRANSFORAMINAL LUMBAR INTERBODY FUSION L34,45;  Surgeon: Kj Falcon MD;  Location:  [facility];  Service: Neurosurgery   • LUMBAR LAMINECTOMY WITH FUSION Left 12/7/2018    Procedure: LUMBAR LAMINECTOMY TRANSFORAMINAL LUMBAR INTERBODY FUSION LEFT L3-4, L4-5 QUADRANT RETRACTOR;  Surgeon: Kj Falcon MD;  Location:  [facility];  Service: Neurosurgery                       PT Assessment/Plan     Row Name 05/09/19 8568          PT Assessment    Assessment Comments  Patient walked in without assistive device today.  Overall he is doing well with physical therapy and nearing discharge.  However, it was observed today that he does have difficutly with turning or changing direction towards the right with a few stumbles with this today.  -SWETA        PT Plan    PT Plan Comments  Continue to challenge his ability to change direction without stumbles.  -SWETA       User Key  (r) = Recorded By, (t) = Taken By,  (c) = Cosigned By    Initials Name Provider Type    Lj Tovar, PTA Physical Therapy Assistant            Exercises     Row Name 05/09/19 1384             Subjective Comments    Subjective Comments  Patient reports he has walked without cane today.  He denies falls or near falls.  He reports his back pain is practically not there.    He reports his biggest difficulty now is his balance.  -SWETA         Subjective Pain    Able to rate subjective pain?  yes  -SWETA      Pre-Treatment Pain Level  1  -SWETA      Post-Treatment Pain Level  0  -SWETA         Total Minutes    87458 - PT Therapeutic Exercise Minutes  43  -SWETA         Exercise 1    Exercise Name 1  shuttle press  -SWETA      Cueing 1  Verbal  -SWETA      Time 1  5 minutes   -SWETA      Additional Comments  8 bands, bilaterally   -SWETA         Exercise 2    Exercise Name 2  --  -SWETA      Cueing 2  --  -SWETA      Time 2  --  -SWETA      Additional Comments  --  -SWETA         Exercise 3    Exercise Name 3  45 cm ball toss on rebounder with blue foam and feet together   -SWETA      Cueing 3  Verbal;Demo  -SWETA      Sets 3  3  -SWETA      Reps 3  15  -SWETA         Exercise 4    Exercise Name 4  45 cm ball toss, tandem stance  -SWETA      Cueing 4  Verbal;Demo  -SWETA      Sets 4  3 each side   -SWETA      Reps 4  15  -SWETA      Additional Comments  CGA/min A, needed seated rest following   -SWETA         Exercise 5    Exercise Name 5  lateral step up/over/down on exercise step   -SWETA      Cueing 5  Verbal;Tactile;Demo  -SWETA      Sets 5  1  -SWETA      Reps 5  10  -SWETA      Additional Comments  CGA/min A  -SWETA         Exercise 6    Exercise Name 6  forward step up/downs from exercise step   -SWETA      Cueing 6  Verbal;Tactile;Demo  -SWETA      Sets 6  1  -SWETA      Reps 6  10  -SWETA      Additional Comments  CGA/min A, needed seated rest following   -SWETA         Exercise 7    Exercise Name 7  cone pick ups  -SWETA      Cueing 7  Verbal;Tactile  -SWETA         Exercise 8    Exercise Name 8  sit to stands and walking around cones at  various distances from chair and sitting back down in between each one( TUG practice type activity)  -SWETA      Cueing 8  Verbal;Demo  -SWETA      Sets 8  2( 1 round turning   -SWETA      Additional Comments  CGA, but he did have a few stumbles without fall while moving towards the right   -SWETA        User Key  (r) = Recorded By, (t) = Taken By, (c) = Cosigned By    Initials Name Provider Type    Lj Tovar PTA Physical Therapy Assistant                       PT OP Goals     Row Name 05/09/19 1302          Long Term Goals    LTG Date to Achieve  03/14/19  -SWETA     LTG 1  Improve demond hip extension to 10 deg  -SWETA     LTG 1 Progress  Met  -SWEAT     LTG 2  Able to walk community distances with no assistive device with right AFO  -SWETA     LTG 2 Progress  Met  -SWETA     LTG 3  Reports on falls or tripping x 1 week while wearing his AFO  -SWETA     LTG 3 Progress  Ongoing  -SWETA     LTG 3 Progress Comments  he does have difficulty with turns towards the right   -SWETA     LTG 4  SLS demond legs x 10 sec with hip stability  -SWETA     LTG 4 Progress  Ongoing  -SWETA        Time Calculation    PT Goal Re-Cert Due Date  05/19/19  -SWETA       User Key  (r) = Recorded By, (t) = Taken By, (c) = Cosigned By    Initials Name Provider Type    Lj Tovar PTA Physical Therapy Assistant          Therapy Education  Given: HEP, Mobility training  Program: Reinforced  How Provided: Verbal  Provided to: Patient  Level of Understanding: Verbalized              Time Calculation:   Start Time: 1302  Stop Time: 1345  Time Calculation (min): 43 min  Total Timed Code Minutes- PT: 43 minute(s)  Therapy Charges for Today     Code Description Service Date Service Provider Modifiers Qty    52782209302 HC PT THER PROC EA 15 MIN 5/9/2019 Lj Mercado PTA GP 3                    Lj Mercado PTA  5/9/2019

## 2019-05-14 ENCOUNTER — HOSPITAL ENCOUNTER (OUTPATIENT)
Dept: PHYSICAL THERAPY | Facility: HOSPITAL | Age: 84
Setting detail: THERAPIES SERIES
Discharge: HOME | End: 2019-05-14
Attending: NEUROLOGICAL SURGERY

## 2019-05-14 DIAGNOSIS — M51.37 DEGENERATION OF LUMBAR OR LUMBOSACRAL INTERVERTEBRAL DISC: Primary | ICD-10-CM

## 2019-05-14 DIAGNOSIS — M54.42 ACUTE LEFT-SIDED LOW BACK PAIN WITH LEFT-SIDED SCIATICA: ICD-10-CM

## 2019-05-14 DIAGNOSIS — M48.062 SPINAL STENOSIS, LUMBAR REGION, WITH NEUROGENIC CLAUDICATION: ICD-10-CM

## 2019-05-14 PROCEDURE — 97110 THERAPEUTIC EXERCISES: CPT

## 2019-05-14 NOTE — THERAPY TREATMENT NOTE
Outpatient Physical Therapy Ortho Treatment Note   [place]     Patient Name: Chad Henriquez  : 1929  MRN: [id number]  Today's Date: 2019      Visit Date: 2019    Visit Dx:    ICD-10-CM ICD-9-CM   1. Degeneration of lumbar or lumbosacral intervertebral disc M51.37 722.52   2. Acute left-sided low back pain with left-sided sciatica M54.42 724.2     724.3   3. Spinal stenosis, lumbar region, with neurogenic claudication M48.062 724.03       Patient Active Problem List   Diagnosis   • BPH with urinary obstruction   • Frequency of urination   • Nocturia   • OAB (overactive bladder)   • Non-smoker   • Spinal stenosis, lumbar region, with neurogenic claudication   • Left leg pain   • Bilateral hip pain   • Acute left-sided low back pain with left-sided sciatica   • Essential tremor   • BMI 21.0-21.9, adult   • Spinal stenosis   • Degeneration of lumbar or lumbosacral intervertebral disc   • BMI 24.0-24.9, adult        Past Medical History:   Diagnosis Date   • Allergic rhinitis    • Anemia    • Arthritis    • BPH (benign prostatic hypertrophy) with urinary retention    • Cancer (CMS/HCC)     skin cancer   • Chronic back pain     RUNS DOWN BOTH LEGS   • Constipation    • Coronary artery disease    • Hard of hearing    • Heart attack (CMS/HCC)     NO MUSCLE DAMAGE - JUST OCCLUDED VALVE   • High cholesterol    • History of skin cancer     BILATERAL EARS   • History of transfusion        • Hypertension    • Inguinal hernia    • Leaky heart valve     DR CONCEPCION SAYS NOT ENOUGH TO BE OF CONCERN   • Other bursal cyst, right elbow    • PONV (postoperative nausea and vomiting)     has had scopalamine patch in past    • Sleep apnea     DOES NOT USE CPAP OR BIPAP   • Spinal stenosis of cervical region    • Spinal stenosis of lumbar region    • Tremors of nervous system    • Wears hearing aid     BILATERAL        Past Surgical History:   Procedure Laterality Date   • ANTERIOR LUMBAR EXPOSURE N/A  12/7/2018    Procedure: ANTERIOR LUMBAR EXPOSURE;  Surgeon: Manolo Mcleod DO;  Location:  [facility];  Service: Vascular   • APPENDECTOMY     • BACK SURGERY      X3   • CARDIAC SURGERY  08/08/1986    X1 BYPASS   • CORONARY ANGIOPLASTY WITH STENT PLACEMENT  1997    X3 STENTS   • HERNIA REPAIR Bilateral     x2   • INGUINAL HERNIA REPAIR Right 6/22/2017    Procedure: RIGHT INGUINAL HERNIA REPAIR AND EXCISION OF CYST RIGHT ELBOW ;  Surgeon: Jackelyn Arriola MD;  Location:  [facility];  Service:    • LUMBAR LAMINECTOMY N/A 3/12/2018    Procedure: LUMBAR LAMINECTOMY WITHOUT FUSION L3-4,4-5;  Surgeon: Kj Falcon MD;  Location:  [facility];  Service: Neurosurgery   • LUMBAR LAMINECTOMY ANTERIOR LUMBAR INTERBODY FUSION N/A 12/7/2018    Procedure: ANTERIOR LUMBAR INTERBODY FUSION L5-S1;  Surgeon: Kj Falcon MD;  Location:  [facility];  Service: Neurosurgery   • LUMBAR LAMINECTOMY WITH FUSION Left 12/10/2018    Procedure: LUMBAR LAMINECTOMY TRANSFORAMINAL LUMBAR INTERBODY FUSION L34,45;  Surgeon: Kj Falcon MD;  Location:  [facility];  Service: Neurosurgery   • LUMBAR LAMINECTOMY WITH FUSION Left 12/7/2018    Procedure: LUMBAR LAMINECTOMY TRANSFORAMINAL LUMBAR INTERBODY FUSION LEFT L3-4, L4-5 QUADRANT RETRACTOR;  Surgeon: Kj Falcon MD;  Location:  [facility];  Service: Neurosurgery                       PT Assessment/Plan     Row Name 05/14/19 1349          PT Assessment    Assessment Comments  Pt report no longer using his cane and feeling steady enough to not need it. He was slightly unsteady walking in from the lobby and reached for the wall; however had no unsteadiness throughout treatment. We continued to progress with functional movements, balance and strengthening. His ability to change directions smoothly and safely is slighty limited but improved since last session. He has one more scheduled visit and we will plan for D/C at that time.   -TR        PT Plan    PT Plan Comments  Finalize HEP and  "plan for D/C   -TR       User Key  (r) = Recorded By, (t) = Taken By, (c) = Cosigned By    Initials Name Provider Type    Rachael Herzog PTA Physical Therapy Assistant            Exercises     Row Name 05/14/19 1349             Subjective Comments    Subjective Comments  Pt reports \"doing okay\" and denies any changes.   -TR         Subjective Pain    Able to rate subjective pain?  yes  -TR      Pre-Treatment Pain Level  0  -TR      Post-Treatment Pain Level  0  -TR         Total Minutes    08693 - PT Therapeutic Exercise Minutes  41  -TR         Exercise 1    Exercise Name 1  SCIFIT LE bike   -TR      Cueing 1  Verbal  -TR      Sets 1  seat #8  -TR      Reps 1  pedals #5  -TR      Time 1  Level 4  -TR      Additional Comments  10  -TR         Exercise 2    Exercise Name 2  Retrograde walking against blue band   -TR      Cueing 2  Verbal;Tactile  -TR      Sets 2  4  -TR      Time 2  20'  -TR         Exercise 3    Exercise Name 3  Forward walking against blue band   -TR      Cueing 3  Verbal  -TR      Sets 3  4  -TR      Time 3  20'  -TR         Exercise 4    Exercise Name 4  Sit to stands witout the use of UE's  -TR      Cueing 4  Verbal  -TR      Sets 4  1  -TR      Reps 4  12  -TR         Exercise 5    Exercise Name 5  SLS at mat table   -TR      Additional Comments  see goals section  -TR         Exercise 6    Exercise Name 6  Single leg airplanes at TRX straps  -TR      Cueing 6  Verbal  -TR      Sets 6  2  -TR      Reps 6  10  -TR      Additional Comments  BLE   -TR         Exercise 7    Exercise Name 7  walkingin hallway working on quick change in direction especially to the R, 180 and 360 degrees.   -TR        User Key  (r) = Recorded By, (t) = Taken By, (c) = Cosigned By    Initials Name Provider Type    Rachael Herzog PTA Physical Therapy Assistant                       PT OP Goals     Row Name 05/14/19 1341          Long Term Goals    LTG Date to Achieve  03/14/19  -TR     LTG 1  Improve " demond hip extension to 10 deg  -TR     LTG 1 Progress  Met  -TR     LTG 2  Able to walk community distances with no assistive device with right AFO  -TR     LTG 2 Progress  Met  -TR     LTG 3  Reports on falls or tripping x 1 week while wearing his AFO  -TR     LTG 3 Progress  Ongoing  -TR     LTG 4  SLS demond legs x 10 sec with hip stability  -TR     LTG 4 Progress  Ongoing  -TR     LTG 4 Progress Comments  RLE 3 seconds, LLE 5 seconds   -TR        Time Calculation    PT Goal Re-Cert Due Date  05/19/19  -TR       User Key  (r) = Recorded By, (t) = Taken By, (c) = Cosigned By    Initials Name Provider Type    TR Rachael Mckeon PTA Physical Therapy Assistant          Therapy Education  Education Details: Reviewed components and continued to discuss correction to gait   Given: HEP, Symptoms/condition management  Program: Reinforced  How Provided: Verbal  Provided to: Patient  Level of Understanding: Verbalized              Time Calculation:   Start Time: 1349  Stop Time: 1430  Time Calculation (min): 41 min  Total Timed Code Minutes- PT: 41 minute(s)  Therapy Charges for Today     Code Description Service Date Service Provider Modifiers Qty    02026640203 HC PT THER PROC EA 15 MIN 5/14/2019 Rachael Mckeon PTA GP 3                    Rachael Mckeon PTA  5/14/2019

## 2019-05-16 ENCOUNTER — HOSPITAL ENCOUNTER (OUTPATIENT)
Dept: PHYSICAL THERAPY | Facility: HOSPITAL | Age: 84
Setting detail: THERAPIES SERIES
Discharge: HOME | End: 2019-05-16
Attending: NEUROLOGICAL SURGERY

## 2019-05-16 DIAGNOSIS — M48.062 SPINAL STENOSIS, LUMBAR REGION, WITH NEUROGENIC CLAUDICATION: ICD-10-CM

## 2019-05-16 DIAGNOSIS — M51.37 DEGENERATION OF LUMBAR OR LUMBOSACRAL INTERVERTEBRAL DISC: Primary | ICD-10-CM

## 2019-05-16 DIAGNOSIS — M79.605 LEFT LEG PAIN: ICD-10-CM

## 2019-05-16 DIAGNOSIS — M54.42 ACUTE LEFT-SIDED LOW BACK PAIN WITH LEFT-SIDED SCIATICA: ICD-10-CM

## 2019-05-16 DIAGNOSIS — M25.552 BILATERAL HIP PAIN: ICD-10-CM

## 2019-05-16 DIAGNOSIS — M25.551 BILATERAL HIP PAIN: ICD-10-CM

## 2019-05-16 PROCEDURE — 97110 THERAPEUTIC EXERCISES: CPT | Performed by: PHYSICAL THERAPIST

## 2019-05-16 NOTE — THERAPY DISCHARGE NOTE
Outpatient Physical Therapy Ortho Treatment Note/Discharge Summary   [place]     Patient Name: Chad Henriquez  : 1929  MRN: [id number]  Today's Date: 2019      Visit Date: 2019    Visit Dx:    ICD-10-CM ICD-9-CM   1. Degeneration of lumbar or lumbosacral intervertebral disc M51.37 722.52   2. Acute left-sided low back pain with left-sided sciatica M54.42 724.2     724.3   3. Spinal stenosis, lumbar region, with neurogenic claudication M48.062 724.03   4. Left leg pain M79.605 729.5   5. Bilateral hip pain M25.551 719.45    M25.552        Patient Active Problem List   Diagnosis   • BPH with urinary obstruction   • Frequency of urination   • Nocturia   • OAB (overactive bladder)   • Non-smoker   • Spinal stenosis, lumbar region, with neurogenic claudication   • Left leg pain   • Bilateral hip pain   • Acute left-sided low back pain with left-sided sciatica   • Essential tremor   • BMI 21.0-21.9, adult   • Spinal stenosis   • Degeneration of lumbar or lumbosacral intervertebral disc   • BMI 24.0-24.9, adult        Past Medical History:   Diagnosis Date   • Allergic rhinitis    • Anemia    • Arthritis    • BPH (benign prostatic hypertrophy) with urinary retention    • Cancer (CMS/HCC)     skin cancer   • Chronic back pain     RUNS DOWN BOTH LEGS   • Constipation    • Coronary artery disease    • Hard of hearing    • Heart attack (CMS/HCC)     NO MUSCLE DAMAGE - JUST OCCLUDED VALVE   • High cholesterol    • History of skin cancer     BILATERAL EARS   • History of transfusion        • Hypertension    • Inguinal hernia    • Leaky heart valve     DR CONCEPCION SAYS NOT ENOUGH TO BE OF CONCERN   • Other bursal cyst, right elbow    • PONV (postoperative nausea and vomiting)     has had scopalamine patch in past    • Sleep apnea     DOES NOT USE CPAP OR BIPAP   • Spinal stenosis of cervical region    • Spinal stenosis of lumbar region    • Tremors of nervous system    • Wears hearing aid      BILATERAL        Past Surgical History:   Procedure Laterality Date   • ANTERIOR LUMBAR EXPOSURE N/A 12/7/2018    Procedure: ANTERIOR LUMBAR EXPOSURE;  Surgeon: Manolo Mcleod DO;  Location:  [facility];  Service: Vascular   • APPENDECTOMY     • BACK SURGERY      X3   • CARDIAC SURGERY  08/08/1986    X1 BYPASS   • CORONARY ANGIOPLASTY WITH STENT PLACEMENT  1997    X3 STENTS   • HERNIA REPAIR Bilateral     x2   • INGUINAL HERNIA REPAIR Right 6/22/2017    Procedure: RIGHT INGUINAL HERNIA REPAIR AND EXCISION OF CYST RIGHT ELBOW ;  Surgeon: Jackelyn Arriola MD;  Location:  [facility];  Service:    • LUMBAR LAMINECTOMY N/A 3/12/2018    Procedure: LUMBAR LAMINECTOMY WITHOUT FUSION L3-4,4-5;  Surgeon: Kj Falcon MD;  Location:  [facility];  Service: Neurosurgery   • LUMBAR LAMINECTOMY ANTERIOR LUMBAR INTERBODY FUSION N/A 12/7/2018    Procedure: ANTERIOR LUMBAR INTERBODY FUSION L5-S1;  Surgeon: Kj Falcon MD;  Location:  [facility];  Service: Neurosurgery   • LUMBAR LAMINECTOMY WITH FUSION Left 12/10/2018    Procedure: LUMBAR LAMINECTOMY TRANSFORAMINAL LUMBAR INTERBODY FUSION L34,45;  Surgeon: Kj Falcon MD;  Location:  [facility];  Service: Neurosurgery   • LUMBAR LAMINECTOMY WITH FUSION Left 12/7/2018    Procedure: LUMBAR LAMINECTOMY TRANSFORAMINAL LUMBAR INTERBODY FUSION LEFT L3-4, L4-5 QUADRANT RETRACTOR;  Surgeon: Kj Falcon MD;  Location:  [facility];  Service: Neurosurgery                       PT Assessment/Plan     Row Name 05/16/19 1300          PT Assessment    Assessment Comments  He is at a point where he can continue with his HEP. He is much better than when he started adn still has room for growth but doesn't really need PT to guide him through this.   -TB        PT Plan    PT Plan Comments  DC from PT.  -TB       User Key  (r) = Recorded By, (t) = Taken By, (c) = Cosigned By    Initials Name Provider Type    TB Luis Moe, PT Physical Therapist              Exercises     Row Name  05/16/19 1300             Subjective Comments    Subjective Comments  He feels like he has plateaued.. He is better but still has some work to do.   -TB         Subjective Pain    Pre-Treatment Pain Level  0  -TB         Total Minutes    81471 - PT Therapeutic Exercise Minutes  40  -TB         Exercise 1    Exercise Name 1  assessed gait walking outside on the drainage path walking on either side of the slope; also walked up and over grassy hills. He wa [place] to walk about 10 minutes without resting with only a couple of minor losses of balance.   -TB      Time 1  15  -TB         Exercise 2    Exercise Name 2  SLS with hip abd/airplane using // bars too steady as needed  -TB      Time 2  10  -TB         Exercise 3    Exercise Name 3  TRX strap standing rows  -TB      Sets 3  2  -TB      Reps 3  15  -TB         Exercise 4    Exercise Name 4  TRX forward plank  -TB      Sets 4  2  -TB      Reps 4  10  -TB      Time 4  5 sec each  -TB         Exercise 5    Exercise Name 5  squats using TRX straps to steady; cued to bend at hips more and equal weight demond  -TB      Reps 5  20  -TB        User Key  (r) = Recorded By, (t) = Taken By, (c) = Cosigned By    Initials Name Provider Type    TB Luis Moe, PT Physical Therapist                         PT OP Goals     Row Name 05/16/19 1300          Long Term Goals    LTG Date to Achieve  03/14/19  -TB     LTG 1  Improve demond hip extension to 10 deg  -TB     LTG 1 Progress  Partially Met  -TB     LTG 1 Progress Comments  to 0 deg  -TB     LTG 2  Able to walk community distances with no assistive device with right AFO  -TB     LTG 2 Progress  Met  -TB     LTG 2 Progress Comments  walking now with no AD  -TB     LTG 3  Reports on falls or tripping x 1 week while wearing his AFO  -TB     LTG 3 Progress  Met  -TB     LTG 3 Progress Comments  fell in tub 2-3 weeks ago, none since then  -TB     LTG 4  SLS demond legs x 10 sec with hip stability  -TB     LTG 4 Progress  Partially Met   -TB     LTG 4 Progress Comments  RLE 3 sec; LLE 5 sec  -TB        Time Calculation    PT Goal Re-Cert Due Date  05/19/19  -TB       User Key  (r) = Recorded By, (t) = Taken By, (c) = Cosigned By    Initials Name Provider Type    TB Luis Moe, PT Physical Therapist          Therapy Education  Education Details: SLS with hip abd/airplane  Given: HEP, Symptoms/condition management  Program: Reinforced  How Provided: Verbal  Provided to: Patient  Level of Understanding: Verbalized              Time Calculation:   Start Time: 1305  Stop Time: 1345  Time Calculation (min): 40 min  Therapy Charges for Today     Code Description Service Date Service Provider Modifiers Qty    98555996475 HC PT THER PROC EA 15 MIN 5/16/2019 Luis Moe, PT GP 3                OP PT Discharge Summary  Date of Discharge: 05/16/19  Reason for Discharge: Maximum functional potential achieved  Outcomes Achieved: Patient able to partially acheive established goals  Discharge Destination: Home with home program  Discharge Instructions/Additional Comments: Our emphasis early was on hip flexibility. We progressed to more hip stabiltiy and balance exercises. He was able to wean off his cane for the most part. He is ready to progress his HEP on his own.       Luis Moe, PT  5/16/2019

## 2019-07-11 ENCOUNTER — OFFICE VISIT (OUTPATIENT)
Dept: NEUROSURGERY | Facility: CLINIC | Age: 84
End: 2019-07-11

## 2019-07-11 VITALS
BODY MASS INDEX: 24.67 KG/M2 | WEIGHT: 162.8 LBS | DIASTOLIC BLOOD PRESSURE: 70 MMHG | SYSTOLIC BLOOD PRESSURE: 140 MMHG | HEIGHT: 68 IN

## 2019-07-11 DIAGNOSIS — G60.9 HEREDITARY AND IDIOPATHIC PERIPHERAL NEUROPATHY: ICD-10-CM

## 2019-07-11 DIAGNOSIS — M51.37 DEGENERATION OF LUMBAR OR LUMBOSACRAL INTERVERTEBRAL DISC: ICD-10-CM

## 2019-07-11 DIAGNOSIS — M25.552 BILATERAL HIP PAIN: ICD-10-CM

## 2019-07-11 DIAGNOSIS — M48.062 SPINAL STENOSIS, LUMBAR REGION, WITH NEUROGENIC CLAUDICATION: Primary | ICD-10-CM

## 2019-07-11 DIAGNOSIS — Z78.9 NON-SMOKER: ICD-10-CM

## 2019-07-11 DIAGNOSIS — M25.551 BILATERAL HIP PAIN: ICD-10-CM

## 2019-07-11 PROCEDURE — 99213 OFFICE O/P EST LOW 20 MIN: CPT | Performed by: NEUROLOGICAL SURGERY

## 2019-07-11 RX ORDER — GABAPENTIN 600 MG/1
600 TABLET ORAL 3 TIMES DAILY
Qty: 90 TABLET | Refills: 3 | Status: SHIPPED | OUTPATIENT
Start: 2019-07-11 | End: 2019-08-19 | Stop reason: DRUGHIGH

## 2019-07-11 RX ORDER — GABAPENTIN 300 MG/1
CAPSULE ORAL
Qty: 21 CAPSULE | Refills: 0 | Status: SHIPPED | OUTPATIENT
Start: 2019-07-11 | End: 2019-08-19 | Stop reason: DRUGHIGH

## 2019-07-11 RX ORDER — GABAPENTIN 100 MG/1
CAPSULE ORAL
Qty: 21 CAPSULE | Refills: 0 | Status: SHIPPED | OUTPATIENT
Start: 2019-07-11 | End: 2019-08-19 | Stop reason: DRUGHIGH

## 2019-07-11 NOTE — PROGRESS NOTES
SUBJECTIVE:  Patient ID: Chad Henriquez is a 89 y.o. male is here today for follow-up.    Chief Complaint: Back pain  Chief Complaint   Patient presents with   • back & leg pain     patient is here for a 4 month follow up; patient went to the operating room initially on 12/07/2018 for an L5-S1 ALIF and then back to the operating room on 12/10/2018 for a L 3-4, 4-5 TLIF with laminectomy.       HPI  89-year-old gentleman went to the operating for a multilevel instrumented fusion in December 2018.  He is done very well.  He has very little back pain in the lower extremity radicular type pain.  He is walking up to 2 miles a day.  He takes no pain medication.  He is complaining of nerve neuropathy type pain in his feet in a stocking distribution.  He can get burning and tingling at night.  He does feel it is affecting his balance.  He wears an AFO and is done very well without bracing.    The following portions of the patient's history were reviewed and updated as appropriate: allergies, current medications, past family history, past medical history, past social history, past surgical history and problem list.    OBJECTIVE:    Review of Systems   Musculoskeletal: Positive for back pain.   Neurological: Positive for numbness.   All other systems reviewed and are negative.         Physical Exam   Constitutional: He is oriented to person, place, and time.   Neurological: He is oriented to person, place, and time.   Psychiatric: His speech is normal.       Neurologic Exam     Mental Status   Oriented to person, place, and time.   Attention: normal.   Speech: speech is normal   Level of consciousness: alert  Knowledge: good.     Cranial Nerves   Cranial nerves II through XII intact.     Motor Exam     Strength   Strength 5/5 except as noted.   Right anterior tibial: 2/5    Sensory Exam   Right leg light touch: decreased from knee  Left leg light touch: decreased from knee  Right leg pinprick: decreased from knee  Left leg  pinprick: decreased from knee      Independent Review of Radiographic Studies:       ASSESSMENT/PLAN:  The patient clinically presents with peripheral neuropathy.  This could be contributing to his balance issues and difficulty walking at times.  We discussed 2 options, oral gabapentin versus a neuropathic pain cream.  He would like to try the oral gabapentin.  We will start him an escalating dose to see if it improves his neuropathy.      1. Spinal stenosis, lumbar region, with neurogenic claudication    2. Degeneration of lumbar or lumbosacral intervertebral disc    3. Hereditary and idiopathic peripheral neuropathy    4. Bilateral hip pain    5. BMI 24.0-24.9, adult    6. Non-smoker            Return if symptoms worsen or fail to improve.      Kj Falcon MD

## 2019-08-19 DIAGNOSIS — G60.9 HEREDITARY AND IDIOPATHIC PERIPHERAL NEUROPATHY: ICD-10-CM

## 2019-08-19 DIAGNOSIS — M48.062 SPINAL STENOSIS, LUMBAR REGION, WITH NEUROGENIC CLAUDICATION: ICD-10-CM

## 2019-08-20 RX ORDER — GABAPENTIN 600 MG/1
600 TABLET ORAL 3 TIMES DAILY
Qty: 90 TABLET | Refills: 3 | Status: SHIPPED | OUTPATIENT
Start: 2019-08-20

## 2019-10-22 ENCOUNTER — HOSPITAL ENCOUNTER (EMERGENCY)
Age: 84
Discharge: HOME | End: 2019-10-22
Payer: MEDICARE

## 2019-10-22 VITALS
HEART RATE: 65 BPM | RESPIRATION RATE: 16 BRPM | TEMPERATURE: 98.2 F | DIASTOLIC BLOOD PRESSURE: 80 MMHG | OXYGEN SATURATION: 96 % | SYSTOLIC BLOOD PRESSURE: 172 MMHG

## 2019-10-22 DIAGNOSIS — L76.21 POSTOPERATIVE HEMORRHAGE OF SKIN FOLLOWING DERMATOLOGIC PROCEDURE: Primary | ICD-10-CM

## 2019-10-22 LAB
APTT: 30.1 SEC (ref 26–36.2)
BASOPHILS ABSOLUTE: 0.1 K/UL (ref 0–0.2)
BASOPHILS RELATIVE PERCENT: 0.4 % (ref 0–1)
EOSINOPHILS ABSOLUTE: 0.1 K/UL (ref 0–0.6)
EOSINOPHILS RELATIVE PERCENT: 1 % (ref 0–5)
HCT VFR BLD CALC: 41.3 % (ref 42–52)
HEMOGLOBIN: 13.6 G/DL (ref 14–18)
IMMATURE GRANULOCYTES #: 0 K/UL
INR BLD: 1.12 (ref 0.88–1.18)
LYMPHOCYTES ABSOLUTE: 1 K/UL (ref 1.1–4.5)
LYMPHOCYTES RELATIVE PERCENT: 8.4 % (ref 20–40)
MCH RBC QN AUTO: 33.1 PG (ref 27–31)
MCHC RBC AUTO-ENTMCNC: 32.9 G/DL (ref 33–37)
MCV RBC AUTO: 100.5 FL (ref 80–94)
MONOCYTES ABSOLUTE: 0.7 K/UL (ref 0–0.9)
MONOCYTES RELATIVE PERCENT: 6.1 % (ref 0–10)
NEUTROPHILS ABSOLUTE: 9.6 K/UL (ref 1.5–7.5)
NEUTROPHILS RELATIVE PERCENT: 83.8 % (ref 50–65)
PDW BLD-RTO: 13.7 % (ref 11.5–14.5)
PLATELET # BLD: 231 K/UL (ref 130–400)
PMV BLD AUTO: 9.9 FL (ref 9.4–12.4)
PROTHROMBIN TIME: 13.8 SEC (ref 12–14.6)
RBC # BLD: 4.11 M/UL (ref 4.7–6.1)
WBC # BLD: 11.5 K/UL (ref 4.8–10.8)

## 2019-10-22 PROCEDURE — 96374 THER/PROPH/DIAG INJ IV PUSH: CPT

## 2019-10-22 PROCEDURE — 85610 PROTHROMBIN TIME: CPT

## 2019-10-22 PROCEDURE — 2500000003 HC RX 250 WO HCPCS: Performed by: EMERGENCY MEDICINE

## 2019-10-22 PROCEDURE — 85025 COMPLETE CBC W/AUTO DIFF WBC: CPT

## 2019-10-22 PROCEDURE — 93005 ELECTROCARDIOGRAM TRACING: CPT | Performed by: NURSE PRACTITIONER

## 2019-10-22 PROCEDURE — 99284 EMERGENCY DEPT VISIT MOD MDM: CPT

## 2019-10-22 PROCEDURE — 6370000000 HC RX 637 (ALT 250 FOR IP)

## 2019-10-22 PROCEDURE — 85730 THROMBOPLASTIN TIME PARTIAL: CPT

## 2019-10-22 PROCEDURE — 36415 COLL VENOUS BLD VENIPUNCTURE: CPT

## 2019-10-22 PROCEDURE — 2500000003 HC RX 250 WO HCPCS: Performed by: NURSE PRACTITIONER

## 2019-10-22 PROCEDURE — 96376 TX/PRO/DX INJ SAME DRUG ADON: CPT

## 2019-10-22 PROCEDURE — 6370000000 HC RX 637 (ALT 250 FOR IP): Performed by: NURSE PRACTITIONER

## 2019-10-22 PROCEDURE — 6360000002 HC RX W HCPCS: Performed by: NURSE PRACTITIONER

## 2019-10-22 RX ORDER — METOPROLOL TARTRATE 50 MG/1
25 TABLET, FILM COATED ORAL ONCE
Status: COMPLETED | OUTPATIENT
Start: 2019-10-22 | End: 2019-10-22

## 2019-10-22 RX ORDER — TRANEXAMIC ACID 100 MG/ML
1000 INJECTION, SOLUTION INTRAVENOUS ONCE
Status: COMPLETED | OUTPATIENT
Start: 2019-10-22 | End: 2019-10-22

## 2019-10-22 RX ORDER — TRANEXAMIC ACID 100 MG/ML
15 INJECTION, SOLUTION INTRAVENOUS ONCE
Status: DISCONTINUED | OUTPATIENT
Start: 2019-10-22 | End: 2019-10-22

## 2019-10-22 RX ORDER — ONDANSETRON 2 MG/ML
4 INJECTION INTRAMUSCULAR; INTRAVENOUS ONCE
Status: COMPLETED | OUTPATIENT
Start: 2019-10-22 | End: 2019-10-22

## 2019-10-22 RX ORDER — ONDANSETRON 2 MG/ML
INJECTION INTRAMUSCULAR; INTRAVENOUS
Status: DISCONTINUED
Start: 2019-10-22 | End: 2019-10-23 | Stop reason: HOSPADM

## 2019-10-22 RX ORDER — OXYCODONE AND ACETAMINOPHEN 7.5; 325 MG/1; MG/1
1 TABLET ORAL EVERY 4 HOURS PRN
COMMUNITY
End: 2020-11-12

## 2019-10-22 RX ORDER — LIDOCAINE HYDROCHLORIDE AND EPINEPHRINE 10; 10 MG/ML; UG/ML
20 INJECTION, SOLUTION INFILTRATION; PERINEURAL ONCE
Status: COMPLETED | OUTPATIENT
Start: 2019-10-22 | End: 2019-10-22

## 2019-10-22 RX ADMIN — TRANEXAMIC ACID 1000 MG: 100 INJECTION, SOLUTION INTRAVENOUS at 21:26

## 2019-10-22 RX ADMIN — METOPROLOL TARTRATE 25 MG: 50 TABLET ORAL at 21:54

## 2019-10-22 RX ADMIN — Medication 1 EACH: at 20:10

## 2019-10-22 RX ADMIN — ONDANSETRON 4 MG: 2 INJECTION INTRAMUSCULAR; INTRAVENOUS at 20:10

## 2019-10-22 RX ADMIN — ONDANSETRON 4 MG: 2 INJECTION INTRAMUSCULAR; INTRAVENOUS at 21:26

## 2019-10-22 RX ADMIN — LIDOCAINE HYDROCHLORIDE,EPINEPHRINE BITARTRATE 20 ML: 10; .01 INJECTION, SOLUTION INFILTRATION; PERINEURAL at 21:26

## 2019-10-22 ASSESSMENT — PAIN SCALES - GENERAL: PAINLEVEL_OUTOF10: 0

## 2019-10-23 LAB
EKG P AXIS: 56 DEGREES
EKG P-R INTERVAL: 218 MS
EKG Q-T INTERVAL: 428 MS
EKG QRS DURATION: 142 MS
EKG QTC CALCULATION (BAZETT): 436 MS
EKG T AXIS: 85 DEGREES

## 2019-10-23 PROCEDURE — 93010 ELECTROCARDIOGRAM REPORT: CPT | Performed by: INTERNAL MEDICINE

## 2020-02-18 ENCOUNTER — OFFICE VISIT (OUTPATIENT)
Dept: INTERNAL MEDICINE | Facility: CLINIC | Age: 85
End: 2020-02-18

## 2020-02-18 VITALS
HEART RATE: 74 BPM | SYSTOLIC BLOOD PRESSURE: 154 MMHG | BODY MASS INDEX: 25.46 KG/M2 | DIASTOLIC BLOOD PRESSURE: 60 MMHG | HEIGHT: 68 IN | WEIGHT: 168 LBS

## 2020-02-18 DIAGNOSIS — G25.0 ESSENTIAL TREMOR: ICD-10-CM

## 2020-02-18 DIAGNOSIS — E78.00 HYPERCHOLESTEREMIA: Primary | ICD-10-CM

## 2020-02-18 DIAGNOSIS — R55 SYNCOPE, UNSPECIFIED SYNCOPE TYPE: ICD-10-CM

## 2020-02-18 PROCEDURE — 99214 OFFICE O/P EST MOD 30 MIN: CPT | Performed by: INTERNAL MEDICINE

## 2020-02-18 NOTE — PROGRESS NOTES
Subjective   Chad Henriquez is a 89 y.o. male.   Chief Complaint   Patient presents with   • Hypertension     6 mos. FU   • Loss of Consciousness     Fainted last week as he was turning around in his bedroom.  Wife had a hard time getting him to come around and said he was snorting, etc.  Says he felt weak afterwards.  He is aware that BP will drop if he changes positions quickly, but not sure if that is what caused this episode.       Chad comes in today complaining of an episode of syncope he states he was turning around his bedroom he had just gotten up was taking his shirt off when he developed a syncopal episode he was out for only a short period of time he does did not complain of chest pains shortness of breath any loss of function of left or right body       The following portions of the patient's history were reviewed and updated as appropriate: allergies, current medications, past family history, past medical history, past social history, past surgical history and problem list.    Review of Systems   Constitutional: Negative for activity change, appetite change, fatigue, fever, unexpected weight gain and unexpected weight loss.   HENT: Negative for swollen glands, trouble swallowing and voice change.    Eyes: Negative for blurred vision and visual disturbance.   Respiratory: Negative for cough and shortness of breath.    Cardiovascular: Negative for chest pain, palpitations and leg swelling.   Gastrointestinal: Negative for abdominal pain, constipation, diarrhea, nausea, vomiting and indigestion.   Endocrine: Negative for cold intolerance, heat intolerance, polydipsia and polyphagia.   Genitourinary: Negative for dysuria and frequency.   Musculoskeletal: Negative for arthralgias, back pain, joint swelling and neck pain.   Skin: Negative for color change, rash and skin lesions.   Neurological: Positive for syncope. Negative for dizziness, weakness, headache, memory problem and confusion.   Hematological:  Does not bruise/bleed easily.   Psychiatric/Behavioral: Negative for agitation, hallucinations and suicidal ideas. The patient is not nervous/anxious.        Objective   Past Medical History:   Diagnosis Date   • Allergic rhinitis    • Anemia    • Arthritis    • BPH (benign prostatic hypertrophy) with urinary retention    • Cancer (CMS/HCC)     skin cancer   • Chronic back pain     RUNS DOWN BOTH LEGS   • Constipation    • Coronary artery disease    • Hard of hearing    • Heart attack (CMS/HCC) 1986    NO MUSCLE DAMAGE - JUST OCCLUDED VALVE   • High cholesterol    • History of skin cancer     BILATERAL EARS   • History of transfusion     1986   • Hypertension    • Inguinal hernia    • Leaky heart valve     DR CONCEPCION SAYS NOT ENOUGH TO BE OF CONCERN   • Other bursal cyst, right elbow    • PONV (postoperative nausea and vomiting)     has had scopalamine patch in past    • Sleep apnea     DOES NOT USE CPAP OR BIPAP   • Spinal stenosis of cervical region    • Spinal stenosis of lumbar region    • Tremors of nervous system    • Wears hearing aid     BILATERAL      Past Surgical History:   Procedure Laterality Date   • ANTERIOR LUMBAR EXPOSURE N/A 12/7/2018    Procedure: ANTERIOR LUMBAR EXPOSURE;  Surgeon: Manolo Mcleod DO;  Location:  [facility] OR;  Service: Vascular   • APPENDECTOMY     • BACK SURGERY      X3   • CARDIAC SURGERY  08/08/1986    X1 BYPASS   • CORONARY ANGIOPLASTY WITH STENT PLACEMENT  1997    X3 STENTS   • HERNIA REPAIR Bilateral     x2   • INGUINAL HERNIA REPAIR Right 6/22/2017    Procedure: RIGHT INGUINAL HERNIA REPAIR AND EXCISION OF CYST RIGHT ELBOW ;  Surgeon: Jackelyn Arriola MD;  Location:  [facility];  Service:    • LUMBAR LAMINECTOMY N/A 3/12/2018    Procedure: LUMBAR LAMINECTOMY WITHOUT FUSION L3-4,4-5;  Surgeon: Kj Falcon MD;  Location:  [facility];  Service: Neurosurgery   • LUMBAR LAMINECTOMY ANTERIOR LUMBAR INTERBODY FUSION N/A 12/7/2018    Procedure: ANTERIOR LUMBAR INTERBODY FUSION  L5-S1;  Surgeon: Kj Falcon MD;  Location:  [facility];  Service: Neurosurgery   • LUMBAR LAMINECTOMY WITH FUSION Left 12/10/2018    Procedure: LUMBAR LAMINECTOMY TRANSFORAMINAL LUMBAR INTERBODY FUSION L34,45;  Surgeon: Kj Falcon MD;  Location:  [facility];  Service: Neurosurgery   • LUMBAR LAMINECTOMY WITH FUSION Left 12/7/2018    Procedure: LUMBAR LAMINECTOMY TRANSFORAMINAL LUMBAR INTERBODY FUSION LEFT L3-4, L4-5 QUADRANT RETRACTOR;  Surgeon: Kj Falcon MD;  Location:  [facility];  Service: Neurosurgery        Current Outpatient Medications:   •  alfuzosin (UROXATRAL) 10 MG 24 hr tablet, Take 1 tablet by mouth Daily., Disp: 30 tablet, Rfl: 5  •  aspirin 81 MG tablet, Take 81 mg by mouth., Disp: , Rfl:   •  diphenhydrAMINE (BENADRYL) 25 mg capsule, Take 25 mg by mouth 2 (Two) Times a Day., Disp: , Rfl:   •  finasteride (PROSCAR) 5 MG tablet, Take 1 tablet by mouth Daily., Disp: 30 tablet, Rfl: 5  •  Flaxseed, Linseed, (FLAXSEED OIL) 1000 MG capsule, 540mg takes 4 tablets by mouth daily, Disp: , Rfl:   •  folic acid ([employer] FOLIC ACID) 800 MCG tablet, Take 800 mcg by mouth daily.  , Disp: , Rfl:   •  gabapentin (NEURONTIN) 600 MG tablet, Take 1 tablet by mouth 3 (Three) Times a Day., Disp: 90 tablet, Rfl: 3  •  L-Lysine 600 MG tablet, Take 1 tablet by mouth Daily., Disp: , Rfl:   •  metoprolol succinate XL (TOPROL-XL) 25 MG 24 hr tablet, Take 25 mg by mouth Every Night., Disp: , Rfl:   •  pravastatin (PRAVACHOL) 40 MG tablet, Take 40 mg by mouth daily.  , Disp: , Rfl:   •  primidone (MYSOLINE) 50 MG tablet, Take 1/2 tablet at bedtime, Disp: 45 tablet, Rfl: 3  •  chlorpheniramine (CHLOR-TRIMETON) 4 MG tablet, Take 4 mg by mouth Daily., Disp: , Rfl:   •  docusate sodium 100 MG capsule, Take 100 mg by mouth., Disp: , Rfl:   •  DULoxetine (CYMBALTA) 60 MG capsule, Take 60 mg by mouth Daily., Disp: , Rfl:   •  ferrous sulfate 325 (65 FE) MG tablet, Take 325 mg by mouth 3 (Three) Times a Day With Meals.,  Disp: , Rfl:   •  furosemide (LASIX) 40 MG tablet, Take 40 mg by mouth Daily., Disp: , Rfl:   •  lidocaine (LIDODERM) 5 %, Place 1 patch on the skin as directed by provider Daily. Remove & Discard patch within 12 hours or as directed by MD, Disp: , Rfl:   •  meloxicam (MOBIC) 7.5 MG tablet, Take 7.5 mg by mouth., Disp: , Rfl:   •  oxyCODONE-acetaminophen (PERCOCET) 7.5-325 MG per tablet, Take 1 tablet by mouth 2 (Two) Times a Day., Disp: 60 tablet, Rfl: 0  •  traMADol (ULTRAM) 50 MG tablet, Take 1 tablet by mouth Every 6 (Six) Hours As Needed for Moderate Pain ., Disp: 120 tablet, Rfl: 0   Vitals:    02/18/20 1413   BP: 154/60   Pulse: 74     Physical Exam   Constitutional: He is oriented to person, place, and time. He appears well-developed and well-nourished.   HENT:   Head: Normocephalic and atraumatic.   Right Ear: External ear normal.   Left Ear: External ear normal.   Nose: Nose normal.   Mouth/Throat: Oropharynx is clear and moist.   Eyes: Pupils are equal, round, and reactive to light. Conjunctivae and EOM are normal.   Neck: Normal range of motion. Neck supple. No thyromegaly present.   Cardiovascular: Normal rate, regular rhythm, normal heart sounds and intact distal pulses.   Pulmonary/Chest: Effort normal and breath sounds normal.   Abdominal: Soft. Bowel sounds are normal.   Lymphadenopathy:     He has no cervical adenopathy.   Neurological: He is alert and oriented to person, place, and time.   Right foot drop, has a brace   Skin: Skin is warm and dry.   Psychiatric: He has a normal mood and affect. His behavior is normal. Thought content normal.   Nursing note and vitals reviewed.        Patient's Body mass index is 25.54 kg/m². BMI is within normal parameters. No follow-up required..      Assessment/Plan   Diagnoses and all orders for this visit:    1. Hypercholesteremia (Primary)  -     Lipid panel  -     AST    2. Syncope, unspecified syncope type  -     US Carotid Bilateral; Future  -     CT Head  Without Contrast; Future    3. Essential tremor        This point we are going to institute a TIA work-up checking his carotids doing a noncontrasted CT of his head his exam was unremarkable today he did not complain of palpitations so we did not institute any kind of cardiological work-up it did seem to be associated with a change in position but certainly could have been postural hypotension we will follow-up once we have all of his evaluation

## 2020-02-19 LAB
AST SERPL-CCNC: 20 U/L (ref 1–40)
CHOLEST SERPL-MCNC: 129 MG/DL (ref 0–200)
HDLC SERPL-MCNC: 41 MG/DL (ref 40–60)
LDLC SERPL CALC-MCNC: 61 MG/DL (ref 0–100)
TRIGL SERPL-MCNC: 134 MG/DL (ref 0–150)
VLDLC SERPL CALC-MCNC: 26.8 MG/DL

## 2020-02-21 ENCOUNTER — HOSPITAL ENCOUNTER (OUTPATIENT)
Dept: CT IMAGING | Facility: HOSPITAL | Age: 85
Discharge: HOME | End: 2020-02-21

## 2020-02-21 ENCOUNTER — HOSPITAL ENCOUNTER (OUTPATIENT)
Dept: ULTRASOUND IMAGING | Facility: HOSPITAL | Age: 85
Discharge: HOME | End: 2020-02-21
Admitting: INTERNAL MEDICINE

## 2020-02-21 DIAGNOSIS — R55 SYNCOPE, UNSPECIFIED SYNCOPE TYPE: ICD-10-CM

## 2020-02-21 PROCEDURE — 70450 CT HEAD/BRAIN W/O DYE: CPT

## 2020-02-21 PROCEDURE — 93880 EXTRACRANIAL BILAT STUDY: CPT

## 2020-02-21 PROCEDURE — 93880 EXTRACRANIAL BILAT STUDY: CPT | Performed by: SURGERY

## 2020-05-07 ENCOUNTER — OFFICE VISIT (OUTPATIENT)
Dept: CARDIOLOGY | Age: 85
End: 2020-05-07
Payer: MEDICARE

## 2020-05-07 VITALS
DIASTOLIC BLOOD PRESSURE: 74 MMHG | SYSTOLIC BLOOD PRESSURE: 140 MMHG | WEIGHT: 168 LBS | HEART RATE: 64 BPM | BODY MASS INDEX: 25.46 KG/M2 | HEIGHT: 68 IN

## 2020-05-07 PROBLEM — Z95.1 HX OF CABG: Status: ACTIVE | Noted: 2020-05-07

## 2020-05-07 PROCEDURE — 1036F TOBACCO NON-USER: CPT | Performed by: INTERNAL MEDICINE

## 2020-05-07 PROCEDURE — G8427 DOCREV CUR MEDS BY ELIG CLIN: HCPCS | Performed by: INTERNAL MEDICINE

## 2020-05-07 PROCEDURE — G8417 CALC BMI ABV UP PARAM F/U: HCPCS | Performed by: INTERNAL MEDICINE

## 2020-05-07 PROCEDURE — 99213 OFFICE O/P EST LOW 20 MIN: CPT | Performed by: INTERNAL MEDICINE

## 2020-05-07 PROCEDURE — 4040F PNEUMOC VAC/ADMIN/RCVD: CPT | Performed by: INTERNAL MEDICINE

## 2020-05-07 PROCEDURE — 1123F ACP DISCUSS/DSCN MKR DOCD: CPT | Performed by: INTERNAL MEDICINE

## 2020-05-07 ASSESSMENT — ENCOUNTER SYMPTOMS
GASTROINTESTINAL NEGATIVE: 1
EYES NEGATIVE: 1
DIARRHEA: 0
RESPIRATORY NEGATIVE: 1
SHORTNESS OF BREATH: 0
NAUSEA: 0
VOMITING: 0

## 2020-05-12 DIAGNOSIS — I25.10 CORONARY ARTERY DISEASE INVOLVING NATIVE CORONARY ARTERY OF NATIVE HEART WITHOUT ANGINA PECTORIS: ICD-10-CM

## 2020-05-12 DIAGNOSIS — I10 ESSENTIAL HYPERTENSION: ICD-10-CM

## 2020-05-12 DIAGNOSIS — E78.2 MIXED HYPERLIPIDEMIA: ICD-10-CM

## 2020-05-12 LAB
CHOLESTEROL, TOTAL: 133 MG/DL (ref 160–199)
HDLC SERPL-MCNC: 45 MG/DL (ref 55–121)
LDL CHOLESTEROL CALCULATED: 71 MG/DL
TRIGL SERPL-MCNC: 86 MG/DL (ref 0–149)

## 2020-09-21 ENCOUNTER — TELEPHONE (OUTPATIENT)
Dept: INTERNAL MEDICINE | Facility: CLINIC | Age: 85
End: 2020-09-21

## 2020-09-21 ENCOUNTER — FLU SHOT (OUTPATIENT)
Dept: INTERNAL MEDICINE | Facility: CLINIC | Age: 85
End: 2020-09-21

## 2020-09-21 DIAGNOSIS — Z23 NEED FOR INFLUENZA VACCINATION: Primary | ICD-10-CM

## 2020-09-21 PROCEDURE — 90694 VACC AIIV4 NO PRSRV 0.5ML IM: CPT | Performed by: INTERNAL MEDICINE

## 2020-09-21 PROCEDURE — G0008 ADMIN INFLUENZA VIRUS VAC: HCPCS | Performed by: INTERNAL MEDICINE

## 2020-09-21 NOTE — TELEPHONE ENCOUNTER
Pt's wife Eva called in requesting a excuse for jury duty for pt. Caller stated pt is unstable for jury duty.     Please call Eva when letter is ready. [phone].

## 2020-11-12 ENCOUNTER — OFFICE VISIT (OUTPATIENT)
Dept: CARDIOLOGY | Age: 85
End: 2020-11-12
Payer: MEDICARE

## 2020-11-12 VITALS
HEART RATE: 68 BPM | HEIGHT: 68 IN | SYSTOLIC BLOOD PRESSURE: 138 MMHG | DIASTOLIC BLOOD PRESSURE: 72 MMHG | BODY MASS INDEX: 25.31 KG/M2 | WEIGHT: 167 LBS

## 2020-11-12 PROCEDURE — 1036F TOBACCO NON-USER: CPT | Performed by: INTERNAL MEDICINE

## 2020-11-12 PROCEDURE — 4040F PNEUMOC VAC/ADMIN/RCVD: CPT | Performed by: INTERNAL MEDICINE

## 2020-11-12 PROCEDURE — 99213 OFFICE O/P EST LOW 20 MIN: CPT | Performed by: INTERNAL MEDICINE

## 2020-11-12 PROCEDURE — G8427 DOCREV CUR MEDS BY ELIG CLIN: HCPCS | Performed by: INTERNAL MEDICINE

## 2020-11-12 PROCEDURE — 1123F ACP DISCUSS/DSCN MKR DOCD: CPT | Performed by: INTERNAL MEDICINE

## 2020-11-12 PROCEDURE — G8484 FLU IMMUNIZE NO ADMIN: HCPCS | Performed by: INTERNAL MEDICINE

## 2020-11-12 PROCEDURE — G8417 CALC BMI ABV UP PARAM F/U: HCPCS | Performed by: INTERNAL MEDICINE

## 2020-11-12 NOTE — PROGRESS NOTES
[facility] Progress Note                            Date:  11/12/2020  Patient: Casimiro Diamond  Age:  80 y. o., 11/9/1929      Reason for evaluation:         SUBJECTIVE:    Returns today for follow-up assessment. History of CABG 1986 walks 40 minutes 5 days a week overall feels well denies anginal chest pain or limiting dyspnea. No new complaints or issues. Blood pressure stable 138/72 heart 68. Review of Systems      OBJECTIVE:     /72   Pulse 68   Ht 5' 8\" (1.727 m)   Wt 167 lb (75.8 kg)   BMI 25.39 kg/m²     Labs:   CBC: No results for input(s): WBC, HGB, HCT, PLT in the last 72 hours. BMP:No results for input(s): NA, K, CO2, BUN, CREATININE, LABGLOM, GLUCOSE in the last 72 hours. BNP: No results for input(s): BNP in the last 72 hours. PT/INR: No results for input(s): PROTIME, INR in the last 72 hours. APTT:No results for input(s): APTT in the last 72 hours. CARDIAC ENZYMES:No results for input(s): CKTOTAL, CKMB, CKMBINDEX, TROPONINI in the last 72 hours. FASTING LIPID PANEL:  Lab Results   Component Value Date    HDL 45 05/12/2020    LDLCALC 71 05/12/2020    TRIG 86 05/12/2020     LIVER PROFILE:No results for input(s): AST, ALT, LABALBU in the last 72 hours.         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 Lori Robles, 3/2018    CARDIAC SURGERY      CABG    DIAGNOSTIC CARDIAC CATH LAB PROCEDURE      Stent to mid and distal RCA 1997    HERNIA REPAIR      NECK SURGERY       Family History   Problem Relation Age of Onset    High Blood Pressure Mother     Stroke Mother     Coronary Art Dis Father      Allergies   Allergen Reactions    Codeine     Fentanyl Other (See Comments)     Fentanyl patchs, caused patient to become confused and anxious per family     Current Outpatient Medications   Medication Sig Dispense Refill    aspirin 81 MG tablet Take 81 mg by mouth daily      metoprolol tartrate (LOPRESSOR) 25 MG tablet Take 25 mg by mouth 2 times daily      gabapentin (NEURONTIN) 100 MG capsule Take 1-2 at night for leg pain. (Patient taking differently: Take 500 mg by mouth 3 times daily. ) 90 capsule 0    docusate sodium (COLACE, DULCOLAX) 100 MG CAPS Take 100 mg by mouth 2 times daily 60 capsule 0    alfuzosin (UROXATRAL) 10 MG extended release tablet Take 1 tablet by mouth nightly 30 tablet 3    finasteride (PROSCAR) 5 MG tablet Take 1 tablet by mouth daily 30 tablet 3    primidone (MYSOLINE) 50 MG tablet Take 5 mg by mouth nightly       chlorpheniramine (CHLOR-TRIMETON) 4 MG tablet Take 4 mg by mouth daily       Flaxseed, Linseed, (FLAXSEED OIL) 1000 MG CAPS Take 2,000 mg by mouth daily       pravastatin (PRAVACHOL) 40 MG tablet Take 40 mg by mouth daily.  folic acid ([employer] FOLIC ACID) 202 MCG tablet Take 800 mcg by mouth daily.  L-Lysine 500 MG TABS Take 500 mg by mouth daily. No current facility-administered medications for this visit.       Social History     Socioeconomic History    Marital status:      Spouse name: Not on file    Number of children: Not on file    Years of education: Not on file    Highest education level: Not on file   Occupational History    Not on file   Social Needs    Financial resource strain: Not on file    Food insecurity     Worry: Not on file     Inability: Not on file    Transportation needs     Medical: Not on file     Non-medical: Not on file   Tobacco Use    Smoking status: Former Smoker     Packs/day: 2.00     Years: 6.00 Pack years: 12.00     Types: Cigarettes    Smokeless tobacco: Never Used   Substance and Sexual Activity    Alcohol use: No    Drug use: No    Sexual activity: Yes     Partners: Female     Comment: He is . Lifestyle    Physical activity     Days per week: Not on file     Minutes per session: Not on file    Stress: Not on file   Relationships    Social connections     Talks on phone: Not on file     Gets together: Not on file     Attends Taoist service: Not on file     Active member of club or organization: Not on file     Attends meetings of clubs or organizations: Not on file     Relationship status: Not on file    Intimate partner violence     Fear of current or ex partner: Not on file     Emotionally abused: Not on file     Physically abused: Not on file     Forced sexual activity: Not on file   Other Topics Concern    Not on file   Social History Narrative    Not on file       Physical Examination:  /72   Pulse 68   Ht 5' 8\" (1.727 m)   Wt 167 lb (75.8 kg)   BMI 25.39 kg/m²   Physical Exam        ASSESSMENT:     Diagnosis Orders   1. Coronary artery disease involving native coronary artery of native heart without angina pectoris     2. Atherosclerotic heart disease of native coronary artery with other forms of angina pectoris ([address]     3. Mixed hyperlipidemia     4. Essential hypertension     5. Hx of CABG         PLAN:  No orders of the defined types were placed in this encounter. No orders of the defined types were placed in this encounter. 1. Continue present medications  2. Recommend follow-up assessment in 6 months    Return in about 6 months (around 5/12/2021) for return to Dr. Pau Box only. Bailey Newsome MD 11/12/2020 1:34 PM [facility]      Thisdictation was generated by voice recognition computer software.   Although all attempts are made to edit the dictation for accuracy, there may be errors in the transcription that are not intended.

## 2021-01-25 ENCOUNTER — CLINICAL SUPPORT (OUTPATIENT)
Dept: INTERNAL MEDICINE | Facility: CLINIC | Age: 86
End: 2021-01-25

## 2021-01-25 ENCOUNTER — OFFICE VISIT (OUTPATIENT)
Dept: INTERNAL MEDICINE | Facility: CLINIC | Age: 86
End: 2021-01-25

## 2021-01-25 DIAGNOSIS — J06.9 ACUTE URI: Primary | ICD-10-CM

## 2021-01-25 DIAGNOSIS — J20.9 ACUTE BRONCHITIS, UNSPECIFIED ORGANISM: Primary | ICD-10-CM

## 2021-01-25 LAB
EXPIRATION DATE: NORMAL
EXPIRATION DATE: NORMAL
FLUAV AG NPH QL: NEGATIVE
FLUBV AG NPH QL: NEGATIVE
INTERNAL CONTROL: NORMAL
INTERNAL CONTROL: NORMAL
Lab: NORMAL
Lab: NORMAL
S PYO AG THROAT QL: NEGATIVE

## 2021-01-25 PROCEDURE — 87880 STREP A ASSAY W/OPTIC: CPT | Performed by: NURSE PRACTITIONER

## 2021-01-25 PROCEDURE — 99442 PR PHYS/QHP TELEPHONE EVALUATION 11-20 MIN: CPT | Performed by: NURSE PRACTITIONER

## 2021-01-25 PROCEDURE — 87804 INFLUENZA ASSAY W/OPTIC: CPT | Performed by: NURSE PRACTITIONER

## 2021-01-25 RX ORDER — AZITHROMYCIN 250 MG/1
TABLET, FILM COATED ORAL
Qty: 6 TABLET | Refills: 0 | Status: SHIPPED | OUTPATIENT
Start: 2021-01-25 | End: 2021-02-08

## 2021-01-25 RX ORDER — ZINC GLUCONATE 50 MG
1 TABLET ORAL DAILY
COMMUNITY
End: 2021-07-07

## 2021-01-25 RX ORDER — MULTIVIT-MIN/IRON/FOLIC ACID/K 18-600-40
1 CAPSULE ORAL 2 TIMES DAILY
COMMUNITY

## 2021-01-25 RX ORDER — METHYLPREDNISOLONE 4 MG/1
TABLET ORAL
Qty: 1 EACH | Refills: 0 | Status: SHIPPED | OUTPATIENT
Start: 2021-01-25 | End: 2021-02-08

## 2021-01-25 RX ORDER — CHOLECALCIFEROL (VITAMIN D3) 125 MCG
1 CAPSULE ORAL DAILY
COMMUNITY

## 2021-01-25 NOTE — PROGRESS NOTES
Chad BECKHAM [place]  1929  [id number]    Verified patient's NAME and  before test was performed.       COVID-19 Test Performed:   Oropharyngeal Swab     Additional Tests Performed:   POCT FLU A/B and POCT STREP     Location:  PATIENT VEHICLE - [facility]    How did the patient tolerate the test?   Well tolerated by patient..    Staff Member Performing Test:  YENY LEWIS MA    PPE Worn:    mask, gloves, eye protection, face shield and gown          Patient presented for COVID-19 testing as ordered by the provider. Appropriate PPE donned. Patient tolerated well. Patient was given COVID-19 Fact Sheet, How to Quarantine at Home Instructions, and Infection Prevention in the Home handout. Patient advised that results are expected within 2-4 days depending on the time of test.     While waiting for results of test, patient was asked to please call their primary care physician's office or the [place] hotline at [phone] for support of non-emergent symptoms expected with this virus such as increased shortness of breath, fever, cough, or other questions.    He was advised to seek care in the Emergency Department should extreme symptoms arise such as new onset confusion, extreme lethargy, hypoxia, or new hypotension.     Questions were engaged and answered to the best of my ability, patient expressed verbal understanding.

## 2021-01-25 NOTE — PROGRESS NOTES
"Subjective   Chad Henriquez is a 89 y.o. male.   Chief Complaint   Patient presents with   • Illness     sinus and chest congestion, productive cough-yellow, fatigue, low grade fever x 1 week ago   You have chosen to receive care through a telephone visit. Do you consent to use a telephone visit for your medical care today? Yes    Chad is present via telephone visit today for complaints of sinus congestion that he feels is moved to his chest.  He reports about 1 week ago he developed symptoms of a sinus infection.  He reports last Tuesday he went and got his first Covid vaccine.  He states since that time he feels that his congestion is moved to his chest.  He is coughing with some mild production.  He states it is not as productive as it has been.  He denies high fever stating he is possibly had \"very little fever\".  He denies significant shortness of breath but states he would not be able to handle his typical walking exercise around the mall as he usually does.  There has been some wheezing noted.  He reports the sinus congestion in his head is resolved by using his sinus rinse.       The following portions of the patient's history were reviewed and updated as appropriate: allergies, current medications, past family history, past medical history, past social history, past surgical history and problem list.    Review of Systems   Constitutional: Negative for activity change, appetite change, fatigue, fever, unexpected weight gain and unexpected weight loss.   HENT: Negative for swollen glands, trouble swallowing and voice change.    Eyes: Negative for blurred vision and visual disturbance.   Respiratory: Positive for cough, shortness of breath and wheezing.    Cardiovascular: Negative for chest pain, palpitations and leg swelling.   Gastrointestinal: Negative for abdominal pain, constipation, diarrhea, nausea, vomiting and indigestion.   Endocrine: Negative for cold intolerance, heat intolerance, polydipsia and " polyphagia.   Genitourinary: Negative for dysuria and frequency.   Musculoskeletal: Negative for arthralgias, back pain, joint swelling and neck pain.   Skin: Negative for color change, rash and skin lesions.   Neurological: Negative for dizziness, weakness, headache, memory problem and confusion.   Hematological: Does not bruise/bleed easily.   Psychiatric/Behavioral: Negative for agitation, hallucinations and suicidal ideas. The patient is not nervous/anxious.        Objective   Past Medical History:   Diagnosis Date   • Allergic rhinitis    • Anemia    • Arthritis    • BPH (benign prostatic hypertrophy) with urinary retention    • Cancer (CMS/HCC)     skin cancer   • Chronic back pain     RUNS DOWN BOTH LEGS   • Constipation    • Coronary artery disease    • Hard of hearing    • Heart attack (CMS/HCC) 1986    NO MUSCLE DAMAGE - JUST OCCLUDED VALVE   • High cholesterol    • History of skin cancer     BILATERAL EARS   • History of transfusion     1986   • Hypertension    • Inguinal hernia    • Leaky heart valve     DR CONCEPCION SAYS NOT ENOUGH TO BE OF CONCERN   • Other bursal cyst, right elbow    • PONV (postoperative nausea and vomiting)     has had scopalamine patch in past    • Sleep apnea     DOES NOT USE CPAP OR BIPAP   • Spinal stenosis of cervical region    • Spinal stenosis of lumbar region    • Tremors of nervous system    • Wears hearing aid     BILATERAL      Past Surgical History:   Procedure Laterality Date   • ANTERIOR LUMBAR EXPOSURE N/A 12/7/2018    Procedure: ANTERIOR LUMBAR EXPOSURE;  Surgeon: Manolo Mcleod DO;  Location: [facility] OR;  Service: Vascular   • APPENDECTOMY     • BACK SURGERY      X3   • CARDIAC SURGERY  08/08/1986    X1 BYPASS   • CORONARY ANGIOPLASTY WITH STENT PLACEMENT  1997    X3 STENTS   • HERNIA REPAIR Bilateral     x2   • INGUINAL HERNIA REPAIR Right 6/22/2017    Procedure: RIGHT INGUINAL HERNIA REPAIR AND EXCISION OF CYST RIGHT ELBOW ;  Surgeon: Jackelyn Arriola MD;   Location:  [facility] OR;  Service:    • LUMBAR LAMINECTOMY N/A 3/12/2018    Procedure: LUMBAR LAMINECTOMY WITHOUT FUSION L3-4,4-5;  Surgeon: Kj Falcon MD;  Location:  [facility] OR;  Service: Neurosurgery   • LUMBAR LAMINECTOMY ANTERIOR LUMBAR INTERBODY FUSION N/A 12/7/2018    Procedure: ANTERIOR LUMBAR INTERBODY FUSION L5-S1;  Surgeon: Kj Falcon MD;  Location:  [facility] OR;  Service: Neurosurgery   • LUMBAR LAMINECTOMY WITH FUSION Left 12/10/2018    Procedure: LUMBAR LAMINECTOMY TRANSFORAMINAL LUMBAR INTERBODY FUSION L34,45;  Surgeon: Kj Falcon MD;  Location:  [facility] OR;  Service: Neurosurgery   • LUMBAR LAMINECTOMY WITH FUSION Left 12/7/2018    Procedure: LUMBAR LAMINECTOMY TRANSFORAMINAL LUMBAR INTERBODY FUSION LEFT L3-4, L4-5 QUADRANT RETRACTOR;  Surgeon: Kj Falcon MD;  Location:  [facility] OR;  Service: Neurosurgery        Current Outpatient Medications:   •  alfuzosin (UROXATRAL) 10 MG 24 hr tablet, Take 1 tablet by mouth Daily., Disp: 30 tablet, Rfl: 5  •  Ascorbic Acid (Vitamin C) 500 MG capsule, Take 1 capsule by mouth 2 (two) times a day., Disp: , Rfl:   •  aspirin 81 MG tablet, Take 81 mg by mouth., Disp: , Rfl:   •  chlorpheniramine (CHLOR-TRIMETON) 4 MG tablet, Take 4 mg by mouth Daily., Disp: , Rfl:   •  Cholecalciferol (Vitamin D3) 50 MCG (2000 UT) tablet, Take 1 tablet by mouth Daily., Disp: , Rfl:   •  diphenhydrAMINE (BENADRYL) 25 mg capsule, Take 25 mg by mouth 2 (Two) Times a Day., Disp: , Rfl:   •  docusate sodium 100 MG capsule, Take 100 mg by mouth., Disp: , Rfl:   •  DULoxetine (CYMBALTA) 60 MG capsule, Take 60 mg by mouth Daily., Disp: , Rfl:   •  ferrous sulfate 325 (65 FE) MG tablet, Take 325 mg by mouth 3 (Three) Times a Day With Meals., Disp: , Rfl:   •  finasteride (PROSCAR) 5 MG tablet, Take 1 tablet by mouth Daily., Disp: 30 tablet, Rfl: 5  •  Flaxseed, Linseed, (FLAXSEED OIL) 1000 MG capsule, 540mg takes 4 tablets by mouth daily, Disp: , Rfl:   •  folic acid ([employer] FOLIC  ACID) 800 MCG tablet, Take 800 mcg by mouth daily.  , Disp: , Rfl:   •  furosemide (LASIX) 40 MG tablet, Take 40 mg by mouth Daily., Disp: , Rfl:   •  gabapentin (NEURONTIN) 600 MG tablet, Take 1 tablet by mouth 3 (Three) Times a Day., Disp: 90 tablet, Rfl: 3  •  L-Lysine 600 MG tablet, Take 1 tablet by mouth Daily., Disp: , Rfl:   •  lidocaine (LIDODERM) 5 %, Place 1 patch on the skin as directed by provider Daily. Remove & Discard patch within 12 hours or as directed by MD, Disp: , Rfl:   •  meloxicam (MOBIC) 7.5 MG tablet, Take 7.5 mg by mouth., Disp: , Rfl:   •  metoprolol succinate XL (TOPROL-XL) 25 MG 24 hr tablet, Take 25 mg by mouth Every Night., Disp: , Rfl:   •  oxyCODONE-acetaminophen (PERCOCET) 7.5-325 MG per tablet, Take 1 tablet by mouth 2 (Two) Times a Day., Disp: 60 tablet, Rfl: 0  •  pravastatin (PRAVACHOL) 40 MG tablet, Take 40 mg by mouth daily.  , Disp: , Rfl:   •  primidone (MYSOLINE) 50 MG tablet, Take 1/2 tablet at bedtime, Disp: 45 tablet, Rfl: 3  •  traMADol (ULTRAM) 50 MG tablet, Take 1 tablet by mouth Every 6 (Six) Hours As Needed for Moderate Pain ., Disp: 120 tablet, Rfl: 0  •  Zinc 50 MG tablet, Take 1 tablet by mouth Daily., Disp: , Rfl:   •  azithromycin (Zithromax Z-Jose) 250 MG tablet, Take 2 tablets the first day, then 1 tablet daily for 4 days., Disp: 6 tablet, Rfl: 0  •  methylPREDNISolone (MEDROL) 4 MG dose pack, Take as directed on package instructions., Disp: 1 each, Rfl: 0     There were no vitals filed for this visit.  There were no vitals filed for this visit.  Physical Exam    No physical exam- telephone visit  Able to talk through telephone visit without obvious shortness of breath.  Answering questions appropriately.       Assessment/Plan   Diagnoses and all orders for this visit:    1. Acute bronchitis, unspecified organism (Primary)  -     azithromycin (Zithromax Z-Jose) 250 MG tablet; Take 2 tablets the first day, then 1 tablet daily for 4 days.  Dispense: 6 tablet;  Refill: 0  -     methylPREDNISolone (MEDROL) 4 MG dose pack; Take as directed on package instructions.  Dispense: 1 each; Refill: 0    This visit has been rescheduled as a phone visit to comply with patient safety concerns in accordance with CDC recommendations. Total time of discussion was 11-20 minutes.      At this time I will send a Z-Jose and a Medrol Dosepak as his symptoms are suggestive of a bronchitis.  He reports he has tolerated both a Z-Jose and Medrol Dosepak in the past.  He has agreed to come and get COVID-19 testing this afternoon from the office.  I have discussed the need for further evaluation and follow-up if symptoms do not improve or begin to get worse or if you develop significant shortness of breath.  He verbalizes understanding

## 2021-01-26 LAB — SARS-COV-2 RNA RESP QL NAA+PROBE: DETECTED

## 2021-01-27 NOTE — PROGRESS NOTES
COVID-19 positive.  How are his symptoms? He needs to remain quarantined for at least 10 days from when symptoms started.  Those who have been exposed to him (even 48 hours before symptom started) need to be notified and quarantined as well.

## 2021-01-29 ENCOUNTER — TELEPHONE (OUTPATIENT)
Dept: INTERNAL MEDICINE | Facility: CLINIC | Age: 86
End: 2021-01-29

## 2021-01-29 ENCOUNTER — APPOINTMENT (OUTPATIENT)
Dept: GENERAL RADIOLOGY | Facility: HOSPITAL | Age: 86
End: 2021-01-29

## 2021-01-29 ENCOUNTER — HOSPITAL ENCOUNTER (EMERGENCY)
Facility: HOSPITAL | Age: 86
Discharge: HOME | End: 2021-01-29
Attending: EMERGENCY MEDICINE | Admitting: EMERGENCY MEDICINE

## 2021-01-29 VITALS
WEIGHT: 168 LBS | RESPIRATION RATE: 18 BRPM | TEMPERATURE: 98.2 F | DIASTOLIC BLOOD PRESSURE: 62 MMHG | BODY MASS INDEX: 25.46 KG/M2 | HEIGHT: 68 IN | OXYGEN SATURATION: 98 % | SYSTOLIC BLOOD PRESSURE: 115 MMHG | HEART RATE: 69 BPM

## 2021-01-29 DIAGNOSIS — U07.1 COVID-19: Primary | ICD-10-CM

## 2021-01-29 LAB
ABO GROUP BLD: NORMAL
ALBUMIN SERPL-MCNC: 3.6 G/DL (ref 3.5–5.2)
ALBUMIN/GLOB SERPL: 1.6 G/DL
ALP SERPL-CCNC: 61 U/L (ref 39–117)
ALT SERPL W P-5'-P-CCNC: 16 U/L (ref 1–41)
ANION GAP SERPL CALCULATED.3IONS-SCNC: 8 MMOL/L (ref 5–15)
AST SERPL-CCNC: 18 U/L (ref 1–40)
BASOPHILS # BLD AUTO: 0.03 10*3/MM3 (ref 0–0.2)
BASOPHILS NFR BLD AUTO: 0.4 % (ref 0–1.5)
BILIRUB SERPL-MCNC: 0.4 MG/DL (ref 0–1.2)
BLD GP AB SCN SERPL QL: NEGATIVE
BUN SERPL-MCNC: 19 MG/DL (ref 8–23)
BUN/CREAT SERPL: 25.7 (ref 7–25)
CALCIUM SPEC-SCNC: 8.5 MG/DL (ref 8.2–9.6)
CHLORIDE SERPL-SCNC: 98 MMOL/L (ref 98–107)
CO2 SERPL-SCNC: 24 MMOL/L (ref 22–29)
CREAT SERPL-MCNC: 0.74 MG/DL (ref 0.76–1.27)
CRP SERPL-MCNC: 1.69 MG/DL (ref 0–0.5)
D DIMER PPP FEU-MCNC: 0.67 MG/L (FEU) (ref 0–0.5)
D-LACTATE SERPL-SCNC: 1.3 MMOL/L (ref 0.5–2)
DEPRECATED RDW RBC AUTO: 44.8 FL (ref 37–54)
EOSINOPHIL # BLD AUTO: 0.02 10*3/MM3 (ref 0–0.4)
EOSINOPHIL NFR BLD AUTO: 0.2 % (ref 0.3–6.2)
ERYTHROCYTE [DISTWIDTH] IN BLOOD BY AUTOMATED COUNT: 13 % (ref 12.3–15.4)
FERRITIN SERPL-MCNC: 167.4 NG/ML (ref 30–400)
GFR SERPL CREATININE-BSD FRML MDRD: 99 ML/MIN/1.73
GLOBULIN UR ELPH-MCNC: 2.3 GM/DL
GLUCOSE SERPL-MCNC: 125 MG/DL (ref 65–99)
HCT VFR BLD AUTO: 35.9 % (ref 37.5–51)
HGB BLD-MCNC: 12.2 G/DL (ref 13–17.7)
IMM GRANULOCYTES # BLD AUTO: 0.02 10*3/MM3 (ref 0–0.05)
IMM GRANULOCYTES NFR BLD AUTO: 0.2 % (ref 0–0.5)
LDH SERPL-CCNC: 233 U/L (ref 135–225)
LIPASE SERPL-CCNC: 61 U/L (ref 13–60)
LYMPHOCYTES # BLD AUTO: 0.53 10*3/MM3 (ref 0.7–3.1)
LYMPHOCYTES NFR BLD AUTO: 6.5 % (ref 19.6–45.3)
MAGNESIUM SERPL-MCNC: 1.8 MG/DL (ref 1.7–2.3)
MCH RBC QN AUTO: 32.1 PG (ref 26.6–33)
MCHC RBC AUTO-ENTMCNC: 34 G/DL (ref 31.5–35.7)
MCV RBC AUTO: 94.5 FL (ref 79–97)
MONOCYTES # BLD AUTO: 0.87 10*3/MM3 (ref 0.1–0.9)
MONOCYTES NFR BLD AUTO: 10.7 % (ref 5–12)
NEUTROPHILS NFR BLD AUTO: 6.69 10*3/MM3 (ref 1.7–7)
NEUTROPHILS NFR BLD AUTO: 82 % (ref 42.7–76)
NRBC BLD AUTO-RTO: 0 /100 WBC (ref 0–0.2)
PLATELET # BLD AUTO: 237 10*3/MM3 (ref 140–450)
PMV BLD AUTO: 9.8 FL (ref 6–12)
POTASSIUM SERPL-SCNC: 3.9 MMOL/L (ref 3.5–5.2)
PROCALCITONIN SERPL-MCNC: 0.22 NG/ML (ref 0–0.25)
PROT SERPL-MCNC: 5.9 G/DL (ref 6–8.5)
RBC # BLD AUTO: 3.8 10*6/MM3 (ref 4.14–5.8)
RH BLD: NEGATIVE
SODIUM SERPL-SCNC: 130 MMOL/L (ref 136–145)
T&S EXPIRATION DATE: NORMAL
WBC # BLD AUTO: 8.16 10*3/MM3 (ref 3.4–10.8)

## 2021-01-29 PROCEDURE — 96374 THER/PROPH/DIAG INJ IV PUSH: CPT

## 2021-01-29 PROCEDURE — 86900 BLOOD TYPING SEROLOGIC ABO: CPT | Performed by: EMERGENCY MEDICINE

## 2021-01-29 PROCEDURE — 86850 RBC ANTIBODY SCREEN: CPT | Performed by: EMERGENCY MEDICINE

## 2021-01-29 PROCEDURE — M0239 BAMLANIVIMAB-XXXX INFUSION: HCPCS

## 2021-01-29 PROCEDURE — 96361 HYDRATE IV INFUSION ADD-ON: CPT

## 2021-01-29 PROCEDURE — 85379 FIBRIN DEGRADATION QUANT: CPT | Performed by: EMERGENCY MEDICINE

## 2021-01-29 PROCEDURE — 99284 EMERGENCY DEPT VISIT MOD MDM: CPT

## 2021-01-29 PROCEDURE — M0239 BAMLANIVIMAB-XXXX INFUSION: HCPCS | Performed by: EMERGENCY MEDICINE

## 2021-01-29 PROCEDURE — 83615 LACTATE (LD) (LDH) ENZYME: CPT | Performed by: EMERGENCY MEDICINE

## 2021-01-29 PROCEDURE — 25010000006 BAMLANIVIMAB 700 MG/20ML SOLUTION 20 ML VIAL: Performed by: EMERGENCY MEDICINE

## 2021-01-29 PROCEDURE — 71045 X-RAY EXAM CHEST 1 VIEW: CPT

## 2021-01-29 PROCEDURE — 82728 ASSAY OF FERRITIN: CPT | Performed by: EMERGENCY MEDICINE

## 2021-01-29 PROCEDURE — 87040 BLOOD CULTURE FOR BACTERIA: CPT | Performed by: EMERGENCY MEDICINE

## 2021-01-29 PROCEDURE — 85025 COMPLETE CBC W/AUTO DIFF WBC: CPT | Performed by: EMERGENCY MEDICINE

## 2021-01-29 PROCEDURE — 25010000002 ONDANSETRON PER 1 MG: Performed by: EMERGENCY MEDICINE

## 2021-01-29 PROCEDURE — 84145 PROCALCITONIN (PCT): CPT | Performed by: EMERGENCY MEDICINE

## 2021-01-29 PROCEDURE — 83735 ASSAY OF MAGNESIUM: CPT | Performed by: EMERGENCY MEDICINE

## 2021-01-29 PROCEDURE — 86140 C-REACTIVE PROTEIN: CPT | Performed by: EMERGENCY MEDICINE

## 2021-01-29 PROCEDURE — 83690 ASSAY OF LIPASE: CPT | Performed by: EMERGENCY MEDICINE

## 2021-01-29 PROCEDURE — 86901 BLOOD TYPING SEROLOGIC RH(D): CPT | Performed by: EMERGENCY MEDICINE

## 2021-01-29 PROCEDURE — 83605 ASSAY OF LACTIC ACID: CPT | Performed by: EMERGENCY MEDICINE

## 2021-01-29 PROCEDURE — 80053 COMPREHEN METABOLIC PANEL: CPT | Performed by: EMERGENCY MEDICINE

## 2021-01-29 RX ORDER — METHYLPREDNISOLONE SODIUM SUCCINATE 125 MG/2ML
125 INJECTION, POWDER, LYOPHILIZED, FOR SOLUTION INTRAMUSCULAR; INTRAVENOUS ONCE AS NEEDED
Status: DISCONTINUED | OUTPATIENT
Start: 2021-01-29 | End: 2021-01-29 | Stop reason: HOSPADM

## 2021-01-29 RX ORDER — SODIUM CHLORIDE 0.9 % (FLUSH) 0.9 %
10 SYRINGE (ML) INJECTION AS NEEDED
Status: DISCONTINUED | OUTPATIENT
Start: 2021-01-29 | End: 2021-01-29 | Stop reason: HOSPADM

## 2021-01-29 RX ORDER — DIPHENHYDRAMINE HYDROCHLORIDE 50 MG/ML
50 INJECTION INTRAMUSCULAR; INTRAVENOUS ONCE AS NEEDED
Status: DISCONTINUED | OUTPATIENT
Start: 2021-01-29 | End: 2021-01-29 | Stop reason: HOSPADM

## 2021-01-29 RX ORDER — ONDANSETRON 2 MG/ML
4 INJECTION INTRAMUSCULAR; INTRAVENOUS ONCE
Status: COMPLETED | OUTPATIENT
Start: 2021-01-29 | End: 2021-01-29

## 2021-01-29 RX ORDER — SODIUM CHLORIDE 9 MG/ML
30 INJECTION, SOLUTION INTRAVENOUS ONCE
Status: COMPLETED | OUTPATIENT
Start: 2021-01-29 | End: 2021-01-29

## 2021-01-29 RX ORDER — EPINEPHRINE 0.3 MG/.3ML
0.3 INJECTION SUBCUTANEOUS ONCE AS NEEDED
Status: DISCONTINUED | OUTPATIENT
Start: 2021-01-29 | End: 2021-01-29 | Stop reason: HOSPADM

## 2021-01-29 RX ORDER — SODIUM CHLORIDE 9 MG/ML
125 INJECTION, SOLUTION INTRAVENOUS CONTINUOUS
Status: DISCONTINUED | OUTPATIENT
Start: 2021-01-29 | End: 2021-01-29 | Stop reason: HOSPADM

## 2021-01-29 RX ADMIN — SODIUM CHLORIDE 125 ML/HR: 9 INJECTION, SOLUTION INTRAVENOUS at 11:08

## 2021-01-29 RX ADMIN — ONDANSETRON HYDROCHLORIDE 4 MG: 2 SOLUTION INTRAMUSCULAR; INTRAVENOUS at 11:10

## 2021-01-29 RX ADMIN — SODIUM CHLORIDE 700 MG: 9 INJECTION, SOLUTION INTRAVENOUS at 13:27

## 2021-01-29 RX ADMIN — SODIUM CHLORIDE 30 ML: 9 INJECTION, SOLUTION INTRAVENOUS at 14:30

## 2021-01-29 NOTE — TELEPHONE ENCOUNTER
He needs to go to the ER.  His is COVID-19 Positive.  The ER will need to be notified of his pending arrival.

## 2021-01-29 NOTE — TELEPHONE ENCOUNTER
PATIENT'S WIFE STATES HE HAS GOTTEN WORSE  AND SHE WOULD LIKE A CALL BACK TO DISCUSS WHAT THEY SHOULD DO. CONGESTED, LOWER BACK PAIN, ACHES, FEVER , AND DRY HEAVING.  PLEASE ADVISE.   BEST CALL BACK [phone]

## 2021-01-30 ENCOUNTER — PATIENT OUTREACH (OUTPATIENT)
Dept: PHARMACY | Facility: HOSPITAL | Age: 86
End: 2021-01-30

## 2021-01-30 NOTE — OUTREACH NOTE
COVID-19 Monoclonal Antibody Pharmacy Patient Outreach        Consult Details  Patient Name (): Chad Henriquez  (1929)   PCP: Zelalem Jackson MD   Medication: bamlanivimab 700 mg in sodium chloride 0.9 % 270 mL IVPB   Date of Administration: 21     Discussion:  Called patient to see check on their status post-administration. I inquired about symptoms and respiratory function (O2 saturation if they have monitor).       Summary:  I encouraged to reach out to PCP if any worsening of symptoms is noted or report to the ED.      Awais Cisneros, PharmD  2021 13:53 CST

## 2021-02-01 ENCOUNTER — EPISODE CHANGES (OUTPATIENT)
Dept: CASE MANAGEMENT | Facility: OTHER | Age: 86
End: 2021-02-01

## 2021-02-02 ENCOUNTER — APPOINTMENT (OUTPATIENT)
Dept: CT IMAGING | Facility: HOSPITAL | Age: 86
End: 2021-02-02

## 2021-02-02 ENCOUNTER — HOSPITAL ENCOUNTER (EMERGENCY)
Facility: HOSPITAL | Age: 86
Discharge: HOME | End: 2021-02-02
Attending: FAMILY MEDICINE | Admitting: EMERGENCY MEDICINE

## 2021-02-02 VITALS
DIASTOLIC BLOOD PRESSURE: 65 MMHG | SYSTOLIC BLOOD PRESSURE: 173 MMHG | RESPIRATION RATE: 18 BRPM | HEIGHT: 68 IN | OXYGEN SATURATION: 96 % | HEART RATE: 84 BPM | WEIGHT: 155 LBS | TEMPERATURE: 98 F | BODY MASS INDEX: 23.49 KG/M2

## 2021-02-02 DIAGNOSIS — K59.00 CONSTIPATION, UNSPECIFIED CONSTIPATION TYPE: ICD-10-CM

## 2021-02-02 DIAGNOSIS — K76.89 HEPATIC CYST: ICD-10-CM

## 2021-02-02 DIAGNOSIS — N28.1 RENAL CYST, RIGHT: ICD-10-CM

## 2021-02-02 DIAGNOSIS — K44.9 HIATAL HERNIA: ICD-10-CM

## 2021-02-02 DIAGNOSIS — N40.0 PROSTATE ENLARGEMENT: ICD-10-CM

## 2021-02-02 DIAGNOSIS — R33.9 URINARY RETENTION: Primary | ICD-10-CM

## 2021-02-02 DIAGNOSIS — E87.1 HYPONATREMIA: ICD-10-CM

## 2021-02-02 LAB
ALBUMIN SERPL-MCNC: 3.8 G/DL (ref 3.5–5.2)
ALBUMIN/GLOB SERPL: 1.5 G/DL
ALP SERPL-CCNC: 68 U/L (ref 39–117)
ALT SERPL W P-5'-P-CCNC: 19 U/L (ref 1–41)
ANION GAP SERPL CALCULATED.3IONS-SCNC: 11 MMOL/L (ref 5–15)
AST SERPL-CCNC: 22 U/L (ref 1–40)
BASOPHILS # BLD AUTO: 0.04 10*3/MM3 (ref 0–0.2)
BASOPHILS NFR BLD AUTO: 0.4 % (ref 0–1.5)
BILIRUB SERPL-MCNC: 0.9 MG/DL (ref 0–1.2)
BILIRUB UR QL STRIP: NEGATIVE
BUN SERPL-MCNC: 13 MG/DL (ref 8–23)
BUN/CREAT SERPL: 25 (ref 7–25)
CALCIUM SPEC-SCNC: 9 MG/DL (ref 8.2–9.6)
CHLORIDE SERPL-SCNC: 95 MMOL/L (ref 98–107)
CLARITY UR: CLEAR
CO2 SERPL-SCNC: 23 MMOL/L (ref 22–29)
COLOR UR: YELLOW
CREAT SERPL-MCNC: 0.52 MG/DL (ref 0.76–1.27)
DEPRECATED RDW RBC AUTO: 42.5 FL (ref 37–54)
EOSINOPHIL # BLD AUTO: 0.09 10*3/MM3 (ref 0–0.4)
EOSINOPHIL NFR BLD AUTO: 0.8 % (ref 0.3–6.2)
ERYTHROCYTE [DISTWIDTH] IN BLOOD BY AUTOMATED COUNT: 12.7 % (ref 12.3–15.4)
GFR SERPL CREATININE-BSD FRML MDRD: 149 ML/MIN/1.73
GLOBULIN UR ELPH-MCNC: 2.6 GM/DL
GLUCOSE SERPL-MCNC: 107 MG/DL (ref 65–99)
GLUCOSE UR STRIP-MCNC: NEGATIVE MG/DL
HCT VFR BLD AUTO: 35.7 % (ref 37.5–51)
HGB BLD-MCNC: 12.7 G/DL (ref 13–17.7)
HGB UR QL STRIP.AUTO: NEGATIVE
HOLD SPECIMEN: NORMAL
HOLD SPECIMEN: NORMAL
IMM GRANULOCYTES # BLD AUTO: 0.04 10*3/MM3 (ref 0–0.05)
IMM GRANULOCYTES NFR BLD AUTO: 0.4 % (ref 0–0.5)
KETONES UR QL STRIP: NEGATIVE
LEUKOCYTE ESTERASE UR QL STRIP.AUTO: NEGATIVE
LYMPHOCYTES # BLD AUTO: 0.96 10*3/MM3 (ref 0.7–3.1)
LYMPHOCYTES NFR BLD AUTO: 8.9 % (ref 19.6–45.3)
MCH RBC QN AUTO: 32.5 PG (ref 26.6–33)
MCHC RBC AUTO-ENTMCNC: 35.6 G/DL (ref 31.5–35.7)
MCV RBC AUTO: 91.3 FL (ref 79–97)
MONOCYTES # BLD AUTO: 0.85 10*3/MM3 (ref 0.1–0.9)
MONOCYTES NFR BLD AUTO: 7.9 % (ref 5–12)
NEUTROPHILS NFR BLD AUTO: 8.8 10*3/MM3 (ref 1.7–7)
NEUTROPHILS NFR BLD AUTO: 81.6 % (ref 42.7–76)
NITRITE UR QL STRIP: NEGATIVE
NRBC BLD AUTO-RTO: 0 /100 WBC (ref 0–0.2)
PH UR STRIP.AUTO: 7 [PH] (ref 5–8)
PLATELET # BLD AUTO: 309 10*3/MM3 (ref 140–450)
PMV BLD AUTO: 9.3 FL (ref 6–12)
POTASSIUM SERPL-SCNC: 4.2 MMOL/L (ref 3.5–5.2)
PROT SERPL-MCNC: 6.4 G/DL (ref 6–8.5)
PROT UR QL STRIP: ABNORMAL
RBC # BLD AUTO: 3.91 10*6/MM3 (ref 4.14–5.8)
SODIUM SERPL-SCNC: 129 MMOL/L (ref 136–145)
SP GR UR STRIP: 1.01 (ref 1–1.03)
UROBILINOGEN UR QL STRIP: ABNORMAL
WBC # BLD AUTO: 10.78 10*3/MM3 (ref 3.4–10.8)
WHOLE BLOOD HOLD SPECIMEN: NORMAL
WHOLE BLOOD HOLD SPECIMEN: NORMAL

## 2021-02-02 PROCEDURE — 36415 COLL VENOUS BLD VENIPUNCTURE: CPT

## 2021-02-02 PROCEDURE — 80053 COMPREHEN METABOLIC PANEL: CPT | Performed by: FAMILY MEDICINE

## 2021-02-02 PROCEDURE — 85025 COMPLETE CBC W/AUTO DIFF WBC: CPT | Performed by: FAMILY MEDICINE

## 2021-02-02 PROCEDURE — 51702 INSERT TEMP BLADDER CATH: CPT

## 2021-02-02 PROCEDURE — 81003 URINALYSIS AUTO W/O SCOPE: CPT | Performed by: FAMILY MEDICINE

## 2021-02-02 PROCEDURE — 74177 CT ABD & PELVIS W/CONTRAST: CPT

## 2021-02-02 PROCEDURE — 99283 EMERGENCY DEPT VISIT LOW MDM: CPT

## 2021-02-02 PROCEDURE — 99284 EMERGENCY DEPT VISIT MOD MDM: CPT

## 2021-02-02 PROCEDURE — 25010000002 IOPAMIDOL 61 % SOLUTION: Performed by: EMERGENCY MEDICINE

## 2021-02-02 PROCEDURE — 51798 US URINE CAPACITY MEASURE: CPT

## 2021-02-02 RX ADMIN — IOPAMIDOL 100 ML: 612 INJECTION, SOLUTION INTRAVENOUS at 07:02

## 2021-02-02 NOTE — ED PROVIDER NOTES
Subjective   Mr. Henriquez is a 89-year-old gentleman with a complicated medical and surgical history that does include BPH and constipation.  The patient presents to the ER with c/o urinary retention since 1pm 2/1/21.  He is also worried that his bowels are partially blocked.  He has had some rectal bleeding, he states the blood is bright red and that has a history of hemorrhoids. States he has been constipated since last week.  He denies any fever or other systemic symptoms.  He has had no chest pain or shortness of breath.  He had no nausea or vomiting.          Review of Systems   Gastrointestinal: Positive for blood in stool and constipation.   Genitourinary: Positive for difficulty urinating.   All other systems reviewed and are negative.      Past Medical History:   Diagnosis Date   • Allergic rhinitis    • Anemia    • Arthritis    • BPH (benign prostatic hypertrophy) with urinary retention    • Cancer (CMS/HCC)     skin cancer   • Chronic back pain     RUNS DOWN BOTH LEGS   • Constipation    • Coronary artery disease    • Hard of hearing    • Heart attack (CMS/HCC) 1986    NO MUSCLE DAMAGE - JUST OCCLUDED VALVE   • High cholesterol    • History of skin cancer     BILATERAL EARS   • History of transfusion     1986   • Hypertension    • Inguinal hernia    • Leaky heart valve     DR CONCEPCION SAYS NOT ENOUGH TO BE OF CONCERN   • Other bursal cyst, right elbow    • PONV (postoperative nausea and vomiting)     has had scopalamine patch in past    • Sleep apnea     DOES NOT USE CPAP OR BIPAP   • Spinal stenosis of cervical region    • Spinal stenosis of lumbar region    • Tremors of nervous system    • Wears hearing aid     BILATERAL       Allergies   Allergen Reactions   • Fentanyl Hallucinations and Other (See Comments)     DELUSIONAL  Fentanyl patchs, caused patient to become confused and anxious per family  Fentanyl patchs, caused patient to become confused and anxious per family   • Codeine Nausea And Vomiting  and GI Intolerance       Past Surgical History:   Procedure Laterality Date   • ANTERIOR LUMBAR EXPOSURE N/A 2018    Procedure: ANTERIOR LUMBAR EXPOSURE;  Surgeon: Manolo Mcleod DO;  Location:  [facility] OR;  Service: Vascular   • APPENDECTOMY     • BACK SURGERY      X3   • CARDIAC SURGERY  08/08/1986    X1 BYPASS   • CORONARY ANGIOPLASTY WITH STENT PLACEMENT  1997    X3 STENTS   • HERNIA REPAIR Bilateral     x2   • INGUINAL HERNIA REPAIR Right 2017    Procedure: RIGHT INGUINAL HERNIA REPAIR AND EXCISION OF CYST RIGHT ELBOW ;  Surgeon: Jackelyn Arriola MD;  Location:  [facility] OR;  Service:    • LUMBAR LAMINECTOMY N/A 3/12/2018    Procedure: LUMBAR LAMINECTOMY WITHOUT FUSION L3-4,4-5;  Surgeon: Kj Falcon MD;  Location:  [facility] OR;  Service: Neurosurgery   • LUMBAR LAMINECTOMY ANTERIOR LUMBAR INTERBODY FUSION N/A 2018    Procedure: ANTERIOR LUMBAR INTERBODY FUSION L5-S1;  Surgeon: Kj Falcon MD;  Location:  [facility] OR;  Service: Neurosurgery   • LUMBAR LAMINECTOMY WITH FUSION Left 12/10/2018    Procedure: LUMBAR LAMINECTOMY TRANSFORAMINAL LUMBAR INTERBODY FUSION L34,45;  Surgeon: Kj Falcon MD;  Location:  [facility] OR;  Service: Neurosurgery   • LUMBAR LAMINECTOMY WITH FUSION Left 2018    Procedure: LUMBAR LAMINECTOMY TRANSFORAMINAL LUMBAR INTERBODY FUSION LEFT L3-4, L4-5 QUADRANT RETRACTOR;  Surgeon: Kj Falcon MD;  Location:  [facility] OR;  Service: Neurosurgery       Family History   Problem Relation Age of Onset   • No Known Problems Father    • No Known Problems Mother        Social History     Socioeconomic History   • Marital status:      Spouse name: Not on file   • Number of children: Not on file   • Years of education: Not on file   • Highest education level: Not on file   Tobacco Use   • Smoking status: Former Smoker     Years: 6.00     Types: Cigarettes     Quit date:      Years since quittin.1   • Smokeless tobacco: Never Used   • Tobacco comment: age  14-20 years of age   Substance and Sexual Activity   • Alcohol use: No   • Drug use: Never   • Sexual activity: Defer           Objective   Physical Exam  Vitals signs and nursing note reviewed.   Constitutional:       Appearance: He is well-developed.   HENT:      Head: Normocephalic and atraumatic.      Right Ear: External ear normal.      Left Ear: External ear normal.      Nose: Nose normal.   Eyes:      Conjunctiva/sclera: Conjunctivae normal.   Neck:      Musculoskeletal: Normal range of motion and neck supple.   Cardiovascular:      Rate and Rhythm: Normal rate and regular rhythm.      Heart sounds: Normal heart sounds.   Pulmonary:      Effort: Pulmonary effort is normal.      Breath sounds: Normal breath sounds.   Abdominal:      General: Bowel sounds are normal. There is distension.      Palpations: Abdomen is soft.      Tenderness: There is generalized abdominal tenderness. There is no guarding or rebound.   Musculoskeletal: Normal range of motion.   Skin:     General: Skin is warm and dry.      Capillary Refill: Capillary refill takes less than 2 seconds.   Neurological:      Mental Status: He is alert and oriented to person, place, and time.   Psychiatric:         Behavior: Behavior normal.         Thought Content: Thought content normal.         Judgment: Judgment normal.         Procedures           ED Course      Lab Results (last 24 hours)     Procedure Component Value Units Date/Time    CBC & Differential [[id number]]  (Abnormal) Collected: 02/02/21 0608    Specimen: Blood Updated: 02/02/21 0634    Narrative:      The following orders were created for panel order CBC & Differential.  Procedure                               Abnormality         Status                     ---------                               -----------         ------                     CBC Auto Differential[[id number]]        Abnormal            Final result                 Please view results for these tests on the individual  orders.    Comprehensive Metabolic Panel [[id number]]  (Abnormal) Collected: 02/02/21 0608    Specimen: Blood Updated: 02/02/21 0651     Glucose 107 mg/dL      BUN 13 mg/dL      Creatinine 0.52 mg/dL      Sodium 129 mmol/L      Potassium 4.2 mmol/L      Chloride 95 mmol/L      CO2 23.0 mmol/L      Calcium 9.0 mg/dL      Total Protein 6.4 g/dL      Albumin 3.80 g/dL      ALT (SGPT) 19 U/L      AST (SGOT) 22 U/L      Alkaline Phosphatase 68 U/L      Total Bilirubin 0.9 mg/dL      eGFR Non African Amer 149 mL/min/1.73      Globulin 2.6 gm/dL      A/G Ratio 1.5 g/dL      BUN/Creatinine Ratio 25.0     Anion Gap 11.0 mmol/L     Narrative:      GFR Normal >60  Chronic Kidney Disease <60  Kidney Failure <15      CBC Auto Differential [[id number]]  (Abnormal) Collected: 02/02/21 0608    Specimen: Blood Updated: 02/02/21 0634     WBC 10.78 10*3/mm3      RBC 3.91 10*6/mm3      Hemoglobin 12.7 g/dL      Hematocrit 35.7 %      MCV 91.3 fL      MCH 32.5 pg      MCHC 35.6 g/dL      RDW 12.7 %      RDW-SD 42.5 fl      MPV 9.3 fL      Platelets 309 10*3/mm3      Neutrophil % 81.6 %      Lymphocyte % 8.9 %      Monocyte % 7.9 %      Eosinophil % 0.8 %      Basophil % 0.4 %      Immature Grans % 0.4 %      Neutrophils, Absolute 8.80 10*3/mm3      Lymphocytes, Absolute 0.96 10*3/mm3      Monocytes, Absolute 0.85 10*3/mm3      Eosinophils, Absolute 0.09 10*3/mm3      Basophils, Absolute 0.04 10*3/mm3      Immature Grans, Absolute 0.04 10*3/mm3      nRBC 0.0 /100 WBC     Urinalysis With Culture If Indicated - Urine, Catheter [[id number]]  (Abnormal) Collected: 02/02/21 0612    Specimen: Urine, Catheter Updated: 02/02/21 0633     Color, UA Yellow     Appearance, UA Clear     pH, UA 7.0     Specific Gravity, UA 1.010     Glucose, UA Negative     Ketones, UA Negative     Bilirubin, UA Negative     Blood, UA Negative     Protein, UA Trace     Leuk Esterase, UA Negative     Nitrite, UA Negative     Urobilinogen, UA 0.2 E.U./dL    Narrative:       Urine microscopic not indicated.        CT Abdomen Pelvis With Contrast   Final Result   1. Large volume of stool within the rectosigmoid colon with moderate   stool seen throughout the remaining colon. Findings supporting   constipation. Sigmoid colonic diverticulosis but no acute   diverticulitis.    2. Moderate to large hiatal hernia.   3. Diffuse vascular calcifications with no aneurysm or dissection.   4. Left hepatic cyst. Subcentimeter right renal cyst.   5. Garcia catheter within the decompressed bladder. Prostate enlargement.   6. Postoperative changes involving lumbosacral spine. Prior mediastinal   surgery.   This report was finalized on 02/02/2021 08:54 by Dr. Ayana Hurd MD.        Labs showed hyponatremia but is baseline for the patient.  Very mild anemia.  Rectal exam showed no gross blood.  Patient did have some hemorrhoids externally and some irritation around the rectum.  CT scan showed a large volume of stool within the rectosigmoid colon with moderate stool seen throughout the remaining colon consistent with constipation.  Patient also had a moderate to large hiatal hernia, left hepatic cyst, right renal cyst, and prostate enlargement.  Patient was given an enema and had a bowel movement in the ER.  He was feeling better.  Patient was advised take over-the-counter MiraLAX and Dulcolax.  He is to keep his Garcia in place.  Leg bag applied.  He will follow up with urology in 1 week for catheter removal and prostate evaluation.  He is to return to the ER immediately for any worsening pain, fever, vomiting or other concerns.  Patient agreeable.                                     MDM  Number of Diagnoses or Management Options     Amount and/or Complexity of Data Reviewed  Clinical lab tests: ordered  Tests in the radiology section of CPT®: ordered    Patient Progress  Patient progress: stable      Final diagnoses:   Urinary retention   Constipation, unspecified constipation type   Hyponatremia    Hepatic cyst   Renal cyst, right   Prostate enlargement   Hiatal hernia     The patient was in urinary retention is a bladder scan demonstrated over 900 cc.  He was much relieved when a Garcia catheter was placed.  I am still concerned about the fact that he is distended and has had appropriate bowel movement in a week.  I am awaiting a CT scan of his abdomen and pelvis to possibly elicit the cause of this.  The care of the patient was handed over to Dr. Girard pending the CT scan.       Laura Hull MD  02/02/21 111

## 2021-02-02 NOTE — PROGRESS NOTES
Subjective    Mr. Henriquez is 89 y.o. male    Chief Complaint: ER follow up / Garcia Removal / Prostate Exam    History of Present Illness  Urinary Retention  Patient complains of urinary retention.  This is a new problem he is being seen for today at [facility] urology he is a former patient but is seen Dr. Fagan in 2018.  Onset of retention was 7 days ago and was sudden in onset. Patient currently does have a urinary catheter in place.  900 ml of urine were drained when catheter was placed. Prior to this event voiding symptoms consisted of none. Prior treatments include tamulosin and Proscar. Recent medications that may have affected his voiding include none.  Patient went to emergency department 02/02/2021 CT scan showed large amount of stool consistent with constipation.  Does seem to coincide with patient's inability to void.  Patient states he is now having regular bowel movements.  Patient had seen  in the past    The following portions of the patient's history were reviewed and updated as appropriate: allergies, current medications, past family history, past medical history, past social history, past surgical history and problem list.    Review of Systems   Constitutional: Negative for appetite change and fever.   HENT: Negative for hearing loss and sore throat.    Eyes: Negative for pain and redness.   Respiratory: Negative for cough and shortness of breath.    Cardiovascular: Negative for chest pain and leg swelling.   Gastrointestinal: Negative for anal bleeding, nausea and vomiting.   Endocrine: Negative for cold intolerance and heat intolerance.   Genitourinary: Negative for dysuria, flank pain, frequency, hematuria and urgency.   Musculoskeletal: Negative for joint swelling and myalgias.   Skin: Negative for color change and rash.   Allergic/Immunologic: Negative for immunocompromised state.   Neurological: Negative for dizziness and speech difficulty.   Hematological: Negative for adenopathy.  Does not bruise/bleed easily.   Psychiatric/Behavioral: Negative for dysphoric mood and suicidal ideas.         Current Outpatient Medications:   •  alfuzosin (UROXATRAL) 10 MG 24 hr tablet, Take 1 tablet by mouth Daily., Disp: 30 tablet, Rfl: 5  •  Ascorbic Acid (Vitamin C) 500 MG capsule, Take 1 capsule by mouth 2 (two) times a day., Disp: , Rfl:   •  aspirin 81 MG tablet, Take 81 mg by mouth., Disp: , Rfl:   •  chlorpheniramine (CHLOR-TRIMETON) 4 MG tablet, Take 4 mg by mouth Daily., Disp: , Rfl:   •  Cholecalciferol (Vitamin D3) 50 MCG (2000 UT) tablet, Take 1 tablet by mouth Daily., Disp: , Rfl:   •  diphenhydrAMINE (BENADRYL) 25 mg capsule, Take 25 mg by mouth 2 (Two) Times a Day., Disp: , Rfl:   •  docusate sodium 100 MG capsule, Take 100 mg by mouth., Disp: , Rfl:   •  finasteride (PROSCAR) 5 MG tablet, Take 1 tablet by mouth Daily., Disp: 30 tablet, Rfl: 5  •  Flaxseed, Linseed, (FLAXSEED OIL) 1000 MG capsule, 540mg takes 4 tablets by mouth daily, Disp: , Rfl:   •  folic acid (CVS FOLIC ACID) 800 MCG tablet, Take 800 mcg by mouth daily.  , Disp: , Rfl:   •  gabapentin (NEURONTIN) 600 MG tablet, Take 1 tablet by mouth 3 (Three) Times a Day., Disp: 90 tablet, Rfl: 3  •  L-Lysine 600 MG tablet, Take 1 tablet by mouth Daily., Disp: , Rfl:   •  metoprolol succinate XL (TOPROL-XL) 25 MG 24 hr tablet, Take 25 mg by mouth Every Night., Disp: , Rfl:   •  pravastatin (PRAVACHOL) 40 MG tablet, Take 40 mg by mouth daily.  , Disp: , Rfl:   •  primidone (MYSOLINE) 50 MG tablet, Take 1/2 tablet at bedtime, Disp: 45 tablet, Rfl: 3  •  tamsulosin (FLOMAX) 0.4 MG capsule 24 hr capsule, Take 1 capsule by mouth Daily., Disp: 30 capsule, Rfl: 11  •  Zinc 50 MG tablet, Take 1 tablet by mouth Daily., Disp: , Rfl:     Past Medical History:   Diagnosis Date   • Allergic rhinitis    • Anemia    • Arthritis    • BPH (benign prostatic hypertrophy) with urinary retention    • Cancer (CMS/HCC)     skin cancer   • Chronic back pain      RUNS DOWN BOTH LEGS   • Constipation    • Coronary artery disease    • Hard of hearing    • Heart attack (CMS/HCC) 1986    NO MUSCLE DAMAGE - JUST OCCLUDED VALVE   • High cholesterol    • History of skin cancer     BILATERAL EARS   • History of transfusion     1986   • Hypertension    • Inguinal hernia    • Leaky heart valve     DR CONCEPCION SAYS NOT ENOUGH TO BE OF CONCERN   • Other bursal cyst, right elbow    • PONV (postoperative nausea and vomiting)     has had scopalamine patch in past    • Sleep apnea     DOES NOT USE CPAP OR BIPAP   • Spinal stenosis of cervical region    • Spinal stenosis of lumbar region    • Tremors of nervous system    • Wears hearing aid     BILATERAL       Past Surgical History:   Procedure Laterality Date   • ANTERIOR LUMBAR EXPOSURE N/A 12/7/2018    Procedure: ANTERIOR LUMBAR EXPOSURE;  Surgeon: Manolo Mcleod DO;  Location:  [facility] OR;  Service: Vascular   • APPENDECTOMY     • BACK SURGERY      X3   • CARDIAC SURGERY  08/08/1986    X1 BYPASS   • CORONARY ANGIOPLASTY WITH STENT PLACEMENT  1997    X3 STENTS   • HERNIA REPAIR Bilateral     x2   • INGUINAL HERNIA REPAIR Right 6/22/2017    Procedure: RIGHT INGUINAL HERNIA REPAIR AND EXCISION OF CYST RIGHT ELBOW ;  Surgeon: Jackelyn Arriola MD;  Location:  [facility] OR;  Service:    • LUMBAR LAMINECTOMY N/A 3/12/2018    Procedure: LUMBAR LAMINECTOMY WITHOUT FUSION L3-4,4-5;  Surgeon: Kj Falcon MD;  Location:  [facility] OR;  Service: Neurosurgery   • LUMBAR LAMINECTOMY ANTERIOR LUMBAR INTERBODY FUSION N/A 12/7/2018    Procedure: ANTERIOR LUMBAR INTERBODY FUSION L5-S1;  Surgeon: Kj Falcon MD;  Location:  [facility] OR;  Service: Neurosurgery   • LUMBAR LAMINECTOMY WITH FUSION Left 12/10/2018    Procedure: LUMBAR LAMINECTOMY TRANSFORAMINAL LUMBAR INTERBODY FUSION L34,45;  Surgeon: Kj Falcon MD;  Location:  [facility] OR;  Service: Neurosurgery   • LUMBAR LAMINECTOMY WITH FUSION Left 12/7/2018    Procedure: LUMBAR LAMINECTOMY  "TRANSFORAMINAL LUMBAR INTERBODY FUSION LEFT L3-4, L4-5 QUADRANT RETRACTOR;  Surgeon: Kj Falcon MD;  Location: [facility];  Service: Neurosurgery       Social History     Socioeconomic History   • Marital status:      Spouse name: Not on file   • Number of children: Not on file   • Years of education: Not on file   • Highest education level: Not on file   Tobacco Use   • Smoking status: Former Smoker     Years: 6.00     Types: Cigarettes     Quit date:      Years since quittin.1   • Smokeless tobacco: Never Used   • Tobacco comment: age [age] years of age   Substance and Sexual Activity   • Alcohol use: No   • Drug use: Never   • Sexual activity: Defer       Family History   Problem Relation Age of Onset   • No Known Problems Father    • No Known Problems Mother        Objective    Patient of Jesus Mcintyre PA-c states he is here today to have his catheter removed. The patient denies any fever, chills or  N&V. Using the catheter in place and sterile water 180cc was installed into the bladder with no complications. Patient was able to urinate 250cc.     Patient was advised to drink clear fluids for the next couple hours and urinate. he was advised he may experience some blood in the urine and burning with urination for the next couple days. If the patient is unable to urinate or develops fever, chills, N&V or suprapubic pain he will call to return for an appt at clinic or seek medical treatment at [facility], PCP or Urgent Care after hours. Patient verbalized understanding and all questions were answered.       Temp 97.7 °F (36.5 °C) (Temporal)   Ht 172.7 cm (68\")   Wt 68 kg (150 lb)   BMI 22.81 kg/m²     Physical Exam  Vitals signs reviewed.   Constitutional:       Appearance: Normal appearance.   HENT:      Head: Normocephalic and atraumatic.      Right Ear: External ear normal.      Left Ear: External ear normal.      Nose: No congestion.   Eyes:      Conjunctiva/sclera: " Conjunctivae normal.   Neck:      Comments: I observed no obvious neck masses  Pulmonary:      Effort: Pulmonary effort is normal.   Genitourinary:     Prostate: Enlarged. Not tender and no nodules present.      Rectum: External hemorrhoid present.      Comments: I palpated the catheter within the central portion of the prostate prostate is enlarged approximately 45 g.  Nothing suspicious for prostate cancer no lesions nodules or firmness his prostate was nontender on examination.  Skin:     Coloration: Skin is not pale.      Findings: No rash.      Comments: No obvious facial rash   Neurological:      General: No focal deficit present.      Mental Status: He is alert and oriented to person, place, and time.   Psychiatric:         Mood and Affect: Mood normal.         Behavior: Behavior normal.             Results for orders placed or performed during the hospital encounter of 02/02/21   Comprehensive Metabolic Panel    Specimen: Blood   Result Value Ref Range    Glucose 107 (H) 65 - 99 mg/dL    BUN 13 8 - 23 mg/dL    Creatinine 0.52 (L) 0.76 - 1.27 mg/dL    Sodium 129 (L) 136 - 145 mmol/L    Potassium 4.2 3.5 - 5.2 mmol/L    Chloride 95 (L) 98 - 107 mmol/L    CO2 23.0 22.0 - 29.0 mmol/L    Calcium 9.0 8.2 - 9.6 mg/dL    Total Protein 6.4 6.0 - 8.5 g/dL    Albumin 3.80 3.50 - 5.20 g/dL    ALT (SGPT) 19 1 - 41 U/L    AST (SGOT) 22 1 - 40 U/L    Alkaline Phosphatase 68 39 - 117 U/L    Total Bilirubin 0.9 0.0 - 1.2 mg/dL    eGFR Non African Amer 149 >60 mL/min/1.73    Globulin 2.6 gm/dL    A/G Ratio 1.5 g/dL    BUN/Creatinine Ratio 25.0 7.0 - 25.0    Anion Gap 11.0 5.0 - 15.0 mmol/L   Urinalysis With Culture If Indicated - Urine, Catheter    Specimen: Urine, Catheter   Result Value Ref Range    Color, UA Yellow Yellow, Straw    Appearance, UA Clear Clear    pH, UA 7.0 5.0 - 8.0    Specific Gravity, UA 1.010 1.005 - 1.030    Glucose, UA Negative Negative    Ketones, UA Negative Negative    Bilirubin, UA Negative  Negative    Blood, UA Negative Negative    Protein, UA Trace (A) Negative    Leuk Esterase, UA Negative Negative    Nitrite, UA Negative Negative    Urobilinogen, UA 0.2 E.U./dL 0.2 - 1.0 E.U./dL   CBC Auto Differential    Specimen: Blood   Result Value Ref Range    WBC 10.78 3.40 - 10.80 10*3/mm3    RBC 3.91 (L) 4.14 - 5.80 10*6/mm3    Hemoglobin 12.7 (L) 13.0 - 17.7 g/dL    Hematocrit 35.7 (L) 37.5 - 51.0 %    MCV 91.3 79.0 - 97.0 fL    MCH 32.5 26.6 - 33.0 pg    MCHC 35.6 31.5 - 35.7 g/dL    RDW 12.7 12.3 - 15.4 %    RDW-SD 42.5 37.0 - 54.0 fl    MPV 9.3 6.0 - 12.0 fL    Platelets 309 140 - 450 10*3/mm3    Neutrophil % 81.6 (H) 42.7 - 76.0 %    Lymphocyte % 8.9 (L) 19.6 - 45.3 %    Monocyte % 7.9 5.0 - 12.0 %    Eosinophil % 0.8 0.3 - 6.2 %    Basophil % 0.4 0.0 - 1.5 %    Immature Grans % 0.4 0.0 - 0.5 %    Neutrophils, Absolute 8.80 (H) 1.70 - 7.00 10*3/mm3    Lymphocytes, Absolute 0.96 0.70 - 3.10 10*3/mm3    Monocytes, Absolute 0.85 0.10 - 0.90 10*3/mm3    Eosinophils, Absolute 0.09 0.00 - 0.40 10*3/mm3    Basophils, Absolute 0.04 0.00 - 0.20 10*3/mm3    Immature Grans, Absolute 0.04 0.00 - 0.05 10*3/mm3    nRBC 0.0 0.0 - 0.2 /100 WBC   Light Blue Top   Result Value Ref Range    Extra Tube hold for add-on    Green Top (Gel)   Result Value Ref Range    Extra Tube Hold for add-ons.    Lavender Top   Result Value Ref Range    Extra Tube hold for add-on    Red Top   Result Value Ref Range    Extra Tube Hold for add-ons.      Assessment and Plan    Diagnoses and all orders for this visit:    1. Retention of urine (Primary)    2. BPH with urinary obstruction    Patient with emergency department 02/02/2021 this is a new problem for the patient today which is retention.  Had seen  in the past and last saw him in 2018 for BPH.  Patient has had bad constipation and then he was unable to urinate he went to the emergency department CT scan showed large amount of stool in the colon consistent with constipation  he is now having regular bowel movements.  He is on tamsulosin and finasteride which I recommend he continue for his BPH.  Patient had his catheter removed and he voided out more than was inserted into his bladder which was a successful voiding trial leave the catheter out follow-up 2 weeks.

## 2021-02-03 ENCOUNTER — PATIENT OUTREACH (OUTPATIENT)
Dept: CASE MANAGEMENT | Facility: OTHER | Age: 86
End: 2021-02-03

## 2021-02-03 ENCOUNTER — EPISODE CHANGES (OUTPATIENT)
Dept: CASE MANAGEMENT | Facility: OTHER | Age: 86
End: 2021-02-03

## 2021-02-03 LAB
BACTERIA SPEC AEROBE CULT: NORMAL
BACTERIA SPEC AEROBE CULT: NORMAL

## 2021-02-07 ENCOUNTER — PATIENT OUTREACH (OUTPATIENT)
Dept: PHARMACY | Facility: HOSPITAL | Age: 86
End: 2021-02-07

## 2021-02-07 NOTE — OUTREACH NOTE
COVID-19 Monoclonal Antibody Pharmacy Patient Outreach        Consult Details  Patient Name (): Chad Henriquez  (1929)   PCP: Zelalem Jackson MD   Medication: Bamlanivimab   Date of Administration: 21     Discussion:  Called patient to see check on their status post-administration. I inquired about symptoms and respiratory function (O2 saturation if they have monitor).    Spoke with patient's wife. She reports he did not experience any side effects from the infusion. She says he is still weak and was struggling with urinary retention. He remains catheterized. They have an appointment with urology on the  of this month about this. She says Mr. Henriquez is sleeping well and eating well. He is walking around the house to stay active. His oxygen saturations have been in the upper 90% range. While it is a slower improvement, he is improving overall.     Summary:  Patient slowly improving. They have been released from the health department. Urology appointment in 2 days. I encouraged to reach out to PCP if any worsening of symptoms is noted or report to the ED.      Daniel Garrison, PharmD  2021 17:05 CST      Patient should wait 90 days post-infusion of MAB before receiving dose of vaccine. He apparently received the first vaccine dose then contracted COVID-19. He receive Bam as above. Instructed patient's wife that he should wait 90 days post-MAB per CDC recommendations. She understands.     Daniel Garrison, PharmD  21  16:10 CST

## 2021-02-08 ENCOUNTER — OFFICE VISIT (OUTPATIENT)
Dept: INTERNAL MEDICINE | Facility: CLINIC | Age: 86
End: 2021-02-08

## 2021-02-08 VITALS
WEIGHT: 152 LBS | DIASTOLIC BLOOD PRESSURE: 80 MMHG | TEMPERATURE: 97.3 F | SYSTOLIC BLOOD PRESSURE: 180 MMHG | HEART RATE: 66 BPM | OXYGEN SATURATION: 99 % | BODY MASS INDEX: 23.04 KG/M2 | HEIGHT: 68 IN

## 2021-02-08 DIAGNOSIS — N13.8 BPH WITH URINARY OBSTRUCTION: Primary | ICD-10-CM

## 2021-02-08 DIAGNOSIS — U07.1 COVID-19 VIRUS INFECTION: ICD-10-CM

## 2021-02-08 DIAGNOSIS — I10 ESSENTIAL HYPERTENSION: ICD-10-CM

## 2021-02-08 DIAGNOSIS — N40.1 BPH WITH URINARY OBSTRUCTION: Primary | ICD-10-CM

## 2021-02-08 PROCEDURE — 99214 OFFICE O/P EST MOD 30 MIN: CPT | Performed by: INTERNAL MEDICINE

## 2021-02-08 RX ORDER — TAMSULOSIN HYDROCHLORIDE 0.4 MG/1
1 CAPSULE ORAL DAILY
Qty: 30 CAPSULE | Refills: 11 | Status: ON HOLD | OUTPATIENT
Start: 2021-02-08 | End: 2021-06-30

## 2021-02-08 NOTE — PROGRESS NOTES
Subjective   Chad Henriquez is a 89 y.o. male.   Chief Complaint   Patient presents with   • Follow-up     ER FOLLOW UP : 2/2/2021 URINARY RETENTION       History of Present Illness patient is here after recent Sohail having had COVID-19.  He had been seen in the emergency room recently with urinary retention he currently has a Garcia catheter in place.    The following portions of the patient's history were reviewed and updated as appropriate: allergies, current medications, past family history, past medical history, past social history, past surgical history and problem list.    Review of Systems   Constitutional: Negative for activity change, appetite change, fatigue, fever, unexpected weight gain and unexpected weight loss.   HENT: Negative for swollen glands, trouble swallowing and voice change.    Eyes: Negative for blurred vision and visual disturbance.   Respiratory: Negative for cough and shortness of breath.    Cardiovascular: Negative for chest pain, palpitations and leg swelling.   Gastrointestinal: Negative for abdominal pain, constipation, diarrhea, nausea, vomiting and indigestion.   Endocrine: Negative for cold intolerance, heat intolerance, polydipsia and polyphagia.   Genitourinary: Negative for dysuria and frequency.        Garcia catheter in place secondary to urinary retention   Musculoskeletal: Negative for arthralgias, back pain, joint swelling and neck pain.   Skin: Negative for color change, rash and skin lesions.   Neurological: Negative for dizziness, weakness, headache, memory problem and confusion.   Hematological: Does not bruise/bleed easily.   Psychiatric/Behavioral: Negative for agitation, hallucinations and suicidal ideas. The patient is not nervous/anxious.        Objective   Past Medical History:   Diagnosis Date   • Allergic rhinitis    • Anemia    • Arthritis    • BPH (benign prostatic hypertrophy) with urinary retention    • Cancer (CMS/[facility])     skin cancer   • Chronic back pain      RUNS DOWN BOTH LEGS   • Constipation    • Coronary artery disease    • Hard of hearing    • Heart attack (CMS/HCC) 1986    NO MUSCLE DAMAGE - JUST OCCLUDED VALVE   • High cholesterol    • History of skin cancer     BILATERAL EARS   • History of transfusion     1986   • Hypertension    • Inguinal hernia    • Leaky heart valve     DR CONCEPCION SAYS NOT ENOUGH TO BE OF CONCERN   • Other bursal cyst, right elbow    • PONV (postoperative nausea and vomiting)     has had scopalamine patch in past    • Sleep apnea     DOES NOT USE CPAP OR BIPAP   • Spinal stenosis of cervical region    • Spinal stenosis of lumbar region    • Tremors of nervous system    • Wears hearing aid     BILATERAL      Past Surgical History:   Procedure Laterality Date   • ANTERIOR LUMBAR EXPOSURE N/A 12/7/2018    Procedure: ANTERIOR LUMBAR EXPOSURE;  Surgeon: Manolo Mcleod DO;  Location:  [facility] OR;  Service: Vascular   • APPENDECTOMY     • BACK SURGERY      X3   • CARDIAC SURGERY  08/08/1986    X1 BYPASS   • CORONARY ANGIOPLASTY WITH STENT PLACEMENT  1997    X3 STENTS   • HERNIA REPAIR Bilateral     x2   • INGUINAL HERNIA REPAIR Right 6/22/2017    Procedure: RIGHT INGUINAL HERNIA REPAIR AND EXCISION OF CYST RIGHT ELBOW ;  Surgeon: Jackelyn Arriola MD;  Location:  [facility] OR;  Service:    • LUMBAR LAMINECTOMY N/A 3/12/2018    Procedure: LUMBAR LAMINECTOMY WITHOUT FUSION L3-4,4-5;  Surgeon: Kj Falcon MD;  Location:  [facility] OR;  Service: Neurosurgery   • LUMBAR LAMINECTOMY ANTERIOR LUMBAR INTERBODY FUSION N/A 12/7/2018    Procedure: ANTERIOR LUMBAR INTERBODY FUSION L5-S1;  Surgeon: Kj Falcon MD;  Location:  [facility] OR;  Service: Neurosurgery   • LUMBAR LAMINECTOMY WITH FUSION Left 12/10/2018    Procedure: LUMBAR LAMINECTOMY TRANSFORAMINAL LUMBAR INTERBODY FUSION L34,45;  Surgeon: Kj Falcon MD;  Location:  [facility] OR;  Service: Neurosurgery   • LUMBAR LAMINECTOMY WITH FUSION Left 12/7/2018    Procedure: LUMBAR LAMINECTOMY  TRANSFORAMINAL LUMBAR INTERBODY FUSION LEFT L3-4, L4-5 QUADRANT RETRACTOR;  Surgeon: Kj Falcon MD;  Location: [facility] OR;  Service: Neurosurgery        Current Outpatient Medications:   •  Ascorbic Acid (Vitamin C) 500 MG capsule, Take 1 capsule by mouth 2 (two) times a day., Disp: , Rfl:   •  aspirin 81 MG tablet, Take 81 mg by mouth., Disp: , Rfl:   •  chlorpheniramine (CHLOR-TRIMETON) 4 MG tablet, Take 4 mg by mouth Daily., Disp: , Rfl:   •  Cholecalciferol (Vitamin D3) 50 MCG (2000 UT) tablet, Take 1 tablet by mouth Daily., Disp: , Rfl:   •  diphenhydrAMINE (BENADRYL) 25 mg capsule, Take 25 mg by mouth 2 (Two) Times a Day., Disp: , Rfl:   •  docusate sodium 100 MG capsule, Take 100 mg by mouth., Disp: , Rfl:   •  finasteride (PROSCAR) 5 MG tablet, Take 1 tablet by mouth Daily., Disp: 30 tablet, Rfl: 5  •  Flaxseed, Linseed, (FLAXSEED OIL) 1000 MG capsule, 540mg takes 4 tablets by mouth daily, Disp: , Rfl:   •  folic acid ([employer]S FOLIC ACID) 800 MCG tablet, Take 800 mcg by mouth daily.  , Disp: , Rfl:   •  gabapentin (NEURONTIN) 600 MG tablet, Take 1 tablet by mouth 3 (Three) Times a Day., Disp: 90 tablet, Rfl: 3  •  L-Lysine 600 MG tablet, Take 1 tablet by mouth Daily., Disp: , Rfl:   •  metoprolol succinate XL (TOPROL-XL) 25 MG 24 hr tablet, Take 25 mg by mouth Every Night., Disp: , Rfl:   •  pravastatin (PRAVACHOL) 40 MG tablet, Take 40 mg by mouth daily.  , Disp: , Rfl:   •  primidone (MYSOLINE) 50 MG tablet, Take 1/2 tablet at bedtime, Disp: 45 tablet, Rfl: 3  •  Zinc 50 MG tablet, Take 1 tablet by mouth Daily., Disp: , Rfl:   •  alfuzosin (UROXATRAL) 10 MG 24 hr tablet, Take 1 tablet by mouth Daily., Disp: 30 tablet, Rfl: 5  •  tamsulosin (FLOMAX) 0.4 MG capsule 24 hr capsule, Take 1 capsule by mouth Daily., Disp: 30 capsule, Rfl: 11     Vitals:    02/08/21 1557   BP: 180/80   Pulse: 66   Temp: 97.3 °F (36.3 °C)   SpO2: 99%         02/08/21  1557   Weight: 68.9 kg (152 lb)     Patient's Body mass index  is 23.11 kg/m². BMI is .      Physical Exam  Vitals signs and nursing note reviewed.   Constitutional:       General: He is not in acute distress.     Appearance: Normal appearance. He is well-developed.   HENT:      Head: Normocephalic and atraumatic.      Right Ear: External ear normal.      Left Ear: External ear normal.      Nose: Nose normal.   Eyes:      Extraocular Movements: Extraocular movements intact.      Conjunctiva/sclera: Conjunctivae normal.      Pupils: Pupils are equal, round, and reactive to light.   Neck:      Musculoskeletal: Normal range of motion and neck supple. No neck rigidity or muscular tenderness.   Cardiovascular:      Rate and Rhythm: Normal rate and regular rhythm.      Pulses: Normal pulses.      Heart sounds: Normal heart sounds.   Pulmonary:      Effort: Pulmonary effort is normal.      Breath sounds: Normal breath sounds.   Abdominal:      General: Bowel sounds are normal.      Palpations: Abdomen is soft.   Genitourinary:     Comments: Garcia catheter in place  Musculoskeletal: Normal range of motion.   Skin:     General: Skin is warm and dry.   Neurological:      General: No focal deficit present.      Mental Status: He is alert and oriented to person, place, and time.   Psychiatric:         Mood and Affect: Mood normal.         Behavior: Behavior normal.               Assessment/Plan   Diagnoses and all orders for this visit:    1. BPH with urinary obstruction (Primary)    2. Essential hypertension    3. COVID-19 virus infection    Other orders  -     tamsulosin (FLOMAX) 0.4 MG capsule 24 hr capsule; Take 1 capsule by mouth Daily.  Dispense: 30 capsule; Refill: 11      This point I have added tamsulosin to relax his prostate hopefully urology can pull his catheter out tomorrow and this will alleviate some of his symptoms.  He also has a significantly elevated blood pressure today I have explained that removing the catheter and taking the Flomax may well improve his blood  pressure I would like to have his blood pressure rechecked in the next couple weeks and I have rescheduled his upcoming yearly appointment for 3 months.

## 2021-02-09 ENCOUNTER — OFFICE VISIT (OUTPATIENT)
Dept: UROLOGY | Facility: CLINIC | Age: 86
End: 2021-02-09

## 2021-02-09 VITALS — HEIGHT: 68 IN | WEIGHT: 150 LBS | TEMPERATURE: 97.7 F | BODY MASS INDEX: 22.73 KG/M2

## 2021-02-09 DIAGNOSIS — N40.1 BPH WITH URINARY OBSTRUCTION: ICD-10-CM

## 2021-02-09 DIAGNOSIS — R33.9 RETENTION OF URINE: Primary | ICD-10-CM

## 2021-02-09 DIAGNOSIS — N13.8 BPH WITH URINARY OBSTRUCTION: ICD-10-CM

## 2021-02-09 PROCEDURE — 99212 OFFICE O/P EST SF 10 MIN: CPT | Performed by: PHYSICIAN ASSISTANT

## 2021-02-17 NOTE — PROGRESS NOTES
Subjective    Mr. Henriquez is 89 y.o. male    Chief Complaint: 2 week follow up Retention     History of Present Illness  Patient is a 89-year-old gentleman with history of urinary retention he did not have a previous history of retention until he presented to me 02/09/2021 which was a follow-up from emergency department visit 02/02/2021.  It was found that he had large amount of stool in his colon based on CT scan findings consistent with constipation.  He is taking bulking agent to try to promote bowel movement he has not had to have a catheter replaced since he was last seen he is back to his baseline voiding patient should be taking finasteride and tamsulosin which is on his med list and was prescribed by Dr. Jackson.  Patient is good to check with his pharmacy to see whether he is taking it still.    The following portions of the patient's history were reviewed and updated as appropriate: allergies, current medications, past family history, past medical history, past social history, past surgical history and problem list.    Review of Systems   Constitutional: Negative for appetite change and fever.   HENT: Negative for hearing loss and sore throat.    Eyes: Negative for pain and redness.   Respiratory: Negative for cough and shortness of breath.    Cardiovascular: Negative for chest pain and leg swelling.   Gastrointestinal: Negative for anal bleeding, nausea and vomiting.   Endocrine: Negative for cold intolerance and heat intolerance.   Genitourinary: Negative for dysuria, flank pain, frequency, hematuria and urgency.   Musculoskeletal: Negative for joint swelling and myalgias.   Skin: Negative for color change and rash.   Allergic/Immunologic: Negative for immunocompromised state.   Neurological: Negative for dizziness and speech difficulty.   Hematological: Negative for adenopathy. Does not bruise/bleed easily.   Psychiatric/Behavioral: Negative for dysphoric mood and suicidal ideas.         Current  Outpatient Medications:   •  alfuzosin (UROXATRAL) 10 MG 24 hr tablet, Take 1 tablet by mouth Daily., Disp: 30 tablet, Rfl: 5  •  Ascorbic Acid (Vitamin C) 500 MG capsule, Take 1 capsule by mouth 2 (two) times a day., Disp: , Rfl:   •  aspirin 81 MG tablet, Take 81 mg by mouth., Disp: , Rfl:   •  chlorpheniramine (CHLOR-TRIMETON) 4 MG tablet, Take 4 mg by mouth Daily., Disp: , Rfl:   •  Cholecalciferol (Vitamin D3) 50 MCG (2000 UT) tablet, Take 1 tablet by mouth Daily., Disp: , Rfl:   •  docusate sodium 100 MG capsule, Take 100 mg by mouth., Disp: , Rfl:   •  finasteride (PROSCAR) 5 MG tablet, Take 1 tablet by mouth Daily., Disp: 30 tablet, Rfl: 5  •  Flaxseed, Linseed, (FLAXSEED OIL) 1000 MG capsule, 540mg takes 4 tablets by mouth daily, Disp: , Rfl:   •  folic acid ([employer] FOLIC ACID) 800 MCG tablet, Take 800 mcg by mouth daily.  , Disp: , Rfl:   •  gabapentin (NEURONTIN) 600 MG tablet, Take 1 tablet by mouth 3 (Three) Times a Day., Disp: 90 tablet, Rfl: 3  •  L-Lysine 600 MG tablet, Take 1 tablet by mouth Daily., Disp: , Rfl:   •  metoprolol succinate XL (TOPROL-XL) 25 MG 24 hr tablet, Take 25 mg by mouth Every Night., Disp: , Rfl:   •  pravastatin (PRAVACHOL) 40 MG tablet, Take 40 mg by mouth daily.  , Disp: , Rfl:   •  primidone (MYSOLINE) 50 MG tablet, Take 1/2 tablet at bedtime, Disp: 45 tablet, Rfl: 3  •  tamsulosin (FLOMAX) 0.4 MG capsule 24 hr capsule, Take 1 capsule by mouth Daily., Disp: 30 capsule, Rfl: 11  •  Zinc 50 MG tablet, Take 1 tablet by mouth Daily., Disp: , Rfl:   •  diphenhydrAMINE (BENADRYL) 25 mg capsule, Take 25 mg by mouth 2 (Two) Times a Day., Disp: , Rfl:     Past Medical History:   Diagnosis Date   • Allergic rhinitis    • Anemia    • Arthritis    • BPH (benign prostatic hypertrophy) with urinary retention    • Cancer (CMS/HCC)     skin cancer   • Chronic back pain     RUNS DOWN BOTH LEGS   • Constipation    • Coronary artery disease    • Hard of hearing    • Heart attack (CMS/HCC) 1986     NO MUSCLE DAMAGE - JUST OCCLUDED VALVE   • High cholesterol    • History of skin cancer     BILATERAL EARS   • History of transfusion     1986   • Hypertension    • Inguinal hernia    • Leaky heart valve     DR CONCEPCION SAYS NOT ENOUGH TO BE OF CONCERN   • Other bursal cyst, right elbow    • PONV (postoperative nausea and vomiting)     has had scopalamine patch in past    • Sleep apnea     DOES NOT USE CPAP OR BIPAP   • Spinal stenosis of cervical region    • Spinal stenosis of lumbar region    • Tremors of nervous system    • Wears hearing aid     BILATERAL       Past Surgical History:   Procedure Laterality Date   • ANTERIOR LUMBAR EXPOSURE N/A 12/7/2018    Procedure: ANTERIOR LUMBAR EXPOSURE;  Surgeon: Manolo Mcleod DO;  Location:  [facility];  Service: Vascular   • APPENDECTOMY     • BACK SURGERY      X3   • CARDIAC SURGERY  08/08/1986    X1 BYPASS   • CORONARY ANGIOPLASTY WITH STENT PLACEMENT  1997    X3 STENTS   • HERNIA REPAIR Bilateral     x2   • INGUINAL HERNIA REPAIR Right 6/22/2017    Procedure: RIGHT INGUINAL HERNIA REPAIR AND EXCISION OF CYST RIGHT ELBOW ;  Surgeon: Jackelyn Arriola MD;  Location:  [facility];  Service:    • LUMBAR LAMINECTOMY N/A 3/12/2018    Procedure: LUMBAR LAMINECTOMY WITHOUT FUSION L3-4,4-5;  Surgeon: Kj Falcon MD;  Location:  [facility];  Service: Neurosurgery   • LUMBAR LAMINECTOMY ANTERIOR LUMBAR INTERBODY FUSION N/A 12/7/2018    Procedure: ANTERIOR LUMBAR INTERBODY FUSION L5-S1;  Surgeon: Kj Falcon MD;  Location:  [facility];  Service: Neurosurgery   • LUMBAR LAMINECTOMY WITH FUSION Left 12/10/2018    Procedure: LUMBAR LAMINECTOMY TRANSFORAMINAL LUMBAR INTERBODY FUSION L34,45;  Surgeon: Kj Falcon MD;  Location:  [facility];  Service: Neurosurgery   • LUMBAR LAMINECTOMY WITH FUSION Left 12/7/2018    Procedure: LUMBAR LAMINECTOMY TRANSFORAMINAL LUMBAR INTERBODY FUSION LEFT L3-4, L4-5 QUADRANT RETRACTOR;  Surgeon: Kj Falcon MD;  Location:  [facility];  " Service: Neurosurgery       Social History     Socioeconomic History   • Marital status:      Spouse name: Not on file   • Number of children: Not on file   • Years of education: Not on file   • Highest education level: Not on file   Tobacco Use   • Smoking status: Former Smoker     Years: 6.00     Types: Cigarettes     Quit date:      Years since quittin.1   • Smokeless tobacco: Never Used   • Tobacco comment: age [age] years of age   Substance and Sexual Activity   • Alcohol use: No   • Drug use: Never   • Sexual activity: Defer       Family History   Problem Relation Age of Onset   • No Known Problems Father    • No Known Problems Mother        Objective    Temp 97.2 °F (36.2 °C) (Temporal)   Ht 172.7 cm (68\")   Wt 73.9 kg (163 lb)   BMI 24.78 kg/m²     Physical Exam  Vitals signs reviewed.   Constitutional:       General: He is not in acute distress.     Appearance: Normal appearance. He is not ill-appearing.   HENT:      Head: Normocephalic and atraumatic.      Right Ear: External ear normal.      Left Ear: External ear normal.      Nose: No congestion.   Eyes:      Conjunctiva/sclera: Conjunctivae normal.   Neck:      Comments: I observed no obvious neck masses  Pulmonary:      Effort: Pulmonary effort is normal.   Musculoskeletal:      Comments: Patient ambulates without difficulty   Skin:     Coloration: Skin is not pale.      Findings: No rash.      Comments: I observed no obvious facial rash   Neurological:      General: No focal deficit present.      Mental Status: He is alert and oriented to person, place, and time.   Psychiatric:         Mood and Affect: Mood normal.         Behavior: Behavior normal.             Results for orders placed or performed in visit on 21   POC Urinalysis Dipstick, Multipro    Specimen: Urine   Result Value Ref Range    Color Yellow Yellow, Straw, Dark Yellow, Geovanna    Clarity, UA Clear Clear    Glucose, UA Negative Negative, 1000 mg/dL (3+) mg/dL    " Bilirubin Negative Negative    Ketones, UA Negative Negative    Specific Gravity  1.015 1.005 - 1.030    Blood, UA Negative Negative    pH, Urine 7.0 5.0 - 8.0    Protein, POC Negative Negative mg/dL    Urobilinogen, UA Normal Normal    Nitrite, UA Negative Negative    Leukocytes Negative Negative     Assessment and Plan    Diagnoses and all orders for this visit:    1. Retention of urine (Primary)  -     POC Urinalysis Dipstick, Multipro    Patient now voiding back to his baseline he did not have to have catheter replaced after voiding trial at his last visit.  Patient states that he is having better bowel movements than previous.  Commend continuing finasteride and tamsulosin.  Urine is clear he will follow-up in 3 months.

## 2021-02-23 ENCOUNTER — OFFICE VISIT (OUTPATIENT)
Dept: UROLOGY | Facility: CLINIC | Age: 86
End: 2021-02-23

## 2021-02-23 VITALS — WEIGHT: 163 LBS | TEMPERATURE: 97.2 F | HEIGHT: 68 IN | BODY MASS INDEX: 24.71 KG/M2

## 2021-02-23 DIAGNOSIS — R33.9 RETENTION OF URINE: Primary | ICD-10-CM

## 2021-02-23 LAB
BILIRUB BLD-MCNC: NEGATIVE MG/DL
CLARITY, POC: CLEAR
COLOR UR: YELLOW
GLUCOSE UR STRIP-MCNC: NEGATIVE MG/DL
KETONES UR QL: NEGATIVE
LEUKOCYTE EST, POC: NEGATIVE
NITRITE UR-MCNC: NEGATIVE MG/ML
PH UR: 7 [PH] (ref 5–8)
PROT UR STRIP-MCNC: NEGATIVE MG/DL
RBC # UR STRIP: NEGATIVE /UL
SP GR UR: 1.01 (ref 1–1.03)
UROBILINOGEN UR QL: NORMAL

## 2021-02-23 PROCEDURE — 99213 OFFICE O/P EST LOW 20 MIN: CPT | Performed by: PHYSICIAN ASSISTANT

## 2021-02-23 PROCEDURE — 81003 URINALYSIS AUTO W/O SCOPE: CPT | Performed by: PHYSICIAN ASSISTANT

## 2021-03-19 ENCOUNTER — TELEPHONE (OUTPATIENT)
Dept: INTERNAL MEDICINE | Facility: CLINIC | Age: 86
End: 2021-03-19

## 2021-03-19 NOTE — TELEPHONE ENCOUNTER
PATIENTS WIFE CALLED AND STATED THAT SHE WOULD LIKE TO SPEAK TO PROVIDER REGARDING INFUSION (COVID19 INFUSION). HE HAS THE OPPORTUNITY TO GET BOOSTER VACCINE THIS Tuesday AND IS WONDERING IF HE SHOULD PROCEED.    CALLBACK: [phone]    PATIENTS WIFE HAS SOME OTHER QUESTIONS REGARDING VACCINE.

## 2021-03-30 ENCOUNTER — OFFICE VISIT (OUTPATIENT)
Dept: INTERNAL MEDICINE | Facility: CLINIC | Age: 86
End: 2021-03-30

## 2021-03-30 VITALS
OXYGEN SATURATION: 99 % | HEART RATE: 65 BPM | BODY MASS INDEX: 25.31 KG/M2 | DIASTOLIC BLOOD PRESSURE: 78 MMHG | SYSTOLIC BLOOD PRESSURE: 160 MMHG | HEIGHT: 68 IN | WEIGHT: 167 LBS | TEMPERATURE: 97.3 F

## 2021-03-30 DIAGNOSIS — G25.0 ESSENTIAL TREMOR: ICD-10-CM

## 2021-03-30 DIAGNOSIS — N13.8 BPH WITH URINARY OBSTRUCTION: ICD-10-CM

## 2021-03-30 DIAGNOSIS — I10 ESSENTIAL HYPERTENSION: Primary | ICD-10-CM

## 2021-03-30 DIAGNOSIS — N40.1 BPH WITH URINARY OBSTRUCTION: ICD-10-CM

## 2021-03-30 PROCEDURE — 99214 OFFICE O/P EST MOD 30 MIN: CPT | Performed by: INTERNAL MEDICINE

## 2021-03-30 NOTE — PROGRESS NOTES
Subjective   Chad Henriquez is a 89 y.o. male.   Chief Complaint   Patient presents with   • Hypertension     yesterday at /89, couldnt get down so sent him to the ER; test run said cpk was abnormal and he was anemic and [facility] is causing the elevated BP.  After patient got home and took his night time medication his BP went down and has been fine today per wife        History of Present Illness patient has recent encounter with the [facility] clinic he had a severely elevated blood pressure.  Apparently he has been on short acting [facility]ol [facility] instead of the long-acting metoprolol succinate.  He has been taking 25 mg of metoprolol in the evenings he previously was taking 1/2 tablet twice a day.    The following portions of the patient's history were reviewed and updated as appropriate: allergies, current medications, past family history, past medical history, past social history, past surgical history and problem list.    Review of Systems   Constitutional: Negative for activity change, appetite change, fatigue, fever, unexpected weight gain and unexpected weight loss.   HENT: Negative for swollen glands, trouble swallowing and voice change.    Eyes: Negative for blurred vision and visual disturbance.   Respiratory: Negative for cough and shortness of breath.    Cardiovascular: Negative for chest pain, palpitations and leg swelling.   Gastrointestinal: Negative for abdominal pain, constipation, diarrhea, nausea, vomiting and indigestion.   Endocrine: Negative for cold intolerance, heat intolerance, polydipsia and polyphagia.   Genitourinary: Negative for dysuria and frequency.   Musculoskeletal: Negative for arthralgias, back pain, joint swelling and neck pain.   Skin: Negative for color change, rash and skin lesions.   Neurological: Negative for dizziness, weakness, headache, memory problem and confusion.   Hematological: Does not bruise/bleed  easily.   Psychiatric/Behavioral: Negative for agitation, hallucinations and suicidal ideas. The patient is not nervous/anxious.        Objective   Past Medical History:   Diagnosis Date   • Allergic rhinitis    • Anemia    • Arthritis    • BPH (benign prostatic hypertrophy) with urinary retention    • Cancer (CMS/HCC)     skin cancer   • Chronic back pain     RUNS DOWN BOTH LEGS   • Constipation    • Coronary artery disease    • Hard of hearing    • Heart attack (CMS/HCC) 1986    NO MUSCLE DAMAGE - JUST OCCLUDED VALVE   • High cholesterol    • History of skin cancer     BILATERAL EARS   • History of transfusion     1986   • Hypertension    • Inguinal hernia    • Leaky heart valve     DR CONCEPCION SAYS NOT ENOUGH TO BE OF CONCERN   • Other bursal cyst, right elbow    • PONV (postoperative nausea and vomiting)     has had scopalamine patch in past    • Sleep apnea     DOES NOT USE CPAP OR BIPAP   • Spinal stenosis of cervical region    • Spinal stenosis of lumbar region    • Tremors of nervous system    • Wears hearing aid     BILATERAL      Past Surgical History:   Procedure Laterality Date   • ANTERIOR LUMBAR EXPOSURE N/A 12/7/2018    Procedure: ANTERIOR LUMBAR EXPOSURE;  Surgeon: Manolo Mcleod DO;  Location:  [facility];  Service: Vascular   • APPENDECTOMY     • BACK SURGERY      X3   • CARDIAC SURGERY  08/08/1986    X1 BYPASS   • CORONARY ANGIOPLASTY WITH STENT PLACEMENT  1997    X3 STENTS   • HERNIA REPAIR Bilateral     x2   • INGUINAL HERNIA REPAIR Right 6/22/2017    Procedure: RIGHT INGUINAL HERNIA REPAIR AND EXCISION OF CYST RIGHT ELBOW ;  Surgeon: Jackelyn Arriola MD;  Location:  [facility];  Service:    • LUMBAR LAMINECTOMY N/A 3/12/2018    Procedure: LUMBAR LAMINECTOMY WITHOUT FUSION L3-4,4-5;  Surgeon: Kj Falcon MD;  Location:  [facility];  Service: Neurosurgery   • LUMBAR LAMINECTOMY ANTERIOR LUMBAR INTERBODY FUSION N/A 12/7/2018    Procedure: ANTERIOR LUMBAR INTERBODY FUSION L5-S1;  Surgeon:  Kj Falcon MD;  Location:  [facility];  Service: Neurosurgery   • LUMBAR LAMINECTOMY WITH FUSION Left 12/10/2018    Procedure: LUMBAR LAMINECTOMY TRANSFORAMINAL LUMBAR INTERBODY FUSION L34,45;  Surgeon: Kj Falcon MD;  Location:  [facility];  Service: Neurosurgery   • LUMBAR LAMINECTOMY WITH FUSION Left 12/7/2018    Procedure: LUMBAR LAMINECTOMY TRANSFORAMINAL LUMBAR INTERBODY FUSION LEFT L3-4, L4-5 QUADRANT RETRACTOR;  Surgeon: Kj Falcon MD;  Location:  [facility];  Service: Neurosurgery        Current Outpatient Medications:   •  alfuzosin (UROXATRAL) 10 MG 24 hr tablet, Take 1 tablet by mouth Daily., Disp: 30 tablet, Rfl: 5  •  Ascorbic Acid (Vitamin C) 500 MG capsule, Take 1 capsule by mouth 2 (two) times a day., Disp: , Rfl:   •  aspirin 81 MG tablet, Take 81 mg by mouth., Disp: , Rfl:   •  chlorpheniramine (CHLOR-TRIMETON) 4 MG tablet, Take 4 mg by mouth Daily., Disp: , Rfl:   •  Cholecalciferol (Vitamin D3) 50 MCG (2000 UT) tablet, Take 1 tablet by mouth Daily., Disp: , Rfl:   •  finasteride (PROSCAR) 5 MG tablet, Take 1 tablet by mouth Daily., Disp: 30 tablet, Rfl: 5  •  Flaxseed, Linseed, (FLAXSEED OIL) 1000 MG capsule, 540mg takes 4 tablets by mouth daily, Disp: , Rfl:   •  folic acid ([employer]S FOLIC ACID) 800 MCG tablet, Take 800 mcg by mouth daily.  , Disp: , Rfl:   •  gabapentin (NEURONTIN) 600 MG tablet, Take 1 tablet by mouth 3 (Three) Times a Day., Disp: 90 tablet, Rfl: 3  •  L-Lysine 600 MG tablet, Take 1 tablet by mouth Daily., Disp: , Rfl:   •  pravastatin (PRAVACHOL) 40 MG tablet, Take 40 mg by mouth daily.  , Disp: , Rfl:   •  primidone (MYSOLINE) 50 MG tablet, Take 1/2 tablet at bedtime, Disp: 45 tablet, Rfl: 3  •  psyllium (METAMUCIL) 58.6 % packet, Take 1 packet by mouth Daily., Disp: , Rfl:   •  tamsulosin (FLOMAX) 0.4 MG capsule 24 hr capsule, Take 1 capsule by mouth Daily., Disp: 30 capsule, Rfl: 11  •  Zinc 50 MG tablet, Take 1 tablet by mouth Daily., Disp: , Rfl:   •   metoprolol tartrate (LOPRESSOR) 25 MG tablet, 1/2 in morning and 1 at night, Disp: 135 tablet, Rfl: 3     Vitals:    03/30/21 1531   BP: 160/78   Pulse:    Temp:    SpO2:          03/30/21  1521   Weight: 75.8 kg (167 lb)     Patient's Body mass index is 25.39 kg/m². BMI is .      Physical Exam  Vitals and nursing note reviewed.   Constitutional:       General: He is not in acute distress.     Appearance: Normal appearance. He is well-developed.   HENT:      Head: Normocephalic and atraumatic.      Right Ear: External ear normal.      Left Ear: External ear normal.      Nose: Nose normal.   Eyes:      Extraocular Movements: Extraocular movements intact.      Conjunctiva/sclera: Conjunctivae normal.      Pupils: Pupils are equal, round, and reactive to light.   Cardiovascular:      Rate and Rhythm: Normal rate and regular rhythm.      Pulses: Normal pulses.      Heart sounds: Normal heart sounds.   Pulmonary:      Effort: Pulmonary effort is normal.      Breath sounds: Normal breath sounds.   Abdominal:      General: Bowel sounds are normal.      Palpations: Abdomen is soft.   Musculoskeletal:         General: Normal range of motion.      Cervical back: Normal range of motion and neck supple. No rigidity. No muscular tenderness.   Skin:     General: Skin is warm and dry.   Neurological:      General: No focal deficit present.      Mental Status: He is alert and oriented to person, place, and time.   Psychiatric:         Mood and Affect: Mood normal.         Behavior: Behavior normal.               Assessment/Plan   Diagnoses and all orders for this visit:    1. Essential hypertension (Primary)    2. BPH with urinary obstruction    3. Essential tremor    Other orders  -     Discontinue: metoprolol tartrate (LOPRESSOR) 25 MG tablet; 1/2 in morning and 1 at night  Dispense: 135 tablet; Refill: 3  -     metoprolol tartrate (LOPRESSOR) 25 MG tablet; 1/2 in morning and 1 at night  Dispense: 135 tablet; Refill: 3    At  today's visit I had discontinued a inaccurate recording of metoprolol succinate which is what we thought he was on he apparently [facility] and taking it 25 mg in the evening.  I have told him that likely to get his blood pressure under better control he should take 25mg in the evening and 1/2 tablet in the morning of the tartrate.  Of asked him to have his blood pressure rechecked again in the next couple of weeks.

## 2021-05-05 ENCOUNTER — HOSPITAL ENCOUNTER (OUTPATIENT)
Dept: GENERAL RADIOLOGY | Facility: HOSPITAL | Age: 86
Discharge: HOME | End: 2021-05-05
Admitting: NURSE PRACTITIONER

## 2021-05-05 ENCOUNTER — OFFICE VISIT (OUTPATIENT)
Dept: NEUROSURGERY | Facility: CLINIC | Age: 86
End: 2021-05-05

## 2021-05-05 VITALS
WEIGHT: 169 LBS | BODY MASS INDEX: 25.61 KG/M2 | SYSTOLIC BLOOD PRESSURE: 140 MMHG | DIASTOLIC BLOOD PRESSURE: 62 MMHG | HEIGHT: 68 IN

## 2021-05-05 DIAGNOSIS — E66.3 OVERWEIGHT WITH BODY MASS INDEX (BMI) OF 25 TO 25.9 IN ADULT: ICD-10-CM

## 2021-05-05 DIAGNOSIS — M51.16 LUMBAR DISC DISEASE WITH RADICULOPATHY: Primary | ICD-10-CM

## 2021-05-05 DIAGNOSIS — Z78.9 NON-SMOKER: ICD-10-CM

## 2021-05-05 PROCEDURE — 72114 X-RAY EXAM L-S SPINE BENDING: CPT

## 2021-05-05 PROCEDURE — 99214 OFFICE O/P EST MOD 30 MIN: CPT | Performed by: NURSE PRACTITIONER

## 2021-05-05 RX ORDER — TRAMADOL HYDROCHLORIDE 50 MG/1
50 TABLET ORAL EVERY 8 HOURS PRN
Qty: 90 TABLET | Refills: 0 | Status: SHIPPED | OUTPATIENT
Start: 2021-05-05 | End: 2021-07-02 | Stop reason: HOSPADM

## 2021-05-05 NOTE — PATIENT INSTRUCTIONS
"BMI for Adults  What is BMI?  Body mass index (BMI) is a number that is calculated from a person's weight and height. BMI can help estimate how much of a person's weight is composed of fat. BMI does not measure body fat directly. Rather, it is an alternative to procedures that directly measure body fat, which can be difficult and expensive.  BMI can help identify people who may be at higher risk for certain medical problems.  What are BMI measurements used for?  BMI is used as a screening tool to identify possible weight problems. It helps determine whether a person is obese, overweight, a healthy weight, or underweight.  BMI is useful for:  · Identifying a weight problem that may be related to a medical condition or may increase the risk for medical problems.  · Promoting changes, such as changes in diet and exercise, to help reach a healthy weight. BMI screening can be repeated to see if these changes are working.  How is BMI calculated?  BMI involves measuring your weight in relation to your height. Both height and weight are measured, and the BMI is calculated from those numbers. This can be done either in English ([place] or metric measurements. Note that charts and online BMI calculators are available to help you find your BMI quickly and easily without having to do these calculations yourself.  To calculate your BMI in English ([place] measurements:    1. Measure your weight in pounds (lb).  2. Multiply the number of pounds by 703.  ? For example, for a person who weighs 180 lb, multiply that number by 703, which equals 126,540.  3. Measure your height in inches. Then multiply that number by itself to get a measurement called \"inches squared.\"  ? For example, for a person who is 70 inches tall, the \"inches squared\" measurement is 70 inches x 70 inches, which equals 4,900 inches squared.  4. Divide the total from step 2 (number of lb x 703) by the total from step 3 (inches squared): 126,540 ÷ 4,900 = 25.8. This is " "your BMI.  To calculate your BMI in metric measurements:  1. Measure your weight in kilograms (kg).  2. Measure your height in meters (m). Then multiply that number by itself to get a measurement called \"meters squared.\"  ? For example, for a person who is 1.75 m tall, the \"meters squared\" measurement is 1.75 m x 1.75 m, which is equal to 3.1 meters squared.  3. Divide the number of kilograms (your weight) by the meters squared number. In this example: 70 ÷ 3.1 = 22.6. This is your BMI.  What do the results mean?  BMI charts are used to identify whether you are underweight, normal weight, overweight, or obese. The following guidelines will be used:  · Underweight: BMI less than 18.5.  · Normal weight: BMI between 18.5 and 24.9.  · Overweight: BMI between 25 and 29.9.  · Obese: BMI of 30 or above.  Keep these notes in mind:  · Weight includes both fat and muscle, so someone with a muscular build, such as an athlete, may have a BMI that is higher than 24.9. In cases like these, BMI is not an accurate measure of body fat.  · To determine if excess body fat is the cause of a BMI of 25 or higher, further assessments may need to be done by a health care provider.  · BMI is usually interpreted in the same way for men and women.  Where to find more information  For more information about BMI, including tools to quickly calculate your BMI, go to these websites:  · Centers for Disease Control and Prevention: www.cdc.gov  · American Heart Association: www.heart.org  · National Heart, Lung, and Blood [place]: www.nhlbi.nih.gov  Summary  · Body mass index (BMI) is a number that is calculated from a person's weight and height.  · BMI may help estimate how much of a person's weight is composed of fat. BMI can help identify those who may be at higher risk for certain medical problems.  · BMI can be measured using English measurements or metric measurements.  · BMI charts are used to identify whether you are underweight, normal " "weight, overweight, or obese.  This information is not intended to replace advice given to you by your health care provider. Make sure you discuss any questions you have with your health care provider.  Document Revised: 09/09/2020 Document Reviewed: 07/17/2020  Go Patient Education © 2021 [employer]      https://www.nhlbi.nih.gov/files/docs/public/heart/dash_brief.pdf\">   DASH Eating Plan  DASH stands for Dietary Approaches to Stop Hypertension. The DASH eating plan is a healthy eating plan that has been shown to:  · Reduce high blood pressure (hypertension).  · Reduce your risk for type 2 diabetes, heart disease, and stroke.  · Help with weight loss.  What are tips for following this plan?  Reading food labels  · Check food labels for the amount of salt (sodium) per serving. Choose foods with less than 5 percent of the Daily Value of sodium. Generally, foods with less than 300 milligrams (mg) of sodium per serving fit into this eating plan.  · To find whole grains, look for the word \"whole\" as the first word in the ingredient list.  Shopping  · Buy products labeled as \"low-sodium\" or \"no salt added.\"  · Buy fresh foods. Avoid canned foods and pre-made or frozen meals.  Cooking  · Avoid adding salt when cooking. Use salt-free seasonings or herbs instead of table salt or sea salt. Check with your health care provider or pharmacist before using salt substitutes.  · Do not walker foods. Cook foods using healthy methods such as baking, boiling, grilling, roasting, and broiling instead.  · Cook with heart-healthy oils, such as olive, canola, avocado, soybean, or sunflower oil.  Meal planning    · Eat a balanced diet that includes:  ? 4 or more servings of fruits and 4 or more servings of vegetables each day. Try to fill one-half of your plate with fruits and vegetables.  ? 6-8 servings of whole grains each day.  ? Less than 6 oz (170 g) of lean meat, poultry, or fish each day. A 3-oz (85-g) serving of meat is " about the same size as a deck of cards. One egg equals 1 oz (28 g).  ? 2-3 servings of low-fat dairy each day. One serving is 1 cup (237 mL).  ? 1 serving of nuts, seeds, or beans 5 times each week.  ? 2-3 servings of heart-healthy fats. Healthy fats called omega-3 fatty acids are found in foods such as walnuts, flaxseeds, fortified milks, and eggs. These fats are also found in cold-water fish, such as sardines, salmon, and mackerel.  · Limit how much you eat of:  ? Canned or prepackaged foods.  ? Food that is high in trans fat, such as some fried foods.  ? Food that is high in saturated fat, such as fatty meat.  ? Desserts and other sweets, sugary drinks, and other foods with added sugar.  ? Full-fat dairy products.  · Do not salt foods before eating.  · Do not eat more than 4 egg yolks a week.  · Try to eat at least 2 vegetarian meals a week.  · Eat more home-cooked food and less restaurant, buffet, and fast food.  Lifestyle  · When eating at a restaurant, ask that your food be prepared with less salt or no salt, if possible.  · If you drink alcohol:  ? Limit how much you use to:  § 0-1 drink a day for women who are not pregnant.  § 0-2 drinks a day for men.  ? Be aware of how much alcohol is in your drink. In the [place] one drink equals one 12 oz bottle of beer (355 mL), one 5 oz glass of wine (148 mL), or one 1½ oz glass of hard liquor (44 mL).  General information  · Avoid eating more than 2,300 mg of salt a day. If you have hypertension, you may need to reduce your sodium intake to 1,500 mg a day.  · Work with your health care provider to maintain a healthy body weight or to lose weight. Ask what an ideal weight is for you.  · Get at least 30 minutes of exercise that causes your heart to beat faster (aerobic exercise) most days of the week. Activities may include walking, swimming, or biking.  · Work with your health care provider or dietitian to adjust your eating plan to your individual calorie needs.  What  foods should I eat?  Fruits  All fresh, dried, or frozen fruit. Canned fruit in natural juice (without added sugar).  Vegetables  Fresh or frozen vegetables (raw, steamed, roasted, or grilled). Low-sodium or reduced-sodium tomato and vegetable juice. Low-sodium or reduced-sodium tomato sauce and tomato paste. Low-sodium or reduced-sodium canned vegetables.  Grains  Whole-grain or whole-wheat bread. Whole-grain or whole-wheat pasta. Brown rice. Oatmeal. Quinoa. Bulgur. Whole-grain and low-sodium cereals. Yuli bread. Low-fat, low-sodium crackers. Whole-wheat flour tortillas.  Meats and other proteins  Skinless chicken or turkey. Ground chicken or turkey. Pork with fat trimmed off. Fish and seafood. Egg whites. Dried beans, peas, or lentils. Unsalted nuts, nut butters, and seeds. Unsalted canned beans. Lean cuts of beef with fat trimmed off. Low-sodium, lean precooked or cured meat, such as sausages or meat loaves.  Dairy  Low-fat (1%) or fat-free (skim) milk. Reduced-fat, low-fat, or fat-free cheeses. Nonfat, low-sodium ricotta or cottage cheese. Low-fat or nonfat yogurt. Low-fat, low-sodium cheese.  Fats and oils  Soft margarine without trans fats. Vegetable oil. Reduced-fat, low-fat, or light mayonnaise and salad dressings (reduced-sodium). Canola, safflower, olive, avocado, soybean, and sunflower oils. Avocado.  Seasonings and condiments  Herbs. Spices. Seasoning mixes without salt.  Other foods  Unsalted popcorn and pretzels. Fat-free sweets.  The items listed above may not be a complete list of foods and beverages you can eat. Contact a dietitian for more information.  What foods should I avoid?  Fruits  Canned fruit in a light or heavy syrup. Fried fruit. Fruit in cream or butter sauce.  Vegetables  Creamed or fried vegetables. Vegetables in a cheese sauce. Regular canned vegetables (not low-sodium or reduced-sodium). Regular canned tomato sauce and paste (not low-sodium or reduced-sodium). Regular tomato and  vegetable juice (not low-sodium or reduced-sodium). Pickles. Olives.  Grains  Baked goods made with fat, such as croissants, muffins, or some breads. Dry pasta or rice meal packs.  Meats and other proteins  Fatty cuts of meat. Ribs. Fried meat. Chu. Bologna, salami, and other precooked or cured meats, such as sausages or meat loaves. Fat from the back of a pig (fatback). Bratwurst. Salted nuts and seeds. Canned beans with added salt. Canned or smoked fish. Whole eggs or egg yolks. Chicken or turkey with skin.  Dairy  Whole or 2% milk, cream, and half-and-half. Whole or full-fat cream cheese. Whole-fat or sweetened yogurt. Full-fat cheese. Nondairy creamers. Whipped toppings. Processed cheese and cheese spreads.  Fats and oils  Butter. Stick margarine. Lard. Shortening. Ghee. Chu fat. Tropical oils, such as coconut, palm kernel, or palm oil.  Seasonings and condiments  Onion salt, garlic salt, seasoned salt, table salt, and sea salt. Worcestershire sauce. Tartar sauce. Barbecue sauce. Teriyaki sauce. Soy sauce, including reduced-sodium. Steak sauce. Canned and packaged gravies. Fish sauce. Oyster sauce. Cocktail sauce. Store-bought horseradish. Ketchup. Mustard. Meat flavorings and tenderizers. Bouillon cubes. Hot sauces. Pre-made or packaged marinades. Pre-made or packaged taco seasonings. Relishes. Regular salad dressings.  Other foods  Salted popcorn and pretzels.  The items listed above may not be a complete list of foods and beverages you should avoid. Contact a dietitian for more information.  Where to find more information  · National Heart, Lung, and Blood [place]: www.nhlbi.nih.gov  · American Heart Association: www.heart.org  · Academy of Nutrition and Dietetics: www.eatright.org  · National Kidney Foundation: www.kidney.org  Summary  · The DASH eating plan is a healthy eating plan that has been shown to reduce high blood pressure (hypertension). It may also reduce your risk for type 2 diabetes, heart  disease, and stroke.  · When on the DASH eating plan, aim to eat more fresh fruits and vegetables, whole grains, lean proteins, low-fat dairy, and heart-healthy fats.  · With the DASH eating plan, you should limit salt (sodium) intake to 2,300 mg a day. If you have hypertension, you may need to reduce your sodium intake to 1,500 mg a day.  · Work with your health care provider or dietitian to adjust your eating plan to your individual calorie needs.  This information is not intended to replace advice given to you by your health care provider. Make sure you discuss any questions you have with your health care provider.  Document Revised: 11/20/2020 Document Reviewed: 11/20/2020  Elsevier Patient Education © 2021 [employer]

## 2021-05-05 NOTE — PROGRESS NOTES
Chief complaint:   Chief Complaint   Patient presents with   • Back Pain     Chad is returning today with a return in severe low back pain with left hip and left thigh pain.  No recent x-rays, no physical therapy.         Subjective     HPI: This is a 89-year-old male gentleman who went to the operating room in December 2018 for a L5-S1 ALIF and L3-4, 4-5 TLIF with laminectomy.  At his last office appointment in July 2019 he was doing well and was having very little back pain or lower extremity pain.  He did have a right foot drop.  He was wearing an AFO.  He comes in today for reevaluation.  The patient states that he has been doing good up until about 6 months ago.  There is no accident or injury associated with his worsening pain.  The pain in his back has been more prominent on the right side but he states now it is on the left side.  It is also going into his left lower extremity in a radicular fashion to his knee.  This pain is constant.  It is worse with standing and better with sitting.  Denies any bowel or bladder incontinence.  He has not had any imaging done recently.  He still continues to wear the AFO and does have a weakness in the right foot.  He says that the pain is limiting his ability to walk at this time.  Rates his pain on a scale 0-10 at a 7.  He says it does interfere with his actives daily living    Review of Systems   Constitutional: Positive for activity change.   Musculoskeletal: Positive for back pain.   Neurological: Positive for weakness.   Psychiatric/Behavioral: Negative.    All other systems reviewed and are negative.       Past Medical History:   Diagnosis Date   • Allergic rhinitis    • Anemia    • Arthritis    • BPH (benign prostatic hypertrophy) with urinary retention    • Cancer (CMS/HCC)     skin cancer   • Chronic back pain     RUNS DOWN BOTH LEGS   • Constipation    • Coronary artery disease    • Hard of hearing    • Heart attack (CMS/HCC) 1986    NO MUSCLE DAMAGE - JUST  OCCLUDED VALVE   • High cholesterol    • History of skin cancer     BILATERAL EARS   • History of transfusion     1986   • Hypertension    • Inguinal hernia    • Leaky heart valve     DR CONCEPCION SAYS NOT ENOUGH TO BE OF CONCERN   • Other bursal cyst, right elbow    • PONV (postoperative nausea and vomiting)     has had scopalamine patch in past    • Sleep apnea     DOES NOT USE CPAP OR BIPAP   • Spinal stenosis of cervical region    • Spinal stenosis of lumbar region    • Tremors of nervous system    • Wears hearing aid     BILATERAL     Past Surgical History:   Procedure Laterality Date   • ANTERIOR LUMBAR EXPOSURE N/A 12/7/2018    Procedure: ANTERIOR LUMBAR EXPOSURE;  Surgeon: Manolo Mcleod DO;  Location:  [facility] OR;  Service: Vascular   • APPENDECTOMY     • BACK SURGERY      X3   • CARDIAC SURGERY  08/08/1986    X1 BYPASS   • CORONARY ANGIOPLASTY WITH STENT PLACEMENT  1997    X3 STENTS   • HERNIA REPAIR Bilateral     x2   • INGUINAL HERNIA REPAIR Right 6/22/2017    Procedure: RIGHT INGUINAL HERNIA REPAIR AND EXCISION OF CYST RIGHT ELBOW ;  Surgeon: Jackelyn Arriola MD;  Location:  [facility] OR;  Service:    • LUMBAR LAMINECTOMY N/A 3/12/2018    Procedure: LUMBAR LAMINECTOMY WITHOUT FUSION L3-4,4-5;  Surgeon: Kj Falcon MD;  Location:  [facility] OR;  Service: Neurosurgery   • LUMBAR LAMINECTOMY ANTERIOR LUMBAR INTERBODY FUSION N/A 12/7/2018    Procedure: ANTERIOR LUMBAR INTERBODY FUSION L5-S1;  Surgeon: Kj Falcon MD;  Location:  [facility];  Service: Neurosurgery   • LUMBAR LAMINECTOMY WITH FUSION Left 12/10/2018    Procedure: LUMBAR LAMINECTOMY TRANSFORAMINAL LUMBAR INTERBODY FUSION L34,45;  Surgeon: Kj Falcon MD;  Location:  [facility];  Service: Neurosurgery   • LUMBAR LAMINECTOMY WITH FUSION Left 12/7/2018    Procedure: LUMBAR LAMINECTOMY TRANSFORAMINAL LUMBAR INTERBODY FUSION LEFT L3-4, L4-5 QUADRANT RETRACTOR;  Surgeon: Kj Falcon MD;  Location:  [facility] OR;  Service: Neurosurgery  "    Family History   Problem Relation Age of Onset   • No Known Problems Father    • No Known Problems Mother      Social History     Tobacco Use   • Smoking status: Former Smoker     Years: 6.00     Types: Cigarettes     Quit date:      Years since quittin.3   • Smokeless tobacco: Never Used   • Tobacco comment: age 14-20 years of age   Vaping Use   • Vaping Use: Never used   Substance Use Topics   • Alcohol use: No   • Drug use: Never     (Not in a hospital admission)    Allergies:  Fentanyl and Codeine    Objective      Vital Signs  /62   Ht 172.7 cm (68\")   Wt 76.7 kg (169 lb)   BMI 25.70 kg/m²     Physical Exam  Constitutional:       Appearance: Normal appearance. He is well-developed.   HENT:      Head: Normocephalic.   Eyes:      General: Lids are normal.      Conjunctiva/sclera: Conjunctivae normal.      Pupils: Pupils are equal, round, and reactive to light.   Cardiovascular:      Rate and Rhythm: Normal rate and regular rhythm.   Pulmonary:      Effort: Pulmonary effort is normal.      Breath sounds: Normal breath sounds.   Musculoskeletal:         General: Normal range of motion.      Cervical back: Normal range of motion.   Skin:     General: Skin is warm.   Neurological:      Mental Status: He is alert and oriented to person, place, and time.      GCS: GCS eye subscore is 4. GCS verbal subscore is 5. GCS motor subscore is 6.      Cranial Nerves: No cranial nerve deficit.      Sensory: No sensory deficit.      Motor: Weakness present.      Gait: Gait abnormal.      Deep Tendon Reflexes: Reflexes are normal and symmetric. Reflexes normal.   Psychiatric:         Speech: Speech normal.         Behavior: Behavior normal.         Thought Content: Thought content normal.         Neurologic Exam     Mental Status   Oriented to person, place, and time.   Attention: normal.   Speech: speech is normal   Level of consciousness: alert  Knowledge: good.     Cranial Nerves   Cranial nerves II through " XII intact.     CN III, IV, VI   Pupils are equal, round, and reactive to light.    Motor Exam     Strength   Strength 5/5 except as noted.   Right anterior tibial: 3/5    Sensory Exam   Right leg light touch: decreased from knee  Left leg light touch: decreased from knee  Right leg pinprick: decreased from knee  Left leg pinprick: decreased from knee    Gait, Coordination, and Reflexes Patient has 2 step a little higher with the right lower extremity due to right foot drop       Results Review: No new imaging        Assessment/Plan: I am going to send the patient for stat x-rays of the lumbar spine to include standing flexion and extension.  I will also send him for an MRI of the lumbar spine.  I will also give him tramadol to see if this will help with the pain that he is experiencing.  I will have him follow-up with Dr. Falcon after imaging is completed to see if there is anything from a surgical standpoint that needs to be addressed versus consideration for pain management injections.  He was told to call us if he had any further problems or concerns  Patient is a nonsmoker  The patient's Body mass index is 25.7 kg/m².. BMI is above normal parameters. Recommendations include: educational material and nutrition counseling  Advance Care Planning   ACP discussion was held with the patient during this visit. Patient does not have an advance directive, information provided.      Diagnoses and all orders for this visit:    1. Lumbar disc disease with radiculopathy (Primary)  -     XR Spine Lumbar Complete With Flex & Ext  -     MRI Lumbar Spine Without Contrast; Future  -     traMADol (ULTRAM) 50 MG tablet; Take 1 tablet by mouth Every 8 (Eight) Hours As Needed for Moderate Pain .  Dispense: 90 tablet; Refill: 0    2. Non-smoker    3. Overweight with body mass index (BMI) of 25 to 25.9 in adult          I discussed the patients findings and my recommendations with patient    Josiah Robles, APRN  05/05/21  13:13  CDT

## 2021-05-09 NOTE — PROGRESS NOTES
Discharge Summary:      Nelida Morrell      Admit Date:   2021  Discharge Date:  2021     Admitting diagnosis:  Labor and delivery, indication for care [O75.9]  Discharge Diagnosis: Status post vaginal, spontaneous.  Pregnancy Complications: none  Tubal Ligation:  no        History:  History reviewed. No pertinent past medical history.  OB History    Para Term  AB Living   1 1 1     1   SAB TAB Ectopic Molar Multiple Live Births           0 1      # Outcome Date GA Lbr Abdon/2nd Weight Sex Delivery Anes PTL Lv   1 Term 21 40w5d / 03:15 3.375 kg (7 lb 7.1 oz) F Vag-Spont EPI, Local  CJ        Patient has no known allergies.  Patient Active Problem List    Diagnosis Date Noted   • Obesity affecting pregnancy in third trimester 2021   • Supervision of normal first pregnancy in third trimester 01/15/2021   • Positive urine drug screen-beto 2020        Hospital Course:   25 y.o. , now para 2, was admitted with the above mentioned diagnosis, underwent Induction of Labor, vaginal, spontaneous.  Repeat CBC showed a white cell count of 31, but the patient was nontoxic-appearing.  She denied any fevers/chills, headache, shortness of breath, cough, worsening abdominal pain, worsening vaginal bleeding, vaginal discharge, urinary symptoms, or any other concerning symptoms.  A repeat CBC was ordered for 4 PM which showed a decreasing white cell count, so the decision was made to discharge the patient.  Patient had a progressive advancement in diet , ambulation and toleration of oral analgesia.  Patient was discharged in a stable condition.     Vitals:    21 2145 21 0200 21 0600   BP: 127/83 112/72 111/68 108/64   Pulse: (!) 103 79 80 79   Resp: 18 17 17 16   Temp: 36.8 °C (98.3 °F) 36.4 °C (97.6 °F) 36.3 °C (97.4 °F) 36.4 °C (97.6 °F)   TempSrc: Temporal Temporal Temporal Temporal   SpO2:  97% 96% 96%   Weight:       Height:           Current  SUBJECTIVE:  Patient ID: Chad Henriquez is a 88 y.o. male is here today for follow-up.    Chief Complaint: Back pain  Chief Complaint   Patient presents with   • back & leg pain     patient is following up with Dr Callahan for injections (didn't give significant relief) and he is going through DCS trial.       HPI  88year-old gentleman status post laminectomy for lumbar stenosis.  He did get some relief initially after the surgery but he had a recurrence of his lower extremity pain and back pain.  He is been working with Dr. callahan.  He is had one injection without any improvement.  He is setting him up for a spinal cord stimulator trial as a next step.  He is taking Percocet a few times a day.  He also is complaining of a left upper extremity essential tremor.  His having trouble writing but no trouble eating.  He has seen a neurology nurse practitioner but they did not prescribe any medication and is wondering if anything can be done.    The following portions of the patient's history were reviewed and updated as appropriate: allergies, current medications, past family history, past medical history, past social history, past surgical history and problem list.    OBJECTIVE:    Review of Systems   Musculoskeletal: Positive for back pain.   Neurological: Positive for tremors and numbness.   All other systems reviewed and are negative.         Physical Exam   Constitutional: He is oriented to person, place, and time.   Neurological: He is oriented to person, place, and time. He has normal strength.   Psychiatric: His speech is normal.       Neurologic Exam     Mental Status   Oriented to person, place, and time.   Attention: normal.   Speech: speech is normal   Level of consciousness: alert  Knowledge: good.     Cranial Nerves   Cranial nerves II through XII intact.     Motor Exam   Muscle bulk: normal  Overall muscle tone: normal  Right arm pronator drift: absent  Left arm pronator drift: absent    Strength   Strength 5/5  Facility-Administered Medications   Medication Dose   • ondansetron (ZOFRAN ODT) dispertab 4 mg  4 mg    Or   • ondansetron (ZOFRAN) syringe/vial injection 4 mg  4 mg   • ibuprofen (MOTRIN) tablet 600 mg  600 mg   • acetaminophen (Tylenol) tablet 325 mg  325 mg   • oxytocin (PITOCIN) infusion (for postpartum)   mL/hr   • calcium carbonate (TUMS) chewable tab 1,000 mg  1,000 mg   • LR infusion     • tetanus-dipth-acell pertussis (Tdap) inj 0.5 mL  0.5 mL   • measles, mumps and rubella vaccine (MMR) injection 0.5 mL  0.5 mL   • docusate sodium (COLACE) capsule 100 mg  100 mg   • prenatal plus vitamin (STUARTNATAL 1+1) 27-1 MG tablet 1 tablet  1 tablet   • HYDROcodone/acetaminophen (NORCO)  MG per tablet 1 tablet  1 tablet   • miSOPROStol (CYTOTEC) tablet 25 mcg  25 mcg       Exam:  Abdomen: Abdomen soft, non-tender. BS normal. No masses,  No organomegaly  Fundus Non Tender: no  Extremity: normal extremities, peripheral pulses and reflexes normal, no calf tenderness   CV: Normal S1/S2, no murmurs, rubs, heaves   Resp: Chest clear      Labs:  Recent Labs     21  1025 21  0219   WBC 13.0* 31.4*   RBC 4.63 4.16*   HEMOGLOBIN 13.7 12.6   HEMATOCRIT 40.6 36.0*   MCV 87.7 86.5   MCH 29.6 30.3   MCHC 33.7 35.0   RDW 41.9 41.2   PLATELETCT 185 169   MPV 11.1 10.4        Activity:   Discharge to home  Pelvic Rest x 6 weeks    Assessment:  25-year-old  postpartum day 1 status post vaginal delivery at 40 weeks 5 days.  Repeat CBC on postpartum day 1 showed a white cell count of 31 but the patient was nontoxic-appearing.  A repeat CBC was ordered for 4 PM which showed a decreasing white cell count.  The patient was reassessed and still appeared nontoxic and had no concerning symptoms.  The patient was discharged in a stable condition.    Follow up: [facility] or [facility] in 5 weeks for vaginal    To resume daily PNV and iron supplement if needed with hydration.   Patient to RT TPC or ER if any of  throughout.     Sensory Exam   Light touch normal.   Pinprick normal.     Gait, Coordination, and Reflexes     Gait  Gait: (Slow shuffling gait but stable)    Tremor   Resting tremor: absent  Intention tremor: present  Action tremor: left arm    Reflexes   Reflexes 2+ except as noted.       Independent Review of Radiographic Studies:       ASSESSMENT/PLAN:  1.  Lumbar stenosis.  Scottie does not require further surgical intervention.  I think an implantable pain control device trials  Next step.  We will see him in follow-up for his low back should assistance be required implanting a device.  2.  Essential tremor.  Scottie has a left upper extremity essential tremor.  It is not parkinsonian.  We are and start him on a low-dose of primidone since he is already on metoprolol for blood pressure.      1. Spinal stenosis, lumbar region, with neurogenic claudication    2. Bilateral hip pain    3. Essential tremor    4. Non-smoker    5. Normal body mass index (BMI)            Return if symptoms worsen or fail to improve.      Kj Falcon MD           the following occur:  Fever over 100.5  Severe abdominal pain  Red streaks or painful masses in the breasts  Foul smelling discharge or lochia  Heavy vaginal bleeding saturating a pad per hour  S/s of PP depression    The patient is undecided on contraception and will discuss at follow up appointment.      Discharge Meds:   Current Outpatient Medications   Medication Sig Dispense Refill   • ibuprofen (MOTRIN) 800 MG Tab Take 1 tablet by mouth every 8 hours as needed. 30 tablet 0       Ady Lay M.D.

## 2021-05-17 ENCOUNTER — OFFICE VISIT (OUTPATIENT)
Dept: CARDIOLOGY CLINIC | Age: 86
End: 2021-05-17
Payer: MEDICARE

## 2021-05-17 VITALS
WEIGHT: 169 LBS | BODY MASS INDEX: 25.61 KG/M2 | DIASTOLIC BLOOD PRESSURE: 58 MMHG | SYSTOLIC BLOOD PRESSURE: 122 MMHG | HEIGHT: 68 IN | HEART RATE: 62 BPM

## 2021-05-17 DIAGNOSIS — Z95.1 HX OF CABG: ICD-10-CM

## 2021-05-17 DIAGNOSIS — I25.10 CORONARY ARTERY DISEASE INVOLVING NATIVE CORONARY ARTERY OF NATIVE HEART WITHOUT ANGINA PECTORIS: ICD-10-CM

## 2021-05-17 DIAGNOSIS — M51.16 LUMBAR DISC DISEASE WITH RADICULOPATHY: Primary | ICD-10-CM

## 2021-05-17 DIAGNOSIS — I10 ESSENTIAL HYPERTENSION: ICD-10-CM

## 2021-05-17 DIAGNOSIS — E78.2 MIXED HYPERLIPIDEMIA: Primary | ICD-10-CM

## 2021-05-17 PROCEDURE — G8417 CALC BMI ABV UP PARAM F/U: HCPCS | Performed by: INTERNAL MEDICINE

## 2021-05-17 PROCEDURE — 99213 OFFICE O/P EST LOW 20 MIN: CPT | Performed by: INTERNAL MEDICINE

## 2021-05-17 PROCEDURE — 1036F TOBACCO NON-USER: CPT | Performed by: INTERNAL MEDICINE

## 2021-05-17 PROCEDURE — 1123F ACP DISCUSS/DSCN MKR DOCD: CPT | Performed by: INTERNAL MEDICINE

## 2021-05-17 PROCEDURE — G8427 DOCREV CUR MEDS BY ELIG CLIN: HCPCS | Performed by: INTERNAL MEDICINE

## 2021-05-17 PROCEDURE — 4040F PNEUMOC VAC/ADMIN/RCVD: CPT | Performed by: INTERNAL MEDICINE

## 2021-05-17 RX ORDER — LORAZEPAM 1 MG/1
TABLET ORAL
Qty: 2 TABLET | Refills: 0 | Status: SHIPPED | OUTPATIENT
Start: 2021-05-17 | End: 2021-05-26

## 2021-05-17 ASSESSMENT — ENCOUNTER SYMPTOMS
EYES NEGATIVE: 1
RESPIRATORY NEGATIVE: 1
DIARRHEA: 0
SHORTNESS OF BREATH: 0
GASTROINTESTINAL NEGATIVE: 1
VOMITING: 0
NAUSEA: 0

## 2021-05-17 NOTE — PROGRESS NOTES
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 Chata Chaudhry, 3/2018   [facility] 43      CABG    DIAGNOSTIC CARDIAC CATH LAB PROCEDURE      Stent to mid and distal RCA 1997    HERNIA REPAIR      NECK SURGERY       Family History   Problem Relation Age of Onset    High Blood Pressure Mother     Stroke Mother     Coronary Art Dis Father      Allergies   Allergen Reactions    Codeine     Fentanyl Other (See Comments)     Fentanyl patchs, caused patient to become confused and anxious per family     Current Outpatient Medications   Medication Sig Dispense Refill    aspirin 81 MG tablet Take 81 mg by mouth daily      metoprolol tartrate (LOPRESSOR) 25 MG tablet Take 25 mg by mouth 2 times daily Bid 1/2 in the am and a whole one at night      gabapentin (NEURONTIN) 100 MG capsule Take 1-2 at night for leg pain. (Patient taking differently: Take 500 mg by mouth 3 times daily. ) 90 capsule 0    docusate sodium (COLACE, DULCOLAX) 100 MG CAPS Take 100 mg by mouth 2 times daily 60 capsule 0    alfuzosin (UROXATRAL) 10 MG extended release tablet Take 1 tablet by mouth nightly 30 tablet 3    finasteride (PROSCAR) 5 MG tablet Take 1 tablet by mouth daily 30 tablet 3    primidone (MYSOLINE) 50 MG tablet Take 5 mg by mouth nightly       chlorpheniramine (CHLOR-TRIMETON) 4 MG tablet Take 4 mg by mouth daily       Flaxseed, Linseed, (FLAXSEED OIL) 1000 MG CAPS Take 2,000 mg by mouth daily       pravastatin (PRAVACHOL) 40 MG tablet Take 40 mg by mouth daily.         folic acid ([facility] FOLIC ACID) 398 MCG Cardiovascular:      Rate and Rhythm: Normal rate and regular rhythm. Heart sounds: Normal heart sounds. No murmur heard. No friction rub. No gallop. Pulmonary:      Effort: Pulmonary effort is normal. No respiratory distress. Breath sounds: Normal breath sounds. No wheezing or rales. Abdominal:      General: There is no distension. Tenderness: There is no abdominal tenderness. Lymphadenopathy:      Cervical: No cervical adenopathy. Skin:     General: Skin is warm and dry. ASSESSMENT:     Diagnosis Orders   1. Mixed hyperlipidemia     2. Essential hypertension     3. Hx of CABG     4. Coronary artery disease involving native coronary artery of native heart without angina pectoris         PLAN:  No orders of the defined types were placed in this encounter. No orders of the defined types were placed in this encounter. 1. Continue present medications  2. Recommend follow-up assessment in 6 months    Return in about 6 months (around 11/17/2021) for return to Dr. Luke Mcgee only. Kandi Waters MD 5/17/2021 2:33 PM CDT    [facility]      Thisdictation was generated by voice recognition computer software. Although all attempts are made to edit the dictation for accuracy, there may be errors in the transcription that are not intended.

## 2021-05-19 ENCOUNTER — HOSPITAL ENCOUNTER (OUTPATIENT)
Dept: MRI IMAGING | Facility: HOSPITAL | Age: 86
Discharge: HOME | End: 2021-05-19
Admitting: NURSE PRACTITIONER

## 2021-05-19 DIAGNOSIS — M51.16 LUMBAR DISC DISEASE WITH RADICULOPATHY: ICD-10-CM

## 2021-05-19 PROCEDURE — 72148 MRI LUMBAR SPINE W/O DYE: CPT

## 2021-05-20 ENCOUNTER — OFFICE VISIT (OUTPATIENT)
Dept: NEUROSURGERY | Facility: CLINIC | Age: 86
End: 2021-05-20

## 2021-05-20 ENCOUNTER — HOSPITAL ENCOUNTER (OUTPATIENT)
Dept: GENERAL RADIOLOGY | Facility: HOSPITAL | Age: 86
Discharge: HOME | End: 2021-05-20
Admitting: NEUROLOGICAL SURGERY

## 2021-05-20 VITALS — BODY MASS INDEX: 25.61 KG/M2 | HEIGHT: 68 IN | WEIGHT: 169 LBS

## 2021-05-20 DIAGNOSIS — E66.3 OVERWEIGHT WITH BODY MASS INDEX (BMI) OF 25 TO 25.9 IN ADULT: ICD-10-CM

## 2021-05-20 DIAGNOSIS — M51.16 LUMBAR DISC DISEASE WITH RADICULOPATHY: ICD-10-CM

## 2021-05-20 DIAGNOSIS — M51.37 DEGENERATION OF LUMBAR OR LUMBOSACRAL INTERVERTEBRAL DISC: ICD-10-CM

## 2021-05-20 DIAGNOSIS — M48.062 SPINAL STENOSIS, LUMBAR REGION, WITH NEUROGENIC CLAUDICATION: Primary | ICD-10-CM

## 2021-05-20 DIAGNOSIS — M25.552 LEFT HIP PAIN: ICD-10-CM

## 2021-05-20 DIAGNOSIS — Z78.9 NON-SMOKER: ICD-10-CM

## 2021-05-20 DIAGNOSIS — G60.9 HEREDITARY AND IDIOPATHIC PERIPHERAL NEUROPATHY: ICD-10-CM

## 2021-05-20 PROCEDURE — 99213 OFFICE O/P EST LOW 20 MIN: CPT | Performed by: NEUROLOGICAL SURGERY

## 2021-05-20 PROCEDURE — 73503 X-RAY EXAM HIP UNI 4/> VIEWS: CPT

## 2021-05-20 NOTE — PROGRESS NOTES
SUBJECTIVE:  Patient ID: Chad Henriquez is a 89 y.o. male is here today for follow-up.    Chief Complaint: Left leg pain  Chief Complaint   Patient presents with   • BACK & LEG PAIN     patient is here for discussion of MRI @ [facility] on 5/19/21; patient did well after his surgery in 2018 until recently when he developed recurrence of pain.       HPI  This is a 89-year-old gentleman known to the service who we have treated in the past for severe lumbar stenosis.  In March 2018 he had an L3-4 laminectomy foraminotomy which was helpful for about 8 or 9 months.  He had a recurrence of his neurogenic claudication to the point where he was in a wheelchair and nonambulatory.  He was taken back to surgery December 2018 for a L3-4, 4 5, 5 S1 instrumented fusion and decompression.  He did exceedingly well after that surgery with relief of his lower extremity claudication and a significant improvement in his back pain.  About 6 months ago he started to develop left lower extremity radicular pain.  It is claudicant in nature.  It limits his functioning.  He he does not really have any meaningful low back pain.  He takes gabapentin 600 mg 3 times a day and recently has been using Ultram.  His family's thinks that is making him groggy and more sleepy.  He does not think it is tremendously helped his pain.  We sent him for repeat MRI of the lumbar spine is here to discuss the results.    The following portions of the patient's history were reviewed and updated as appropriate: allergies, current medications, past family history, past medical history, past social history, past surgical history and problem list.    OBJECTIVE:    Review of Systems   Musculoskeletal: Positive for arthralgias and back pain.   All other systems reviewed and are negative.         Physical Exam  Eyes:      Extraocular Movements: EOM normal.      Pupils: Pupils are equal, round, and reactive to light.   Neurological:      Mental Status: He is oriented to  person, place, and time.      Coordination: Finger-Nose-Finger Test normal.      Gait: Gait is intact.      Deep Tendon Reflexes: Strength normal.   Psychiatric:         Speech: Speech normal.         Neurologic Exam     Mental Status   Oriented to person, place, and time.   Attention: normal.   Speech: speech is normal   Level of consciousness: alert  Knowledge: good.     Cranial Nerves     CN II   Visual fields full to confrontation.     CN III, IV, VI   Pupils are equal, round, and reactive to light.  Extraocular motions are normal.     CN V   Facial sensation intact.     CN VII   Facial expression full, symmetric.     CN VIII   CN VIII normal.     CN IX, X   CN IX normal.   CN X normal.     CN XI   CN XI normal.     CN XII   CN XII normal.     Motor Exam   Muscle bulk: normal  Overall muscle tone: normal  Right arm pronator drift: absent  Left arm pronator drift: absent    Strength   Strength 5/5 throughout.     Sensory Exam   Light touch normal.   Pinprick normal.     Gait, Coordination, and Reflexes     Gait  Gait: normal    Coordination   Finger to nose coordination: normal    Tremor   Resting tremor: absent  Intention tremor: absent  Action tremor: absent    Reflexes   Reflexes 2+ except as noted.       Independent Review of Radiographic Studies:       ASSESSMENT/PLAN:  The patient complains of left lower extremity radicular claudication.  He has severe concentric lumbar stenosis at the level above his previous fusion at L3-4.  We spent an extensive course of nonsurgical care prior to this during his other issues which did not help.  This included physical therapy oral pain medicine and injection treatments.  He had a dorsal column stimulator trial at 1 point prior to his last fusion which was not helpful.  We discussed the options of surgery.  A complete laminectomy at that level would necessitate extending his fusion this would be a relatively extensive operation for someone his age.  The other option  would be a more selective left hemilaminectomy foraminotomy to try to decompress the nerves to the left leg and relieve the lumbar stenosis on the left side.  We discussed the fact that this may only provide temporary relief of his left leg pain.  In addition it may not adequately decompress that nerve and relieve the left leg pain.  Diagnostic understand this.  They are can consider their options.  We are can x-ray of the left hip today to make sure he does not have a primary hip arthropathy.      1. Spinal stenosis, lumbar region, with neurogenic claudication    2. Lumbar disc disease with radiculopathy    3. Hereditary and idiopathic peripheral neuropathy    4. Degeneration of lumbar or lumbosacral intervertebral disc    5. Left hip pain    6. Overweight with body mass index (BMI) of 25 to 25.9 in adult    7. Non-smoker        The patient's Body mass index is 25.7 kg/m².. BMI is above normal parameters. Recommendations include: educational material    Return for PATIENT TO C/B WITH DECISION.      Kj Falcon MD

## 2021-05-20 NOTE — PATIENT INSTRUCTIONS
"PATIENT TO CONTINUE TO FOLLOW UP WITH HIS PRIMARY CARE PROVIDER FOR YEARLY PHYSICAL EXAMS TO ENSURE COMPLETE HEALTH MAINTENANCE        BMI for Adults  What is BMI?  Body mass index (BMI) is a number that is calculated from a person's weight and height. BMI can help estimate how much of a person's weight is composed of fat. BMI does not measure body fat directly. Rather, it is an alternative to procedures that directly measure body fat, which can be difficult and expensive.  BMI can help identify people who may be at higher risk for certain medical problems.  What are BMI measurements used for?  BMI is used as a screening tool to identify possible weight problems. It helps determine whether a person is obese, overweight, a healthy weight, or underweight.  BMI is useful for:  · Identifying a weight problem that may be related to a medical condition or may increase the risk for medical problems.  · Promoting changes, such as changes in diet and exercise, to help reach a healthy weight. BMI screening can be repeated to see if these changes are working.  How is BMI calculated?  BMI involves measuring your weight in relation to your height. Both height and weight are measured, and the BMI is calculated from those numbers. This can be done either in English ([place] or metric measurements. Note that charts and online BMI calculators are available to help you find your BMI quickly and easily without having to do these calculations yourself.  To calculate your BMI in English ([place] measurements:    1. Measure your weight in pounds (lb).  2. Multiply the number of pounds by 703.  ? For example, for a person who weighs 180 lb, multiply that number by 703, which equals 126,540.  3. Measure your height in inches. Then multiply that number by itself to get a measurement called \"inches squared.\"  ? For example, for a person who is 70 inches tall, the \"inches squared\" measurement is 70 inches x 70 inches, which equals 4,900 inches " "squared.  4. Divide the total from step 2 (number of lb x 703) by the total from step 3 (inches squared): 126,540 ÷ 4,900 = 25.8. This is your BMI.  To calculate your BMI in metric measurements:  1. Measure your weight in kilograms (kg).  2. Measure your height in meters (m). Then multiply that number by itself to get a measurement called \"meters squared.\"  ? For example, for a person who is 1.75 m tall, the \"meters squared\" measurement is 1.75 m x 1.75 m, which is equal to 3.1 meters squared.  3. Divide the number of kilograms (your weight) by the meters squared number. In this example: 70 ÷ 3.1 = 22.6. This is your BMI.  What do the results mean?  BMI charts are used to identify whether you are underweight, normal weight, overweight, or obese. The following guidelines will be used:  · Underweight: BMI less than 18.5.  · Normal weight: BMI between 18.5 and 24.9.  · Overweight: BMI between 25 and 29.9.  · Obese: BMI of 30 or above.  Keep these notes in mind:  · Weight includes both fat and muscle, so someone with a muscular build, such as an athlete, may have a BMI that is higher than 24.9. In cases like these, BMI is not an accurate measure of body fat.  · To determine if excess body fat is the cause of a BMI of 25 or higher, further assessments may need to be done by a health care provider.  · BMI is usually interpreted in the same way for men and women.  Where to find more information  For more information about BMI, including tools to quickly calculate your BMI, go to these websites:  · Centers for Disease Control and Prevention: www.cdc.gov  · American Heart Association: www.heart.org  · National Heart, Lung, and Blood [place]: www.nhlbi.nih.gov  Summary  · Body mass index (BMI) is a number that is calculated from a person's weight and height.  · BMI may help estimate how much of a person's weight is composed of fat. BMI can help identify those who may be at higher risk for certain medical problems.  · BMI " "can be measured using English measurements or metric measurements.  · BMI charts are used to identify whether you are underweight, normal weight, overweight, or obese.  This information is not intended to replace advice given to you by your health care provider. Make sure you discuss any questions you have with your health care provider.  Document Revised: 09/09/2020 Document Reviewed: 07/17/2020  [employer] Patient Education © 2021 [employer]      https://www.nhlbi.nih.gov/files/docs/public/heart/dash_brief.pdf\">   DASH Eating Plan  DASH stands for Dietary Approaches to Stop Hypertension. The DASH eating plan is a healthy eating plan that has been shown to:  · Reduce high blood pressure (hypertension).  · Reduce your risk for type 2 diabetes, heart disease, and stroke.  · Help with weight loss.  What are tips for following this plan?  Reading food labels  · Check food labels for the amount of salt (sodium) per serving. Choose foods with less than 5 percent of the Daily Value of sodium. Generally, foods with less than 300 milligrams (mg) of sodium per serving fit into this eating plan.  · To find whole grains, look for the word \"whole\" as the first word in the ingredient list.  Shopping  · Buy products labeled as \"low-sodium\" or \"no salt added.\"  · Buy fresh foods. Avoid canned foods and pre-made or frozen meals.  Cooking  · Avoid adding salt when cooking. Use salt-free seasonings or herbs instead of table salt or sea salt. Check with your health care provider or pharmacist before using salt substitutes.  · Do not walker foods. Cook foods using healthy methods such as baking, boiling, grilling, roasting, and broiling instead.  · Cook with heart-healthy oils, such as olive, canola, avocado, soybean, or sunflower oil.  Meal planning    · Eat a balanced diet that includes:  ? 4 or more servings of fruits and 4 or more servings of vegetables each day. Try to fill one-half of your plate with fruits and vegetables.  ? 6-8 " servings of whole grains each day.  ? Less than 6 oz (170 g) of lean meat, poultry, or fish each day. A 3-oz (85-g) serving of meat is about the same size as a deck of cards. One egg equals 1 oz (28 g).  ? 2-3 servings of low-fat dairy each day. One serving is 1 cup (237 mL).  ? 1 serving of nuts, seeds, or beans 5 times each week.  ? 2-3 servings of heart-healthy fats. Healthy fats called omega-3 fatty acids are found in foods such as walnuts, flaxseeds, fortified milks, and eggs. These fats are also found in cold-water fish, such as sardines, salmon, and mackerel.  · Limit how much you eat of:  ? Canned or prepackaged foods.  ? Food that is high in trans fat, such as some fried foods.  ? Food that is high in saturated fat, such as fatty meat.  ? Desserts and other sweets, sugary drinks, and other foods with added sugar.  ? Full-fat dairy products.  · Do not salt foods before eating.  · Do not eat more than 4 egg yolks a week.  · Try to eat at least 2 vegetarian meals a week.  · Eat more home-cooked food and less restaurant, buffet, and fast food.  Lifestyle  · When eating at a restaurant, ask that your food be prepared with less salt or no salt, if possible.  · If you drink alcohol:  ? Limit how much you use to:  § 0-1 drink a day for women who are not pregnant.  § 0-2 drinks a day for men.  ? Be aware of how much alcohol is in your drink. In the [place] one drink equals one 12 oz bottle of beer (355 mL), one 5 oz glass of wine (148 mL), or one 1½ oz glass of hard liquor (44 mL).  General information  · Avoid eating more than 2,300 mg of salt a day. If you have hypertension, you may need to reduce your sodium intake to 1,500 mg a day.  · Work with your health care provider to maintain a healthy body weight or to lose weight. Ask what an ideal weight is for you.  · Get at least 30 minutes of exercise that causes your heart to beat faster (aerobic exercise) most days of the week. Activities may include walking,  swimming, or biking.  · Work with your health care provider or dietitian to adjust your eating plan to your individual calorie needs.  What foods should I eat?  Fruits  All fresh, dried, or frozen fruit. Canned fruit in natural juice (without added sugar).  Vegetables  Fresh or frozen vegetables (raw, steamed, roasted, or grilled). Low-sodium or reduced-sodium tomato and vegetable juice. Low-sodium or reduced-sodium tomato sauce and tomato paste. Low-sodium or reduced-sodium canned vegetables.  Grains  Whole-grain or whole-wheat bread. Whole-grain or whole-wheat pasta. Brown rice. Oatmeal. Quinoa. Bulgur. Whole-grain and low-sodium cereals. Yuli bread. Low-fat, low-sodium crackers. Whole-wheat flour tortillas.  Meats and other proteins  Skinless chicken or turkey. Ground chicken or turkey. Pork with fat trimmed off. Fish and seafood. Egg whites. Dried beans, peas, or lentils. Unsalted nuts, nut butters, and seeds. Unsalted canned beans. Lean cuts of beef with fat trimmed off. Low-sodium, lean precooked or cured meat, such as sausages or meat loaves.  Dairy  Low-fat (1%) or fat-free (skim) milk. Reduced-fat, low-fat, or fat-free cheeses. Nonfat, low-sodium ricotta or cottage cheese. Low-fat or nonfat yogurt. Low-fat, low-sodium cheese.  Fats and oils  Soft margarine without trans fats. Vegetable oil. Reduced-fat, low-fat, or light mayonnaise and salad dressings (reduced-sodium). Canola, safflower, olive, avocado, soybean, and sunflower oils. Avocado.  Seasonings and condiments  Herbs. Spices. Seasoning mixes without salt.  Other foods  Unsalted popcorn and pretzels. Fat-free sweets.  The items listed above may not be a complete list of foods and beverages you can eat. Contact a dietitian for more information.  What foods should I avoid?  Fruits  Canned fruit in a light or heavy syrup. Fried fruit. Fruit in cream or butter sauce.  Vegetables  Creamed or fried vegetables. Vegetables in a cheese sauce. Regular canned  vegetables (not low-sodium or reduced-sodium). Regular canned tomato sauce and paste (not low-sodium or reduced-sodium). Regular tomato and vegetable juice (not low-sodium or reduced-sodium). Pickles. Olives.  Grains  Baked goods made with fat, such as croissants, muffins, or some breads. Dry pasta or rice meal packs.  Meats and other proteins  Fatty cuts of meat. Ribs. Fried meat. Chu. Bologna, salami, and other precooked or cured meats, such as sausages or meat loaves. Fat from the back of a pig (fatback). Bratwurst. Salted nuts and seeds. Canned beans with added salt. Canned or smoked fish. Whole eggs or egg yolks. Chicken or turkey with skin.  Dairy  Whole or 2% milk, cream, and half-and-half. Whole or full-fat cream cheese. Whole-fat or sweetened yogurt. Full-fat cheese. Nondairy creamers. Whipped toppings. Processed cheese and cheese spreads.  Fats and oils  Butter. Stick margarine. Lard. Shortening. Ghee. Chu fat. Tropical oils, such as coconut, palm kernel, or palm oil.  Seasonings and condiments  Onion salt, garlic salt, seasoned salt, table salt, and sea salt. Worcestershire sauce. Tartar sauce. Barbecue sauce. Teriyaki sauce. Soy sauce, including reduced-sodium. Steak sauce. Canned and packaged gravies. Fish sauce. Oyster sauce. Cocktail sauce. Store-bought horseradish. Ketchup. Mustard. Meat flavorings and tenderizers. Bouillon cubes. Hot sauces. Pre-made or packaged marinades. Pre-made or packaged taco seasonings. Relishes. Regular salad dressings.  Other foods  Salted popcorn and pretzels.  The items listed above may not be a complete list of foods and beverages you should avoid. Contact a dietitian for more information.  Where to find more information  · National Heart, Lung, and Blood [place]: www.nhlbi.nih.gov  · American Heart Association: www.heart.org  · Academy of Nutrition and Dietetics: www.eatright.org  · National Kidney Foundation: www.kidney.org  Summary  · The DASH eating plan is a  healthy eating plan that has been shown to reduce high blood pressure (hypertension). It may also reduce your risk for type 2 diabetes, heart disease, and stroke.  · When on the DASH eating plan, aim to eat more fresh fruits and vegetables, whole grains, lean proteins, low-fat dairy, and heart-healthy fats.  · With the DASH eating plan, you should limit salt (sodium) intake to 2,300 mg a day. If you have hypertension, you may need to reduce your sodium intake to 1,500 mg a day.  · Work with your health care provider or dietitian to adjust your eating plan to your individual calorie needs.  This information is not intended to replace advice given to you by your health care provider. Make sure you discuss any questions you have with your health care provider.  Document Revised: 11/20/2020 Document Reviewed: 11/20/2020  [employer] Patient Education © 2021 [employer]

## 2021-05-24 ENCOUNTER — OFFICE VISIT (OUTPATIENT)
Dept: UROLOGY | Facility: CLINIC | Age: 86
End: 2021-05-24

## 2021-05-24 VITALS — HEIGHT: 68 IN | TEMPERATURE: 97.5 F | WEIGHT: 170 LBS | BODY MASS INDEX: 25.76 KG/M2

## 2021-05-24 DIAGNOSIS — R33.9 RETENTION OF URINE: Primary | ICD-10-CM

## 2021-05-24 LAB
BILIRUB BLD-MCNC: NEGATIVE MG/DL
CLARITY, POC: CLEAR
COLOR UR: YELLOW
GLUCOSE UR STRIP-MCNC: NEGATIVE MG/DL
KETONES UR QL: NEGATIVE
LEUKOCYTE EST, POC: NEGATIVE
NITRITE UR-MCNC: NEGATIVE MG/ML
PH UR: 7 [PH] (ref 5–8)
PROT UR STRIP-MCNC: ABNORMAL MG/DL
RBC # UR STRIP: NEGATIVE /UL
SP GR UR: 1.01 (ref 1–1.03)
UROBILINOGEN UR QL: NORMAL

## 2021-05-24 PROCEDURE — 81003 URINALYSIS AUTO W/O SCOPE: CPT | Performed by: PHYSICIAN ASSISTANT

## 2021-05-24 PROCEDURE — 99213 OFFICE O/P EST LOW 20 MIN: CPT | Performed by: PHYSICIAN ASSISTANT

## 2021-05-26 ENCOUNTER — TELEPHONE (OUTPATIENT)
Dept: INTERNAL MEDICINE | Facility: CLINIC | Age: 86
End: 2021-05-26

## 2021-05-26 ENCOUNTER — OFFICE VISIT (OUTPATIENT)
Dept: INTERNAL MEDICINE | Facility: CLINIC | Age: 86
End: 2021-05-26

## 2021-05-26 VITALS
SYSTOLIC BLOOD PRESSURE: 140 MMHG | BODY MASS INDEX: 25.91 KG/M2 | TEMPERATURE: 93.2 F | HEIGHT: 68 IN | OXYGEN SATURATION: 98 % | WEIGHT: 171 LBS | HEART RATE: 52 BPM | DIASTOLIC BLOOD PRESSURE: 56 MMHG

## 2021-05-26 DIAGNOSIS — M48.062 SPINAL STENOSIS, LUMBAR REGION, WITH NEUROGENIC CLAUDICATION: ICD-10-CM

## 2021-05-26 DIAGNOSIS — I10 ESSENTIAL HYPERTENSION: Primary | ICD-10-CM

## 2021-05-26 PROCEDURE — 99213 OFFICE O/P EST LOW 20 MIN: CPT | Performed by: INTERNAL MEDICINE

## 2021-05-26 NOTE — PROGRESS NOTES
Subjective   Chad Henriquez is a 89 y.o. male.   Chief Complaint   Patient presents with   • Hypertension     4 week FU on HTN.  [facility] since his [facility] visit [facility], and he does not know the name and didn't bring it with him.   • Back Pain     c/o continued low back pain, followed by Dr. Falcon.  Wife states he is getting to where he can hardly get around.       History of Present Illness patient is here for follow-up of hypertension apparently [facility] his blood pressure today is 140/56.  Patient also tells me he is having considerable back pain and Dr. Falcon is considering another surgery for him.  Patient has had surgery few years ago he had done extremely well from that surgery however had now has problems at a different level.    The following portions of the patient's history were reviewed and updated as appropriate: allergies, current medications, past family history, past medical history, past social history, past surgical history and problem list.    Review of Systems   Constitutional: Negative for activity change, appetite change, fatigue, fever, unexpected weight gain and unexpected weight loss.   HENT: Negative for swollen glands, trouble swallowing and voice change.    Eyes: Negative for blurred vision and visual disturbance.   Respiratory: Negative for cough and shortness of breath.    Cardiovascular: Negative for chest pain, palpitations and leg swelling.   Gastrointestinal: Negative for abdominal pain, constipation, diarrhea, nausea, vomiting and indigestion.   Endocrine: Negative for cold intolerance, heat intolerance, polydipsia and polyphagia.   Genitourinary: Negative for dysuria and frequency.   Musculoskeletal: Negative for arthralgias, back pain, joint swelling and neck pain.   Skin: Negative for color change, rash and skin lesions.   Neurological: Negative for dizziness, weakness, headache, memory problem and confusion.   Hematological:  Does not bruise/bleed easily.   Psychiatric/Behavioral: Negative for agitation, hallucinations and suicidal ideas. The patient is not nervous/anxious.        Objective   Past Medical History:   Diagnosis Date   • Allergic rhinitis    • Anemia    • Arthritis    • BPH (benign prostatic hypertrophy) with urinary retention    • Cancer (CMS/HCC)     skin cancer   • Chronic back pain     RUNS DOWN BOTH LEGS   • Constipation    • Coronary artery disease    • Hard of hearing    • Heart attack (CMS/HCC) 1986    NO MUSCLE DAMAGE - JUST OCCLUDED VALVE   • High cholesterol    • History of skin cancer     BILATERAL EARS   • History of transfusion     1986   • Hypertension    • Inguinal hernia    • Leaky heart valve     DR CONCEPCION SAYS NOT ENOUGH TO BE OF CONCERN   • Other bursal cyst, right elbow    • PONV (postoperative nausea and vomiting)     has had scopalamine patch in past    • Sleep apnea     DOES NOT USE CPAP OR BIPAP   • Spinal stenosis of cervical region    • Spinal stenosis of lumbar region    • Tremors of nervous system    • Wears hearing aid     BILATERAL      Past Surgical History:   Procedure Laterality Date   • ANTERIOR LUMBAR EXPOSURE N/A 12/7/2018    Procedure: ANTERIOR LUMBAR EXPOSURE;  Surgeon: Manolo Mcleod DO;  Location:  [facility] OR;  Service: Vascular   • APPENDECTOMY     • BACK SURGERY      X3   • CARDIAC SURGERY  08/08/1986    X1 BYPASS   • CORONARY ANGIOPLASTY WITH STENT PLACEMENT  1997    X3 STENTS   • HERNIA REPAIR Bilateral     x2   • INGUINAL HERNIA REPAIR Right 6/22/2017    Procedure: RIGHT INGUINAL HERNIA REPAIR AND EXCISION OF CYST RIGHT ELBOW ;  Surgeon: Jackelyn Arriola MD;  Location:  [facility] OR;  Service:    • LUMBAR LAMINECTOMY N/A 3/12/2018    Procedure: LUMBAR LAMINECTOMY WITHOUT FUSION L3-4,4-5;  Surgeon: Kj Falcon MD;  Location:  [facility];  Service: Neurosurgery   • LUMBAR LAMINECTOMY ANTERIOR LUMBAR INTERBODY FUSION N/A 12/7/2018    Procedure: ANTERIOR LUMBAR INTERBODY FUSION  L5-S1;  Surgeon: Kj Falcon MD;  Location:  [facility];  Service: Neurosurgery   • LUMBAR LAMINECTOMY WITH FUSION Left 12/10/2018    Procedure: LUMBAR LAMINECTOMY TRANSFORAMINAL LUMBAR INTERBODY FUSION L34,45;  Surgeon: Kj Falcon MD;  Location:  [facility];  Service: Neurosurgery   • LUMBAR LAMINECTOMY WITH FUSION Left 12/7/2018    Procedure: LUMBAR LAMINECTOMY TRANSFORAMINAL LUMBAR INTERBODY FUSION LEFT L3-4, L4-5 QUADRANT RETRACTOR;  Surgeon: Kj Falcon MD;  Location:  [facility];  Service: Neurosurgery        Current Outpatient Medications:   •  alfuzosin (UROXATRAL) 10 MG 24 hr tablet, Take 1 tablet by mouth Daily., Disp: 30 tablet, Rfl: 5  •  Ascorbic Acid (Vitamin C) 500 MG capsule, Take 1 capsule by mouth 2 (two) times a day., Disp: , Rfl:   •  aspirin 81 MG tablet, Take 81 mg by mouth., Disp: , Rfl:   •  chlorpheniramine (CHLOR-TRIMETON) 4 MG tablet, Take 4 mg by mouth Daily., Disp: , Rfl:   •  Cholecalciferol (Vitamin D3) 50 MCG (2000 UT) tablet, Take 1 tablet by mouth Daily., Disp: , Rfl:   •  finasteride (PROSCAR) 5 MG tablet, Take 1 tablet by mouth Daily., Disp: 30 tablet, Rfl: 5  •  Flaxseed, Linseed, (FLAXSEED OIL) 1000 MG capsule, 540mg takes 4 tablets by mouth daily, Disp: , Rfl:   •  folic acid ([employer] FOLIC ACID) 800 MCG tablet, Take 800 mcg by mouth daily.  , Disp: , Rfl:   •  gabapentin (NEURONTIN) 600 MG tablet, Take 1 tablet by mouth 3 (Three) Times a Day., Disp: 90 tablet, Rfl: 3  •  L-Lysine 600 MG tablet, Take 1 tablet by mouth Daily., Disp: , Rfl:   •  metoprolol tartrate (LOPRESSOR) 25 MG tablet, 1/2 in morning and 1 at night, Disp: 135 tablet, Rfl: 3  •  pravastatin (PRAVACHOL) 40 MG tablet, Take 40 mg by mouth daily.  , Disp: , Rfl:   •  primidone (MYSOLINE) 50 MG tablet, Take 1/2 tablet at bedtime, Disp: 45 tablet, Rfl: 3  •  psyllium (METAMUCIL) 58.6 % packet, Take 1 packet by mouth Daily., Disp: , Rfl:   •  tamsulosin (FLOMAX) 0.4 MG capsule 24 hr capsule, Take 1 capsule  by mouth Daily., Disp: 30 capsule, Rfl: 11  •  traMADol (ULTRAM) 50 MG tablet, Take 1 tablet by mouth Every 8 (Eight) Hours As Needed for Moderate Pain ., Disp: 90 tablet, Rfl: 0  •  Zinc 50 MG tablet, Take 1 tablet by mouth Daily., Disp: , Rfl:      Vitals:    05/26/21 1346   BP: 140/56   Pulse: 52   Temp: 93.2 °F (34 °C)   SpO2: 98%         05/26/21  1346   Weight: 77.6 kg (171 lb)         Physical Exam  Vitals and nursing note reviewed.   Constitutional:       General: He is not in acute distress.     Appearance: Normal appearance. He is well-developed.   HENT:      Head: Normocephalic and atraumatic.      Right Ear: External ear normal.      Left Ear: External ear normal.      Nose: Nose normal.   Eyes:      Extraocular Movements: Extraocular movements intact.      Conjunctiva/sclera: Conjunctivae normal.      Pupils: Pupils are equal, round, and reactive to light.   Cardiovascular:      Rate and Rhythm: Normal rate and regular rhythm.      Pulses: Normal pulses.      Heart sounds: Normal heart sounds.   Pulmonary:      Effort: Pulmonary effort is normal.      Breath sounds: Normal breath sounds.   Abdominal:      General: Bowel sounds are normal.      Palpations: Abdomen is soft.   Musculoskeletal:         General: Normal range of motion.      Cervical back: Normal range of motion and neck supple. No rigidity. No muscular tenderness.   Skin:     General: Skin is warm and dry.   Neurological:      General: No focal deficit present.      Mental Status: He is alert and oriented to person, place, and time.   Psychiatric:         Mood and Affect: Mood normal.         Behavior: Behavior normal.               Assessment/Plan   Diagnoses and all orders for this visit:    1. Essential hypertension (Primary)    2. Spinal stenosis, lumbar region, with neurogenic claudication    We spent some time reviewing Dr. Falcon's note talking to him about back pain and his plans.  In regard to his blood pressure it seems to be  adequately controlled at this point.  I see no contraindication to upcoming surgery with  he is followed by cardiology and was apparently told that everything was good his last visit there.

## 2021-06-01 RX ORDER — AMLODIPINE BESYLATE 2.5 MG/1
2.5 TABLET ORAL DAILY
COMMUNITY

## 2021-06-10 ENCOUNTER — OFFICE VISIT (OUTPATIENT)
Dept: NEUROSURGERY | Facility: CLINIC | Age: 86
End: 2021-06-10

## 2021-06-10 VITALS — HEIGHT: 68 IN | WEIGHT: 170 LBS | BODY MASS INDEX: 25.76 KG/M2

## 2021-06-10 DIAGNOSIS — E66.3 OVERWEIGHT WITH BODY MASS INDEX (BMI) OF 25 TO 25.9 IN ADULT: ICD-10-CM

## 2021-06-10 DIAGNOSIS — M51.37 DEGENERATION OF LUMBAR OR LUMBOSACRAL INTERVERTEBRAL DISC: ICD-10-CM

## 2021-06-10 DIAGNOSIS — M79.605 LEFT LEG PAIN: ICD-10-CM

## 2021-06-10 DIAGNOSIS — M51.16 LUMBAR DISC DISEASE WITH RADICULOPATHY: ICD-10-CM

## 2021-06-10 DIAGNOSIS — M48.062 SPINAL STENOSIS, LUMBAR REGION, WITH NEUROGENIC CLAUDICATION: Primary | ICD-10-CM

## 2021-06-10 DIAGNOSIS — Z78.9 NON-SMOKER: ICD-10-CM

## 2021-06-10 PROCEDURE — 99213 OFFICE O/P EST LOW 20 MIN: CPT | Performed by: NEUROLOGICAL SURGERY

## 2021-06-10 NOTE — PROGRESS NOTES
SUBJECTIVE:  Patient ID: Chad Henriquez is a 89 y.o. male is here today for follow-up.    Chief Complaint:leg pain   Chief Complaint   Patient presents with   • BACK & LEG PAIN     patient is here today to finalize surgery plans       HPI  89-year-old gentleman that we have been following most recently for intractable left lower extremity radicular pain.  Is claudicant in nature.  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  We did imaging and a work-up of his left hip which was negative.  He is here to discuss further treatment options.    The following portions of the patient's history were reviewed and updated as appropriate: allergies, current medications, past family history, past medical history, past social history, past surgical history and problem list.    OBJECTIVE:    Review of Systems   Musculoskeletal: Positive for back pain.   All other systems reviewed and are negative.         Physical Exam  Eyes:      Extraocular Movements: EOM normal.      Pupils: Pupils are equal, round, and reactive to light.   Neurological:      Mental Status: He is oriented to person, place, and time.      Coordination: Finger-Nose-Finger Test normal.      Gait: Gait is intact.      Deep Tendon Reflexes: Strength normal.   Psychiatric:         Speech: Speech normal.         Neurologic Exam     Mental Status   Oriented to person, place, and time.   Attention: normal.   Speech: speech is normal   Level of consciousness: alert  Knowledge: good.     Cranial Nerves     CN II   Visual fields full to confrontation.     CN III, IV, VI   Pupils are equal, round, and reactive to light.  Extraocular motions are normal.     CN V   Facial sensation intact.     CN VII   Facial expression full, symmetric.     CN VIII   CN VIII normal.     CN IX, X   CN IX normal.   CN X normal.     CN XI   CN XI normal.     CN XII   CN XII normal.     Motor Exam   Muscle bulk: normal  Overall muscle tone:  normal  Right arm pronator drift: absent  Left arm pronator drift: absent    Strength   Strength 5/5 throughout.     Sensory Exam   Light touch normal.   Pinprick normal.     Gait, Coordination, and Reflexes     Gait  Gait: normal    Coordination   Finger to nose coordination: normal    Tremor   Resting tremor: absent  Intention tremor: absent  Action tremor: absent    Reflexes   Reflexes 2+ except as noted.       Independent Review of Radiographic Studies:       ASSESSMENT/PLAN:  Chad has adjacent level degeneration causing severe left lower extremity radicular pain.  This is severely impacting his function.  In the past we have tried to do a minimally invasive hemilaminectomy foraminotomy which was not successful.  He also has had an extensive course of medical management, injection treatments and even a dorsal column stimulator trial all without improvement.  I think his best treatment course would be a left transforaminal lumbar interbody fusion and laminectomy at L2-3.  The risks and benefits were discussed at length which included but were not limited to infection, bleeding, paralysis, spinal fluid leak, bowel bladder incontinence, stroke, coma, and death.  We also discussed the fact that obviously he is at high risk just for his age.  He may require rehab after the surgery.  The family acknowledged understanding of the risks involved with the surgery at 89 years old.  Their questions and concerns were addressed.      1. Spinal stenosis, lumbar region, with neurogenic claudication    2. Degeneration of lumbar or lumbosacral intervertebral disc    3. Lumbar disc disease with radiculopathy    4. Left leg pain    5. Non-smoker    6. Overweight with body mass index (BMI) of 25 to 25.9 in adult        The patient's Body mass index is 25.85 kg/m².. BMI is above normal parameters. Recommendations include: educational material    Return for POSTOPERATIVELY.      Kj Falcon MD

## 2021-06-10 NOTE — PATIENT INSTRUCTIONS
"PATIENT TO CONTINUE TO FOLLOW UP WITH HIS PRIMARY CARE PROVIDER FOR YEARLY PHYSICAL EXAMS TO ENSURE COMPLETE HEALTH MAINTENANCE        BMI for Adults  What is BMI?  Body mass index (BMI) is a number that is calculated from a person's weight and height. BMI can help estimate how much of a person's weight is composed of fat. BMI does not measure body fat directly. Rather, it is an alternative to procedures that directly measure body fat, which can be difficult and expensive.  BMI can help identify people who may be at higher risk for certain medical problems.  What are BMI measurements used for?  BMI is used as a screening tool to identify possible weight problems. It helps determine whether a person is obese, overweight, a healthy weight, or underweight.  BMI is useful for:  · Identifying a weight problem that may be related to a medical condition or may increase the risk for medical problems.  · Promoting changes, such as changes in diet and exercise, to help reach a healthy weight. BMI screening can be repeated to see if these changes are working.  How is BMI calculated?  BMI involves measuring your weight in relation to your height. Both height and weight are measured, and the BMI is calculated from those numbers. This can be done either in English ([place] or metric measurements. Note that charts and online BMI calculators are available to help you find your BMI quickly and easily without having to do these calculations yourself.  To calculate your BMI in English ([place] measurements:    1. Measure your weight in pounds (lb).  2. Multiply the number of pounds by 703.  ? For example, for a person who weighs 180 lb, multiply that number by 703, which equals 126,540.  3. Measure your height in inches. Then multiply that number by itself to get a measurement called \"inches squared.\"  ? For example, for a person who is 70 inches tall, the \"inches squared\" measurement is 70 inches x 70 inches, which equals 4,900 inches " "squared.  4. Divide the total from step 2 (number of lb x 703) by the total from step 3 (inches squared): 126,540 ÷ 4,900 = 25.8. This is your BMI.  To calculate your BMI in metric measurements:  1. Measure your weight in kilograms (kg).  2. Measure your height in meters (m). Then multiply that number by itself to get a measurement called \"meters squared.\"  ? For example, for a person who is 1.75 m tall, the \"meters squared\" measurement is 1.75 m x 1.75 m, which is equal to 3.1 meters squared.  3. Divide the number of kilograms (your weight) by the meters squared number. In this example: 70 ÷ 3.1 = 22.6. This is your BMI.  What do the results mean?  BMI charts are used to identify whether you are underweight, normal weight, overweight, or obese. The following guidelines will be used:  · Underweight: BMI less than 18.5.  · Normal weight: BMI between 18.5 and 24.9.  · Overweight: BMI between 25 and 29.9.  · Obese: BMI of 30 or above.  Keep these notes in mind:  · Weight includes both fat and muscle, so someone with a muscular build, such as an athlete, may have a BMI that is higher than 24.9. In cases like these, BMI is not an accurate measure of body fat.  · To determine if excess body fat is the cause of a BMI of 25 or higher, further assessments may need to be done by a health care provider.  · BMI is usually interpreted in the same way for men and women.  Where to find more information  For more information about BMI, including tools to quickly calculate your BMI, go to these websites:  · Centers for Disease Control and Prevention: www.cdc.gov  · American Heart Association: www.heart.org  · National Heart, Lung, and Blood [place]: www.nhlbi.nih.gov  Summary  · Body mass index (BMI) is a number that is calculated from a person's weight and height.  · BMI may help estimate how much of a person's weight is composed of fat. BMI can help identify those who may be at higher risk for certain medical problems.  · BMI " "can be measured using English measurements or metric measurements.  · BMI charts are used to identify whether you are underweight, normal weight, overweight, or obese.  This information is not intended to replace advice given to you by your health care provider. Make sure you discuss any questions you have with your health care provider.  Document Revised: 09/09/2020 Document Reviewed: 07/17/2020  [employer] Patient Education © 2021 [employer]      https://www.nhlbi.nih.gov/files/docs/public/heart/dash_brief.pdf\">   DASH Eating Plan  DASH stands for Dietary Approaches to Stop Hypertension. The DASH eating plan is a healthy eating plan that has been shown to:  · Reduce high blood pressure (hypertension).  · Reduce your risk for type 2 diabetes, heart disease, and stroke.  · Help with weight loss.  What are tips for following this plan?  Reading food labels  · Check food labels for the amount of salt (sodium) per serving. Choose foods with less than 5 percent of the Daily Value of sodium. Generally, foods with less than 300 milligrams (mg) of sodium per serving fit into this eating plan.  · To find whole grains, look for the word \"whole\" as the first word in the ingredient list.  Shopping  · Buy products labeled as \"low-sodium\" or \"no salt added.\"  · Buy fresh foods. Avoid canned foods and pre-made or frozen meals.  Cooking  · Avoid adding salt when cooking. Use salt-free seasonings or herbs instead of table salt or sea salt. Check with your health care provider or pharmacist before using salt substitutes.  · Do not walker foods. Cook foods using healthy methods such as baking, boiling, grilling, roasting, and broiling instead.  · Cook with heart-healthy oils, such as olive, canola, avocado, soybean, or sunflower oil.  Meal planning    · Eat a balanced diet that includes:  ? 4 or more servings of fruits and 4 or more servings of vegetables each day. Try to fill one-half of your plate with fruits and vegetables.  ? 6-8 " servings of whole grains each day.  ? Less than 6 oz (170 g) of lean meat, poultry, or fish each day. A 3-oz (85-g) serving of meat is about the same size as a deck of cards. One egg equals 1 oz (28 g).  ? 2-3 servings of low-fat dairy each day. One serving is 1 cup (237 mL).  ? 1 serving of nuts, seeds, or beans 5 times each week.  ? 2-3 servings of heart-healthy fats. Healthy fats called omega-3 fatty acids are found in foods such as walnuts, flaxseeds, fortified milks, and eggs. These fats are also found in cold-water fish, such as sardines, salmon, and mackerel.  · Limit how much you eat of:  ? Canned or prepackaged foods.  ? Food that is high in trans fat, such as some fried foods.  ? Food that is high in saturated fat, such as fatty meat.  ? Desserts and other sweets, sugary drinks, and other foods with added sugar.  ? Full-fat dairy products.  · Do not salt foods before eating.  · Do not eat more than 4 egg yolks a week.  · Try to eat at least 2 vegetarian meals a week.  · Eat more home-cooked food and less restaurant, buffet, and fast food.  Lifestyle  · When eating at a restaurant, ask that your food be prepared with less salt or no salt, if possible.  · If you drink alcohol:  ? Limit how much you use to:  § 0-1 drink a day for women who are not pregnant.  § 0-2 drinks a day for men.  ? Be aware of how much alcohol is in your drink. In the [place] one drink equals one 12 oz bottle of beer (355 mL), one 5 oz glass of wine (148 mL), or one 1½ oz glass of hard liquor (44 mL).  General information  · Avoid eating more than 2,300 mg of salt a day. If you have hypertension, you may need to reduce your sodium intake to 1,500 mg a day.  · Work with your health care provider to maintain a healthy body weight or to lose weight. Ask what an ideal weight is for you.  · Get at least 30 minutes of exercise that causes your heart to beat faster (aerobic exercise) most days of the week. Activities may include walking,  swimming, or biking.  · Work with your health care provider or dietitian to adjust your eating plan to your individual calorie needs.  What foods should I eat?  Fruits  All fresh, dried, or frozen fruit. Canned fruit in natural juice (without added sugar).  Vegetables  Fresh or frozen vegetables (raw, steamed, roasted, or grilled). Low-sodium or reduced-sodium tomato and vegetable juice. Low-sodium or reduced-sodium tomato sauce and tomato paste. Low-sodium or reduced-sodium canned vegetables.  Grains  Whole-grain or whole-wheat bread. Whole-grain or whole-wheat pasta. Brown rice. Oatmeal. Quinoa. Bulgur. Whole-grain and low-sodium cereals. Yuli bread. Low-fat, low-sodium crackers. Whole-wheat flour tortillas.  Meats and other proteins  Skinless chicken or turkey. Ground chicken or turkey. Pork with fat trimmed off. Fish and seafood. Egg whites. Dried beans, peas, or lentils. Unsalted nuts, nut butters, and seeds. Unsalted canned beans. Lean cuts of beef with fat trimmed off. Low-sodium, lean precooked or cured meat, such as sausages or meat loaves.  Dairy  Low-fat (1%) or fat-free (skim) milk. Reduced-fat, low-fat, or fat-free cheeses. Nonfat, low-sodium ricotta or cottage cheese. Low-fat or nonfat yogurt. Low-fat, low-sodium cheese.  Fats and oils  Soft margarine without trans fats. Vegetable oil. Reduced-fat, low-fat, or light mayonnaise and salad dressings (reduced-sodium). Canola, safflower, olive, avocado, soybean, and sunflower oils. Avocado.  Seasonings and condiments  Herbs. Spices. Seasoning mixes without salt.  Other foods  Unsalted popcorn and pretzels. Fat-free sweets.  The items listed above may not be a complete list of foods and beverages you can eat. Contact a dietitian for more information.  What foods should I avoid?  Fruits  Canned fruit in a light or heavy syrup. Fried fruit. Fruit in cream or butter sauce.  Vegetables  Creamed or fried vegetables. Vegetables in a cheese sauce. Regular canned  vegetables (not low-sodium or reduced-sodium). Regular canned tomato sauce and paste (not low-sodium or reduced-sodium). Regular tomato and vegetable juice (not low-sodium or reduced-sodium). Pickles. Olives.  Grains  Baked goods made with fat, such as croissants, muffins, or some breads. Dry pasta or rice meal packs.  Meats and other proteins  Fatty cuts of meat. Ribs. Fried meat. Chu. Bologna, salami, and other precooked or cured meats, such as sausages or meat loaves. Fat from the back of a pig (fatback). Bratwurst. Salted nuts and seeds. Canned beans with added salt. Canned or smoked fish. Whole eggs or egg yolks. Chicken or turkey with skin.  Dairy  Whole or 2% milk, cream, and half-and-half. Whole or full-fat cream cheese. Whole-fat or sweetened yogurt. Full-fat cheese. Nondairy creamers. Whipped toppings. Processed cheese and cheese spreads.  Fats and oils  Butter. Stick margarine. Lard. Shortening. Ghee. Chu fat. Tropical oils, such as coconut, palm kernel, or palm oil.  Seasonings and condiments  Onion salt, garlic salt, seasoned salt, table salt, and sea salt. Worcestershire sauce. Tartar sauce. Barbecue sauce. Teriyaki sauce. Soy sauce, including reduced-sodium. Steak sauce. Canned and packaged gravies. Fish sauce. Oyster sauce. Cocktail sauce. Store-bought horseradish. Ketchup. Mustard. Meat flavorings and tenderizers. Bouillon cubes. Hot sauces. Pre-made or packaged marinades. Pre-made or packaged taco seasonings. Relishes. Regular salad dressings.  Other foods  Salted popcorn and pretzels.  The items listed above may not be a complete list of foods and beverages you should avoid. Contact a dietitian for more information.  Where to find more information  · National Heart, Lung, and Blood [place]: www.nhlbi.nih.gov  · American Heart Association: www.heart.org  · Academy of Nutrition and Dietetics: www.eatright.org  · National Kidney Foundation: www.kidney.org  Summary  · The DASH eating plan is a  healthy eating plan that has been shown to reduce high blood pressure (hypertension). It may also reduce your risk for type 2 diabetes, heart disease, and stroke.  · When on the DASH eating plan, aim to eat more fresh fruits and vegetables, whole grains, lean proteins, low-fat dairy, and heart-healthy fats.  · With the DASH eating plan, you should limit salt (sodium) intake to 2,300 mg a day. If you have hypertension, you may need to reduce your sodium intake to 1,500 mg a day.  · Work with your health care provider or dietitian to adjust your eating plan to your individual calorie needs.  This information is not intended to replace advice given to you by your health care provider. Make sure you discuss any questions you have with your health care provider.  Document Revised: 11/20/2020 Document Reviewed: 11/20/2020  [employer] Patient Education © 2021 [employer]

## 2021-06-15 ENCOUNTER — TELEPHONE (OUTPATIENT)
Dept: INTERNAL MEDICINE | Facility: CLINIC | Age: 86
End: 2021-06-15

## 2021-06-15 NOTE — TELEPHONE ENCOUNTER
The following is a message from Dr. Falcon's surgery scheduler.  Looks like you cleared him from your standpoint at his last visit, but just want to confirm.    Olivia Dozier Ronald L, MD; [facility]  Patient is scheduled for surgery on 6/30/21 with Dr Falcon for a Lumbar Fusion and Revision. His pre-op testing is scheduled for 6/28/21.     Patient was last seen in your office on 5/26/21, we are requesting surgical clearance. Are you able to clear the patient and put in a surgical clearance note in his chart or will you need to see him for another appointment?     Thank You!     Olivia   Surgery Scheduler  [facility]

## 2021-06-24 ENCOUNTER — TRANSCRIBE ORDERS (OUTPATIENT)
Dept: ADMINISTRATIVE | Facility: HOSPITAL | Age: 86
End: 2021-06-24

## 2021-06-24 DIAGNOSIS — Z11.59 SCREENING FOR VIRAL DISEASE: Primary | ICD-10-CM

## 2021-06-28 ENCOUNTER — PRE-ADMISSION TESTING (OUTPATIENT)
Dept: PREADMISSION TESTING | Facility: HOSPITAL | Age: 86
End: 2021-06-28

## 2021-06-28 ENCOUNTER — HOSPITAL ENCOUNTER (OUTPATIENT)
Dept: CT IMAGING | Facility: HOSPITAL | Age: 86
Discharge: HOME | End: 2021-06-28

## 2021-06-28 ENCOUNTER — LAB (OUTPATIENT)
Dept: LAB | Facility: HOSPITAL | Age: 86
End: 2021-06-28

## 2021-06-28 VITALS
WEIGHT: 169.31 LBS | OXYGEN SATURATION: 99 % | BODY MASS INDEX: 25.08 KG/M2 | HEIGHT: 69 IN | DIASTOLIC BLOOD PRESSURE: 79 MMHG | SYSTOLIC BLOOD PRESSURE: 188 MMHG | HEART RATE: 58 BPM | RESPIRATION RATE: 16 BRPM

## 2021-06-28 DIAGNOSIS — M51.37 DEGENERATION OF LUMBAR OR LUMBOSACRAL INTERVERTEBRAL DISC: ICD-10-CM

## 2021-06-28 DIAGNOSIS — M79.605 LEFT LEG PAIN: ICD-10-CM

## 2021-06-28 DIAGNOSIS — M48.062 SPINAL STENOSIS, LUMBAR REGION, WITH NEUROGENIC CLAUDICATION: ICD-10-CM

## 2021-06-28 DIAGNOSIS — M51.16 LUMBAR DISC DISEASE WITH RADICULOPATHY: ICD-10-CM

## 2021-06-28 LAB
ALBUMIN SERPL-MCNC: 4.4 G/DL (ref 3.5–5.2)
ALBUMIN/GLOB SERPL: 1.9 G/DL
ALP SERPL-CCNC: 67 U/L (ref 39–117)
ALT SERPL W P-5'-P-CCNC: 23 U/L (ref 1–41)
ANION GAP SERPL CALCULATED.3IONS-SCNC: 7 MMOL/L (ref 5–15)
AST SERPL-CCNC: 22 U/L (ref 1–40)
BACTERIA UR QL AUTO: ABNORMAL /HPF
BILIRUB SERPL-MCNC: 0.5 MG/DL (ref 0–1.2)
BILIRUB UR QL STRIP: NEGATIVE
BUN SERPL-MCNC: 14 MG/DL (ref 8–23)
BUN/CREAT SERPL: 24.6 (ref 7–25)
CALCIUM SPEC-SCNC: 9.3 MG/DL (ref 8.2–9.6)
CHLORIDE SERPL-SCNC: 99 MMOL/L (ref 98–107)
CLARITY UR: CLEAR
CO2 SERPL-SCNC: 29 MMOL/L (ref 22–29)
COLOR UR: YELLOW
CREAT SERPL-MCNC: 0.57 MG/DL (ref 0.76–1.27)
DEPRECATED RDW RBC AUTO: 45.1 FL (ref 37–54)
ERYTHROCYTE [DISTWIDTH] IN BLOOD BY AUTOMATED COUNT: 12.6 % (ref 12.3–15.4)
GFR SERPL CREATININE-BSD FRML MDRD: 134 ML/MIN/1.73
GLOBULIN UR ELPH-MCNC: 2.3 GM/DL
GLUCOSE SERPL-MCNC: 97 MG/DL (ref 65–99)
GLUCOSE UR STRIP-MCNC: NEGATIVE MG/DL
HCT VFR BLD AUTO: 36 % (ref 37.5–51)
HGB BLD-MCNC: 12 G/DL (ref 13–17.7)
HGB UR QL STRIP.AUTO: NEGATIVE
HYALINE CASTS UR QL AUTO: ABNORMAL /LPF
KETONES UR QL STRIP: NEGATIVE
LEUKOCYTE ESTERASE UR QL STRIP.AUTO: ABNORMAL
MCH RBC QN AUTO: 32.8 PG (ref 26.6–33)
MCHC RBC AUTO-ENTMCNC: 33.3 G/DL (ref 31.5–35.7)
MCV RBC AUTO: 98.4 FL (ref 79–97)
NITRITE UR QL STRIP: NEGATIVE
PH UR STRIP.AUTO: 6.5 [PH] (ref 5–8)
PLATELET # BLD AUTO: 239 10*3/MM3 (ref 140–450)
PMV BLD AUTO: 9.6 FL (ref 6–12)
POTASSIUM SERPL-SCNC: 4.6 MMOL/L (ref 3.5–5.2)
PROT SERPL-MCNC: 6.7 G/DL (ref 6–8.5)
PROT UR QL STRIP: NEGATIVE
RBC # BLD AUTO: 3.66 10*6/MM3 (ref 4.14–5.8)
RBC # UR: ABNORMAL /HPF
REF LAB TEST METHOD: ABNORMAL
SARS-COV-2 ORF1AB RESP QL NAA+PROBE: NOT DETECTED
SODIUM SERPL-SCNC: 135 MMOL/L (ref 136–145)
SP GR UR STRIP: <=1.005 (ref 1–1.03)
SQUAMOUS #/AREA URNS HPF: ABNORMAL /HPF
UROBILINOGEN UR QL STRIP: ABNORMAL
WBC # BLD AUTO: 8.08 10*3/MM3 (ref 3.4–10.8)
WBC UR QL AUTO: ABNORMAL /HPF

## 2021-06-28 PROCEDURE — 93005 ELECTROCARDIOGRAM TRACING: CPT

## 2021-06-28 PROCEDURE — 36415 COLL VENOUS BLD VENIPUNCTURE: CPT

## 2021-06-28 PROCEDURE — C9803 HOPD COVID-19 SPEC COLLECT: HCPCS | Performed by: NEUROLOGICAL SURGERY

## 2021-06-28 PROCEDURE — 93010 ELECTROCARDIOGRAM REPORT: CPT | Performed by: INTERNAL MEDICINE

## 2021-06-28 PROCEDURE — 85027 COMPLETE CBC AUTOMATED: CPT

## 2021-06-28 PROCEDURE — U0004 COV-19 TEST NON-CDC HGH THRU: HCPCS | Performed by: NEUROLOGICAL SURGERY

## 2021-06-28 PROCEDURE — C9803 HOPD COVID-19 SPEC COLLECT: HCPCS

## 2021-06-28 PROCEDURE — 81001 URINALYSIS AUTO W/SCOPE: CPT

## 2021-06-28 PROCEDURE — 80053 COMPREHEN METABOLIC PANEL: CPT

## 2021-06-28 PROCEDURE — U0005 INFEC AGEN DETEC AMPLI PROBE: HCPCS | Performed by: NEUROLOGICAL SURGERY

## 2021-06-28 PROCEDURE — 72131 CT LUMBAR SPINE W/O DYE: CPT

## 2021-06-29 LAB
QT INTERVAL: 488 MS
QTC INTERVAL: 449 MS

## 2021-06-30 ENCOUNTER — HOSPITAL ENCOUNTER (INPATIENT)
Facility: HOSPITAL | Age: 86
LOS: 2 days | Discharge: HOME HEALTH | End: 2021-07-02
Attending: NEUROLOGICAL SURGERY | Admitting: NEUROLOGICAL SURGERY

## 2021-06-30 ENCOUNTER — ANESTHESIA (OUTPATIENT)
Dept: PERIOP | Facility: HOSPITAL | Age: 86
End: 2021-06-30

## 2021-06-30 ENCOUNTER — ANESTHESIA EVENT (OUTPATIENT)
Dept: PERIOP | Facility: HOSPITAL | Age: 86
End: 2021-06-30

## 2021-06-30 ENCOUNTER — APPOINTMENT (OUTPATIENT)
Dept: GENERAL RADIOLOGY | Facility: HOSPITAL | Age: 86
End: 2021-06-30

## 2021-06-30 DIAGNOSIS — R55 SYNCOPE, UNSPECIFIED SYNCOPE TYPE: ICD-10-CM

## 2021-06-30 DIAGNOSIS — R35.0 FREQUENCY OF URINATION: Primary | ICD-10-CM

## 2021-06-30 DIAGNOSIS — M51.16 LUMBAR DISC DISEASE WITH RADICULOPATHY: ICD-10-CM

## 2021-06-30 DIAGNOSIS — M48.00 SPINAL STENOSIS, UNSPECIFIED SPINAL REGION: ICD-10-CM

## 2021-06-30 DIAGNOSIS — I25.118 CORONARY ARTERY DISEASE OF NATIVE ARTERY OF NATIVE HEART WITH STABLE ANGINA PECTORIS (HCC): ICD-10-CM

## 2021-06-30 DIAGNOSIS — Z74.09 IMPAIRED MOBILITY: ICD-10-CM

## 2021-06-30 DIAGNOSIS — G47.33 OBSTRUCTIVE SLEEP APNEA SYNDROME: ICD-10-CM

## 2021-06-30 DIAGNOSIS — Z78.9 DECREASED ACTIVITIES OF DAILY LIVING (ADL): ICD-10-CM

## 2021-06-30 DIAGNOSIS — M48.062 SPINAL STENOSIS, LUMBAR REGION, WITH NEUROGENIC CLAUDICATION: ICD-10-CM

## 2021-06-30 PROCEDURE — 0SG00AJ FUSION OF LUMBAR VERTEBRAL JOINT WITH INTERBODY FUSION DEVICE, POSTERIOR APPROACH, ANTERIOR COLUMN, OPEN APPROACH: ICD-10-PCS | Performed by: NEUROLOGICAL SURGERY

## 2021-06-30 PROCEDURE — 25010000002 CEFAZOLIN PER 500 MG: Performed by: NURSE PRACTITIONER

## 2021-06-30 PROCEDURE — C1713 ANCHOR/SCREW BN/BN,TIS/BN: HCPCS | Performed by: NEUROLOGICAL SURGERY

## 2021-06-30 PROCEDURE — 25010000002 ONDANSETRON PER 1 MG: Performed by: NURSE ANESTHETIST, CERTIFIED REGISTERED

## 2021-06-30 PROCEDURE — C1889 IMPLANT/INSERT DEVICE, NOC: HCPCS | Performed by: NEUROLOGICAL SURGERY

## 2021-06-30 PROCEDURE — 25010000002 PROPOFOL 10 MG/ML EMULSION: Performed by: NURSE ANESTHETIST, CERTIFIED REGISTERED

## 2021-06-30 PROCEDURE — 0ST20ZZ RESECTION OF LUMBAR VERTEBRAL DISC, OPEN APPROACH: ICD-10-PCS | Performed by: NEUROLOGICAL SURGERY

## 2021-06-30 PROCEDURE — 8E0W0CZ ROBOTIC ASSISTED PROCEDURE OF TRUNK REGION, OPEN APPROACH: ICD-10-PCS | Performed by: NEUROLOGICAL SURGERY

## 2021-06-30 PROCEDURE — 22842 INSERT SPINE FIXATION DEVICE: CPT | Performed by: NEUROLOGICAL SURGERY

## 2021-06-30 PROCEDURE — 72100 X-RAY EXAM L-S SPINE 2/3 VWS: CPT

## 2021-06-30 PROCEDURE — 4A11X4G MONITORING OF PERIPHERAL NERVOUS ELECTRICAL ACTIVITY, INTRAOPERATIVE, EXTERNAL APPROACH: ICD-10-PCS | Performed by: NEUROLOGICAL SURGERY

## 2021-06-30 PROCEDURE — 01NB0ZZ RELEASE LUMBAR NERVE, OPEN APPROACH: ICD-10-PCS | Performed by: NEUROLOGICAL SURGERY

## 2021-06-30 PROCEDURE — 25010000002 HYDROMORPHONE PER 4 MG: Performed by: ANESTHESIOLOGY

## 2021-06-30 PROCEDURE — 25010000003 CEFAZOLIN PER 500 MG: Performed by: NEUROLOGICAL SURGERY

## 2021-06-30 PROCEDURE — 86901 BLOOD TYPING SEROLOGIC RH(D): CPT | Performed by: NEUROLOGICAL SURGERY

## 2021-06-30 PROCEDURE — 0QP004Z REMOVAL OF INTERNAL FIXATION DEVICE FROM LUMBAR VERTEBRA, OPEN APPROACH: ICD-10-PCS | Performed by: NEUROLOGICAL SURGERY

## 2021-06-30 PROCEDURE — 25010000002 FENTANYL CITRATE (PF) 250 MCG/5ML SOLUTION: Performed by: NURSE ANESTHETIST, CERTIFIED REGISTERED

## 2021-06-30 PROCEDURE — 22630 ARTHRD PST TQ 1NTRSPC LUM: CPT | Performed by: NEUROLOGICAL SURGERY

## 2021-06-30 PROCEDURE — 25010000002 ONDANSETRON PER 1 MG: Performed by: NURSE PRACTITIONER

## 2021-06-30 PROCEDURE — 22853 INSJ BIOMECHANICAL DEVICE: CPT | Performed by: NEUROLOGICAL SURGERY

## 2021-06-30 PROCEDURE — 86850 RBC ANTIBODY SCREEN: CPT | Performed by: NEUROLOGICAL SURGERY

## 2021-06-30 PROCEDURE — 0SH004Z INSERTION OF INTERNAL FIXATION DEVICE INTO LUMBAR VERTEBRAL JOINT, OPEN APPROACH: ICD-10-PCS | Performed by: NEUROLOGICAL SURGERY

## 2021-06-30 PROCEDURE — 25010000002 CEFAZOLIN PER 500 MG: Performed by: NEUROLOGICAL SURGERY

## 2021-06-30 PROCEDURE — 86900 BLOOD TYPING SEROLOGIC ABO: CPT | Performed by: NEUROLOGICAL SURGERY

## 2021-06-30 PROCEDURE — 61783 SCAN PROC SPINAL: CPT | Performed by: NEUROLOGICAL SURGERY

## 2021-06-30 PROCEDURE — 76000 FLUOROSCOPY <1 HR PHYS/QHP: CPT

## 2021-06-30 DEVICE — ROD 1476006110 4.75 CCM+ STRT PERC 110MM
Type: IMPLANTABLE DEVICE | Site: SPINE LUMBAR | Status: FUNCTIONAL
Brand: CD HORIZON® SPINAL SYSTEM

## 2021-06-30 DEVICE — SPACER 7770828 ELEVATE STD 28X8MM
Type: IMPLANTABLE DEVICE | Site: SPINE LUMBAR | Status: FUNCTIONAL
Brand: ELEVATE™ SPINAL SYSTEM

## 2021-06-30 DEVICE — MAS 54850014545 4.75 EXT TAB CANN 4.5X45
Type: IMPLANTABLE DEVICE | Site: SPINE LUMBAR | Status: FUNCTIONAL
Brand: CD HORIZON® SOLERA® SPINAL SYSTEM

## 2021-06-30 DEVICE — KT HEMOST ABS SURGIFOAM PORCN 1GRAM: Type: IMPLANTABLE DEVICE | Site: SPINE LUMBAR | Status: FUNCTIONAL

## 2021-06-30 DEVICE — HEMOST ABS SURGIFOAM SZ100 8X12 10MM: Type: IMPLANTABLE DEVICE | Site: SPINE LUMBAR | Status: FUNCTIONAL

## 2021-06-30 DEVICE — SCRW ST SOLERA VOYAGER BRKOFF TI 4.75MM: Type: IMPLANTABLE DEVICE | Site: SPINE LUMBAR | Status: FUNCTIONAL

## 2021-06-30 RX ORDER — SODIUM CHLORIDE 9 MG/ML
INJECTION, SOLUTION INTRAVENOUS AS NEEDED
Status: DISCONTINUED | OUTPATIENT
Start: 2021-06-30 | End: 2021-06-30 | Stop reason: HOSPADM

## 2021-06-30 RX ORDER — DOCUSATE SODIUM 100 MG/1
100 CAPSULE, LIQUID FILLED ORAL 2 TIMES DAILY
Status: DISCONTINUED | OUTPATIENT
Start: 2021-06-30 | End: 2021-07-02 | Stop reason: HOSPADM

## 2021-06-30 RX ORDER — MAGNESIUM HYDROXIDE 1200 MG/15ML
LIQUID ORAL AS NEEDED
Status: DISCONTINUED | OUTPATIENT
Start: 2021-06-30 | End: 2021-06-30 | Stop reason: HOSPADM

## 2021-06-30 RX ORDER — BUPIVACAINE HYDROCHLORIDE AND EPINEPHRINE 2.5; 5 MG/ML; UG/ML
INJECTION, SOLUTION EPIDURAL; INFILTRATION; INTRACAUDAL; PERINEURAL AS NEEDED
Status: DISCONTINUED | OUTPATIENT
Start: 2021-06-30 | End: 2021-06-30 | Stop reason: HOSPADM

## 2021-06-30 RX ORDER — ACETAMINOPHEN 500 MG
1000 TABLET ORAL ONCE
Status: COMPLETED | OUTPATIENT
Start: 2021-06-30 | End: 2021-06-30

## 2021-06-30 RX ORDER — SODIUM CHLORIDE 0.9 % (FLUSH) 0.9 %
3 SYRINGE (ML) INJECTION EVERY 12 HOURS SCHEDULED
Status: DISCONTINUED | OUTPATIENT
Start: 2021-06-30 | End: 2021-07-02 | Stop reason: HOSPADM

## 2021-06-30 RX ORDER — SODIUM CHLORIDE 9 MG/ML
75 INJECTION, SOLUTION INTRAVENOUS CONTINUOUS
Status: DISCONTINUED | OUTPATIENT
Start: 2021-06-30 | End: 2021-07-02 | Stop reason: HOSPADM

## 2021-06-30 RX ORDER — SUCCINYLCHOLINE/SOD CL,ISO/PF 200MG/10ML
SYRINGE (ML) INTRAVENOUS AS NEEDED
Status: DISCONTINUED | OUTPATIENT
Start: 2021-06-30 | End: 2021-06-30 | Stop reason: SURG

## 2021-06-30 RX ORDER — CYCLOBENZAPRINE HCL 10 MG
10 TABLET ORAL 3 TIMES DAILY PRN
Status: DISCONTINUED | OUTPATIENT
Start: 2021-06-30 | End: 2021-07-02 | Stop reason: HOSPADM

## 2021-06-30 RX ORDER — PROPOFOL 10 MG/ML
VIAL (ML) INTRAVENOUS AS NEEDED
Status: DISCONTINUED | OUTPATIENT
Start: 2021-06-30 | End: 2021-06-30 | Stop reason: SURG

## 2021-06-30 RX ORDER — SODIUM CHLORIDE, SODIUM LACTATE, POTASSIUM CHLORIDE, CALCIUM CHLORIDE 600; 310; 30; 20 MG/100ML; MG/100ML; MG/100ML; MG/100ML
1000 INJECTION, SOLUTION INTRAVENOUS CONTINUOUS
Status: DISCONTINUED | OUTPATIENT
Start: 2021-06-30 | End: 2021-06-30 | Stop reason: HOSPADM

## 2021-06-30 RX ORDER — FLUMAZENIL 0.1 MG/ML
0.2 INJECTION INTRAVENOUS AS NEEDED
Status: DISCONTINUED | OUTPATIENT
Start: 2021-06-30 | End: 2021-06-30 | Stop reason: HOSPADM

## 2021-06-30 RX ORDER — ROCURONIUM BROMIDE 10 MG/ML
INJECTION, SOLUTION INTRAVENOUS AS NEEDED
Status: DISCONTINUED | OUTPATIENT
Start: 2021-06-30 | End: 2021-06-30 | Stop reason: SURG

## 2021-06-30 RX ORDER — BUPIVACAINE HCL/0.9 % NACL/PF 0.1 %
2 PLASTIC BAG, INJECTION (ML) EPIDURAL EVERY 8 HOURS
Status: COMPLETED | OUTPATIENT
Start: 2021-06-30 | End: 2021-07-01

## 2021-06-30 RX ORDER — FENTANYL CITRATE 50 UG/ML
INJECTION, SOLUTION INTRAMUSCULAR; INTRAVENOUS AS NEEDED
Status: DISCONTINUED | OUTPATIENT
Start: 2021-06-30 | End: 2021-06-30 | Stop reason: SURG

## 2021-06-30 RX ORDER — AMLODIPINE BESYLATE 5 MG/1
2.5 TABLET ORAL DAILY
Status: DISCONTINUED | OUTPATIENT
Start: 2021-06-30 | End: 2021-07-02 | Stop reason: HOSPADM

## 2021-06-30 RX ORDER — IBUPROFEN 600 MG/1
600 TABLET ORAL ONCE AS NEEDED
Status: DISCONTINUED | OUTPATIENT
Start: 2021-06-30 | End: 2021-06-30 | Stop reason: HOSPADM

## 2021-06-30 RX ORDER — GABAPENTIN 300 MG/1
600 CAPSULE ORAL EVERY 8 HOURS SCHEDULED
Status: DISCONTINUED | OUTPATIENT
Start: 2021-06-30 | End: 2021-07-02 | Stop reason: HOSPADM

## 2021-06-30 RX ORDER — NALOXONE HCL 0.4 MG/ML
0.04 VIAL (ML) INJECTION AS NEEDED
Status: DISCONTINUED | OUTPATIENT
Start: 2021-06-30 | End: 2021-06-30 | Stop reason: HOSPADM

## 2021-06-30 RX ORDER — SODIUM CHLORIDE 0.9 % (FLUSH) 0.9 %
3 SYRINGE (ML) INJECTION EVERY 12 HOURS SCHEDULED
Status: DISCONTINUED | OUTPATIENT
Start: 2021-06-30 | End: 2021-06-30 | Stop reason: HOSPADM

## 2021-06-30 RX ORDER — ONDANSETRON 2 MG/ML
4 INJECTION INTRAMUSCULAR; INTRAVENOUS EVERY 6 HOURS PRN
Status: DISCONTINUED | OUTPATIENT
Start: 2021-06-30 | End: 2021-07-02 | Stop reason: HOSPADM

## 2021-06-30 RX ORDER — CHLORPHENIRAMINE MALEATE 4 MG/1
4 TABLET ORAL DAILY
Status: DISCONTINUED | OUTPATIENT
Start: 2021-06-30 | End: 2021-07-02 | Stop reason: HOSPADM

## 2021-06-30 RX ORDER — LABETALOL HYDROCHLORIDE 5 MG/ML
5 INJECTION, SOLUTION INTRAVENOUS
Status: DISCONTINUED | OUTPATIENT
Start: 2021-06-30 | End: 2021-06-30 | Stop reason: HOSPADM

## 2021-06-30 RX ORDER — MORPHINE SULFATE 2 MG/ML
1 INJECTION, SOLUTION INTRAMUSCULAR; INTRAVENOUS EVERY 4 HOURS PRN
Status: DISCONTINUED | OUTPATIENT
Start: 2021-06-30 | End: 2021-07-02 | Stop reason: HOSPADM

## 2021-06-30 RX ORDER — ACETAMINOPHEN 325 MG/1
650 TABLET ORAL EVERY 4 HOURS PRN
Status: DISCONTINUED | OUTPATIENT
Start: 2021-06-30 | End: 2021-07-02 | Stop reason: HOSPADM

## 2021-06-30 RX ORDER — LIDOCAINE HYDROCHLORIDE 10 MG/ML
0.5 INJECTION, SOLUTION EPIDURAL; INFILTRATION; INTRACAUDAL; PERINEURAL ONCE AS NEEDED
Status: DISCONTINUED | OUTPATIENT
Start: 2021-06-30 | End: 2021-06-30 | Stop reason: SDUPTHER

## 2021-06-30 RX ORDER — BISACODYL 10 MG
10 SUPPOSITORY, RECTAL RECTAL DAILY PRN
Status: DISCONTINUED | OUTPATIENT
Start: 2021-06-30 | End: 2021-07-02 | Stop reason: HOSPADM

## 2021-06-30 RX ORDER — PRIMIDONE 50 MG/1
50 TABLET ORAL NIGHTLY
Status: DISCONTINUED | OUTPATIENT
Start: 2021-06-30 | End: 2021-07-02 | Stop reason: HOSPADM

## 2021-06-30 RX ORDER — NALOXONE HCL 0.4 MG/ML
0.4 VIAL (ML) INJECTION
Status: DISCONTINUED | OUTPATIENT
Start: 2021-06-30 | End: 2021-07-02 | Stop reason: HOSPADM

## 2021-06-30 RX ORDER — OXYCODONE AND ACETAMINOPHEN 7.5; 325 MG/1; MG/1
1 TABLET ORAL EVERY 4 HOURS PRN
Status: DISCONTINUED | OUTPATIENT
Start: 2021-06-30 | End: 2021-07-02 | Stop reason: HOSPADM

## 2021-06-30 RX ORDER — SODIUM CHLORIDE 0.9 % (FLUSH) 0.9 %
3 SYRINGE (ML) INJECTION AS NEEDED
Status: DISCONTINUED | OUTPATIENT
Start: 2021-06-30 | End: 2021-06-30 | Stop reason: HOSPADM

## 2021-06-30 RX ORDER — LIDOCAINE HYDROCHLORIDE 10 MG/ML
0.5 INJECTION, SOLUTION EPIDURAL; INFILTRATION; INTRACAUDAL; PERINEURAL ONCE AS NEEDED
Status: DISCONTINUED | OUTPATIENT
Start: 2021-06-30 | End: 2021-06-30 | Stop reason: HOSPADM

## 2021-06-30 RX ORDER — FINASTERIDE 5 MG/1
5 TABLET, FILM COATED ORAL DAILY
Status: DISCONTINUED | OUTPATIENT
Start: 2021-06-30 | End: 2021-07-02 | Stop reason: HOSPADM

## 2021-06-30 RX ORDER — TAMSULOSIN HYDROCHLORIDE 0.4 MG/1
0.4 CAPSULE ORAL NIGHTLY
Status: DISCONTINUED | OUTPATIENT
Start: 2021-06-30 | End: 2021-07-02 | Stop reason: HOSPADM

## 2021-06-30 RX ORDER — OXYCODONE HYDROCHLORIDE AND ACETAMINOPHEN 5; 325 MG/1; MG/1
1 TABLET ORAL ONCE AS NEEDED
Status: COMPLETED | OUTPATIENT
Start: 2021-06-30 | End: 2021-06-30

## 2021-06-30 RX ORDER — ONDANSETRON 2 MG/ML
4 INJECTION INTRAMUSCULAR; INTRAVENOUS AS NEEDED
Status: DISCONTINUED | OUTPATIENT
Start: 2021-06-30 | End: 2021-06-30 | Stop reason: HOSPADM

## 2021-06-30 RX ORDER — SODIUM CHLORIDE 0.9 % (FLUSH) 0.9 %
10 SYRINGE (ML) INJECTION AS NEEDED
Status: DISCONTINUED | OUTPATIENT
Start: 2021-06-30 | End: 2021-06-30 | Stop reason: HOSPADM

## 2021-06-30 RX ORDER — PRAVASTATIN SODIUM 20 MG
40 TABLET ORAL DAILY
Status: DISCONTINUED | OUTPATIENT
Start: 2021-06-30 | End: 2021-07-02 | Stop reason: HOSPADM

## 2021-06-30 RX ORDER — FENTANYL CITRATE 50 UG/ML
25 INJECTION, SOLUTION INTRAMUSCULAR; INTRAVENOUS
Status: DISCONTINUED | OUTPATIENT
Start: 2021-06-30 | End: 2021-06-30 | Stop reason: HOSPADM

## 2021-06-30 RX ORDER — ONDANSETRON 2 MG/ML
INJECTION INTRAMUSCULAR; INTRAVENOUS AS NEEDED
Status: DISCONTINUED | OUTPATIENT
Start: 2021-06-30 | End: 2021-06-30 | Stop reason: SURG

## 2021-06-30 RX ORDER — SODIUM CHLORIDE, SODIUM LACTATE, POTASSIUM CHLORIDE, CALCIUM CHLORIDE 600; 310; 30; 20 MG/100ML; MG/100ML; MG/100ML; MG/100ML
100 INJECTION, SOLUTION INTRAVENOUS CONTINUOUS
Status: DISCONTINUED | OUTPATIENT
Start: 2021-06-30 | End: 2021-06-30 | Stop reason: HOSPADM

## 2021-06-30 RX ORDER — EPHEDRINE SULFATE 50 MG/ML
INJECTION, SOLUTION INTRAVENOUS AS NEEDED
Status: DISCONTINUED | OUTPATIENT
Start: 2021-06-30 | End: 2021-06-30 | Stop reason: SURG

## 2021-06-30 RX ORDER — BUPIVACAINE HCL/0.9 % NACL/PF 0.1 %
2 PLASTIC BAG, INJECTION (ML) EPIDURAL ONCE
Status: COMPLETED | OUTPATIENT
Start: 2021-06-30 | End: 2021-06-30

## 2021-06-30 RX ORDER — POLYETHYLENE GLYCOL 3350 17 G/17G
17 POWDER, FOR SOLUTION ORAL DAILY PRN
Status: DISCONTINUED | OUTPATIENT
Start: 2021-06-30 | End: 2021-07-02 | Stop reason: HOSPADM

## 2021-06-30 RX ORDER — SODIUM CHLORIDE 0.9 % (FLUSH) 0.9 %
10 SYRINGE (ML) INJECTION AS NEEDED
Status: DISCONTINUED | OUTPATIENT
Start: 2021-06-30 | End: 2021-07-02 | Stop reason: HOSPADM

## 2021-06-30 RX ORDER — HYDROMORPHONE HYDROCHLORIDE 1 MG/ML
0.2 INJECTION, SOLUTION INTRAMUSCULAR; INTRAVENOUS; SUBCUTANEOUS
Status: DISCONTINUED | OUTPATIENT
Start: 2021-06-30 | End: 2021-06-30 | Stop reason: HOSPADM

## 2021-06-30 RX ORDER — SODIUM CHLORIDE 0.9 % (FLUSH) 0.9 %
3-10 SYRINGE (ML) INJECTION AS NEEDED
Status: DISCONTINUED | OUTPATIENT
Start: 2021-06-30 | End: 2021-06-30 | Stop reason: HOSPADM

## 2021-06-30 RX ADMIN — FINASTERIDE 5 MG: 5 TABLET, FILM COATED ORAL at 21:46

## 2021-06-30 RX ADMIN — SODIUM CHLORIDE, POTASSIUM CHLORIDE, SODIUM LACTATE AND CALCIUM CHLORIDE 1000 ML: 600; 310; 30; 20 INJECTION, SOLUTION INTRAVENOUS at 12:30

## 2021-06-30 RX ADMIN — EPHEDRINE SULFATE 10 MG: 50 INJECTION INTRAVENOUS at 15:44

## 2021-06-30 RX ADMIN — SODIUM CHLORIDE 75 ML/HR: 9 INJECTION, SOLUTION INTRAVENOUS at 20:28

## 2021-06-30 RX ADMIN — FENTANYL CITRATE 50 MCG: 50 INJECTION, SOLUTION INTRAMUSCULAR; INTRAVENOUS at 15:14

## 2021-06-30 RX ADMIN — CHLORPHENIRAMINE MALEATE 4 MG: 4 TABLET ORAL at 21:47

## 2021-06-30 RX ADMIN — Medication 80 MG: at 15:04

## 2021-06-30 RX ADMIN — AMLODIPINE BESYLATE 2.5 MG: 5 TABLET ORAL at 21:47

## 2021-06-30 RX ADMIN — METOPROLOL TARTRATE 12.5 MG: 25 TABLET, FILM COATED ORAL at 21:47

## 2021-06-30 RX ADMIN — ONDANSETRON 4 MG: 2 INJECTION INTRAMUSCULAR; INTRAVENOUS at 20:28

## 2021-06-30 RX ADMIN — PRIMIDONE 50 MG: 50 TABLET ORAL at 21:46

## 2021-06-30 RX ADMIN — ACETAMINOPHEN 1000 MG: 500 TABLET, FILM COATED ORAL at 13:56

## 2021-06-30 RX ADMIN — PROPOFOL 50 MG: 10 INJECTION, EMULSION INTRAVENOUS at 15:04

## 2021-06-30 RX ADMIN — ONDANSETRON 4 MG: 2 INJECTION INTRAMUSCULAR; INTRAVENOUS at 16:31

## 2021-06-30 RX ADMIN — ROCURONIUM BROMIDE 5 MG: 50 INJECTION INTRAVENOUS at 15:04

## 2021-06-30 RX ADMIN — CEFAZOLIN SODIUM 2 G: 10 INJECTION, POWDER, FOR SOLUTION INTRAVENOUS at 23:24

## 2021-06-30 RX ADMIN — HYDROMORPHONE HYDROCHLORIDE 0.2 MG: 1 INJECTION, SOLUTION INTRAMUSCULAR; INTRAVENOUS; SUBCUTANEOUS at 18:46

## 2021-06-30 RX ADMIN — VASOPRESSIN 0.5 ML: 20 INJECTION INTRAVENOUS at 17:47

## 2021-06-30 RX ADMIN — HYDROMORPHONE HYDROCHLORIDE 0.2 MG: 1 INJECTION, SOLUTION INTRAMUSCULAR; INTRAVENOUS; SUBCUTANEOUS at 18:56

## 2021-06-30 RX ADMIN — TAMSULOSIN HYDROCHLORIDE 0.4 MG: 0.4 CAPSULE ORAL at 21:48

## 2021-06-30 RX ADMIN — FENTANYL CITRATE 100 MCG: 50 INJECTION, SOLUTION INTRAMUSCULAR; INTRAVENOUS at 15:00

## 2021-06-30 RX ADMIN — SODIUM CHLORIDE, POTASSIUM CHLORIDE, SODIUM LACTATE AND CALCIUM CHLORIDE 1000 ML: 600; 310; 30; 20 INJECTION, SOLUTION INTRAVENOUS at 12:31

## 2021-06-30 RX ADMIN — EPHEDRINE SULFATE 10 MG: 50 INJECTION INTRAVENOUS at 15:35

## 2021-06-30 RX ADMIN — ACETAMINOPHEN 650 MG: 325 TABLET, FILM COATED ORAL at 21:52

## 2021-06-30 RX ADMIN — PRAVASTATIN SODIUM 40 MG: 20 TABLET ORAL at 21:47

## 2021-06-30 RX ADMIN — OXYCODONE HYDROCHLORIDE AND ACETAMINOPHEN 1 TABLET: 5; 325 TABLET ORAL at 19:26

## 2021-06-30 RX ADMIN — DOCUSATE SODIUM 100 MG: 100 CAPSULE ORAL at 21:47

## 2021-06-30 RX ADMIN — Medication 2 G: at 15:17

## 2021-06-30 RX ADMIN — EPHEDRINE SULFATE 15 MG: 50 INJECTION INTRAVENOUS at 16:20

## 2021-06-30 RX ADMIN — FENTANYL CITRATE 100 MCG: 50 INJECTION, SOLUTION INTRAMUSCULAR; INTRAVENOUS at 15:55

## 2021-06-30 NOTE — ANESTHESIA PROCEDURE NOTES
Airway  Urgency: elective    Date/Time: 6/30/2021 3:08 PM  Airway not difficult    General Information and Staff    Patient location during procedure: OR  CRNA: Toño Sultana CRNA    Indications and Patient Condition  Indications for airway management: airway protection    Preoxygenated: yes  MILS maintained throughout  Mask difficulty assessment: 1 - vent by mask    Final Airway Details  Final airway type: endotracheal airway      Successful airway: ETT  Cuffed: yes   Successful intubation technique: direct laryngoscopy  Endotracheal tube insertion site: oral  Blade: Vance  Blade size: 2  ETT size (mm): 7.5  Cormack-Lehane Classification: grade I - full view of glottis  Placement verified by: chest auscultation and capnometry   Cuff volume (mL): 5  Measured from: gums  ETT/EBT to gums (cm): 22  Number of attempts at approach: 1  Assessment: lips, teeth, and gum same as pre-op and atraumatic intubation

## 2021-06-30 NOTE — ANESTHESIA PREPROCEDURE EVALUATION
Anesthesia Evaluation     history of anesthetic complications: PONV  NPO Solid Status: > 8 hours  NPO Liquid Status: > 8 hours           Airway   Mallampati: I  TM distance: >3 FB  Neck ROM: full  No difficulty expected  Dental      Pulmonary    (+) sleep apnea,   Cardiovascular   Exercise tolerance: poor (<4 METS)    Patient on routine beta blocker and Beta blocker given within 24 hours of surgery    (+) hypertension, past MI , CAD, CABG (1986) >6 Months, hyperlipidemia,     ROS comment: Low risk stress test 2016  CABG 1986  3 stents 1997    Neuro/Psych  (+) tremors, numbness,     (-) seizures, TIA, CVA  GI/Hepatic/Renal/Endo    (-) liver disease, no renal disease, diabetes    Musculoskeletal     Abdominal    Substance History      OB/GYN          Other   arthritis,                    Anesthesia Plan    ASA 3     general     intravenous induction     Anesthetic plan, all risks, benefits, and alternatives have been provided, discussed and informed consent has been obtained with: patient.

## 2021-07-01 LAB
ANION GAP SERPL CALCULATED.3IONS-SCNC: 5 MMOL/L (ref 5–15)
BASOPHILS # BLD AUTO: 0.02 10*3/MM3 (ref 0–0.2)
BASOPHILS NFR BLD AUTO: 0.2 % (ref 0–1.5)
BUN SERPL-MCNC: 12 MG/DL (ref 8–23)
BUN/CREAT SERPL: 21.4 (ref 7–25)
CALCIUM SPEC-SCNC: 7.8 MG/DL (ref 8.2–9.6)
CHLORIDE SERPL-SCNC: 99 MMOL/L (ref 98–107)
CO2 SERPL-SCNC: 27 MMOL/L (ref 22–29)
CREAT SERPL-MCNC: 0.56 MG/DL (ref 0.76–1.27)
DEPRECATED RDW RBC AUTO: 45.1 FL (ref 37–54)
EOSINOPHIL # BLD AUTO: 0.04 10*3/MM3 (ref 0–0.4)
EOSINOPHIL NFR BLD AUTO: 0.3 % (ref 0.3–6.2)
ERYTHROCYTE [DISTWIDTH] IN BLOOD BY AUTOMATED COUNT: 12.7 % (ref 12.3–15.4)
GFR SERPL CREATININE-BSD FRML MDRD: 137 ML/MIN/1.73
GLUCOSE SERPL-MCNC: 134 MG/DL (ref 65–99)
HCT VFR BLD AUTO: 25.5 % (ref 37.5–51)
HGB BLD-MCNC: 8.6 G/DL (ref 13–17.7)
IMM GRANULOCYTES # BLD AUTO: 0.05 10*3/MM3 (ref 0–0.05)
IMM GRANULOCYTES NFR BLD AUTO: 0.4 % (ref 0–0.5)
LYMPHOCYTES # BLD AUTO: 0.78 10*3/MM3 (ref 0.7–3.1)
LYMPHOCYTES NFR BLD AUTO: 6.1 % (ref 19.6–45.3)
MCH RBC QN AUTO: 33 PG (ref 26.6–33)
MCHC RBC AUTO-ENTMCNC: 33.7 G/DL (ref 31.5–35.7)
MCV RBC AUTO: 97.7 FL (ref 79–97)
MONOCYTES # BLD AUTO: 1.14 10*3/MM3 (ref 0.1–0.9)
MONOCYTES NFR BLD AUTO: 8.9 % (ref 5–12)
NEUTROPHILS NFR BLD AUTO: 10.85 10*3/MM3 (ref 1.7–7)
NEUTROPHILS NFR BLD AUTO: 84.1 % (ref 42.7–76)
NRBC BLD AUTO-RTO: 0 /100 WBC (ref 0–0.2)
PLATELET # BLD AUTO: 176 10*3/MM3 (ref 140–450)
PMV BLD AUTO: 9.7 FL (ref 6–12)
POTASSIUM SERPL-SCNC: 4.7 MMOL/L (ref 3.5–5.2)
RBC # BLD AUTO: 2.61 10*6/MM3 (ref 4.14–5.8)
SODIUM SERPL-SCNC: 131 MMOL/L (ref 136–145)
WBC # BLD AUTO: 12.88 10*3/MM3 (ref 3.4–10.8)

## 2021-07-01 PROCEDURE — 97166 OT EVAL MOD COMPLEX 45 MIN: CPT | Performed by: OCCUPATIONAL THERAPIST

## 2021-07-01 PROCEDURE — 80048 BASIC METABOLIC PNL TOTAL CA: CPT | Performed by: NURSE PRACTITIONER

## 2021-07-01 PROCEDURE — 97161 PT EVAL LOW COMPLEX 20 MIN: CPT | Performed by: PHYSICAL THERAPIST

## 2021-07-01 PROCEDURE — 99024 POSTOP FOLLOW-UP VISIT: CPT | Performed by: NEUROLOGICAL SURGERY

## 2021-07-01 PROCEDURE — 97116 GAIT TRAINING THERAPY: CPT

## 2021-07-01 PROCEDURE — 25010000002 CEFAZOLIN PER 500 MG: Performed by: NURSE PRACTITIONER

## 2021-07-01 PROCEDURE — 85025 COMPLETE CBC W/AUTO DIFF WBC: CPT | Performed by: NURSE PRACTITIONER

## 2021-07-01 RX ADMIN — SODIUM CHLORIDE, PRESERVATIVE FREE 3 ML: 5 INJECTION INTRAVENOUS at 20:22

## 2021-07-01 RX ADMIN — GABAPENTIN 600 MG: 300 CAPSULE ORAL at 05:51

## 2021-07-01 RX ADMIN — CHLORPHENIRAMINE MALEATE 4 MG: 4 TABLET ORAL at 09:00

## 2021-07-01 RX ADMIN — OXYCODONE HYDROCHLORIDE AND ACETAMINOPHEN 1 TABLET: 7.5; 325 TABLET ORAL at 03:49

## 2021-07-01 RX ADMIN — GABAPENTIN 600 MG: 300 CAPSULE ORAL at 21:15

## 2021-07-01 RX ADMIN — GABAPENTIN 600 MG: 300 CAPSULE ORAL at 14:32

## 2021-07-01 RX ADMIN — DOCUSATE SODIUM 100 MG: 100 CAPSULE ORAL at 20:21

## 2021-07-01 RX ADMIN — OXYCODONE HYDROCHLORIDE AND ACETAMINOPHEN 1 TABLET: 7.5; 325 TABLET ORAL at 13:53

## 2021-07-01 RX ADMIN — FINASTERIDE 5 MG: 5 TABLET, FILM COATED ORAL at 09:00

## 2021-07-01 RX ADMIN — METOPROLOL TARTRATE 12.5 MG: 25 TABLET, FILM COATED ORAL at 09:00

## 2021-07-01 RX ADMIN — OXYCODONE HYDROCHLORIDE AND ACETAMINOPHEN 1 TABLET: 7.5; 325 TABLET ORAL at 23:28

## 2021-07-01 RX ADMIN — PRIMIDONE 50 MG: 50 TABLET ORAL at 20:21

## 2021-07-01 RX ADMIN — DOCUSATE SODIUM 100 MG: 100 CAPSULE ORAL at 09:00

## 2021-07-01 RX ADMIN — TAMSULOSIN HYDROCHLORIDE 0.4 MG: 0.4 CAPSULE ORAL at 20:21

## 2021-07-01 RX ADMIN — SODIUM CHLORIDE 75 ML/HR: 9 INJECTION, SOLUTION INTRAVENOUS at 10:23

## 2021-07-01 RX ADMIN — CEFAZOLIN SODIUM 2 G: 10 INJECTION, POWDER, FOR SOLUTION INTRAVENOUS at 14:32

## 2021-07-01 RX ADMIN — CEFAZOLIN SODIUM 2 G: 10 INJECTION, POWDER, FOR SOLUTION INTRAVENOUS at 08:00

## 2021-07-01 RX ADMIN — AMLODIPINE BESYLATE 2.5 MG: 5 TABLET ORAL at 09:00

## 2021-07-01 RX ADMIN — METOPROLOL TARTRATE 12.5 MG: 25 TABLET, FILM COATED ORAL at 20:21

## 2021-07-01 RX ADMIN — PRAVASTATIN SODIUM 40 MG: 20 TABLET ORAL at 09:00

## 2021-07-01 NOTE — OP NOTE
Procedure Note  Preop Diagnosis: Spinal stenosis, lumbar region, with neurogenic claudication [M48.062]    Post-Op Diagnosis Codes:     * Spinal stenosis, lumbar region, with neurogenic claudication [M48.062]     Procedure Name: L2-3 discectomy  L2-3 interbody fusion with peek and allograft and autograft   L2-3 left hemilaminectomy, facetectomy, foraminotomy  L2, 3, 4, 5, S1 posterior pedicle screw instrumentation, revision of posterior pedicle screw instrumentation L3, 4, 5, S1  Use of image guided stereotactic navigation  Use of the surgical robot    indications:  A MRI revealed findings of Lumbar Degenerative Disc Disease. The patient now presents for a  L2-3 decompression and fusion after discussing therapeutic alternatives.          Surgeon: Kj Falcon MD     Assistants: None    Anesthesia: General endotracheal anesthesia    ASA Class: 3    Procedure Details   After obtaining informed consent, having the risks and benefits of the procedure explained including but not limited to infection, bleeding, paralysis, spinal fluid leak, bowel bladder incontinence, stroke, coma, and death.  The patient was brought to the operating.  The patient was given general anesthesia via an endotracheal tube.  The patient was flipped prone on a Flako axis table.  The lumbar spine was then prepped and draped in a standard sterile fashion.  The posterior superior iliac spine on the right was palpated and identified.  A preplanned incision was infiltrated Marcaine and epinephrine.  A 10 blade scalpel was used to make an incision at the dermis epidermis.  Bovie cautery was used to extend the incision down to the level of the periosteum at the posterior superior iliac spine on the right.  A pin was then inserted using a pin .  The surgical robot was then anchored to the iliac spine pin.  The surgical robot was then registered and calibrated.  Preop CT scan and plan and fluoroscopic images were then aligned and  registered.  In accordance with the predesign plan the robot arm was then sent to the left at L2.  A 20 blade scalpel was used to make an incision through the lumbosacral fascia along this trajectory.  A tissue protector and dilator were then inserted through the robot arm to the entry point at L2 .  A drill was then inserted through the robot arm along this trajectory and a channel was drilled through the pedicle at L2   on the left .  A tap was then inserted through the robot arm arm along its trajectory and each pedicle was then tapped.  A series of image guided screws were then placed through the robot arm along the trajectory at L2 through the pedicle into the vertebral body on the left .  The robot arm was then sent to the right at L2 along the preplanned trajectory. A 20 blade scalpel was used to make an incision through the dermis epidermis along this trajectory.  A tissue protector and dilator were then inserted through the robot arm to the entry point at L2 .  A drill was then inserted through the robot arm along this trajectory and a channel was drilled through the pedicle at L2  and L2 .  A tap was then inserted through the robot arm arm along its trajectory and each pedicle was then tapped.  A series of image guided screws were then placed through the robot arm along the trajectory at L2 through the pedicle into the vertebral body on the right .  Previous paramedian incisions from L3-S1 were identified.  They were injected with Marcaine with epinephrine on the left and the right.  A 10 blade scalpel was used make incisions the dermis epidermis.  Bovie cautery was used to extend this incision down to the subcutaneous and soft tissues to the level of the lumbosacral fascia.  Lumbosacral fascia was then opened using Bovie cautery.  Using image of navigation the previous screws at L3, 4, 5, S1 were identified on the left and the right.  The screw heads and the angelia were exposed.  The setscrews were removed  at each level on the left and the right.  The rods were removed at each level on the left on the right.  The screws were then removed at each level on the left and the right.  In accordance with our preoperative plan the screws at L3, 4, 5, S1 on the left and the right were replaced with new instrumentation.  A retractor system was anchored to the screw extenders at L2  and L3 on the left.  The retractor system then was distracted.  The left hemilamina and facet at L2-3 o was exposed using Bovie cautery.  Medial lateral retractors were then placed.  The hemilaminectomy and facetectomy were then conducted using a matchstick drill and a combination of 2 mm 3 mm Kerrisons.  Neurophysiologic monitoring was used to identify the disc space and the exiting nerve root.  The discectomy was then conducted using a bayoneted 15 blade scalpel to incise the annulus.  And then the discectomy was completed using a series of image guided 7 mm 8 mm disc qamar, up-biting curettes, pituitary rongeurs.  Once the discectomy was completed a 28 mm x 8 mm expandable peek interbody cage filled with autograft and allograft was tapped into place under image guidance.  The cage was expanded.  Fluoroscopy confirmed good positioning of the interbody cage and all the pedicle screws.  The hemilaminectomy and foraminotomy and facetectomy and decompression of the exiting nerve roots was then completed using 3 mm Kerrison. 2 rods were then measured on the left and the right.  A   120 mm angelia was inserted on the left and a 110 mm angelia was inserted on the right into the heads of the screws.  The rods were then anchored in place and reduced using set screws.  The left L5 setscrew would not seat properly after several attempts with both a angelia reducer and a rocking instrument.  The decision was made at that point since there was already a solid bony fusion at L4, 5, S1, to leave the set screw absent at this level on the left. Final AP and lateral  fluoroscopy showed good positioning of all interbody devices and hardware.  The setscrews were final tightened and broken off.  The wounds were then copiously irrigated with antibiotic solution.  They were inspected for hemostasis.  The deep tissues were closed using a series of inverted interrupted 0 Vicryl sutures.  The subcutaneous and soft tissues were closed using a series of inverted erupted 2-0 Vicryl sutures.  And the skin was closed using a running 4-0 Monocryl.  All sponge needle and instrument counts were correct at the end of the procedure.  The patient was extubated in stable condition returned recovery with about 600 cc of blood loss.  All cultures    Findings:  Lumbar Degenerative Disc Disease    Estimated Blood Loss:  600 mL           Drains: None           Total IV Fluids:  ml           Specimens: None           Implants:   Implant Name Type Inv. Item Serial No.  Lot No. LRB No. Used Action   HEMOST ABS SURGIFOAM  8X12 10MM - [id number] Implant HEMOST ABS SURGIFOAM  8X12 10MM  [employer]  [employer] J [employer] J [id number] Left 1 Implanted   KT HEMOST ABS SURGIFOAM PORCN 1GRAM - [id number] Implant KT HEMOST ABS SURGIFOAM PORCN 1GRAM  [employer]  [employer] J [id number] Left 1 Implanted   SPACR ELEVATE STD 28X8MM - [id number] Implant SPACR ELEVATE STD 28X8MM  [employer]  Left 1 Implanted   SCRW CAITLYN SOLERA VOYAGER MAS 4.5X45MM - [id number] Implant SCRW CAITLYN SOLERA VOYAGER MAS 4.5X45MM  [employer]  Left 2 Implanted   SCRW CAITLYN SOLERA VOYAGER MAS 7.5X45MM - [id number] Implant SCRW CAITLYN SOLERA VOYAGER MAS 7.5X45MM  [employer]  Left 7 Implanted   SCRW CAITLYN SOLERA VOYAGER MAS 6.5X45MM - [id number] Implant SCRW CAITLYN SOLERA VOYAGER MAS 6.5X45MM  [employer]  Left 1 Implanted   DAVID SOLERA PERC CCM 4.84A950SY - [id number] Implant DAVID SOLERA PERC CCM 4.96D125MQ  [employer]  Left 1 Implanted   DAVID SOLERA PERC CCM 4.04V741YN - [id number] Implant DAVID SOLERA PERC CCM 4.42X282OF  [employer]  Left 1 Implanted   SCRW ST  SOLERA VOYAGER BRKOFF TI 4.75MM - [id number] Implant SCRW ST SOLERA VOYAGER BRKOFF TI 4.75MM  [employer]  Left 10 Implanted   SCRW ST SOLERA VOYAGER BRKOFF TI 4.75MM - [id number] Implant SCRW ST SOLERA VOYAGER BRKOFF TI 4.75MM  [employer]  Left 2 Implanted and Explanted              Complications:  none           Disposition: PACU - hemodynamically stable.           Condition: stable        Kj Falcon MD

## 2021-07-01 NOTE — PLAN OF CARE
Goal Outcome Evaluation:  Plan of Care Reviewed With: patient        Progress: no change  Outcome Summary: PT evaluation completed. The patient presents alert and oriented x4 sitting EOB. 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 He has no other focal weakness. 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 He was able to walk safely in the hallway with use of a RW and his AFO, however he does not typically use a walker at home. He will benefit from skilled PT services to work on balance and gait safety so he can return home safely with his wife.

## 2021-07-01 NOTE — CASE MANAGEMENT/SOCIAL WORK
Discharge Planning Assessment  [facility]     Patient Name: Chad Henriquez  MRN: [id number]  Today's Date: 7/1/2021    Admit Date: 6/30/2021    Discharge Needs Assessment     Row Name 07/01/21 1005       Living Environment    Lives With  spouse    Name(s) of Who Lives With Patient  NILTON HENRIQUEZ    Current Living Arrangements  home/apartment/condo    Primary Care Provided by  spouse/significant other    Provides Primary Care For  no one, unable/limited ability to care for self    Family Caregiver if Needed  spouse;child(jennifer), adult    Family Caregiver Names  NILTON HENRIQUEZ AND ADULT SON WHO IS STAYING ONE WEEK    Quality of Family Relationships  helpful;involved;supportive    Able to Return to Prior Arrangements  yes       Resource/Environmental Concerns    Resource/Environmental Concerns  none    Transportation Concerns  car, none       Transition Planning    Patient/Family Anticipates Transition to  home with help/services    Patient/Family Anticipated Services at Transition  home health care    Transportation Anticipated  family or friend will provide       Discharge Needs Assessment    Readmission Within the Last 30 Days  no previous admission in last 30 days    Current Outpatient/Agency/Support Group  homecare agency    Equipment Currently Used at Home  walker, rolling;bath bench    Anticipated Changes Related to Illness  none    Equipment Needed After Discharge  commode;cane, straight    Outpatient/Agency/Support Group Needs  homecare agency    Discharge Facility/Level of Care Needs  home with home health    Provided Post Acute Provider List?  Yes    Post Acute Provider List  Home Health    Provided Post Acute Provider Quality & Resource List?  Yes    Post Acute Provider Quality and Resource List  Home Health    Delivered To  Patient    Method of Delivery  In person    Patient's Choice of Community Agency(s)  [facility]    Discharge Coordination/Progress  PLAN FOR HOME AT TIME OF DC WITH FAMILY.  REQUESTING [facility] FOR CONTINUED REHAB NEEDS. DME IN HOME WITH EXCEPTION OF BSC WHICH HE IS REQUESTING FROM [employer]. PATIENT HAS RX COVERAGE AND PCP        Discharge Plan    No documentation.       Continued Care and Services - Admitted Since 6/30/2021    Coordination has not been started for this encounter.         Demographic Summary    No documentation.       Functional Status    No documentation.       Psychosocial    No documentation.       Abuse/Neglect    No documentation.       Legal    No documentation.       Substance Abuse    No documentation.       Patient Forms    No documentation.           Shelbie Alexander RN

## 2021-07-01 NOTE — THERAPY EVALUATION
Patient Name: Chad Henriquez  : 1929    MRN: [id number]                              Today's Date: 2021       Admit Date: 2021    Visit Dx:     ICD-10-CM ICD-9-CM   1. Frequency of urination  R35.0 788.41   2. Spinal stenosis, lumbar region, with neurogenic claudication  M48.062 724.03   3. Decreased activities of daily living (ADL)  Z78.9 V49.89   4. Lumbar disc disease with radiculopathy  M51.16 722.10     724.4   5. Syncope, unspecified syncope type  R55 780.2   6. Impaired mobility  Z74.09 799.89     Patient Active Problem List   Diagnosis   • BPH with urinary obstruction   • Frequency of urination   • Nocturia   • OAB (overactive bladder)   • Non-smoker   • Spinal stenosis, lumbar region, with neurogenic claudication   • Left leg pain   • Bilateral hip pain   • Acute left-sided low back pain with left-sided sciatica   • Essential tremor   • BMI 21.0-21.9, adult   • Spinal stenosis   • Degeneration of lumbar or lumbosacral intervertebral disc   • Overweight with body mass index (BMI) of 25 to 25.9 in adult   • Hereditary and idiopathic peripheral neuropathy   • Syncope   • Hypercholesteremia   • CAD (coronary artery disease)   • Hypertension   • Lumbar disc disease with radiculopathy   • COVID-19 virus infection   • Left hip pain     Past Medical History:   Diagnosis Date   • Allergic rhinitis    • Anemia    • Arthritis    • Blind left eye    • BPH (benign prostatic hypertrophy) with urinary retention    • Cancer (CMS/HCC)     skin cancer   • Chronic back pain     RUNS DOWN BOTH LEGS   • Constipation    • Coronary artery disease    • Hard of hearing    • Heart attack (CMS/HCC)     NO MUSCLE DAMAGE - JUST OCCLUDED VALVE   • High cholesterol    • History of skin cancer     BILATERAL EARS   • History of transfusion        • Hypertension    • Inguinal hernia    • Leaky heart valve     DR CONCEPCION SAYS NOT ENOUGH TO BE OF CONCERN   • Other bursal cyst, right elbow    • PONV (postoperative  nausea and vomiting)     has had scopalamine patch in past    • Sleep apnea     DOES NOT USE CPAP OR BIPAP   • Spinal stenosis of cervical region    • Spinal stenosis of lumbar region    • Tremors of nervous system    • Wears hearing aid     BILATERAL     Past Surgical History:   Procedure Laterality Date   • ANTERIOR LUMBAR EXPOSURE N/A 12/7/2018    Procedure: ANTERIOR LUMBAR EXPOSURE;  Surgeon: Manolo Mcleod DO;  Location:  [facility];  Service: Vascular   • APPENDECTOMY     • BACK SURGERY      X3   • CARDIAC SURGERY  08/08/1986    X1 BYPASS   • CORONARY ANGIOPLASTY WITH STENT PLACEMENT  1997    X3 STENTS   • HERNIA REPAIR Bilateral     x2   • INGUINAL HERNIA REPAIR Right 6/22/2017    Procedure: RIGHT INGUINAL HERNIA REPAIR AND EXCISION OF CYST RIGHT ELBOW ;  Surgeon: Jackelyn Arriola MD;  Location:  [facility];  Service:    • LUMBAR FUSION Left 6/30/2021    Procedure: LUMBAR LAMINECTOMY, DISCECTOMY, FACETECTOMY,  TRANSFORAMINAL LUMBAR INTERBODY FUSION L2-3. REVISION OF INSTRUMENTATION L3-S1. USE OF IMAGE GUIDANCE AND SURGICAL ROBOT;  Surgeon: Kj Falcon MD;  Location:  [facility];  Service: Robotics - Neuro;  Laterality: Left;   • LUMBAR LAMINECTOMY N/A 3/12/2018    Procedure: LUMBAR LAMINECTOMY WITHOUT FUSION L3-4,4-5;  Surgeon: Kj Falcon MD;  Location:  [facility];  Service: Neurosurgery   • LUMBAR LAMINECTOMY ANTERIOR LUMBAR INTERBODY FUSION N/A 12/7/2018    Procedure: ANTERIOR LUMBAR INTERBODY FUSION L5-S1;  Surgeon: Kj Falcon MD;  Location:  [facility];  Service: Neurosurgery   • LUMBAR LAMINECTOMY WITH FUSION Left 12/10/2018    Procedure: LUMBAR LAMINECTOMY TRANSFORAMINAL LUMBAR INTERBODY FUSION L34,45;  Surgeon: Kj Falcon MD;  Location:  [facility];  Service: Neurosurgery   • LUMBAR LAMINECTOMY WITH FUSION Left 12/7/2018    Procedure: LUMBAR LAMINECTOMY TRANSFORAMINAL LUMBAR INTERBODY FUSION LEFT L3-4, L4-5 QUADRANT RETRACTOR;  Surgeon: Kj Falcon MD;  Location:  [facility];   Service: Neurosurgery   • SKIN LESION EXCISION      nasal     General Information     Row Name 07/01/21 0940          Physical Therapy Time and Intention    Document Type  evaluation s/p TLIF L2-3 with revision L3-S1 with L LE pain, neurogenic claudication  -MS     Mode of Treatment  physical therapy;co-treatment  -MS     Row Name 07/01/21 0940          General Information    Patient Profile Reviewed  yes  -MS     Prior Level of Function  independent:;ADL's;all household mobility  -MS     Existing Precautions/Restrictions  fall;spinal;brace worn when out of bed R AFO  -MS     Barriers to Rehab  hearing deficit;physical barrier  -MS     Row Name 07/01/21 0940          Living Environment    Lives With  spouse  -MS     Row Name 07/01/21 0940          Home Main Entrance    Number of Stairs, Main Entrance  four  -MS     Stair Railings, Main Entrance  railings on both sides of stairs  -MS     Row Name 07/01/21 0940          Stairs Within Home, Primary    Number of Stairs, Within Home, Primary  none  -MS     Row Name 07/01/21 0940          Cognition    Orientation Status (Cognition)  oriented x 4  -MS     Row Name 07/01/21 0940          Safety Issues, Functional Mobility    Safety Issues Affecting Function (Mobility)  friction/shear risk  -MS     Impairments Affecting Function (Mobility)  balance;strength;endurance/activity tolerance;pain  -MS       User Key  (r) = Recorded By, (t) = Taken By, (c) = Cosigned By    Initials Name Provider Type    MS Sarah Rainey R, PT, DPT, NCS Physical Therapist        Mobility     Row Name 07/01/21 0940          Bed Mobility    Bed Mobility  sit-supine  -MS     Sit-Supine [place] (Bed Mobility)  contact guard;verbal cues;nonverbal cues (demo/gesture)  -MS     Row Name 07/01/21 0940          Sit-Stand Transfer    Sit-Stand [place] (Transfers)  contact guard;verbal cues;nonverbal cues (demo/gesture)  -MS     Assistive Device (Sit-Stand Transfers)  walker, front-wheeled  -MS      Row Name 07/01/21 0940          Gait/Stairs (Locomotion)    [place] Level (Gait)  contact guard  -MS     Assistive Device (Gait)  walker, front-wheeled  -MS     Distance in Feet (Gait)  250ft with forward flexed posture, AFO in place so decreased R heel strike  -MS       User Key  (r) = Recorded By, (t) = Taken By, (c) = Cosigned By    Initials Name Provider Type    MS Sarah Rainey, PT, DPT, NCS Physical Therapist        Obj/Interventions     Row Name 07/01/21 0940          Range of Motion Comprehensive    Comment, General Range of Motion  R EHL and dorsiflexion impaired 75% chronically  -MS     Row Name 07/01/21 0940          Strength Comprehensive (MMT)    Comment, General Manual Muscle Testing (MMT) Assessment  R EHL and dorsiflexion 1/5, wears AFO, all others grossly 4/5  -MS     Row Name 07/01/21 0940          Balance    Static Sitting Balance  WFL  -MS     Dynamic Sitting Balance  WFL  -MS     Static Standing Balance  WFL;supported  -MS     Dynamic Standing Balance  WFL;supported  -MS     Row Name 07/01/21 0940          Sensory Assessment (Somatosensory)    Sensory Assessment (Somatosensory)  -- B feet impaired sensation from peripheral neuropathy  -MS       User Key  (r) = Recorded By, (t) = Taken By, (c) = Cosigned By    Initials Name Provider Type    MS RaineySarah, PT, DPT, NCS Physical Therapist        Goals/Plan     Row Name 07/01/21 0940          Bed Mobility Goal 1 (PT)    Activity/Assistive Device (Bed Mobility Goal 1, PT)  bed mobility activities, all  -MS     [place] Level/Cues Needed (Bed Mobility Goal 1, PT)  independent  -MS     Time Frame (Bed Mobility Goal 1, PT)  long term goal (LTG);by discharge  -MS     Progress/Outcomes (Bed Mobility Goal 1, PT)  goal ongoing  -MS     Row Name 07/01/21 0940          Transfer Goal 1 (PT)    Activity/Assistive Device (Transfer Goal 1, PT)  sit-to-stand/stand-to-sit;bed-to-chair/chair-to-bed;walker, rolling  -MS     [place] Level/Cues  Needed (Transfer Goal 1, PT)  modified independence  -MS     Time Frame (Transfer Goal 1, PT)  long term goal (LTG);by discharge  -MS     Progress/Outcome (Transfer Goal 1, PT)  goal ongoing  -MS     Row Name 07/01/21 0940          Gait Training Goal 1 (PT)    Activity/Assistive Device (Gait Training Goal 1, PT)  gait (walking locomotion);assistive device use;decrease fall risk;improve balance and speed;increase endurance/gait distance;walker, rolling  -MS     [place] Level (Gait Training Goal 1, PT)  modified independence  -MS     Distance (Gait Training Goal 1, PT)  300ft with AFO  -MS     Time Frame (Gait Training Goal 1, PT)  long term goal (LTG);by discharge  -MS     Progress/Outcome (Gait Training Goal 1, PT)  goal ongoing  -MS     Row Name 07/01/21 0940          Stairs Goal 1 (PT)    Activity/Assistive Device (Stairs Goal 1, PT)  ascending stairs;descending stairs;using handrail, right;step-over step;step-to-step  -MS     [place] Level/Cues Needed (Stairs Goal 1, PT)  contact guard assist  -MS     Number of Stairs (Stairs Goal 1, PT)  3  -MS     Time Frame (Stairs Goal 1, PT)  by discharge;long term goal (LTG)  -MS     Progress/Outcome (Stairs Goal 1, PT)  goal ongoing  -MS       User Key  (r) = Recorded By, (t) = Taken By, (c) = Cosigned By    Initials Name Provider Type    MS Sarah Rainey R, PT, DPT, NCS Physical Therapist        Clinical Impression     Row Name 07/01/21 0940          Pain    Additional Documentation  Pain Scale: Numbers Pre/Post-Treatment (Group)  -MS     Row Name 07/01/21 0940          Pain Scale: Numbers Pre/Post-Treatment    Pretreatment Pain Rating  7/10  -MS     Posttreatment Pain Rating  7/10  -MS     Pain Location  back  -MS     Pain Intervention(s)  Medication (See MAR);Repositioned;Ambulation/increased activity  -MS     Row Name 07/01/21 0940          Plan of Care Review    Plan of Care Reviewed With  patient  -MS     Progress  no change  -MS     Outcome Summary  PT  evaluation completed. The patient presents alert and oriented x4 sitting EOB. 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 He has no other focal weakness. 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 He was able to walk safely in the hallway with use of a RW and his AFO, however he does not typically use a walker at home. He will benefit from skilled PT services to work on balance and gait safety so he can return home safely with his wife.  -MS     Row Name 07/01/21 0940          Therapy Assessment/Plan (PT)    Patient/Family Therapy Goals Statement (PT)  go home and return to prior activities  -MS     Rehab Potential (PT)  good, to achieve stated therapy goals  -MS     Criteria for Skilled Interventions Met (PT)  yes;meets criteria;skilled treatment is necessary  -MS     Predicted Duration of Therapy Intervention (PT)  until discharge  -MS     Row Name 07/01/21 0940          Positioning and Restraints    Post Treatment Position  bed  -MS     In Bed  fowlers;call light within reach;encouraged to call for assist;side rails up x2;with family/caregiver  -MS       User Key  (r) = Recorded By, (t) = Taken By, (c) = Cosigned By    Initials Name Provider Type    MS Redd Sarah R, PT, DPT, NCS Physical Therapist        Outcome Measures     Row Name 07/01/21 0940          How much help from another person do you currently need...    Turning from your back to your side while in flat bed without using bedrails?  3  -MS     Moving from lying on back to sitting on the side of a flat bed without bedrails?  3  -MS     Moving to and from a bed to a chair (including a wheelchair)?  3  -MS     Standing up from a chair using your arms (e.g., wheelchair, bedside chair)?  3  -MS     Climbing 3-5 steps with a railing?  2  -MS     To walk in hospital room?  3  -MS     AM-PAC 6 Clicks Score (PT)  17  -MS     Row Name 07/01/21 0940 07/01/21 0815       Functional Assessment     Outcome Measure Options  AM-PAC 6 Clicks Basic Mobility (PT)  -MS  AM-PAC 6 Clicks Daily Activity (OT)  -      User Key  (r) = Recorded By, (t) = Taken By, (c) = Cosigned By    Initials Name Provider Type    [facility] Deepti Buckner, OTR/L Occupational Therapist    MS Sarah Rainey, PT, DPT, NCS Physical Therapist        Physical Therapy Education                 Title: PT OT SLP Therapies (In Progress)     Topic: Physical Therapy (In Progress)     Point: Mobility training (Done)     Learning Progress Summary           Patient Acceptance, E, VU by MS at 7/1/2021 1205    Comment: role of PT in his care, spinal restrictions, use of LSO                   Point: Home exercise program (Not Started)     Learner Progress:  Not documented in this visit.          Point: Body mechanics (Not Started)     Learner Progress:  Not documented in this visit.          Point: Precautions (Done)     Learning Progress Summary           Patient Acceptance, E, VU by MS at 7/1/2021 1205    Comment: role of PT in his care, spinal restrictions, use of LSO                               User Key     Initials Effective Dates Name Provider Type Discipline    MS 06/19/18 -  Sarah Rainey, PT, DPT, NCS Physical Therapist PT              PT Recommendation and Plan  Planned Therapy Interventions (PT): balance training, bed mobility training, gait training, patient/family education, orthotic fitting/training, strengthening, stair training, transfer training  Plan of Care Reviewed With: patient  Progress: no change  Outcome Summary: PT evaluation completed. The patient presents alert and oriented x4 sitting EOB. 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 He has no other focal weakness. 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 He was able to walk safely in the hallway with use of a RW and his AFO, however he does not typically use a walker at home. He will benefit from skilled  PT services to work on balance and gait safety so he can return home safely with his wife.     Time Calculation:   PT Charges     Row Name 07/01/21 0940             Time Calculation    Start Time  0940  -MS      Stop Time  1020  -MS      Time Calculation (min)  40 min  -MS      PT Received On  07/01/21  -MS      PT Goal Re-Cert Due Date  07/11/21  -MS         Untimed Charges    PT Eval/Re-eval Minutes  40  -MS         Total Minutes    Untimed Charges Total Minutes  40  -MS       Total Minutes  40  -MS        User Key  (r) = Recorded By, (t) = Taken By, (c) = Cosigned By    Initials Name Provider Type    MS Sarah Rainey, PT, DPT, NCS Physical Therapist        Therapy Charges for Today     Code Description Service Date Service Provider Modifiers Qty    71372801474 HC PT EVAL LOW COMPLEXITY 3 7/1/2021 Sarah Rainey PT, DPT, NCS GP 1          PT G-Codes  Outcome Measure Options: AM-PAC 6 Clicks Basic Mobility (PT)  AM-PAC 6 Clicks Score (PT): 17  AM-PAC 6 Clicks Score (OT): 16    Sarah Rainey PT, DPT, NCS  7/1/2021

## 2021-07-01 NOTE — THERAPY TREATMENT NOTE
Acute Care - Physical Therapy Treatment Note  [facility]     Patient Name: Chad Henriquez  : 1929  MRN: [id number]  Today's Date: 2021           PT Assessment (last 12 hours)      PT Evaluation and Treatment     [facility] Name 21 1500 21 1314       Physical Therapy Time and Intention    Subjective Information  complains of;pain;weakness  -  --    Document Type  therapy note (daily note)  -  --    Mode of Treatment  physical therapy  -  --    Session Not Performed  --  other (see comments)  -    Comment, Session Not Performed  --  eating lunch  -[facility] Name 21 1500          General Information    Existing Precautions/Restrictions  brace worn when out of bed;fall;spinal R AFO  -     Barriers to Rehab  hearing deficit;medically complex  -[facility] Name 21 1500          Pain Scale: Numbers Pre/Post-Treatment    Pretreatment Pain Rating  4/10  -     Posttreatment Pain Rating  5/10  -     Pain Location  back  -[facility] Name 21 1500          Pain Scale: Word Pre/Post-Treatment    Pain Intervention(s)  Medication (See MAR);Repositioned;Ambulation/increased activity  -[facility] Name 21 1500          Bed Mobility    Bed Mobility  sidelying-sit;sit-sidelying  -     Sidelying-Sit [place] (Bed Mobility)  minimum assist (75% patient effort);verbal cues  -     Sit-Sidelying [place] (Bed Mobility)  minimum assist (75% patient effort);verbal cues  -[facility] Name 21 1500          Transfers    Sit-Stand [place] (Transfers)  minimum assist (75% patient effort);verbal cues  -[facility] Name 21 1500          Gait/Stairs (Locomotion)    [place] Level (Gait)  contact guard;verbal cues  -     Assistive Device (Gait)  walker, front-wheeled  -     Distance in Feet (Gait)  250  -[facility] Name             Wound 21 1550 lumbar spine Incision    Wound - [facility] Placement Date: 21  - Placement Time:   - Present  on Hospital Admission: N  -SH Location: lumbar spine  -SH Primary Wound Type: Incision  -SH Additional Comments: mastisol, steris, telfa, tape  -SH    [facility] [employer] [facility] Date first assessed: 06/30/21  -SH Time first assessed: 1550  -SH Present on Hospital Admission: N  -SH Location: lumbar spine  -SH Primary Wound Type: Incision  -SH Additional Comments: mastisol, steris, telfa, tape  -SH    Row Name 07/01/21 1500          Positioning and Restraints    Pre-Treatment Position  in bed  -     Post Treatment Position  bed  -     In Bed  fowlers;call light within reach;encouraged to call for assist;with family/caregiver;SCD pump applied;not[place] [facility]  -       User Key  (r) = Recorded By, (t) = Taken By, (c) = Cosigned By    Initials Name Provider Type     Nohelia Ng, PTA Physical Therapy Assistant     Paola Alvarez, RN Registered Nurse        Physical Therapy Education                 Title: PT OT SLP Therapies (In Progress)     Topic: Physical Therapy (In Progress)     Point: Mobility training (Done)     Learning Progress Summary           Patient Acceptance, E, VU by MS at 7/1/2021 1205    Comment: role of PT in his care, spinal restrictions, use of LSO                   Point: Home exercise program (Not Started)     Learner Progress:  Not documented in this visit.          Point: Body mechanics (Not Started)     Learner Progress:  Not documented in this visit.          Point: Precautions (Done)     Learning Progress Summary           Patient Acceptance, E, VU by MS at 7/1/2021 1205    Comment: role of PT in his care, spinal restrictions, use of LSO                               User Key     Initials Effective Dates Name Provider Type Discipline    MS 06/19/18 -  Sarah Rainey, PT, DPT, NCS Physical Therapist PT              PT Recommendation and Plan             Time Calculation:   PT Charges     Row Name 07/01/21 1541 07/01/21 0940          Time Calculation    Start Time  1500  -AH   0940  -MS     Stop Time  1538  -AH  1020  -MS     Time Calculation (min)  38 min  -AH  40 min  -MS     PT Received On  --  07/01/21  -MS     PT Goal Re-Cert Due Date  --  07/11/21  -MS        Time Calculation- PT    Total Timed Code Minutes- PT  38 minute(s)  -AH  --        Timed Charges    51106 - Gait Training Minutes   38  -AH  --        Untimed Charges    PT Eval/Re-eval Minutes  --  40  -MS        Total Minutes    Timed Charges Total Minutes  38  -AH  --     Untimed Charges Total Minutes  --  40  -MS      Total Minutes  38  -AH  40  -MS       User Key  (r) = Recorded By, (t) = Taken By, (c) = Cosigned By    Initials Name Provider Type    Nohelia Mattson, [place] Physical Therapy Assistant    MS Rainey Sarah MINO, PT, DPT, NCS Physical Therapist        Therapy Charges for Today     Code Description Service Date Service Provider Modifiers Qty    25489445255 HC GAIT TRAINING EA 15 MIN 7/1/2021 Nohelia Ng PTA GP 3          PT G-Codes  Outcome Measure Options: AM-PAC 6 Clicks Basic Mobility (PT)  AM-PAC 6 Clicks Score (PT): 17  AM-PAC 6 Clicks Score (OT): 16    Nohelia Ng PTA  7/1/2021

## 2021-07-01 NOTE — THERAPY EVALUATION
Patient Name: Chad Henriquez  : 1929    MRN: [id number]                              Today's Date: 2021       Admit Date: 2021    Visit Dx:     ICD-10-CM ICD-9-CM   1. Spinal stenosis, lumbar region, with neurogenic claudication  M48.062 724.03   2. Decreased activities of daily living (ADL)  Z78.9 V49.89     Patient Active Problem List   Diagnosis   • BPH with urinary obstruction   • Frequency of urination   • Nocturia   • OAB (overactive bladder)   • Non-smoker   • Spinal stenosis, lumbar region, with neurogenic claudication   • Left leg pain   • Bilateral hip pain   • Acute left-sided low back pain with left-sided sciatica   • Essential tremor   • BMI 21.0-21.9, adult   • Spinal stenosis   • Degeneration of lumbar or lumbosacral intervertebral disc   • Overweight with body mass index (BMI) of 25 to 25.9 in adult   • Hereditary and idiopathic peripheral neuropathy   • Syncope   • Hypercholesteremia   • CAD (coronary artery disease)   • Hypertension   • Lumbar disc disease with radiculopathy   • COVID-19 virus infection   • Left hip pain     Past Medical History:   Diagnosis Date   • Allergic rhinitis    • Anemia    • Arthritis    • Blind left eye    • BPH (benign prostatic hypertrophy) with urinary retention    • Cancer (CMS/HCC)     skin cancer   • Chronic back pain     RUNS DOWN BOTH LEGS   • Constipation    • Coronary artery disease    • Hard of hearing    • Heart attack (CMS/HCC)     NO MUSCLE DAMAGE - JUST OCCLUDED VALVE   • High cholesterol    • History of skin cancer     BILATERAL EARS   • History of transfusion        • Hypertension    • Inguinal hernia    • Leaky heart valve     DR CONCEPCION SAYS NOT ENOUGH TO BE OF CONCERN   • Other bursal cyst, right elbow    • PONV (postoperative nausea and vomiting)     has had scopalamine patch in past    • Sleep apnea     DOES NOT USE CPAP OR BIPAP   • Spinal stenosis of cervical region    • Spinal stenosis of lumbar region    • Tremors of  nervous system    • Wears hearing aid     BILATERAL     Past Surgical History:   Procedure Laterality Date   • ANTERIOR LUMBAR EXPOSURE N/A 12/7/2018    Procedure: ANTERIOR LUMBAR EXPOSURE;  Surgeon: Manolo Mcleod DO;  Location:  [facility];  Service: Vascular   • APPENDECTOMY     • BACK SURGERY      X3   • CARDIAC SURGERY  08/08/1986    X1 BYPASS   • CORONARY ANGIOPLASTY WITH STENT PLACEMENT  1997    X3 STENTS   • HERNIA REPAIR Bilateral     x2   • INGUINAL HERNIA REPAIR Right 6/22/2017    Procedure: RIGHT INGUINAL HERNIA REPAIR AND EXCISION OF CYST RIGHT ELBOW ;  Surgeon: Jackelyn Arriola MD;  Location:  [facility];  Service:    • LUMBAR FUSION Left 6/30/2021    Procedure: LUMBAR LAMINECTOMY, DISCECTOMY, FACETECTOMY,  TRANSFORAMINAL LUMBAR INTERBODY FUSION L2-3. REVISION OF INSTRUMENTATION L3-S1. USE OF IMAGE GUIDANCE AND SURGICAL ROBOT;  Surgeon: Kj Falcon MD;  Location:  [facility];  Service: Robotics - Neuro;  Laterality: Left;   • LUMBAR LAMINECTOMY N/A 3/12/2018    Procedure: LUMBAR LAMINECTOMY WITHOUT FUSION L3-4,4-5;  Surgeon: Kj Falcon MD;  Location:  [facility];  Service: Neurosurgery   • LUMBAR LAMINECTOMY ANTERIOR LUMBAR INTERBODY FUSION N/A 12/7/2018    Procedure: ANTERIOR LUMBAR INTERBODY FUSION L5-S1;  Surgeon: Kj Falcon MD;  Location:  [facility];  Service: Neurosurgery   • LUMBAR LAMINECTOMY WITH FUSION Left 12/10/2018    Procedure: LUMBAR LAMINECTOMY TRANSFORAMINAL LUMBAR INTERBODY FUSION L34,45;  Surgeon: Kj Falcon MD;  Location:  [facility];  Service: Neurosurgery   • LUMBAR LAMINECTOMY WITH FUSION Left 12/7/2018    Procedure: LUMBAR LAMINECTOMY TRANSFORAMINAL LUMBAR INTERBODY FUSION LEFT L3-4, L4-5 QUADRANT RETRACTOR;  Surgeon: Kj Falcon MD;  Location:  [facility];  Service: Neurosurgery   • SKIN LESION EXCISION      nasal     General Information     Row Name 07/01/21 0815          General Information    Patient Profile Reviewed  yes  -CH     Prior Level of  Function  independent:;ADL's;all household mobility  -     Existing Precautions/Restrictions  fall;oxygen therapy device and L/min  -     Barriers to Rehab  hearing deficit  -     Row Name 07/01/21 0815          Occupational Profile    Environmental Supports and Barriers (Occupational Profile)  tub shower combo with TTB  -     Row Name 07/01/21 0815          Living Environment    Lives With  spouse  -     Row Name 07/01/21 0815          Home Main Entrance    Number of Stairs, Main Entrance  four  -     Stair Railings, Main Entrance  railings on both sides of stairs  -     Row Name 07/01/21 0815          Stairs Within Home, Primary    Stairs, Within Home, Primary  stairs from garage to home - primary entrance  -     Row Name 07/01/21 0815          Cognition    Orientation Status (Cognition)  oriented x 4  -     Row Name 07/01/21 0815          Safety Issues, Functional Mobility    Safety Issues Affecting Function (Mobility)  friction/shear risk  -     Impairments Affecting Function (Mobility)  balance;strength;endurance/activity tolerance;postural/trunk control;pain  -       User Key  (r) = Recorded By, (t) = Taken By, (c) = Cosigned By    Initials Name Provider Type     Deepti Buckner, OTR/L Occupational Therapist          Mobility/ADL's     Row Name 07/01/21 0815          Bed Mobility    Bed Mobility  rolling right;sidelying-sit  -     Rolling Right [place] (Bed Mobility)  contact guard;verbal cues;nonverbal cues (demo/gesture)  -     Sidelying-Sit [place] (Bed Mobility)  contact guard;verbal cues;nonverbal cues (demo/gesture)  -     Bed Mobility, Safety Issues  decreased use of legs for bridging/pushing  -     Assistive Device (Bed Mobility)  bed rails;head of bed elevated  -     Row Name 07/01/21 0815          Transfers    Transfers  sit-stand transfer;bed-chair transfer  -     Bed-Chair [place] (Transfers)  minimum assist (75% patient effort);verbal  cues;nonverbal cues (demo/gesture)  -     Sit-Stand [place] (Transfers)  minimum assist (75% patient effort);verbal cues;nonverbal cues (demo/gesture)  -[facility] Name 07/01/21 0815          Functional Mobility    Functional Mobility- Comment  issued pt. rwx for increased amb  -[facility] Name 07/01/21 0815          Activities of Daily Living    BADL Assessment/Intervention  upper body dressing;lower body dressing;feeding  -[facility] Name 07/01/21 0815          Upper Body Dressing Assessment/Training    [place] Level (Upper Body Dressing)  maximum assist (25% patient effort);don  -     Position (Upper Body Dressing)  edge of bed sitting  -     Comment (Upper Body Dressing)  LSO  -[facility] Name 07/01/21 0815          Lower Body Dressing Assessment/Training    [place] Level (Lower Body Dressing)  maximum assist (25% patient effort);don;socks;minimum assist (75% patient effort);shoes/slippers  -     Position (Lower Body Dressing)  edge of bed sitting  -[facility] Name 07/01/21 0815          Self-Feeding Assessment/Training    [place] Level (Feeding)  independent;scoop food and bring to mouth;liquids to mouth;finger foods  -     Position (Self-Feeding)  supported sitting  -       User Key  (r) = Recorded By, (t) = Taken By, (c) = Cosigned By    Initials Name Provider Type     Deepti Buckner, OTR/L Occupational Therapist        Obj/Interventions     [facility] Name 07/01/21 0815          Vision Assessment/Intervention    Visual Impairment/Limitations  WNL  -[facility] Name 07/01/21 0815          Range of Motion Comprehensive    Comment, General Range of Motion  bilat shoulders achieve 0-100 which is fxl for posture and age, all other jts WFL  -[facility] Name 07/01/21 0815          Strength Comprehensive (MMT)    Comment, General Manual Muscle Testing (MMT) Assessment  no formal testing completed 2' lifting restriction  -[facility] Name 07/01/21 0815          Balance    Balance Assessment   sitting static balance;sitting dynamic balance;sit to stand dynamic balance;standing static balance;standing dynamic balance  -     Static Sitting Balance  WFL  -CH     Dynamic Sitting Balance  mild impairment  -CH     Sit to Stand Dynamic Balance  mild impairment  -CH     Static Standing Balance  mild impairment  -CH     Dynamic Standing Balance  mild impairment  -CH     Balance Interventions  sitting;standing;sit to stand;supported;static;dynamic  -CH       User Key  (r) = Recorded By, (t) = Taken By, (c) = Cosigned By    Initials Name Provider Type     Deepti Buckner, OTR/L Occupational Therapist        Goals/Plan     Row Name 07/01/21 0815          Transfer Goal 1 (OT)    Activity/Assistive Device (Transfer Goal 1, OT)  sit-to-stand/stand-to-sit;bed-to-chair/chair-to-bed;toilet;tub  -CH     [place] Level/Cues Needed (Transfer Goal 1, OT)  supervision required;verbal cues required  -     Time Frame (Transfer Goal 1, OT)  long term goal (LTG);by discharge  -     Progress/Outcome (Transfer Goal 1, OT)  goal ongoing  -     Row Name 07/01/21 0815          Dressing Goal 1 (OT)    Activity/Device (Dressing Goal 1, OT)  upper body dressing  -     [place]/Cues Needed (Dressing Goal 1, OT)  minimum assist (75% or more patient effort)  -     Time Frame (Dressing Goal 1, OT)  long term goal (LTG);by discharge  -     Progress/Outcome (Dressing Goal 1, OT)  goal ongoing  -     Row Name 07/01/21 0815          Toileting Goal 1 (OT)    Activity/Device (Toileting Goal 1, OT)  adjust/manage clothing;toileting skills, all;perform perineal hygiene;commode  -     [place] Level/Cues Needed (Toileting Goal 1, OT)  contact guard assist  -     Time Frame (Toileting Goal 1, OT)  long term goal (LTG);by discharge  -     Progress/Outcome (Toileting Goal 1, OT)  goal ongoing  [facility]     Row Name 07/01/21 0815          Therapy Assessment/Plan (OT)    Planned Therapy Interventions (OT)  activity tolerance  training;adaptive equipment training;BADL retraining;functional balance retraining;occupation/activity based interventions;patient/caregiver education/training;ROM/therapeutic exercise;strengthening exercise;transfer/mobility retraining;orthotic fabrication/fitting/training  -       User Key  (r) = Recorded By, (t) = Taken By, (c) = Cosigned By    Initials Name Provider Type     Deepti Buckner, OTR/L Occupational Therapist        Clinical Impression     Row Name 07/01/21 0815          Pain Assessment    Additional Documentation  Pain Scale: Numbers Pre/Post-Treatment (Group)  -     Row Name 07/01/21 0815          Pain Scale: Numbers Pre/Post-Treatment    Pretreatment Pain Rating  6/10  -     Posttreatment Pain Rating  6/10  -     Pain Location  back  -     Pain Intervention(s)  Medication (See MAR)  -     Row Name 07/01/21 0815          Plan of Care Review    Plan of Care Reviewed With  patient  -     Progress  no change  -     Outcome Summary  OT eval completed.  Mr. Henriquez is extremely pleasant & AxO x4.  He was able to get EOB at [facility], [facility] with [facility], stand at Min A & t/f to chair at [facility].  Mr. Henriquez was receptive to all edu & is a good rehab candidate.  OTR issued him with a rwx for amb.  I rec he cont OT tx until DC.  DC rec is dependent on improvement while hospitalized.  -     Row Name 07/01/21 0815          Therapy Assessment/Plan (OT)    Patient/Family Therapy Goal Statement (OT)  improve fxn  -     Rehab Potential (OT)  good, to achieve stated therapy goals  -     Criteria for Skilled Therapeutic Interventions Met (OT)  yes;skilled treatment is necessary  -     Therapy Frequency (OT)  5 times/wk  -     Row Name 07/01/21 0815          Therapy Plan Review/Discharge Plan (OT)    Equipment Needs Upon Discharge (OT)  walker, rolling;commode chair  -     Anticipated Discharge Disposition (OT)  home with 24/7 care;home with assist;home with home health;skilled nursing  facility  -     Row Name 07/01/21 0815          Positioning and Restraints    Pre-Treatment Position  in bed  -     Post Treatment Position  chair  -     In Chair  sitting;call light within reach;encouraged to call for assist;notified nsg  -       User Key  (r) = Recorded By, (t) = Taken By, (c) = Cosigned By    Initials Name Provider Type     Deepti Buckner, OTR/L Occupational Therapist        Outcome Measures     Row Name 07/01/21 0815          How much help from another is currently needed...    Putting on and taking off regular lower body clothing?  2  -CH     Bathing (including washing, rinsing, and drying)  2  -CH     Toileting (which includes using toilet bed pan or urinal)  2  -CH     Putting on and taking off regular upper body clothing  3  -CH     Taking care of personal grooming (such as brushing teeth)  3  -CH     Eating meals  4  -CH     AM-PAC 6 Clicks Score (OT)  16  [facility]     Row Name 07/01/21 0815          Functional Assessment    Outcome Measure Options  AM-PAC 6 Clicks Daily Activity (OT)  -       User Key  (r) = Recorded By, (t) = Taken By, (c) = Cosigned By    Initials Name Provider Type     Deepti Buckner, OTR/L Occupational Therapist        Occupational Therapy Education                 Title: PT OT SLP Therapies (In Progress)     Topic: Occupational Therapy (In Progress)     Point: ADL training (Done)     Description:   Instruct learner(s) on proper safety adaptation and remediation techniques during self care or transfers.   Instruct in proper use of assistive devices.              Learning Progress Summary           Patient Acceptance, E,D, VU,NR by  at 7/1/2021 1012                   Point: Home exercise program (Not Started)     Description:   Instruct learner(s) on appropriate technique for monitoring, assisting and/or progressing therapeutic exercises/activities.              Learner Progress:  Not documented in this visit.          Point: Precautions (Done)      Description:   Instruct learner(s) on prescribed precautions during self-care and functional transfers.              Learning Progress Summary           Patient Acceptance, E,D, VU,NR by  at 7/1/2021 1012                   Point: Body mechanics (Done)     Description:   Instruct learner(s) on proper positioning and spine alignment during self-care, functional mobility activities and/or exercises.              Learning Progress Summary           Patient Acceptance, E,D, VU,NR by  at 7/1/2021 1012                               User Key     Initials Effective Dates Name Provider Type [facility] 06/16/21 -  Deepti Buckner, OTR/L Occupational Therapist OT              OT Recommendation and Plan  Planned Therapy Interventions (OT): activity tolerance training, adaptive equipment training, BADL retraining, functional balance retraining, occupation/activity based interventions, patient/caregiver education/training, ROM/therapeutic exercise, strengthening exercise, transfer/mobility retraining, orthotic fabrication/fitting/training  Therapy Frequency (OT): 5 times/wk  Plan of Care Review  Plan of Care Reviewed With: patient  Progress: no change  Outcome Summary: OT eval completed.  Mr. Henriquez is extremely pleasant & AxO x4.  He was able to get EOB at [facility], [facility] L[facility] with Max A, stand at Min A & t/f to chair at Min A.  Mr. Henriquez was receptive to all edu & is a good rehab candidate.  OTR issued him with a rwx for amb.  I rec he cont OT tx until DC.  DC rec is dependent on improvement while hospitalized.     Time Calculation:   Time Calculation- OT     Row Name 07/01/21 0815             Time Calculation- OT    OT Start Time  0815 add 6 min for chart review  -      OT Stop Time  0850  -      OT Time Calculation (min)  35 min  -      OT Received On  07/01/21  -      OT Goal Re-Cert Due Date  07/11/21  -        User Key  (r) = Recorded By, (t) = Taken By, (c) = Cosigned By    Initials Name Provider Type    CH  Deepti Buckner, OTR/L Occupational Therapist        Therapy Charges for Today     Code Description Service Date Service Provider Modifiers Qty    [id number] HC OT EVAL MOD COMPLEXITY 3 7/1/2021 Deepti Buckner, DARRIUSR/L GO 1               ELENA Saeed/JAH  7/1/2021

## 2021-07-01 NOTE — PLAN OF CARE
Goal Outcome Evaluation:  Plan of Care Reviewed With: patient        Progress: no change  Outcome Summary: OT eval completed.  Mr. Henriquez is extremely pleasant & AxO x4.  He was able to get EOB at [facility], ALISIA LSO with Max A, stand at Min A & t/f to chair at Min A.  Mr. Henriquez was receptive to all edu & is a good rehab candidate.  OTR issued him with a rwx for amb.  I rec he cont OT tx until DC.  DC rec is dependent on improvement while hospitalized.

## 2021-07-01 NOTE — PLAN OF CARE
Goal Outcome Evaluation:  Plan of Care Reviewed With: patient, spouse        Progress: improving  Outcome Summary: Pt is alert & orient x4, VSS, .  Bladder scanned after 8 hours, had 258 ml residual.  In&out cath, 250 ml output.  Medicated once for pain.  Reinforced dressing.  Will continue to monitor

## 2021-07-01 NOTE — PLAN OF CARE
Goal Outcome Evaluation:  Plan of Care Reviewed With: patient        Progress: no change  Outcome Summary: Pt has been very pleasant A&O. Drowsy and wanted to sleep after surgery. Wife has been at bedside. CO mild pain, prn Tylenol controlled well. Restign between care.

## 2021-07-01 NOTE — PROGRESS NOTES
Chad Henriquez  89 y.o.      Chief complaint:   Back pain     Subjective  No events overnight.  Back and left leg pain under control.  No confusion or hallucinations this morning.    Temp:  [97 °F (36.1 °C)-97.8 °F (36.6 °C)] 97.8 °F (36.6 °C)  Heart Rate:  [58-67] 58  Resp:  [16-20] 16  BP: (114-187)/(42-69) 120/52      Objective    Neurologic Exam     Mental Status   Oriented to person, place, and time.   Attention: normal.   Speech: speech is normal   Level of consciousness: alert  Knowledge: good.     Cranial Nerves     CN II   Visual fields full to confrontation.     CN III, IV, VI   Pupils are equal, round, and reactive to light.  Extraocular motions are normal.     CN V   Facial sensation intact.     CN VII   Facial expression full, symmetric.     CN VIII   CN VIII normal.     CN IX, X   CN IX normal.   CN X normal.     CN XI   CN XI normal.     CN XII   CN XII normal.     Motor Exam   Muscle bulk: normal  Overall muscle tone: normal  Right arm pronator drift: absent  Left arm pronator drift: absent    Strength   Strength 5/5 throughout.     Sensory Exam   Light touch normal.   Pinprick normal.     Gait, Coordination, and Reflexes     Gait  Gait: normal    Coordination   Finger to nose coordination: normal    Tremor   Resting tremor: absent  Intention tremor: absent  Action tremor: absent    Reflexes   Reflexes 2+ except as noted.       Lab Results (last 24 hours)     Procedure Component Value Units Date/Time    Basic Metabolic Panel [[id number]]  (Abnormal) Collected: 07/01/21 0538    Specimen: Blood Updated: 07/01/21 0626     Glucose 134 mg/dL      BUN 12 mg/dL      Creatinine 0.56 mg/dL      Sodium 131 mmol/L      Potassium 4.7 mmol/L      Chloride 99 mmol/L      CO2 27.0 mmol/L      Calcium 7.8 mg/dL      eGFR Non African Amer 137 mL/min/1.73      BUN/Creatinine Ratio 21.4     Anion Gap 5.0 mmol/L     Narrative:      GFR Normal >60  Chronic Kidney Disease <60  Kidney Failure <15      CBC & Differential  [[id number]]  (Abnormal) Collected: 07/01/21 0538    Specimen: Blood Updated: 07/01/21 0607    Narrative:      The following orders were created for panel order CBC & Differential.  Procedure                               Abnormality         Status                     ---------                               -----------         ------                     CBC Auto Differential[[id number]]        Abnormal            Final result                 Please view results for these tests on the individual orders.    CBC Auto Differential [[id number]]  (Abnormal) Collected: 07/01/21 0538    Specimen: Blood Updated: 07/01/21 0607     WBC 12.88 10*3/mm3      RBC 2.61 10*6/mm3      Hemoglobin 8.6 g/dL      Hematocrit 25.5 %      MCV 97.7 fL      MCH 33.0 pg      MCHC 33.7 g/dL      RDW 12.7 %      RDW-SD 45.1 fl      MPV 9.7 fL      Platelets 176 10*3/mm3      Neutrophil % 84.1 %      Lymphocyte % 6.1 %      Monocyte % 8.9 %      Eosinophil % 0.3 %      Basophil % 0.2 %      Immature Grans % 0.4 %      Neutrophils, Absolute 10.85 10*3/mm3      Lymphocytes, Absolute 0.78 10*3/mm3      Monocytes, Absolute 1.14 10*3/mm3      Eosinophils, Absolute 0.04 10*3/mm3      Basophils, Absolute 0.02 10*3/mm3      Immature Grans, Absolute 0.05 10*3/mm3      nRBC 0.0 /100 WBC               Plan:   I doing well this morning.  Brace and mobilize with physical therapy.  Acute blood loss anemia secondary to postoperative blood loss.      Spinal stenosis, lumbar region, with neurogenic claudication        Kj Falcon MD

## 2021-07-02 ENCOUNTER — READMISSION MANAGEMENT (OUTPATIENT)
Dept: CALL CENTER | Facility: HOSPITAL | Age: 86
End: 2021-07-02

## 2021-07-02 ENCOUNTER — TELEPHONE (OUTPATIENT)
Dept: INTERNAL MEDICINE | Facility: CLINIC | Age: 86
End: 2021-07-02

## 2021-07-02 ENCOUNTER — APPOINTMENT (OUTPATIENT)
Dept: GENERAL RADIOLOGY | Facility: HOSPITAL | Age: 86
End: 2021-07-02

## 2021-07-02 ENCOUNTER — HOME HEALTH ADMISSION (OUTPATIENT)
Dept: HOME HEALTH SERVICES | Facility: HOME HEALTHCARE | Age: 86
End: 2021-07-02

## 2021-07-02 VITALS
BODY MASS INDEX: 24.66 KG/M2 | HEIGHT: 69 IN | WEIGHT: 166.5 LBS | TEMPERATURE: 98.9 F | OXYGEN SATURATION: 92 % | SYSTOLIC BLOOD PRESSURE: 133 MMHG | HEART RATE: 68 BPM | DIASTOLIC BLOOD PRESSURE: 41 MMHG | RESPIRATION RATE: 16 BRPM

## 2021-07-02 LAB
DEPRECATED RDW RBC AUTO: 45.7 FL (ref 37–54)
ERYTHROCYTE [DISTWIDTH] IN BLOOD BY AUTOMATED COUNT: 12.8 % (ref 12.3–15.4)
HCT VFR BLD AUTO: 24.8 % (ref 37.5–51)
HGB BLD-MCNC: 8.2 G/DL (ref 13–17.7)
MCH RBC QN AUTO: 32.3 PG (ref 26.6–33)
MCHC RBC AUTO-ENTMCNC: 33.1 G/DL (ref 31.5–35.7)
MCV RBC AUTO: 97.6 FL (ref 79–97)
PLATELET # BLD AUTO: 159 10*3/MM3 (ref 140–450)
PMV BLD AUTO: 10.2 FL (ref 6–12)
RBC # BLD AUTO: 2.54 10*6/MM3 (ref 4.14–5.8)
WBC # BLD AUTO: 13.42 10*3/MM3 (ref 3.4–10.8)

## 2021-07-02 PROCEDURE — 85027 COMPLETE CBC AUTOMATED: CPT | Performed by: NURSE PRACTITIONER

## 2021-07-02 PROCEDURE — 99024 POSTOP FOLLOW-UP VISIT: CPT | Performed by: NURSE PRACTITIONER

## 2021-07-02 PROCEDURE — 97116 GAIT TRAINING THERAPY: CPT

## 2021-07-02 PROCEDURE — 71045 X-RAY EXAM CHEST 1 VIEW: CPT

## 2021-07-02 RX ORDER — OXYCODONE AND ACETAMINOPHEN 7.5; 325 MG/1; MG/1
1 TABLET ORAL EVERY 6 HOURS PRN
Qty: 120 TABLET | Refills: 0 | Status: SHIPPED | OUTPATIENT
Start: 2021-07-02 | End: 2021-11-15

## 2021-07-02 RX ORDER — OXYCODONE AND ACETAMINOPHEN 7.5; 325 MG/1; MG/1
1 TABLET ORAL EVERY 6 HOURS PRN
Qty: 120 TABLET | Refills: 0
Start: 2021-07-02 | End: 2021-08-01

## 2021-07-02 RX ADMIN — GABAPENTIN 600 MG: 300 CAPSULE ORAL at 05:13

## 2021-07-02 RX ADMIN — FINASTERIDE 5 MG: 5 TABLET, FILM COATED ORAL at 08:20

## 2021-07-02 RX ADMIN — SODIUM CHLORIDE 75 ML/HR: 9 INJECTION, SOLUTION INTRAVENOUS at 01:33

## 2021-07-02 RX ADMIN — SODIUM CHLORIDE, PRESERVATIVE FREE 3 ML: 5 INJECTION INTRAVENOUS at 08:20

## 2021-07-02 RX ADMIN — PRAVASTATIN SODIUM 40 MG: 20 TABLET ORAL at 08:20

## 2021-07-02 RX ADMIN — GABAPENTIN 600 MG: 300 CAPSULE ORAL at 14:45

## 2021-07-02 RX ADMIN — OXYCODONE HYDROCHLORIDE AND ACETAMINOPHEN 1 TABLET: 7.5; 325 TABLET ORAL at 17:24

## 2021-07-02 RX ADMIN — DOCUSATE SODIUM 100 MG: 100 CAPSULE ORAL at 08:20

## 2021-07-02 RX ADMIN — CHLORPHENIRAMINE MALEATE 4 MG: 4 TABLET ORAL at 08:20

## 2021-07-02 NOTE — THERAPY TREATMENT NOTE
Acute Care - Physical Therapy Treatment Note  [facility]     Patient Name: Chad Henriquez  : 1929  MRN: [id number]  Today's Date: 2021           PT Assessment (last 12 hours)      PT Evaluation and Treatment     [facility] Name 21 1448 21 1320       Physical Therapy Time and Intention    Subjective Information  complains of;weakness;fatigue;pain  -  --    Document Type  therapy note (daily note)  -  --    Mode of Treatment  physical therapy  -  --    Session Not Performed  --  other (see comments)  -    Comment, Session Not Performed  --  wife states pt just got back to bed, would like for him to rest  -[facility] Name 21 0711          Physical Therapy Time and Intention    Subjective Information  complains of;weakness;fatigue;pain c/o SOA  -     Document Type  therapy note (daily note)  -     Mode of Treatment  physical therapy  -[facility] Name 21 1448 21 0711       General Information    Existing Precautions/Restrictions  brace worn when out of bed;fall;spinal R AFO  -  brace worn when out of bed;fall;spinal R AFO  -AH    Barriers to Rehab  --  hearing deficit  -[facility] Name 21 1448 21 0711       Pain Scale: Numbers Pre/Post-Treatment    Pretreatment Pain Rating  2/10  -  2/10  -    Posttreatment Pain Rating  2/10  -  2/10  -    Pain Location  back  -  back  -[facility] Name 21 0711          Pain Scale: Word Pre/Post-Treatment    Pain Intervention(s)  Repositioned;Ambulation/increased activity  -[facility] Name 21 1448 21 0711       Bed Mobility    Bed Mobility  sidelying-sit;sit-sidelying  -  sidelying-sit;sit-sidelying  -    Sidelying-Sit [place] (Bed Mobility)  minimum assist (75% patient effort);verbal cues;moderate assist (50% patient effort)  -  minimum assist (75% patient effort);verbal cues  -    Sit-Sidelying [place] (Bed Mobility)  -- CHAIR  -  -- CHAIR  -[facility] Name 21 1448 21  0711       Transfers    Sit-Stand [place] (Transfers)  minimum assist (75% patient effort);verbal cues  -  minimum assist (75% patient effort);verbal cues  -    Row Name 07/02/21 1448 07/02/21 0711       Gait/Stairs (Locomotion)    [place] Level (Gait)  contact guard;verbal cues;minimum assist (75% patient effort)  -  contact guard;verbal cues;minimum assist (75% patient effort)  -    Assistive Device (Gait)  walker, front-wheeled  -  walker, front-wheeled  -    Distance in Feet (Gait)  30  -AH  200  -AH    Bilateral Gait Deviations  forward flexed posture  -AH  forward flexed posture  -    [place] Level (Stairs)  contact guard;verbal cues  -  --    Handrail Location (Stairs)  both sides  -  --    Number of Steps (Stairs)  5 x 2  -AH  --    Ascending Technique (Stairs)  step-over-step  -AH  --    Descending Technique (Stairs)  step-over-step  -  --    Row Name             Wound 06/30/21 1550 lumbar spine Incision    Wound - Properties Group Placement Date: 06/30/21  -SH Placement Time: 1550  -SH Present on Hospital Admission: N  -SH Location: lumbar spine  -SH Primary Wound Type: Incision  -SH Additional Comments: mastisol, steris, telfa, tape  -SH    Retired Wound - Properties Group Date first assessed: 06/30/21  - Time first assessed: 1550  -SH Present on Hospital Admission: N  -SH Location: lumbar spine  -SH Primary Wound Type: Incision  -SH Additional Comments: mastisol, steris, telfa, tape  -SH    Row Name 07/02/21 0711          Plan of Care Review    Plan of Care Reviewed With  patient;[facility]  -     Progress  no change  -     Outcome Summary  pt c/o increased weakness this am, trans to EOB min assist, min assist to stacy LSO, pt c/o SOA on RA sat 86% stacy 02 back @ 2L for amb, pt amb 200 feet cga-min assist with rwx, trans to chair min assist  -     Row Name 07/02/21 0711          Vital Signs    Pre SpO2 (%)  92  -AH     O2 Delivery Pre Treatment  nasal cannula 2L  -AH      Intra SpO2 (%)  86  -AH     O2 Delivery Intra Treatment  room air stacy 02 back @ 2L  -AH     Post SpO2 (%)  96  -AH     O2 Delivery Post Treatment  nasal cannula 2L  -AH     Row Name 07/02/21 1448 07/02/21 0711       Positioning and Restraints    Pre-Treatment Position  in bed  -AH  in bed  -AH    Post Treatment Position  chair  -  chair  -AH    In Chair  notified nsg;sitting;call light within reach;encouraged to call for assist;with family/caregiver  -  sitting;call light within reach;encouraged to call for assist;with family/caregiver  -AH      User Key  (r) = Recorded By, (t) = Taken By, (c) = Cosigned By    Initials Name Provider Type     Nohelia Ng, FRANCHESKA Physical Therapy Assistant     Paola Alvarez, RN Registered Nurse        Physical Therapy Education                 Title: PT OT SLP Therapies (In Progress)     Topic: Physical Therapy (In Progress)     Point: Mobility training (Done)     Learning Progress Summary           Patient Acceptance, E, VU by MS at 7/1/2021 1205    Comment: role of PT in his care, spinal restrictions, use of LSO                   Point: Home exercise program (Not Started)     Learner Progress:  Not documented in this visit.          Point: Body mechanics (Not Started)     Learner Progress:  Not documented in this visit.          Point: Precautions (Done)     Learning Progress Summary           Patient Acceptance, E,TB, VU by  at 7/2/2021 0759    Comment: LSO when OOB    Acceptance, E, VU by MS at 7/1/2021 1205    Comment: role of PT in his care, spinal restrictions, use of LSO   Family Acceptance, E,TB, VU by  at 7/2/2021 0759    Comment: LSO when OOB                               User Key     Initials Effective Dates Name Provider Type [facility] 06/16/21 -  Nohelia Ng PTA Physical Therapy Assistant PT    MS 06/19/18 -  Sarah Rainey, PT, DPT, NCS Physical Therapist PT              PT Recommendation and Plan     Plan of Care Reviewed With: patient,  son  Progress: no change  Outcome Summary: pt c/o increased weakness this am, trans to EOB min assist, min assist to stacy LSO, pt c/o SOA on RA sat 86% stacy 02 back @ 2L for amb, pt amb 200 feet cga-min assist with rwx, trans to chair min assist       Time Calculation:   PT Charges     Row Name 07/02/21 1535 07/02/21 0759          Time Calculation    Start Time  1448  -  0711  -     Stop Time  1535  -  0750  -     Time Calculation (min)  47 min  -  39 min  -     PT Received On  --  07/02/21  -        Time Calculation- PT    Total Timed Code Minutes- PT  47 minute(s)  -  39 minute(s)  -AH        Timed Charges    43037 - Gait Training Minutes   47  -AH  39  -AH        Total Minutes    Timed Charges Total Minutes  47  -AH  39  -AH      Total Minutes  47  -AH  39  -AH       User Key  (r) = Recorded By, (t) = Taken By, (c) = Cosigned By    Initials Name Provider Type    Nohelia Mattson PTA Physical Therapy Assistant        Therapy Charges for Today     Code Description Service Date Service Provider Modifiers Qty    92034285813 HC GAIT TRAINING EA 15 MIN 7/1/2021 Nohelia Ng, PTA GP 3    79757592811 HC GAIT TRAINING EA 15 MIN 7/2/2021 Nohelia Ng, PTA GP 3    64396397458 HC GAIT TRAINING EA 15 MIN 7/2/2021 Nohelia Ng, PTA GP 3          PT G-Codes  Outcome Measure Options: AM-PAC 6 Clicks Basic Mobility (PT)  AM-PAC 6 Clicks Score (PT): 17  AM-PAC 6 Clicks Score (OT): 16    Nohelia Ng PTA  7/2/2021

## 2021-07-02 NOTE — THERAPY TREATMENT NOTE
Acute Care - Physical Therapy Treatment Note  [facility]     Patient Name: Chad Henriquez  : 1929  MRN: [id number]  Today's Date: 2021           PT Assessment (last 12 hours)      PT Evaluation and Treatment     [facility] Name 21 [facility]          Physical Therapy Time and Intention    Subjective Information  complains of;weakness;fatigue;pain c/o SOA  -     Document Type  therapy note (daily note)  -     Mode of Treatment  physical therapy  -[facility] Name 21          General Information    Existing Precautions/Restrictions  brace worn when out of bed;fall;spinal R AFO  -     Barriers to Rehab  hearing deficit  -[facility] Name 21          Pain Scale: Numbers Pre/Post-Treatment    Pretreatment Pain Rating  2/10  -     Posttreatment Pain Rating  2/10  -     Pain Location  back  -[facility] Name 21          Pain Scale: Word Pre/Post-Treatment    Pain Intervention(s)  Repositioned;Ambulation/increased activity  -[facility] Name 21          Bed Mobility    Bed Mobility  sidelying-sit;sit-sidelying  -     Sidelying-Sit [place] (Bed Mobility)  minimum assist (75% patient effort);verbal cues  -     Sit-Sidelying [place] (Bed Mobility)  -- CHAIR  -[facility] Name 21          Transfers    Sit-Stand [place] (Transfers)  minimum assist (75% patient effort);verbal cues  -[facility] Name 21          Gait/Stairs (Locomotion)    [place] Level (Gait)  contact guard;verbal cues;minimum assist (75% patient effort)  -     Assistive Device (Gait)  walker, front-wheeled  -     Distance in Feet (Gait)  200  -     Bilateral Gait Deviations  forward flexed posture  -[facility] Name             Wound 21 1550 lumbar spine Incision    Wound - Properties Group Placement Date: 21  -SH Placement Time: 1550  -SH Present on Hospital Admission: N  -SH Location: lumbar spine  -SH Primary Wound Type: Incision  -SH  Additional Comments: mastisol, steris, telfa, tape  -SH    Retired Wound - Properties Group Date first assessed: 06/30/21  -SH Time first assessed: 1550  -SH Present on Hospital Admission: N  -SH Location: lumbar spine  -SH Primary Wound Type: Incision  -SH Additional Comments: mastisol, steris, telfa, tape  -SH    Row Name 07/02/21 0711          Plan of Care Review    Plan of Care Reviewed With  patient;son  -     Progress  no change  -     Outcome Summary  pt c/o increased weakness this am, trans to EOB min assist, min assist to stacy LSO, pt c/o SOA on RA sat 86% stacy 02 back @ 2L for amb, pt amb 200 feet cga-min assist with rwx, trans to chair min assist  -AH     Row Name 07/02/21 0711          Vital Signs    Pre SpO2 (%)  92  -AH     O2 Delivery Pre Treatment  nasal cannula 2L  -AH     Intra SpO2 (%)  86  -AH     O2 Delivery Intra Treatment  room air stacy 02 back @ 2L  -AH     Post SpO2 (%)  96  -AH     O2 Delivery Post Treatment  nasal cannula 2L  -AH     Row Name 07/02/21 0711          Positioning and Restraints    Pre-Treatment Position  in bed  -     Post Treatment Position  chair  -     In Chair  sitting;call light within reach;encouraged to call for assist;with family/caregiver  -       User Key  (r) = Recorded By, (t) = Taken By, (c) = Cosigned By    Initials Name Provider Type     Nohelia Ng PTA Physical Therapy Assistant    Paola Claire, RN Registered Nurse        Physical Therapy Education                 Title: PT OT SLP Therapies (In Progress)     Topic: Physical Therapy (In Progress)     Point: Mobility training (Done)     Learning Progress Summary           Patient Acceptance, E, VU by MS at 7/1/2021 1205    Comment: role of PT in his care, spinal restrictions, use of LSO                   Point: Home exercise program (Not Started)     Learner Progress:  Not documented in this visit.          Point: Body mechanics (Not Started)     Learner Progress:  Not documented in this  visit.          Point: Precautions (Done)     Learning Progress Summary           Patient Acceptance, E,TB, VU by  at 7/2/2021 0759    Comment: LSO when OOB    Acceptance, E, VU by MS at 7/1/2021 1205    Comment: role of PT in his care, spinal restrictions, use of LSO   Family Acceptance, E,TB, VU by  at 7/2/2021 0759    Comment: LSO when OOB                               User Key     Initials Effective Dates Name Provider Type [facility] 06/16/21 -  Nohelia Ng PTA Physical Therapy Assistant PT    MS 06/19/18 -  Sarah Rainey, PT, DPT, NCS Physical Therapist PT              PT Recommendation and Plan     Plan of Care Reviewed With: patient, son  Progress: no change  Outcome Summary: pt c/o increased weakness this am, trans to EOB min assist, min assist to stacy LSO, pt c/o SOA on RA sat 86% stacy 02 back @ 2L for amb, pt amb 200 feet cga-min assist with rwx, trans to chair min assist       Time Calculation:   PT Charges     Row Name 07/02/21 0759             Time Calculation    Start Time  0711  -      Stop Time  0750  -      Time Calculation (min)  39 min  -      PT Received On  07/02/21  -         Time Calculation- PT    Total Timed Code Minutes- PT  39 minute(s)  -         Timed Charges    52488 - Gait Training Minutes   39  -AH         Total Minutes    Timed Charges Total Minutes  39  -       Total Minutes  39  -AH        User Key  (r) = Recorded By, (t) = Taken By, (c) = Cosigned By    Initials Name Provider Type     Nohelia Ng PTA Physical Therapy Assistant        Therapy Charges for Today     Code Description Service Date Service Provider Modifiers Qty    16188408434 HC GAIT TRAINING EA 15 MIN 7/1/2021 Nohelia Ng, FRANCHESKA GP 3    98712885102 HC GAIT TRAINING EA 15 MIN 7/2/2021 Nohelia Ng PTA GP 3          PT G-Codes  Outcome Measure Options: AM-PAC 6 Clicks Basic Mobility (PT)  AM-PAC 6 Clicks Score (PT): 17  AM-PAC 6 Clicks Score (OT): 16    Nohelia Ng  PTA  7/2/2021

## 2021-07-02 NOTE — TELEPHONE ENCOUNTER
Caller: Chad Henriquez    Relationship to patient: Self    Best call back number: [phone]    New or established patient?  [] New  [x] Established    Date of discharge: 07/02/21  Facility discharged from: [facility]     Diagnosis/Symptoms: DID NOT STATE     Length of stay (If applicable):DID NOT STATE     Specialty Only: Did you see a [facility] provider?    [x] Yes  [] No  If so, who? DID NOT STATE       :  ADD NOTES: I WAS NOT ABLE TO SCHEDULE WITH DR RAMOS

## 2021-07-02 NOTE — PROGRESS NOTES
Continued Stay Note   [place]     Patient Name: Chad Henriquez  MRN: [id number]  Today's Date: 7/2/2021    Admit Date: 6/30/2021    Discharge Plan     Row Name 07/02/21 1413       Plan    Final Discharge Disposition Code  06 - home with home health care    Final Note  Pt is being dcd home today. Orders for HH and home oxygen. Spoke to pt wife (Eva) regarding options. Eva prefers [facility] and [facility] for home oxygen and bsc. Notified Génesis with [facility] and [facility]. [facility] will deliver portable tank to pt room.        Discharge Codes    No documentation.       Expected Discharge Date and Time     Expected Discharge Date Expected Discharge Time    Jul 2, 2021             DELORIS Davis

## 2021-07-02 NOTE — PLAN OF CARE
Goal Outcome Evaluation:  Plan of Care Reviewed With: patient        Progress: no change  Outcome Summary: Pt A&Ox4. PPP. baseline n/t feet. OLMSTEAD. LSO when out of bed. Ewiiaapaayp. Blind L eye. Lumbar incision, CDI. c/o pain, prn pain medication given with good relief. SCD. up x 1, walker. , 1 L NC. O2 delivered at bedside. Pt to be DC home today.

## 2021-07-02 NOTE — PLAN OF CARE
Problem: Adult Inpatient Plan of Care  Goal: Plan of Care Review  Outcome: Ongoing, Progressing  Flowsheets (Taken 7/2/2021 0312)  Progress: improving  Plan of Care Reviewed With:   patient   son  Outcome Summary: Pt A&Ox4, VSS. . Voiding. Medicated once during the night for pain. Dressing CDI. Bed alarm set, call light in reach, safety maintained.

## 2021-07-02 NOTE — PROGRESS NOTES
Exercise Oximetry    Patient Name:Chad Henriquez   MRN: [id number]   Date: 07/02/21             ROOM AIR BASELINE   SpO2%  86   Heart Rate    Blood Pressure      EXERCISE ON ROOM AIR SpO2% EXERCISE ON O2 @ 2 LPM SpO2%   1 MINUTE  1 MINUTE 92   2 MINUTES  2 MINUTES    3 MINUTES  3 MINUTES    4 MINUTES  4 MINUTES    5 MINUTES  5 MINUTES    6 MINUTES  6 MINUTES 96              Distance Walked   Distance Walked   Dyspnea (Isaiah Scale)   Dyspnea (Isaiah Scale)   Fatigue (Isaiah Scale)   Fatigue (Isaiah Scale)   SpO2% Post Exercise   SpO2% Post Exercise   HR Post Exercise   HR Post Exercise   Time to Recovery   Time to Recovery     Comments: 0800 pt ambulated with physical therapy.  Pt qualifies for 2lpm nasal cannula.

## 2021-07-02 NOTE — DISCHARGE SUMMARY
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  The patient has been through all manner of conservative care without any meaningful improvement. The patient went to the operating room on June 30, 2021 for a revision of posterior instrumentation from L3-S1 with a L2-3 TLIF with laminectomy and posterior instrumentation from L2-S1.  The patient tolerated procedure well.  Currently the patient is ambulating.  The patient is tolerating p.o.  The patient is voiding spontaneously.  It is felt that at this time the patient is stable and suitable to be discharged home.  See orders for discharge instructions and restrictions.  The patient can take the dressing off in 48 hours and can get the wound wet at that time.  The patient is to clean the wound daily with soap and water.  They are to call the office with any drainage from the incision or worsening pain.  He is not to take any NSAIDs.  He is to wear the brace whenever he is out of bed.  We will follow-up with the patient in the office in 3 weeks.    Procedures Performed  Procedure(s):  LUMBAR LAMINECTOMY, DISCECTOMY, FACETECTOMY,  TRANSFORAMINAL LUMBAR INTERBODY FUSION L2-3. REVISION OF INSTRUMENTATION L3-S1. USE OF IMAGE GUIDANCE AND SURGICAL ROBOT       Consults:   Consults     No orders found from 6/1/2021 to 7/1/2021.            Condition on Discharge: Stable    Vital Signs  Temp:  [98.1 °F (36.7 °C)-99.9 °F (37.7 °C)] 98.9 °F (37.2 °C)  Heart Rate:  [63-75] 68  Resp:  [16-18] 16  BP: (124-138)/(36-52) 133/41    Physical Exam:   Physical Exam  Constitutional:       Appearance: He is well-developed.   HENT:      Head: Normocephalic.    Eyes:      Pupils: Pupils are equal, round, and reactive to light.   Pulmonary:      Effort: Pulmonary effort is normal.   Musculoskeletal:         General: Normal range of motion.      Cervical back: Normal range of motion.      Comments: Back pain   Skin:     General: Skin is warm.   Neurological:      Mental Status: He is alert and oriented to person, place, and time.      GCS: GCS eye subscore is 4. GCS verbal subscore is 5. GCS motor subscore is 6.      Cranial Nerves: No cranial nerve deficit.      Sensory: No sensory deficit.      Gait: Gait normal.      Deep Tendon Reflexes: Reflexes are normal and symmetric.   Psychiatric:         Speech: Speech normal.         Behavior: Behavior normal.         Thought Content: Thought content normal.          Neurologic Exam     Mental Status   Oriented to person, place, and time.   Speech: speech is normal     Cranial Nerves     CN III, IV, VI   Pupils are equal, round, and reactive to light.         Discharge Disposition  Home or Self Care    Discharge Medications     Discharge Medications      New Medications      Instructions Start Date   oxyCODONE-acetaminophen 7.5-325 MG per tablet  Commonly known as: PERCOCET   1 tablet, Oral, Every 6 Hours PRN         Changes to Medications      Instructions Start Date   primidone 50 MG tablet  Commonly known as: MYSOLINE  What changed:   · how much to take  · how to take this  · additional instructions   Take 1/2 tablet at bedtime         Continue These Medications      Instructions Start Date   alfuzosin 10 MG 24 hr tablet  Commonly known as: Uroxatral   10 mg, Oral, Daily      amLODIPine 2.5 MG tablet  Commonly known as: NORVASC   2.5 mg, Oral, Daily      aspirin 81 MG tablet   81 mg, Oral      chlorpheniramine 4 MG tablet  Commonly known as: CHLOR-TRIMETON   4 mg, Oral, Daily      [employer] Folic Acid 800 MCG tablet  Generic drug: folic acid   Taking as multivitamin now      finasteride 5 MG tablet  Commonly known as: PROSCAR   5  mg, Oral, Daily      Flaxseed Oil 1000 MG capsule   540mg takes 4 tablets by mouth daily      gabapentin 600 MG tablet  Commonly known as: Neurontin   600 mg, Oral, 3 Times Daily      L-Lysine 600 MG tablet   1 tablet, Oral, Daily      metoprolol tartrate 25 MG tablet  Commonly known as: LOPRESSOR   1/2 in morning and 1 at night      pravastatin 40 MG tablet  Commonly known as: PRAVACHOL   Take 40 mg by mouth daily.        psyllium 58.6 % packet  Commonly known as: METAMUCIL   1 packet, Oral, Daily, Takes in capsule form 1 in am  And 1 at night and 2 at noon      Vitamin C 500 MG capsule   1 capsule, Oral, 2 times daily      Vitamin D3 50 MCG (2000 UT) tablet   1 tablet, Oral, Daily      Zinc 50 MG tablet   1 tablet, Oral, Daily         Stop These Medications    traMADol 50 MG tablet  Commonly known as: ULTRAM            Discharge Diet:   Diet Instructions     Diet: Regular; Thin      Discharge Diet: Regular    Fluid Consistency: Thin          Activity at Discharge:   Activity Instructions     Other Instructions (Specify)      Activity Instructions: no strenuous activity. Wear brace when out of bed or sitting up          Follow-up Appointments  Future Appointments   Date Time Provider Department Center   11/24/2021  9:15 AM Toño Mcintyre PA [facility]   12/1/2021  1:15 PM Zelalem Jackson MD [facility]     Additional Instructions for the Follow-ups that You Need to Schedule     Ambulatory Referral to Home Health   As directed      Face to Face Visit Date: 7/1/2021    Follow-up provider for Plan of Care?: I will be treating the patient on an ongoing basis.  Please send me the Plan of Care for signature.    Follow-up provider: BJORN GOODWIN [[id number]]    Reason/Clinical Findings: S/P SPINAL SURGERY    Describe mobility limitations that make leaving home difficult: DIFFICULT TO LEAVE HOME RECENT SPINAL SURGERY; PAIN MED REQUIRED. NOT DRIVING. MUST HAVE ASSIST X1 AND DME.    Nursing/Therapeutic Services  Requested: Physical Therapy Occupational Therapy    PT orders: Gait Training Transfer training Strengthening Home safety assessment    Weight Bearing Status: As Tolerated    Occupational orders: Activities of daily living Home safety assessment Strengthening    Frequency: 1 Week 1         Call MD With Problems / Concerns   As directed      Call with worsening back or leg pain, drainage from wound, fever, or difficulty walking    Order Comments: Call with worsening back or leg pain, drainage from wound, fever, or difficulty walking          Discharge Follow-up with Specialty: job robles np; 3 Weeks   As directed      Specialty: job robles np    Follow Up: 3 Weeks               Test Results Pending at Discharge       Job Robles, EDIE  07/02/21  12:42 CDT    Time: Discharge 20 min

## 2021-07-03 ENCOUNTER — HOME CARE VISIT (OUTPATIENT)
Dept: HOME HEALTH SERVICES | Facility: CLINIC | Age: 86
End: 2021-07-03

## 2021-07-03 ENCOUNTER — TRANSITIONAL CARE MANAGEMENT TELEPHONE ENCOUNTER (OUTPATIENT)
Dept: CALL CENTER | Facility: HOSPITAL | Age: 86
End: 2021-07-03

## 2021-07-03 PROCEDURE — G0299 HHS/HOSPICE OF RN EA 15 MIN: HCPCS

## 2021-07-03 NOTE — OUTREACH NOTE
Prep Survey      Responses   [facility] facility patient discharged from?  [place]   Is LACE score < 7 ?  No   Emergency Room discharge w/ pulse ox?  No   Eligibility  [facility]   Date of Admission  06/30/21   Date of Discharge  07/02/21   Discharge Disposition  Home or Self Care   Discharge diagnosis  Spinal stenosis-laminectomy this visit   Does the patient have one of the following disease processes/diagnoses(primary or secondary)?  General Surgery   Does the patient have Home health ordered?  Yes   What is the Home health agency?   [facility]   Is there a DME ordered?  No   Prep survey completed?  Yes          Catalina Hurd RN

## 2021-07-03 NOTE — THERAPY DISCHARGE NOTE
Acute Care - Physical Therapy Discharge Summary  [facility]       Patient Name: Chad Henriquez  : 1929  MRN: [id number]    Today's Date: 7/3/2021                 Admit Date: 2021      PT Recommendation and Plan    Visit Dx:    ICD-10-CM ICD-9-CM   1. Frequency of urination  R35.0 788.41   2. Spinal stenosis, lumbar region, with neurogenic claudication  M48.062 724.03   3. Decreased activities of daily living (ADL)  Z78.9 V49.89   4. Lumbar disc disease with radiculopathy  M51.16 722.10     724.4   5. Syncope, unspecified syncope type  R55 780.2   6. Impaired mobility  Z74.09 799.89   7. Coronary artery disease of native artery of native heart with stable angina pectoris (CMS/[facility])  I25.118 414.01     413.9   8. Obstructive sleep apnea syndrome  G47.33 327.23   9. Spinal stenosis, unspecified spinal region  M48.00 724.00               PT Rehab Goals     Row Name 21 0648             Bed Mobility Goal 1 (PT)    Activity/Assistive Device (Bed Mobility Goal 1, PT)  bed mobility activities, all  -AB      [place] Level/Cues Needed (Bed Mobility Goal 1, PT)  independent  -AB      Time Frame (Bed Mobility Goal 1, PT)  long term goal (LTG);by discharge  -AB      Progress/Outcomes (Bed Mobility Goal 1, PT)  goal not met  -AB         Transfer Goal 1 (PT)    Activity/Assistive Device (Transfer Goal 1, PT)  sit-to-stand/stand-to-sit;bed-to-chair/chair-to-bed;walker, rolling  -AB      [place] Level/Cues Needed (Transfer Goal 1, PT)  modified independence  -AB      Time Frame (Transfer Goal 1, PT)  long term goal (LTG);by discharge  -AB      Progress/Outcome (Transfer Goal 1, PT)  goal not met  -AB         Gait Training Goal 1 (PT)    Activity/Assistive Device (Gait Training Goal 1, PT)  gait (walking locomotion);assistive device use;decrease fall risk;improve balance and speed;increase endurance/gait distance;walker, rolling  -AB      [place] Level (Gait Training Goal 1, PT)  modified independence   -AB      Distance (Gait Training Goal 1, PT)  300ft with AFO  -AB      Time Frame (Gait Training Goal 1, PT)  long term goal (LTG);by discharge  -AB      Progress/Outcome (Gait Training Goal 1, PT)  goal not met  -AB         Stairs Goal 1 (PT)    Activity/Assistive Device (Stairs Goal 1, PT)  ascending stairs;descending stairs;using handrail, right;step-over step;step-to-step  -AB      [place] Level/Cues Needed (Stairs Goal 1, PT)  contact guard assist  -AB      Number of Stairs (Stairs Goal 1, PT)  3  -AB      Time Frame (Stairs Goal 1, PT)  by discharge;long term goal (LTG)  -AB      Progress/Outcome (Stairs Goal 1, PT)  goal not met  -AB        User Key  (r) = Recorded By, (t) = Taken By, (c) = Cosigned By    Initials Name Provider Type Discipline    Fernanda Griffith PTA Physical Therapy Assistant PT              PT Discharge Summary  Anticipated Discharge Disposition (PT): home with assist  Reason for Discharge: Discharge from facility  Outcomes Achieved: Refer to plan of care for updates on goals achieved  Discharge Destination: Home with assist      Fernanda Ulrich PTA   7/3/2021

## 2021-07-03 NOTE — OUTREACH NOTE
Call Center TCM Note      Responses   [facility] patient discharged from?  [facility]   Does the patient have one of the following disease processes/diagnoses(primary or secondary)?  General Surgery   TCM attempt successful?  Yes   Call end time  1123   Discharge diagnosis  Spinal stenosis-laminectomy this visit   Person spoke with today (if not patient) and relationship  Spouse   Meds reviewed with patient/caregiver?  Yes   Is the patient having any side effects they believe may be caused by any medication additions or changes?  No   Does the patient have all medications related to this admission filled (includes all antibiotics, pain medications, etc.)  Yes   Is the patient taking all medications as directed (includes completed medication regime)?  Yes   Does the patient have a follow up appointment scheduled with their surgeon?  Yes   Has the patient kept scheduled appointments due by today?  N/A   Comments  [facility] appt on 7/7/21 at 11:15 AM   What is the Home health agency?   [facility]    Has home health visited the patient within 72 hours of discharge?  Yes   Home health comments   visit on 7/3/21   What DME was ordered?  O2   Has all DME been delivered?  Yes   Psychosocial issues?  No   Did the patient receive a copy of their discharge instructions?  Yes   Nursing interventions  Reviewed instructions with patient   What is the patient's perception of their health status since discharge?  Improving   Nursing interventions  Nurse provided patient education   Is the patient /caregiver able to teach back basic post-op care?  Practice 'cough and deep breath', Continue use of incentive spirometry at least 1 week post discharge, Take showers only when approved by MD-sponge bathe until then, No tub bath, swimming, or hot tub until instructed by MD, Lifting as instructed by MD in discharge instructions, Keep incision areas clean,dry and protected   Is the patient/caregiver able to teach back signs and  symptoms of incisional infection?  Increased redness, swelling or pain at the incisonal site, Increased drainage or bleeding, Incisional warmth, Pus or odor from incision, Fever   Is the patient/caregiver able to teach back steps to recovery at home?  Rest and rebuild strength, gradually increase activity, Set small, achievable goals for return to baseline health, Eat a well-balance diet   If the patient is a current smoker, are they able to teach back resources for cessation?  Not a smoker   Is the patient/caregiver able to teach back the hierarchy of who to call/visit for symptoms/problems? PCP, Specialist, Home health nurse, Urgent Care, ED, 911  Yes   TCM call completed?  Yes   Wrap up additional comments  Spouse states pt is doing ok,  reports he is resting well. No questions/concerns.          Daniella Grey RN    7/3/2021, 11:23 EDT

## 2021-07-04 NOTE — THERAPY DISCHARGE NOTE
Acute Care - Occupational Therapy Discharge Summary  [facility]     Patient Name: Chad Henriquez  : 1929  MRN: [id number]    Today's Date: 2021                 Admit Date: 2021        OT Recommendation and Plan    Visit Dx:    ICD-10-CM ICD-9-CM   1. Frequency of urination  R35.0 788.41   2. Spinal stenosis, lumbar region, with neurogenic claudication  M48.062 724.03   3. Decreased activities of daily living (ADL)  Z78.9 V49.89   4. Lumbar disc disease with radiculopathy  M51.16 722.10     724.4   5. Syncope, unspecified syncope type  R55 780.2   6. Impaired mobility  Z74.09 799.89   7. Coronary artery disease of native artery of native heart with stable angina pectoris (CMS/[facility])  I25.118 414.01     413.9   8. Obstructive sleep apnea syndrome  G47.33 327.23   9. Spinal stenosis, unspecified spinal region  M48.00 724.00               OT Rehab Goals     Row Name 21 0700             Transfer Goal 1 (OT)    Activity/Assistive Device (Transfer Goal 1, OT)  sit-to-stand/stand-to-sit;bed-to-chair/chair-to-bed;toilet;tub  -JJ      [place] Level/Cues Needed (Transfer Goal 1, OT)  supervision required;verbal cues required  -JJ      Time Frame (Transfer Goal 1, OT)  long term goal (LTG);by discharge  -JJ      Progress/Outcome (Transfer Goal 1, OT)  goal not met  -JJ         Dressing Goal 1 (OT)    Activity/Device (Dressing Goal 1, OT)  upper body dressing  -JJ      [place]/Cues Needed (Dressing Goal 1, OT)  minimum assist (75% or more patient effort)  -JJ      Time Frame (Dressing Goal 1, OT)  long term goal (LTG);by discharge  -JJ      Progress/Outcome (Dressing Goal 1, OT)  goal not met  -JJ         Toileting Goal 1 (OT)    Activity/Device (Toileting Goal 1, OT)  adjust/manage clothing;toileting skills, all;perform perineal hygiene;commode  -JJ      [place] Level/Cues Needed (Toileting Goal 1, OT)  contact guard assist  -JJ      Time Frame (Toileting Goal 1, OT)  long term goal (LTG);by  discharge  -ANA      Progress/Outcome (Toileting Goal 1, OT)  goal not met  -ANA        User Key  (r) = Recorded By, (t) = Taken By, (c) = Cosigned By    Initials Name Provider Type Discipline    Ayana Rizvi, OTR/L Occupational Therapist OT                      OT Discharge Summary  Anticipated Discharge Disposition (OT): home with 24/7 care, home with assist, home with home health, skilled nursing facility  Reason for Discharge: Discharge from facility  Outcomes Achieved: Refer to plan of care for updates on goals achieved  Discharge Destination: Home with assist      ELENA Evans/JAH  7/4/2021

## 2021-07-05 ENCOUNTER — HOME CARE VISIT (OUTPATIENT)
Dept: HOME HEALTH SERVICES | Facility: CLINIC | Age: 86
End: 2021-07-05

## 2021-07-05 VITALS
HEART RATE: 64 BPM | RESPIRATION RATE: 16 BRPM | SYSTOLIC BLOOD PRESSURE: 130 MMHG | OXYGEN SATURATION: 95 % | DIASTOLIC BLOOD PRESSURE: 60 MMHG | TEMPERATURE: 97.1 F

## 2021-07-05 PROCEDURE — G0151 HHCP-SERV OF PT,EA 15 MIN: HCPCS

## 2021-07-05 NOTE — HOME HEALTH
Patient is a 89 y.o. male presented with Spinal stenosis, lumbar region, with neurogenic claudication.  Patient had back surgery on 6/30/2021.

## 2021-07-06 ENCOUNTER — HOME CARE VISIT (OUTPATIENT)
Dept: HOME HEALTH SERVICES | Facility: CLINIC | Age: 86
End: 2021-07-06

## 2021-07-06 ENCOUNTER — TELEPHONE (OUTPATIENT)
Dept: NEUROSURGERY | Facility: CLINIC | Age: 86
End: 2021-07-06

## 2021-07-06 ENCOUNTER — TELEPHONE (OUTPATIENT)
Dept: INTERNAL MEDICINE | Facility: CLINIC | Age: 86
End: 2021-07-06

## 2021-07-06 VITALS
RESPIRATION RATE: 18 BRPM | SYSTOLIC BLOOD PRESSURE: 128 MMHG | DIASTOLIC BLOOD PRESSURE: 58 MMHG | HEART RATE: 73 BPM | OXYGEN SATURATION: 97 %

## 2021-07-06 PROCEDURE — G0299 HHS/HOSPICE OF RN EA 15 MIN: HCPCS

## 2021-07-06 NOTE — HOME HEALTH
Patient has edema in bilateral lower extremities, swelling to lateral right hip.PCP(spoke with Karen) and referring MD(left message with Angy) have been notified.Patient instructed to take pain meds as ordered.

## 2021-07-06 NOTE — TELEPHONE ENCOUNTER
Ayana with [facility] called to let us know that patient has so lower ext edema ( 3+) and fluid on his right hip.  Pt is scheduled to see Dr. Jackson on 07/07.    Wife wanted to give pt some of her Bumex but HH advised against it.

## 2021-07-06 NOTE — TELEPHONE ENCOUNTER
Ayana called to let us know she saw Chad today and has admitted him to home health.  She wanted to let us know that the patient has some swelling to his RT lateral hip area, adjacent to the incision.  She states it is not red and it isn't warm to the touch.  The patient is seeing Dr Zelalem Jackson tomorrow for follow up visit and they are going to mention this to him as well.    I am going to pass this on to Josiah and Dr Falcon so they are aware.  I am also going to send Karen schrader/Dr Jackson a message just letting her know if the patient needs to be seen in our office sooner than his scheduled follow up on 7/21/21 then to contact me or the office.      chi wilkerson CMA

## 2021-07-07 ENCOUNTER — HOME CARE VISIT (OUTPATIENT)
Dept: HOME HEALTH SERVICES | Facility: CLINIC | Age: 86
End: 2021-07-07

## 2021-07-07 ENCOUNTER — OFFICE VISIT (OUTPATIENT)
Dept: INTERNAL MEDICINE | Facility: CLINIC | Age: 86
End: 2021-07-07

## 2021-07-07 VITALS
BODY MASS INDEX: 25.32 KG/M2 | HEART RATE: 59 BPM | OXYGEN SATURATION: 98 % | DIASTOLIC BLOOD PRESSURE: 64 MMHG | HEIGHT: 68 IN | TEMPERATURE: 97.5 F | SYSTOLIC BLOOD PRESSURE: 146 MMHG

## 2021-07-07 VITALS
RESPIRATION RATE: 18 BRPM | DIASTOLIC BLOOD PRESSURE: 48 MMHG | HEART RATE: 73 BPM | OXYGEN SATURATION: 95 % | TEMPERATURE: 98.3 F | SYSTOLIC BLOOD PRESSURE: 141 MMHG

## 2021-07-07 DIAGNOSIS — Z98.890 POST-OPERATIVE STATE: ICD-10-CM

## 2021-07-07 DIAGNOSIS — T40.2X5A CONSTIPATION DUE TO OPIOID THERAPY: Primary | ICD-10-CM

## 2021-07-07 DIAGNOSIS — Z09 HOSPITAL DISCHARGE FOLLOW-UP: ICD-10-CM

## 2021-07-07 DIAGNOSIS — K59.03 CONSTIPATION DUE TO OPIOID THERAPY: Primary | ICD-10-CM

## 2021-07-07 DIAGNOSIS — E78.00 HYPERCHOLESTEREMIA: ICD-10-CM

## 2021-07-07 PROCEDURE — 1111F DSCHRG MED/CURRENT MED MERGE: CPT | Performed by: NURSE PRACTITIONER

## 2021-07-07 PROCEDURE — G0157 HHC PT ASSISTANT EA 15: HCPCS

## 2021-07-07 PROCEDURE — 99495 TRANSJ CARE MGMT MOD F2F 14D: CPT | Performed by: NURSE PRACTITIONER

## 2021-07-07 RX ORDER — PRAVASTATIN SODIUM 20 MG
20 TABLET ORAL NIGHTLY
Qty: 90 TABLET | Refills: 1 | Status: SHIPPED | OUTPATIENT
Start: 2021-07-07

## 2021-07-07 RX ORDER — AMOXICILLIN 250 MG
2 CAPSULE ORAL DAILY
Qty: 60 TABLET | Refills: 1 | Status: SHIPPED | OUTPATIENT
Start: 2021-07-07

## 2021-07-07 NOTE — HOME HEALTH
Patient states he is a little tired from being at the doctor today. He says he seen Dr. Jackson's PA to follow-up on inc. swelling home health nurse was concerned about, however they seemed to think it was normal following surgery. He says he has back pain almost always and occasional tightness in the left leg down the knee. Patient denies falls or changes in medication.    Patient states he would like to return to walking the mall without AD. He notes he has walked the mall 5 days per week since 1986.

## 2021-07-07 NOTE — PROGRESS NOTES
Transitional Care Follow Up Visit  Subjective     Chad Henriquez is a 89 y.o. male who presents for a transitional care management visit.    Within 48 business hours after discharge our office contacted him via telephone to coordinate his care and needs.      I reviewed and discussed the details of that call along with the discharge summary, hospital problems, inpatient lab results, inpatient diagnostic studies, and consultation reports with Chad.     Current outpatient and discharge medications have been reconciled for the patient.  Reviewed by: EDIE Lopes      Date of TCM Phone Call 7/2/2021   [facility]   Date of Admission 6/30/2021   Date of Discharge 7/2/2021   Discharge Disposition Home or Self Care     Risk for Readmission (LACE) Score: 7 (7/2/2021  5:01 AM)      Mr. Henriquez is a pleasant 89-year-old male who presents for hospital follow-up after lower back surgery completed by Dr. Falcon.  There was a call yesterday from home health nurse that was concerned 3 edema and right sided lower back swelling.  Upon exam today patient swelling is mild and denies any tenderness with palpation at this site.  Patient and wife deny any fever, chills, or worsening pain since surgery.  Patient has been taking pain medicine scheduled related to the recommendations of the home health nurse to help with physical therapy.  Related to this he is having some opioid related constipation.  They had tried Metamucil, over-the-counter stool softener and laxative, and finally use 1 cup of warm prune juice and mixed with MiraLAX for successful bowel movement.  He is still having trouble with bowel movements and they are asking for recommendations today.  The home health nurse was also concerned about surgical incision sites and reported that they were swollen and more red than she thought was appropriate.  However on exam today surgical incision look appropriate with half of the Steri-Strips still attached  to the incision sites.  Patient denies any moderate tenderness with palpation.  There is no warmth, exudate, edema, and incisions are well approximated.  Patient reports that he has been using his incentive spirometer daily.  He was initially placed on oxygen related to postoperative hypoxia of oxygen saturation less than 90.  However they reported his oxygen saturation without his oxygen at night has been satting in the upper 90s and is requesting to get off oxygen because patient does not like it is interrupting with his sleep.  He denies any exercise intolerance and reports baseline shortness of breath has not worsened.  He denies cough or chest tightness or pain.     Course During Hospital Stay:   The patient was admitted to the operating room on June 30 for a revision of posterior instrumentation from L3-S1 with a L2-3 TLIF with laminectomy and posterior instrumentation from L2-S1.  The patient tolerated procedure well. He was stable postoperatively and discharged home.  is ambulating.      The following portions of the patient's history were reviewed and updated as appropriate: allergies, current medications, past family history, past medical history, past social history, past surgical history and problem list.    Review of Systems   Constitutional: Negative for activity change, appetite change, fatigue, fever and unexpected weight change.   HENT: Negative for congestion, ear discharge, ear pain, hearing loss, postnasal drip, rhinorrhea, sinus pressure, tinnitus, trouble swallowing and voice change.    Eyes: Negative for discharge and visual disturbance.   Respiratory: Negative for apnea, cough and shortness of breath.    Cardiovascular: Negative for chest pain, palpitations and leg swelling.   Gastrointestinal: Negative for abdominal pain, blood in stool, constipation and rectal pain.   Endocrine: Negative for cold intolerance, heat intolerance, polydipsia and polyphagia.   Genitourinary: Negative for  decreased urine volume, difficulty urinating, frequency, hematuria and urgency.   Musculoskeletal: Negative for arthralgias, back pain, myalgias, neck pain and neck stiffness.   Skin: Negative for color change, rash and wound.   Allergic/Immunologic: Negative for environmental allergies.   Neurological: Negative for dizziness, speech difficulty, weakness, numbness and headaches.   Hematological: Negative for adenopathy. Does not bruise/bleed easily.   Psychiatric/Behavioral: Negative for agitation, confusion, decreased concentration and sleep disturbance. The patient is not nervous/anxious.        Objective   Physical Exam  Vitals and nursing note reviewed.   Constitutional:       Appearance: Normal appearance. He is well-developed.   HENT:      Head: Normocephalic and atraumatic.      Right Ear: Hearing and external ear normal.      Left Ear: Hearing and external ear normal.   Eyes:      Conjunctiva/sclera: Conjunctivae normal.      Pupils: Pupils are equal, round, and reactive to light.   Neck:      Thyroid: No thyromegaly.   Cardiovascular:      Rate and Rhythm: Normal rate and regular rhythm.      Pulses: Normal pulses.           Dorsalis pedis pulses are 2+ on the right side and 2+ on the left side.        Posterior tibial pulses are 2+ on the right side and 2+ on the left side.      Heart sounds: Normal heart sounds. No murmur heard.     Pulmonary:      Effort: Pulmonary effort is normal. No tachypnea, accessory muscle usage, respiratory distress or retractions.      Breath sounds: Examination of the left-lower field reveals decreased breath sounds. Decreased breath sounds present.      Comments: Most likely atelectasis postoperatively; clears with cough  Abdominal:      General: Abdomen is flat. Bowel sounds are normal.      Palpations: Abdomen is soft.   Musculoskeletal:      Cervical back: Normal range of motion and neck supple.      Lumbar back: Swelling and tenderness present.        Back:       Right  lower le+ Pitting Edema present.      Left lower le+ Pitting Edema present.   Lymphadenopathy:      Cervical: No cervical adenopathy.   Skin:     General: Skin is warm and dry.      Capillary Refill: Capillary refill takes less than 2 seconds.      Findings: Bruising, erythema and wound present.          Neurological:      General: No focal deficit present.      Mental Status: He is alert and oriented to person, place, and time.      Cranial Nerves: Cranial nerves are intact.      Motor: Motor function is intact.      Coordination: Coordination is intact.      Gait: Gait abnormal.      Deep Tendon Reflexes:      Reflex Scores:       Patellar reflexes are 2+ on the right side and 2+ on the left side.       Achilles reflexes are 2+ on the right side and 2+ on the left side.     Comments: Antalgic gait related to mild pain with ambulation postoperatively; Chronic neuropathy bilaterally R>L.   Psychiatric:         Attention and Perception: Attention normal.         Mood and Affect: Mood normal.         Speech: Speech normal.         Behavior: Behavior normal. Behavior is cooperative.         Thought Content: Thought content normal.         Assessment/Plan   Diagnoses and all orders for this visit:    1. Constipation due to opioid therapy (Primary)  -     sennosides-docusate (senna-docusate sodium) 8.6-50 MG per tablet; Take 2 tablets by mouth Daily.  Dispense: 60 tablet; Refill: 1    2. Hospital discharge follow-up    3. Post-operative state      Advise wife and son that patient will need to complete the milk of magnesia regiment.  We will have him take 30 mL tonight taking tomorrow.  He can start the Senokot docusate 2 tablets once a day or 1 tablet morning 1 tablet in the afternoon per their preference.  He will continue to increase his hydration and take Metamucil after using the milk of magnesia.  If he does not successfully have a bowel will need to call our office for further instructions    Patient had  questions about whether he needed to stay on half dose of pravastatin and half dose of metoprolol in the a.m. and full dose of metoprolol p.m.  His blood pressure is controlled and heart rate is within normal limits without any episodes of dizziness or hypotension or hypertension we will not adjust this at this time if he continues to have issues noted by home health would recommend him see Dr. Lozano sooner for further assessment.    Related to sounds of atelectasis in the lower left base and history of hypoxia after surgery.  Will have him increase his incentive spirometer use up to 5 times a day.  We will have him wear his oxygen only if his O2 sats less than 90%.  Will call the office if he has signs of chest pain, pink frothy sputum, shortness of breath worse with exertion in order to determine if he needs emergent assessment.    Discussed the signs and symptoms of DVT and surgical site infection.  Patient, wife, son verbalized understanding and will contact our office and/or Dr. Falcon's office in order to determine further treatment.  I do not think that he has a surgical site infection apparent today think that the swelling in his lower extremities is related to his legs being dependent more and less ambulation.  Advised him to elevate his legs as often as possible and if not comfortable can elevate his legs before bed and at least 30 to 45 minutes at a time during the day.  Swelling continues to be an issue we will recheck labs and determine if he can be on a diuretic temporarily.

## 2021-07-08 ENCOUNTER — HOME CARE VISIT (OUTPATIENT)
Dept: HOME HEALTH SERVICES | Facility: HOME HEALTHCARE | Age: 86
End: 2021-07-08

## 2021-07-08 ENCOUNTER — HOME CARE VISIT (OUTPATIENT)
Dept: HOME HEALTH SERVICES | Facility: CLINIC | Age: 86
End: 2021-07-08

## 2021-07-08 VITALS
DIASTOLIC BLOOD PRESSURE: 58 MMHG | TEMPERATURE: 98.2 F | HEART RATE: 64 BPM | RESPIRATION RATE: 18 BRPM | SYSTOLIC BLOOD PRESSURE: 136 MMHG | OXYGEN SATURATION: 95 %

## 2021-07-08 VITALS
OXYGEN SATURATION: 95 % | DIASTOLIC BLOOD PRESSURE: 70 MMHG | HEART RATE: 65 BPM | SYSTOLIC BLOOD PRESSURE: 156 MMHG | TEMPERATURE: 98.4 F | RESPIRATION RATE: 18 BRPM

## 2021-07-08 PROCEDURE — G0152 HHCP-SERV OF OT,EA 15 MIN: HCPCS

## 2021-07-08 PROCEDURE — G0300 HHS/HOSPICE OF LPN EA 15 MIN: HCPCS

## 2021-07-09 NOTE — HOME HEALTH
Primary dx: Other orthopedic aftercare. Pt is 89 y.o. male with spinal stenosis, lumbar rgeion, with neurogenic claudication. He had back surgery on 6/30/21, spinal fusion L2-L3.  Secondary dx: Acute L-sided low back pain with L sided sciatica, B hip pain, BPH with urinary obstruction, CAD, COVID-19 (10/20); degeneration lumbar /lumbosacral intervertebral disc, urinary frequency, HTN, spinal stenosis.  Surgical procedure: Spinal fusion L2-L3  Subjective: The patient's wife reports he is having more pain than usual since his PT session this morning.  Previous OT: The patient has had OT services in hospital rehab.  Fall prevention education: Remove throw rugs, use appropriate footwear for ambulation, have adequate lighting in the home 24/7.  Diabetic teaching: Pt is not diabetic  Fall reported: None  Medication Changes: None  Medication teaching: Patient has no questions about current medication regimen. He manages his own meds and takes as prescribed. Recommended using pill planner to simplify med regimen.  Insurance Changes: None  Reason for referral: The patient recently had back surgery and is needing more assistance with ADL tasks, transfers and mobility.  Precautions: Wear back brace when up OOB, no bending, no twisting, no lifting  Equipment: FWW, BSC (not using), reacher, LHSH, tub transfer bench, SPC, bed rail. Equipment needed: sock aid, long-handled bath sponge.  PLOF: Patient was able to complete toileting, grooming, bathing and dressing with occasional SBA; ambulating with SPC for outdoor use only.   Patient Goals for therapy: To get back to being able to walk at the mall.  Medical necessity: Skilled OT is reasonable and medically necessary to increase safety and independence with ADL/iADL tasks and return the patient to PLOF.  Next MD appointment: EDIE Fernandez 7/21/21 @ 2:30.  Rehab potential: Good, due to good family support, high PLOF; motivated to achieve PLOF.  D/C plan: To self/family,  when goals met or highest functional level achieved.  Frequency: 1 wk/3, beginning week of 7/12/21  Plan for next visit: Use of AE for LB dressing, bathing transfers.

## 2021-07-12 ENCOUNTER — HOME CARE VISIT (OUTPATIENT)
Dept: HOME HEALTH SERVICES | Facility: CLINIC | Age: 86
End: 2021-07-12

## 2021-07-12 PROCEDURE — G0157 HHC PT ASSISTANT EA 15: HCPCS

## 2021-07-13 ENCOUNTER — HOME CARE VISIT (OUTPATIENT)
Dept: HOME HEALTH SERVICES | Facility: HOME HEALTHCARE | Age: 86
End: 2021-07-13

## 2021-07-13 ENCOUNTER — HOME CARE VISIT (OUTPATIENT)
Dept: HOME HEALTH SERVICES | Facility: CLINIC | Age: 86
End: 2021-07-13

## 2021-07-13 VITALS
HEART RATE: 67 BPM | SYSTOLIC BLOOD PRESSURE: 148 MMHG | TEMPERATURE: 98 F | DIASTOLIC BLOOD PRESSURE: 70 MMHG | RESPIRATION RATE: 18 BRPM | OXYGEN SATURATION: 96 %

## 2021-07-13 VITALS
SYSTOLIC BLOOD PRESSURE: 122 MMHG | OXYGEN SATURATION: 95 % | HEART RATE: 60 BPM | TEMPERATURE: 97.6 F | RESPIRATION RATE: 18 BRPM | DIASTOLIC BLOOD PRESSURE: 56 MMHG

## 2021-07-13 PROCEDURE — G0300 HHS/HOSPICE OF LPN EA 15 MIN: HCPCS

## 2021-07-13 PROCEDURE — G0152 HHCP-SERV OF OT,EA 15 MIN: HCPCS

## 2021-07-13 NOTE — HOME HEALTH
Subjective: Patient's [facility] reports [facility] this [facility] and going to [facility] help for bathing and respite.  Fall reported: none reported  Medication Changes: none reported  Medication teaching:  none  Insurance Changes: none  Precautions: back precautions, falls. LSO brace  Medical necessity: Skilled OT is medically necessary to instruct patient on AE to increase ADL performance and safety.  Next MD appointment: Dr. Falcon 7/21/2021  D/C plan: d/c home with spouse when goals met or maximum functional potential achieved.  Frequency: 1wk2  Plan for next visit: bathing routine  Discharge Instructions: n/a  Medicare of Non-Coverage Form: n/a

## 2021-07-14 ENCOUNTER — HOME CARE VISIT (OUTPATIENT)
Dept: HOME HEALTH SERVICES | Facility: CLINIC | Age: 86
End: 2021-07-14

## 2021-07-14 ENCOUNTER — READMISSION MANAGEMENT (OUTPATIENT)
Dept: CALL CENTER | Facility: HOSPITAL | Age: 86
End: 2021-07-14

## 2021-07-14 VITALS
HEART RATE: 59 BPM | DIASTOLIC BLOOD PRESSURE: 58 MMHG | SYSTOLIC BLOOD PRESSURE: 145 MMHG | OXYGEN SATURATION: 96 % | RESPIRATION RATE: 18 BRPM | TEMPERATURE: 97.8 F

## 2021-07-14 PROCEDURE — G0180 MD CERTIFICATION HHA PATIENT: HCPCS | Performed by: NEUROLOGICAL SURGERY

## 2021-07-14 NOTE — OUTREACH NOTE
General Surgery Week 2 Survey      Responses   [facility] patient discharged from?  [place]   Does the patient have one of the following disease processes/diagnoses(primary or secondary)?  General Surgery   Week 2 attempt successful?  Yes   Call start time  1235   Call end time  1237   Discharge diagnosis  Spinal stenosis-laminectomy this visit   Person spoke with today (if not patient) and relationship  Spouse   Meds reviewed with patient/caregiver?  Yes   Is the patient having any side effects they believe may be caused by any medication additions or changes?  No   Does the patient have all medications related to this admission filled (includes all antibiotics, pain medications, etc.)  Yes   Is the patient taking all medications as directed (includes completed medication regime)?  Yes   Does the patient have a follow up appointment scheduled with their surgeon?  Yes   Has the patient kept scheduled appointments due by today?  N/A   What is the [facility] health agency?   [facility]    Has home health visited the patient within 72 hours of discharge?  Yes   Psychosocial issues?  No   What is the patient's perception of their health status since discharge?  Improving   Nursing interventions  Nurse provided patient education   Is the patient /caregiver able to teach back basic post-op care?  Take showers only when approved by MD-sponge bathe until then, No tub bath, swimming, or hot tub until instructed by MD, Keep incision areas clean,dry and protected   Is the patient/caregiver able to teach back signs and symptoms of incisional infection?  Increased redness, swelling or pain at the incisonal site, Increased drainage or bleeding, Incisional warmth, Pus or odor from incision, Fever   Is the patient/caregiver able to teach back steps to recovery at home?  Rest and rebuild strength, gradually increase activity, Set small, achievable goals for return to baseline health, Eat a well-balance diet   Week 2 call completed?  Yes    Wrap up additional comments  Spouse states pt is doing ok,  reports pt was having some pain earlier but is feeling better after taking pain rx. No questions/concerns.          Daniella Grey RN

## 2021-07-14 NOTE — HOME HEALTH
Patient's wife states her son that has been here helping had to go home so it's been a little harder dressing and bathing. She says she [facility] to [facility] this. Patient reports he is having pain in the middle part of his low back.

## 2021-07-16 ENCOUNTER — HOME CARE VISIT (OUTPATIENT)
Dept: HOME HEALTH SERVICES | Facility: CLINIC | Age: 86
End: 2021-07-16

## 2021-07-16 ENCOUNTER — TELEPHONE (OUTPATIENT)
Dept: NEUROSURGERY | Facility: CLINIC | Age: 86
End: 2021-07-16

## 2021-07-16 VITALS
HEART RATE: 57 BPM | TEMPERATURE: 97.7 F | DIASTOLIC BLOOD PRESSURE: 60 MMHG | SYSTOLIC BLOOD PRESSURE: 134 MMHG | RESPIRATION RATE: 18 BRPM | OXYGEN SATURATION: 99 %

## 2021-07-16 VITALS
TEMPERATURE: 97.7 F | OXYGEN SATURATION: 99 % | SYSTOLIC BLOOD PRESSURE: 134 MMHG | HEART RATE: 57 BPM | DIASTOLIC BLOOD PRESSURE: 60 MMHG

## 2021-07-16 PROCEDURE — G0495 RN CARE TRAIN/EDU IN HH: HCPCS

## 2021-07-16 PROCEDURE — G0157 HHC PT ASSISTANT EA 15: HCPCS

## 2021-07-16 NOTE — TELEPHONE ENCOUNTER
Sharon called stating they saw the patient today and he is complaining of pain in his LLE - describes as a sharp shooting pain.  She states he rates his pain at an 8 out of 10.  This is a new complaint for him.  His wife stated he had a good day yesterday but woke up at midnight with this severe pain.    I am going to forward this to Josiah to see what he wants to do.      chi wilkerson CMA

## 2021-07-16 NOTE — TELEPHONE ENCOUNTER
I called the patient's wife, Eva, and gave her the same information (see how he does over the weekend and if no better then call us back Monday.  She stated that he has had a bad couple of days, they wouldn't do the therapy when they came to the house b/c of the pain.  She agreed they would watch him over the weekend.      chi wilkerson CMA

## 2021-07-16 NOTE — TELEPHONE ENCOUNTER
Neeraj Rahman:  Josiah Robles APRN sent to Alayna Arriola CMA  Caller: Unspecified (Today,  1:25 PM)    As long as he has no weakness let’s see how the weekend goes and call us back on Monday if no improvement.      I called Sharon back but had to leave her a message.    chi gallego CMA

## 2021-07-16 NOTE — HOME HEALTH
Patient's wife states patient had a good day yesterday but then last night he woke up around midnight with signficant pain in left leg. Patient states his wife put an ice pack on it and he took tylenol and that seemed to help ease his pain. Patient's wife states nurse was here this morning and they told her his symptoms and she was going to call Dr. Falcon. Patient denies falls or changes to medication.

## 2021-07-19 ENCOUNTER — HOME CARE VISIT (OUTPATIENT)
Dept: HOME HEALTH SERVICES | Facility: CLINIC | Age: 86
End: 2021-07-19

## 2021-07-19 NOTE — TELEPHONE ENCOUNTER
"Per patient's wife: patient is much better.  They stopped his \"exercising\" through PT and just had him walk.  She reports no leg pain today, just lower back soreness.  He is used an ice pack for about 20 mins and the pain went away.  She says he has taken 3 pain pills per day over the weekend and thinks this has helped as well.  She is going to see if 2 pain pills per day will do the trick for his pain today (he doesn't like to take pain mediation) but understands that if it doesn't then she will go back to 3 per day.  They are going to hold on returning to therapy exercising until he sees Josiah on Wednesday.    chi wilkerson CMA  "

## 2021-07-21 ENCOUNTER — TELEPHONE (OUTPATIENT)
Dept: NEUROSURGERY | Facility: CLINIC | Age: 86
End: 2021-07-21

## 2021-07-21 ENCOUNTER — OFFICE VISIT (OUTPATIENT)
Dept: NEUROSURGERY | Facility: CLINIC | Age: 86
End: 2021-07-21

## 2021-07-21 ENCOUNTER — HOSPITAL ENCOUNTER (OUTPATIENT)
Dept: GENERAL RADIOLOGY | Facility: HOSPITAL | Age: 86
Discharge: HOME | End: 2021-07-21
Admitting: NURSE PRACTITIONER

## 2021-07-21 VITALS — HEIGHT: 69 IN | WEIGHT: 166 LBS | BODY MASS INDEX: 24.59 KG/M2

## 2021-07-21 DIAGNOSIS — M51.37 DEGENERATION OF LUMBAR OR LUMBOSACRAL INTERVERTEBRAL DISC: Primary | ICD-10-CM

## 2021-07-21 DIAGNOSIS — M51.37 DEGENERATION OF LUMBAR OR LUMBOSACRAL INTERVERTEBRAL DISC: ICD-10-CM

## 2021-07-21 DIAGNOSIS — Z78.9 NON-SMOKER: ICD-10-CM

## 2021-07-21 DIAGNOSIS — M51.16 LUMBAR DISC DISEASE WITH RADICULOPATHY: ICD-10-CM

## 2021-07-21 PROCEDURE — 72110 X-RAY EXAM L-2 SPINE 4/>VWS: CPT

## 2021-07-21 PROCEDURE — 99024 POSTOP FOLLOW-UP VISIT: CPT | Performed by: NURSE PRACTITIONER

## 2021-07-21 NOTE — PROGRESS NOTES
"  Chief complaint:   Chief Complaint   Patient presents with   • Post-op     Chad is returning for his post op visit for his lumbar surgery, he was doing well but then he states he started with some low back and right hip pain, his therapy that has been working with him has discontinued this therapy until they know what is going on.  Chad states this is a new pain, not the same as before surgery.         Subjective     HPI: This is a 89 y.o. male patient who went to the operating room on 6/30/2021 for a L2-3 TLIF with revision of instrumentation from L2-S1.  The patient is here in follow up today for postoperative visit.  Prior to the surgery the patient was complaining of left lower extremity radicular pain.  The patient is not complaining of any left lower extremity pain.  The patient was discharged home with home health and did work with the home health and started having some pain on the top of his right buttocks and pain going down into his right leg.  They did hold off on doing the home health therapy and he did make improvements and says now he is only having some occasional pain in the top of the right buttocks region.  I did have the patient ambulate for me today and he did not complain of any meaningful pain other than just some soreness from the surgery.  Denies any bowel or bladder incontinence.  He remains in the brace.  He is taking the pain medication as he needs it.        Review of Systems   Musculoskeletal: Positive for back pain.   Neurological: Negative.    Psychiatric/Behavioral: Negative.          Objective      Vital Signs  Ht 174 cm (68.5\")   Wt 75.3 kg (166 lb)   BMI 24.87 kg/m²     Physical Exam  Constitutional:       Appearance: He is well-developed.   HENT:      Head: Normocephalic.   Eyes:      Extraocular Movements: EOM normal.      Pupils: Pupils are equal, round, and reactive to light.   Pulmonary:      Effort: Pulmonary effort is normal.   Musculoskeletal:         General: Normal " range of motion.      Cervical back: Normal range of motion.   Skin:     General: Skin is warm.   Neurological:      Mental Status: He is alert and oriented to person, place, and time.      GCS: GCS eye subscore is 4. GCS verbal subscore is 5. GCS motor subscore is 6.      Cranial Nerves: No cranial nerve deficit.      Sensory: No sensory deficit.      Coordination: Finger-Nose-Finger Test normal.      Gait: Gait abnormal.      Deep Tendon Reflexes: Strength normal and reflexes are normal and symmetric.   Psychiatric:         Speech: Speech normal.         Behavior: Behavior normal.         Thought Content: Thought content normal.       Incisions clean dry and intact    Neurologic Exam     Mental Status   Oriented to person, place, and time.   Attention: normal.   Speech: speech is normal   Level of consciousness: alert  Knowledge: good.     Cranial Nerves     CN II   Visual fields full to confrontation.     CN III, IV, VI   Pupils are equal, round, and reactive to light.  Extraocular motions are normal.     CN V   Facial sensation intact.     CN VII   Facial expression full, symmetric.     CN VIII   CN VIII normal.     CN IX, X   CN IX normal.   CN X normal.     CN XI   CN XI normal.     CN XII   CN XII normal.     Motor Exam   Muscle bulk: normal  Overall muscle tone: normal  Right arm pronator drift: absent  Left arm pronator drift: absent    Strength   Strength 5/5 throughout.     Sensory Exam   Light touch normal.   Pinprick normal.     Gait, Coordination, and Reflexes     Coordination   Finger to nose coordination: normal    Tremor   Resting tremor: absent  Intention tremor: absent  Action tremor: absent    Reflexes   Reflexes 2+ except as noted. Slow gait with right foot hitting the ground harder than the left foot but patient does have good movements in both dorsiflexion and plantarflexion bilaterally       Imaging review: No new imaging          Assessment/Plan: The patient is doing well.  I think the  patient was overdoing it with the home health therapy.  I am going to send him for x-rays of the lumbar spine and we will follow-up with him and let him know when he can come out of the brace.  We will have him follow-up with Dr. Falcon in 8 to 10 weeks.  He was told to give the home health therapy a break for another week and then he may resume it as he is already getting up on his own.  They said that he is using a walker occasionally.  I do think the patient is doing well from the surgery and just needs more time to recover.  They were told to call us if he has any further problems or concerns    Patient is a nonsmoker  The patient's Body mass index is 24.87 kg/m².. BMI is within normal parameters. No follow-up required.   Advance Care Planning   ACP discussion was held with the patient during this visit. Patient does not have an advance directive, information provided.      Diagnoses and all orders for this visit:    1. 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 Lumbar disc disease with radiculopathy  -     XR Spine Lumbar Complete 4+VW; Future    3. Non-smoker    4. Body mass index (BMI) of 24.0 to 24.9 in adult        I discussed the patients findings and my recommendations with patient  Josiah Robles, EDIE  07/21/21  15:02 CDT

## 2021-07-21 NOTE — PATIENT INSTRUCTIONS
Advance Care Planning and Advance Directives     You make decisions on a daily basis - decisions about where you want to live, your career, your home, your life. Perhaps one of the most important decisions you face is your choice for future medical care. Take time to talk with your family and your healthcare team and start planning today.  Advance Care Planning is a process that can help you:  · Understand possible future healthcare decisions in light of your own experiences  · Reflect on those decision in light of your goals and values  · Discuss your decisions with those closest to you and the healthcare professionals that care for you  · Make a plan by creating a document that reflects your wishes    Surrogate Decision Maker  In the event of a medical emergency, which has left you unable to communicate or to make your own decisions, you would need someone to make decisions for you.  It is important to discuss your preferences for medical treatment with this person while you are in good health.     Qualities of a surrogate decision maker:  • Willing to take on this role and responsibility  • Knows what you want for future medical care  • Willing to follow your wishes even if they don't agree with them  • Able to make difficult medical decisions under stressful circumstances    Advance Directives  These are legal documents you can create that will guide your healthcare team and decision maker(s) when needed. These documents can be stored in the electronic medical record.    · Living Will - a legal document to guide your care if you have a terminal condition or a serious illness and are unable to communicate. States vary by statute in document names/types, but most forms may include one or more of the following:        -  Directions regarding life-prolonging treatments        -  Directions regarding artificially provided nutrition/hydration        -  Choosing a healthcare decision maker        -  Direction  regarding organ/tissue donation    · Durable Power of  for Healthcare - this document names an -in-fact to make medical decisions for you, but it may also allow this person to make personal and financial decisions for you. Please seek the advice of an  if you need this type of document.    **Advance Directives are not required and no one may discriminate against you if you do not sign one.    Medical Orders  Many states allow specific forms/orders signed by your physician to record your wishes for medical treatment in your current state of health. This form, signed in personal communication with your physician, addresses resuscitation and other medical interventions that you may or may not want.      For more information or to schedule a time with a [facility] Advance Care Planning Facilitator contact: [email or web address] or call [phone] and someone will contact you directly.

## 2021-07-21 NOTE — TELEPHONE ENCOUNTER
Left a vm with f/u appt on 9/21/21 @ 4:30pm with Dr. Falcon. Asked for return call at my direct extension to confirm he did get my message. I also have mailed out an appointment reminder.

## 2021-07-22 ENCOUNTER — TELEPHONE (OUTPATIENT)
Dept: NEUROSURGERY | Facility: CLINIC | Age: 86
End: 2021-07-22

## 2021-07-22 ENCOUNTER — READMISSION MANAGEMENT (OUTPATIENT)
Dept: CALL CENTER | Facility: HOSPITAL | Age: 86
End: 2021-07-22

## 2021-07-22 ENCOUNTER — HOME CARE VISIT (OUTPATIENT)
Dept: HOME HEALTH SERVICES | Facility: CLINIC | Age: 86
End: 2021-07-22

## 2021-07-22 NOTE — OUTREACH NOTE
General Surgery Week 3 Survey      Responses   [facility] patient discharged from?  [place]   Does the patient have one of the following disease processes/diagnoses(primary or secondary)?  General Surgery   Week 3 attempt successful?  Yes   Call start time  1636   Call end time  1637   Discharge diagnosis  Spinal stenosis-laminectomy this visit   Meds reviewed with patient/caregiver?  Yes   Is the patient having any side effects they believe may be caused by any medication additions or changes?  No   Does the patient have all medications related to this admission filled (includes all antibiotics, pain medications, etc.)  Yes   Is the patient taking all medications as directed (includes completed medication regime)?  Yes   Does the patient have a follow up appointment scheduled with their surgeon?  Yes   Has the patient kept scheduled appointments due by today?  Yes   What is the Home health agency?   [facility]    Has home health visited the patient within 72 hours of discharge?  Yes   Home health comments  HH visit on 7/3/21   Psychosocial issues?  No   Comments  Doctor stopped PT for a couple of weeks due to pain but will restart next week. Wife reports healing well.    Did the patient receive a copy of their discharge instructions?  Yes   Nursing interventions  Reviewed instructions with patient   What is the patient's perception of their health status since discharge?  Improving   Nursing interventions  Nurse provided patient education   Is the patient /caregiver able to teach back basic post-op care?  Take showers only when approved by MD-sponge bathe until then, No tub bath, swimming, or hot tub until instructed by MD, Keep incision areas clean,dry and protected   Is the patient/caregiver able to teach back signs and symptoms of incisional infection?  Increased redness, swelling or pain at the incisonal site, Increased drainage or bleeding, Incisional warmth, Pus or odor from incision, Fever   Is the  patient/caregiver able to teach back steps to recovery at home?  Rest and rebuild strength, gradually increase activity, Set small, achievable goals for return to baseline health, Eat a well-balance diet   If the patient is a current smoker, are they able to teach back resources for cessation?  Not a smoker   Is the patient/caregiver able to teach back the hierarchy of who to call/visit for symptoms/problems? PCP, Specialist, Home health nurse, Urgent Care, ED, 911  Yes   Week 3 call completed?  Yes          Daniel Bosch RN

## 2021-07-22 NOTE — TELEPHONE ENCOUNTER
Called patient with his x-ray results, explained the x-ray looks good but he needs to stay in the brace until 08/13/2021, they understood and will call with any questions or problems.

## 2021-07-22 NOTE — TELEPHONE ENCOUNTER
----- Message from EDIE Coulter sent at 7/22/2021  7:55 AM CDT -----  X-rays look good he can come out of the brace on August 13  ----- Message -----  From: [facility] [place] In  Sent: 7/21/2021   4:10 PM CDT  To: EDIE Coulter

## 2021-07-23 ENCOUNTER — HOME CARE VISIT (OUTPATIENT)
Dept: HOME HEALTH SERVICES | Facility: CLINIC | Age: 86
End: 2021-07-23

## 2021-07-23 PROCEDURE — G0300 HHS/HOSPICE OF LPN EA 15 MIN: HCPCS

## 2021-07-23 PROCEDURE — G0299 HHS/HOSPICE OF RN EA 15 MIN: HCPCS

## 2021-07-24 VITALS
RESPIRATION RATE: 20 BRPM | TEMPERATURE: 97.8 F | SYSTOLIC BLOOD PRESSURE: 148 MMHG | OXYGEN SATURATION: 98 % | HEART RATE: 68 BPM | DIASTOLIC BLOOD PRESSURE: 62 MMHG

## 2021-07-24 NOTE — HOME HEALTH
PT ANSWERS THE DOOR USING THE WALKER, PT IS ALERT PLEASANT AND ORIENTED, PT STATES HE IS DOING VERY WELL SINCE HIS SURGERY, PT STATES HIS LOP IS A 3-4/10 AND HE IS DOING WELL, PT STATES HE IS HAVING NORMAL BM DENIES DARK/TARRY STOOLS, PT DENIES ANY S/S OF UTI AT THIS TIME, NO EDMEA NOTED TO LOWER EXT AT THIS TIME. PT IS WEARING HIS BACK BRACE AS ORDERED, PTS POST OP INCISION IS CLEAN DRY AND INTACT, PT DOES HAVE 1 STERI STRIP LEFT IN PLACE FROM A DRAIN, INSTRUCTED PT AND WIFE TO KEEP IN PLACE AND AFTER HIS NEXT BATH MAY REMOVE, PT DID HAVE A BATH TODAY, [facility] CAME TODAY TO GIVE PT A BATH, PTS WIFE IS REMOVERING FROM AN EXTENSIVE STAY IN THE HOSP D/T COVID 19, PTS WIFE IS GETTING STRONGER BUT DOES NEED ASSISTANCE WITH CAREING FOR THE PT. INFORMED PT AND HIS WIFE I WOULD CALL AND OBTAIN A MEDICAL SOCIAL SERVICE ORDER TO SEE IF PT QUALIFIES FOR ANY ADDITIONAL ASSISTANCE FROM THE [facility], BOTH PT AND WIFE ARE IN AGREEMENT WITH PLAN OF CARE

## 2021-07-28 ENCOUNTER — HOME CARE VISIT (OUTPATIENT)
Dept: HOME HEALTH SERVICES | Facility: CLINIC | Age: 86
End: 2021-07-28

## 2021-07-28 ENCOUNTER — TELEPHONE (OUTPATIENT)
Dept: NEUROSURGERY | Facility: CLINIC | Age: 86
End: 2021-07-28

## 2021-07-28 VITALS
OXYGEN SATURATION: 99 % | SYSTOLIC BLOOD PRESSURE: 138 MMHG | HEART RATE: 59 BPM | TEMPERATURE: 97.7 F | RESPIRATION RATE: 16 BRPM | DIASTOLIC BLOOD PRESSURE: 70 MMHG

## 2021-07-28 PROCEDURE — G0152 HHCP-SERV OF OT,EA 15 MIN: HCPCS

## 2021-07-28 NOTE — TELEPHONE ENCOUNTER
Patient's wife calls, states patient is doing much better, no longer needs the oxygen and would like for [facility] to be notified so they can come  the tank & stop the billing.  [facility] have [facility] and she is going to put this order in.    chi wilkerson CMA

## 2021-07-28 NOTE — HOME HEALTH
OT ROUTINE VISIT  Subjective: The patient is sitting in his recliner chair on therapist's arrival, states he is feeling good today.  Fall prevention education: Use FWW for all mobility; wear non-skid footwear for all mobility/transfers, have adequate lighting in the home 24/7; remove throw rugs..  Fall reported: None  Medication Changes: N/A  Medication teaching:Pt has no questions regarding medications.   Insurance Changes: N/A   Precautions: fall risk  Medical necessity: Skilled OT is reasonable and medically necessary to increase safety and independence with ADL and to increase UE function as needed for ADL performance and patient/caregiver education.   Next MD appointment: Unknown  D/C plan: To self/family, when goals met or highest functional level achieved.   Frequency: The patient has met all goals and is being discharged from skilled home health OT services this date.   [facility]/Medicare non coverage form signed: No  Plan for next visit: N/A

## 2021-07-30 ENCOUNTER — HOME CARE VISIT (OUTPATIENT)
Dept: HOME HEALTH SERVICES | Facility: CLINIC | Age: 86
End: 2021-07-30

## 2021-07-30 PROCEDURE — G0495 RN CARE TRAIN/EDU IN HH: HCPCS

## 2021-08-01 VITALS
HEART RATE: 60 BPM | OXYGEN SATURATION: 91 % | TEMPERATURE: 97.7 F | RESPIRATION RATE: 18 BRPM | SYSTOLIC BLOOD PRESSURE: 160 MMHG | DIASTOLIC BLOOD PRESSURE: 52 MMHG

## 2021-08-02 ENCOUNTER — TELEPHONE (OUTPATIENT)
Dept: INTERNAL MEDICINE | Facility: CLINIC | Age: 86
End: 2021-08-02

## 2021-08-02 NOTE — TELEPHONE ENCOUNTER
[facility] and [facility].  She was at the home on Friday and tested positive for Covid on Saturday.  Neither he or his wife have shown symptoms.  They both have had the Covid in the past  They have both had both vaccine shots.  Wondering what they should do and if they need tested

## 2021-08-02 NOTE — TELEPHONE ENCOUNTER
I would just quarantine for 10 days if they begin to have symptoms we can certainly test them if they want to be tested they can be tested now.  Having been vaccinated I feel very comfortable with just a wait-and-see approach.

## 2021-08-03 ENCOUNTER — READMISSION MANAGEMENT (OUTPATIENT)
Dept: CALL CENTER | Facility: HOSPITAL | Age: 86
End: 2021-08-03

## 2021-08-03 NOTE — OUTREACH NOTE
General Surgery Week 4 Survey      Responses   [facility] [place] [facility] from?  [place]   Does the patient have one of the following disease processes/diagnoses(primary or secondary)?  General Surgery   Week 4 attempt successful?  Yes   Call start time  1245   Call end time  1248   Discharge diagnosis  Spinal stenosis-laminectomy this visit   Is patient permission given to speak with other caregiver?  Yes   List who call center can speak with  spouse- Eva   Person spoke with today (if not patient) and relationship  spouse- Eva   Is the patient taking all medications as directed (includes completed medication regime)?  Yes   Has the patient kept scheduled appointments due by today?  Yes   Psychosocial issues?  No   What is the patient's perception of their health status since discharge?  Improving   Is the patient/caregiver able to teach back the hierarchy of who to call/visit for symptoms/problems? PCP, Specialist, Home health nurse, Urgent Care, ED, 911  Yes   Week 4 call completed?  Yes   Would the patient like one additional call?  No   Graduated  Yes   Is the patient interested in additional calls from an ambulatory ?  NOTE:  applies to high risk patients requiring additional follow-up.  No   Did the patient feel the follow up calls were helpful during their recovery period?  Yes   Was the number of calls appropriate?  Yes   Wrap up additional comments  Per spouse, patient is doing well, no questions or concerns.          Fatoumata Mir RN

## 2021-08-04 ENCOUNTER — HOME CARE VISIT (OUTPATIENT)
Dept: HOME HEALTH SERVICES | Facility: CLINIC | Age: 86
End: 2021-08-04

## 2021-08-13 ENCOUNTER — HOME CARE VISIT (OUTPATIENT)
Dept: HOME HEALTH SERVICES | Facility: CLINIC | Age: 86
End: 2021-08-13

## 2021-08-13 VITALS
OXYGEN SATURATION: 95 % | TEMPERATURE: 97.6 F | HEART RATE: 46 BPM | RESPIRATION RATE: 18 BRPM | SYSTOLIC BLOOD PRESSURE: 144 MMHG | DIASTOLIC BLOOD PRESSURE: 66 MMHG

## 2021-08-13 PROCEDURE — G0299 HHS/HOSPICE OF RN EA 15 MIN: HCPCS

## 2021-08-15 NOTE — HOME HEALTH
SN visit for agency discharge. Pt alert and oriented. Reports mild back pain at this visit. Requests order to be sent to [facility], [place] for outpatient PT.

## 2021-08-23 DIAGNOSIS — M51.37 DEGENERATION OF LUMBAR OR LUMBOSACRAL INTERVERTEBRAL DISC: Primary | ICD-10-CM

## 2021-08-23 DIAGNOSIS — M48.062 SPINAL STENOSIS, LUMBAR REGION, WITH NEUROGENIC CLAUDICATION: ICD-10-CM

## 2021-09-02 ENCOUNTER — TREATMENT (OUTPATIENT)
Dept: PHYSICAL THERAPY | Facility: CLINIC | Age: 86
End: 2021-09-02

## 2021-09-02 DIAGNOSIS — Z98.1 HISTORY OF LUMBAR FUSION: Primary | ICD-10-CM

## 2021-09-02 DIAGNOSIS — Z91.81 RISK FOR FALLS: ICD-10-CM

## 2021-09-02 DIAGNOSIS — G89.29 CHRONIC BILATERAL LOW BACK PAIN, UNSPECIFIED WHETHER SCIATICA PRESENT: ICD-10-CM

## 2021-09-02 DIAGNOSIS — M54.50 CHRONIC BILATERAL LOW BACK PAIN, UNSPECIFIED WHETHER SCIATICA PRESENT: ICD-10-CM

## 2021-09-02 DIAGNOSIS — Z74.09 IMPAIRED MOBILITY: ICD-10-CM

## 2021-09-02 PROCEDURE — 97161 PT EVAL LOW COMPLEX 20 MIN: CPT | Performed by: PHYSICAL THERAPIST

## 2021-09-02 NOTE — PROGRESS NOTES
Physical Therapy Therapy Initial Evaluation and Plan of Care    Patient: Chad Henriquez               : 1929  Visit Date: 2021  Referring practitioner: EDIE Coulter  Date of Initial Visit: 2021  Patient seen for 1 sessions    Visit Diagnoses:    ICD-10-CM ICD-9-CM   1. History of lumbar fusion  Z98.1 V45.4   2. Chronic bilateral low back pain, unspecified whether sciatica present  M54.5 724.2    G89.29 338.29   3. Impaired mobility  Z74.09 799.89   4. Risk for falls  Z91.81 V15.88     Past Medical History:   Diagnosis Date   • Allergic rhinitis    • Anemia    • Arthritis    • Blind left eye    • BPH (benign prostatic hypertrophy) with urinary retention    • Cancer (CMS/HCC)     skin cancer   • Chronic back pain     RUNS DOWN BOTH LEGS   • Constipation    • Coronary artery disease    • Hard of hearing    • Heart attack (CMS/HCC)     NO MUSCLE DAMAGE - JUST OCCLUDED VALVE   • High cholesterol    • History of skin cancer     BILATERAL EARS   • History of transfusion        • Hypertension    • Inguinal hernia    • Leaky heart valve     DR CONCEPCION SAYS NOT ENOUGH TO BE OF CONCERN   • Other bursal cyst, right elbow    • PONV (postoperative nausea and vomiting)     has had scopalamine patch in past    • Sleep apnea     DOES NOT USE CPAP OR BIPAP   • Spinal stenosis of cervical region    • Spinal stenosis of lumbar region    • Tremors of nervous system    • Wears hearing aid     BILATERAL     Past Surgical History:   Procedure Laterality Date   • ANTERIOR LUMBAR EXPOSURE N/A 2018    Procedure: ANTERIOR LUMBAR EXPOSURE;  Surgeon: Manolo Mcleod DO;  Location: [facility] OR;  Service: Vascular   • APPENDECTOMY     • BACK SURGERY      X3   • CARDIAC SURGERY  08/08/1986    X1 BYPASS   • CORONARY ANGIOPLASTY WITH STENT PLACEMENT  1997    X3 STENTS   • HERNIA REPAIR Bilateral     x2   • INGUINAL HERNIA REPAIR Right 2017    Procedure: RIGHT INGUINAL HERNIA REPAIR AND EXCISION OF  CYST RIGHT ELBOW ;  Surgeon: Jackelyn Arriola MD;  Location:  [facility] OR;  Service:    • LUMBAR FUSION Left 6/30/2021    Procedure: LUMBAR LAMINECTOMY, DISCECTOMY, FACETECTOMY,  TRANSFORAMINAL LUMBAR INTERBODY FUSION L2-3. REVISION OF INSTRUMENTATION L3-S1. USE OF IMAGE GUIDANCE AND SURGICAL ROBOT;  Surgeon: Kj Falcon MD;  Location:  [facility] OR;  Service: Robotics - Neuro;  Laterality: Left;   • LUMBAR LAMINECTOMY N/A 3/12/2018    Procedure: LUMBAR LAMINECTOMY WITHOUT FUSION L3-4,4-5;  Surgeon: Kj Falcon MD;  Location:  [facility] OR;  Service: Neurosurgery   • LUMBAR LAMINECTOMY ANTERIOR LUMBAR INTERBODY FUSION N/A 12/7/2018    Procedure: ANTERIOR LUMBAR INTERBODY FUSION L5-S1;  Surgeon: Kj Falcon MD;  Location:  [facility] OR;  Service: Neurosurgery   • LUMBAR LAMINECTOMY WITH FUSION Left 12/10/2018    Procedure: LUMBAR LAMINECTOMY TRANSFORAMINAL LUMBAR INTERBODY FUSION L34,45;  Surgeon: Kj Falcon MD;  Location:  [facility] OR;  Service: Neurosurgery   • LUMBAR LAMINECTOMY WITH FUSION Left 12/7/2018    Procedure: LUMBAR LAMINECTOMY TRANSFORAMINAL LUMBAR INTERBODY FUSION LEFT L3-4, L4-5 QUADRANT RETRACTOR;  Surgeon: Kj Falcon MD;  Location:  [facility] OR;  Service: Neurosurgery   • SKIN LESION EXCISION      nasal         SUBJECTIVE     Subjective Evaluation    History of Present Illness  Date of surgery: 6/30/2021    Subjective comment: Patient reports 6/30/21 he had a spinal procedure, L2-L3 TLIF and revision of L2-S1 fusion.  Prior to surgery, he had LLE pain and radicular symtpoms. He now has flucating R/L hip pain and low back pain. Twice a day, patient and wife walk one loop at the mall. No reported fall recently, however does report he has balance issues. 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 Would like to walk without cane.    (Patient's wife present to assist with subjective questioning. )  Precautions and Work Restrictions: Unsure if still needing to follow  spinal precautions. No longer required to wear brace. Pain  Current pain rating: 3  At best pain ratin  At worst pain ratin  Location: B low back   Quality: dull ache  Relieving factors: ice, rest and medications (Tylenol)  Aggravating factors: ambulation and movement    Social Support  Lives in: multiple-level home (All necesseties on lower level, 4 steps to enter/exit )  Lives with: spouse    Hand dominance: left    Diagnostic Tests  X-ray: abnormal (Postoperative changes L2-S1. No evidence of hardware fracture or loosening)    Treatments  Previous treatment: home therapy  Patient Goals  Patient goals for therapy: decreased pain and improved balance  Patient goal: Increase walking        Outcome Measure:   PT G-Codes  Outcome Measure Options: Tinetti  Tinetti Total Score: 19    OBJECTIVE     Objective          Static Posture     Head  Forward.    Shoulders  Rounded.    Thoracic Spine  Hyperkyphosis.    Lumbar Spine   Flattened.     Palpation   Left   Tenderness of the erector spinae, lumbar paraspinals and piriformis.     Right Tenderness of the erector spinae, lumbar paraspinals and piriformis.     Neurological Testing     Sensation     Lumbar   Left   Intact: light touch    Right   Intact: light touch    Reflexes   Left   Patellar (L4): absent (0)  Achilles (S1): absent (0)    Right   Patellar (L4): trace (1+)  Achilles (S1): trace (1+)    Passive Range of Motion   Left Hip   Flexion: WFL  Extension: WFL  External rotation (prone): WFL  Internal rotation (90/90): 15 degrees     Right Hip   Flexion: WFL  Extension: WFL  External rotation (prone): WFL  Internal rotation (90/90): 18 degrees     Strength/Myotome Testing     Left Hip   Planes of Motion   Flexion: 4+  Abduction: 4    Right Hip   Planes of Motion   Flexion: 4+  Abduction: 4    Left Knee   Flexion: 4+  Extension: 4+    Right Knee   Flexion: 4+  Extension: 4+    Left Ankle/Foot   Dorsiflexion: 5  Plantar flexion: 5  Great toe extension:  5    Right Ankle/Foot   Dorsiflexion: 3-  Plantar flexion: 4-  Great toe extension: 2    Additional Strength Details  R foot drop from previous spine surgery, not a new clinical presentation     Tests     Lumbar     Left   Negative crossed SLR and passive SLR.     Right   Negative crossed SLR and passive SLR.     Ambulation     Comments   Ambulates with straight cane  Increased MARIANNE    Path deviations noted without SC         Therapy Education/Self Care 80209   Details: Educated patient and wife on anatomy of spine, physical therapy plan of care and HEP. Encouraged continued walking at the mall as patient greatly enjoys that, however recommended frequent rest breaks to see if that helps with decreasing subsequent pain.    Given [employer] HEP (access code [email or web address])  mobility training   Progress: New   Education provided to:  Patient and Spouse/SO   Level of understanding Verbalized and Demonstrated   Timed Minutes      Access Code: [email or web address]  URL: [email or web address]  Date: 09/02/2021  Prepared by: Benji Mir    Exercises  Hooklying Single Leg Bent Knee Fallouts with Resistance - 1 x daily - 7 x weekly - 2 sets - 10 reps  Standing Tandem Balance with Counter Support - 1 x daily - 7 x weekly - 1 sets - 5 reps - 20-30 sec hold        Total Timed Treatment:     0   mins  Total Time of Visit:            60   mins    ASSESSMENT/PLAN     GOALS:  Goals                                          Progress Note due by 10/2/2021                                                      Recert due by 12/01/2021   LTG by: 6 weeks  Date Status   Patient will demonstrate independence with comprehensive HEP      Patient will demonstrate decreased B piriformis muscle guarding       Patient will demonstrate 5/5 B hip abductor strength       Patient maintain tandem standing >20 seconds with minimal swaying       Patient will ambulate >200 ft without AD while maintaining good gait mechanics       Patient will score >25 on Tinetti                Assessment & Plan     Assessment  Impairments: abnormal gait, abnormal muscle firing, abnormal or restricted ROM, impaired balance, impaired physical strength, lacks appropriate home exercise program and pain with function  Assessment details: Mr. Henriquez presented to physical therapy today with a chief complaint of low back pain and decreased balance following a L2-3 fusion with revision of L2-S1 fusion performed on 6/30/21. Physical examination revealed decreased B hip strength, mild tenderness and guarding of B piriformis, decreased hip mobility and decreased static/dynamic balance. No radicular symptoms present today and negative special testing for radiculopathy. The patient is active and enjoys walking the mall daily with his wife. He would like to improve his balance and ambulate with a straight cane, which I believe is an attainable goal. Mr. Henriquez will benefit from skilled therapy services to assist him in improving his overall mobility and decrease risk of falling. Thank you for this referral.   Barriers to therapy: Hard of hearing   Prognosis: good  Functional Limitations: walking, uncomfortable because of pain, moving in bed, sitting, standing and stooping  Plan  Therapy options: will be seen for skilled physical therapy services  Planned therapy interventions: abdominal trunk stabilization, home exercise program, joint mobilization, functional ROM exercises, manual therapy, neuromuscular re-education, postural training, soft tissue mobilization, spinal/joint mobilization, strengthening, stretching, therapeutic activities and gait training  Frequency: 2x week  Duration in visits: 12  Treatment plan discussed with: patient  Plan details: Initially focus on lumbar paraspinal muscle guarding and B hip mobility, particularly hip IR. Progress hip and core stability exercises. Challenge static and dynamic balance to promote ability to ambulate without AD. [employer] HEP.        PT SIGNATURE: Lana  JESSIE Crowley, DPT       Initial Certification  Certification Period: 9/2/2021 through 12/1/2021  I certify that the therapy services are furnished while this patient is under my care.  The services outlined above are required by this patient, and will be reviewed every 90 days.     PHYSICIAN:     Josiah Robles APRN______________________________________DATE: _________     Please sign and return via fax to [phone].   Thank you so much for letting us work with Chad. I appreciate your letting us work with your patients. If you have any questions or concerns, please don't hesitate to contact me.

## 2021-09-03 NOTE — PROGRESS NOTES
I have reviewed the notes, assessments, and/or procedures performed by Lana Crowley, PT, DPT, I concur with her/his documentation of Chad Herniquez.

## 2021-09-08 ENCOUNTER — TREATMENT (OUTPATIENT)
Dept: PHYSICAL THERAPY | Facility: CLINIC | Age: 86
End: 2021-09-08

## 2021-09-08 DIAGNOSIS — Z98.1 HISTORY OF LUMBAR FUSION: Primary | ICD-10-CM

## 2021-09-08 DIAGNOSIS — Z91.81 RISK FOR FALLS: ICD-10-CM

## 2021-09-08 DIAGNOSIS — G89.29 CHRONIC BILATERAL LOW BACK PAIN, UNSPECIFIED WHETHER SCIATICA PRESENT: ICD-10-CM

## 2021-09-08 DIAGNOSIS — Z74.09 IMPAIRED MOBILITY: ICD-10-CM

## 2021-09-08 DIAGNOSIS — M54.50 CHRONIC BILATERAL LOW BACK PAIN, UNSPECIFIED WHETHER SCIATICA PRESENT: ICD-10-CM

## 2021-09-08 PROCEDURE — 97140 MANUAL THERAPY 1/> REGIONS: CPT | Performed by: PHYSICAL THERAPIST

## 2021-09-08 PROCEDURE — 97110 THERAPEUTIC EXERCISES: CPT | Performed by: PHYSICAL THERAPIST

## 2021-09-08 NOTE — PROGRESS NOTES
Physical Therapy Treatment Note    Patient: Chad Henriquez                                                                                     Visit Date: 2021  :     1929    Referring practitioner:    EDIE Coulter  Date of Initial Visit:          Type: THERAPY  Noted: 2021    Patient seen for 2 sessions    Visit Diagnoses:    ICD-10-CM ICD-9-CM   1. History of lumbar fusion  Z98.1 V45.4   2. Chronic bilateral low back pain, unspecified whether sciatica present  M54.5 724.2    G89.29 338.29   3. Impaired mobility  Z74.09 799.89   4. Risk for falls  Z91.81 V15.88     SUBJECTIVE     Subjective Patient stated that he is in increased pain today. No questions or concerns regarding HEP.    PAIN: 4/10 pre >0/10 post         OBJECTIVE     Objective      Manual Therapy     [id number]  Comments   IASTM with blue ridged roller lumbar paraspinals and R gluteals Patient L sidelying    STM B lumbar paraspinals  Patient L sidelying    B hip lateral glides with belt                 Timed Minutes 25        Therapeutic Exercises    20659 Comments   Passive B hip stretching into flexion/IR/ER/hamstrings    HL bridges 2 x 10    Bent knee fallouts with manual resistance  X 10 each LE               Timed Minutes 20       Therapy Education/Self Care 17892   Details: Reviewed HEP   Given [employer] HEP (access code [email or web address])   Progress: Reinforced   Education provided to:  Patient and Spouse/SO   Level of understanding Verbalized and Demonstrated   Timed Minutes          Total Timed Treatment:     45   mins  Total Time of Visit:             45   mins         ASSESSMENT/PLAN     GOALS  Goals                                          Progress Note due by 10/2/2021                                                      Recert due by 2021   LTG by: 6 weeks   Date Status   Patient will demonstrate independence with comprehensive HEP         Patient will demonstrate decreased B piriformis muscle guarding   Addressing  guarding today, pain and guarding decreased following manual therapy  9/8/21 Ongoing    Patient will demonstrate 5/5 B hip abductor strength          Patient maintain tandem standing >20 seconds with minimal swaying          Patient will ambulate >200 ft without AD while maintaining good gait mechanics          Patient will score >25 on Tinetti                          Assessment/Plan     ASSESSMENT: Today was Mr. Henriquez's first visit since initial evaluation, therefore no goals met at this time. His lumbar paraspinals were noted to be guarded and painful today upon manual assessment. He responded well to STM, reporting no pain at the end of session. Had patient perform gentle hip stability exercises, no increase in pain noted and he demonstrated good form. The patient should progress well as we continue to address his soft tissue restrictions and work towards hip and core stability training.     PLAN: Utilize manual therapy as needed to address soft tissue restrictions. Progress hip and core stability exercises.     Signature: Lana Crowley, PT

## 2021-09-09 NOTE — PROGRESS NOTES
I have reviewed the notes, assessments, and/or procedures performed by Lana Crowley, PT, DPT, I concur with her/his documentation of Chad Henriquez.

## 2021-09-14 ENCOUNTER — TREATMENT (OUTPATIENT)
Dept: PHYSICAL THERAPY | Facility: CLINIC | Age: 86
End: 2021-09-14

## 2021-09-14 DIAGNOSIS — Z91.81 RISK FOR FALLS: ICD-10-CM

## 2021-09-14 DIAGNOSIS — Z74.09 IMPAIRED MOBILITY: ICD-10-CM

## 2021-09-14 DIAGNOSIS — Z98.1 HISTORY OF LUMBAR FUSION: Primary | ICD-10-CM

## 2021-09-14 DIAGNOSIS — M54.50 CHRONIC BILATERAL LOW BACK PAIN, UNSPECIFIED WHETHER SCIATICA PRESENT: ICD-10-CM

## 2021-09-14 DIAGNOSIS — G89.29 CHRONIC BILATERAL LOW BACK PAIN, UNSPECIFIED WHETHER SCIATICA PRESENT: ICD-10-CM

## 2021-09-14 PROCEDURE — 97140 MANUAL THERAPY 1/> REGIONS: CPT | Performed by: PHYSICAL THERAPIST

## 2021-09-14 PROCEDURE — 97110 THERAPEUTIC EXERCISES: CPT | Performed by: PHYSICAL THERAPIST

## 2021-09-14 NOTE — PROGRESS NOTES
Physical Therapy Treatment Note    Patient: Chad Henriquez                                                                                     Visit Date: 2021  :     1929    Referring practitioner:    EDIE Coulter  Date of Initial Visit:          Type: THERAPY  Noted: 2021    Patient seen for 3 sessions    Visit Diagnoses:    ICD-10-CM ICD-9-CM   1. History of lumbar fusion  Z98.1 V45.4   2. Chronic bilateral low back pain, unspecified whether sciatica present  M54.5 724.2    G89.29 338.29   3. Impaired mobility  Z74.09 799.89   4. Risk for falls  Z91.81 V15.88     SUBJECTIVE     Subjective Patient states he is about the same, mild soreness across low back.     PAIN: 3/10          OBJECTIVE     Objective      Manual Therapy     67571  Comments   STM with massage cream B lumbar paraspinals  Patient L sidelying    B hip lateral glides with belt                 Timed Minutes 25        Therapeutic Exercises    45395 Comments   Passive B hip stretching into flexion/IR/ER/hamstrings    Supine piriformis stretch     Standing mini squats  2 x 10                Timed Minutes 20       Therapy Education/Self Care 71170   Details: Reviewed HEP, explained the importance of core activation and upright posture during ambulation   Given [employer] HEP (access code [email or web address])   Progress: Reinforced   Education provided to:  Patient and Spouse/SO   Level of understanding Verbalized and Demonstrated   Timed Minutes          Total Timed Treatment:     45   mins  Total Time of Visit:             45   mins         ASSESSMENT/PLAN     GOALS  Goals                                          Progress Note due by 10/2/2021                                                      Recert due by 2021   LTG by: 6 weeks   Date Status   Patient will demonstrate independence with comprehensive HEP         Patient will demonstrate decreased B piriformis muscle guarding   Addressing guarding today, pain and guarding  decreased following manual therapy  9/8/21 Ongoing    Patient will demonstrate 5/5 B hip abductor strength          Patient maintain tandem standing >20 seconds with minimal swaying          Patient will ambulate >200 ft without AD while maintaining good gait mechanics   Discussed core activation and upright posture during ambulation to promote decreased stress on the lumbar spine  9/14/21 Ongoing    Patient will score >25 on Tinetti                          Assessment/Plan     ASSESSMENT: Patient arrived with lumbar muscle soreness, paraspinals noted to be guarded. B hips also hypomobile, likely contributing to the increased lumbar pain. Responded well to manual therapy and stretching. Planning to progress exercises next session.     PLAN: Utilize manual therapy as needed to address soft tissue restrictions. Progress and review HEP.     Signature: Lana Crowley, PT, DPT

## 2021-09-16 ENCOUNTER — TREATMENT (OUTPATIENT)
Dept: PHYSICAL THERAPY | Facility: CLINIC | Age: 86
End: 2021-09-16

## 2021-09-16 DIAGNOSIS — M54.50 CHRONIC BILATERAL LOW BACK PAIN, UNSPECIFIED WHETHER SCIATICA PRESENT: ICD-10-CM

## 2021-09-16 DIAGNOSIS — Z98.1 HISTORY OF LUMBAR FUSION: Primary | ICD-10-CM

## 2021-09-16 DIAGNOSIS — G89.29 CHRONIC BILATERAL LOW BACK PAIN, UNSPECIFIED WHETHER SCIATICA PRESENT: ICD-10-CM

## 2021-09-16 DIAGNOSIS — Z91.81 RISK FOR FALLS: ICD-10-CM

## 2021-09-16 DIAGNOSIS — Z74.09 IMPAIRED MOBILITY: ICD-10-CM

## 2021-09-16 PROCEDURE — 97110 THERAPEUTIC EXERCISES: CPT | Performed by: PHYSICAL THERAPIST

## 2021-09-16 PROCEDURE — 97140 MANUAL THERAPY 1/> REGIONS: CPT | Performed by: PHYSICAL THERAPIST

## 2021-09-16 NOTE — PROGRESS NOTES
Physical Therapy Treatment Note    Patient: Chad Henriquez                                                                                     Visit Date: 2021  :     1929    Referring practitioner:    EDIE Coulter  Date of Initial Visit:          Type: THERAPY  Noted: 2021    Patient seen for 4 sessions    Visit Diagnoses:    ICD-10-CM ICD-9-CM   1. History of lumbar fusion  Z98.1 V45.4   2. Chronic bilateral low back pain, unspecified whether sciatica present  M54.5 724.2    G89.29 338.29   3. Impaired mobility  Z74.09 799.89   4. Risk for falls  Z91.81 V15.88     SUBJECTIVE     Subjective No complaints following previous session, pain is about the same     PAIN: 4/10 pre > 2/10 post           OBJECTIVE     Objective      Manual Therapy     08135  Comments   STM with massage cream B lumbar paraspinals  Patient in massage chair, increased muscle guarding on R side                    Timed Minutes 25        Therapeutic Exercises    50542 Comments   Seated marches 2 x 10 each LE, frequent cue for upright posture    Side steps, cue for maintaining neutral hip alignment Added to HEP   Standing mini squats  2 x 10                Timed Minutes 15       Therapy Education/Self Care 50086   Details: Reviewed HEP, explained the importance of core activation and upright posture during ambulation   Given [employer] HEP (access code [email or web address])   Progress: Reinforced   Education provided to:  Patient and Spouse/SO   Level of understanding Verbalized and Demonstrated   Timed Minutes          Total Timed Treatment:     40   mins  Total Time of Visit:             40   mins         ASSESSMENT/PLAN     GOALS  Goals                                          Progress Note due by 10/2/2021                                                      Recert due by 2021   LTG by: 6 weeks   Date Status   Patient will demonstrate independence with comprehensive HEP         Patient will demonstrate decreased B  piriformis muscle guarding   Addressing guarding today, pain and guarding decreased following manual therapy  9/8/21 Ongoing    Patient will demonstrate 5/5 B hip abductor strength   Implemented side stepping today to target hip abductors 9/16/21 Ongoing    Patient maintain tandem standing >20 seconds with minimal swaying          Patient will ambulate >200 ft without AD while maintaining good gait mechanics   Discussed core activation and upright posture during ambulation to promote decreased stress on the lumbar spine  9/14/21 Ongoing    Patient will score >25 on Tinetti                          Assessment/Plan     ASSESSMENT: Increased R lumbar paraspinal muscle guarding, responded well to STM in massage chair. This allowed better access to B lumbar paraspinals. Continuing to work on BLE strength and endurance, hip stability and postural weakness noted.      PLAN: Utilize manual therapy as needed to address soft tissue restrictions. Consider focusing on core and posture next session.      Signature: Lana Crowley, PT, DPT

## 2021-09-21 ENCOUNTER — OFFICE VISIT (OUTPATIENT)
Dept: NEUROSURGERY | Facility: CLINIC | Age: 86
End: 2021-09-21

## 2021-09-21 ENCOUNTER — TREATMENT (OUTPATIENT)
Dept: PHYSICAL THERAPY | Facility: CLINIC | Age: 86
End: 2021-09-21

## 2021-09-21 VITALS — BODY MASS INDEX: 24.59 KG/M2 | HEIGHT: 69 IN | WEIGHT: 166 LBS

## 2021-09-21 DIAGNOSIS — Z98.1 HISTORY OF LUMBAR FUSION: Primary | ICD-10-CM

## 2021-09-21 DIAGNOSIS — M48.062 SPINAL STENOSIS, LUMBAR REGION, WITH NEUROGENIC CLAUDICATION: Primary | ICD-10-CM

## 2021-09-21 DIAGNOSIS — Z78.9 NON-SMOKER: ICD-10-CM

## 2021-09-21 DIAGNOSIS — G89.29 CHRONIC BILATERAL LOW BACK PAIN, UNSPECIFIED WHETHER SCIATICA PRESENT: ICD-10-CM

## 2021-09-21 DIAGNOSIS — M54.50 CHRONIC BILATERAL LOW BACK PAIN, UNSPECIFIED WHETHER SCIATICA PRESENT: ICD-10-CM

## 2021-09-21 DIAGNOSIS — Z91.81 RISK FOR FALLS: ICD-10-CM

## 2021-09-21 DIAGNOSIS — Z74.09 IMPAIRED MOBILITY: ICD-10-CM

## 2021-09-21 DIAGNOSIS — M51.37 DEGENERATION OF LUMBAR OR LUMBOSACRAL INTERVERTEBRAL DISC: ICD-10-CM

## 2021-09-21 DIAGNOSIS — M51.16 LUMBAR DISC DISEASE WITH RADICULOPATHY: ICD-10-CM

## 2021-09-21 PROCEDURE — 99024 POSTOP FOLLOW-UP VISIT: CPT | Performed by: NEUROLOGICAL SURGERY

## 2021-09-21 PROCEDURE — 97110 THERAPEUTIC EXERCISES: CPT | Performed by: PHYSICAL THERAPIST

## 2021-09-21 PROCEDURE — 97140 MANUAL THERAPY 1/> REGIONS: CPT | Performed by: PHYSICAL THERAPIST

## 2021-09-21 NOTE — PROGRESS NOTES
Physical Therapy Treatment Note    Patient: Chad Henriquez                                                                                     Visit Date: 2021  :     1929    Referring practitioner:    EDIE Coulter  Date of Initial Visit:          Type: THERAPY  Noted: 2021    Patient seen for 5 sessions    Visit Diagnoses:    ICD-10-CM ICD-9-CM   1. History of lumbar fusion  Z98.1 V45.4   2. Chronic bilateral low back pain, unspecified whether sciatica present  M54.5 724.2    G89.29 338.29   3. Impaired mobility  Z74.09 799.89   4. Risk for falls  Z91.81 V15.88     SUBJECTIVE     Subjective States his back is feeling pretty good this morning. Did have 1 day over the weekend where he had no pain, which is an improvement. No complaints following previous session.     PAIN: 2/10            OBJECTIVE     Objective        Therapeutic Exercises    17939 Comments   HL bridges  2 x 10    HL ball press downs  2 x 10    SLR, bilateral  2 x 10    Supine marches  2 x 10; cue for pelvic tilt    Standing hip abduction/extension 2 x 10 each        Timed Minutes 30     Manual Therapy     44386  Comments   IASTM with pink roller thoracic and lumba paraspinals  Patient in massage chair    Thoracic extension PA Grade 2               Timed Minutes 10          Therapy Education/Self Care 74639   Details: Reviewed HEP, explained the importance of core activation and upright posture during ambulation   Given [employer] HEP (access code [email or web address])   Progress: Reinforced   Education provided to:  Patient and Spouse/SO   Level of understanding Verbalized and Demonstrated   Timed Minutes          Total Timed Treatment:     40   mins  Total Time of Visit:             40   mins         ASSESSMENT/PLAN     GOALS  Goals                                          Progress Note due by 10/2/2021                                                      Recert due by 2021   LTG by: 6 weeks   Date Status   Patient will  demonstrate independence with comprehensive HEP  Encouraging pt to really focus on posture when ambulating or standing at home 9/21/21 ongoing   Patient will demonstrate decreased B piriformis muscle guarding   Addressing guarding today, pain and guarding decreased following manual therapy  9/8/21 Ongoing    Patient will demonstrate 5/5 B hip abductor strength   Implemented side stepping today to target hip abductors 9/16/21 Ongoing    Patient maintain tandem standing >20 seconds with minimal swaying          Patient will ambulate >200 ft without AD while maintaining good gait mechanics   Discussed core activation and upright posture during ambulation to promote decreased stress on the lumbar spine  9/14/21 Ongoing    Patient will score >25 on Tinetti                          Assessment/Plan     ASSESSMENT: Focused on implementing exercises today to promote hip and core stability training. Patient did a good job of maintaining core contraction and performing exercises with proper form. During standing exercises, did require cues to decreased compensations while doing hip abduction/extension. Performed gentle thoracic PA as patient has notable kyphosis. Has follow up with MD this afternoon.      PLAN: Utilize manual therapy as needed to address soft tissue restrictions. Continue focusing on core and posture next session.      Signature: Lana Crowley, PT, DPT

## 2021-09-21 NOTE — PROGRESS NOTES
SUBJECTIVE:  Patient ID: Chad Henriquez is a 89 y.o. male is here today for follow-up.    Chief Complaint: Back pain  Chief Complaint   Patient presents with   • BACK & LEG PAIN     patient is here for a wound check: L2/3 TLIF w/instrument revision L2-S1 on 6/30/21       HPI  89-year-old gentleman who went to the operating room on Ute 3, 2021 for a TLIF at L2-3 and an extension of a previous fusion L2-S1.  He is here in follow-up.  He gets occasional intermittent back pain with prolonged standing and activity.  He has no lower extremity pain numbness or tingling.  He is walking in the mall twice a day without any difficulty.  He is not taking any pain medicine.  In general he does feel like he is better than he was several weeks ago and continues to improve.    The following portions of the patient's history were reviewed and updated as appropriate: allergies, current medications, past family history, past medical history, past social history, past surgical history and problem list.    OBJECTIVE:    Review of Systems   Musculoskeletal: Positive for arthralgias.   All other systems reviewed and are negative.         Physical Exam  Eyes:      Extraocular Movements: EOM normal.      Pupils: Pupils are equal, round, and reactive to light.   Neurological:      Mental Status: He is oriented to person, place, and time.      Coordination: Finger-Nose-Finger Test normal.      Gait: Gait is intact.      Deep Tendon Reflexes: Strength normal.   Psychiatric:         Speech: Speech normal.         Neurologic Exam     Mental Status   Oriented to person, place, and time.   Attention: normal.   Speech: speech is normal   Level of consciousness: alert  Knowledge: good.     Cranial Nerves     CN II   Visual fields full to confrontation.     CN III, IV, VI   Pupils are equal, round, and reactive to light.  Extraocular motions are normal.     CN V   Facial sensation intact.     CN VII   Facial expression full, symmetric.     CN VIII    CN VIII normal.     CN IX, X   CN IX normal.   CN X normal.     CN XI   CN XI normal.     CN XII   CN XII normal.     Motor Exam   Muscle bulk: normal  Overall muscle tone: normal  Right arm pronator drift: absent  Left arm pronator drift: absent    Strength   Strength 5/5 throughout.     Sensory Exam   Light touch normal.   Pinprick normal.     Gait, Coordination, and Reflexes     Gait  Gait: normal    Coordination   Finger to nose coordination: normal    Tremor   Resting tremor: absent  Intention tremor: absent  Action tremor: absent    Reflexes   Reflexes 2+ except as noted.       Independent Review of Radiographic Studies:   Imaging demonstrates good position of all interbody devices and hardware.    ASSESSMENT/PLAN:  The patient is doing well after his extension of the fusion to L2-3.  His pain in his legs and his back are under satisfactory control.  He continues to improve in the postoperative period.  We will see him in follow-up in about 3 months.      No diagnosis found.    The patient's Body mass index is 24.87 kg/m².. BMI is within normal parameters. No follow-up required.    No follow-ups on file.      Kj Falcon MD

## 2021-09-23 ENCOUNTER — TREATMENT (OUTPATIENT)
Dept: PHYSICAL THERAPY | Facility: CLINIC | Age: 86
End: 2021-09-23

## 2021-09-23 DIAGNOSIS — Z74.09 IMPAIRED MOBILITY: ICD-10-CM

## 2021-09-23 DIAGNOSIS — Z98.1 HISTORY OF LUMBAR FUSION: Primary | ICD-10-CM

## 2021-09-23 DIAGNOSIS — G89.29 CHRONIC BILATERAL LOW BACK PAIN, UNSPECIFIED WHETHER SCIATICA PRESENT: ICD-10-CM

## 2021-09-23 DIAGNOSIS — Z91.81 RISK FOR FALLS: ICD-10-CM

## 2021-09-23 DIAGNOSIS — M54.50 CHRONIC BILATERAL LOW BACK PAIN, UNSPECIFIED WHETHER SCIATICA PRESENT: ICD-10-CM

## 2021-09-23 PROCEDURE — 97116 GAIT TRAINING THERAPY: CPT | Performed by: PHYSICAL THERAPIST

## 2021-09-23 PROCEDURE — 97110 THERAPEUTIC EXERCISES: CPT | Performed by: PHYSICAL THERAPIST

## 2021-09-23 NOTE — PROGRESS NOTES
Physical Therapy Treatment Note    Patient: Chad Henriquez                                                                                     Visit Date: 2021  :     1929    Referring practitioner:    EDIE Coulter  Date of Initial Visit:          Type: THERAPY  Noted: 2021    Patient seen for 6 sessions    Visit Diagnoses:    ICD-10-CM ICD-9-CM   1. History of lumbar fusion  Z98.1 V45.4   2. Chronic bilateral low back pain, unspecified whether sciatica present  M54.5 724.2    G89.29 338.29   3. Impaired mobility  Z74.09 799.89   4. Risk for falls  Z91.81 V15.88     SUBJECTIVE     Subjective No increase in pain following previous session. Patient stated that his follow up with Dr. Falcon went well, he is now cleared to drive.     PAIN: 2/10            OBJECTIVE     Objective        Therapeutic Exercises    47122 Comments   HL bridges  2 x 10    HL ball press downs  2 x 10    Supine reverse marches  2 x 10   SL passive hip extension stretching         Timed Minutes 30     Gait Training          34351   Task/Terrain Asst AD Comments   Amb outdoors on uneven ground, navigating turns  SBA-CGA No AD and w SC Slow gait speed and B knee flexion noted  Forward flexed posture  2-3 occurrences of R toe catching during swing phase    Slightly improve speed and mechanics when ambulating with SC. Also able to curbs better with SC                Timed Minutes 10          Therapy Education/Self Care 73702   Details: Reviewed HEP, explained the importance of core activation and upright posture during ambulation   Given [employer] HEP (access code [email or web address])   Progress: Reinforced   Education provided to:  Patient and Spouse/SO   Level of understanding Verbalized and Demonstrated   Timed Minutes          Total Timed Treatment:     40   mins  Total Time of Visit:             40   mins         ASSESSMENT/PLAN     GOALS  Goals                                          Progress Note due by 10/2/2021                                                       Recert due by 12/01/2021   LTG by: 6 weeks   Date Status   Patient will demonstrate independence with comprehensive HEP  Encouraging pt to really focus on posture when ambulating or standing at home 9/21/21 ongoing   Patient will demonstrate decreased B piriformis muscle guarding   Addressing guarding today, pain and guarding decreased following manual therapy  9/8/21 Ongoing    Patient will demonstrate 5/5 B hip abductor strength   Implemented side stepping today to target hip abductors 9/16/21 Ongoing    Patient maintain tandem standing >20 seconds with minimal swaying          Patient will ambulate >200 ft without AD while maintaining good gait mechanics  Ambulated on uneven ground today, performed well with no LOB.  9/23/21 Ongoing    Patient will score >25 on Tinetti                          Assessment/Plan     ASSESSMENT: Continuing to work on core, hip and postural strengthening. Patient stated he is wanted to improve his walking, therefore assessed that today. He does have presence of R drop foot, encouraged him to bring AFO next session. His gait speed is slow and with strengthening, speed may improve. He is safer ambulating with the SC at this time.     PLAN: Assess gait with AFO. Continue strengthening hips/core and endurance training.       Signature: Lana Crowley, PT, DPT

## 2021-09-28 ENCOUNTER — TREATMENT (OUTPATIENT)
Dept: PHYSICAL THERAPY | Facility: CLINIC | Age: 86
End: 2021-09-28

## 2021-09-28 DIAGNOSIS — G89.29 CHRONIC BILATERAL LOW BACK PAIN, UNSPECIFIED WHETHER SCIATICA PRESENT: ICD-10-CM

## 2021-09-28 DIAGNOSIS — Z98.1 HISTORY OF LUMBAR FUSION: Primary | ICD-10-CM

## 2021-09-28 DIAGNOSIS — Z91.81 RISK FOR FALLS: ICD-10-CM

## 2021-09-28 DIAGNOSIS — M54.50 CHRONIC BILATERAL LOW BACK PAIN, UNSPECIFIED WHETHER SCIATICA PRESENT: ICD-10-CM

## 2021-09-28 DIAGNOSIS — Z74.09 IMPAIRED MOBILITY: ICD-10-CM

## 2021-09-28 PROCEDURE — 97116 GAIT TRAINING THERAPY: CPT | Performed by: PHYSICAL THERAPIST

## 2021-09-28 PROCEDURE — 97112 NEUROMUSCULAR REEDUCATION: CPT | Performed by: PHYSICAL THERAPIST

## 2021-09-28 PROCEDURE — 97110 THERAPEUTIC EXERCISES: CPT | Performed by: PHYSICAL THERAPIST

## 2021-09-28 NOTE — PROGRESS NOTES
I have reviewed the notes, assessments, and/or procedures performed by Lana Crowley,PT, DPT, I concur with her/his documentation of Chad Henriquez.

## 2021-09-28 NOTE — PROGRESS NOTES
Physical Therapy Treatment Note    Patient: Chad Henriquez                                                                                     Visit Date: 2021  :     1929    Referring practitioner:    EDIE Coulter  Date of Initial Visit:          Type: THERAPY  Noted: 2021    Patient seen for 7 sessions    Visit Diagnoses:    ICD-10-CM ICD-9-CM   1. History of lumbar fusion  Z98.1 V45.4   2. Chronic bilateral low back pain, unspecified whether sciatica present  M54.5 724.2    G89.29 338.29   3. Impaired mobility  Z74.09 799.89   4. Risk for falls  Z91.81 V15.88     SUBJECTIVE     Subjective No complaints following previous session.  Has been walking without cane since Saturday.     PAIN: 1-2/10            OBJECTIVE     Objective        Therapeutic Exercises    29861 Comments   Standing mini squats 2 x 10    Standing hip abduction/extension  2 x 10    Working on upright posture in standing             Timed Minutes 20     Gait Training          16950   Task/Terrain Asst AD Comments   Amb outdoors on uneven ground, navigating turns  SBA-CGA No AD  Slow gait speed and B knee flexion noted  Able to improve R heel strike with cueing   Forward flexed posture  AFO donned which improved mechanics                 Timed Minutes 10        Neuromuscular Reeducation     74336 Comments   Tandem standing    FT, EC Mild swaying noted   FT, EC with perturbations  Performed well, no LOB           Timed Minutes 10       Therapy Education/Self Care 47741   Details: Reviewed HEP and progressed    Given [employer] HEP (access code [email or web address])   Progress: Reinforced   Education provided to:  Patient and Spouse/SO   Level of understanding Verbalized and Demonstrated   Timed Minutes      Access Code: [email or web address]  URL: [email or web address]  Date: 2021  Prepared by: Benji Mir    Exercises  Hooklying Single Leg Bent Knee Fallouts with Resistance - 1 x daily - 7 x weekly - 2 sets - 10 reps  Standing  Tandem Balance with Counter Support - 1 x daily - 7 x weekly - 1 sets - 5 reps - 20-30 sec hold  Standing Hip Abduction - 1 x daily - 7 x weekly - 2 sets - 10 reps  Standing Hip Extension - 1 x daily - 7 x weekly - 2 sets - 10 reps  Standing Partial Squat - 1 x daily - 7 x weekly - 2 sets - 10 reps    Total Timed Treatment:     40   mins  Total Time of Visit:             40   mins         ASSESSMENT/PLAN     GOALS  Goals                                          Progress Note due by 10/2/2021                                                      Recert due by 12/01/2021   LTG by: 6 weeks   Date Status   Patient will demonstrate independence with comprehensive HEP  Encouraging pt to really focus on posture when ambulating or standing at home 9/21/21 ongoing   Patient will demonstrate decreased B piriformis muscle guarding   Addressing guarding today, pain and guarding decreased following manual therapy  9/8/21 Ongoing    Patient will demonstrate 5/5 B hip abductor strength   Implemented side stepping today to target hip abductors 9/16/21 Ongoing    Patient maintain tandem standing >20 seconds with minimal swaying   Maintained 10 seconds today 9/28/21 ongoing   Patient will ambulate >200 ft without AD while maintaining good gait mechanics  Ambulated on uneven ground today, performed well with no LOB.  9/23/21 Ongoing    Patient will score >25 on Tinetti                          Assessment/Plan     ASSESSMENT: His gait mechanics seemed to improve with his R AFO. He initially had decreased R heel strike, responded well to cueing. His balance is fair, LOB noted when performing static activities. This is likely due to B hip weakness and back pain.     PLAN: Address goals for progress note. Continue strengthening hips/core and endurance training.       Signature: Lana Crowley, PT, DPT

## 2021-09-30 ENCOUNTER — TREATMENT (OUTPATIENT)
Dept: PHYSICAL THERAPY | Facility: CLINIC | Age: 86
End: 2021-09-30

## 2021-09-30 DIAGNOSIS — Z91.81 RISK FOR FALLS: ICD-10-CM

## 2021-09-30 DIAGNOSIS — M54.50 CHRONIC BILATERAL LOW BACK PAIN, UNSPECIFIED WHETHER SCIATICA PRESENT: ICD-10-CM

## 2021-09-30 DIAGNOSIS — Z98.1 HISTORY OF LUMBAR FUSION: Primary | ICD-10-CM

## 2021-09-30 DIAGNOSIS — G89.29 CHRONIC BILATERAL LOW BACK PAIN, UNSPECIFIED WHETHER SCIATICA PRESENT: ICD-10-CM

## 2021-09-30 DIAGNOSIS — Z74.09 IMPAIRED MOBILITY: ICD-10-CM

## 2021-09-30 PROCEDURE — 97116 GAIT TRAINING THERAPY: CPT | Performed by: PHYSICAL THERAPIST

## 2021-09-30 PROCEDURE — 97530 THERAPEUTIC ACTIVITIES: CPT | Performed by: PHYSICAL THERAPIST

## 2021-09-30 PROCEDURE — 97112 NEUROMUSCULAR REEDUCATION: CPT | Performed by: PHYSICAL THERAPIST

## 2021-09-30 NOTE — PROGRESS NOTES
Physical Therapy Treatment Note and 30 Day Progress Note    Patient: Chad Henriquez                                                                                     Visit Date: 2021  :     1929    Referring practitioner:    EDIE Coulter  Date of Initial Visit:          Type: THERAPY  Noted: 2021    Patient seen for 8 sessions    Visit Diagnoses:    ICD-10-CM ICD-9-CM   1. History of lumbar fusion  Z98.1 V45.4   2. Chronic bilateral low back pain, unspecified whether sciatica present  M54.5 724.2    G89.29 338.29   3. Impaired mobility  Z74.09 799.89   4. Risk for falls  Z91.81 V15.88     SUBJECTIVE     Subjective He felt pretty good after last session, mild pain reported today. He feels since starting therapy, his balance has really improved.     PAIN: 1-2/10            OBJECTIVE     Objective     Therapeutic Activities    73932 Comments   Sit to stands without UE assist     Stair training     Step ups in parallel bars on 3 in step, no UE assist  Very challenging for patient due to decreased confidence and balance.            Timed Minutes 21        Gait Training          86034   Task/Terrain Asst AD Comments   Amb outdoors on level ground SBA No AD, AFO donned   Difficulty with increasing gait speed, however was able to maintain equal step length. L arm swing is improved from previous sessions.                  Timed Minutes 10        Neuromuscular Reeducation     76632 Comments   Tandem standing    FT, EC Mild swaying noted   FT, EC with perturbations  Performed well, no LOB   Tinetti assessment         Timed Minutes 10       Therapy Education/Self Care 81973   Details: Reviewed HEP and progressed. Encouraged working on sit to stands at home to work on strength and endurance.    Given [employer] HEP (access code [email or web address])   Progress: Reinforced   Education provided to:  Patient and Spouse/SO   Level of understanding Verbalized and Demonstrated   Timed Minutes      Access Code:  [email or web address]  URL: [email or web address]  Date: 09/28/2021  Prepared by: Benji Mir    Exercises  Hooklying Single Leg Bent Knee Fallouts with Resistance - 1 x daily - 7 x weekly - 2 sets - 10 reps  Standing Tandem Balance with Counter Support - 1 x daily - 7 x weekly - 1 sets - 5 reps - 20-30 sec hold  Standing Hip Abduction - 1 x daily - 7 x weekly - 2 sets - 10 reps  Standing Hip Extension - 1 x daily - 7 x weekly - 2 sets - 10 reps  Standing Partial Squat - 1 x daily - 7 x weekly - 2 sets - 10 reps    Total Timed Treatment:     41   mins  Total Time of Visit:             43   mins         ASSESSMENT/PLAN     GOALS  Goals                                          Progress Note due by 10/30/2021                                                      Recert due by 12/01/2021   LTG by: 6 weeks   Date Status   Patient will demonstrate independence with comprehensive HEP  Continuing to progress HEP, he has been compliant  9/30/21 ongoing   Patient will demonstrate decreased B piriformis muscle guarding   No recent complaints of increased pain or guarding.   9/30/21 MET   Patient will demonstrate 5/5 B hip abductor strength   4+/5 bilaterally, compensation noted with hip flexion during MMT 9/30/21 Ongoing    Patient maintain tandem standing >20 seconds with minimal swaying   Maintained 10 seconds today 9/28/21 ongoing   Patient will ambulate >200 ft without AD while maintaining good gait mechanics  Able to ambulate 300 ft without AD, however minimal L arm swing noted and discontinuous steps with turns  9/30/21 Ongoing    Patient will score >25 on Tinetti   19 on evaluation   21 today  9/30/21 Progressing                    Assessment/Plan     ASSESSMENT:  Mr. Henriquez has met 1/6 goals and has made progress toward the remaining goals. His muscle guarding, hip mobility and endurance with walking have all improved. His primary limitation at this point would be static/dynamic balance and BLE strength. Today we addressed  stair training with and without UE assist on handrail. When attempting reciprocal pattern without handrail, he was unable to perform and had 1 LOB. This is likely due to decreased strength and muscle control, leading to unsteadiness on the stairs. He will benefit from continued skilled therapy services to address his deficits and improve his functional strength to decrease risk of falling.     PLAN: Continue working on stair training and sit to standing transfers for strengthening.        Signature: Lana Crowley, PT, DPT

## 2021-10-05 ENCOUNTER — TREATMENT (OUTPATIENT)
Dept: PHYSICAL THERAPY | Facility: CLINIC | Age: 86
End: 2021-10-05

## 2021-10-05 DIAGNOSIS — G89.29 CHRONIC BILATERAL LOW BACK PAIN, UNSPECIFIED WHETHER SCIATICA PRESENT: ICD-10-CM

## 2021-10-05 DIAGNOSIS — Z98.1 HISTORY OF LUMBAR FUSION: Primary | ICD-10-CM

## 2021-10-05 DIAGNOSIS — Z74.09 IMPAIRED MOBILITY: ICD-10-CM

## 2021-10-05 DIAGNOSIS — Z91.81 RISK FOR FALLS: ICD-10-CM

## 2021-10-05 DIAGNOSIS — M54.50 CHRONIC BILATERAL LOW BACK PAIN, UNSPECIFIED WHETHER SCIATICA PRESENT: ICD-10-CM

## 2021-10-05 PROCEDURE — 97530 THERAPEUTIC ACTIVITIES: CPT | Performed by: PHYSICAL THERAPIST

## 2021-10-05 PROCEDURE — 97112 NEUROMUSCULAR REEDUCATION: CPT | Performed by: PHYSICAL THERAPIST

## 2021-10-05 PROCEDURE — 97110 THERAPEUTIC EXERCISES: CPT | Performed by: PHYSICAL THERAPIST

## 2021-10-05 NOTE — PROGRESS NOTES
Physical Therapy Treatment Note    Patient: Chad Henriquez                                                                                     Visit Date: 10/5/2021  :     1929    Referring practitioner:    EDIE Coulter  Date of Initial Visit:          Type: THERAPY  Noted: 2021    Patient seen for 9 sessions    Visit Diagnoses:    ICD-10-CM ICD-9-CM   1. History of lumbar fusion  Z98.1 V45.4   2. Chronic bilateral low back pain, unspecified whether sciatica present  M54.50 724.2    G89.29 338.29   3. Impaired mobility  Z74.09 799.89   4. Risk for falls  Z91.81 V15.88     SUBJECTIVE     Subjective Patient reported increased muscle tightness in LLE following previous session. Back is feeling a little better.    PAIN: 1-1.5/10            OBJECTIVE     Objective      Therapeutic Activities    89058 Comments   Sit to stands without UE assist   3 x 5   Step ups in parallel bars on 3 in step, no UE assist  Very challenging for patient due to decreased confidence and balance.  Also had difficulty coordinating movement    CGA - Min A               Timed Minutes 25       Therapeutic Exercises    33282 Comments   Passive B hip stretching                 Timed Minutes 10        Neuromuscular Reeducation     98219 Comments   Tandem standing    Static standing on wobble board, laterally     Mini squats on wobble board X 10 with UE assist on // bars  X 10 without UE assist    Timed Minutes 10       Therapy Education/Self Care 58703   Details: Reviewed HEP and progressed    Given [employer] HEP (access code [email or web address])   Progress: Reinforced   Education provided to:  Patient and Spouse/SO   Level of understanding Verbalized and Demonstrated   Timed Minutes      Access Code: [email or web address]  URL: [email or web address]  Date: 2021  Prepared by: Benji Mir    Exercises  Hooklying Single Leg Bent Knee Fallouts with Resistance - 1 x daily - 7 x weekly - 2 sets - 10 reps  Standing Tandem Balance with Counter  Support - 1 x daily - 7 x weekly - 1 sets - 5 reps - 20-30 sec hold  Standing Hip Abduction - 1 x daily - 7 x weekly - 2 sets - 10 reps  Standing Hip Extension - 1 x daily - 7 x weekly - 2 sets - 10 reps  Standing Partial Squat - 1 x daily - 7 x weekly - 2 sets - 10 reps    Total Timed Treatment:     55   mins  Total Time of Visit:             55   mins         ASSESSMENT/PLAN     GOALS  Goals                                          Progress Note due by 10/30/2021                                                      Recert due by 12/01/2021   LTG by: 6 weeks   Date Status   Patient will demonstrate independence with comprehensive HEP  Continuing to progress HEP, he has been compliant  9/30/21 ongoing   Patient will demonstrate decreased B piriformis muscle guarding   No recent complaints of increased pain or guarding.   9/30/21 MET   Patient will demonstrate 5/5 B hip abductor strength   4+/5 bilaterally, compensation noted with hip flexion during MMT 9/30/21 Ongoing    Patient maintain tandem standing >20 seconds with minimal swaying   Maintained 18 seconds today 10/5/21 ongoing   Patient will ambulate >200 ft without AD while maintaining good gait mechanics  Able to ambulate 300 ft without AD, however minimal L arm swing noted and discontinuous steps with turns  9/30/21 Ongoing    Patient will score >25 on Tinetti   19 on evaluation   21 today  9/30/21 Progressing                    Assessment/Plan     ASSESSMENT: Continued to work on step ups and balance training as that is a primary limitation. Decreased endurance noted today as he seems to fatigue quickly. When performing step ups onto 3 in step, he did require Min A due to LOB 2x. Performed passive stretching of B hips as patient was sore following previous session.     PLAN: Continue to challenge static/dynamic balance. Utilize stretching and manual therapy as needed for muscle soreness.        Signature: Lana Crowley, PT, DPT

## 2021-10-07 ENCOUNTER — TREATMENT (OUTPATIENT)
Dept: PHYSICAL THERAPY | Facility: CLINIC | Age: 86
End: 2021-10-07

## 2021-10-07 DIAGNOSIS — Z98.1 HISTORY OF LUMBAR FUSION: Primary | ICD-10-CM

## 2021-10-07 PROCEDURE — 97112 NEUROMUSCULAR REEDUCATION: CPT | Performed by: PHYSICAL THERAPIST

## 2021-10-07 NOTE — PROGRESS NOTES
Physical Therapy Treatment Note    Patient: Chad Henriquez                                                                                     Visit Date: 10/7/2021  :     1929    Referring practitioner:    EDIE Coulter  Date of Initial Visit:          Type: THERAPY  Noted: 2021    Patient seen for 10 sessions    Visit Diagnoses:    ICD-10-CM ICD-9-CM   1. History of lumbar fusion  Z98.1 V45.4     SUBJECTIVE     Subjective Pt reports that he is doing well, no all since last visit.    PAIN: 2/10            OBJECTIVE     Objective      Neuromuscular Reeducation     49827 Comments   Wobble board A/P, M/L    Ambulation with head turns and nods    Cone step overs x3   Lateral cone step overs x2   Rhomberg stance 45 cm ball toss 2 x 10       Timed Minutes 40       Therapy Education/Self Care 29293   Details: Reviewed HEP and progressed    Given [employer] HEP (access code [email or web address])   Progress: Reinforced   Education provided to:  Patient and Spouse/SO   Level of understanding Verbalized and Demonstrated   Timed Minutes      Access Code: [email or web address]  URL: [email or web address]  Date: 2021  Prepared by: Benji Mir    Exercises  Hooklying Single Leg Bent Knee Fallouts with Resistance - 1 x daily - 7 x weekly - 2 sets - 10 reps  Standing Tandem Balance with Counter Support - 1 x daily - 7 x weekly - 1 sets - 5 reps - 20-30 sec hold  Standing Hip Abduction - 1 x daily - 7 x weekly - 2 sets - 10 reps  Standing Hip Extension - 1 x daily - 7 x weekly - 2 sets - 10 reps  Standing Partial Squat - 1 x daily - 7 x weekly - 2 sets - 10 reps    Total Timed Treatment:     40   mins  Total Time of Visit:             40   mins         ASSESSMENT/PLAN     GOALS  Goals                                          Progress Note due by 10/30/2021                                                      Recert due by 2021   LTG by: 6 weeks   Date Status   Patient will demonstrate independence with comprehensive  HEP  Continuing to progress HEP, he has been compliant  9/30/21 ongoing   Patient will demonstrate decreased B piriformis muscle guarding   No recent complaints of increased pain or guarding.   9/30/21 MET   Patient will demonstrate 5/5 B hip abductor strength   4+/5 bilaterally, compensation noted with hip flexion during MMT 9/30/21 Ongoing    Patient maintain tandem standing >20 seconds with minimal swaying   Maintained 18 seconds today 10/5/21 ongoing   Patient will ambulate >200 ft without AD while maintaining good gait mechanics  Able to ambulate 300 ft without AD, however minimal L arm swing noted and discontinuous steps with turns  9/30/21 Ongoing    Patient will score >25 on Tinetti   19 on evaluation   21 today  9/30/21 Progressing                    Assessment/Plan     ASSESSMENT: We focused today on balance tasks. He had more trouble with tasks moving to the R than the L as well as anterior weight shifting. There was also occasional path deviations with head turns and nods.      PLAN: Continue to progress with static and dynamic balance tasks.     Signature: Benji Mir, PT DPT, DPT

## 2021-10-12 ENCOUNTER — TELEPHONE (OUTPATIENT)
Dept: PHYSICAL THERAPY | Facility: CLINIC | Age: 86
End: 2021-10-12

## 2021-10-14 ENCOUNTER — TREATMENT (OUTPATIENT)
Dept: PHYSICAL THERAPY | Facility: CLINIC | Age: 86
End: 2021-10-14

## 2021-10-14 DIAGNOSIS — M54.50 CHRONIC BILATERAL LOW BACK PAIN, UNSPECIFIED WHETHER SCIATICA PRESENT: ICD-10-CM

## 2021-10-14 DIAGNOSIS — Z91.81 RISK FOR FALLS: ICD-10-CM

## 2021-10-14 DIAGNOSIS — Z74.09 IMPAIRED MOBILITY: ICD-10-CM

## 2021-10-14 DIAGNOSIS — G89.29 CHRONIC BILATERAL LOW BACK PAIN, UNSPECIFIED WHETHER SCIATICA PRESENT: ICD-10-CM

## 2021-10-14 DIAGNOSIS — Z98.1 HISTORY OF LUMBAR FUSION: Primary | ICD-10-CM

## 2021-10-14 PROCEDURE — 97112 NEUROMUSCULAR REEDUCATION: CPT | Performed by: PHYSICAL THERAPIST

## 2021-10-14 PROCEDURE — 97110 THERAPEUTIC EXERCISES: CPT | Performed by: PHYSICAL THERAPIST

## 2021-10-14 NOTE — PROGRESS NOTES
"Physical Therapy Treatment Note    Patient: Chad Henriquez                                                                                     Visit Date: 10/14/2021  :     1929    Referring practitioner:    EDIE Coulter  Date of Initial Visit:          Type: THERAPY  Noted: 2021    Patient seen for 11 sessions    Visit Diagnoses:    ICD-10-CM ICD-9-CM   1. History of lumbar fusion  Z98.1 V45.4   2. Chronic bilateral low back pain, unspecified whether sciatica present  M54.50 724.2    G89.29 338.29   3. Impaired mobility  Z74.09 799.89   4. Risk for falls  Z91.81 V15.88     SUBJECTIVE     Subjective Patient states that he is in general just \"feeling okay\", however no complaints with back.     PAIN: 0-1/10            OBJECTIVE     Objective      Neuromuscular Reeducation     78835 Comments   Agility ladder drills  CGA-Min A    Amb weaving through cones  CGA   Stepping over cones  CGA-Min A               Timed Minutes 25     Therapeutic Exercises    56421 Comments   Ambulation in hallway for endurance     Step ups on 6 inch step, no UE assist 2 x 10 leading with each LE  1 LOB due to catching toe on step, recovered with Min A and handrail A                Timed Minutes 15       Therapy Education/Self Care 40314   Details: Reviewed HEP and progressed    Given [employer] HEP (access code [email or web address])   Progress: Reinforced   Education provided to:  Patient and Spouse/SO   Level of understanding Verbalized and Demonstrated   Timed Minutes      Access Code: [email or web address]  URL: [email or web address]  Date: 2021  Prepared by: Benji Mir    Exercises  Hooklying Single Leg Bent Knee Fallouts with Resistance - 1 x daily - 7 x weekly - 2 sets - 10 reps  Standing Tandem Balance with Counter Support - 1 x daily - 7 x weekly - 1 sets - 5 reps - 20-30 sec hold  Standing Hip Abduction - 1 x daily - 7 x weekly - 2 sets - 10 reps  Standing Hip Extension - 1 x daily - 7 x weekly - 2 sets - 10 " reps  Standing Partial Squat - 1 x daily - 7 x weekly - 2 sets - 10 reps    Total Timed Treatment:     40   mins  Total Time of Visit:             40   mins         ASSESSMENT/PLAN     GOALS  Goals                                          Progress Note due by 10/30/2021                                                      Recert due by 12/01/2021   LTG by: 6 weeks   Date Status   Patient will demonstrate independence with comprehensive HEP  Continuing to progress HEP, he has been compliant  9/30/21 ongoing   Patient will demonstrate decreased B piriformis muscle guarding   No recent complaints of increased pain or guarding.   9/30/21 MET   Patient will demonstrate 5/5 B hip abductor strength   4+/5 bilaterally, compensation noted with hip flexion during MMT 9/30/21 Ongoing    Patient maintain tandem standing >20 seconds with minimal swaying   Maintained 18 seconds today 10/5/21 ongoing   Patient will ambulate >200 ft without AD while maintaining good gait mechanics  Able to ambulate 300 ft without AD, however minimal L arm swing noted and discontinuous steps with turns  9/30/21 Ongoing    Patient will score >25 on Tinetti   19 on evaluation   21 today     Continuing to challenge balance as he required CGA-Min A 10/14/21 Progressing          Assessment/Plan     ASSESSMENT: Continues to have decreased dynamic balance, likely related to decreased proprioception. When performing step ups, he had 1 LOB and noted to have decreased endurance. He is still at increased falls risk therefore would benefit from continued strength and balance training.       PLAN: Continue to progress with static and dynamic balance tasks.     Signature: Lana Crowley, PT, DPT

## 2021-10-19 ENCOUNTER — TREATMENT (OUTPATIENT)
Dept: PHYSICAL THERAPY | Facility: CLINIC | Age: 86
End: 2021-10-19

## 2021-10-19 DIAGNOSIS — M54.50 CHRONIC BILATERAL LOW BACK PAIN, UNSPECIFIED WHETHER SCIATICA PRESENT: ICD-10-CM

## 2021-10-19 DIAGNOSIS — Z91.81 RISK FOR FALLS: ICD-10-CM

## 2021-10-19 DIAGNOSIS — Z98.1 HISTORY OF LUMBAR FUSION: Primary | ICD-10-CM

## 2021-10-19 DIAGNOSIS — G89.29 CHRONIC BILATERAL LOW BACK PAIN, UNSPECIFIED WHETHER SCIATICA PRESENT: ICD-10-CM

## 2021-10-19 DIAGNOSIS — Z74.09 IMPAIRED MOBILITY: ICD-10-CM

## 2021-10-19 PROCEDURE — 97110 THERAPEUTIC EXERCISES: CPT | Performed by: PHYSICAL THERAPIST

## 2021-10-19 PROCEDURE — 97112 NEUROMUSCULAR REEDUCATION: CPT | Performed by: PHYSICAL THERAPIST

## 2021-10-19 NOTE — PROGRESS NOTES
Physical Therapy Treatment Note    Patient: Chad Henriquez                                                                                     Visit Date: 10/19/2021  :     1929    Referring practitioner:    EDIE Coulter  Date of Initial Visit:          Type: THERAPY  Noted: 2021    Patient seen for 12 sessions    Visit Diagnoses:    ICD-10-CM ICD-9-CM   1. History of lumbar fusion  Z98.1 V45.4   2. Chronic bilateral low back pain, unspecified whether sciatica present  M54.50 724.2    G89.29 338.29   3. Impaired mobility  Z74.09 799.89   4. Risk for falls  Z91.81 V15.88     SUBJECTIVE     Subjective Patient states that he is feeling pretty good, no complaints following previous session. He is frustrated that his back always hurts when he is up and moving.     PAIN: 2-3/10            OBJECTIVE     Objective      Neuromuscular Reeducation     45684 Comments   Agility ladder drills  CGA-Min A    Ambulation with commands (stop, go, fast, slow, turn around)     Tandem standing, EO  Maintained 5-15 seconds at a time today                Timed Minutes 30     Therapeutic Exercises    78932 Comments   Step ups on 6 inch step, no UE assist 2 x 10 leading with each LE  2 LOB due to catching toe on step, recovered with Min A and handrail A                Timed Minutes 10     Therapy Education/Self Care 59512   Details:     Given [facility] (access code [email or web address])   Progress: Reinforced   Education provided to:  Patient and Spouse/SO   Level of understanding Verbalized and Demonstrated   Timed Minutes      Access Code: [email or web address]  URL: [email or web address]  Date: 2021  Prepared by: Benji Mir    Exercises  Hooklying Single Leg Bent Knee Fallouts with Resistance - 1 x daily - 7 x weekly - 2 sets - 10 reps  Standing Tandem Balance with Counter Support - 1 x daily - 7 x weekly - 1 sets - 5 reps - 20-30 sec hold  Standing Hip Abduction - 1 x daily - 7 x weekly - 2 sets - 10 reps  Standing Hip  "Extension - 1 x daily - 7 x weekly - 2 sets - 10 reps  Standing Partial Squat - 1 x daily - 7 x weekly - 2 sets - 10 reps    Total Timed Treatment:     40   mins  Total Time of Visit:             40   mins         ASSESSMENT/PLAN     GOALS  Goals                                          Progress Note due by 10/30/2021                                                      Recert due by 12/01/2021   LTG by: 6 weeks   Date Status   Patient will demonstrate independence with comprehensive HEP  Continuing to progress HEP, he has been compliant  9/30/21 ongoing   Patient will demonstrate decreased B piriformis muscle guarding   No recent complaints of increased pain or guarding.   9/30/21 MET   Patient will demonstrate 5/5 B hip abductor strength   4+/5 bilaterally, compensation noted with hip flexion during MMT 9/30/21 Ongoing    Patient maintain tandem standing >20 seconds with minimal swaying   Maintained 15 seconds today 10/19/21 ongoing   Patient will ambulate >200 ft without AD while maintaining good gait mechanics  Able to ambulate 300 ft without AD, however minimal L arm swing noted and discontinuous steps with turns  9/30/21 Ongoing    Patient will score >25 on Tinetti   19 on evaluation   21 today     Continuing to challenge balance as he required CGA-Min A 10/14/21 Progressing          Assessment/Plan     ASSESSMENT: Continuing to challenge static and dynamic balance. He improves with multiple repetitions and cue to \"take his time\" as he tends to rush. With step ups, he has difficulty due to decrease LLE proprioception leading to his toe catching on the step. His endurance seems to be improving as he is no longer requiring as many rest breaks between activities.       PLAN: Consider utilizing the neurocom to address weight bearing status and balance.      Signature: Lana Crowley, PT, DPT    "

## 2021-10-19 NOTE — PROGRESS NOTES
Detail Level: Zone I have reviewed the notes, assessments, and/or procedures performed by Lana Crowley, PT, DPT, I concur with her/his documentation of Chad Henriquez.

## 2021-10-21 ENCOUNTER — TREATMENT (OUTPATIENT)
Dept: PHYSICAL THERAPY | Facility: CLINIC | Age: 86
End: 2021-10-21

## 2021-10-21 DIAGNOSIS — Z91.81 RISK FOR FALLS: ICD-10-CM

## 2021-10-21 DIAGNOSIS — G89.29 CHRONIC BILATERAL LOW BACK PAIN, UNSPECIFIED WHETHER SCIATICA PRESENT: ICD-10-CM

## 2021-10-21 DIAGNOSIS — Z74.09 IMPAIRED MOBILITY: ICD-10-CM

## 2021-10-21 DIAGNOSIS — M54.50 CHRONIC BILATERAL LOW BACK PAIN, UNSPECIFIED WHETHER SCIATICA PRESENT: ICD-10-CM

## 2021-10-21 DIAGNOSIS — Z98.1 HISTORY OF LUMBAR FUSION: Primary | ICD-10-CM

## 2021-10-21 PROCEDURE — 97112 NEUROMUSCULAR REEDUCATION: CPT | Performed by: PHYSICAL THERAPIST

## 2021-10-21 PROCEDURE — 97110 THERAPEUTIC EXERCISES: CPT | Performed by: PHYSICAL THERAPIST

## 2021-10-21 NOTE — PROGRESS NOTES
Physical Therapy Treatment Note    Patient: Chad Henriquez                                                                                     Visit Date: 10/21/2021  :     1929    Referring practitioner:    EDIE Coulter  Date of Initial Visit:          Type: THERAPY  Noted: 2021    Patient seen for 13 sessions    Visit Diagnoses:    ICD-10-CM ICD-9-CM   1. History of lumbar fusion  Z98.1 V45.4   2. Chronic bilateral low back pain, unspecified whether sciatica present  M54.50 724.2    G89.29 338.29   3. Impaired mobility  Z74.09 799.89   4. Risk for falls  Z91.81 V15.88     SUBJECTIVE     Subjective No complaints following previous session. Balance is his primary concern at this point.     PAIN: 1.5/10            OBJECTIVE     Objective      Neuromuscular Reeducation     56430 Comments   Tandem standing, EO  Maintained 5-15 seconds at a time today    Standing FT, EC on foam pad     Toe taps on cone, UE support         Timed Minutes 25     Therapeutic Exercises    98456 Comments   Step ups on 6 inch step, with UE assist x 10 leading with each LE   Standing hip flexion/abduction/extension with TRX strap  3 sets x 5 reps each direction  Frequent cue for form, difficulty controlling movement            Timed Minutes 15     Therapy Education/Self Care 91485   Details:     Given [employer] (access code [email or web address])   Progress: Reinforced   Education provided to:  Patient and Spouse/SO   Level of understanding Verbalized and Demonstrated   Timed Minutes      Access Code: [email or web address]  URL: [email or web address]  Date: 2021  Prepared by: Benji Mir    Exercises  Hooklying Single Leg Bent Knee Fallouts with Resistance - 1 x daily - 7 x weekly - 2 sets - 10 reps  Standing Tandem Balance with Counter Support - 1 x daily - 7 x weekly - 1 sets - 5 reps - 20-30 sec hold  Standing Hip Abduction - 1 x daily - 7 x weekly - 2 sets - 10 reps  Standing Hip Extension - 1 x daily - 7 x weekly - 2 sets -  10 reps  Standing Partial Squat - 1 x daily - 7 x weekly - 2 sets - 10 reps    Total Timed Treatment:     40   mins  Total Time of Visit:             40   mins         ASSESSMENT/PLAN     GOALS  Goals                                          Progress Note due by 10/30/2021                                                      Recert due by 12/01/2021   LTG by: 6 weeks   Date Status   Patient will demonstrate independence with comprehensive HEP  Continuing to progress HEP, he has been compliant  9/30/21 ongoing   Patient will demonstrate decreased B piriformis muscle guarding   No recent complaints of increased pain or guarding.   9/30/21 MET   Patient will demonstrate 5/5 B hip abductor strength  During standing hip abduction, compensated with hip flexion however improved with cueing  10/21/21 Ongoing    Patient maintain tandem standing >20 seconds with minimal swaying   Maintained 15 seconds today 10/19/21 ongoing   Patient will ambulate >200 ft without AD while maintaining good gait mechanics  Able to ambulate 300 ft without AD, however minimal L arm swing noted and discontinuous steps with turns  9/30/21 Ongoing    Patient will score >25 on Tinetti   19 on evaluation   21 today     Continuing to challenge balance as he required CGA-Min A 10/14/21 Progressing        Assessment/Plan     ASSESSMENT: Implemented activities today that required SLS to promote improved balance, stair training and gait training. As we continue to work on this, his mechanics should improve. Requires CGA-Min A during activities.     PLAN: Consider utilizing the neurocom. Continue challenging balance.     Signature: Lana Crowley, PT, DPT

## 2021-10-22 NOTE — HOME HEALTH
Patient not feeling well when therapist arrived with increased pain levels. Patient's wife and therapist agree to move visit to Friday and let patient rest for the remainder of the day. Clear bilaterally, pupils equal, round and reactive to light.

## 2021-10-22 NOTE — PROGRESS NOTES
I have reviewed the notes, assessments, and/or procedures performed by Lana Crowley, PT DPT, I concur with her/his documentation of Chad Henriquez.

## 2021-10-26 ENCOUNTER — TREATMENT (OUTPATIENT)
Dept: PHYSICAL THERAPY | Facility: CLINIC | Age: 86
End: 2021-10-26

## 2021-10-26 DIAGNOSIS — Z91.81 RISK FOR FALLS: ICD-10-CM

## 2021-10-26 DIAGNOSIS — Z98.1 HISTORY OF LUMBAR FUSION: Primary | ICD-10-CM

## 2021-10-26 DIAGNOSIS — Z74.09 IMPAIRED MOBILITY: ICD-10-CM

## 2021-10-26 DIAGNOSIS — M54.50 CHRONIC BILATERAL LOW BACK PAIN, UNSPECIFIED WHETHER SCIATICA PRESENT: ICD-10-CM

## 2021-10-26 DIAGNOSIS — G89.29 CHRONIC BILATERAL LOW BACK PAIN, UNSPECIFIED WHETHER SCIATICA PRESENT: ICD-10-CM

## 2021-10-26 PROCEDURE — 97112 NEUROMUSCULAR REEDUCATION: CPT | Performed by: PHYSICAL THERAPIST

## 2021-10-26 PROCEDURE — 97110 THERAPEUTIC EXERCISES: CPT | Performed by: PHYSICAL THERAPIST

## 2021-10-26 NOTE — PROGRESS NOTES
Physical Therapy Treatment Note    Patient: Chad Henriquez                                                                                     Visit Date: 10/26/2021  :     1929    Referring practitioner:    EDIE Coulter  Date of Initial Visit:          Type: THERAPY  Noted: 2021    Patient seen for 14 sessions    Visit Diagnoses:    ICD-10-CM ICD-9-CM   1. History of lumbar fusion  Z98.1 V45.4   2. Chronic bilateral low back pain, unspecified whether sciatica present  M54.50 724.2    G89.29 338.29   3. Impaired mobility  Z74.09 799.89   4. Risk for falls  Z91.81 V15.88     SUBJECTIVE     Subjective No complaints following previous session, pain is still relatively low. Reports stiffness.      PAIN: 1.5/10            OBJECTIVE     Objective      Neuromuscular Reeducation     14440 Comments   Agility ladder drills  CGA-Min A               Timed Minutes 15     Therapeutic Exercises    44837 Comments   LTR with 55 cm stability ball  2 x 10    SLR BLE X 10    SL clamshells 2 x 10    B hip passive stretching into flex/IR/ER and hamstring     Timed Minutes 30     Therapy Education/Self Care 67599   Details:     Given [employer] (access code [email or web address])   Progress: Reinforced   Education provided to:  Patient and Spouse/SO   Level of understanding Verbalized and Demonstrated   Timed Minutes      Access Code: [email or web address]  URL: [email or web address]  Date: 2021  Prepared by: Benji Mir    Exercises  Hooklying Single Leg Bent Knee Fallouts with Resistance - 1 x daily - 7 x weekly - 2 sets - 10 reps  Standing Tandem Balance with Counter Support - 1 x daily - 7 x weekly - 1 sets - 5 reps - 20-30 sec hold  Standing Hip Abduction - 1 x daily - 7 x weekly - 2 sets - 10 reps  Standing Hip Extension - 1 x daily - 7 x weekly - 2 sets - 10 reps  Standing Partial Squat - 1 x daily - 7 x weekly - 2 sets - 10 reps    Total Timed Treatment:     45 mins  Total Time of Visit:             45 mins          ASSESSMENT/PLAN     GOALS  Goals                                          Progress Note due by 10/30/2021                                                      Recert due by 12/01/2021   LTG by: 6 weeks   Date Status   Patient will demonstrate independence with comprehensive HEP  Continuing to progress HEP, he has been compliant  9/30/21 ongoing   Patient will demonstrate decreased B piriformis muscle guarding   No recent complaints of increased pain or guarding.   9/30/21 MET   Patient will demonstrate 5/5 B hip abductor strength  During standing hip abduction, compensated with hip flexion however improved with cueing  10/21/21 Ongoing    Patient maintain tandem standing >20 seconds with minimal swaying   Maintained 15 seconds today 10/19/21 ongoing   Patient will ambulate >200 ft without AD while maintaining good gait mechanics  Working on SLS to assist with gait mechanics, fair mechanics noted  10/26/21 Ongoing    Patient will score >25 on Tinetti   19 on evaluation   21 today     Continuing to challenge balance as he required CGA-Min A 10/14/21 Progressing        Assessment/Plan     ASSESSMENT: Patient was stiff upon arrival, B hips noted to be hypomobile. Responded well to gradual stretching, primary limitations into IR/ER bilaterally. Balance was fair this date, frequent reminder to slow down seemed to help him not lose balance. Any activity involving SLS is very difficult for him because of his decreased balance.     PLAN: Progress note due next session. Review and bolster HEP.      Signature: Lana Crowley, PT, DPT

## 2021-10-28 ENCOUNTER — TREATMENT (OUTPATIENT)
Dept: PHYSICAL THERAPY | Facility: CLINIC | Age: 86
End: 2021-10-28

## 2021-10-28 DIAGNOSIS — G89.29 CHRONIC BILATERAL LOW BACK PAIN, UNSPECIFIED WHETHER SCIATICA PRESENT: ICD-10-CM

## 2021-10-28 DIAGNOSIS — Z74.09 IMPAIRED MOBILITY: ICD-10-CM

## 2021-10-28 DIAGNOSIS — M54.50 CHRONIC BILATERAL LOW BACK PAIN, UNSPECIFIED WHETHER SCIATICA PRESENT: ICD-10-CM

## 2021-10-28 DIAGNOSIS — Z91.81 RISK FOR FALLS: ICD-10-CM

## 2021-10-28 DIAGNOSIS — Z98.1 HISTORY OF LUMBAR FUSION: Primary | ICD-10-CM

## 2021-10-28 PROCEDURE — 97112 NEUROMUSCULAR REEDUCATION: CPT | Performed by: PHYSICAL THERAPIST

## 2021-10-28 PROCEDURE — 97110 THERAPEUTIC EXERCISES: CPT | Performed by: PHYSICAL THERAPIST

## 2021-10-28 NOTE — PROGRESS NOTES
Physical Therapy Treatment Note and 30 Day Progress Note    Patient: Chad Henriquez                                                                                     Visit Date: 10/28/2021  :     1929    Referring practitioner:    EDIE Coulter  Date of Initial Visit:          Type: THERAPY  Noted: 2021    Patient seen for 15 sessions    Visit Diagnoses:    ICD-10-CM ICD-9-CM   1. History of lumbar fusion  Z98.1 V45.4   2. Chronic bilateral low back pain, unspecified whether sciatica present  M54.50 724.2    G89.29 338.29   3. Impaired mobility  Z74.09 799.89   4. Risk for falls  Z91.81 V15.88     SUBJECTIVE     Subjective No complaints following previous session. Overall feels his walking speed and energy level have improved. He still feels his balance needs help.     PAIN: 1.5/10       PT G-Codes  Tinetti Total Score: 23    OBJECTIVE     Objective      Neuromuscular Reeducation     31640 Comments   Static balance activities involving tandem, EO/EC, FT  CGA-Min A                Timed Minutes 10     Therapeutic Exercises    09088 Comments   LTR with 55 cm stability ball  2 x 10    SL clamshells 2 x 10    Standing hip abd/ext 2 x 10; improved form    Walking for endurance     B hip passive stretching into flex/IR/ER and hamstring     Timed Minutes 30     Therapy Education/Self Care 61355   Details:  Reviewed HEP and encouraged more consistent performance before progressing    Given [employer] HEP (access code [email or web address])   Progress: Reinforced   Education provided to:  Patient and Spouse/SO   Level of understanding Verbalized and Demonstrated   Timed Minutes      Access Code: [email or web address]  URL: [email or web address]  Date: 2021  Prepared by: Benji Mir    Exercises  Hooklying Single Leg Bent Knee Fallouts with Resistance - 1 x daily - 7 x weekly - 2 sets - 10 reps  Standing Tandem Balance with Counter Support - 1 x daily - 7 x weekly - 1 sets - 5 reps - 20-30 sec hold  Standing Hip  Abduction - 1 x daily - 7 x weekly - 2 sets - 10 reps  Standing Hip Extension - 1 x daily - 7 x weekly - 2 sets - 10 reps  Standing Partial Squat - 1 x daily - 7 x weekly - 2 sets - 10 reps    Total Timed Treatment:     40 mins  Total Time of Visit:             45 mins         ASSESSMENT/PLAN     GOALS  Goals                                          Progress Note due by 11/28/2021                                                      Recert due by 12/01/2021   LTG by: 6 weeks   Date Status   Patient will demonstrate independence with comprehensive HEP  With reminders from his wife, he is compliant with HEP.  10/28 ongoing   Patient will demonstrate decreased B piriformis muscle guarding   No recent complaints of increased pain or guarding.   9/30/21 MET   Patient will demonstrate 5/5 B hip abductor strength  4+/5 B  10/28/21 Ongoing    Patient maintain tandem standing >20 seconds with minimal swaying   Maintained 12 seconds today 10/28/21 ongoing   Patient will ambulate >200 ft without AD while maintaining good gait mechanics  Working on SLS to assist with gait mechanics, fair mechanics noted     He still has mid-low guard arms during ambulation and mild forward flexed posture. No occurrence of R foot drop today however he did have his AFO on  10/28/21 Ongoing    Patient will score >25 on Tinetti   19 on evaluation   23 today      10/28/21 Progressing        Assessment & Plan     Assessment  Impairments: abnormal gait, abnormal muscle firing, abnormal or restricted ROM, impaired balance, impaired physical strength, lacks appropriate home exercise program and pain with function  Barriers to therapy: Hard of hearing   Prognosis: good  Functional Limitations: walking, uncomfortable because of pain, moving in bed, sitting, standing and stooping  Plan  Therapy options: will be seen for skilled physical therapy services  Planned therapy interventions: abdominal trunk stabilization, home exercise program, joint  mobilization, functional ROM exercises, manual therapy, neuromuscular re-education, postural training, soft tissue mobilization, spinal/joint mobilization, strengthening, stretching, therapeutic activities and gait training  Frequency: 2x week  Duration in visits: 12  Duration in weeks: 6  Treatment plan discussed with: patient         ASSESSMENT: Mr. Henriquez has met 1 goal and continues to make progress toward his remaining goals. He has made progress with his endurance, walking speed and overall strength. Primary limitations are static/dynamic balance and hip weakness. He requires frequent cueing for posture and exercise form. He responds well to therapy as his low back is now only mildly stiff and he is now able to better participate in activities. His wife voiced concerns regarding long distance walking and on uneven ground when travelling, therefore we will continue to address. Today we progressed his hip strengthening and challenged his balance.     PLAN: Consider utilizing neurocom next session.     Signature: Lana Crowley, PT, DPT

## 2021-11-04 ENCOUNTER — TREATMENT (OUTPATIENT)
Dept: PHYSICAL THERAPY | Facility: CLINIC | Age: 86
End: 2021-11-04

## 2021-11-04 DIAGNOSIS — Z74.09 IMPAIRED MOBILITY: ICD-10-CM

## 2021-11-04 DIAGNOSIS — M54.50 CHRONIC BILATERAL LOW BACK PAIN, UNSPECIFIED WHETHER SCIATICA PRESENT: ICD-10-CM

## 2021-11-04 DIAGNOSIS — Z91.81 RISK FOR FALLS: ICD-10-CM

## 2021-11-04 DIAGNOSIS — Z98.1 HISTORY OF LUMBAR FUSION: Primary | ICD-10-CM

## 2021-11-04 DIAGNOSIS — G89.29 CHRONIC BILATERAL LOW BACK PAIN, UNSPECIFIED WHETHER SCIATICA PRESENT: ICD-10-CM

## 2021-11-04 PROCEDURE — 97110 THERAPEUTIC EXERCISES: CPT | Performed by: PHYSICAL THERAPIST

## 2021-11-04 NOTE — PROGRESS NOTES
Physical Therapy Treatment Note    Patient: Chad Henriquez                                                                                     Visit Date: 2021  :     1929    Referring practitioner:    EDIE Coulter  Date of Initial Visit:          Type: THERAPY  Noted: 2021    Patient seen for 16 sessions    Visit Diagnoses:    ICD-10-CM ICD-9-CM   1. History of lumbar fusion  Z98.1 V45.4   2. Chronic bilateral low back pain, unspecified whether sciatica present  M54.50 724.2    G89.29 338.29   3. Impaired mobility  Z74.09 799.89   4. Risk for falls  Z91.81 V15.88     SUBJECTIVE     Subjective  Mr. Henriquez feels pretty good today, no complaints following previous session.     PAIN: 1/10            OBJECTIVE     Objective      Therapeutic Exercises    96181 Comments   LTR with 55 cm stability ball  2 x 10    SL hip abduction 2 x 10    Standing hip abd/ext 2 x 10; improved form    Walking for endurance  5 min, level surface    Shuttle press, BLE 5 cords, 2 min    Shuttle press, unilateral LE 3 cords, 2 min each LE           Timed Minutes 40     Therapy Education/Self Care 15534   Details:  Reviewed HEP and encouraged more consistent performance before progressing    Given [employer] HEP (access code [email or web address])   Progress: Reinforced   Education provided to:  Patient and Spouse/SO   Level of understanding Verbalized and Demonstrated   Timed Minutes      Access Code: [email or web address]  URL: [email or web address]  Date: 2021  Prepared by: Benji Mir    Exercises  Hooklying Single Leg Bent Knee Fallouts with Resistance - 1 x daily - 7 x weekly - 2 sets - 10 reps  Standing Tandem Balance with Counter Support - 1 x daily - 7 x weekly - 1 sets - 5 reps - 20-30 sec hold  Standing Hip Abduction - 1 x daily - 7 x weekly - 2 sets - 10 reps  Standing Hip Extension - 1 x daily - 7 x weekly - 2 sets - 10 reps  Standing Partial Squat - 1 x daily - 7 x weekly - 2 sets - 10 reps    Total Timed  Treatment:     40 mins  Total Time of Visit:             42 mins         ASSESSMENT/PLAN     GOALS  Goals                                          Progress Note due by 11/28/2021                                                      Recert due by 12/01/2021   LTG by: 6 weeks   Date Status   Patient will demonstrate independence with comprehensive HEP  With reminders from his wife, he is compliant with HEP.  10/28 ongoing   Patient will demonstrate decreased B piriformis muscle guarding   No recent complaints of increased pain or guarding.   9/30/21 MET   Patient will demonstrate 5/5 B hip abductor strength  Progressed hip abductor strengthening today.   11/4/21 Ongoing    Patient maintain tandem standing >20 seconds with minimal swaying   Maintained 12 seconds today 10/28/21 ongoing   Patient will ambulate >200 ft without AD while maintaining good gait mechanics  Working on SLS to assist with gait mechanics, fair mechanics noted     He still has mid-low guard arms during ambulation and mild forward flexed posture. No occurrence of R foot drop today however he did have his AFO on  10/28/21 Ongoing    Patient will score >25 on Tinetti   19 on evaluation   23 today      10/28/21 Progressing        Assessment/Plan     ASSESSMENT: Focused on strengthening of B hips and quadriceps this session, he tolerated well however was fatigued at end of session. His endurance is improving as he is requiring fewer rest breaks. His walking speed is improving, likely related to increasing his strength and balance.     PLAN: Consider utilizing neurocom next session. Progress strengthening of B hips.     Signature:Lana Crowley, PT, DPT License #: [id number]

## 2021-11-05 ENCOUNTER — TREATMENT (OUTPATIENT)
Dept: PHYSICAL THERAPY | Facility: CLINIC | Age: 86
End: 2021-11-05

## 2021-11-05 DIAGNOSIS — Z91.81 RISK FOR FALLS: ICD-10-CM

## 2021-11-05 DIAGNOSIS — Z74.09 IMPAIRED MOBILITY: ICD-10-CM

## 2021-11-05 DIAGNOSIS — Z98.1 HISTORY OF LUMBAR FUSION: Primary | ICD-10-CM

## 2021-11-05 DIAGNOSIS — M54.50 CHRONIC BILATERAL LOW BACK PAIN, UNSPECIFIED WHETHER SCIATICA PRESENT: ICD-10-CM

## 2021-11-05 DIAGNOSIS — G89.29 CHRONIC BILATERAL LOW BACK PAIN, UNSPECIFIED WHETHER SCIATICA PRESENT: ICD-10-CM

## 2021-11-05 PROCEDURE — 97112 NEUROMUSCULAR REEDUCATION: CPT | Performed by: PHYSICAL THERAPIST

## 2021-11-05 PROCEDURE — 97110 THERAPEUTIC EXERCISES: CPT | Performed by: PHYSICAL THERAPIST

## 2021-11-05 NOTE — PROGRESS NOTES
Physical Therapy Treatment Note    Patient: Chad Henriquez                                                                                     Visit Date: 2021  :     1929    Referring practitioner:    EDIE Coulter  Date of Initial Visit:          Type: THERAPY  Noted: 2021    Patient seen for 17 sessions    Visit Diagnoses:    ICD-10-CM ICD-9-CM   1. History of lumbar fusion  Z98.1 V45.4   2. Chronic bilateral low back pain, unspecified whether sciatica present  M54.50 724.2    G89.29 338.29   3. Impaired mobility  Z74.09 799.89   4. Risk for falls  Z91.81 V15.88     SUBJECTIVE     Subjective  Mr. Henriquez feels pretty good today, no falls since our last session.    PAIN: 1/10            OBJECTIVE     Objective      Neuromuscular Reeducation     83607 Comments   NeuroCom limits of stability and rhythmic weight shifting Difficulty with weight shifting fwd and to the left    Weight shifting on rocker board A/P and M/L        Timed Minutes 35     Therapeutic Exercises    79402 Comments   Fwd lunges on BOSU in // bars 2 x 10 each leg   Lateral lunges on BOSU in // bars 2 x 10 each leg           Timed Minutes 10     Therapy Education/Self Care [id number]   Details:  Reviewed HEP and encouraged more consistent performance before progressing    Given [employer] HEP (access code [email or web address])   Progress: Reinforced   Education provided to:  Patient and Spouse/SO   Level of understanding Verbalized and Demonstrated   Timed Minutes      Access Code: [email or web address]  URL: [email or web address]  Date: 2021  Prepared by: Benji Mir    Exercises  Hooklying Single Leg Bent Knee Fallouts with Resistance - 1 x daily - 7 x weekly - 2 sets - 10 reps  Standing Tandem Balance with Counter Support - 1 x daily - 7 x weekly - 1 sets - 5 reps - 20-30 sec hold  Standing Hip Abduction - 1 x daily - 7 x weekly - 2 sets - 10 reps  Standing Hip Extension - 1 x daily - 7 x weekly - 2 sets - 10 reps  Standing Partial  Squat - 1 x daily - 7 x weekly - 2 sets - 10 reps    Total Timed Treatment:     40 mins  Total Time of Visit:             42 mins         ASSESSMENT/PLAN     GOALS  Goals                                          Progress Note due by 11/28/2021                                                      Recert due by 12/01/2021   LTG by: 6 weeks   Date Status   Patient will demonstrate independence with comprehensive HEP  With reminders from his wife, he is compliant with HEP.  10/28 ongoing   Patient will demonstrate decreased B piriformis muscle guarding   No recent complaints of increased pain or guarding.   9/30/21 MET   Patient will demonstrate 5/5 B hip abductor strength  Progressed hip abductor strengthening today.   11/4/21 Ongoing    Patient maintain tandem standing >20 seconds with minimal swaying  Worked on balance tasks on the NeuroCom  11/5/21 ongoing   Patient will ambulate >200 ft without AD while maintaining good gait mechanics  Working on SLS to assist with gait mechanics, fair mechanics noted     He still has mid-low guard arms during ambulation and mild forward flexed posture. No occurrence of R foot drop today however he did have his AFO on  10/28/21 Ongoing    Patient will score >25 on Tinetti   19 on evaluation   23 today      10/28/21 Progressing        Assessment/Plan     ASSESSMENT: We utilized the NeuroCom for the first time this date, it revealed increased difficulty shifting his weight anterior and to the right. E spent the rest of the session addressing this and with practice his performance improved.    PLAN: Continue to work on anterior weight shifting along with hip strengthening and endurance activities.    Signature:Benji Mir, PT, DPT License #: [id number]

## 2021-11-08 ENCOUNTER — TREATMENT (OUTPATIENT)
Dept: PHYSICAL THERAPY | Facility: CLINIC | Age: 86
End: 2021-11-08

## 2021-11-08 DIAGNOSIS — Z74.09 IMPAIRED MOBILITY: ICD-10-CM

## 2021-11-08 DIAGNOSIS — Z91.81 RISK FOR FALLS: ICD-10-CM

## 2021-11-08 DIAGNOSIS — M54.50 CHRONIC BILATERAL LOW BACK PAIN, UNSPECIFIED WHETHER SCIATICA PRESENT: ICD-10-CM

## 2021-11-08 DIAGNOSIS — G89.29 CHRONIC BILATERAL LOW BACK PAIN, UNSPECIFIED WHETHER SCIATICA PRESENT: ICD-10-CM

## 2021-11-08 DIAGNOSIS — Z98.1 HISTORY OF LUMBAR FUSION: Primary | ICD-10-CM

## 2021-11-08 PROCEDURE — 97112 NEUROMUSCULAR REEDUCATION: CPT | Performed by: PHYSICAL THERAPIST

## 2021-11-08 PROCEDURE — 97110 THERAPEUTIC EXERCISES: CPT | Performed by: PHYSICAL THERAPIST

## 2021-11-08 NOTE — PROGRESS NOTES
Physical Therapy Treatment Note    Patient: Chad Henriquez                                                                                     Visit Date: 2021  :     1929    Referring practitioner:    EDIE Coulter  Date of Initial Visit:          Type: THERAPY  Noted: 2021    Patient seen for 18 sessions    Visit Diagnoses:    ICD-10-CM ICD-9-CM   1. History of lumbar fusion  Z98.1 V45.4   2. Chronic bilateral low back pain, unspecified whether sciatica present  M54.50 724.2    G89.29 338.29   3. Impaired mobility  Z74.09 799.89   4. Risk for falls  Z91.81 V15.88     SUBJECTIVE     Subjective  Mr. Henriquez reports quadriceps soreness following session, still mildly sore today. No complaints otherwise.     PAIN: 1/10            OBJECTIVE     Objective      Neuromuscular Reeducation     69765 Comments   Weight shifting on rocker board A/P     Backwards walking CGA   Static standing on wobble board A/P Tendency to lose balance posteriorly    Timed Minutes 15     Therapeutic Exercises    55557 Comments   Fwd lunges on BOSU in // bars x 10 each leg   Step ups on 6 inch step 2 x 10 leading with each LE; CGA    Walking for endurance         Timed Minutes 26     Therapy Education/Self Care [id number]   Details:  Reviewed HEP and encouraged more consistent performance before progressing    Given [employer] HEP (access code [email or web address])   Progress: Reinforced   Education provided to:  Patient and Spouse/SO   Level of understanding Verbalized and Demonstrated   Timed Minutes      Access Code: [email or web address]  URL: [email or web address]  Date: 2021  Prepared by: Benji Mir    Exercises  Hooklying Single Leg Bent Knee Fallouts with Resistance - 1 x daily - 7 x weekly - 2 sets - 10 reps  Standing Tandem Balance with Counter Support - 1 x daily - 7 x weekly - 1 sets - 5 reps - 20-30 sec hold  Standing Hip Abduction - 1 x daily - 7 x weekly - 2 sets - 10 reps  Standing Hip Extension - 1 x daily - 7 x  weekly - 2 sets - 10 reps  Standing Partial Squat - 1 x daily - 7 x weekly - 2 sets - 10 reps    Total Timed Treatment:     41 mins  Total Time of Visit:             41 mins         ASSESSMENT/PLAN     GOALS  Goals                                          Progress Note due by 11/28/2021                                                      Recert due by 12/01/2021   LTG by: 6 weeks   Date Status   Patient will demonstrate independence with comprehensive HEP  With reminders from his wife, he is compliant with HEP.  10/28 ongoing   Patient will demonstrate decreased B piriformis muscle guarding   No recent complaints of increased pain or guarding.   9/30/21 MET   Patient will demonstrate 5/5 B hip abductor strength  Progressed hip abductor strengthening today.   11/4/21 Ongoing    Patient maintain tandem standing >20 seconds with minimal swaying  Difficulty with anterior weight shifting which contributes to his decreased static balance 11/8/21 ongoing   Patient will ambulate >200 ft without AD while maintaining good gait mechanics  Working on SLS to assist with gait mechanics, fair mechanics noted     He still has mid-low guard arms during ambulation and mild forward flexed posture. No occurrence of R foot drop today however he did have his AFO on  10/28/21 Ongoing    Patient will score >25 on Tinetti   19 on evaluation   23 today      10/28/21 Progressing        Assessment/Plan     ASSESSMENT: Focused on balance activities and BLE strengthening. As he fatigues, he has greater tendency of losing balance posteriorly. His proprioception is improving as he was able to self correct to shift weight anteriorly following multiple repetitions.     PLAN: Continue to work on anterior weight shifting along with hip strengthening and endurance activities. Consider challenging dynamic balance.     Signature: Lana Crowley, PT, DPT License #: [id number]

## 2021-11-11 ENCOUNTER — TREATMENT (OUTPATIENT)
Dept: PHYSICAL THERAPY | Facility: CLINIC | Age: 86
End: 2021-11-11

## 2021-11-11 DIAGNOSIS — M54.50 CHRONIC BILATERAL LOW BACK PAIN, UNSPECIFIED WHETHER SCIATICA PRESENT: ICD-10-CM

## 2021-11-11 DIAGNOSIS — Z91.81 RISK FOR FALLS: ICD-10-CM

## 2021-11-11 DIAGNOSIS — Z74.09 IMPAIRED MOBILITY: ICD-10-CM

## 2021-11-11 DIAGNOSIS — Z98.1 HISTORY OF LUMBAR FUSION: Primary | ICD-10-CM

## 2021-11-11 DIAGNOSIS — G89.29 CHRONIC BILATERAL LOW BACK PAIN, UNSPECIFIED WHETHER SCIATICA PRESENT: ICD-10-CM

## 2021-11-11 PROCEDURE — 97110 THERAPEUTIC EXERCISES: CPT

## 2021-11-11 PROCEDURE — 97112 NEUROMUSCULAR REEDUCATION: CPT

## 2021-11-11 NOTE — PROGRESS NOTES
Physical Therapy Treatment Note    Patient: Chad Henriquez                                                                                     Visit Date: 2021  :     1929    Referring practitioner:    EDIE Coulter  Date of Initial Visit:          Type: THERAPY  Noted: 2021    Patient seen for 19 sessions    Visit Diagnoses:    ICD-10-CM ICD-9-CM   1. History of lumbar fusion  Z98.1 V45.4   2. Impaired mobility  Z74.09 799.89   3. Chronic bilateral low back pain, unspecified whether sciatica present  M54.50 724.2    G89.29 338.29   4. Risk for falls  Z91.81 V15.88     SUBJECTIVE     Subjective  No complaints following previous session.     PAIN: 1.5/10            OBJECTIVE     Objective      Neuromuscular Reeducation     20738 Comments   Weight shifting on rocker board A/P and M/L    Backwards walking CGA; short steps noted, Narrow MARIANNE    Mini squats on wobble board     Static standing on wobble board A/P with EO and EC  Tendency to lose balance posteriorly    Timed Minutes 25     Therapeutic Exercises    46706 Comments   Step ups on 6 inch step 2 x 10 leading with each LE; CGA    Walking for endurance in hallway         Timed Minutes 15     Therapy Education/Self Care [id number]   Details:  Reviewed HEP and encouraged more consistent performance before progressing    Given [employer] HEP (access code [email or web address])   Progress: Reinforced   Education provided to:  Patient and Spouse/SO   Level of understanding Verbalized and Demonstrated   Timed Minutes      Access Code: [email or web address]  URL: [email or web address]  Date: 2021  Prepared by: Lana Marcos     Exercises  Hooklying Single Leg Bent Knee Fallouts with Resistance - 1 x daily - 7 x weekly - 2 sets - 10 reps  Standing Tandem Balance with Counter Support - 1 x daily - 7 x weekly - 1 sets - 5 reps - 20-30 sec hold  Standing Hip Abduction - 1 x daily - 7 x weekly - 2 sets - 10 reps  Standing Hip Extension - 1 x daily - 7 x weekly - 2  sets - 10 reps  Standing Partial Squat - 1 x daily - 7 x weekly - 2 sets - 10 reps    Total Timed Treatment:     40 mins  Total Time of Visit:             40 mins         ASSESSMENT/PLAN     GOALS  Goals                                          Progress Note due by 11/28/2021                                                      Recert due by 12/01/2021   LTG by: 6 weeks   Date Status   Patient will demonstrate independence with comprehensive HEP  With reminders from his wife, he is compliant with HEP.  10/28 ongoing   Patient will demonstrate decreased B piriformis muscle guarding   No recent complaints of increased pain or guarding.   9/30/21 MET   Patient will demonstrate 5/5 B hip abductor strength  Progressed hip abductor strengthening today.   11/4/21 Ongoing    Patient maintain tandem standing >20 seconds with minimal swaying  Difficulty with anterior weight shifting which contributes to his decreased static balance 11/8/21 ongoing   Patient will ambulate >200 ft without AD while maintaining good gait mechanics  Working on SLS to assist with gait mechanics, fair mechanics noted     He still has mid-low guard arms during ambulation and mild forward flexed posture. No occurrence of R foot drop today however he did have his AFO on  10/28/21 Ongoing    Patient will score >25 on Tinetti   19 on evaluation   23 today      10/28/21 Progressing        Assessment/Plan     ASSESSMENT: Mr. Henriquez was fatigued today as he is dealing with exacerbated allergies. Noted to have decreased weight shifting over the RLE during squats, likely related to weakness. LOB posteriorly on 2 occassions during step ups w/o UE support, Min A for recovery. Did not progress this his activities today as he seems to be responding well to repetitions for improved carry over.     PLAN: Consider utilizing TRX straps for balance and strengthening.      Signature: Lana Marcos, PT, DPT License #: [id number]

## 2021-11-15 ENCOUNTER — TELEPHONE (OUTPATIENT)
Dept: PHYSICAL THERAPY | Facility: CLINIC | Age: 86
End: 2021-11-15

## 2021-11-15 ENCOUNTER — OFFICE VISIT (OUTPATIENT)
Dept: INTERNAL MEDICINE | Facility: CLINIC | Age: 86
End: 2021-11-15

## 2021-11-15 ENCOUNTER — HOSPITAL ENCOUNTER (OUTPATIENT)
Dept: GENERAL RADIOLOGY | Facility: HOSPITAL | Age: 86
Discharge: HOME | End: 2021-11-15
Admitting: NURSE PRACTITIONER

## 2021-11-15 VITALS
HEART RATE: 58 BPM | OXYGEN SATURATION: 99 % | HEIGHT: 69 IN | SYSTOLIC BLOOD PRESSURE: 136 MMHG | TEMPERATURE: 97.5 F | DIASTOLIC BLOOD PRESSURE: 70 MMHG | BODY MASS INDEX: 24.87 KG/M2

## 2021-11-15 DIAGNOSIS — J06.9 UPPER RESPIRATORY TRACT INFECTION, UNSPECIFIED TYPE: Primary | ICD-10-CM

## 2021-11-15 DIAGNOSIS — J06.9 UPPER RESPIRATORY TRACT INFECTION, UNSPECIFIED TYPE: ICD-10-CM

## 2021-11-15 DIAGNOSIS — R05.9 COUGH: ICD-10-CM

## 2021-11-15 DIAGNOSIS — R09.89 CHEST CONGESTION: ICD-10-CM

## 2021-11-15 LAB
EXPIRATION DATE: NORMAL
FLUAV AG NPH QL: NEGATIVE
FLUBV AG NPH QL: NEGATIVE
INTERNAL CONTROL: NORMAL
Lab: NORMAL

## 2021-11-15 PROCEDURE — 87804 INFLUENZA ASSAY W/OPTIC: CPT | Performed by: NURSE PRACTITIONER

## 2021-11-15 PROCEDURE — 71046 X-RAY EXAM CHEST 2 VIEWS: CPT

## 2021-11-15 PROCEDURE — 99213 OFFICE O/P EST LOW 20 MIN: CPT | Performed by: NURSE PRACTITIONER

## 2021-11-15 RX ORDER — AZITHROMYCIN 250 MG/1
TABLET, FILM COATED ORAL
Qty: 6 TABLET | Refills: 0 | Status: SHIPPED | OUTPATIENT
Start: 2021-11-15 | End: 2021-11-23

## 2021-11-15 NOTE — PROGRESS NOTES
"Subjective   Chad Henriquez is a 89 y.o. male.   Chief Complaint   Patient presents with   • Sinusitis     chest congestion, sore throat, cough(clear/yellow)   • Fever     yesterday and today        Chad presents today for complaints of cough and chest congestion.  He report symptoms initially started last week after walking [place] the mall in the cold air.  Symptoms started with head and sinus congestion, but now in the chest and causing a cough which is productive of yellow mucus.  He states sometimes it is dark yellow, cloudy, or \"black looking\".  There was a possible fever the last 2 days but is reported as \"in the 90's\".  He is positive for Shortness of breath but states this has been chronic and stable since his past COVID-19 diagnosis.  He denies significant changes in weight or swelling to the lower extremities.     URI   This is a new problem. The current episode started in the past 7 days. The problem has been gradually worsening. Maximum temperature: Low grade temp \"in the 90s\" Associated symptoms include congestion, coughing, headaches, a sore throat and wheezing. Pertinent negatives include no abdominal pain, chest pain, diarrhea, dysuria, nausea, neck pain, rash, swollen glands or vomiting. Treatments tried: Mucus relief. The treatment provided moderate relief.        The following portions of the patient's history were reviewed and updated as appropriate: allergies, current medications, past family history, past medical history, past social history, past surgical history and problem list.    Review of Systems   Constitutional: Positive for fever. Negative for activity change, appetite change, fatigue, unexpected weight gain and unexpected weight loss.   HENT: Positive for congestion, sinus pressure and sore throat. Negative for swollen glands, trouble swallowing and voice change.    Eyes: Negative for blurred vision and visual disturbance.   Respiratory: Positive for cough and wheezing. Negative for " shortness of breath.    Cardiovascular: Negative for chest pain, palpitations and leg swelling.   Gastrointestinal: Negative for abdominal pain, constipation, diarrhea, nausea, vomiting and indigestion.   Endocrine: Negative for cold intolerance, heat intolerance, polydipsia and polyphagia.   Genitourinary: Negative for dysuria and frequency.   Musculoskeletal: Negative for arthralgias, back pain, joint swelling and neck pain.   Skin: Negative for color change, rash and skin lesions.   Neurological: Positive for headache. Negative for dizziness, weakness, memory problem and confusion.   Hematological: Does not bruise/bleed easily.   Psychiatric/Behavioral: Negative for agitation, hallucinations and suicidal ideas. The patient is not nervous/anxious.        Objective   Past Medical History:   Diagnosis Date   • Allergic rhinitis    • Anemia    • Arthritis    • Blind left eye    • BPH (benign prostatic hypertrophy) with urinary retention    • Cancer (HCC)     skin cancer   • Chronic back pain     RUNS DOWN BOTH LEGS   • Constipation    • Coronary artery disease    • Hard of hearing    • Heart attack (HCC) 1986    NO MUSCLE DAMAGE - JUST OCCLUDED VALVE   • High cholesterol    • History of skin cancer     BILATERAL EARS   • History of transfusion     1986   • Hypertension    • Inguinal hernia    • Leaky heart valve     DR CONCEPCION SAYS NOT ENOUGH TO BE OF CONCERN   • Other bursal cyst, right elbow    • PONV (postoperative nausea and vomiting)     has had scopalamine patch in past    • Sleep apnea     DOES NOT USE CPAP OR BIPAP   • Spinal stenosis of cervical region    • Spinal stenosis of lumbar region    • Tremors of nervous system    • Wears hearing aid     BILATERAL      Past Surgical History:   Procedure Laterality Date   • ANTERIOR LUMBAR EXPOSURE N/A 12/7/2018    Procedure: ANTERIOR LUMBAR EXPOSURE;  Surgeon: Manolo Mcleod DO;  Location: [facility] OR;  Service: Vascular   • APPENDECTOMY     • BACK SURGERY       X3   • CARDIAC SURGERY  08/08/1986    X1 BYPASS   • CORONARY ANGIOPLASTY WITH STENT PLACEMENT  1997    X3 STENTS   • HERNIA REPAIR Bilateral     x2   • INGUINAL HERNIA REPAIR Right 6/22/2017    Procedure: RIGHT INGUINAL HERNIA REPAIR AND EXCISION OF CYST RIGHT ELBOW ;  Surgeon: Jackelyn Arriola MD;  Location:  [facility] OR;  Service:    • LUMBAR FUSION Left 6/30/2021    Procedure: LUMBAR LAMINECTOMY, DISCECTOMY, FACETECTOMY,  TRANSFORAMINAL LUMBAR INTERBODY FUSION L2-3. REVISION OF INSTRUMENTATION L3-S1. USE OF IMAGE GUIDANCE AND SURGICAL ROBOT;  Surgeon: Kj Falcon MD;  Location:  [facility] OR;  Service: Robotics - Neuro;  Laterality: Left;   • LUMBAR LAMINECTOMY N/A 3/12/2018    Procedure: LUMBAR LAMINECTOMY WITHOUT FUSION L3-4,4-5;  Surgeon: Kj Falcon MD;  Location:  [facility] OR;  Service: Neurosurgery   • LUMBAR LAMINECTOMY ANTERIOR LUMBAR INTERBODY FUSION N/A 12/7/2018    Procedure: ANTERIOR LUMBAR INTERBODY FUSION L5-S1;  Surgeon: Kj Falcon MD;  Location:  [facility] OR;  Service: Neurosurgery   • LUMBAR LAMINECTOMY WITH FUSION Left 12/10/2018    Procedure: LUMBAR LAMINECTOMY TRANSFORAMINAL LUMBAR INTERBODY FUSION L34,45;  Surgeon: Kj Falcon MD;  Location:  [facility] OR;  Service: Neurosurgery   • LUMBAR LAMINECTOMY WITH FUSION Left 12/7/2018    Procedure: LUMBAR LAMINECTOMY TRANSFORAMINAL LUMBAR INTERBODY FUSION LEFT L3-4, L4-5 QUADRANT RETRACTOR;  Surgeon: Kj Falcon MD;  Location:  [facility] OR;  Service: Neurosurgery   • SKIN LESION EXCISION      nasal        Current Outpatient Medications:   •  alfuzosin (UROXATRAL) 10 MG 24 hr tablet, Take 1 tablet by mouth Daily., Disp: 30 tablet, Rfl: 5  •  amLODIPine (NORVASC) 2.5 MG tablet, Take 2.5 mg by mouth Daily., Disp: , Rfl:   •  Ascorbic Acid (Vitamin C) 500 MG capsule, Take 1 capsule by mouth 2 (two) times a day., Disp: , Rfl:   •  chlorpheniramine (CHLOR-TRIMETON) 4 MG tablet, Take 4 mg by mouth Daily., Disp: , Rfl:   •  Cholecalciferol  (Vitamin D3) 50 MCG (2000 UT) tablet, Take 1 tablet by mouth Daily., Disp: , Rfl:   •  finasteride (PROSCAR) 5 MG tablet, Take 1 tablet by mouth Daily., Disp: 30 tablet, Rfl: 5  •  Flaxseed, Linseed, (FLAXSEED OIL) 1000 MG capsule, 540mg takes 4 tablets by mouth daily, Disp: , Rfl:   •  folic acid ([employer] FOLIC ACID) 800 MCG tablet, Taking as multivitamin now, Disp: , Rfl:   •  gabapentin (NEURONTIN) 600 MG tablet, Take 1 tablet by mouth 3 (Three) Times a Day., Disp: 90 tablet, Rfl: 3  •  L-Lysine 600 MG tablet, Take 1 tablet by mouth Daily., Disp: , Rfl:   •  metoprolol tartrate (LOPRESSOR) 25 MG tablet, 1/2 in morning and 1 at night, Disp: 135 tablet, Rfl: 3  •  pravastatin (PRAVACHOL) 20 MG tablet, Take 1 tablet by mouth Every Night., Disp: 90 tablet, Rfl: 1  •  primidone (MYSOLINE) 50 MG tablet, Take 1/2 tablet at bedtime (Patient taking differently: Take 50 mg by mouth.), Disp: 45 tablet, Rfl: 3  •  psyllium (METAMUCIL) 58.6 % packet, Take 1 packet by mouth Daily. Takes in capsule form 1 in am  And 1 at night and 2 at noon, Disp: , Rfl:   •  sennosides-docusate (senna-docusate sodium) 8.6-50 MG per tablet, Take 2 tablets by mouth Daily., Disp: 60 tablet, Rfl: 1  •  azithromycin (Zithromax Z-Jose) 250 MG tablet, Take 2 tablets by mouth on day 1, then 1 tablet daily on days 2-5, Disp: 6 tablet, Rfl: 0      Vitals:    11/15/21 1100   BP: 136/70   Pulse: 58   Temp: 97.5 °F (36.4 °C)   SpO2: 99%     There were no vitals filed for this visit.    Body mass index is 24.87 kg/m².    Physical Exam  Constitutional:       General: He is not in acute distress.     Appearance: He is well-developed.   HENT:      Head: Normocephalic.      Right Ear: Tympanic membrane and external ear normal.      Left Ear: Tympanic membrane and external ear normal.      Mouth/Throat:      Mouth: Mucous membranes are moist. No oral lesions.      Pharynx: Oropharynx is clear.   Eyes:      Conjunctiva/sclera: Conjunctivae normal.   Cardiovascular:       Rate and Rhythm: Normal rate and regular rhythm.      Heart sounds: Normal heart sounds.   Pulmonary:      Effort: Pulmonary effort is normal.      Breath sounds: Rhonchi present. No wheezing.      Comments: Congested sounds in lungs; instructed to cough which produced thick yellow sputum in tissue.  Lungs did not clear after cough.   Musculoskeletal:      Right lower leg: No edema.      Left lower leg: No edema.   Skin:     General: Skin is warm and dry.   Neurological:      Mental Status: He is alert and oriented to person, place, and time.      Gait: Gait normal.   Psychiatric:         Mood and Affect: Mood normal.         Speech: Speech normal.         Behavior: Behavior normal.         Thought Content: Thought content normal.               Assessment/Plan   Diagnoses and all orders for this visit:    1. Upper respiratory tract infection, unspecified type (Primary)  -     XR Chest PA & Lateral; Future  -     azithromycin (Zithromax Z-Jose) 250 MG tablet; Take 2 tablets by mouth on day 1, then 1 tablet daily on days 2-5  Dispense: 6 tablet; Refill: 0  -     COVID-19,[facility] ROUTINE, NP/OP SWAB IN TRANSPORT MEDIA OR ESWAB 72 HR TAT - Swab, Nasopharynx  -     POCT Influenza A/B    2. Cough    3. Chest congestion      Will go ahead and send Z-pack to pharmacy today.  I am going to get a chest x-ray to evaluate further as well.  Continue with Mucus relief at home. Take deep breaths and cough, increase water intake.  Notify office if symptoms start to worsen, worsening shortness of breath, fever, etc.    Flu is negative; COVID-19 test pending.

## 2021-11-16 LAB
LABCORP SARS-COV-2, NAA 2 DAY TAT: NORMAL
SARS-COV-2 RNA RESP QL NAA+PROBE: NOT DETECTED

## 2021-11-18 ENCOUNTER — TELEPHONE (OUTPATIENT)
Dept: CARDIOLOGY CLINIC | Age: 86
End: 2021-11-18

## 2021-11-18 NOTE — TELEPHONE ENCOUNTER
Greg Hua requests that office  return their call. The best time to reach him is Anytime. Patient needs rescheduled his Return in about 6 months with  Dr. Nereida Lund only,please call patient @[phone]    Thank you.

## 2021-11-23 ENCOUNTER — OFFICE VISIT (OUTPATIENT)
Dept: INTERNAL MEDICINE | Facility: CLINIC | Age: 86
End: 2021-11-23

## 2021-11-23 VITALS
SYSTOLIC BLOOD PRESSURE: 130 MMHG | OXYGEN SATURATION: 98 % | DIASTOLIC BLOOD PRESSURE: 70 MMHG | WEIGHT: 174.2 LBS | TEMPERATURE: 97.1 F | BODY MASS INDEX: 26.1 KG/M2 | HEART RATE: 70 BPM

## 2021-11-23 DIAGNOSIS — J20.9 ACUTE BRONCHITIS, UNSPECIFIED ORGANISM: Primary | ICD-10-CM

## 2021-11-23 PROCEDURE — 99213 OFFICE O/P EST LOW 20 MIN: CPT | Performed by: INTERNAL MEDICINE

## 2021-11-23 PROCEDURE — 96372 THER/PROPH/DIAG INJ SC/IM: CPT | Performed by: INTERNAL MEDICINE

## 2021-11-23 RX ORDER — AMOXICILLIN AND CLAVULANATE POTASSIUM 875; 125 MG/1; MG/1
1 TABLET, FILM COATED ORAL 2 TIMES DAILY
Qty: 14 TABLET | Refills: 0 | Status: SHIPPED | OUTPATIENT
Start: 2021-11-23 | End: 2021-12-01

## 2021-11-23 RX ORDER — TRIAMCINOLONE ACETONIDE 40 MG/ML
40 INJECTION, SUSPENSION INTRA-ARTICULAR; INTRAMUSCULAR ONCE
Status: COMPLETED | OUTPATIENT
Start: 2021-11-23 | End: 2021-11-23

## 2021-11-23 RX ORDER — ASPIRIN 81 MG/1
81 TABLET ORAL DAILY
COMMUNITY

## 2021-11-23 RX ADMIN — TRIAMCINOLONE ACETONIDE 40 MG: 40 INJECTION, SUSPENSION INTRA-ARTICULAR; INTRAMUSCULAR at 14:33

## 2021-11-23 NOTE — PROGRESS NOTES
Subjective   Chad Henriquez is a 89 y.o. male.   Chief Complaint   Patient presents with   • Follow-Up     1 week f/u, saw Tonie last Monday, was prescribed meds but still coughing up phlegm       History of Present Illness patient is here today for residual for cough productive of yellow sputum.  I took time to review his last chest x-ray which showed no signs of pneumonia.  He also had a negative Covid test last week.    The following portions of the patient's history were reviewed and updated as appropriate: allergies, current medications, past family history, past medical history, past social history, past surgical history and problem list.    Review of Systems   Constitutional: Negative for activity change, appetite change, fatigue, fever, unexpected weight gain and unexpected weight loss.   HENT: Negative for swollen glands, trouble swallowing and voice change.    Eyes: Negative for blurred vision and visual disturbance.   Respiratory: Positive for cough. Negative for shortness of breath.    Cardiovascular: Negative for chest pain, palpitations and leg swelling.   Gastrointestinal: Negative for abdominal pain, constipation, diarrhea, nausea, vomiting and indigestion.   Endocrine: Negative for cold intolerance, heat intolerance, polydipsia and polyphagia.   Genitourinary: Negative for dysuria and frequency.   Musculoskeletal: Negative for arthralgias, back pain, joint swelling and neck pain.   Skin: Negative for color change, rash and skin lesions.   Neurological: Negative for dizziness, weakness, headache, memory problem and confusion.   Hematological: Does not bruise/bleed easily.   Psychiatric/Behavioral: Negative for agitation, hallucinations and suicidal ideas. The patient is not nervous/anxious.        Objective   Past Medical History:   Diagnosis Date   • Allergic rhinitis    • Anemia    • Arthritis    • Blind left eye    • BPH (benign prostatic hypertrophy) with urinary retention    • Cancer (HCC)      skin cancer   • Chronic back pain     RUNS DOWN BOTH LEGS   • Constipation    • Coronary artery disease    • Hard of hearing    • Heart attack (HCC) 1986    NO MUSCLE DAMAGE - JUST OCCLUDED VALVE   • High cholesterol    • History of skin cancer     BILATERAL EARS   • History of transfusion     1986   • Hypertension    • Inguinal hernia    • Leaky heart valve     DR CONCEPCION SAYS NOT ENOUGH TO BE OF CONCERN   • Other bursal cyst, right elbow    • PONV (postoperative nausea and vomiting)     has had scopalamine patch in past    • Sleep apnea     DOES NOT USE CPAP OR BIPAP   • Spinal stenosis of cervical region    • Spinal stenosis of lumbar region    • Tremors of nervous system    • Wears hearing aid     BILATERAL      Past Surgical History:   Procedure Laterality Date   • ANTERIOR LUMBAR EXPOSURE N/A 12/7/2018    Procedure: ANTERIOR LUMBAR EXPOSURE;  Surgeon: Manolo Mcleod DO;  Location:  [facility];  Service: Vascular   • APPENDECTOMY     • BACK SURGERY      X3   • CARDIAC SURGERY  08/08/1986    X1 BYPASS   • CORONARY ANGIOPLASTY WITH STENT PLACEMENT  1997    X3 STENTS   • HERNIA REPAIR Bilateral     x2   • INGUINAL HERNIA REPAIR Right 6/22/2017    Procedure: RIGHT INGUINAL HERNIA REPAIR AND EXCISION OF CYST RIGHT ELBOW ;  Surgeon: Jackelyn Arriola MD;  Location:  [facility];  Service:    • LUMBAR FUSION Left 6/30/2021    Procedure: LUMBAR LAMINECTOMY, DISCECTOMY, FACETECTOMY,  TRANSFORAMINAL LUMBAR INTERBODY FUSION L2-3. REVISION OF INSTRUMENTATION L3-S1. USE OF IMAGE GUIDANCE AND SURGICAL ROBOT;  Surgeon: Kj Falcon MD;  Location:  [facility];  Service: Robotics - Neuro;  Laterality: Left;   • LUMBAR LAMINECTOMY N/A 3/12/2018    Procedure: LUMBAR LAMINECTOMY WITHOUT FUSION L3-4,4-5;  Surgeon: Kj Falcon MD;  Location:  [facility];  Service: Neurosurgery   • LUMBAR LAMINECTOMY ANTERIOR LUMBAR INTERBODY FUSION N/A 12/7/2018    Procedure: ANTERIOR LUMBAR INTERBODY FUSION L5-S1;  Surgeon: Kj Falcon  MD CHAPINCITO;  Location:  [facility];  Service: Neurosurgery   • LUMBAR LAMINECTOMY WITH FUSION Left 12/10/2018    Procedure: LUMBAR LAMINECTOMY TRANSFORAMINAL LUMBAR INTERBODY FUSION L34,45;  Surgeon: Kj Falcon MD;  Location:  [facility];  Service: Neurosurgery   • LUMBAR LAMINECTOMY WITH FUSION Left 12/7/2018    Procedure: LUMBAR LAMINECTOMY TRANSFORAMINAL LUMBAR INTERBODY FUSION LEFT L3-4, L4-5 QUADRANT RETRACTOR;  Surgeon: Kj Falcon MD;  Location:  [facility];  Service: Neurosurgery   • SKIN LESION EXCISION      nasal        Current Outpatient Medications:   •  alfuzosin (UROXATRAL) 10 MG 24 hr tablet, Take 1 tablet by mouth Daily., Disp: 30 tablet, Rfl: 5  •  amLODIPine (NORVASC) 2.5 MG tablet, Take 2.5 mg by mouth Daily., Disp: , Rfl:   •  Ascorbic Acid (Vitamin C) 500 MG capsule, Take 1 capsule by mouth 2 (two) times a day., Disp: , Rfl:   •  aspirin 81 MG EC tablet, Take 81 mg by mouth Daily., Disp: , Rfl:   •  chlorpheniramine (CHLOR-TRIMETON) 4 MG tablet, Take 4 mg by mouth Daily., Disp: , Rfl:   •  finasteride (PROSCAR) 5 MG tablet, Take 1 tablet by mouth Daily., Disp: 30 tablet, Rfl: 5  •  Flaxseed, Linseed, (FLAXSEED OIL) 1000 MG capsule, 540mg takes 4 tablets by mouth daily, Disp: , Rfl:   •  folic acid ([employer] FOLIC ACID) 800 MCG tablet, Taking as multivitamin now, Disp: , Rfl:   •  gabapentin (NEURONTIN) 600 MG tablet, Take 1 tablet by mouth 3 (Three) Times a Day., Disp: 90 tablet, Rfl: 3  •  metoprolol tartrate (LOPRESSOR) 25 MG tablet, 1/2 in morning and 1 at night, Disp: 135 tablet, Rfl: 3  •  pravastatin (PRAVACHOL) 20 MG tablet, Take 1 tablet by mouth Every Night., Disp: 90 tablet, Rfl: 1  •  primidone (MYSOLINE) 50 MG tablet, Take 1/2 tablet at bedtime (Patient taking differently: Take 50 mg by mouth.), Disp: 45 tablet, Rfl: 3  •  sennosides-docusate (senna-docusate sodium) 8.6-50 MG per tablet, Take 2 tablets by mouth Daily., Disp: 60 tablet, Rfl: 1  •  amoxicillin-clavulanate (AUGMENTIN)  875-125 MG per tablet, Take 1 tablet by mouth 2 (Two) Times a Day., Disp: 14 tablet, Rfl: 0  •  Cholecalciferol (Vitamin D3) 50 MCG (2000 UT) tablet, Take 1 tablet by mouth Daily., Disp: , Rfl:   •  L-Lysine 600 MG tablet, Take 1 tablet by mouth Daily., Disp: , Rfl:       Vitals:    11/23/21 1417   BP: 130/70   Pulse: 70   Temp: 97.1 °F (36.2 °C)   SpO2: 98%         11/23/21  1417   Weight: 79 kg (174 lb 3.2 oz)       Body mass index is 26.1 kg/m².    Physical Exam  Vitals and nursing note reviewed.   Constitutional:       General: He is not in acute distress.     Appearance: Normal appearance. He is well-developed.   HENT:      Head: Normocephalic and atraumatic.      Right Ear: External ear normal.      Left Ear: External ear normal.      Nose: Nose normal.   Eyes:      Extraocular Movements: Extraocular movements intact.      Conjunctiva/sclera: Conjunctivae normal.      Pupils: Pupils are equal, round, and reactive to light.   Cardiovascular:      Rate and Rhythm: Normal rate and regular rhythm.      Pulses: Normal pulses.      Heart sounds: Normal heart sounds.   Pulmonary:      Effort: Pulmonary effort is normal.      Breath sounds: Normal breath sounds.   Abdominal:      General: Bowel sounds are normal.      Palpations: Abdomen is soft.   Musculoskeletal:         General: Normal range of motion.      Cervical back: Normal range of motion and neck supple. No rigidity. No muscular tenderness.   Skin:     General: Skin is warm and dry.   Neurological:      General: No focal deficit present.      Mental Status: He is alert and oriented to person, place, and time.   Psychiatric:         Mood and Affect: Mood normal.         Behavior: Behavior normal.               Assessment/Plan   Diagnoses and all orders for this visit:    1. Acute bronchitis, unspecified organism (Primary)    Other orders  -     amoxicillin-clavulanate (AUGMENTIN) 875-125 MG per tablet; Take 1 tablet by mouth 2 (Two) Times a Day.  Dispense: 14  tablet; Refill: 0      Patient seen here today for persistent cough.  He is better than he was last week but has not cleared he is asking for steroid injection and additional antibiotics.  He certainly looks good at this point I think the residual bronchial infection is likely I will go ahead and getting some Augmentin for 7 days and a Kenalog injection.

## 2021-11-30 ENCOUNTER — TREATMENT (OUTPATIENT)
Dept: PHYSICAL THERAPY | Facility: CLINIC | Age: 86
End: 2021-11-30

## 2021-11-30 DIAGNOSIS — Z98.1 HISTORY OF LUMBAR FUSION: Primary | ICD-10-CM

## 2021-11-30 DIAGNOSIS — Z74.09 IMPAIRED MOBILITY: ICD-10-CM

## 2021-11-30 PROCEDURE — 97110 THERAPEUTIC EXERCISES: CPT

## 2021-11-30 PROCEDURE — 97112 NEUROMUSCULAR REEDUCATION: CPT

## 2021-11-30 NOTE — PROGRESS NOTES
Physical Therapy Treatment Note and 90 Day Recertification Note    Patient: Chad Henriquez                                                                                     Visit Date: 2021  :     1929    Referring practitioner:    EDIE Coulter  Date of Initial Visit:          Type: THERAPY  Noted: 2021    Patient seen for 20 sessions    Visit Diagnoses:    ICD-10-CM ICD-9-CM   1. History of lumbar fusion  Z98.1 V45.4   2. Impaired mobility  Z74.09 799.89     SUBJECTIVE     Subjective  Mr. Henriquez has been sick for about 2.5 weeks, therefore he has not been able to attend his PT appointments. His back continues to hurt him upon getting up if sitting for a long period of time. His primary concern is stumbling over his toe and continued pain.     PAIN: 0/10 currently            OBJECTIVE     Objective      Neuromuscular Reeducation     [id number] Comments   Tandem standing  Unable to maintain more than 5-8 seconds at a time today    Standing FT with perturbations  LOB posteriorly requiring UE assist    Standing FT with EC         Timed Minutes 25     Therapeutic Exercises    [id number] Comments   Walking for endurance in hallway  300+ feet,    HL bent knee fallouts with green t band  2 x 10    SL clamshells with green t band             Timed Minutes 15     Therapy Education/Self Care [id number]   Details:  Reviewed HEP and encouraged more consistent performance before progressing    Given [employer] HEP (access code [email or web address])   Progress: Reinforced   Education provided to:  Patient and Spouse/SO   Level of understanding Verbalized and Demonstrated   Timed Minutes      Access Code: [email or web address]  URL: [email or web address]  Date: 2021  Prepared by: Lana Marcos     Exercises  Hooklying Single Leg Bent Knee Fallouts with Resistance - 1 x daily - 7 x weekly - 2 sets - 10 reps  Standing Tandem Balance with Counter Support - 1 x daily - 7 x weekly - 1 sets - 5 reps - 20-30 sec hold  Standing Hip  Abduction - 1 x daily - 7 x weekly - 2 sets - 10 reps  Standing Hip Extension - 1 x daily - 7 x weekly - 2 sets - 10 reps  Standing Partial Squat - 1 x daily - 7 x weekly - 2 sets - 10 reps    Total Timed Treatment:     40 mins  Total Time of Visit:             40 mins         ASSESSMENT/PLAN     GOALS  Goals                                          Progress Note due by 12/30/21                                                      Recert due by 2/27/22   LTG by: 6 weeks   Date Status   Patient will demonstrate independence with comprehensive HEP  Reviewed today, has not been able to perform as he has been sick for almost 3 weeks.  11/30/21 ongoing   Patient will demonstrate decreased B piriformis muscle guarding   No recent complaints of increased pain or guarding.   9/30/21 MET   Patient will demonstrate 5/5 B hip abductor strength  Weakness still noted, progressed resisted hip abduction exercises today  11/30/21 Ongoing    Patient maintain tandem standing >20 seconds with minimal swaying  Increased difficulty today, likely related to lapse in care from illness. Maintained 5-8 seconds this date 11/30/21 ongoing   Patient will ambulate >200 ft without AD while maintaining good gait mechanics  Fatigued today, forward flexed posture and B knee flexion during gait.  11/30/21 Ongoing    Patient will score >25 on Tinetti   19 on evaluation   24 today      11/30/21 Progressing        Assessment & Plan     Assessment  Impairments: abnormal gait, abnormal muscle firing, abnormal or restricted ROM, impaired balance, impaired physical strength, lacks appropriate home exercise program and pain with function  Functional Limitations: walking, uncomfortable because of pain, moving in bed, sitting, standing and stooping  Barriers to therapy: Hard of hearing   Prognosis: good    Plan  Therapy options: will be seen for skilled therapy services  Planned therapy interventions: abdominal trunk stabilization, home exercise program,  joint mobilization, functional ROM exercises, manual therapy, neuromuscular re-education, postural training, soft tissue mobilization, spinal/joint mobilization, strengthening, stretching, therapeutic activities and gait training  Frequency: 2x week  Duration in visits: 12  Duration in weeks: 6  Treatment plan discussed with: patient         ASSESSMENT: Mr. Henriquez has met 1 goal and made progress toward meeting his remaining goals. He has been sick and has not been able to attend PT for about 2.5 weeks, therefore he has had a mild regression with balance and endurance. Today we focused on addressing his static balance, endurance and reviewing strengthening exercises. He tolerated all activity well with minimal fatigue at end of session. He relies heavily on visual input for maintaining upright balance, there as we continue to challenge him his balance should improve. His postural awareness has regressed as he required more frequent cueing this date. Overall Mr. Carlson continues to respond well to physical therapy treatments, he will benefit from continued balance training and B hip strengthening.     PLAN: Consider utilizing TRX straps for balance and strengthening. Try the unicam bike next session.     Signature: Lana Marcos, PT, DPT License #: [id number]    Clinical Progress: improved  Home Program Compliance: No  Progress toward previous goals: Goals are not all met, however he has made progress toward them       90 Day Recertification  Certification Period: 11/30/2021 through 2/27/2022  I certify that the therapy services are furnished while this patient is under my care.  The services outlined above are required by this patient, and will be reviewed every 90 days.     PHYSICIAN:     Josiah Robles APRN______________________________________DATE: _________    Please sign and return via fax to [phone].   Thank you so much for letting us work with Chad. I appreciate your letting us work with your patients.  If you have any questions or concerns, please don't hesitate to contact me.

## 2021-12-01 ENCOUNTER — OFFICE VISIT (OUTPATIENT)
Dept: INTERNAL MEDICINE | Facility: CLINIC | Age: 86
End: 2021-12-01

## 2021-12-01 VITALS
BODY MASS INDEX: 24.44 KG/M2 | OXYGEN SATURATION: 98 % | HEART RATE: 55 BPM | TEMPERATURE: 97.9 F | WEIGHT: 165 LBS | DIASTOLIC BLOOD PRESSURE: 60 MMHG | HEIGHT: 69 IN | SYSTOLIC BLOOD PRESSURE: 150 MMHG

## 2021-12-01 DIAGNOSIS — I10 PRIMARY HYPERTENSION: Primary | ICD-10-CM

## 2021-12-01 DIAGNOSIS — Z79.899 ENCOUNTER FOR LONG-TERM CURRENT USE OF MEDICATION: ICD-10-CM

## 2021-12-01 PROCEDURE — 99214 OFFICE O/P EST MOD 30 MIN: CPT | Performed by: INTERNAL MEDICINE

## 2021-12-01 PROCEDURE — 1126F AMNT PAIN NOTED NONE PRSNT: CPT | Performed by: INTERNAL MEDICINE

## 2021-12-01 PROCEDURE — 1170F FXNL STATUS ASSESSED: CPT | Performed by: INTERNAL MEDICINE

## 2021-12-01 PROCEDURE — 1159F MED LIST DOCD IN RCRD: CPT | Performed by: INTERNAL MEDICINE

## 2021-12-01 PROCEDURE — G0439 PPPS, SUBSEQ VISIT: HCPCS | Performed by: INTERNAL MEDICINE

## 2021-12-01 NOTE — PROGRESS NOTES
The ABCs of the Annual Wellness Visit  Subsequent Medicare Wellness Visit    Chief Complaint   Patient presents with   • Medicare Wellness-subsequent      Subjective    History of Present Illness:  Chad Henriquez is a 89 y.o. male who presents for a Subsequent Medicare Wellness Visit.    The following portions of the patient's history were reviewed and   updated as appropriate: allergies, current medications, past family history, past medical history, past social history, past surgical history and problem list.    Compared to one year ago, the patient feels his physical   health is the same.    Compared to one year ago, the patient feels his mental   health is the same.    Recent Hospitalizations:  This patient has had a [facility] admission record on file within the last 365 days.    Current Medical Providers:  Patient Care Team:  Zelalem Jackson MD as PCP - General (Internal Medicine)  Dylon Maldonado MD as Consulting Physician (Urology)  Sukhdeep Guillory MD as Consulting Physician (Pain Medicine)    Outpatient Medications Prior to Visit   Medication Sig Dispense Refill   • alfuzosin (UROXATRAL) 10 MG 24 hr tablet Take 1 tablet by mouth Daily. 30 tablet 5   • amLODIPine (NORVASC) 2.5 MG tablet Take 2.5 mg by mouth Daily.     • Ascorbic Acid (Vitamin C) 500 MG capsule Take 1 capsule by mouth 2 (two) times a day.     • aspirin 81 MG EC tablet Take 81 mg by mouth Daily.     • chlorpheniramine (CHLOR-TRIMETON) 4 MG tablet Take 4 mg by mouth Daily.     • Cholecalciferol (Vitamin D3) 50 MCG (2000 UT) tablet Take 1 tablet by mouth Daily.     • finasteride (PROSCAR) 5 MG tablet Take 1 tablet by mouth Daily. 30 tablet 5   • Flaxseed, Linseed, (FLAXSEED OIL) 1000 MG capsule 540mg takes 4 tablets by mouth daily     • folic acid ([employer] FOLIC ACID) 800 MCG tablet Taking as multivitamin now     • gabapentin (NEURONTIN) 600 MG tablet Take 1 tablet by mouth 3 (Three) Times a Day. 90 tablet 3   • L-Lysine 600 MG tablet  Take 1 tablet by mouth Daily.     • metoprolol tartrate (LOPRESSOR) 25 MG tablet 1/2 in morning and 1 at night 135 tablet 3   • pravastatin (PRAVACHOL) 20 MG tablet Take 1 tablet by mouth Every Night. 90 tablet 1   • primidone (MYSOLINE) 50 MG tablet Take 1/2 tablet at bedtime (Patient taking differently: Take 50 mg by mouth.) 45 tablet 3   • sennosides-docusate (senna-docusate sodium) 8.6-50 MG per tablet Take 2 tablets by mouth Daily. 60 tablet 1   • amoxicillin-clavulanate (AUGMENTIN) 875-125 MG per tablet Take 1 tablet by mouth 2 (Two) Times a Day. 14 tablet 0     No facility-administered medications prior to visit.       No opioid medication identified on active medication list. I have reviewed chart for other potential  high risk medication/s and harmful drug interactions in the elderly.          Aspirin is on active medication list. Aspirin use is indicated based on review of current medical condition/s. Pros and cons of this therapy have been discussed today. Benefits of this medication outweigh potential harm.  Patient has been encouraged to continue taking this medication.  .      Patient Active Problem List   Diagnosis   • BPH with urinary obstruction   • Frequency of urination   • Nocturia   • OAB (overactive bladder)   • Non-smoker   • Spinal stenosis, lumbar region, with neurogenic claudication   • Left leg pain   • Bilateral hip pain   • Acute left-sided low back pain with left-sided sciatica   • Essential tremor   • BMI 21.0-21.9, adult   • Spinal stenosis   • Degeneration of lumbar or lumbosacral intervertebral disc   • Body mass index (BMI) of 24.0 to 24.9 in adult   • Hereditary and idiopathic peripheral neuropathy   • Syncope   • Hypercholesteremia   • CAD (coronary artery disease)   • Hypertension   • Lumbar disc disease with radiculopathy   • COVID-19 virus infection   • Left hip pain     Advance Care Planning  Advance Directive is on file.  ACP discussion was held with the patient during this  "visit. Patient has an advance directive in EMR which is still valid.     Review of Systems   Constitutional: Negative for appetite change, chills and fever.   HENT: Negative for congestion and ear pain.    Eyes: Negative for pain and discharge.   Respiratory: Negative for cough, chest tightness and shortness of breath.    Cardiovascular: Negative for chest pain, palpitations and leg swelling.   Gastrointestinal: Negative for abdominal distention and abdominal pain.   Endocrine: Negative for cold intolerance.   Genitourinary: Negative for difficulty urinating, dysuria and flank pain.   Musculoskeletal: Positive for arthralgias, back pain and gait problem.   Skin: Negative for color change and rash.   Neurological: Negative for dizziness and seizures.   Psychiatric/Behavioral: Negative for agitation, decreased concentration and dysphoric mood.        Objective    Vitals:    12/01/21 1315   BP: 150/60   BP Location: Left arm   Patient Position: Sitting   Cuff Size: Adult   Pulse: 55   Temp: 97.9 °F (36.6 °C)   TempSrc: Temporal   SpO2: 98%   Weight: 74.8 kg (165 lb)   Height: 174 cm (68.5\")   PainSc: 0-No pain     BMI Readings from Last 1 Encounters:   12/01/21 24.72 kg/m²   BMI is within normal parameters. No follow-up required.    Does the patient have evidence of cognitive impairment? No    Physical Exam  Vitals and nursing note reviewed.   Constitutional:       General: He is not in acute distress.     Appearance: Normal appearance. He is well-developed.   HENT:      Head: Normocephalic and atraumatic.      Right Ear: External ear normal.      Left Ear: External ear normal.      Nose: Nose normal.   Eyes:      Extraocular Movements: Extraocular movements intact.      Conjunctiva/sclera: Conjunctivae normal.      Pupils: Pupils are equal, round, and reactive to light.   Cardiovascular:      Rate and Rhythm: Normal rate and regular rhythm.      Pulses: Normal pulses.      Heart sounds: Normal heart sounds. "   Pulmonary:      Effort: Pulmonary effort is normal.      Breath sounds: Normal breath sounds.   Abdominal:      General: Bowel sounds are normal.      Palpations: Abdomen is soft.   Musculoskeletal:         General: Normal range of motion.      Cervical back: Normal range of motion and neck supple. No rigidity. No muscular tenderness.   Skin:     General: Skin is warm and dry.   Neurological:      General: No focal deficit present.      Mental Status: He is alert and oriented to person, place, and time.   Psychiatric:         Mood and Affect: Mood normal.         Behavior: Behavior normal.                 HEALTH RISK ASSESSMENT    Smoking Status:  Social History     Tobacco Use   Smoking Status Former Smoker   • Years: 6.00   • Types: Cigarettes   • Quit date:    • Years since quittin.9   Smokeless Tobacco Never Used   Tobacco Comment    age [age] years of age     Alcohol Consumption:  Social History     Substance and Sexual Activity   Alcohol Use No     Fall Risk Screen:    STEADI Fall Risk Assessment was completed, and patient is at LOW risk for falls.Assessment completed on:2021    Depression Screening:  PHQ-2/PHQ-9 Depression Screening 2021   Little interest or pleasure in doing things 0   Feeling down, depressed, or hopeless 0   Total Score 0       Health Habits and Functional and Cognitive Screening:  Functional & Cognitive Status 2021   Do you have difficulty preparing food and eating? No   Do you have difficulty bathing yourself, getting dressed or grooming yourself? No   Do you have difficulty using the toilet? No   Do you have difficulty moving around from place to place? No   Do you have trouble with steps or getting out of a bed or a chair? No   Current Diet Well Balanced Diet   Dental Exam Up to date   Eye Exam Up to date   Exercise (times per week) 3 times per week   Current Exercises Include Walking        Exercise Comment physical therapy   Do you need help using the phone?   No   Are you deaf or do you have serious difficulty hearing?  Yes   Do you need help with transportation? No   Do you need help shopping? No   Do you need help preparing meals?  No   Do you need help with housework?  No   Do you need help with laundry? No   Do you need help taking your medications? No   Do you need help managing money? No   Do you ever drive or ride in a car without wearing a seat belt? No   Have you felt unusual stress, anger or loneliness in the last month? No   Who do you live with? Spouse   If you need help, do you have trouble finding someone available to you? No   Have you been bothered in the last four weeks by sexual problems? No   Do you have difficulty concentrating, remembering or making decisions? No       Age-appropriate Screening Schedule:  Refer to the list below for future screening recommendations based on patient's age, sex and/or medical conditions. Orders for these recommended tests are listed in the plan section. The patient has been provided with a written plan.    Health Maintenance   Topic Date Due   • ZOSTER VACCINE (1 of 2) Never done   • LIPID PANEL  05/12/2022   • TDAP/TD VACCINES (3 - Td or Tdap) 07/09/2029   • INFLUENZA VACCINE  Completed              Assessment/Plan   CMS Preventative Services Quick Reference  Risk Factors Identified During Encounter  Immunizations Discussed/Encouraged (specific Immunizations; Shingrix  The above risks/problems have been discussed with the patient.  Follow up actions/plans if indicated are seen below in the Assessment/Plan Section.  Pertinent information has been shared with the patient in the After Visit Summary.    Diagnoses and all orders for this visit:    1. Primary hypertension (Primary)  -     Comprehensive Metabolic Panel  -     Urinalysis With Microscopic - Urine, Clean Catch  -     Uric Acid    2. Encounter for long-term current use of medication  -     CBC & Differential  -     TSH  -     Lipid Panel        Follow Up:    Return in about 6 months (around 6/1/2022).     An After Visit Summary and PPPS were made available to the patient.    Patient is here for annual wellness evaluation in addition to that we are following up on his blood pressure and he is a gait instability for which he is currently undergoing physical therapy.  We are getting his annual lab work and once I have that back I will give him a call he is to continue his current medications without change.  Spent time talking about making sure that the passageway from his bedroom to the bathroom is on cluttered and is well lit at night his wife states that she is meticulous on keeping things tidy and not allowing clutter to build up.

## 2021-12-02 ENCOUNTER — TREATMENT (OUTPATIENT)
Dept: PHYSICAL THERAPY | Facility: CLINIC | Age: 86
End: 2021-12-02

## 2021-12-02 ENCOUNTER — TELEPHONE (OUTPATIENT)
Dept: INTERNAL MEDICINE | Facility: CLINIC | Age: 86
End: 2021-12-02

## 2021-12-02 DIAGNOSIS — Z74.09 IMPAIRED MOBILITY: ICD-10-CM

## 2021-12-02 DIAGNOSIS — Z98.1 HISTORY OF LUMBAR FUSION: Primary | ICD-10-CM

## 2021-12-02 LAB
ALBUMIN SERPL-MCNC: 4.7 G/DL (ref 3.5–5.2)
ALBUMIN/GLOB SERPL: 2.1 G/DL
ALP SERPL-CCNC: 82 U/L (ref 39–117)
ALT SERPL-CCNC: 23 U/L (ref 1–41)
APPEARANCE UR: CLEAR
AST SERPL-CCNC: 24 U/L (ref 1–40)
BACTERIA #/AREA URNS HPF: NORMAL /HPF
BASOPHILS # BLD AUTO: 0.06 10*3/MM3 (ref 0–0.2)
BASOPHILS NFR BLD AUTO: 0.8 % (ref 0–1.5)
BILIRUB SERPL-MCNC: 0.5 MG/DL (ref 0–1.2)
BILIRUB UR QL STRIP: NEGATIVE
BUN SERPL-MCNC: 19 MG/DL (ref 8–23)
BUN/CREAT SERPL: 26.8 (ref 7–25)
CALCIUM SERPL-MCNC: 9.6 MG/DL (ref 8.2–9.6)
CASTS URNS MICRO: NORMAL
CHLORIDE SERPL-SCNC: 103 MMOL/L (ref 98–107)
CHOLEST SERPL-MCNC: 159 MG/DL (ref 0–200)
CO2 SERPL-SCNC: 29.9 MMOL/L (ref 22–29)
COLOR UR: YELLOW
CREAT SERPL-MCNC: 0.71 MG/DL (ref 0.76–1.27)
EOSINOPHIL # BLD AUTO: 0.1 10*3/MM3 (ref 0–0.4)
EOSINOPHIL NFR BLD AUTO: 1.4 % (ref 0.3–6.2)
EPI CELLS #/AREA URNS HPF: NORMAL /HPF
ERYTHROCYTE [DISTWIDTH] IN BLOOD BY AUTOMATED COUNT: 13.1 % (ref 12.3–15.4)
GLOBULIN SER CALC-MCNC: 2.2 GM/DL
GLUCOSE SERPL-MCNC: 97 MG/DL (ref 65–99)
GLUCOSE UR QL: NEGATIVE
HCT VFR BLD AUTO: 38.9 % (ref 37.5–51)
HDLC SERPL-MCNC: 55 MG/DL (ref 40–60)
HGB BLD-MCNC: 13.1 G/DL (ref 13–17.7)
HGB UR QL STRIP: NEGATIVE
IMM GRANULOCYTES # BLD AUTO: 0.02 10*3/MM3 (ref 0–0.05)
IMM GRANULOCYTES NFR BLD AUTO: 0.3 % (ref 0–0.5)
KETONES UR QL STRIP: NEGATIVE
LDLC SERPL CALC-MCNC: 94 MG/DL (ref 0–100)
LEUKOCYTE ESTERASE UR QL STRIP: NEGATIVE
LYMPHOCYTES # BLD AUTO: 1.39 10*3/MM3 (ref 0.7–3.1)
LYMPHOCYTES NFR BLD AUTO: 18.9 % (ref 19.6–45.3)
MCH RBC QN AUTO: 32.5 PG (ref 26.6–33)
MCHC RBC AUTO-ENTMCNC: 33.7 G/DL (ref 31.5–35.7)
MCV RBC AUTO: 96.5 FL (ref 79–97)
MONOCYTES # BLD AUTO: 0.67 10*3/MM3 (ref 0.1–0.9)
MONOCYTES NFR BLD AUTO: 9.1 % (ref 5–12)
NEUTROPHILS # BLD AUTO: 5.1 10*3/MM3 (ref 1.7–7)
NEUTROPHILS NFR BLD AUTO: 69.5 % (ref 42.7–76)
NITRITE UR QL STRIP: NEGATIVE
NRBC BLD AUTO-RTO: 0.1 /100 WBC (ref 0–0.2)
PH UR STRIP: 7 [PH] (ref 5–8)
PLATELET # BLD AUTO: 295 10*3/MM3 (ref 140–450)
POTASSIUM SERPL-SCNC: 5.2 MMOL/L (ref 3.5–5.2)
PROT SERPL-MCNC: 6.9 G/DL (ref 6–8.5)
PROT UR QL STRIP: NEGATIVE
RBC # BLD AUTO: 4.03 10*6/MM3 (ref 4.14–5.8)
RBC #/AREA URNS HPF: NORMAL /HPF
SODIUM SERPL-SCNC: 139 MMOL/L (ref 136–145)
SP GR UR: 1.01 (ref 1–1.03)
TRIGL SERPL-MCNC: 48 MG/DL (ref 0–150)
TSH SERPL DL<=0.005 MIU/L-ACNC: 1.99 UIU/ML (ref 0.27–4.2)
URATE SERPL-MCNC: 6.1 MG/DL (ref 3.4–7)
UROBILINOGEN UR STRIP-MCNC: NORMAL MG/DL
VLDLC SERPL CALC-MCNC: 10 MG/DL (ref 5–40)
WBC # BLD AUTO: 7.34 10*3/MM3 (ref 3.4–10.8)
WBC #/AREA URNS HPF: NORMAL /HPF

## 2021-12-02 PROCEDURE — 97110 THERAPEUTIC EXERCISES: CPT

## 2021-12-02 PROCEDURE — 97116 GAIT TRAINING THERAPY: CPT

## 2021-12-02 PROCEDURE — 97112 NEUROMUSCULAR REEDUCATION: CPT

## 2021-12-02 NOTE — PROGRESS NOTES
Physical Therapy Treatment Note    Patient: Chad Henriquez                                                                                     Visit Date: 2021  :     1929    Referring practitioner:    EDIE Coulter  Date of Initial Visit:          Type: THERAPY  Noted: 2021    Patient seen for 21 sessions    Visit Diagnoses:    ICD-10-CM ICD-9-CM   1. History of lumbar fusion  Z98.1 V45.4   2. Impaired mobility  Z74.09 799.89     SUBJECTIVE     Subjective  He got his COVID booster yesterday, therefore his R arm is sore. Yearly physical was also performed yesterday, reports all went well. Back pain is minimal this morning.      PAIN: 0/10 currently          OBJECTIVE     Objective      Neuromuscular Reeducation     94606 Comments   Tandem standing  Unable to maintain more than 5-8 seconds at a time today    Standing on M/L wobble board, EO/EC Difficulty maintaining midline positioning, Min A with EC           Timed Minutes 10     Therapeutic Exercises    [id number] Comments   Walking for endurance in hallway  300+ feet,    Standing hip flex/abd/ext with TRX straps  2 sets x 5reps each direction    Standing mini squats with TRX straps 2 x 10            Timed Minutes 20     Gait Training          [id number]   Task/Terrain Asst AD Comments   Amb on uneven surfaces up/down incline and on grass  CGA-Min A  No AD Decreased gait speed when amb up hill, moderate path deviations in grass    Timed Minutes 15       Therapy Education/Self Care [id number]   Details:  Reviewed HEP and encouraged more consistent performance before progressing    Given [place] HEP (access code [email or web address])   Progress: Reinforced   Education provided to:  Patient and Spouse/SO   Level of understanding Verbalized and Demonstrated   Timed Minutes      Access Code: [email or web address]  URL: [email or web address]  Date: 2021  Prepared by: Lana Marcos     Exercises  Hooklying Single Leg Bent Knee Fallouts with Resistance - 1 x daily - 7 x  weekly - 2 sets - 10 reps  Standing Tandem Balance with Counter Support - 1 x daily - 7 x weekly - 1 sets - 5 reps - 20-30 sec hold  Standing Hip Abduction - 1 x daily - 7 x weekly - 2 sets - 10 reps  Standing Hip Extension - 1 x daily - 7 x weekly - 2 sets - 10 reps  Standing Partial Squat - 1 x daily - 7 x weekly - 2 sets - 10 reps    Total Timed Treatment:     45 mins  Total Time of Visit:             45 mins         ASSESSMENT/PLAN     GOALS  Goals                                          Progress Note due by 12/30/21                                                      Recert due by 2/27/22   LTG by: 6 weeks   Date Status   Patient will demonstrate independence with comprehensive HEP  Reviewed today, has not been able to perform as he has been sick for almost 3 weeks.  11/30/21 ongoing   Patient will demonstrate decreased B piriformis muscle guarding   No recent complaints of increased pain or guarding.   9/30/21 MET   Patient will demonstrate 5/5 B hip abductor strength  Weakness still noted, progressed resisted hip abduction exercises today  11/30/21 Ongoing    Patient maintain tandem standing >20 seconds with minimal swaying  Increased difficulty today, likely related to lapse in care from illness. Maintained 5-8 seconds this date 11/30/21 ongoing   Patient will ambulate >200 ft without AD while maintaining good gait mechanics  CGA-Min A during amb on uneven surfaces due to decreased balance 12/2/21 Ongoing    Patient will score >25 on Tinetti   19 on evaluation   24 today      11/30/21 Progressing        Assessment/Plan     ASSESSMENT: Mr. Henriquez did very during our session today, his endurance seems to be returning to his PLOF before getting sick. We performed ambulation on uneven surfaces, he did require CGA-Min A at times due to notable path deviations. His static balance is still decreased, especially when closing eyes. It does improve with repetitions.     PLAN: Try the unicam bike next session.  Continue challenging his static/dynamic balance.     Signature: Lana Marcos, PT, DPT License #: [id number]  Electronically signed 12/2/21

## 2021-12-08 ENCOUNTER — TREATMENT (OUTPATIENT)
Dept: PHYSICAL THERAPY | Facility: CLINIC | Age: 86
End: 2021-12-08

## 2021-12-08 DIAGNOSIS — Z74.09 IMPAIRED MOBILITY: ICD-10-CM

## 2021-12-08 DIAGNOSIS — Z98.1 HISTORY OF LUMBAR FUSION: Primary | ICD-10-CM

## 2021-12-08 PROCEDURE — 97112 NEUROMUSCULAR REEDUCATION: CPT

## 2021-12-08 PROCEDURE — 97110 THERAPEUTIC EXERCISES: CPT

## 2021-12-08 NOTE — PROGRESS NOTES
Physical Therapy Treatment Note    Patient: Chad Henriquez                                                                                     Visit Date: 2021  :     1929    Referring practitioner:    EDIE Coulter  Date of Initial Visit:          Type: THERAPY  Noted: 2021    Patient seen for 22 sessions    Visit Diagnoses:    ICD-10-CM ICD-9-CM   1. History of lumbar fusion  Z98.1 V45.4   2. Impaired mobility  Z74.09 799.89     SUBJECTIVE     Subjective  No complaints or changes following previous session. He drove himself today.      PAIN: 0/10 currently          OBJECTIVE     Objective      Neuromuscular Reeducation     [id number] Comments   Tandem standing  Unable to maintain more than 5-8 seconds at a time today    Standing on M/L wobble board, EO/EC Difficulty maintaining midline positioning, Min A with EC           Timed Minutes 10     Therapeutic Exercises    08687 Comments   Unicam bike, seat 6, no resistance  5 min    Walking for endurance in hallway  ~500ft     Standing hip flex/abd/ext in parallel bars 3 sets x 5reps each direction    Standing mini squats in parallel bars  2 x 10            Timed Minutes 30         Therapy Education/Self Care [id number]   Details:  Reviewed HEP and encouraged more consistent performance before progressing    Given [employer] HEP (access code [email or web address])   Progress: Reinforced   Education provided to:  Patient and Spouse/SO   Level of understanding Verbalized and Demonstrated   Timed Minutes      Access Code: [email or web address]  URL: [email or web address]  Date: 2021  Prepared by: Lana Marcos     Exercises  Hooklying Single Leg Bent Knee Fallouts with Resistance - 1 x daily - 7 x weekly - 2 sets - 10 reps  Standing Tandem Balance with Counter Support - 1 x daily - 7 x weekly - 1 sets - 5 reps - 20-30 sec hold  Standing Hip Abduction - 1 x daily - 7 x weekly - 2 sets - 10 reps  Standing Hip Extension - 1 x daily - 7 x weekly - 2 sets - 10  reps  Standing Partial Squat - 1 x daily - 7 x weekly - 2 sets - 10 reps    Total Timed Treatment:     45 mins  Total Time of Visit:             45 mins         ASSESSMENT/PLAN     GOALS  Goals                                          Progress Note due by 12/30/21                                                      Recert due by 2/27/22   LTG by: 6 weeks   Date Status   Patient will demonstrate independence with comprehensive HEP  Reviewed today, has not been able to perform as he has been sick for almost 3 weeks.  11/30/21 ongoing   Patient will demonstrate decreased B piriformis muscle guarding   No recent complaints of increased pain or guarding.   9/30/21 MET   Patient will demonstrate 5/5 B hip abductor strength  Improved ability to maintain form this date 12/8/21 Ongoing    Patient maintain tandem standing >20 seconds with minimal swaying  Increased difficulty today, likely related to lapse in care from illness. Maintained 5-8 seconds this date 11/30/21 ongoing   Patient will ambulate >200 ft without AD while maintaining good gait mechanics  CGA-Min A during amb on uneven surfaces due to decreased balance 12/2/21 Ongoing    Patient will score >25 on Tinetti   19 on evaluation   24 today      11/30/21 Progressing        Assessment/Plan     ASSESSMENT: Focused on endurance and strength training this session, he tolerated well. Hip stability is improving, however limited. As this continues to improve, his static and dynamic balance should become less of a challenge.     PLAN: Utilize TRX straps next session for hip strengthening and balance training     Signature: Lana Marcos, PT, DPT License #: [id number]  Electronically signed 12/8/21

## 2021-12-09 ENCOUNTER — TREATMENT (OUTPATIENT)
Dept: PHYSICAL THERAPY | Facility: CLINIC | Age: 86
End: 2021-12-09

## 2021-12-09 DIAGNOSIS — Z74.09 IMPAIRED MOBILITY: ICD-10-CM

## 2021-12-09 DIAGNOSIS — Z98.1 HISTORY OF LUMBAR FUSION: Primary | ICD-10-CM

## 2021-12-09 PROCEDURE — 97110 THERAPEUTIC EXERCISES: CPT

## 2021-12-09 NOTE — PROGRESS NOTES
Subjective    Mr. Henriquez is 89 y.o. male    Chief Complaint: 6 month follow up for Urinary Retention     History of Present Illness  Patient is a 89-year-old gentleman with BPH and who had episode of urinary retention.  I saw him 05/24/2021 and he had had his catheter removed by that time and was voiding well he is on tamsulosin and alfuzosin at this time.  He is taking both medicines he said no further episodes of retention subjectively he is voiding fine at his baseline he had severe constipation when he developed urinary retention and after he had bowel movements he was voiding better.  Most likely his retention was a combination of BPH and constipation.    The following portions of the patient's history were reviewed and updated as appropriate: allergies, current medications, past family history, past medical history, past social history, past surgical history and problem list.    Review of Systems   Constitutional: Negative for chills and fever.   Gastrointestinal: Negative for abdominal pain, anal bleeding and blood in stool.   Genitourinary: Negative for decreased urine volume, difficulty urinating, dysuria, enuresis, flank pain, frequency, genital sores, hematuria, penile discharge, penile pain, penile swelling, scrotal swelling, testicular pain and urgency.         Current Outpatient Medications:   •  alfuzosin (UROXATRAL) 10 MG 24 hr tablet, Take 1 tablet by mouth Daily., Disp: 30 tablet, Rfl: 5  •  amLODIPine (NORVASC) 2.5 MG tablet, Take 2.5 mg by mouth Daily., Disp: , Rfl:   •  Ascorbic Acid (Vitamin C) 500 MG capsule, Take 1 capsule by mouth 2 (two) times a day., Disp: , Rfl:   •  aspirin 81 MG EC tablet, Take 81 mg by mouth Daily., Disp: , Rfl:   •  chlorpheniramine (CHLOR-TRIMETON) 4 MG tablet, Take 4 mg by mouth Daily., Disp: , Rfl:   •  Cholecalciferol (Vitamin D3) 50 MCG (2000 UT) tablet, Take 1 tablet by mouth Daily., Disp: , Rfl:   •  finasteride (PROSCAR) 5 MG tablet, Take 1 tablet by mouth  Daily., Disp: 30 tablet, Rfl: 5  •  Flaxseed, Linseed, (FLAXSEED OIL) 1000 MG capsule, 540mg takes 4 tablets by mouth daily, Disp: , Rfl:   •  folic acid ([employer] FOLIC ACID) 800 MCG tablet, Taking as multivitamin now, Disp: , Rfl:   •  gabapentin (NEURONTIN) 600 MG tablet, Take 1 tablet by mouth 3 (Three) Times a Day., Disp: 90 tablet, Rfl: 3  •  L-Lysine 600 MG tablet, Take 1 tablet by mouth Daily., Disp: , Rfl:   •  metoprolol tartrate (LOPRESSOR) 25 MG tablet, 1/2 in morning and 1 at night, Disp: 135 tablet, Rfl: 3  •  pravastatin (PRAVACHOL) 20 MG tablet, Take 1 tablet by mouth Every Night., Disp: 90 tablet, Rfl: 1  •  primidone (MYSOLINE) 50 MG tablet, Take 1/2 tablet at bedtime (Patient taking differently: Take 50 mg by mouth.), Disp: 45 tablet, Rfl: 3  •  sennosides-docusate (senna-docusate sodium) 8.6-50 MG per tablet, Take 2 tablets by mouth Daily., Disp: 60 tablet, Rfl: 1    Past Medical History:   Diagnosis Date   • Allergic rhinitis    • Anemia    • Arthritis    • Blind left eye    • BPH (benign prostatic hypertrophy) with urinary retention    • Cancer (HCC)     skin cancer   • Chronic back pain     RUNS DOWN BOTH LEGS   • Constipation    • Coronary artery disease    • Hard of hearing    • Heart attack (HCC) 1986    NO MUSCLE DAMAGE - JUST OCCLUDED VALVE   • High cholesterol    • History of skin cancer     BILATERAL EARS   • History of transfusion     1986   • Hypertension    • Inguinal hernia    • Leaky heart valve     DR CONCEPCION SAYS NOT ENOUGH TO BE OF CONCERN   • Other bursal cyst, right elbow    • PONV (postoperative nausea and vomiting)     has had scopalamine patch in past    • Sleep apnea     DOES NOT USE CPAP OR BIPAP   • Spinal stenosis of cervical region    • Spinal stenosis of lumbar region    • Tremors of nervous system    • Wears hearing aid     BILATERAL       Past Surgical History:   Procedure Laterality Date   • ANTERIOR LUMBAR EXPOSURE N/A 12/7/2018    Procedure: ANTERIOR LUMBAR  EXPOSURE;  Surgeon: Manolo Mcleod DO;  Location:  [facility];  Service: Vascular   • APPENDECTOMY     • BACK SURGERY      X3   • CARDIAC SURGERY  08/08/1986    X1 BYPASS   • CORONARY ANGIOPLASTY WITH STENT PLACEMENT  1997    X3 STENTS   • HERNIA REPAIR Bilateral     x2   • INGUINAL HERNIA REPAIR Right 2017    Procedure: RIGHT INGUINAL HERNIA REPAIR AND EXCISION OF CYST RIGHT ELBOW ;  Surgeon: Jackelyn Arriola MD;  Location:  [facility];  Service:    • LUMBAR FUSION Left 2021    Procedure: LUMBAR LAMINECTOMY, DISCECTOMY, FACETECTOMY,  TRANSFORAMINAL LUMBAR INTERBODY FUSION L2-3. REVISION OF INSTRUMENTATION L3-S1. USE OF IMAGE GUIDANCE AND SURGICAL ROBOT;  Surgeon: Kj Falcon MD;  Location:  [facility];  Service: Robotics - Neuro;  Laterality: Left;   • LUMBAR LAMINECTOMY N/A 3/12/2018    Procedure: LUMBAR LAMINECTOMY WITHOUT FUSION L3-4,4-5;  Surgeon: Kj Falcon MD;  Location:  [facility];  Service: Neurosurgery   • LUMBAR LAMINECTOMY ANTERIOR LUMBAR INTERBODY FUSION N/A 2018    Procedure: ANTERIOR LUMBAR INTERBODY FUSION L5-S1;  Surgeon: Kj Falcon MD;  Location:  [facility];  Service: Neurosurgery   • LUMBAR LAMINECTOMY WITH FUSION Left 12/10/2018    Procedure: LUMBAR LAMINECTOMY TRANSFORAMINAL LUMBAR INTERBODY FUSION L34,45;  Surgeon: Kj Falcon MD;  Location:  [facility];  Service: Neurosurgery   • LUMBAR LAMINECTOMY WITH FUSION Left 2018    Procedure: LUMBAR LAMINECTOMY TRANSFORAMINAL LUMBAR INTERBODY FUSION LEFT L3-4, L4-5 QUADRANT RETRACTOR;  Surgeon: Kj Falcon MD;  Location:  [facility];  Service: Neurosurgery   • SKIN LESION EXCISION      nasal       Social History     Socioeconomic History   • Marital status:    Tobacco Use   • Smoking status: Former Smoker     Years: 6.00     Types: Cigarettes     Quit date:      Years since quittin.0   • Smokeless tobacco: Never Used   • Tobacco comment: age [age] years of age   Vaping Use   • Vaping Use:  "Never used   Substance and Sexual Activity   • Alcohol use: No   • Drug use: Never   • Sexual activity: Not Currently     Partners: Female       Family History   Problem Relation Age of Onset   • No Known Problems Father    • No Known Problems Mother        Objective    Temp 97.6 °F (36.4 °C)   Ht 174 cm (68.5\")   Wt 77.4 kg (170 lb 9.6 oz)   BMI 25.56 kg/m²     Physical Exam  Vitals reviewed.   Constitutional:       Appearance: Normal appearance.   HENT:      Head: Normocephalic and atraumatic.   Pulmonary:      Effort: Pulmonary effort is normal.   Neurological:      General: No focal deficit present.      Mental Status: He is alert and oriented to person, place, and time.   Psychiatric:         Mood and Affect: Mood normal.         Behavior: Behavior normal.             Results for orders placed or performed in visit on 12/17/21   POC Urinalysis Dipstick, Multipro    Specimen: Urine   Result Value Ref Range    Color Yellow Yellow, Straw, Dark Yellow, Geovanna    Clarity, UA Clear Clear    Glucose, UA Negative Negative, 1000 mg/dL (3+) mg/dL    Bilirubin Negative Negative    Ketones, UA Negative Negative    Specific Gravity  1.015 1.005 - 1.030    Blood, UA Trace (A) Negative    pH, Urine 7.0 5.0 - 8.0    Protein, POC 30 mg/dL (A) Negative mg/dL    Urobilinogen, UA Normal Normal    Nitrite, UA Negative Negative    Leukocytes Negative Negative     Assessment and Plan    Diagnoses and all orders for this visit:    1. Retention of urine (Primary)  -     POC Urinalysis Dipstick, Multipro    2. BPH with obstruction/lower urinary tract symptoms    Patient has no new worsening voiding complaints and is at his baseline.  No further episodes of retention most likely as previous retention when I saw him last was related to severe constipation and once he had good bowel movements he had no further episodes.  Recommend continuing finasteride and alfuzosin follow-up 1 year.            "

## 2021-12-09 NOTE — PROGRESS NOTES
Physical Therapy Treatment Note    Patient: Chad Henriquez                                                                                     Visit Date: 2021  :     1929    Referring practitioner:    EDIE Coulter  Date of Initial Visit:          Type: THERAPY  Noted: 2021    Patient seen for 23 sessions    Visit Diagnoses:    ICD-10-CM ICD-9-CM   1. History of lumbar fusion  Z98.1 V45.4   2. Impaired mobility  Z74.09 799.89     SUBJECTIVE     Subjective  No complaints or changes following previous session. He drove himself today.      PAIN: 0/10 currently          OBJECTIVE     Objective        Therapeutic Exercises    98586 Comments   Unicam bike, seat 6, no resistance  10 min    Shuttle press BLE 6 cords x 20    Shuttle press unilateral LE 4 cords x 20 each    Standing hip flex/abd/ext with TRX straps  2 X 10 each direction, difficulty maintaining balance and form    Standing mini squats with TRX straps  2 x 10            Timed Minutes 40       Therapy Education/Self Care 31409   Details:  Reviewed HEP and encouraged more consistent performance before progressing    Given [employer] HEP (access code [email or web address])   Progress: Reinforced   Education provided to:  Patient and Spouse/SO   Level of understanding Verbalized and Demonstrated   Timed Minutes      Access Code: [email or web address]  URL: [email or web address]  Date: 2021  Prepared by: Lana Marcos     Exercises  Hooklying Single Leg Bent Knee Fallouts with Resistance - 1 x daily - 7 x weekly - 2 sets - 10 reps  Standing Tandem Balance with Counter Support - 1 x daily - 7 x weekly - 1 sets - 5 reps - 20-30 sec hold  Standing Hip Abduction - 1 x daily - 7 x weekly - 2 sets - 10 reps  Standing Hip Extension - 1 x daily - 7 x weekly - 2 sets - 10 reps  Standing Partial Squat - 1 x daily - 7 x weekly - 2 sets - 10 reps    Total Timed Treatment:     40 mins  Total Time of Visit:             40 mins         ASSESSMENT/PLAN      GOALS  Goals                                          Progress Note due by 12/30/21                                                      Recert due by 2/27/22   LTG by: 6 weeks   Date Status   Patient will demonstrate independence with comprehensive HEP  Reviewed today, has not been able to perform as he has been sick for almost 3 weeks.  11/30/21 ongoing   Patient will demonstrate decreased B piriformis muscle guarding   No recent complaints of increased pain or guarding.   9/30/21 MET   Patient will demonstrate 5/5 B hip abductor strength  Improved ability to maintain form this date 12/8/21 Ongoing    Patient maintain tandem standing >20 seconds with minimal swaying  Increased difficulty today, likely related to lapse in care from illness. Maintained 5-8 seconds this date 11/30/21 ongoing   Patient will ambulate >200 ft without AD while maintaining good gait mechanics  CGA-Min A during amb on uneven surfaces due to decreased balance 12/2/21 Ongoing    Patient will score >25 on Tinetti   19 on evaluation   24 today      11/30/21 Progressing        Assessment/Plan     ASSESSMENT: He continues to respond well to strengthening exercises, hip weakness still noted as he has trouble maintaining level pelvis during standing exercises. Required min A for assistance to maintain upright posture when using TRX straps.     PLAN: Continue utilizing shuttle press and TRX straps for strength and balance training     Signature: Lana Marcos, PT, DPT License #: [id number]  Electronically signed 12/9/21

## 2021-12-13 ENCOUNTER — TREATMENT (OUTPATIENT)
Dept: PHYSICAL THERAPY | Facility: CLINIC | Age: 86
End: 2021-12-13

## 2021-12-13 DIAGNOSIS — Z98.1 HISTORY OF LUMBAR FUSION: Primary | ICD-10-CM

## 2021-12-13 DIAGNOSIS — Z74.09 IMPAIRED MOBILITY: ICD-10-CM

## 2021-12-13 PROCEDURE — 97110 THERAPEUTIC EXERCISES: CPT

## 2021-12-13 PROCEDURE — 97140 MANUAL THERAPY 1/> REGIONS: CPT

## 2021-12-13 NOTE — PROGRESS NOTES
Physical Therapy Treatment Note    Patient: Chad Henriquez                                                                                     Visit Date: 2021  :     1929    Referring practitioner:    EDIE Coulter  Date of Initial Visit:          Type: THERAPY  Noted: 2021    Patient seen for 24 sessions    Visit Diagnoses:    ICD-10-CM ICD-9-CM   1. History of lumbar fusion  Z98.1 V45.4   2. Impaired mobility  Z74.09 799.89     SUBJECTIVE     Subjective  No complaints or changes following previous session. He reports mild back pain today.      PAIN: 1-1.5/10 currently          OBJECTIVE     Objective        Therapeutic Exercises    [id number] Comments   Walking for endurnace 5 min    Shuttle press BLE 6 cords, 2 min    Shuttle press unilateral LE 4 cords x 2 min each    Standing hip flex/abd/ext with TRX straps  2 X 10 each direction, difficulty maintaining balance and form    Push/pull 50# sled  50 ft, CGA            Timed Minutes 45     Manual Therapy     [id number]  Comments   IASTM with pink foam roller B lumbar and thoracic paraspinals  Massage chair                    Timed Minutes 10           Therapy Education/Self Care [id number]   Details:  Reviewed HEP and encouraged more consistent performance before progressing    Given [employer] HEP (access code [email or web address])   Progress: Reinforced   Education provided to:  Patient and Spouse/SO   Level of understanding Verbalized and Demonstrated   Timed Minutes      Access Code: [email or web address]  URL: [email or web address]  Date: 2021  Prepared by: Lana Marcos     Exercises  Hooklying Single Leg Bent Knee Fallouts with Resistance - 1 x daily - 7 x weekly - 2 sets - 10 reps  Standing Tandem Balance with Counter Support - 1 x daily - 7 x weekly - 1 sets - 5 reps - 20-30 sec hold  Standing Hip Abduction - 1 x daily - 7 x weekly - 2 sets - 10 reps  Standing Hip Extension - 1 x daily - 7 x weekly - 2 sets - 10 reps  Standing Partial Squat - 1 x daily -  7 x weekly - 2 sets - 10 reps    Total Timed Treatment:     55 mins  Total Time of Visit:             55 mins         ASSESSMENT/PLAN     GOALS  Goals                                          Progress Note due by 12/30/21                                                      Recert due by 2/27/22   LTG by: 6 weeks   Date Status   Patient will demonstrate independence with comprehensive HEP  Reviewed today, has not been able to perform as he has been sick for almost 3 weeks.  11/30/21 ongoing   Patient will demonstrate decreased B piriformis muscle guarding   No recent complaints of increased pain or guarding.   9/30/21 MET   Patient will demonstrate 5/5 B hip abductor strength  Improved ability to maintain form this date 12/8/21 Ongoing    Patient maintain tandem standing >20 seconds with minimal swaying  Increased difficulty today, likely related to lapse in care from illness. Maintained 5-8 seconds this date 11/30/21 ongoing   Patient will ambulate >200 ft without AD while maintaining good gait mechanics  CGA-Min A during amb on uneven surfaces due to decreased balance 12/2/21 Ongoing    Patient will score >25 on Tinetti   19 on evaluation   24 today     Difficulty performed activity that involves SLS, likely related to decreased hip stability and decreased balance    12/13/21 Progressing        Assessment/Plan     ASSESSMENT: He feels he is getting stronger since we have been focusing on BLE strengthening. As his strength improves, I anticipate that his balance will also improve. He is still unsteady with ambulation due to decreased balance, notable path deviations when dual tasking.     PLAN: Continue focusing on BLE strengthening and balance training.     Signature: Lana Marcos, PT, DPT License #: [id number]  Electronically signed 12/13/21

## 2021-12-15 ENCOUNTER — TREATMENT (OUTPATIENT)
Dept: PHYSICAL THERAPY | Facility: CLINIC | Age: 86
End: 2021-12-15

## 2021-12-15 DIAGNOSIS — Z98.1 HISTORY OF LUMBAR FUSION: Primary | ICD-10-CM

## 2021-12-15 DIAGNOSIS — Z74.09 IMPAIRED MOBILITY: ICD-10-CM

## 2021-12-15 PROCEDURE — 97110 THERAPEUTIC EXERCISES: CPT

## 2021-12-15 NOTE — PROGRESS NOTES
Physical Therapy Treatment Note    Patient: Chad Henriquez                                                                                     Visit Date: 12/15/2021  :     1929    Referring practitioner:    EDIE Coulter  Date of Initial Visit:          Type: THERAPY  Noted: 2021    Patient seen for 25 sessions    Visit Diagnoses:    ICD-10-CM ICD-9-CM   1. History of lumbar fusion  Z98.1 V45.4   2. Impaired mobility  Z74.09 799.89     SUBJECTIVE     Subjective  No complaints or soreness following previous session.     PAIN: 0/10 currently          OBJECTIVE     Objective        Therapeutic Exercises    [id number] Comments   Walking for endurnace 7 min    Shuttle press BLE 6 cords, 2 min x 2     Seated frontal overhead raise with 3 lb bar, cue for upright posture 2 x 10    Seated hip IR/ER with red t band  2 x 10 each                Timed Minutes 42          Therapy Education/Self Care [id number]   Details:  Reviewed HEP and encouraged more consistent performance before progressing    Given [employer] HEP (access code [email or web address])   Progress: Reinforced   Education provided to:  Patient and Spouse/SO   Level of understanding Verbalized and Demonstrated   Timed Minutes      Access Code: [email or web address]  URL: [email or web address]  Date: 2021  Prepared by: Lana Marcos     Exercises  Hooklying Single Leg Bent Knee Fallouts with Resistance - 1 x daily - 7 x weekly - 2 sets - 10 reps  Standing Tandem Balance with Counter Support - 1 x daily - 7 x weekly - 1 sets - 5 reps - 20-30 sec hold  Standing Hip Abduction - 1 x daily - 7 x weekly - 2 sets - 10 reps  Standing Hip Extension - 1 x daily - 7 x weekly - 2 sets - 10 reps  Standing Partial Squat - 1 x daily - 7 x weekly - 2 sets - 10 reps    Total Timed Treatment:     42 mins  Total Time of Visit:             42 mins         ASSESSMENT/PLAN     GOALS  Goals                                          Progress Note due by  12/30/21                                                      Recert due by 2/27/22   LTG by: 6 weeks   Date Status   Patient will demonstrate independence with comprehensive HEP  Reviewed today, has not been able to perform as he has been sick for almost 3 weeks.  11/30/21 ongoing   Patient will demonstrate decreased B piriformis muscle guarding   No recent complaints of increased pain or guarding.   9/30/21 MET   Patient will demonstrate 5/5 B hip abductor strength  Improved ability to maintain form this date 12/8/21 Ongoing    Patient maintain tandem standing >20 seconds with minimal swaying  Increased difficulty today, likely related to lapse in care from illness. Maintained 5-8 seconds this date 11/30/21 ongoing   Patient will ambulate >200 ft without AD while maintaining good gait mechanics  CGA-Min A during amb on uneven surfaces due to decreased balance 12/2/21 Ongoing    Patient will score >25 on Tinetti   19 on evaluation   24 today     Difficulty performed activity that involves SLS, likely related to decreased hip stability and decreased balance    12/13/21 Progressing        Assessment/Plan     ASSESSMENT: Continued hip stability training today for improved balance and then also challenged his postural strength. He tolerated well, however his B legs were very tired by end of session. It would be appropriate to put his POC on hold at this point as he can continue strengthening at home.     PLAN: Consider putting POC on hold next session depending on patient response to session. Review and bolster HEP.     Signature: Lana Marcos, PT, DPT License #: [id number]  Electronically signed 12/15/21

## 2021-12-16 ENCOUNTER — OFFICE VISIT (OUTPATIENT)
Dept: NEUROSURGERY | Facility: CLINIC | Age: 86
End: 2021-12-16

## 2021-12-16 VITALS — WEIGHT: 165 LBS | BODY MASS INDEX: 24.44 KG/M2 | HEIGHT: 69 IN

## 2021-12-16 DIAGNOSIS — Z78.9 NON-SMOKER: ICD-10-CM

## 2021-12-16 DIAGNOSIS — M51.37 DEGENERATION OF LUMBAR OR LUMBOSACRAL INTERVERTEBRAL DISC: Primary | ICD-10-CM

## 2021-12-16 DIAGNOSIS — M48.062 SPINAL STENOSIS, LUMBAR REGION, WITH NEUROGENIC CLAUDICATION: ICD-10-CM

## 2021-12-16 PROCEDURE — 99213 OFFICE O/P EST LOW 20 MIN: CPT | Performed by: NEUROLOGICAL SURGERY

## 2021-12-16 NOTE — PROGRESS NOTES
SUBJECTIVE:  Patient ID: Chad Henriquez is a 89 y.o. male is here today for follow-up.    Chief Complaint: Back pain  Chief Complaint   Patient presents with   • Back Pain     patient is here for a 3 mo follow up - patient underwent a TLIF at L2-3 and an extension of a previous fusion L2-S1 on 6/3/2021.  Patient is doing good, still has some pain but is back to doing daily things around the house & driving.       HPI  89-year-old gentleman went to the operating for a L2-3 lumbar fusion with extension of previous fusion on Ute 3, 2021.  He is doing well.  Has no complaints of back pain or leg pain at this point.  Is back to his normal activities and is exercising on his own.  He takes no pain medicine other than Neurontin for his neuropathy.  The following portions of the patient's history were reviewed and updated as appropriate: allergies, current medications, past family history, past medical history, past social history, past surgical history and problem list.    OBJECTIVE:    Review of Systems   All other systems reviewed and are negative.         Physical Exam  Eyes:      Extraocular Movements: EOM normal.      Pupils: Pupils are equal, round, and reactive to light.   Neurological:      Mental Status: He is oriented to person, place, and time.      Coordination: Finger-Nose-Finger Test normal.      Gait: Gait is intact.      Deep Tendon Reflexes: Strength normal.   Psychiatric:         Speech: Speech normal.         Neurologic Exam     Mental Status   Oriented to person, place, and time.   Attention: normal.   Speech: speech is normal   Level of consciousness: alert  Knowledge: good.     Cranial Nerves     CN II   Visual fields full to confrontation.     CN III, IV, VI   Pupils are equal, round, and reactive to light.  Extraocular motions are normal.     CN V   Facial sensation intact.     CN VII   Facial expression full, symmetric.     CN VIII   CN VIII normal.     CN IX, X   CN IX normal.   CN X normal.      CN XI   CN XI normal.     CN XII   CN XII normal.     Motor Exam   Muscle bulk: normal  Overall muscle tone: normal  Right arm pronator drift: absent  Left arm pronator drift: absent    Strength   Strength 5/5 throughout.     Sensory Exam   Light touch normal.   Pinprick normal.     Gait, Coordination, and Reflexes     Gait  Gait: normal    Coordination   Finger to nose coordination: normal    Tremor   Resting tremor: absent  Intention tremor: absent  Action tremor: absent    Reflexes   Reflexes 2+ except as noted.       Independent Review of Radiographic Studies:       ASSESSMENT/PLAN:  Patient is doing remarkably well after his lumbar fusion.  His pain issues are under very good control.  At this point I only need to see him on an as-needed basis.      1. Degeneration of lumbar or lumbosacral intervertebral disc    2. Spinal stenosis, lumbar region, with neurogenic claudication    3. Body mass index (BMI) of 24.0 to 24.9 in adult    4. Non-smoker        The patient's Body mass index is 24.72 kg/m².. BMI is within normal parameters. No follow-up required.    Return if symptoms worsen or fail to improve.      Kj Falcon MD

## 2021-12-17 ENCOUNTER — OFFICE VISIT (OUTPATIENT)
Dept: UROLOGY | Facility: CLINIC | Age: 86
End: 2021-12-17

## 2021-12-17 VITALS — BODY MASS INDEX: 25.27 KG/M2 | TEMPERATURE: 97.6 F | HEIGHT: 69 IN | WEIGHT: 170.6 LBS

## 2021-12-17 DIAGNOSIS — N13.8 BPH WITH OBSTRUCTION/LOWER URINARY TRACT SYMPTOMS: ICD-10-CM

## 2021-12-17 DIAGNOSIS — R33.9 RETENTION OF URINE: Primary | ICD-10-CM

## 2021-12-17 DIAGNOSIS — N40.1 BPH WITH OBSTRUCTION/LOWER URINARY TRACT SYMPTOMS: ICD-10-CM

## 2021-12-17 LAB
BILIRUB BLD-MCNC: NEGATIVE MG/DL
CLARITY, POC: CLEAR
COLOR UR: YELLOW
GLUCOSE UR STRIP-MCNC: NEGATIVE MG/DL
KETONES UR QL: NEGATIVE
LEUKOCYTE EST, POC: NEGATIVE
NITRITE UR-MCNC: NEGATIVE MG/ML
PH UR: 7 [PH] (ref 5–8)
PROT UR STRIP-MCNC: ABNORMAL MG/DL
RBC # UR STRIP: ABNORMAL /UL
SP GR UR: 1.01 (ref 1–1.03)
UROBILINOGEN UR QL: NORMAL

## 2021-12-17 PROCEDURE — 81001 URINALYSIS AUTO W/SCOPE: CPT | Performed by: PHYSICIAN ASSISTANT

## 2021-12-17 PROCEDURE — 99213 OFFICE O/P EST LOW 20 MIN: CPT | Performed by: PHYSICIAN ASSISTANT

## 2021-12-20 ENCOUNTER — TREATMENT (OUTPATIENT)
Dept: PHYSICAL THERAPY | Facility: CLINIC | Age: 86
End: 2021-12-20

## 2021-12-20 DIAGNOSIS — Z74.09 IMPAIRED MOBILITY: ICD-10-CM

## 2021-12-20 DIAGNOSIS — Z98.1 HISTORY OF LUMBAR FUSION: Primary | ICD-10-CM

## 2021-12-20 PROCEDURE — 97112 NEUROMUSCULAR REEDUCATION: CPT

## 2021-12-20 PROCEDURE — 97110 THERAPEUTIC EXERCISES: CPT

## 2021-12-20 NOTE — PROGRESS NOTES
Physical Therapy Treatment Note and Discharge Note    Patient: Chad Henriquez                                                                                     Visit Date: 2021  :     1929    Referring practitioner:    EDIE Coulter  Date of Initial Visit:          Type: THERAPY  Noted: 2021    Patient seen for 26 sessions    Visit Diagnoses:    ICD-10-CM ICD-9-CM   1. History of lumbar fusion  Z98.1 V45.4   2. Impaired mobility  Z74.09 799.89     SUBJECTIVE     Subjective  No complaints or soreness following previous session.     PAIN: 0/10 currently          OBJECTIVE     Objective      Therapeutic Exercises    [id number] Comments   Walking for endurnace 10 min    Shuttle press BLE 6 cords, 2 min x 2     SL clamshells, bilaterally  X 20   Standing hip abd/ext 2 x 10, mirror used for visual feedback on posture            Timed Minutes 32     Neuromuscular Reeducation     [id number] Comments   M/L wobble board (static stanidng, EC, head turns)  CGA   A/P wobble board (static standing, EC) CGA; decreased anterior weight shifting    Tandem standing             Timed Minutes 10       Therapy Education/Self Care [id number]   Details:  Reviewed HEP and encouraged more consistent performance before progressing    Given DigitalMR HEP (access code [email or web address])   Progress: Reinforced   Education provided to:  Patient and Spouse/SO   Level of understanding Verbalized and Demonstrated   Timed Minutes      Access Code: [email or web address]  URL: [email or web address]  Date: 2021  Prepared by: Lana Marcos    Exercises  Standing Tandem Balance with Counter Support - 1 x daily - 7 x weekly - 1 sets - 5 reps - 20-30 sec hold  Standing Hip Abduction - 1 x daily - 7 x weekly - 2 sets - 10 reps  Standing Hip Extension - 1 x daily - 7 x weekly - 2 sets - 10 reps  Standing Partial Squat - 1 x daily - 7 x weekly - 2 sets - 10 reps  Clamshell with Resistance - 1 x daily - 7 x weekly - 2 sets - 10 reps  Supine Bridge with  Resistance Band - 1 x daily - 7 x weekly - 2 sets - 10 reps    Total Timed Treatment:     42 mins  Total Time of Visit:             42 mins         ASSESSMENT/PLAN     GOALS  Goals                                          Progress Note due by 12/30/21                                                      Recert due by 2/27/22   LTG by: 6 weeks   Date Status   Patient will demonstrate independence with comprehensive HEP  Updated and reviewed today 12/20/21 ongoing   Patient will demonstrate decreased B piriformis muscle guarding   No recent complaints of increased pain or guarding.   9/30/21 MET   Patient will demonstrate 5/5 B hip abductor strength  Improved ability to maintain form this date 12/8/21 Ongoing    Patient maintain tandem standing >20 seconds with minimal swaying  Increased difficulty today, likely related to lapse in care from illness. Maintained 5-8 seconds this date 11/30/21 ongoing   Patient will ambulate >200 ft without AD while maintaining good gait mechanics  CGA-Min A during amb on uneven surfaces due to decreased balance 12/2/21 Ongoing    Patient will score >25 on Tinetti   19 on evaluation   24 today     Difficulty performed activity that involves SLS, likely related to decreased hip stability and decreased balance    12/13/21 Progressing        Assessment/Plan     ASSESSMENT: Mr. Henriquez has progressed well with therapy and is ready for POC to be placed on hold. He had a follow up appoitnment with Dr. Falcon last week, no concerns reported. Today we focused on strengthening to ensure that he understands his HEP and will be compliant. Thorough discussion regarding staying active with walking. He and his wife verbalized understanding.     PLAN: POC on hold     Signature: Lana Marcos, PT, DPT License #: [id number]  Electronically signed 12/20/21        Physical Therapy Discharge Summary    Patient: Chad Henriquez                                                                                      Today's Date: 2022  :     1929    Date of Initial Visit:          Type: THERAPY  Noted: 2021    Patient seen for 26 sessions    Visit Diagnoses:    ICD-10-CM ICD-9-CM   1. History of lumbar fusion  Z98.1 V45.4   2. Impaired mobility  Z74.09 799.89       GOALS:  See above     DISCHARGE SUMMARY   Discharge date 2022   Dates of this episode 2021 through 2021   Number of visits on this episode 26   Reason for discharge maximum functional potential achieved   Outcomes achieved Refer to the goals table for specifics on goals   Discharge plan Continue with current home exercise program as instructed   Summary of care Mr. Henriquez was initially seen for impaired balance and back pain following his surgical procedure. He progressed very well with PT as his pain was almost completely absolved and his balance was maximally improved. He is very active in his daily life and he was provided an appropriate HEP to continue following discharge from PT.     Discharge instruction Continue with HEP      Lana Marcos, PT, DPT License #: [id number]

## 2021-12-23 ENCOUNTER — OFFICE VISIT (OUTPATIENT)
Dept: CARDIOLOGY CLINIC | Age: 86
End: 2021-12-23
Payer: MEDICARE

## 2021-12-23 VITALS
DIASTOLIC BLOOD PRESSURE: 80 MMHG | BODY MASS INDEX: 25.7 KG/M2 | SYSTOLIC BLOOD PRESSURE: 138 MMHG | HEART RATE: 63 BPM | HEIGHT: 68 IN

## 2021-12-23 DIAGNOSIS — I10 PRIMARY HYPERTENSION: ICD-10-CM

## 2021-12-23 DIAGNOSIS — I25.118 ATHEROSCLEROTIC HEART DISEASE OF NATIVE CORONARY ARTERY WITH OTHER FORMS OF ANGINA PECTORIS (HCC): ICD-10-CM

## 2021-12-23 DIAGNOSIS — I25.10 CORONARY ARTERY DISEASE INVOLVING NATIVE CORONARY ARTERY OF NATIVE HEART WITHOUT ANGINA PECTORIS: Primary | ICD-10-CM

## 2021-12-23 DIAGNOSIS — Z95.1 HX OF CABG: ICD-10-CM

## 2021-12-23 DIAGNOSIS — E78.2 MIXED HYPERLIPIDEMIA: ICD-10-CM

## 2021-12-23 PROCEDURE — 1036F TOBACCO NON-USER: CPT | Performed by: INTERNAL MEDICINE

## 2021-12-23 PROCEDURE — G8484 FLU IMMUNIZE NO ADMIN: HCPCS | Performed by: INTERNAL MEDICINE

## 2021-12-23 PROCEDURE — 99213 OFFICE O/P EST LOW 20 MIN: CPT | Performed by: INTERNAL MEDICINE

## 2021-12-23 PROCEDURE — 1123F ACP DISCUSS/DSCN MKR DOCD: CPT | Performed by: INTERNAL MEDICINE

## 2021-12-23 PROCEDURE — 93000 ELECTROCARDIOGRAM COMPLETE: CPT | Performed by: INTERNAL MEDICINE

## 2021-12-23 PROCEDURE — G8417 CALC BMI ABV UP PARAM F/U: HCPCS | Performed by: INTERNAL MEDICINE

## 2021-12-23 PROCEDURE — 4040F PNEUMOC VAC/ADMIN/RCVD: CPT | Performed by: INTERNAL MEDICINE

## 2021-12-23 PROCEDURE — G8427 DOCREV CUR MEDS BY ELIG CLIN: HCPCS | Performed by: INTERNAL MEDICINE

## 2021-12-23 ASSESSMENT — ENCOUNTER SYMPTOMS
SHORTNESS OF BREATH: 0
EYES NEGATIVE: 1
DIARRHEA: 0
GASTROINTESTINAL NEGATIVE: 1
NAUSEA: 0
VOMITING: 0
RESPIRATORY NEGATIVE: 1

## 2021-12-23 NOTE — PROGRESS NOTES
[facility] Progress Note                            Date:  12/23/2021  Patient: Filemon Alvarez  Age:  80 y. o., 11/9/1929      Reason for evaluation:         SUBJECTIVE:    Returns today follow-up assessment coronary artery disease hypertension hyperlipidemia and previous CABG. Recent Covid. Has chronic shortness of breath but seems to be gradually improving denies anginal chest pain. Blood pressure 138/80 heart 63. Recent LDL cholesterol 12/1/2021 was 94. Review of Systems   Constitutional: Negative. Negative for chills, fever and unexpected weight change. HENT: Negative. Eyes: Negative. Respiratory: Negative. Negative for shortness of breath. Cardiovascular: Negative. Negative for chest pain. Gastrointestinal: Negative. Negative for diarrhea, nausea and vomiting. Endocrine: Negative. Genitourinary: Negative. Musculoskeletal: Negative. Skin: Negative. Neurological: Negative. All other systems reviewed and are negative. OBJECTIVE:     /80   Pulse 63   Ht 5' 8\" (1.727 m)   BMI 25.70 kg/m²     Labs:   CBC: No results for input(s): WBC, HGB, HCT, PLT in the last 72 hours. BMP:No results for input(s): NA, K, CO2, BUN, CREATININE, LABGLOM, GLUCOSE in the last 72 hours. BNP: No results for input(s): BNP in the last 72 hours. PT/INR: No results for input(s): PROTIME, INR in the last 72 hours. APTT:No results for input(s): APTT in the last 72 hours. CARDIAC ENZYMES:No results for input(s): CKTOTAL, CKMB, CKMBINDEX, TROPONINI in the last 72 hours. FASTING LIPID PANEL:  Lab Results   Component Value Date    HDL 45 05/12/2020    LDLCALC 71 05/12/2020    TRIG 86 05/12/2020     LIVER PROFILE:No results for input(s): AST, ALT, LABALBU in the last 72 hours.         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 Braxton Age, 3/2018   [place] 43      CABG    DIAGNOSTIC CARDIAC CATH LAB PROCEDURE      Stent to mid and distal RCA 1997    HERNIA REPAIR      NECK SURGERY       Family History   Problem Relation Age of Onset    High Blood Pressure Mother     Stroke Mother     Coronary Art Dis Father      Allergies   Allergen Reactions    Codeine     Fentanyl Other (See Comments)     Fentanyl patchs, caused patient to become confused and anxious per family     Current Outpatient Medications   Medication Sig Dispense Refill    aspirin 81 MG tablet Take 81 mg by mouth daily      metoprolol tartrate (LOPRESSOR) 25 MG tablet Take 25 mg by mouth 2 times daily Bid 1/2 in the am and a whole one at night      docusate sodium (COLACE, DULCOLAX) 100 MG CAPS Take 100 mg by mouth 2 times daily 60 capsule 0    alfuzosin (UROXATRAL) 10 MG extended release tablet Take 1 tablet by mouth nightly 30 tablet 3    finasteride (PROSCAR) 5 MG tablet Take 1 tablet by mouth daily 30 tablet 3    primidone (MYSOLINE) 50 MG tablet Take 5 mg by mouth nightly       chlorpheniramine (CHLOR-TRIMETON) 4 MG tablet Take 4 mg by mouth daily       Flaxseed, Linseed, (FLAXSEED OIL) 1000 MG CAPS Take 2,000 mg by mouth daily       pravastatin (PRAVACHOL) 40 MG tablet Take 40 mg by mouth daily.  folic acid ([employer] FOLIC ACID) 304 MCG tablet Take 800 mcg by mouth daily.  L-Lysine 500 MG TABS Take 500 mg by mouth daily.  gabapentin (NEURONTIN) 100 MG capsule Take 1-2 at night for leg pain.  (Patient taking differently: Take 500 mg by mouth 3 times daily. ) 90 capsule 0     No current facility-administered medications for this visit. Social History     Socioeconomic History    Marital status:      Spouse name: Not on file    Number of children: Not on file    Years of education: Not on file    Highest education level: Not on file   Occupational History    Not on file   Tobacco Use    Smoking status: Former Smoker     Packs/day: 2.00     Years: 6.00     Pack years: 12.00     Types: Cigarettes    Smokeless tobacco: Never Used   Substance and Sexual Activity    Alcohol use: No    Drug use: No    Sexual activity: Yes     Partners: Female     Comment: He is . Other Topics Concern    Not on file   Social History Narrative    Not on file     Social Determinants of Health     Financial Resource Strain:     Difficulty of Paying Living Expenses: Not on file   Food Insecurity:     Worried About Running Out of Food in the Last Year: Not on file    [place] of Food in the Last Year: Not on file   Transportation Needs:     Lack of Transportation (Medical): Not on file    Lack of Transportation (Non-Medical):  Not on file   Physical Activity:     Days of Exercise per Week: Not on file    Minutes of Exercise per Session: Not on file   Stress:     Feeling of Stress : Not on file   Social Connections:     Frequency of Communication with Friends and Family: Not on file    Frequency of Social Gatherings with Friends and Family: Not on file    Attends Jew Services: Not on file    Active Member of [address] [place] [address] or Organizations: Not on file    Attends Club or Organization Meetings: Not on file    Marital Status: Not on file   Intimate Partner Violence:     Fear of Current or Ex-Partner: Not on file    Emotionally Abused: Not on file    Physically Abused: Not on file    Sexually Abused: Not on file   Housing Stability:     Unable to Pay for Housing in the Last Year: Not on file    Number of [place] in the Last Year: Not on file    Unstable Housing in the Last Year: Not on file

## 2022-01-29 ENCOUNTER — HOSPITAL ENCOUNTER (EMERGENCY)
Facility: HOSPITAL | Age: 87
Discharge: HOME | End: 2022-01-29
Attending: EMERGENCY MEDICINE | Admitting: EMERGENCY MEDICINE

## 2022-01-29 ENCOUNTER — APPOINTMENT (OUTPATIENT)
Dept: CT IMAGING | Facility: HOSPITAL | Age: 87
End: 2022-01-29

## 2022-01-29 VITALS
TEMPERATURE: 97.3 F | DIASTOLIC BLOOD PRESSURE: 76 MMHG | WEIGHT: 166 LBS | OXYGEN SATURATION: 96 % | BODY MASS INDEX: 23.77 KG/M2 | HEIGHT: 70 IN | SYSTOLIC BLOOD PRESSURE: 168 MMHG | HEART RATE: 74 BPM | RESPIRATION RATE: 18 BRPM

## 2022-01-29 DIAGNOSIS — K52.9 GASTROENTERITIS: Primary | ICD-10-CM

## 2022-01-29 LAB
ALBUMIN SERPL-MCNC: 4.2 G/DL (ref 3.5–5.2)
ALBUMIN/GLOB SERPL: 1.6 G/DL
ALP SERPL-CCNC: 83 U/L (ref 39–117)
ALT SERPL W P-5'-P-CCNC: 23 U/L (ref 1–41)
ANION GAP SERPL CALCULATED.3IONS-SCNC: 11 MMOL/L (ref 5–15)
AST SERPL-CCNC: 26 U/L (ref 1–40)
BASOPHILS # BLD AUTO: 0.03 10*3/MM3 (ref 0–0.2)
BASOPHILS NFR BLD AUTO: 0.4 % (ref 0–1.5)
BILIRUB SERPL-MCNC: 0.6 MG/DL (ref 0–1.2)
BUN SERPL-MCNC: 14 MG/DL (ref 8–23)
BUN/CREAT SERPL: 23.3 (ref 7–25)
CALCIUM SPEC-SCNC: 8.3 MG/DL (ref 8.2–9.6)
CHLORIDE SERPL-SCNC: 100 MMOL/L (ref 98–107)
CO2 SERPL-SCNC: 23 MMOL/L (ref 22–29)
CREAT SERPL-MCNC: 0.6 MG/DL (ref 0.76–1.27)
DEPRECATED RDW RBC AUTO: 46.9 FL (ref 37–54)
EOSINOPHIL # BLD AUTO: 0.07 10*3/MM3 (ref 0–0.4)
EOSINOPHIL NFR BLD AUTO: 0.9 % (ref 0.3–6.2)
ERYTHROCYTE [DISTWIDTH] IN BLOOD BY AUTOMATED COUNT: 13.2 % (ref 12.3–15.4)
GFR SERPL CREATININE-BSD FRML MDRD: 126 ML/MIN/1.73
GLOBULIN UR ELPH-MCNC: 2.6 GM/DL
GLUCOSE SERPL-MCNC: 106 MG/DL (ref 65–99)
HCT VFR BLD AUTO: 38.7 % (ref 37.5–51)
HGB BLD-MCNC: 13 G/DL (ref 13–17.7)
IMM GRANULOCYTES # BLD AUTO: 0.03 10*3/MM3 (ref 0–0.05)
IMM GRANULOCYTES NFR BLD AUTO: 0.4 % (ref 0–0.5)
INR PPP: 1.16 (ref 0.91–1.09)
LIPASE SERPL-CCNC: 28 U/L (ref 13–60)
LYMPHOCYTES # BLD AUTO: 0.82 10*3/MM3 (ref 0.7–3.1)
LYMPHOCYTES NFR BLD AUTO: 10.1 % (ref 19.6–45.3)
MCH RBC QN AUTO: 32.7 PG (ref 26.6–33)
MCHC RBC AUTO-ENTMCNC: 33.6 G/DL (ref 31.5–35.7)
MCV RBC AUTO: 97.2 FL (ref 79–97)
MONOCYTES # BLD AUTO: 0.69 10*3/MM3 (ref 0.1–0.9)
MONOCYTES NFR BLD AUTO: 8.5 % (ref 5–12)
NEUTROPHILS NFR BLD AUTO: 6.47 10*3/MM3 (ref 1.7–7)
NEUTROPHILS NFR BLD AUTO: 79.7 % (ref 42.7–76)
NRBC BLD AUTO-RTO: 0 /100 WBC (ref 0–0.2)
PLATELET # BLD AUTO: 310 10*3/MM3 (ref 140–450)
PMV BLD AUTO: 9.1 FL (ref 6–12)
POTASSIUM SERPL-SCNC: 4.4 MMOL/L (ref 3.5–5.2)
PROT SERPL-MCNC: 6.8 G/DL (ref 6–8.5)
PROTHROMBIN TIME: 14.4 SECONDS (ref 11.9–14.6)
RBC # BLD AUTO: 3.98 10*6/MM3 (ref 4.14–5.8)
SARS-COV-2 RNA PNL SPEC NAA+PROBE: NOT DETECTED
SODIUM SERPL-SCNC: 134 MMOL/L (ref 136–145)
WBC NRBC COR # BLD: 8.11 10*3/MM3 (ref 3.4–10.8)

## 2022-01-29 PROCEDURE — 96374 THER/PROPH/DIAG INJ IV PUSH: CPT

## 2022-01-29 PROCEDURE — 80053 COMPREHEN METABOLIC PANEL: CPT | Performed by: EMERGENCY MEDICINE

## 2022-01-29 PROCEDURE — 25010000002 IOPAMIDOL 61 % SOLUTION: Performed by: EMERGENCY MEDICINE

## 2022-01-29 PROCEDURE — 99283 EMERGENCY DEPT VISIT LOW MDM: CPT

## 2022-01-29 PROCEDURE — 25010000002 ONDANSETRON PER 1 MG: Performed by: EMERGENCY MEDICINE

## 2022-01-29 PROCEDURE — 74177 CT ABD & PELVIS W/CONTRAST: CPT

## 2022-01-29 PROCEDURE — 85610 PROTHROMBIN TIME: CPT | Performed by: EMERGENCY MEDICINE

## 2022-01-29 PROCEDURE — 85025 COMPLETE CBC W/AUTO DIFF WBC: CPT | Performed by: EMERGENCY MEDICINE

## 2022-01-29 PROCEDURE — 87635 SARS-COV-2 COVID-19 AMP PRB: CPT | Performed by: EMERGENCY MEDICINE

## 2022-01-29 PROCEDURE — 83690 ASSAY OF LIPASE: CPT | Performed by: EMERGENCY MEDICINE

## 2022-01-29 RX ORDER — ONDANSETRON 2 MG/ML
8 INJECTION INTRAMUSCULAR; INTRAVENOUS ONCE
Status: COMPLETED | OUTPATIENT
Start: 2022-01-29 | End: 2022-01-29

## 2022-01-29 RX ORDER — FAMOTIDINE 20 MG/1
20 TABLET, FILM COATED ORAL 2 TIMES DAILY
Qty: 20 TABLET | Refills: 0 | Status: SHIPPED | OUTPATIENT
Start: 2022-01-29

## 2022-01-29 RX ORDER — SODIUM CHLORIDE 0.9 % (FLUSH) 0.9 %
10 SYRINGE (ML) INJECTION AS NEEDED
Status: DISCONTINUED | OUTPATIENT
Start: 2022-01-29 | End: 2022-01-29 | Stop reason: HOSPADM

## 2022-01-29 RX ORDER — ONDANSETRON 8 MG/1
8 TABLET, ORALLY DISINTEGRATING ORAL EVERY 8 HOURS PRN
Qty: 15 TABLET | Refills: 0 | Status: SHIPPED | OUTPATIENT
Start: 2022-01-29

## 2022-01-29 RX ADMIN — IOPAMIDOL 100 ML: 612 INJECTION, SOLUTION INTRAVENOUS at 13:11

## 2022-01-29 RX ADMIN — SODIUM CHLORIDE, PRESERVATIVE FREE 10 ML: 5 INJECTION INTRAVENOUS at 11:55

## 2022-01-29 RX ADMIN — ONDANSETRON HYDROCHLORIDE 8 MG: 2 SOLUTION INTRAMUSCULAR; INTRAVENOUS at 11:55

## 2022-01-29 RX ADMIN — SODIUM CHLORIDE, POTASSIUM CHLORIDE, SODIUM LACTATE AND CALCIUM CHLORIDE 1000 ML: 600; 310; 30; 20 INJECTION, SOLUTION INTRAVENOUS at 11:48

## 2022-03-21 ENCOUNTER — OFFICE VISIT (OUTPATIENT)
Dept: INTERNAL MEDICINE | Facility: CLINIC | Age: 87
End: 2022-03-21

## 2022-03-21 VITALS
OXYGEN SATURATION: 98 % | HEIGHT: 70 IN | HEART RATE: 78 BPM | DIASTOLIC BLOOD PRESSURE: 70 MMHG | SYSTOLIC BLOOD PRESSURE: 146 MMHG | TEMPERATURE: 97.8 F | WEIGHT: 167 LBS | BODY MASS INDEX: 23.91 KG/M2

## 2022-03-21 DIAGNOSIS — T63.301A SPIDER BITE WOUND, ACCIDENTAL OR UNINTENTIONAL, INITIAL ENCOUNTER: Primary | ICD-10-CM

## 2022-03-21 PROCEDURE — 99213 OFFICE O/P EST LOW 20 MIN: CPT | Performed by: NURSE PRACTITIONER

## 2022-03-21 RX ORDER — CHLORHEXIDINE GLUCONATE 213 G/1000ML
SOLUTION TOPICAL DAILY PRN
Qty: 118 ML | Refills: 0 | Status: SHIPPED | OUTPATIENT
Start: 2022-03-21

## 2022-03-21 RX ORDER — DOXYCYCLINE HYCLATE 100 MG/1
100 CAPSULE ORAL 2 TIMES DAILY
Qty: 20 CAPSULE | Refills: 0 | Status: SHIPPED | OUTPATIENT
Start: 2022-03-21 | End: 2022-08-03

## 2022-03-21 RX ORDER — MUPIROCIN CALCIUM 20 MG/G
1 CREAM TOPICAL DAILY
Qty: 15 G | Refills: 0 | Status: SHIPPED | OUTPATIENT
Start: 2022-03-21

## 2022-03-21 NOTE — PROGRESS NOTES
"Chief Complaint  Insect Bite (Left groin, open wound. Possible spider bite.Found it when getting a bath Saturday night. Patient has used antibiotic ointment and hydrogen peroxide)    Subjective          Chad Henriquez presents to [facility]  Mr. Henriquez present to the office due to new wound on left groin. He is concerned for spider bite. He reports that he put on pair of pants on that had been hanging in the closet for \"sometime\". He reports noticing wound on groin 2 days ago while showering. Patient reports wife had been cleaning with peroxide and alcohol. He presents with ointment and bandaid on the wound site.     Insect Bite  This is a new problem. The current episode started in the past 7 days. The problem has been gradually worsening. Pertinent negatives include no arthralgias, chills, fever, headaches, joint swelling, myalgias, numbness, rash, swollen glands, vertigo, visual change or weakness. Treatments tried: peroxide, alcohol.       Objective   Vital Signs:   /70 (BP Location: Left arm, Patient Position: Sitting, Cuff Size: Adult)   Pulse 78   Temp 97.8 °F (36.6 °C)   Ht 177.8 cm (70\")   Wt 75.8 kg (167 lb)   SpO2 98%   BMI 23.96 kg/m²     Physical Exam  Vitals and nursing note reviewed.   Constitutional:       Appearance: Normal appearance. He is well-developed, well-groomed and normal weight.   HENT:      Head: Normocephalic and atraumatic.      Right Ear: External ear normal.      Left Ear: External ear normal.   Eyes:      Conjunctiva/sclera: Conjunctivae normal.      Pupils: Pupils are equal, round, and reactive to light.   Neck:      Thyroid: No thyromegaly.   Cardiovascular:      Rate and Rhythm: Normal rate and regular rhythm.      Pulses: Normal pulses.      Heart sounds: Normal heart sounds.   Pulmonary:      Effort: Pulmonary effort is normal.      Breath sounds: Normal breath sounds.   Abdominal:      General: Bowel sounds are normal.      " Palpations: Abdomen is soft.   Musculoskeletal:      Cervical back: Normal range of motion and neck supple.      Right lower leg: No edema.      Left lower leg: No edema.   Lymphadenopathy:      Cervical: No cervical adenopathy.   Skin:     General: Skin is warm and dry.      Capillary Refill: Capillary refill takes less than 2 seconds.      Findings: Abscess, erythema and wound present. No bruising or ecchymosis.          Neurological:      General: No focal deficit present.      Mental Status: He is alert and oriented to person, place, and time.      Deep Tendon Reflexes: Reflexes are normal and symmetric.   Psychiatric:         Behavior: Behavior normal. Behavior is cooperative.         Thought Content: Thought content normal.        Result Review :   The following data was reviewed by: EDIE Hollis on 03/21/2022:  CMP    CMP 7/1/21 12/1/21 1/29/22   Glucose 134 (A) 97 106 (A)   BUN 12 19 14   Creatinine 0.56 (A) 0.71 (A) 0.60 (A)   eGFR Non African Am 137 104 126   eGFR African Am  126    Sodium 131 (A) 139 134 (A)   Potassium 4.7 5.2 4.4   Chloride 99 103 100   Calcium 7.8 (A) 9.6 8.3   Total Protein  6.9    Albumin  4.70 4.20   Globulin  2.2    Total Bilirubin  0.5 0.6   Alkaline Phosphatase  82 83   AST (SGOT)  24 26   ALT (SGPT)  23 23   (A) Abnormal value       Comments are available for some flowsheets but are not being displayed.                    Assessment and Plan    Diagnoses and all orders for this visit:    1. Spider bite wound, accidental or unintentional, initial encounter (Primary)  -     doxycycline (VIBRAMYCIN) 100 MG capsule; Take 1 capsule by mouth 2 (Two) Times a Day.  Dispense: 20 capsule; Refill: 0  -     Anaerobic Culture - Swab, Groin  -     Culture, Routine - Swab, Groin, left  -     mupirocin (Bactroban) 2 % cream; Apply 1 application topically to the appropriate area as directed Daily.  Dispense: 15 g; Refill: 0  -     chlorhexidine (Hibiclens) 4 % external liquid; Apply   topically to the appropriate area as directed Daily As Needed for Wound Care.  Dispense: 118 mL; Refill: 0      I spent 20 minutes caring for Chad on this date of service. This time includes time spent by me in the following activities:preparing for the visit, reviewing tests, obtaining and/or reviewing a separately obtained history, performing a medically appropriate examination and/or evaluation , counseling and educating the patient/family/caregiver, ordering medications, tests, or procedures, documenting information in the medical record, independently interpreting results and communicating that information with the patient/family/caregiver and care coordination  Follow Up     Return in about 1 week (around 3/28/2022) for Recheck groin wound.     Patient will start antibiotic with MRSA coverage. Send swabs to culture wound and switch antibiotic if appropriate. Wash site with antibacterial soap daily. Place bactroban on site. bandaid to cover. Call office and return sooner if wound increases in size or worsening pain.   Patient was given instructions and counseling regarding his condition or for health maintenance advice. Please see specific information pulled into the AVS if appropriate.

## 2022-03-25 LAB
BACTERIA SPEC AEROBE CULT: NORMAL
BACTERIA SPEC ANAEROBE CULT: NORMAL
BACTERIA SPEC CULT: NORMAL
BACTERIA SPEC CULT: NORMAL

## 2022-03-28 ENCOUNTER — OFFICE VISIT (OUTPATIENT)
Dept: INTERNAL MEDICINE | Facility: CLINIC | Age: 87
End: 2022-03-28

## 2022-03-28 VITALS
SYSTOLIC BLOOD PRESSURE: 150 MMHG | HEIGHT: 70 IN | TEMPERATURE: 97.8 F | BODY MASS INDEX: 24.2 KG/M2 | WEIGHT: 169 LBS | DIASTOLIC BLOOD PRESSURE: 70 MMHG | OXYGEN SATURATION: 97 % | HEART RATE: 64 BPM

## 2022-03-28 DIAGNOSIS — T63.301A SPIDER BITE WOUND, ACCIDENTAL OR UNINTENTIONAL, INITIAL ENCOUNTER: Primary | ICD-10-CM

## 2022-03-28 PROCEDURE — 99213 OFFICE O/P EST LOW 20 MIN: CPT | Performed by: NURSE PRACTITIONER

## 2022-03-28 NOTE — PROGRESS NOTES
Subjective   Chad Henriquez is a 89 y.o. male.   Chief Complaint   Patient presents with   • Groin Injury     1 wk wound f/u       Chad presents today for a 1 week follow up to recheck a spider bite wound.  He states his wife has been cleaning the area twice a day and overall feels it is improving.  He continue on doxycycline orally and has 3 days of treatment left.  Wound culture was negative.  He denies worsening pain or appearance of the wound.        The following portions of the patient's history were reviewed and updated as appropriate: allergies, current medications, past family history, past medical history, past social history, past surgical history and problem list.    Review of Systems   Constitutional: Negative for activity change, appetite change, fatigue, fever, unexpected weight gain and unexpected weight loss.   HENT: Negative for swollen glands, trouble swallowing and voice change.    Eyes: Negative for blurred vision and visual disturbance.   Respiratory: Negative for cough and shortness of breath.    Cardiovascular: Negative for chest pain, palpitations and leg swelling.   Gastrointestinal: Negative for abdominal pain, constipation, diarrhea, nausea, vomiting and indigestion.   Endocrine: Negative for cold intolerance, heat intolerance, polydipsia and polyphagia.   Genitourinary: Negative for dysuria and frequency.   Musculoskeletal: Negative for arthralgias, back pain, joint swelling and neck pain.   Skin: Positive for wound. Negative for color change, rash and skin lesions.   Neurological: Negative for dizziness, weakness, headache, memory problem and confusion.   Hematological: Does not bruise/bleed easily.   Psychiatric/Behavioral: Negative for agitation, hallucinations and suicidal ideas. The patient is not nervous/anxious.        Objective   Past Medical History:   Diagnosis Date   • Allergic rhinitis    • Anemia    • Arthritis    • Blind left eye    • BPH (benign prostatic hypertrophy)  with urinary retention    • Cancer (HCC)     skin cancer   • Chronic back pain     RUNS DOWN BOTH LEGS   • Constipation    • Coronary artery disease    • Hard of hearing    • Heart attack (HCC) 1986    NO MUSCLE DAMAGE - JUST OCCLUDED VALVE   • High cholesterol    • History of skin cancer     BILATERAL EARS   • History of transfusion     1986   • Hypertension    • Inguinal hernia    • Leaky heart valve     DR CONCEPCION SAYS NOT ENOUGH TO BE OF CONCERN   • Other bursal cyst, right elbow    • PONV (postoperative nausea and vomiting)     has had scopalamine patch in past    • Sleep apnea     DOES NOT USE CPAP OR BIPAP   • Spinal stenosis of cervical region    • Spinal stenosis of lumbar region    • Tremors of nervous system    • Wears hearing aid     BILATERAL      Past Surgical History:   Procedure Laterality Date   • ANTERIOR LUMBAR EXPOSURE N/A 12/7/2018    Procedure: ANTERIOR LUMBAR EXPOSURE;  Surgeon: Manolo Mcleod DO;  Location:  [facility];  Service: Vascular   • APPENDECTOMY     • BACK SURGERY      X3   • CARDIAC SURGERY  08/08/1986    X1 BYPASS   • CORONARY ANGIOPLASTY WITH STENT PLACEMENT  1997    X3 STENTS   • HERNIA REPAIR Bilateral     x2   • INGUINAL HERNIA REPAIR Right 6/22/2017    Procedure: RIGHT INGUINAL HERNIA REPAIR AND EXCISION OF CYST RIGHT ELBOW ;  Surgeon: Jackelyn Arriola MD;  Location:  [facility];  Service:    • LUMBAR FUSION Left 6/30/2021    Procedure: LUMBAR LAMINECTOMY, DISCECTOMY, FACETECTOMY,  TRANSFORAMINAL LUMBAR INTERBODY FUSION L2-3. REVISION OF INSTRUMENTATION L3-S1. USE OF IMAGE GUIDANCE AND SURGICAL ROBOT;  Surgeon: Kj Falcon MD;  Location:  [facility];  Service: Robotics - Neuro;  Laterality: Left;   • LUMBAR LAMINECTOMY N/A 3/12/2018    Procedure: LUMBAR LAMINECTOMY WITHOUT FUSION L3-4,4-5;  Surgeon: Kj Falcon MD;  Location:  [facility];  Service: Neurosurgery   • LUMBAR LAMINECTOMY ANTERIOR LUMBAR INTERBODY FUSION N/A 12/7/2018    Procedure: ANTERIOR LUMBAR  INTERBODY FUSION L5-S1;  Surgeon: Kj Falcon MD;  Location:  [facility];  Service: Neurosurgery   • LUMBAR LAMINECTOMY WITH FUSION Left 12/10/2018    Procedure: LUMBAR LAMINECTOMY TRANSFORAMINAL LUMBAR INTERBODY FUSION L34,45;  Surgeon: Kj Falcon MD;  Location:  [facility];  Service: Neurosurgery   • LUMBAR LAMINECTOMY WITH FUSION Left 12/7/2018    Procedure: LUMBAR LAMINECTOMY TRANSFORAMINAL LUMBAR INTERBODY FUSION LEFT L3-4, L4-5 QUADRANT RETRACTOR;  Surgeon: Kj Falcon MD;  Location:  [facility];  Service: Neurosurgery   • SKIN LESION EXCISION      nasal        Current Outpatient Medications:   •  alfuzosin (UROXATRAL) 10 MG 24 hr tablet, Take 1 tablet by mouth Daily., Disp: 30 tablet, Rfl: 5  •  amLODIPine (NORVASC) 2.5 MG tablet, Take 2.5 mg by mouth Daily., Disp: , Rfl:   •  Ascorbic Acid (Vitamin C) 500 MG capsule, Take 1 capsule by mouth 2 (two) times a day., Disp: , Rfl:   •  aspirin 81 MG EC tablet, Take 81 mg by mouth Daily., Disp: , Rfl:   •  chlorhexidine (Hibiclens) 4 % external liquid, Apply  topically to the appropriate area as directed Daily As Needed for Wound Care., Disp: 118 mL, Rfl: 0  •  chlorpheniramine (CHLOR-TRIMETON) 4 MG tablet, Take 4 mg by mouth Daily., Disp: , Rfl:   •  Cholecalciferol (Vitamin D3) 50 MCG (2000 UT) tablet, Take 1 tablet by mouth Daily., Disp: , Rfl:   •  doxycycline (VIBRAMYCIN) 100 MG capsule, Take 1 capsule by mouth 2 (Two) Times a Day., Disp: 20 capsule, Rfl: 0  •  famotidine (PEPCID) 20 MG tablet, Take 1 tablet by mouth 2 (Two) Times a Day., Disp: 20 tablet, Rfl: 0  •  finasteride (PROSCAR) 5 MG tablet, Take 1 tablet by mouth Daily., Disp: 30 tablet, Rfl: 5  •  Flaxseed, Linseed, (FLAXSEED OIL) 1000 MG capsule, 540mg takes 4 tablets by mouth daily, Disp: , Rfl:   •  folic acid (FOLVITE) 800 MCG tablet, Taking as multivitamin now, Disp: , Rfl:   •  gabapentin (NEURONTIN) 600 MG tablet, Take 1 tablet by mouth 3 (Three) Times a Day., Disp: 90 tablet,  Rfl: 3  •  L-Lysine 600 MG tablet, Take 1 tablet by mouth Daily., Disp: , Rfl:   •  metoprolol tartrate (LOPRESSOR) 25 MG tablet, 1/2 in morning and 1 at night, Disp: 135 tablet, Rfl: 3  •  mupirocin (Bactroban) 2 % cream, Apply 1 application topically to the appropriate area as directed Daily., Disp: 15 g, Rfl: 0  •  ondansetron ODT (ZOFRAN-ODT) 8 MG disintegrating tablet, Place 1 tablet on the tongue Every 8 (Eight) Hours As Needed for Nausea or Vomiting., Disp: 15 tablet, Rfl: 0  •  pravastatin (PRAVACHOL) 20 MG tablet, Take 1 tablet by mouth Every Night., Disp: 90 tablet, Rfl: 1  •  primidone (MYSOLINE) 50 MG tablet, Take 1/2 tablet at bedtime (Patient taking differently: Take 50 mg by mouth.), Disp: 45 tablet, Rfl: 3  •  sennosides-docusate (senna-docusate sodium) 8.6-50 MG per tablet, Take 2 tablets by mouth Daily., Disp: 60 tablet, Rfl: 1      Vitals:    03/28/22 1520   BP: 150/70   Pulse: 64   Temp: 97.8 °F (36.6 °C)   SpO2: 97%         03/28/22  1520   Weight: 76.7 kg (169 lb)       Body mass index is 24.25 kg/m².    Physical Exam  Constitutional:       General: He is not in acute distress.     Appearance: He is well-developed.   HENT:      Head: Normocephalic.      Right Ear: External ear normal.      Left Ear: External ear normal.      Mouth/Throat:      Mouth: No oral lesions.   Eyes:      Conjunctiva/sclera: Conjunctivae normal.   Cardiovascular:      Rate and Rhythm: Normal rate and regular rhythm.      Heart sounds: Normal heart sounds.   Pulmonary:      Effort: Pulmonary effort is normal.      Breath sounds: Normal breath sounds.   Skin:     General: Skin is warm and dry.             Comments: 1cm x 0.5cm open wound present to left groin.  Small amount of yellow/white pus expressed from the wound followed by serous fluid.  No surrounding erythema.  No tenderness with palpation.    Neurological:      Mental Status: He is alert and oriented to person, place, and time.      Gait: Gait normal.    Psychiatric:         Mood and Affect: Mood normal.         Speech: Speech normal.         Behavior: Behavior normal.         Thought Content: Thought content normal.               Assessment/Plan   Diagnoses and all orders for this visit:    1. Spider bite wound, accidental or unintentional, initial encounter (Primary)        Continue with cleansing and applying topical mupirocin to the area.  Continue Doxycyline until completed.  I have advised warm compresses with epsom salt twice daily to the wound.  Will have him return for recheck on Friday.  It appears there has been some improvement but comparing what I see today vs what was documented 1 week ago, I'd expect to have further progress than there is today.  There is no worsening appearance and no lymphadenopathy.  Will continue current treatment with recheck on Friday.  Sooner if needed.

## 2022-04-01 ENCOUNTER — OFFICE VISIT (OUTPATIENT)
Dept: INTERNAL MEDICINE | Facility: CLINIC | Age: 87
End: 2022-04-01

## 2022-04-01 VITALS
TEMPERATURE: 98.2 F | DIASTOLIC BLOOD PRESSURE: 60 MMHG | BODY MASS INDEX: 24.2 KG/M2 | WEIGHT: 169 LBS | HEART RATE: 60 BPM | HEIGHT: 70 IN | OXYGEN SATURATION: 98 % | SYSTOLIC BLOOD PRESSURE: 140 MMHG

## 2022-04-01 DIAGNOSIS — T63.301D SPIDER BITE WOUND, ACCIDENTAL OR UNINTENTIONAL, SUBSEQUENT ENCOUNTER: Primary | ICD-10-CM

## 2022-04-01 PROCEDURE — 99213 OFFICE O/P EST LOW 20 MIN: CPT | Performed by: NURSE PRACTITIONER

## 2022-04-01 RX ORDER — CHLORHEXIDINE GLUCONATE 4% 4 MG/100ML
LIQUID TOPICAL
COMMUNITY
Start: 2022-03-21

## 2022-04-01 NOTE — PROGRESS NOTES
Subjective   Chad Henriquez is a 89 y.o. male.   Chief Complaint   Patient presents with   • Insect Bite     4 week follow up       Chad presents today for 4 day recheck for a previous spider bite to the left groin.  He finished his Doxycyline this morning.  He denies pain to the area.        The following portions of the patient's history were reviewed and updated as appropriate: allergies, current medications, past family history, past medical history, past social history, past surgical history and problem list.    Review of Systems   Constitutional: Negative for activity change, appetite change, fatigue, fever, unexpected weight gain and unexpected weight loss.   HENT: Negative for swollen glands, trouble swallowing and voice change.    Eyes: Negative for blurred vision and visual disturbance.   Respiratory: Negative for cough and shortness of breath.    Cardiovascular: Negative for chest pain, palpitations and leg swelling.   Gastrointestinal: Negative for abdominal pain, constipation, diarrhea, nausea, vomiting and indigestion.   Endocrine: Negative for cold intolerance, heat intolerance, polydipsia and polyphagia.   Genitourinary: Negative for dysuria and frequency.   Musculoskeletal: Negative for arthralgias, back pain, joint swelling and neck pain.   Skin: Negative for color change, rash and skin lesions.        Left insect bite   Neurological: Negative for dizziness, weakness, headache, memory problem and confusion.   Hematological: Does not bruise/bleed easily.   Psychiatric/Behavioral: Negative for agitation, hallucinations and suicidal ideas. The patient is not nervous/anxious.        Objective   Past Medical History:   Diagnosis Date   • Allergic rhinitis    • Anemia    • Arthritis    • Blind left eye    • BPH (benign prostatic hypertrophy) with urinary retention    • Cancer (HCC)     skin cancer   • Chronic back pain     RUNS DOWN BOTH LEGS   • Constipation    • Coronary artery disease    • Hard of  hearing    • Heart attack (HCC) 1986    NO MUSCLE DAMAGE - JUST OCCLUDED VALVE   • High cholesterol    • History of skin cancer     BILATERAL EARS   • History of transfusion     1986   • Hypertension    • Inguinal hernia    • Leaky heart valve     DR CONCEPCION SAYS NOT ENOUGH TO BE OF CONCERN   • Other bursal cyst, right elbow    • PONV (postoperative nausea and vomiting)     has had scopalamine patch in past    • Sleep apnea     DOES NOT USE CPAP OR BIPAP   • Spinal stenosis of cervical region    • Spinal stenosis of lumbar region    • Tremors of nervous system    • Wears hearing aid     BILATERAL      Past Surgical History:   Procedure Laterality Date   • ANTERIOR LUMBAR EXPOSURE N/A 12/7/2018    Procedure: ANTERIOR LUMBAR EXPOSURE;  Surgeon: Manolo Mcleod DO;  Location:  [facility];  Service: Vascular   • APPENDECTOMY     • BACK SURGERY      X3   • CARDIAC SURGERY  08/08/1986    X1 BYPASS   • CORONARY ANGIOPLASTY WITH STENT PLACEMENT  1997    X3 STENTS   • HERNIA REPAIR Bilateral     x2   • INGUINAL HERNIA REPAIR Right 6/22/2017    Procedure: RIGHT INGUINAL HERNIA REPAIR AND EXCISION OF CYST RIGHT ELBOW ;  Surgeon: Jackelyn Arriola MD;  Location:  [facility];  Service:    • LUMBAR FUSION Left 6/30/2021    Procedure: LUMBAR LAMINECTOMY, DISCECTOMY, FACETECTOMY,  TRANSFORAMINAL LUMBAR INTERBODY FUSION L2-3. REVISION OF INSTRUMENTATION L3-S1. USE OF IMAGE GUIDANCE AND SURGICAL ROBOT;  Surgeon: Kj Falcon MD;  Location:  [facility];  Service: Robotics - Neuro;  Laterality: Left;   • LUMBAR LAMINECTOMY N/A 3/12/2018    Procedure: LUMBAR LAMINECTOMY WITHOUT FUSION L3-4,4-5;  Surgeon: Kj Falcon MD;  Location:  [facility];  Service: Neurosurgery   • LUMBAR LAMINECTOMY ANTERIOR LUMBAR INTERBODY FUSION N/A 12/7/2018    Procedure: ANTERIOR LUMBAR INTERBODY FUSION L5-S1;  Surgeon: Kj Falcon MD;  Location:  [facility];  Service: Neurosurgery   • LUMBAR LAMINECTOMY WITH FUSION Left 12/10/2018    Procedure:  LUMBAR LAMINECTOMY TRANSFORAMINAL LUMBAR INTERBODY FUSION L34,45;  Surgeon: Kj Falcon MD;  Location:  [facility];  Service: Neurosurgery   • LUMBAR LAMINECTOMY WITH FUSION Left 12/7/2018    Procedure: LUMBAR LAMINECTOMY TRANSFORAMINAL LUMBAR INTERBODY FUSION LEFT L3-4, L4-5 QUADRANT RETRACTOR;  Surgeon: Kj Falcon MD;  Location:  [facility];  Service: Neurosurgery   • SKIN LESION EXCISION      nasal        Current Outpatient Medications:   •  alfuzosin (UROXATRAL) 10 MG 24 hr tablet, Take 1 tablet by mouth Daily., Disp: 30 tablet, Rfl: 5  •  amLODIPine (NORVASC) 2.5 MG tablet, Take 2.5 mg by mouth Daily., Disp: , Rfl:   •  Ascorbic Acid (Vitamin C) 500 MG capsule, Take 1 capsule by mouth 2 (two) times a day., Disp: , Rfl:   •  aspirin 81 MG EC tablet, Take 81 mg by mouth Daily., Disp: , Rfl:   •  chlorhexidine (Hibiclens) 4 % external liquid, Apply  topically to the appropriate area as directed Daily As Needed for Wound Care., Disp: 118 mL, Rfl: 0  •  chlorpheniramine (CHLOR-TRIMETON) 4 MG tablet, Take 4 mg by mouth Daily., Disp: , Rfl:   •  Cholecalciferol (Vitamin D3) 50 MCG (2000 UT) tablet, Take 1 tablet by mouth Daily., Disp: , Rfl:   •  doxycycline (VIBRAMYCIN) 100 MG capsule, Take 1 capsule by mouth 2 (Two) Times a Day., Disp: 20 capsule, Rfl: 0  •  famotidine (PEPCID) 20 MG tablet, Take 1 tablet by mouth 2 (Two) Times a Day., Disp: 20 tablet, Rfl: 0  •  finasteride (PROSCAR) 5 MG tablet, Take 1 tablet by mouth Daily., Disp: 30 tablet, Rfl: 5  •  Flaxseed, Linseed, (FLAXSEED OIL) 1000 MG capsule, 540mg takes 4 tablets by mouth daily, Disp: , Rfl:   •  folic acid (FOLVITE) 800 MCG tablet, Taking as multivitamin now, Disp: , Rfl:   •  gabapentin (NEURONTIN) 600 MG tablet, Take 1 tablet by mouth 3 (Three) Times a Day., Disp: 90 tablet, Rfl: 3  •  GNP Antiseptic Skin Cleanser 4 % solution, APPLY  TOPICALLY TO THE APPROPRIATE AREA AS DIRECTED DAILY AS NEEDED FOR WOUND CARE., Disp: , Rfl:   •  L-Lysine  600 MG tablet, Take 1 tablet by mouth Daily., Disp: , Rfl:   •  metoprolol tartrate (LOPRESSOR) 25 MG tablet, 1/2 in morning and 1 at night, Disp: 135 tablet, Rfl: 3  •  mupirocin (Bactroban) 2 % cream, Apply 1 application topically to the appropriate area as directed Daily., Disp: 15 g, Rfl: 0  •  mupirocin (BACTROBAN) 2 % ointment, APPLY 1 APPLICATION TOPICALLY TO THE APPROPRIATE AREA AS DIRECTED DAILY., Disp: , Rfl:   •  ondansetron ODT (ZOFRAN-ODT) 8 MG disintegrating tablet, Place 1 tablet on the tongue Every 8 (Eight) Hours As Needed for Nausea or Vomiting., Disp: 15 tablet, Rfl: 0  •  pravastatin (PRAVACHOL) 20 MG tablet, Take 1 tablet by mouth Every Night., Disp: 90 tablet, Rfl: 1  •  primidone (MYSOLINE) 50 MG tablet, Take 1/2 tablet at bedtime (Patient taking differently: Take 50 mg by mouth.), Disp: 45 tablet, Rfl: 3  •  sennosides-docusate (senna-docusate sodium) 8.6-50 MG per tablet, Take 2 tablets by mouth Daily., Disp: 60 tablet, Rfl: 1  •  triamcinolone (KENALOG) 0.1 % ointment, APPLY TWICE DAILY WHEN AND WHERE RASH IS PRESENT, Disp: , Rfl:       Vitals:    04/01/22 1417   BP: 140/60   Pulse: 60   Temp: 98.2 °F (36.8 °C)   SpO2: 98%         04/01/22  1417   Weight: 76.7 kg (169 lb)       Body mass index is 24.25 kg/m².    Physical Exam  Constitutional:       General: He is not in acute distress.     Appearance: He is well-developed.   HENT:      Head: Normocephalic.      Right Ear: External ear normal.      Left Ear: External ear normal.   Pulmonary:      Effort: Pulmonary effort is normal.   Skin:     General: Skin is warm and dry.      Comments: Abscess to left groin is much improved with no erythema present.  There is a small 0.5 x 0.25 firm area beneath the skin that is non-tender.  No lymphadenopathy; no drainage present.  The skin is non-intact without scab formation.   Neurological:      Mental Status: He is alert and oriented to person, place, and time.      Gait: Gait normal.   Psychiatric:          Mood and Affect: Mood normal.         Speech: Speech normal.         Behavior: Behavior normal.         Thought Content: Thought content normal.               Assessment/Plan   There are no diagnoses linked to this encounter.    There has been improvement in the appearance of the spider bite.  Advised leaving open to air (he has been keeping covered with a bandaid).  Wash the area with antibacterial soap and apply mupirocin ointment topically.  Continue to monitor the area and follow up for worsening appearance.  No further oral antibiotic is needed at this time.

## 2022-05-03 NOTE — PLAN OF CARE
Goal Outcome Evaluation:  Plan of Care Reviewed With: patient, son        Progress: no change  Outcome Summary: pt c/o increased weakness this am, trans to EOB min assist, min assist to stacy LSO, pt c/o SOA on RA sat 86% stacy 02 back @ 2L for amb, pt amb 200 feet cga-min assist with rwx, trans to chair min assist   Interpolation Flap Text: A decision was made to reconstruct the defect utilizing an interpolation axial flap and a staged reconstruction.  A telfa template was made of the defect.  This telfa template was then used to outline the interpolation flap.  The donor area for the pedicle flap was then injected with anesthesia.  The flap was excised through the skin and subcutaneous tissue down to the layer of the underlying musculature.  The interpolation flap was carefully excised within this deep plane to maintain its blood supply.  The edges of the donor site were undermined.   The donor site was closed in a primary fashion.  The pedicle was then rotated into position and sutured.  Once the tube was sutured into place, adequate blood supply was confirmed with blanching and refill.  The pedicle was then wrapped with xeroform gauze and dressed appropriately with a telfa and gauze bandage to ensure continued blood supply and protect the attached pedicle.

## 2022-06-23 ENCOUNTER — OFFICE VISIT (OUTPATIENT)
Dept: CARDIOLOGY CLINIC | Age: 87
End: 2022-06-23
Payer: MEDICARE

## 2022-06-23 VITALS
SYSTOLIC BLOOD PRESSURE: 136 MMHG | HEIGHT: 68 IN | HEART RATE: 58 BPM | WEIGHT: 167 LBS | BODY MASS INDEX: 25.31 KG/M2 | DIASTOLIC BLOOD PRESSURE: 70 MMHG

## 2022-06-23 DIAGNOSIS — Z95.1 HX OF CABG: ICD-10-CM

## 2022-06-23 DIAGNOSIS — E78.2 MIXED HYPERLIPIDEMIA: ICD-10-CM

## 2022-06-23 DIAGNOSIS — I10 PRIMARY HYPERTENSION: ICD-10-CM

## 2022-06-23 DIAGNOSIS — I25.10 CORONARY ARTERY DISEASE INVOLVING NATIVE CORONARY ARTERY OF NATIVE HEART WITHOUT ANGINA PECTORIS: Primary | ICD-10-CM

## 2022-06-23 PROCEDURE — 99213 OFFICE O/P EST LOW 20 MIN: CPT | Performed by: INTERNAL MEDICINE

## 2022-06-23 PROCEDURE — 1036F TOBACCO NON-USER: CPT | Performed by: INTERNAL MEDICINE

## 2022-06-23 PROCEDURE — G8427 DOCREV CUR MEDS BY ELIG CLIN: HCPCS | Performed by: INTERNAL MEDICINE

## 2022-06-23 PROCEDURE — 1123F ACP DISCUSS/DSCN MKR DOCD: CPT | Performed by: INTERNAL MEDICINE

## 2022-06-23 PROCEDURE — G8417 CALC BMI ABV UP PARAM F/U: HCPCS | Performed by: INTERNAL MEDICINE

## 2022-06-23 ASSESSMENT — ENCOUNTER SYMPTOMS
DIARRHEA: 0
GASTROINTESTINAL NEGATIVE: 1
NAUSEA: 0
SHORTNESS OF BREATH: 0
RESPIRATORY NEGATIVE: 1
EYES NEGATIVE: 1
VOMITING: 0

## 2022-06-23 NOTE — PROGRESS NOTES
[facility] Progress Note                            Date:  6/23/2022  Patient: Kiara Alvares  Age:  80 y. o., 11/9/1929      Reason for evaluation:         SUBJECTIVE:    Returns today follow-up assessment coronary artery disease hyperlipidemia hypertension previous CABG. Overall doing fairly well. He had COVID in January 2021 and ever since then has been a little bit more dyspneic than normal but he denies any chest discomfort. He goes to the mall 5 times a week and walks up and down the entire length which he thinks is about a distance of a mile. No other complaints. Blood pressure 136/70 heart rate 58. Review of Systems   Constitutional: Negative. Negative for chills, fever and unexpected weight change. HENT: Negative. Eyes: Negative. Respiratory: Negative. Negative for shortness of breath. Cardiovascular: Negative. Negative for chest pain. Gastrointestinal: Negative. Negative for diarrhea, nausea and vomiting. Endocrine: Negative. Genitourinary: Negative. Musculoskeletal: Negative. Skin: Negative. Neurological: Negative. All other systems reviewed and are negative. OBJECTIVE:     /70   Pulse 58   Ht 5' 8\" (1.727 m)   Wt 167 lb (75.8 kg)   BMI 25.39 kg/m²     Labs:   CBC: No results for input(s): WBC, HGB, HCT, PLT in the last 72 hours. BMP:No results for input(s): NA, K, CO2, BUN, CREATININE, LABGLOM, GLUCOSE in the last 72 hours. BNP: No results for input(s): BNP in the last 72 hours. PT/INR: No results for input(s): PROTIME, INR in the last 72 hours. APTT:No results for input(s): APTT in the last 72 hours. CARDIAC ENZYMES:No results for input(s): CKTOTAL, CKMB, CKMBINDEX, TROPONINI in the last 72 hours. FASTING LIPID PANEL:  Lab Results   Component Value Date    HDL 45 05/12/2020    LDLCALC 71 05/12/2020    TRIG 86 05/12/2020     LIVER PROFILE:No results for input(s): AST, ALT, LABALBU in the last 72 hours.         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 Gloria Murillo, 3/2018   [address] 43      CABG    DIAGNOSTIC CARDIAC CATH LAB PROCEDURE      Stent to mid and distal RCA 1997    HERNIA REPAIR      NECK SURGERY       Family History   Problem Relation Age of Onset    High Blood Pressure Mother     Stroke Mother     Coronary Art Dis Father      Allergies   Allergen Reactions    Codeine     Fentanyl Other (See Comments)     Fentanyl patchs, caused patient to become confused and anxious per family     Current Outpatient Medications   Medication Sig Dispense Refill    aspirin 81 MG tablet Take 81 mg by mouth daily      metoprolol tartrate (LOPRESSOR) 25 MG tablet Take by mouth 1/2 in the am and a whole one at night      docusate sodium (COLACE, DULCOLAX) 100 MG CAPS Take 100 mg by mouth 2 times daily 60 capsule 0    alfuzosin (UROXATRAL) 10 MG extended release tablet Take 1 tablet by mouth nightly 30 tablet 3    finasteride (PROSCAR) 5 MG tablet Take 1 tablet by mouth daily 30 tablet 3    primidone (MYSOLINE) 50 MG tablet Take 5 mg by mouth nightly       chlorpheniramine (CHLOR-TRIMETON) 4 MG tablet Take 4 mg by mouth daily       Flaxseed, Linseed, (FLAXSEED OIL) 1000 MG CAPS Take 2,000 mg by mouth daily       pravastatin (PRAVACHOL) 40 MG tablet Take 40 mg by mouth daily.  folic acid ([facility] FOLIC ACID) 223 MCG tablet Take 800 mcg by mouth daily.  L-Lysine 500 MG TABS Take 500 mg by mouth daily.         gabapentin (NEURONTIN) 100 MG capsule Take 1-2 at night for leg pain. (Patient taking differently: Take 500 mg by mouth 3 times daily. ) 90 capsule 0     No current facility-administered medications for this visit. Social History     Socioeconomic History    Marital status:      Spouse name: Not on file    Number of children: Not on file    Years of education: Not on file    Highest education level: Not on file   Occupational History    Not on file   Tobacco Use    Smoking status: Former Smoker     Packs/day: 2.00     Years: 6.00     Pack years: 12.00     Types: Cigarettes    Smokeless tobacco: Never Used   Substance and Sexual Activity    Alcohol use: No    Drug use: No    Sexual activity: Yes     Partners: Female     Comment: He is . Other Topics Concern    Not on file   Social History Narrative    Not on file     Social Determinants of Health     Financial Resource Strain:     Difficulty of Paying Living Expenses: Not on file   Food Insecurity:     Worried About Running Out of Food in the Last Year: Not on file    [place] of Food in the Last Year: Not on file   Transportation Needs:     Lack of Transportation (Medical): Not on file    Lack of Transportation (Non-Medical):  Not on file   Physical Activity:     Days of Exercise per Week: Not on file    Minutes of Exercise per Session: Not on file   Stress:     Feeling of Stress : Not on file   Social Connections:     Frequency of Communication with Friends and Family: Not on file    Frequency of Social Gatherings with Friends and Family: Not on file    Attends Hinduism Services: Not on file    Active Member of Clubs or Organizations: Not on file    Attends Club or Organization Meetings: Not on file    Marital Status: Not on file   Intimate Partner Violence:     Fear of Current or Ex-Partner: Not on file    Emotionally Abused: Not on file    Physically Abused: Not on file    Sexually Abused: Not on file   Housing Stability:     Unable to Pay for Housing in the Last Year: Not on file    Number of Places Lived in the Last Year: Not on file    Unstable Housing in the Last Year: Not on file       Physical Examination:  /70   Pulse 58   Ht 5' 8\" (1.727 m)   Wt 167 lb (75.8 kg)   BMI 25.39 kg/m²   Physical Exam  Vitals reviewed. Constitutional:       Appearance: He is well-developed. Neck:      Vascular: No carotid bruit or JVD. Cardiovascular:      Rate and Rhythm: Normal rate and regular rhythm. Heart sounds: Normal heart sounds. No murmur heard. No friction rub. No gallop. Pulmonary:      Effort: Pulmonary effort is normal. No respiratory distress. Breath sounds: Normal breath sounds. No wheezing or rales. Abdominal:      General: There is no distension. Tenderness: There is no abdominal tenderness. Lymphadenopathy:      Cervical: No cervical adenopathy. Skin:     General: Skin is warm and dry. ASSESSMENT:     Diagnosis Orders   1. Coronary artery disease involving native coronary artery of native heart without angina pectoris     2. Mixed hyperlipidemia     3. Primary hypertension     4. Hx of CABG         PLAN:  No orders of the defined types were placed in this encounter. No orders of the defined types were placed in this encounter. 1. Continue present medications  2. Recommend follow-up assessment in 6 months    Return in about 6 months (around 12/23/2022) for return to Dr. Brandon Wong only. Alison Atkins MD 6/23/2022 3:24 PM CDT    [facility]      Thisdictation was generated by voice recognition computer software. Although all attempts are made to edit the dictation for accuracy, there may be errors in the transcription that are not intended.

## 2022-08-03 ENCOUNTER — OFFICE VISIT (OUTPATIENT)
Dept: INTERNAL MEDICINE | Facility: CLINIC | Age: 87
End: 2022-08-03

## 2022-08-03 VITALS
SYSTOLIC BLOOD PRESSURE: 152 MMHG | TEMPERATURE: 97.1 F | BODY MASS INDEX: 24.05 KG/M2 | DIASTOLIC BLOOD PRESSURE: 60 MMHG | HEART RATE: 55 BPM | WEIGHT: 168 LBS | HEIGHT: 70 IN | OXYGEN SATURATION: 99 %

## 2022-08-03 DIAGNOSIS — G89.29 CHRONIC BILATERAL LOW BACK PAIN, UNSPECIFIED WHETHER SCIATICA PRESENT: ICD-10-CM

## 2022-08-03 DIAGNOSIS — M54.50 CHRONIC BILATERAL LOW BACK PAIN, UNSPECIFIED WHETHER SCIATICA PRESENT: ICD-10-CM

## 2022-08-03 DIAGNOSIS — G25.0 ESSENTIAL TREMOR: ICD-10-CM

## 2022-08-03 DIAGNOSIS — M48.062 SPINAL STENOSIS, LUMBAR REGION, WITH NEUROGENIC CLAUDICATION: ICD-10-CM

## 2022-08-03 DIAGNOSIS — I10 PRIMARY HYPERTENSION: ICD-10-CM

## 2022-08-03 DIAGNOSIS — R60.0 PEDAL EDEMA: ICD-10-CM

## 2022-08-03 DIAGNOSIS — E78.00 HYPERCHOLESTEREMIA: ICD-10-CM

## 2022-08-03 DIAGNOSIS — R26.9 GAIT DISTURBANCE: Primary | ICD-10-CM

## 2022-08-03 PROCEDURE — 99214 OFFICE O/P EST MOD 30 MIN: CPT | Performed by: FAMILY MEDICINE
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
## 2022-08-23 ENCOUNTER — TREATMENT (OUTPATIENT)
Dept: PHYSICAL THERAPY | Facility: CLINIC | Age: 87
End: 2022-08-23

## 2022-08-23 DIAGNOSIS — M21.371 RIGHT FOOT DROP: ICD-10-CM

## 2022-08-23 DIAGNOSIS — Z74.09 IMPAIRED MOBILITY: Primary | ICD-10-CM

## 2022-08-23 DIAGNOSIS — Z91.81 RISK FOR FALLS: ICD-10-CM

## 2022-08-23 PROCEDURE — 97162 PT EVAL MOD COMPLEX 30 MIN: CPT | Performed by: PHYSICAL THERAPIST

## 2022-08-23 PROCEDURE — 97535 SELF CARE MNGMENT TRAINING: CPT | Performed by: PHYSICAL THERAPIST

## 2022-08-23 NOTE — PROGRESS NOTES
Physical Therapy Initial Evaluation and Plan of Care    Patient: Chad Henriquez               : 1929  Visit Date: 2022  Referring practitioner: Catrachita Andrew DO  Date of Initial Visit: 2022  Patient seen for 1 sessions    Visit Diagnoses:    ICD-10-CM ICD-9-CM   1. Impaired mobility  Z74.09 799.89   2. Risk for falls  Z91.81 V15.88   3. Right foot drop  M21.371 736.79     Past Medical History:   Diagnosis Date   • Allergic rhinitis    • Anemia    • Arthritis    • Blind left eye    • BPH (benign prostatic hypertrophy) with urinary retention    • Cancer (HCC)     skin cancer   • Chronic back pain     RUNS DOWN BOTH LEGS   • Constipation    • Coronary artery disease    • Hard of hearing    • Heart attack (HCC)     NO MUSCLE DAMAGE - JUST OCCLUDED VALVE   • High cholesterol    • History of skin cancer     BILATERAL EARS   • History of transfusion        • Hypertension    • Inguinal hernia    • Leaky heart valve     DR CONCEPCION SAYS NOT ENOUGH TO BE OF CONCERN   • Other bursal cyst, right elbow    • PONV (postoperative nausea and vomiting)     has had scopalamine patch in past    • Sleep apnea     DOES NOT USE CPAP OR BIPAP   • Spinal stenosis of cervical region    • Spinal stenosis of lumbar region    • Tremors of nervous system    • Wears hearing aid     BILATERAL     Past Surgical History:   Procedure Laterality Date   • ANTERIOR LUMBAR EXPOSURE N/A 2018    Procedure: ANTERIOR LUMBAR EXPOSURE;  Surgeon: Manolo Mcleod DO;  Location: [facility] OR;  Service: Vascular   • APPENDECTOMY     • BACK SURGERY      X3   • CARDIAC SURGERY  08/08/1986    X1 BYPASS   • CORONARY ANGIOPLASTY WITH STENT PLACEMENT  1997    X3 STENTS   • HERNIA REPAIR Bilateral     x2   • INGUINAL HERNIA REPAIR Right 2017    Procedure: RIGHT INGUINAL HERNIA REPAIR AND EXCISION OF CYST RIGHT ELBOW ;  Surgeon: Jackelyn Arriola MD;  Location:  [facility] OR;   Service:    • LUMBAR FUSION Left 6/30/2021    Procedure: LUMBAR LAMINECTOMY, DISCECTOMY, FACETECTOMY,  TRANSFORAMINAL LUMBAR INTERBODY FUSION L2-3. REVISION OF INSTRUMENTATION L3-S1. USE OF IMAGE GUIDANCE AND SURGICAL ROBOT;  Surgeon: Kj Falcon MD;  Location:  [facility] OR;  Service: Robotics - Neuro;  Laterality: Left;   • LUMBAR LAMINECTOMY N/A 3/12/2018    Procedure: LUMBAR LAMINECTOMY WITHOUT FUSION L3-4,4-5;  Surgeon: Kj Falcon MD;  Location:  [facility] OR;  Service: Neurosurgery   • LUMBAR LAMINECTOMY ANTERIOR LUMBAR INTERBODY FUSION N/A 12/7/2018    Procedure: ANTERIOR LUMBAR INTERBODY FUSION L5-S1;  Surgeon: Kj Falcon MD;  Location:  [facility] OR;  Service: Neurosurgery   • LUMBAR LAMINECTOMY WITH FUSION Left 12/10/2018    Procedure: LUMBAR LAMINECTOMY TRANSFORAMINAL LUMBAR INTERBODY FUSION L34,45;  Surgeon: Kj Falcon MD;  Location:  [facility] OR;  Service: Neurosurgery   • LUMBAR LAMINECTOMY WITH FUSION Left 12/7/2018    Procedure: LUMBAR LAMINECTOMY TRANSFORAMINAL LUMBAR INTERBODY FUSION LEFT L3-4, L4-5 QUADRANT RETRACTOR;  Surgeon: Kj Falcon MD;  Location:  [facility] OR;  Service: Neurosurgery   • SKIN LESION EXCISION      nasal         SUBJECTIVE     Subjective Evaluation    History of Present Illness  Date of surgery: 6/30/2021  Mechanism of injury: He has had multiple low back surgeries. He had R foot drop after the one in December 2018. He had a fusion by Dr. Falcon in June 2021. Revision of fusion L3-S1. He still has chronic pain but is able to walk daily again and do volunteer work with his [employer].     Subjective comment: He would like the pain to be better, but his balance is really what he is here for.   Patient Occupation: he is a mall walker Quality of life: excellent    Social Support  Lives in: one-story house  Lives with: spouse    Hand dominance: left    Patient Goals  Patient goals for therapy: increased strength and improved balance         Outcome Measure:     PT  G-Codes  Outcome Measure Options: Arreguin Balance  Arreguin Total Score: 38    OBJECTIVE     Objective          Neurological Testing     Reflexes   Left   Patellar (L4): absent (0)  Achilles (S1): absent (0)    Right   Patellar (L4): absent (0)  Achilles (S1): absent (0)    Strength/Myotome Testing     Left Hip   Planes of Motion   Flexion: 4+  Abduction: 3    Right Hip   Planes of Motion   Flexion: 4+  Abduction: 4-    Left Knee   Flexion: 4-  Extension: 5    Right Knee   Flexion: 4+  Extension: 5    Left Ankle/Foot   Dorsiflexion: 5    Right Ankle/Foot   Dorsiflexion: 3-    Ambulation     Quality of Movement During Gait     Ankle    Ankle (Right): Positive foot drop.     Additional Quality of Movement During Gait Details  Wears AFO on R.     Some trunk flexion with shoulders in some extension and abduction.Weakness with stance phase B in hips.     Functional Assessment     Comments  See Arreguin results.   Unable to SLS  Unable to get sharpened romberg stance          Therapy Education/Self Care 59147   Details: Work on SLS with UE support. Showed him his last scans of his low back due to questions about the placement of the rods. Reviewed anatomy and POC for PT.    Date last updated: 8/23/22   Given Home Exercise Program   Progress: New   Education provided to:  Patient   Level of understanding Verbalized   Timed Minutes 10       Total Timed Treatment:     10   mins  Total Time of Visit:            45   mins    ASSESSMENT/PLAN     GOALS:    Goals                                          Progress Note due by 9/22/22                                                      Recert due by 11/20/22   LTG by: 6 weeks Comments Date Status   Improved B hip strength to 4+/5.      No stumbles or falls.      Able to balance on each LE X 10 sec without UE support.               Assessment & Plan     Assessment  Impairments: abnormal gait, impaired balance and impaired physical strength  Functional Limitations: walking and  standing  Assessment details: Mr. Henriquez is concerned about his balance and fall risk as he continues to stay active and walk daily. He has multiple co-morbidities that could all be contributing to difficulty with balance. He is on 2 separate medications that have side effects of dizziness/vertigo type symptoms. 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 He has foot drop and wears and AFO on the R foot. He has weakness in his hips and needs to work on strengthening and balance training.     Barriers to therapy: age, co-morbidities  Prognosis: good    Plan  Therapy options: will be seen for skilled therapy services  Planned therapy interventions: manual therapy, balance/weight-bearing training, flexibility, home exercise program, gait training, stretching, strengthening, soft tissue mobilization, neuromuscular re-education and therapeutic activities  Frequency: 1x week  Duration in weeks: 8  Treatment plan discussed with: patient  Plan details: PT to see for balance training and hip and core strengthening        SIGNATURE: Shefali Crowley PT, DPT, [facility] License #: [id number]  Electronically Signed on 8/24/2022    Initial Certification  Certification Period: 8/24/2022 through 11/21/2022  I certify that the therapy services are furnished while this patient is under my care.  The services outlined above are required by this patient, and will be reviewed every 90 days.     PHYSICIAN: Catrachita Andrew DO (NPI: [id number])    Signature____________________________________________DATE: _________     Please sign and return via fax to [phone].   Thank you so much for letting us work with Chad. I appreciate your letting us work with your patients. If you have any questions or concerns, please don't hesitate to contact me.          [address] [address]  [phone]

## 2022-09-02 ENCOUNTER — TREATMENT (OUTPATIENT)
Dept: PHYSICAL THERAPY | Facility: CLINIC | Age: 87
End: 2022-09-02

## 2022-09-02 DIAGNOSIS — Z91.81 RISK FOR FALLS: ICD-10-CM

## 2022-09-02 DIAGNOSIS — Z74.09 IMPAIRED MOBILITY: Primary | ICD-10-CM

## 2022-09-02 DIAGNOSIS — M21.371 RIGHT FOOT DROP: ICD-10-CM

## 2022-09-02 PROCEDURE — 97110 THERAPEUTIC EXERCISES: CPT | Performed by: PHYSICAL THERAPIST

## 2022-09-02 NOTE — PROGRESS NOTES
Physical Therapy Treatment Note    Patient: Chad Henriquez                                                 Visit Date: 2022  :     1929    Referring practitioner:    Catrachita Andrew DO  Date of Initial Visit:          Type: THERAPY  Noted: 2022    Patient seen for 2 sessions    Visit Diagnoses:    ICD-10-CM ICD-9-CM   1. Impaired mobility  Z74.09 799.89   2. Risk for falls  Z91.81 V15.88   3. Right foot drop  M21.371 736.79     SUBJECTIVE     Subjective  He does not feel any different since his initial eval.  He states he is blind in his L eye and takes Gabapentin and Primidone, these affect his balance.  He has [facility] .  [place] the mall for 45 min 5 days a week.  There are some days he has to sit and rest once during the walk for 3-5 min.     PAIN: 0/10         OBJECTIVE     Objective        Therapeutic Exercises    00777 Comments   SLS with one hand for UE support at the counter X 5 each LE   Hip abduction standing at the counter both hands on the counter  2 x 10 each LE   Tandem standing with HHA and other hand on the counter X 4   In sitting B hip abduction with yellow band 2 x 10 added to HEP   Seated march with yellow band 2 x 10 added to HEP   Timed Minutes 30       Therapy Education/Self Care 11421   Details: See HEP listed below   Date last updated: 22   Given [employer] [facility] (access code [email or web address])   Progress: New   Education provided to:  Patient   Level of understanding Verbalized and Demonstrated   Timed Minutes      Access Code: [email or web address]  URL: [email or web address]  Date: 2022  Prepared by: Nohelia Moya    Exercises  Seated Hip Abduction with Resistance - 2 x daily - 7 x weekly - 2 sets - 10 reps  Seated March with Resistance - 2 x daily - 7 x weekly - 2 sets - 10 reps      Total Timed Treatment:     30   mins  Total Time of  Visit:             30   mins         ASSESSMENT/PLAN     GOALS  Goals                                          Progress Note due by 9/22/22                                                      Recert due by 11/20/22   LTG by: 6 weeks Comments Date Status   Improved B hip strength to 4+/5.  Started on hip strengthening  9/2 Ongoing   No stumbles or falls.         Able to balance on each LE X 10 sec without UE support.  Worked on SLS exercises today. 9/2 Ongoing                    Assessment/Plan     ASSESSMENT: Patient has a forward flexed posture when walking and looks down at the ground for the most part.  He is blind in the L eye and feels he needs to look down due to his vision loss.  He did not have any stumbles today but does walk with a wide base of support.  He is not able to stand on one let without UE support, he has some discomfort in his back with tandem standing.     PLAN: Continue to work on improving hip strength and balance.      SIGNATURE: Nohelia Moya PTA, [facility] [place] License #: [id number]  Electronically Signed on 9/2/2022        [address] [place] [address]. [address]  [phone]

## 2022-09-07 ENCOUNTER — TREATMENT (OUTPATIENT)
Dept: PHYSICAL THERAPY | Facility: CLINIC | Age: 87
End: 2022-09-07

## 2022-09-07 DIAGNOSIS — Z91.81 RISK FOR FALLS: ICD-10-CM

## 2022-09-07 DIAGNOSIS — M21.371 RIGHT FOOT DROP: ICD-10-CM

## 2022-09-07 DIAGNOSIS — Z74.09 IMPAIRED MOBILITY: Primary | ICD-10-CM

## 2022-09-07 PROCEDURE — 97116 GAIT TRAINING THERAPY: CPT | Performed by: PHYSICAL THERAPIST

## 2022-09-07 PROCEDURE — 97112 NEUROMUSCULAR REEDUCATION: CPT | Performed by: PHYSICAL THERAPIST

## 2022-09-07 NOTE — PROGRESS NOTES
Physical Therapy Treatment Note    Patient: Chad Henriquez                                                 Visit Date: 2022  :     1929    Referring practitioner:    Catrachita Andrew DO  Date of Initial Visit:          Type: THERAPY  Noted: 2022    Patient seen for 3 sessions    Visit Diagnoses:    ICD-10-CM ICD-9-CM   1. Impaired mobility  Z74.09 799.89   2. Risk for falls  Z91.81 V15.88   3. Right foot drop  M21.371 736.79     SUBJECTIVE     Subjective  He denies falls or new changes/complaints since last visit.     PAIN: 2/10 (back) > unchanged     OBJECTIVE     Objective      Therapeutic Exercises    50011 Comments   Sit <> stand from level surface  1 x 10, 1 x 5   Reviewed HEP Pt able to perform teach back educ and is performing regularly in addition to walking in mall   B lateral stepping  2 x 10            Timed Minutes 10     Gait Training          52679   Task/Terrain Asst AD Comments   Gait training with SC on R SBA SC Cues to consistently use SC   Sizing AD            Timed Minutes 10     Neuromuscular Reeducation     80577 Comments   Rhomberg stance (EO, EC) on BL foam, // bars    B SLS (EO) in // bars    B tandem stance (EO, EC) in // bars    Attempted marching on foam Increased instability, d/c'd   B alternating marching in // bars Unstable, but improved vs on foam       Timed Minutes 10     Therapy Education/Self Care 01831   Details: Gait training and sizing with AD, reviewed HEP, posture roll in decreasing fall risk   Date last updated: 22   Given Home Exercise Program  [employer] HEP (access code [email or web address])  fall prevention  mobility training   Progress: New   Education provided to:  Patient   Level of understanding Verbalized and Demonstrated   Timed Minutes      Access Code: [email or web address]  Date: 2022  Prepared by: Nohelia Moya    Exercises  Seated Hip Abduction with Resistance - 2 x daily - 7 x weekly - 2 sets  - 10 reps  Seated March with Resistance - 2 x daily - 7 x weekly - 2 sets - 10 reps    Total Timed Treatment:     30   mins  Total Time of Visit:             30   mins         ASSESSMENT/PLAN     GOALS  Goals                                                 Progress Note due by 9/22/22                                                                 Recert due by 11/20/22   LTG by: 6 weeks Comments Date Status   Improved B hip strength to 4+/5.  Started on hip strengthening  9/2 ongoing   No stumbles or falls. Denies falls or near falls. Introduced SC today.  9/7 ongoing   Able to balance on each LE X 10 sec without UE support.  Worked on SLS exercises today. 9/2 ongoing     Assessment/Plan     ASSESSMENT: As previously mentioned, pt is blind in L eye with very limited peripheral vision. Due to this, and forward flexed posture resulting from looking at ground to ensure clear path, his fall risk is greatly increased. As a result, introduced SC today for balance, offloading L LE, and also to ensure clear path during ambulation. He has his own personal SC and performed well with AD in clinic this date, though did require cues for consistent use of AD. He was concerned about becoming reliant on the SC, but fall risk necessitates use of SC and then weaning from AD when more henry and therefore more appropriate to do so. He is currently attending PT only 1x/week; however, it may be beneficial to attend 2x/weekly to progress balance, gait training, and hip stability quicker.     PLAN: Consider assessment on neurocom and cont to progress hip stability and balance. Should he bring his personal SC, consider sizing and refining gait with AD.    SIGNATURE: Kailyn Ordaz PTA, [place] License #: [id number]    Electronically Signed on 9/7/2022        [address] [place] [address] [place] [address]  [phone]

## 2022-09-14 ENCOUNTER — TREATMENT (OUTPATIENT)
Dept: PHYSICAL THERAPY | Facility: CLINIC | Age: 87
End: 2022-09-14

## 2022-09-14 DIAGNOSIS — M21.371 RIGHT FOOT DROP: ICD-10-CM

## 2022-09-14 DIAGNOSIS — Z74.09 IMPAIRED MOBILITY: Primary | ICD-10-CM

## 2022-09-14 DIAGNOSIS — Z91.81 RISK FOR FALLS: ICD-10-CM

## 2022-09-14 PROCEDURE — 97110 THERAPEUTIC EXERCISES: CPT | Performed by: PHYSICAL THERAPIST

## 2022-09-14 PROCEDURE — 97112 NEUROMUSCULAR REEDUCATION: CPT | Performed by: PHYSICAL THERAPIST

## 2022-09-14 NOTE — PROGRESS NOTES
Physical Therapy Treatment Note    Patient: Chad Henriquez                                                 Visit Date: 2022  :     1929    Referring practitioner:    Catrachita Andrew DO  Date of Initial Visit:          Type: THERAPY  Noted: 2022    Patient seen for 4 sessions    Visit Diagnoses:    ICD-10-CM ICD-9-CM   1. Impaired mobility  Z74.09 799.89   2. Risk for falls  Z91.81 V15.88   3. Right foot drop  M21.371 736.79     SUBJECTIVE     Subjective He denies falls but reports LBP all the time.    PAIN: 2/10     OBJECTIVE     Objective      Therapeutic Exercises    04828 Comments   1/2 bolster thoracic/pec stretch, seated    Supine L ankle DF stretch passively    B SL clamshells with red T band 2 x 10            Timed Minutes 25     Gait Training          87912   Task/Terrain Asst AD Comments   Walking in hallway focused on sequencing SBA SC                Timed Minutes 10     Neuromuscular Reeducation     92194 Comments   RS/NS  EO/EC static surface, thereadisks, and 4 all                    Timed Minutes 10     Therapy Education/Self Care 39917   Education offered today Fall risk   [employer] Code [email or web address]   Ongoing HEP   Date: 2022  Prepared by: Nohelia Moya     Exercises  Seated Hip Abduction with Resistance - 2 x daily - 7 x weekly - 2 sets - 10 reps  Seated March with Resistance - 2 x daily - 7 x weekly - 2 sets - 10 reps   Timed Minutes      Total Timed Treatment:     45   mins  Total Time of Visit:             45   mins         ASSESSMENT/PLAN     GOALS  Goals                                                 Progress Note due by 22                                                                 Recert due by 22   LTG by: 6 weeks Comments Date Status   Improved B hip strength to 4+/5.  Started on hip strengthening   ongoing   No stumbles or falls. Denies falls or near falls. Introduced SC today.    ongoing   Able to balance on each LE X 10 sec without UE support.  Worked on SLS exercises today. 9/2 ongoing     Assessment/Plan     ASSESSMENT: Patient demonstrates increased hypomobility, which we addressed today.     PLAN: Address all goals for progress note. Continue to address his mobility and hip stability, progressing with dynamic balance as appropriate.     SIGNATURE: Augustus Dugan [facility], [place] License #: [id number]     Electronically Signed on 9/14/2022        [address] [place] [address]. [address]  [phone]

## 2022-09-21 ENCOUNTER — TREATMENT (OUTPATIENT)
Dept: PHYSICAL THERAPY | Facility: CLINIC | Age: 87
End: 2022-09-21

## 2022-09-21 DIAGNOSIS — Z91.81 RISK FOR FALLS: ICD-10-CM

## 2022-09-21 DIAGNOSIS — M21.371 RIGHT FOOT DROP: ICD-10-CM

## 2022-09-21 DIAGNOSIS — Z74.09 IMPAIRED MOBILITY: Primary | ICD-10-CM

## 2022-09-21 PROCEDURE — 97116 GAIT TRAINING THERAPY: CPT | Performed by: PHYSICAL THERAPIST

## 2022-09-21 PROCEDURE — 97112 NEUROMUSCULAR REEDUCATION: CPT | Performed by: PHYSICAL THERAPIST

## 2022-09-21 NOTE — PROGRESS NOTES
"                                                                Physical Therapy Treatment Note and 30 Day Progress Note    Patient: Chad Henriquez                                                 Visit Date: 2022  :     1929    Referring practitioner:    Cartachita Andrew DO  Date of Initial Visit:          Type: THERAPY  Noted: 2022    Patient seen for 5 sessions    Visit Diagnoses:    ICD-10-CM ICD-9-CM   1. Impaired mobility  Z74.09 799.89   2. Risk for falls  Z91.81 V15.88   3. Right foot drop  M21.371 736.79     SUBJECTIVE     Subjective He reports he is able to do a few things now, he can go to the mall and walk. He was able to make two trips from one end of the mall to the other. He reports pain will increase to 3-4/10 after one lap. He takes Gabapentin for his pain, unsure if this helps. He feels about 30% improved since his evaluation. He denies falls, but reports \"I might've come close.\" He reports he is not sure how many near falls he's had, maybe two. He would like to keep working on his balance.     PAIN: 0/10 (sitting)     PT G-Codes  Outcome Measure Options: Timed Up and Go (TUG)  Arreguin Total Score: 41  TUG Test 1: 17.95 seconds  TUG Test 2: 19 seconds   Arreguin on eval: 38  OBJECTIVE     Objective        Gait Training          49991   Task/Terrain Asst AD Comments   amb with pt personal SC in hallway SBA SC Pt is L handed and struggled with sequencing with SC on R. Though, his mechanics were improved with SC on R vs L. He was much more comfortable with SC on L, but demonstrated increased posterior WB and WS and tends to laterally WS vs anteriorly WS.    Sizing AD      TUG   17.95 sec and 19 sec, difficulty with sit > stand          Timed Minutes 25     Neuromuscular Reeducation     75934 Comments   SLS toe taps on 4\" step Several LOB, x9 >20 sec   Arreguin Balance test    SLS (EO) See goals   Tandem stance (EO) Unable to assume position d/t LOB   Rhomberg stance (EO, EC) Very min sway " with EC   rhomberg stance: sternal perturbations No LOB       Timed Minutes 15     Therapeutic Exercises    31577 Comments   B hip MMT See goals                   Timed Minutes 5     Therapy Education/Self Care 39538   Education offered today Gait training with SC, HEP, POC   [employer] Code [email or web address]   Ongoing HEP   Date: 09/02/2022  Prepared by: Nohelia Moya     Exercises  Seated Hip Abduction with Resistance - 2 x daily - 7 x weekly - 2 sets - 10 reps  Seated March with Resistance - 2 x daily - 7 x weekly - 2 sets - 10 reps   Timed Minutes      Total Timed Treatment:     45   mins  Total Time of Visit:             45   mins         ASSESSMENT/PLAN     GOALS  Goals                                                 Progress Note due by 9/22/22                                                                 Recert due by 11/20/22   LTG by: 6 weeks Comments Date Status   Improved B hip strength to 4+/5. Flex and abd 5/5, ext: 3+ (L) and 4-/5 (R) 9/21 partial met  ongoing   No stumbles or falls. Reports near falls x2.  9/21 ongoing   Able to balance on each LE X 10 sec without UE support.  1-2 seconds with significant instability  9/21 ongoing     Assessment/Plan     ASSESSMENT: Patient feels 30% improved and has partially met one of his three goals as of this date. Fortunately, his B hip MMT was grossly WNL, though hip ext would benefit from further strengthening. He demonstrated several LOB today and reports about 2 near falls since last session, but did present today with his own personal SC. He is L handed, which made utilizing SC on R quite difficult for him. We attempted use on L side, which was much more comfortable for him; however, his gait mechanics were much better with SC on R. He tends to posteriorly WB and avoids anterior WS, which impacts his safety while ambulating. Attempted to encourage anterior WS with cues to place more weight into toes today, though this resulted in pt taking larger steps and walking on  his heels, thus exacerbating posterior WS. He would benefit from continued skilled PT to address his balance deficits to decrease his fall risk, which remains quite high at this juncture.     PLAN: Continue to refine gait mechanics and improve static balance, jimena anterior WS. Consider use of neurocom. Progress towards dynamic balance when appropriate. Cont to progress and boslter HEP as appropriate.     SIGNATURE: Kailyn Ordaz PTA, [place] License #: [id number]    Electronically Signed on 9/21/2022        [address] [place] [address]. [address]  [phone]

## 2022-09-22 NOTE — PROGRESS NOTES
Progress Note Addendum      Patient: Chad Henriquez           : 1929  Visit Date: 2022  Referring practitioner: Catrachita Andrew DO  Date of Initial Visit: Type: THERAPY  Noted: 2022  Patient seen for 5 sessions  Visit Diagnoses:    ICD-10-CM ICD-9-CM   1. Impaired mobility  Z74.09 799.89   2. Risk for falls  Z91.81 V15.88   3. Right foot drop  M21.371 736.79       PT G-Codes  Outcome Measure Options: Timed Up and Go (TUG)  Arreguin Total Score: 41  TUG Test 1: 17.95 seconds  TUG Test 2: 19 seconds  Clinical Progress: improved  Home Program Compliance: Yes  Progress toward previous goals: Partially Met  Prognosis to achieve goals: good    Objective     Assessment & Plan     Assessment  Impairments: abnormal gait, impaired balance and impaired physical strength  Functional Limitations: walking and standing  Barriers to therapy: age, co-morbidities  Prognosis: good    Plan  Therapy options: will be seen for skilled therapy services  Planned therapy interventions: manual therapy, balance/weight-bearing training, flexibility, home exercise program, gait training, stretching, strengthening, soft tissue mobilization, neuromuscular re-education and therapeutic activities  Frequency: 1x week  Duration in weeks: 8  Treatment plan discussed with: PTA        I reviewed the treatment and goals with Kailyn Ordaz PTA and agree with the POC.      SIGNATURE: Luis Moe PT, License #: [id number]  Electronically Signed on 2022

## 2022-09-28 ENCOUNTER — TREATMENT (OUTPATIENT)
Dept: PHYSICAL THERAPY | Facility: CLINIC | Age: 87
End: 2022-09-28

## 2022-09-28 DIAGNOSIS — M21.371 RIGHT FOOT DROP: ICD-10-CM

## 2022-09-28 DIAGNOSIS — Z74.09 IMPAIRED MOBILITY: ICD-10-CM

## 2022-09-28 DIAGNOSIS — Z91.81 RISK FOR FALLS: Primary | ICD-10-CM

## 2022-09-28 PROCEDURE — 97110 THERAPEUTIC EXERCISES: CPT | Performed by: PHYSICAL THERAPIST

## 2022-09-28 PROCEDURE — 97112 NEUROMUSCULAR REEDUCATION: CPT | Performed by: PHYSICAL THERAPIST

## 2022-09-28 NOTE — PROGRESS NOTES
Physical Therapy Treatment Note    Patient: Chad Henriquez                                                 Visit Date: 2022  :     1929    Referring practitioner:    Catrachita Andrew DO  Date of Initial Visit:          Type: THERAPY  Noted: 2022    Patient seen for 6 sessions    Visit Diagnoses:    ICD-10-CM ICD-9-CM   1. Risk for falls  Z91.81 V15.88   2. Right foot drop  M21.371 736.79   3. Impaired mobility  Z74.09 799.89     SUBJECTIVE     Subjective He denies falls or near falls. No new changes or complaints.     PAIN: 0/10 (sitting)       OBJECTIVE     Objective     Neuromuscular Reeducation     44717 Comments   Rhythmic WS on neurocom Avoids post WS and avoids reaching or movements >MARIANNE   Limits of stability  Remains within MARIANNE, L LE preference vs R, avoids posterior WB/WS   B alternating marching in // bars 2 x 10 ea -- added to HEP, pt to use UE support at home with HEP   Maintaining midline on wobble board, EO/EC 30 sec ea               Timed Minutes 30     Therapeutic Exercises    14111 Comments   B ankle DF passive stretch B reclined   B ankle DF PROM R: 3 deg from 0 and L: 1 deg past 0   Updated and reviewed HEP See education    Reclined calf stretch/assisted DF Added to HEP       Timed Minutes 15     Therapy Education/Self Care 44964   Education offered today Gait training with SC, HEP, [facility] Code [id number]   Ongoing HEP   Date: 2022  Prepared by: Kailyn Ordaz    Exercises  Belt Assisted Dorsiflexion - 1-2 x daily - 7 x weekly - 2 sets - 10 reps  Seated Hip Abduction with Resistance - 2 x daily - 7 x weekly - 2 sets - 10 reps  Standing March with Counter Support - 1-2 x daily - 7 x weekly - 2 sets - 10 reps   Timed Minutes      Total Timed Treatment:     45   mins  Total Time of Visit:             45   mins         ASSESSMENT/PLAN     GOALS  Goals                                                 Progress  Note due by 9/22/22                                                                 Recert due by 11/20/22   LTG by: 6 weeks Comments Date Status   Improved B hip strength to 4+/5. Flex and abd 5/5, ext: 3+ (L) and 4-/5 (R) 9/21 partial met  ongoing   No stumbles or falls. Reports near falls x2.  9/21 ongoing   Able to balance on each LE X 10 sec without UE support.  1-2 seconds with significant instability  9/21 ongoing     Assessment/Plan     ASSESSMENT: Progressed patient balance with focus on ankle mobility and weight shift with bilateral and unilateral support. Dorsiflexion of bilateral ankles remain inadequate and indicate struggle with anterior/posterior balance. Patient relies heavily on single-point cane to maintain balance with weight shift to left side as indicated by Nuerocom testing. He remains compliant with HEP. Patient denied increased pain and stated minimal fatigue following session.    PLAN: Progress balance training with dynamic challenges and attempts to have patient reach beyond MARIANNE. Continue to increase ankle mobility with stretching of dorsiflexors.     SIGNATURE: Kailyn Ordaz PTA, [place] License #: [id number]    Electronically Signed on 9/28/2022        [facility] [place] [address]  [phone]

## 2022-10-06 ENCOUNTER — TREATMENT (OUTPATIENT)
Dept: PHYSICAL THERAPY | Facility: CLINIC | Age: 87
End: 2022-10-06

## 2022-10-06 DIAGNOSIS — Z91.81 RISK FOR FALLS: Primary | ICD-10-CM

## 2022-10-06 DIAGNOSIS — Z74.09 IMPAIRED MOBILITY: ICD-10-CM

## 2022-10-06 DIAGNOSIS — M21.371 RIGHT FOOT DROP: ICD-10-CM

## 2022-10-06 PROCEDURE — 97112 NEUROMUSCULAR REEDUCATION: CPT | Performed by: PHYSICAL THERAPIST

## 2022-10-06 PROCEDURE — 97140 MANUAL THERAPY 1/> REGIONS: CPT | Performed by: PHYSICAL THERAPIST

## 2022-10-06 NOTE — PROGRESS NOTES
Physical Therapy Treatment Note    Patient: Chad Henriquez                                                 Visit Date: 10/6/2022  :     1929    Referring practitioner:    Catrachita Andrew DO  Date of Initial Visit:          Type: THERAPY  Noted: 2022    Patient seen for 7 sessions    Visit Diagnoses:    ICD-10-CM ICD-9-CM   1. Risk for falls  Z91.81 V15.88   2. Right foot drop  M21.371 736.79   3. Impaired mobility  Z74.09 799.89     SUBJECTIVE     Subjective He denies falls or near falls. No new changes or complaints. Reports he has not been using his cane consistently. He took it with him to the mall, but was leaning on it so much that his arm got sore.     PAIN: 0/10       OBJECTIVE     Objective     Neuromuscular Reeducation     37119 Comments   A/P WS on wobble board 2 x 10   B alt march with emphasis on SLS    Retro-walking with HHA 30 ft x2       Timed Minutes 15     Manual Therapy     18068  Comments   B ankle DF PROM  R: 10 deg past 0, L: 4 deg past 0   B ankle DF passive stretch reclined   B ankle TCJ glide     Thoracic ext mobilizations against table kyphotic       Timed Minutes 25     Therapeutic Exercises    06134 Comments   Resisted gait with RIP     Thoracic/pec stretch with man OP        Timed Minutes 5     Therapy Education/Self Care 54510   Education offered today Use of towel roll for thoracolumbar support when in recliner   [id number]   Ongoing HEP   Date: 2022  Prepared by: Kailyn Ordaz    Exercises  Belt Assisted Dorsiflexion - 1-2 x daily - 7 x weekly - 2 sets - 10 reps  Seated Hip Abduction with Resistance - 2 x daily - 7 x weekly - 2 sets - 10 reps  Standing March with Counter Support - 1-2 x daily - 7 x weekly - 2 sets - 10 reps   Timed Minutes      Total Timed Treatment:     45   mins  Total Time of Visit:             45   mins         ASSESSMENT/PLAN     GOALS  Goals                                                  Progress Note due by 10/20/22                                                                 Recert due by 11/20/22   LTG by: 6 weeks Comments Date Status   Improved B hip strength to 4+/5. Flex and abd 5/5, ext: 3+ (L) and 4-/5 (R) 9/21 partial met  ongoing   No stumbles or falls. Reports near falls x2.  9/21 ongoing   Able to balance on each LE X 10 sec without UE support.  1-2 seconds with significant instability  9/21 ongoing     Assessment/Plan     ASSESSMENT: Patient demonstrated significantly improved R ankle DF PROM, gaining 7 deg today. Following manual therapies to ankles, ability to A/P WS improved. 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 Following retro-walking for posterior WS and thoracic mobilizations for improved posture, pt's gait was much improved and pain was decreased.     PLAN: Consider prioritizing mobility, especially B gastroc and thoracic. Consider hip strengthening next session to increase hip stability as he does cont to present with trendelenburg.    SIGNATURE: Kailyn Ordaz PTA, [place] License #: [id number]    Electronically Signed on 10/6/2022        [address] [place] [address] [place] [address]  [phone]

## 2022-10-12 ENCOUNTER — TREATMENT (OUTPATIENT)
Dept: PHYSICAL THERAPY | Facility: CLINIC | Age: 87
End: 2022-10-12

## 2022-10-12 DIAGNOSIS — M21.371 RIGHT FOOT DROP: ICD-10-CM

## 2022-10-12 DIAGNOSIS — Z74.09 IMPAIRED MOBILITY: ICD-10-CM

## 2022-10-12 DIAGNOSIS — Z91.81 RISK FOR FALLS: Primary | ICD-10-CM

## 2022-10-12 PROCEDURE — 97140 MANUAL THERAPY 1/> REGIONS: CPT | Performed by: PHYSICAL THERAPIST

## 2022-10-12 PROCEDURE — 97112 NEUROMUSCULAR REEDUCATION: CPT | Performed by: PHYSICAL THERAPIST

## 2022-10-12 NOTE — PROGRESS NOTES
Physical Therapy Treatment Note    Patient: Chad Henriquez                                                 Visit Date: 10/12/2022  :     1929    Referring practitioner:    Catrachita Andrew DO  Date of Initial Visit:          Type: THERAPY  Noted: 2022    Patient seen for 8 sessions    Visit Diagnoses:    ICD-10-CM ICD-9-CM   1. Risk for falls  Z91.81 V15.88   2. Right foot drop  M21.371 736.79   3. Impaired mobility  Z74.09 799.89     SUBJECTIVE     Subjective He denies falls or near falls. No new changes or complaints. Reports he has not been using his cane consistently. Pt reports that he would really like to walk without the cane.     PAIN: 0/10       OBJECTIVE     Objective     Neuromuscular Reeducation     11775 Comments   Standing on blue foam    Standing narrow base eyes closed    Walking forward and side to side  And backward Focusing on balance   Stepping pattern in parallel bars    Timed Minutes 30     Manual Therapy     96999  Comments   B hip flexion stretch supine   B quad stretch supine               Timed Minutes 10     Therapeutic Exercises    67747 Comments   Mini squats 10x2       Sit to stand from elevated surface with yellow band around knees 10 x2   Timed Minutes 10     Therapy Education/Self Care 28741   Education offered today Use of towel roll for thoracolumbar support when in recliner   [facility] Code [id number]   Ongoing HEP   Date: 2022  Prepared by: Kailyn Ordaz    Exercises  Belt Assisted Dorsiflexion - 1-2 x daily - 7 x weekly - 2 sets - 10 reps  Seated Hip Abduction with Resistance - 2 x daily - 7 x weekly - 2 sets - 10 reps  Standing March with Counter Support - 1-2 x daily - 7 x weekly - 2 sets - 10 reps   Timed Minutes      Total Timed Treatment:     50   mins  Total Time of Visit:             50   mins         ASSESSMENT/PLAN     GOALS  Goals                                                 Progress  Note due by 10/20/22                                                                 Recert due by 11/20/22   LTG by: 6 weeks Comments Date Status   Improved B hip strength to 4+/5. Flex and abd 5/5, ext: 3+ (L) and 4-/5 (R) 9/21 partial met  ongoing   No stumbles or falls. Reports no near falls 10/12 ongoing   Able to balance on each LE X 10 sec without UE support.  1-2 seconds with significant instability  9/21 ongoing     Assessment/Plan     ASSESSMENT: The patient presents with no reports of falls and reports that he really wants to walk without the use of a cane. He is walking with a kyphotic posture, hip flexion and knee flexion. He needs stretching and strengthening. We will continue with addressing these issues to decrease his fall risk.     PLAN: Consider prioritizing mobility, especially B gastroc and thoracic. Consider hip strengthening next session to increase hip stability as he does cont to present with trendelenburg.    SIGNATURE: Sarah Rainey, PT, DPT, [facility], [place] License #: [id number]    Electronically Signed on 10/12/2022        [address] [place] [address] [place] [address]  [phone]

## 2022-10-19 ENCOUNTER — TREATMENT (OUTPATIENT)
Dept: PHYSICAL THERAPY | Facility: CLINIC | Age: 87
End: 2022-10-19

## 2022-10-19 DIAGNOSIS — Z74.09 IMPAIRED MOBILITY: ICD-10-CM

## 2022-10-19 DIAGNOSIS — Z91.81 RISK FOR FALLS: Primary | ICD-10-CM

## 2022-10-19 DIAGNOSIS — M21.371 RIGHT FOOT DROP: ICD-10-CM

## 2022-10-19 PROCEDURE — 97140 MANUAL THERAPY 1/> REGIONS: CPT | Performed by: PHYSICAL THERAPIST

## 2022-10-19 PROCEDURE — 97110 THERAPEUTIC EXERCISES: CPT | Performed by: PHYSICAL THERAPIST

## 2022-10-19 NOTE — PROGRESS NOTES
"                                                                Physical Therapy Treatment Note and 30 Day Progress Note    Patient: Chad Henriquez                                                 Visit Date: 10/19/2022  :     1929    Referring practitioner:    Catrachita Andrew DO  Date of Initial Visit:          Type: THERAPY  Noted: 2022    Patient seen for 9 sessions    Visit Diagnoses:    ICD-10-CM ICD-9-CM   1. Risk for falls  Z91.81 V15.88   2. Right foot drop  M21.371 736.79   3. Impaired mobility  Z74.09 799.89     SUBJECTIVE     Subjective He denies falls or near falls since last visit, though does report an average of 2-3 near falls/week. Has been [place] the mall every [place] expect for Monday which are his \"busy days\"     PAIN: 0/10       OBJECTIVE     Objective     Manual Therapy     51371  Comments   IASTM with BL ridge roller, thoracic, massage chair massage chair   Thoracic A/P, Rot mobs  massage chair   IASTM with edge tool, (B) gastrocs massage chair   Timed Minutes 33     Therapeutic Exercises    62002 Comments   Bridge 2 x 10    Clamshells  1 x 10 with YTB    Timed Minutes 10     Therapy Education/Self Care 50219   Education offered today Use of towel roll for thoracolumbar support when in recliner   [facility] [id number]   Ongoing HEP   Date: 2022  Prepared by: Kailyn Ordaz    Exercises  Belt Assisted Dorsiflexion - 1-2 x daily - 7 x weekly - 2 sets - 10 reps  Seated Hip Abduction with Resistance - 2 x daily - 7 x weekly - 2 sets - 10 reps  Standing March with Counter Support - 1-2 x daily - 7 x weekly - 2 sets - 10 reps   Timed Minutes      Total Timed Treatment:     43   mins  Total Time of Visit:             43   mins         ASSESSMENT/PLAN     GOALS  Goals                                                 Progress Note due by 22                                                                 Recert due by 22   LTG by: 6 weeks Comments Date Status "   Improved B hip strength to 4+/5. Flex and abd 5/5, ext: 3+ (L) and 4-/5 (R)  L/O d/t therapist error. Will assess next session 10/19 partial met  ongoing   No stumbles or falls. Reports no near falls 10/19 ongoing   Able to balance on each LE X 10 sec without UE support.  1-2 sec with significant instability on 9/21/2022  (L): 2 sec and (R): 4 sec on 10/19/2022 10/19 ongoing     Assessment/Plan     ASSESSMENT: Patient continues to be limited by significant spinal and hip mobility, also reporting 2-3 falls/week on average. He demonstrates habitually poor posture which exacerbates his back pain and negatively affects his gait mechanics. He would previously only use SC when attending PT, though now reports he does use it when walking at the mall. He is anxious and motivated to d/c use of SC, though his balance remains impaired and lacks adequate hip stability to do so safely. He would benefit from continued skilled PT to address his mobility, balance, gait, and strength deficits to decrease his fall risk and progress towards d/c use of SC when appropriate.     PLAN: Continue to progress spinal and hip mobility, consider open books and towel roll thoracic/pec stretch. As mobility improves, reinforce hip stability, including balance components where appropriate. Cont to refine gait and modify/progress HEP as appropriate.     SIGNATURE: Sebastian Newsome PTA Student, [place] License #:     Electronically Signed on 10/19/2022     The clinical instructor and/or supervising staff, Kailyn Ordaz PTA, was present in clinic guiding the visit by approving, concurring, and confirming the skilled judgement for all services rendered.    Signature:  Kailyn Ordaz PTA, License/Certification#: [id number]    Electronically signed on 10/19/2022        [place] [address]  [phone]

## 2022-10-20 NOTE — PROGRESS NOTES
Progress Note Addendum      Patient: Chad Henriquez           : 1929  Visit Date: 10/19/2022  Referring practitioner: Catrachita Andrew DO  Date of Initial Visit: Type: THERAPY  Noted: 2022  Patient seen for 9 sessions  Visit Diagnoses:    ICD-10-CM ICD-9-CM   1. Risk for falls  Z91.81 V15.88   2. Right foot drop  M21.371 736.79   3. Impaired mobility  Z74.09 799.89          Clinical Progress: improved  Home Program Compliance: Yes  Progress toward previous goals: Partially Met  Prognosis to achieve goals: good    Objective     Assessment & Plan     Assessment  Impairments: abnormal gait, impaired balance and impaired physical strength  Functional Limitations: walking and standing  Barriers to therapy: age, co-morbidities  Prognosis: good    Plan  Therapy options: will be seen for skilled therapy services  Planned therapy interventions: manual therapy, balance/weight-bearing training, flexibility, home exercise program, gait training, stretching, strengthening, soft tissue mobilization, neuromuscular re-education and therapeutic activities  Frequency: 1x week  Duration in weeks: 8  Treatment plan discussed with: PTA        I reviewed the treatment and goals with Kailyn Ordaz PTA and agree with the POC.      SIGNATURE: Luis Moe PT, License #: [id number]  Electronically Signed on 10/20/2022

## 2022-11-03 ENCOUNTER — OFFICE VISIT (OUTPATIENT)
Dept: INTERNAL MEDICINE | Facility: CLINIC | Age: 87
End: 2022-11-03

## 2022-11-03 VITALS
TEMPERATURE: 96.8 F | DIASTOLIC BLOOD PRESSURE: 66 MMHG | BODY MASS INDEX: 24.34 KG/M2 | OXYGEN SATURATION: 98 % | SYSTOLIC BLOOD PRESSURE: 132 MMHG | HEIGHT: 70 IN | WEIGHT: 170 LBS | HEART RATE: 61 BPM

## 2022-11-03 DIAGNOSIS — M51.37 DEGENERATION OF LUMBAR OR LUMBOSACRAL INTERVERTEBRAL DISC: ICD-10-CM

## 2022-11-03 DIAGNOSIS — I10 PRIMARY HYPERTENSION: Primary | ICD-10-CM

## 2022-11-03 PROCEDURE — 99213 OFFICE O/P EST LOW 20 MIN: CPT | Performed by: NURSE PRACTITIONER

## 2022-11-04 NOTE — PROGRESS NOTES
Subjective     Chief Complaint:  Back pain    HPI:  Patient presents to the office today for a 3-month follow-up.  His blood pressure is well controlled at 132/66.  He has not been monitoring this at home.  He denies any chest pain.  He states he has had shortness of breath ever since having COVID in January 2021.  He does not feel that this has worsened.  He denies any difficulty with cough or congestion.  He does try to stay as active as possible and states he [place] the [place].    Patient complains of chronic lower back pain.  He does not relate any sciatic symptoms.  Denies numbness or tingling to the lower extremities.  This is worse with bending and twisting, better in a seated position.  He states he has been going to physical therapy for his balance and cannot really tell if this is working or not.    Patient's PMR from outside medical facility reviewed and noted.    Past Medical History:   Past Medical History:   Diagnosis Date   • Allergic rhinitis    • Anemia    • Arthritis    • Blind left eye    • BPH (benign prostatic hypertrophy) with urinary retention    • Cancer (HCC)     skin cancer   • Chronic back pain     RUNS DOWN BOTH LEGS   • Constipation    • Coronary artery disease    • Hard of hearing    • Heart attack (HCC) 1986    NO MUSCLE DAMAGE - JUST OCCLUDED VALVE   • High cholesterol    • History of skin cancer     BILATERAL EARS   • History of transfusion     1986   • Hypertension    • Inguinal hernia    • Leaky heart valve     DR CONCEPCION SAYS NOT ENOUGH TO BE OF CONCERN   • Other bursal cyst, right elbow    • PONV (postoperative nausea and vomiting)     has had scopalamine patch in past    • Sleep apnea     DOES NOT USE CPAP OR BIPAP   • Spinal stenosis of cervical region    • Spinal stenosis of lumbar region    • Tremors of nervous system    • Wears hearing aid     BILATERAL     Past Surgical History:  Past Surgical History:   Procedure Laterality Date   • ANTERIOR LUMBAR  EXPOSURE N/A 12/7/2018    Procedure: ANTERIOR LUMBAR EXPOSURE;  Surgeon: Manolo Mcleod DO;  Location:  [facility];  Service: Vascular   • APPENDECTOMY     • BACK SURGERY      X3   • CARDIAC SURGERY  08/08/1986    X1 BYPASS   • CORONARY ANGIOPLASTY WITH STENT PLACEMENT  1997    X3 STENTS   • HERNIA REPAIR Bilateral     x2   • INGUINAL HERNIA REPAIR Right 6/22/2017    Procedure: RIGHT INGUINAL HERNIA REPAIR AND EXCISION OF CYST RIGHT ELBOW ;  Surgeon: Jackelyn Arriola MD;  Location:  [facility];  Service:    • LUMBAR FUSION Left 6/30/2021    Procedure: LUMBAR LAMINECTOMY, DISCECTOMY, FACETECTOMY,  TRANSFORAMINAL LUMBAR INTERBODY FUSION L2-3. REVISION OF INSTRUMENTATION L3-S1. USE OF IMAGE GUIDANCE AND SURGICAL ROBOT;  Surgeon: Kj Falcon MD;  Location:  [facility];  Service: Robotics - Neuro;  Laterality: Left;   • LUMBAR LAMINECTOMY N/A 3/12/2018    Procedure: LUMBAR LAMINECTOMY WITHOUT FUSION L3-4,4-5;  Surgeon: Kj Falcon MD;  Location:  [facility];  Service: Neurosurgery   • LUMBAR LAMINECTOMY ANTERIOR LUMBAR INTERBODY FUSION N/A 12/7/2018    Procedure: ANTERIOR LUMBAR INTERBODY FUSION L5-S1;  Surgeon: Kj Falcon MD;  Location:  [facility];  Service: Neurosurgery   • LUMBAR LAMINECTOMY WITH FUSION Left 12/10/2018    Procedure: LUMBAR LAMINECTOMY TRANSFORAMINAL LUMBAR INTERBODY FUSION L34,45;  Surgeon: Kj Falcon MD;  Location:  [facility];  Service: Neurosurgery   • LUMBAR LAMINECTOMY WITH FUSION Left 12/7/2018    Procedure: LUMBAR LAMINECTOMY TRANSFORAMINAL LUMBAR INTERBODY FUSION LEFT L3-4, L4-5 QUADRANT RETRACTOR;  Surgeon: Kj Falcon MD;  Location:  [facility];  Service: Neurosurgery   • SKIN LESION EXCISION      nasal     Social History:  reports that he quit smoking about 73 years ago. His smoking use included cigarettes. He has never used smokeless tobacco. He reports that he does not drink alcohol and does not use drugs.    Family History: family history includes No Known Problems  in his father and mother.      Allergies:  Allergies   Allergen Reactions   • Codeine Nausea And Vomiting and GI Intolerance   • Fentanyl Hallucinations and Other (See Comments)     DELUSIONAL  Fentanyl patchs, caused patient to become confused and anxious per family  Fentanyl patchs, caused patient to become confused and anxious per family   • Augmentin [Amoxicillin-Pot Clavulanate] GI Intolerance     Medications:  Prior to Admission medications    Medication Sig Start Date End Date Taking? Authorizing Provider   alfuzosin (UROXATRAL) 10 MG 24 hr tablet Take 1 tablet by mouth Daily. 3/29/17  Yes Dylon Maldonado MD   amLODIPine (NORVASC) 2.5 MG tablet Take 2.5 mg by mouth Daily.   Yes Fredrick Manley MD   Ascorbic Acid (Vitamin C) 500 MG capsule Take 1 capsule by mouth 2 (two) times a day.   Yes Fredrick Manley MD   aspirin 81 MG EC tablet Take 81 mg by mouth Daily.   Yes Fredrick Manley MD   chlorhexidine (Hibiclens) 4 % external liquid Apply  topically to the appropriate area as directed Daily As Needed for Wound Care. 3/21/22  Yes Glenys Archer APRN   chlorpheniramine (CHLOR-TRIMETON) 4 MG tablet Take 4 mg by mouth Daily.   Yes Fredrick Manley MD   Cholecalciferol (Vitamin D3) 50 MCG (2000 UT) tablet Take 1 tablet by mouth Daily.   Yes Fredrick Manley MD   famotidine (PEPCID) 20 MG tablet Take 1 tablet by mouth 2 (Two) Times a Day. 1/29/22  Yes Kang Rodriguez MD   finasteride (PROSCAR) 5 MG tablet Take 1 tablet by mouth Daily. 3/29/17  Yes Dylon Maldonado MD   Flaxseed, Linseed, (FLAXSEED OIL) 1000 MG capsule 540mg takes 4 tablets by mouth daily   Yes ProviderFredrick MD   folic acid (FOLVITE) 800 MCG tablet Taking as multivitamin now   Yes Fredrick Manley MD   gabapentin (NEURONTIN) 600 MG tablet Take 1 tablet by mouth 3 (Three) Times a Day. 8/20/19  Yes Kj Falcon MD GNP Antiseptic Skin Cleanser 4 % solution APPLY  TOPICALLY TO THE  "APPROPRIATE AREA AS DIRECTED DAILY AS NEEDED FOR WOUND CARE. 3/21/22  Yes Fredrick Manley MD   L-Lysine 600 MG tablet Take 1 tablet by mouth Daily.   Yes Fredrick Manley MD   metoprolol tartrate (LOPRESSOR) 25 MG tablet 1/2 in morning and 1 at night 3/30/21  Yes Zelalem Jackson MD   mupirocin (Bactroban) 2 % cream Apply 1 application topically to the appropriate area as directed Daily. 3/21/22  Yes Glenys Archer APRN   mupirocin (BACTROBAN) 2 % ointment APPLY 1 APPLICATION TOPICALLY TO THE APPROPRIATE AREA AS DIRECTED DAILY. 3/21/22  Yes Fredrick Manley MD   ondansetron ODT (ZOFRAN-ODT) 8 MG disintegrating tablet Place 1 tablet on the tongue Every 8 (Eight) Hours As Needed for Nausea or Vomiting. 1/29/22  Yes Kang Rodriguez MD   pravastatin (PRAVACHOL) 20 MG tablet Take 1 tablet by mouth Every Night. 7/7/21  Yes Glenys Archer APRN   primidone (MYSOLINE) 50 MG tablet Take 1/2 tablet at bedtime  Patient taking differently: Take 1 tablet by mouth. 8/28/18  Yes Kj Falcon MD   sennosides-docusate (senna-docusate sodium) 8.6-50 MG per tablet Take 2 tablets by mouth Daily. 7/7/21  Yes Glenys Archer APRN   triamcinolone (KENALOG) 0.1 % ointment APPLY TWICE DAILY WHEN AND WHERE RASH IS PRESENT 3/23/22  Yes Fredrick Manley MD       Objective     Vital Signs: /66 (BP Location: Left arm, Patient Position: Sitting, Cuff Size: Adult)   Pulse 61   Temp 96.8 °F (36 °C) (Temporal)   Ht 177.8 cm (70\")   Wt 77.1 kg (170 lb)   SpO2 98%   BMI 24.39 kg/m²   Physical Exam  Vitals and nursing note reviewed.   Constitutional:       General: He is not in acute distress.     Appearance: He is not ill-appearing or toxic-appearing.   HENT:      Head: Normocephalic and atraumatic.      Mouth/Throat:      Mouth: Mucous membranes are moist.      Pharynx: Oropharynx is clear.   Eyes:      Comments: Blind in the left eye   Cardiovascular:      Rate and " Rhythm: Normal rate and regular rhythm.      Pulses: Normal pulses.      Heart sounds: Murmur heard.   Pulmonary:      Effort: Pulmonary effort is normal.      Breath sounds: No wheezing, rhonchi or rales.   Abdominal:      General: Bowel sounds are normal. There is no distension.      Palpations: Abdomen is soft.      Tenderness: There is no abdominal tenderness.   Musculoskeletal:         General: No swelling or tenderness. Normal range of motion.      Cervical back: Normal range of motion and neck supple. No tenderness.      Comments: AFO brace to the right leg for chronic foot drop   Skin:     General: Skin is warm and dry.      Findings: No erythema or rash.   Neurological:      General: No focal deficit present.      Mental Status: He is alert and oriented to person, place, and time.   Psychiatric:         Mood and Affect: Mood normal.         Behavior: Behavior normal.         Thought Content: Thought content normal.         Judgment: Judgment normal.       BMI is within normal parameters. No other follow-up for BMI required.    Results Reviewed:  Reviewed patient's last office visit note with Dr. Andrew from 8/3/2022.    Assessment / Plan     Assessment/Plan:  Diagnoses and all orders for this visit:    1. Primary hypertension (Primary)  Overview:  Last Assessment & Plan:   Hypertension is unchanged.  Continue current treatment regimen.  Blood pressure will be reassessed at the next regular appointment.      2. Degeneration of lumbar or lumbosacral intervertebral disc  Overview:  Added automatically from request for surgery [id number]       Patient presents to the office today for a 3-month follow-up.  His chronic back pain seems to be at baseline.  We will continue with gabapentin.  Patient also taking Tylenol as needed.  He is currently going to physical therapy for his balance.    Patient's blood pressure is well controlled in the office today at 132/66.  We will continue present regimen.  Planning to assess  full panel of labs at his annual wellness visit in 1 month.    Return in about 1 month (around 12/3/2022) for Annual physical. unless patient needs to be seen sooner or acute issues arise.    I have discussed the patient results/orders and and plan/recommendation with them at today's visit.      Tanya Garzon, APRN   11/03/2022

## 2022-11-09 ENCOUNTER — TREATMENT (OUTPATIENT)
Dept: PHYSICAL THERAPY | Facility: CLINIC | Age: 87
End: 2022-11-09

## 2022-11-09 DIAGNOSIS — Z98.1 HISTORY OF LUMBAR FUSION: ICD-10-CM

## 2022-11-09 DIAGNOSIS — G89.29 CHRONIC BILATERAL LOW BACK PAIN, UNSPECIFIED WHETHER SCIATICA PRESENT: ICD-10-CM

## 2022-11-09 DIAGNOSIS — Z91.81 RISK FOR FALLS: Primary | ICD-10-CM

## 2022-11-09 DIAGNOSIS — M54.50 CHRONIC BILATERAL LOW BACK PAIN, UNSPECIFIED WHETHER SCIATICA PRESENT: ICD-10-CM

## 2022-11-09 DIAGNOSIS — M21.371 RIGHT FOOT DROP: ICD-10-CM

## 2022-11-09 DIAGNOSIS — Z74.09 IMPAIRED MOBILITY: ICD-10-CM

## 2022-11-09 PROCEDURE — 97140 MANUAL THERAPY 1/> REGIONS: CPT | Performed by: PHYSICAL THERAPIST

## 2022-11-09 PROCEDURE — 97110 THERAPEUTIC EXERCISES: CPT | Performed by: PHYSICAL THERAPIST

## 2022-11-09 NOTE — PROGRESS NOTES
"                                                                Physical Therapy Treatment Note  [address] [place] [address]    Patient: Chad Henriquez                                                 Visit Date: 2022  :     1929    Referring practitioner:    Catrachita Andrew DO  Date of Initial Visit:          Type: THERAPY  Noted: 2022    Patient seen for 10 sessions    Visit Diagnoses:    ICD-10-CM ICD-9-CM   1. Risk for falls  Z91.81 V15.88   2. Right foot drop  M21.371 736.79   3. Impaired mobility  Z74.09 799.89   4. History of lumbar fusion  Z98.1 V45.4   5. Chronic bilateral low back pain, unspecified whether sciatica present  M54.50 724.2    G89.29 338.29     SUBJECTIVE     Subjective He states he feels \"normal\" reports constant LBP     PAIN: 3/10         OBJECTIVE     Objective        Manual Therapy     41288  Comments   STM B lower lumbar region in L SL    Manual B pec and thoracic stretches in sitting with 1/2 foam roller                Timed Minutes 25        Therapeutic Exercises    79963 Units Comments   B HF stretches in SL     HL alt clamshells with green T band x 20      ALT SKC's               Timed Minutes 15     Therapy Education/Self Care 02030   Education offered today [id number]   Medbride Code    Ongoing HEP   Date: 2022  Prepared by: Kailyn Ordaz     Exercises  Belt Assisted Dorsiflexion - 1-2 x daily - 7 x weekly - 2 sets - 10 reps  Seated Hip Abduction with Resistance - 2 x daily - 7 x weekly - 2 sets - 10 reps  Standing March with Counter Support - 1-2 x daily - 7 x weekly - 2 sets - 10 reps   Timed Minutes        Total Timed Treatment:     40   mins  Total Time of Visit:             40   mins         ASSESSMENT/PLAN     GOALS    Goals                                                 Progress Note due by 22                                                                 Recert due by 22   LTG by: 6 weeks Comments Date Status   Improved B hip " strength to 4+/5. Flex and abd 5/5, ext: 3+ (L) and 4-/5 (R)  L/O d/t therapist error. Will assess next session 10/19 partial met  ongoing   No stumbles or falls. Reports no near falls 10/19 ongoing   Able to balance on each LE X 10 sec without UE support.  1-2 sec with significant instability on 9/21/2022  (L): 2 sec and (R): 4 sec on 10/19/2022 10/19 ongoing       Assessment/Plan     ASSESSMENT: According to Kailyn Ordaz PTA she was informed by a family member of the patient that he had had some falls and wasn't using his SC. He denied this report but I advised him that he could undergo significant difficulty if he had a severe injury resulting from a fall.    PLAN: Review all goals for a progress note.    SIGNATURE: Augustus Dugan PTA, [place] License #: [id number]  Electronically Signed on 11/9/2022        [address] [place] [address] [place] [address]  [phone]

## 2022-11-16 ENCOUNTER — TREATMENT (OUTPATIENT)
Dept: PHYSICAL THERAPY | Facility: CLINIC | Age: 87
End: 2022-11-16

## 2022-11-16 DIAGNOSIS — Z98.1 HISTORY OF LUMBAR FUSION: ICD-10-CM

## 2022-11-16 DIAGNOSIS — M21.371 RIGHT FOOT DROP: ICD-10-CM

## 2022-11-16 DIAGNOSIS — G89.29 CHRONIC BILATERAL LOW BACK PAIN, UNSPECIFIED WHETHER SCIATICA PRESENT: ICD-10-CM

## 2022-11-16 DIAGNOSIS — M54.50 CHRONIC BILATERAL LOW BACK PAIN, UNSPECIFIED WHETHER SCIATICA PRESENT: ICD-10-CM

## 2022-11-16 DIAGNOSIS — Z91.81 RISK FOR FALLS: Primary | ICD-10-CM

## 2022-11-16 DIAGNOSIS — Z74.09 IMPAIRED MOBILITY: ICD-10-CM

## 2022-11-16 PROCEDURE — 97112 NEUROMUSCULAR REEDUCATION: CPT | Performed by: PHYSICAL THERAPIST

## 2022-11-16 PROCEDURE — 97110 THERAPEUTIC EXERCISES: CPT | Performed by: PHYSICAL THERAPIST

## 2022-11-16 NOTE — PROGRESS NOTES
30 Day Progress Note and 90 Day Recertification Addendum      Patient: Chad Henriquez           : 1929  Visit Date: 2022  Referring practitioner: Catrachita Andrew DO  Date of Initial Visit: Type: THERAPY  Noted: 2022  Patient seen for 11 sessions  Visit Diagnoses:    ICD-10-CM ICD-9-CM   1. Risk for falls  Z91.81 V15.88   2. Right foot drop  M21.371 736.79   3. Impaired mobility  Z74.09 799.89   4. History of lumbar fusion  Z98.1 V45.4   5. Chronic bilateral low back pain, unspecified whether sciatica present  M54.50 724.2    G89.29 338.29          Clinical Progress: improved  Home Program Compliance: Yes  Progress toward previous goals: Partially Met  Prognosis to achieve goals: good    Objective     Assessment & Plan     Assessment  Impairments: abnormal gait, impaired balance and impaired physical strength  Functional Limitations: walking and standing  Barriers to therapy: age, co-morbidities  Prognosis: good    Plan  Therapy options: will be seen for skilled therapy services  Planned therapy interventions: manual therapy, balance/weight-bearing training, flexibility, home exercise program, gait training, stretching, strengthening, soft tissue mobilization, neuromuscular re-education and therapeutic activities  Frequency: 1x week  Duration in weeks: 8  Treatment plan discussed with: PTA        I reviewed the treatment and goals with Augustus Dugan PTA and agree with the POC.      SIGNATURE: Luis Moe PT, License #: [id number]  Electronically Signed on 2022      90 Day Recertification  Certification Period: 2022 through 2023  Based upon review of the patient's progress and continued therapy plan, it is my medical opinion that Chad Henriquez should continue physical therapy treatment at [facility]     PHYSICIAN: Catrachita Andrew DO (NPI: [id number])    Signature: __________________________________________________DATE: ___________________     Please  sign and return via fax to [phone].   Thank you so much for letting us work with Chad. I appreciate your letting us work with your patients. If you have any questions or concerns, please don't hesitate to contact me.
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
## 2022-12-02 ENCOUNTER — LAB (OUTPATIENT)
Dept: INTERNAL MEDICINE | Facility: CLINIC | Age: 87
End: 2022-12-02

## 2022-12-02 DIAGNOSIS — E78.00 HYPERCHOLESTEREMIA: Primary | ICD-10-CM

## 2022-12-02 DIAGNOSIS — Z79.899 ENCOUNTER FOR LONG-TERM CURRENT USE OF MEDICATION: ICD-10-CM

## 2022-12-02 DIAGNOSIS — I10 PRIMARY HYPERTENSION: ICD-10-CM

## 2022-12-06 ENCOUNTER — OFFICE VISIT (OUTPATIENT)
Dept: INTERNAL MEDICINE | Facility: CLINIC | Age: 87
End: 2022-12-06

## 2022-12-06 ENCOUNTER — HOSPITAL ENCOUNTER (OUTPATIENT)
Dept: GENERAL RADIOLOGY | Facility: HOSPITAL | Age: 87
Discharge: HOME | End: 2022-12-06

## 2022-12-06 ENCOUNTER — TELEPHONE (OUTPATIENT)
Dept: INTERNAL MEDICINE | Facility: CLINIC | Age: 87
End: 2022-12-06

## 2022-12-06 VITALS
HEART RATE: 64 BPM | TEMPERATURE: 97.3 F | OXYGEN SATURATION: 95 % | HEIGHT: 68 IN | DIASTOLIC BLOOD PRESSURE: 64 MMHG | SYSTOLIC BLOOD PRESSURE: 150 MMHG | BODY MASS INDEX: 24.86 KG/M2 | WEIGHT: 164 LBS

## 2022-12-06 DIAGNOSIS — R09.89 CHEST CONGESTION: ICD-10-CM

## 2022-12-06 DIAGNOSIS — R60.0 BILATERAL LOWER EXTREMITY EDEMA: ICD-10-CM

## 2022-12-06 DIAGNOSIS — J06.9 UPPER RESPIRATORY TRACT INFECTION, UNSPECIFIED TYPE: ICD-10-CM

## 2022-12-06 DIAGNOSIS — R06.02 SOB (SHORTNESS OF BREATH): ICD-10-CM

## 2022-12-06 DIAGNOSIS — R05.1 ACUTE COUGH: ICD-10-CM

## 2022-12-06 DIAGNOSIS — R09.89 CHEST CONGESTION: Primary | ICD-10-CM

## 2022-12-06 DIAGNOSIS — R35.0 FREQUENCY OF URINATION: ICD-10-CM

## 2022-12-06 LAB
BILIRUB BLD-MCNC: NEGATIVE MG/DL
CLARITY, POC: CLEAR
COLOR UR: YELLOW
GLUCOSE UR STRIP-MCNC: NEGATIVE MG/DL
KETONES UR QL: NEGATIVE
LEUKOCYTE EST, POC: NEGATIVE
NITRITE UR-MCNC: NEGATIVE MG/ML
PH UR: 7 [PH] (ref 5–8)
PROT UR STRIP-MCNC: ABNORMAL MG/DL
RBC # UR STRIP: NEGATIVE /UL
SP GR UR: 1.02 (ref 1–1.03)
UROBILINOGEN UR QL: ABNORMAL

## 2022-12-06 PROCEDURE — 71046 X-RAY EXAM CHEST 2 VIEWS: CPT

## 2022-12-06 PROCEDURE — 99214 OFFICE O/P EST MOD 30 MIN: CPT

## 2022-12-06 PROCEDURE — 81003 URINALYSIS AUTO W/O SCOPE: CPT

## 2022-12-06 RX ORDER — DOXYCYCLINE HYCLATE 100 MG/1
100 CAPSULE ORAL 2 TIMES DAILY
Qty: 20 CAPSULE | Refills: 0 | Status: SHIPPED | OUTPATIENT
Start: 2022-12-06 | End: 2022-12-16

## 2022-12-06 RX ORDER — ALBUTEROL SULFATE 0.63 MG/3ML
1 SOLUTION RESPIRATORY (INHALATION) EVERY 6 HOURS PRN
Qty: 30 EACH | Refills: 0 | Status: SHIPPED | OUTPATIENT
Start: 2022-12-06

## 2022-12-06 RX ORDER — METHYLPREDNISOLONE 4 MG/1
TABLET ORAL
Qty: 1 TABLET | Refills: 1 | Status: SHIPPED | OUTPATIENT
Start: 2022-12-06

## 2022-12-06 NOTE — PROGRESS NOTES
"Subjective   Chad Henriquez is a 89 y.o. male.   Chief Complaint   Patient presents with   • URI     Sx's started the day before Thanksgiving, chest congestion, coughing up yellow \"milky looking\" phlegm, sob, no energy, was given azithromycin (completed taking this), wife states she is worried since he is 89 and he is worse now       History of Present Illness   Mr. Henriquez presents here today with complaints of chest congestion, coughing up yellow phlegm, shortness of breath, lower extremity edema, increased fatigue and weakness.  He presents here today with his wife both of which answer my questions today  They report that symptoms began the day before Thanksgiving, he went to urgent care on 11/26 and was prescribed a Z-Jose, he completed this as prescribed and states he felt maybe a little bit better after however symptoms have recurred and are worse now.  He states that he gets short of breath walking as far 60 feet, has had increased fatigue, wife states that he would sleep all day if she let him.  He states he is normally a mall walker.  He denies any sore throat, denies any lymphadenopathy.  He states he has had intermittent bilateral ear pain, states that 1 year hurts and then the next 1 will hurt for a while.  Reports low-grade fever in the beginning with some chills and sweating, this is not occurred recently.  He feels that his lungs have become more coarse.  He reports he has a history of stents and bypass surgery.  Wife reports that she has been treating him with Mucinex Vapotherm, and Tylenol.  He denies chest pain, nausea, admits to having 1 day of diarrhea but otherwise no other GI symptoms or side effects.  He reports he has been urinating more frequently, he is unsure if this is related to drinking more water.  Denies any dysuria or pelvic discomfort no flank pain.  He has noted lower extremity edema slightly worse in the left leg, is wondering what could cause this.  The following portions of the " patient's history were reviewed and updated as appropriate: allergies, current medications, past family history, past medical history, past social history, past surgical history and problem list.    Review of Systems    Objective   Past Medical History:   Diagnosis Date   • Allergic rhinitis    • Anemia    • Arthritis    • Blind left eye    • BPH (benign prostatic hypertrophy) with urinary retention    • Cancer (HCC)     skin cancer   • Chronic back pain     RUNS DOWN BOTH LEGS   • Constipation    • Coronary artery disease    • Hard of hearing    • Heart attack (HCC) 1986    NO MUSCLE DAMAGE - JUST OCCLUDED VALVE   • High cholesterol    • History of skin cancer     BILATERAL EARS   • History of transfusion     1986   • Hypertension    • Inguinal hernia    • Leaky heart valve     DR CONCEPCION SAYS NOT ENOUGH TO BE OF CONCERN   • Other bursal cyst, right elbow    • PONV (postoperative nausea and vomiting)     has had scopalamine patch in past    • Sleep apnea     DOES NOT USE CPAP OR BIPAP   • Spinal stenosis of cervical region    • Spinal stenosis of lumbar region    • Tremors of nervous system    • Wears hearing aid     BILATERAL      Past Surgical History:   Procedure Laterality Date   • ANTERIOR LUMBAR EXPOSURE N/A 12/7/2018    Procedure: ANTERIOR LUMBAR EXPOSURE;  Surgeon: Manolo Mcleod DO;  Location:  [facility] OR;  Service: Vascular   • APPENDECTOMY     • BACK SURGERY      X3   • CARDIAC SURGERY  08/08/1986    X1 BYPASS   • CORONARY ANGIOPLASTY WITH STENT PLACEMENT  1997    X3 STENTS   • HERNIA REPAIR Bilateral     x2   • INGUINAL HERNIA REPAIR Right 6/22/2017    Procedure: RIGHT INGUINAL HERNIA REPAIR AND EXCISION OF CYST RIGHT ELBOW ;  Surgeon: Jackelyn Arriola MD;  Location: [facility];  Service:    • LUMBAR FUSION Left 6/30/2021    Procedure: LUMBAR LAMINECTOMY, DISCECTOMY, FACETECTOMY,  TRANSFORAMINAL LUMBAR INTERBODY FUSION L2-3. REVISION OF INSTRUMENTATION L3-S1. USE OF IMAGE GUIDANCE AND SURGICAL  ROBOT;  Surgeon: Kj Falcon MD;  Location:  [facility] OR;  Service: Robotics - Neuro;  Laterality: Left;   • LUMBAR LAMINECTOMY N/A 3/12/2018    Procedure: LUMBAR LAMINECTOMY WITHOUT FUSION L3-4,4-5;  Surgeon: Kj Falcon MD;  Location:  [facility] OR;  Service: Neurosurgery   • LUMBAR LAMINECTOMY ANTERIOR LUMBAR INTERBODY FUSION N/A 12/7/2018    Procedure: ANTERIOR LUMBAR INTERBODY FUSION L5-S1;  Surgeon: Kj Falcon MD;  Location:  [facility] OR;  Service: Neurosurgery   • LUMBAR LAMINECTOMY WITH FUSION Left 12/10/2018    Procedure: LUMBAR LAMINECTOMY TRANSFORAMINAL LUMBAR INTERBODY FUSION L34,45;  Surgeon: Kj Falcon MD;  Location:  [facility] OR;  Service: Neurosurgery   • LUMBAR LAMINECTOMY WITH FUSION Left 12/7/2018    Procedure: LUMBAR LAMINECTOMY TRANSFORAMINAL LUMBAR INTERBODY FUSION LEFT L3-4, L4-5 QUADRANT RETRACTOR;  Surgeon: Kj Falcon MD;  Location:  [facility] OR;  Service: Neurosurgery   • SKIN LESION EXCISION      nasal        Current Outpatient Medications:   •  alfuzosin (UROXATRAL) 10 MG 24 hr tablet, Take 1 tablet by mouth Daily., Disp: 30 tablet, Rfl: 5  •  amLODIPine (NORVASC) 2.5 MG tablet, Take 2.5 mg by mouth Daily., Disp: , Rfl:   •  Ascorbic Acid (Vitamin C) 500 MG capsule, Take 1 capsule by mouth 2 (two) times a day., Disp: , Rfl:   •  aspirin 81 MG EC tablet, Take 81 mg by mouth Daily., Disp: , Rfl:   •  chlorhexidine (Hibiclens) 4 % external liquid, Apply  topically to the appropriate area as directed Daily As Needed for Wound Care., Disp: 118 mL, Rfl: 0  •  chlorpheniramine (CHLOR-TRIMETON) 4 MG tablet, Take 4 mg by mouth Daily., Disp: , Rfl:   •  Cholecalciferol (Vitamin D3) 50 MCG (2000 UT) tablet, Take 1 tablet by mouth Daily., Disp: , Rfl:   •  famotidine (PEPCID) 20 MG tablet, Take 1 tablet by mouth 2 (Two) Times a Day., Disp: 20 tablet, Rfl: 0  •  finasteride (PROSCAR) 5 MG tablet, Take 1 tablet by mouth Daily., Disp: 30 tablet, Rfl: 5  •  Flaxseed, Linseed, (FLAXSEED  OIL) 1000 MG capsule, 540mg takes 4 tablets by mouth daily, Disp: , Rfl:   •  folic acid (FOLVITE) 800 MCG tablet, Taking as multivitamin now, Disp: , Rfl:   •  gabapentin (NEURONTIN) 600 MG tablet, Take 1 tablet by mouth 3 (Three) Times a Day., Disp: 90 tablet, Rfl: 3  •  GNP Antiseptic Skin Cleanser 4 % solution, APPLY  TOPICALLY TO THE APPROPRIATE AREA AS DIRECTED DAILY AS NEEDED FOR WOUND CARE., Disp: , Rfl:   •  L-Lysine 600 MG tablet, Take 1 tablet by mouth Daily., Disp: , Rfl:   •  metoprolol tartrate (LOPRESSOR) 25 MG tablet, 1/2 in morning and 1 at night, Disp: 135 tablet, Rfl: 3  •  mupirocin (Bactroban) 2 % cream, Apply 1 application topically to the appropriate area as directed Daily., Disp: 15 g, Rfl: 0  •  mupirocin (BACTROBAN) 2 % ointment, APPLY 1 APPLICATION TOPICALLY TO THE APPROPRIATE AREA AS DIRECTED DAILY., Disp: , Rfl:   •  ondansetron ODT (ZOFRAN-ODT) 8 MG disintegrating tablet, Place 1 tablet on the tongue Every 8 (Eight) Hours As Needed for Nausea or Vomiting., Disp: 15 tablet, Rfl: 0  •  pravastatin (PRAVACHOL) 20 MG tablet, Take 1 tablet by mouth Every Night., Disp: 90 tablet, Rfl: 1  •  primidone (MYSOLINE) 50 MG tablet, Take 1/2 tablet at bedtime (Patient taking differently: Take 50 mg by mouth.), Disp: 45 tablet, Rfl: 3  •  sennosides-docusate (senna-docusate sodium) 8.6-50 MG per tablet, Take 2 tablets by mouth Daily., Disp: 60 tablet, Rfl: 1  •  triamcinolone (KENALOG) 0.1 % ointment, APPLY TWICE DAILY WHEN AND WHERE RASH IS PRESENT, Disp: , Rfl:   •  albuterol (ACCUNEB) 0.63 MG/3ML nebulizer solution, Take 3 mL by nebulization Every 6 (Six) Hours As Needed for Wheezing., Disp: 30 each, Rfl: 0  •  doxycycline (VIBRAMYCIN) 100 MG capsule, Take 1 capsule by mouth 2 (Two) Times a Day for 10 days., Disp: 20 capsule, Rfl: 0  •  methylPREDNISolone (MEDROL) 4 MG dose pack, Take as directed on package instructions., Disp: 1 tablet, Rfl: 1      /64 (BP Location: Left arm, Patient  "Position: Sitting, Cuff Size: Adult)   Pulse 64   Temp 97.3 °F (36.3 °C) (Temporal)   Ht 172.7 cm (68\")   Wt 74.4 kg (164 lb)   SpO2 95%   BMI 24.94 kg/m²      Body mass index is 24.94 kg/m².         Physical Exam  Vitals and nursing note reviewed.   Constitutional:       General: He is not in acute distress.     Appearance: Normal appearance. He is not ill-appearing, toxic-appearing or diaphoretic.   HENT:      Head: Normocephalic and atraumatic.      Right Ear: Ear canal and external ear normal. There is no impacted cerumen.      Left Ear: Ear canal and external ear normal. There is no impacted cerumen.      Ears:      Comments: Has bilateral hearing aides       Nose: Nose normal.      Mouth/Throat:      Mouth: Mucous membranes are moist.      Pharynx: Oropharynx is clear. No oropharyngeal exudate or posterior oropharyngeal erythema.   Eyes:      Extraocular Movements: Extraocular movements intact.      Conjunctiva/sclera: Conjunctivae normal.      Pupils: Pupils are equal, round, and reactive to light.   Cardiovascular:      Rate and Rhythm: Normal rate and regular rhythm.      Pulses: Normal pulses.      Heart sounds: Normal heart sounds. No murmur heard.    No friction rub. No gallop.   Pulmonary:      Effort: Pulmonary effort is normal. No respiratory distress.      Breath sounds: No stridor. No wheezing, rhonchi or rales.   Chest:      Chest wall: No tenderness.   Abdominal:      General: Bowel sounds are normal. There is no distension.      Palpations: Abdomen is soft.      Tenderness: There is no abdominal tenderness. There is no guarding.   Musculoskeletal:         General: Normal range of motion.      Cervical back: Normal range of motion and neck supple. No tenderness.      Right lower leg: Edema (trace) present.      Left lower leg: Edema (1+) present.   Lymphadenopathy:      Cervical: No cervical adenopathy.   Skin:     General: Skin is warm and dry.      Capillary Refill: Capillary refill takes " less than 2 seconds.   Neurological:      General: No focal deficit present.      Mental Status: He is alert and oriented to person, place, and time. Mental status is at baseline.      Motor: Weakness present.   Psychiatric:         Mood and Affect: Mood normal.         Behavior: Behavior normal.         Thought Content: Thought content normal.         Judgment: Judgment normal.               Assessment & Plan   Diagnoses and all orders for this visit:    1. Chest congestion (Primary)  -     XR Chest 2 View; Future  -     albuterol (ACCUNEB) 0.63 MG/3ML nebulizer solution; Take 3 mL by nebulization Every 6 (Six) Hours As Needed for Wheezing.  Dispense: 30 each; Refill: 0  -     Home Nebulizer    2. Acute cough  -     XR Chest 2 View; Future    3. SOB (shortness of breath)  -     XR Chest 2 View; Future  -     albuterol (ACCUNEB) 0.63 MG/3ML nebulizer solution; Take 3 mL by nebulization Every 6 (Six) Hours As Needed for Wheezing.  Dispense: 30 each; Refill: 0  -     Home Nebulizer    4. Bilateral lower extremity edema  -     XR Chest 2 View; Future    5. Frequency of urination  -     POC Urinalysis Dipstick, Multipro    6. Upper respiratory tract infection, unspecified type  -     doxycycline (VIBRAMYCIN) 100 MG capsule; Take 1 capsule by mouth 2 (Two) Times a Day for 10 days.  Dispense: 20 capsule; Refill: 0  -     methylPREDNISolone (MEDROL) 4 MG dose pack; Take as directed on package instructions.  Dispense: 1 tablet; Refill: 1               Plan of care reviewed with Mr. Henriquez and Mrs. Henriquez  Does appear to have significant chest congestion on auscultation, lungs sound coarse throughout.  Vitals are stable as of now.  We will proceed with getting a chest x-ray prior to initiating any treatment.  We will also treat him with albuterol nebulizing treatments, educated on use and not to use more frequently than prescribed.   Given urine frequency we assessed a urine today, significant only for trace protein.   Lower extremity edema is not significant, 1+ in the left trace in the right, no pain appreciated redness or erythema.  Does not appear to have symptoms of sinusitis or appearance.  Chest x-ray notes shallow inspiration with mild chronic lung changes such as bronchial wall thickening, this may reflect chronic bronchitis, there are no focal pulmonary infiltrates there is mild basilar vascular crowding and atelectasis.  We will treat with doxycycline and oral steroids, as needed albuterol.  Encouraged him to assess blood pressure, temperature, pulse rate, and oxygen saturation daily to keep a log of these, advised to go to the ER if he develops severe shortness of air, chest pain or if oxygen saturation drops below 90%.  Advised that once treatment gets sent in for him I would certainly want to know if symptoms do not improve or if they increase in severity, both  and Mrs. Henriquez state understanding of this.  He will continue with over-the-counter therapies of Mucinex, Vapotherm and Tylenol as needed.  Encouraged him to elevate lower extremities when sitting, encouraged to do dorsiflexion plantarflexion exercises, may consider wearing compression stockings.  Also encouraged him to do deep breathing and coughing exercises and not to remain sedentary all day long but to remain as active as possible.

## 2022-12-06 NOTE — TELEPHONE ENCOUNTER
Caller: Eva Henriquez    Relationship: Emergency Contact    Best call back number: [phone]    What is the best time to reach you: ANYTIME    Who are you requesting to speak with (clinical staff, provider,  specific staff member): CLINICAL      What was the call regarding: LUISA WAS GIVEN A PRESCRIPTION TODAY FOR A NEBULIZER AND THEY TOOK IT TO     [facility], [facility] - [address] - [phone]  - [phone]   [phone]    [facility] NEEDS A PRESCRIPTION WITH A DIAGNOSIS CODE ON IT FOR THE INSURANCE TO COVER IT    Do you require a callback: YES

## 2022-12-07 ENCOUNTER — TELEPHONE (OUTPATIENT)
Dept: INTERNAL MEDICINE | Facility: CLINIC | Age: 87
End: 2022-12-07

## 2022-12-07 LAB
ALBUMIN SERPL-MCNC: 4.1 G/DL (ref 3.5–5.2)
ALBUMIN/GLOB SERPL: 2.2 G/DL
ALP SERPL-CCNC: 75 U/L (ref 39–117)
ALT SERPL-CCNC: 14 U/L (ref 1–41)
APPEARANCE UR: CLEAR
AST SERPL-CCNC: 19 U/L (ref 1–40)
BACTERIA #/AREA URNS HPF: NORMAL /HPF
BASOPHILS # BLD AUTO: 0.06 10*3/MM3 (ref 0–0.2)
BASOPHILS NFR BLD AUTO: 0.7 % (ref 0–1.5)
BILIRUB SERPL-MCNC: 0.5 MG/DL (ref 0–1.2)
BILIRUB UR QL STRIP: NEGATIVE
BUN SERPL-MCNC: 10 MG/DL (ref 8–23)
BUN/CREAT SERPL: 13 (ref 7–25)
CALCIUM SERPL-MCNC: 9 MG/DL (ref 8.2–9.6)
CASTS URNS MICRO: NORMAL
CHLORIDE SERPL-SCNC: 100 MMOL/L (ref 98–107)
CHOLEST SERPL-MCNC: 143 MG/DL (ref 0–200)
CO2 SERPL-SCNC: 28.4 MMOL/L (ref 22–29)
COLOR UR: YELLOW
CREAT SERPL-MCNC: 0.77 MG/DL (ref 0.76–1.27)
EGFRCR SERPLBLD CKD-EPI 2021: 83.5 ML/MIN/1.73
EOSINOPHIL # BLD AUTO: 0.27 10*3/MM3 (ref 0–0.4)
EOSINOPHIL NFR BLD AUTO: 3 % (ref 0.3–6.2)
EPI CELLS #/AREA URNS HPF: NORMAL /HPF
ERYTHROCYTE [DISTWIDTH] IN BLOOD BY AUTOMATED COUNT: 13 % (ref 12.3–15.4)
GLOBULIN SER CALC-MCNC: 1.9 GM/DL
GLUCOSE SERPL-MCNC: 88 MG/DL (ref 65–99)
GLUCOSE UR QL STRIP: NEGATIVE
HCT VFR BLD AUTO: 36.3 % (ref 37.5–51)
HDLC SERPL-MCNC: 31 MG/DL (ref 40–60)
HGB BLD-MCNC: 12 G/DL (ref 13–17.7)
HGB UR QL STRIP: NEGATIVE
IMM GRANULOCYTES # BLD AUTO: 0.03 10*3/MM3 (ref 0–0.05)
IMM GRANULOCYTES NFR BLD AUTO: 0.3 % (ref 0–0.5)
KETONES UR QL STRIP: NEGATIVE
LDLC SERPL CALC-MCNC: 89 MG/DL (ref 0–100)
LEUKOCYTE ESTERASE UR QL STRIP: NEGATIVE
LYMPHOCYTES # BLD AUTO: 1.15 10*3/MM3 (ref 0.7–3.1)
LYMPHOCYTES NFR BLD AUTO: 12.6 % (ref 19.6–45.3)
MCH RBC QN AUTO: 31.8 PG (ref 26.6–33)
MCHC RBC AUTO-ENTMCNC: 33.1 G/DL (ref 31.5–35.7)
MCV RBC AUTO: 96.3 FL (ref 79–97)
MONOCYTES # BLD AUTO: 1.02 10*3/MM3 (ref 0.1–0.9)
MONOCYTES NFR BLD AUTO: 11.1 % (ref 5–12)
NEUTROPHILS # BLD AUTO: 6.62 10*3/MM3 (ref 1.7–7)
NEUTROPHILS NFR BLD AUTO: 72.3 % (ref 42.7–76)
NITRITE UR QL STRIP: NEGATIVE
NRBC BLD AUTO-RTO: 0 /100 WBC (ref 0–0.2)
PH UR STRIP: 7 [PH] (ref 5–8)
PLATELET # BLD AUTO: 303 10*3/MM3 (ref 140–450)
POTASSIUM SERPL-SCNC: 4.6 MMOL/L (ref 3.5–5.2)
PROT SERPL-MCNC: 6 G/DL (ref 6–8.5)
PROT UR QL STRIP: ABNORMAL
RBC # BLD AUTO: 3.77 10*6/MM3 (ref 4.14–5.8)
RBC #/AREA URNS HPF: NORMAL /HPF
SODIUM SERPL-SCNC: 137 MMOL/L (ref 136–145)
SP GR UR STRIP: 1.01 (ref 1–1.03)
TRIGL SERPL-MCNC: 128 MG/DL (ref 0–150)
TSH SERPL DL<=0.005 MIU/L-ACNC: 2.14 UIU/ML (ref 0.27–4.2)
UROBILINOGEN UR STRIP-MCNC: ABNORMAL MG/DL
VLDLC SERPL CALC-MCNC: 23 MG/DL (ref 5–40)
WBC # BLD AUTO: 9.15 10*3/MM3 (ref 3.4–10.8)
WBC #/AREA URNS HPF: NORMAL /HPF

## 2022-12-07 NOTE — TELEPHONE ENCOUNTER
PATIENTS WIFE REQUESTING WE RESEND THE PRESCRIPTION FOR THE NEBULIZER  WITH THE DIAGNOSIS CODE    PHARMACY TOLD PATIENTS WIFE THAT THEY STILL HAVE NOT RECEIVED ANYTHING FROM THE OFFICE     GOOD CALL BACK ONCE RESENT  [phone]

## 2022-12-09 ENCOUNTER — OFFICE VISIT (OUTPATIENT)
Dept: INTERNAL MEDICINE | Facility: CLINIC | Age: 87
End: 2022-12-09

## 2022-12-09 VITALS
WEIGHT: 167 LBS | BODY MASS INDEX: 25.31 KG/M2 | HEART RATE: 67 BPM | DIASTOLIC BLOOD PRESSURE: 60 MMHG | HEIGHT: 68 IN | TEMPERATURE: 97.8 F | SYSTOLIC BLOOD PRESSURE: 152 MMHG | OXYGEN SATURATION: 98 %

## 2022-12-09 DIAGNOSIS — J20.9 ACUTE BRONCHITIS, UNSPECIFIED ORGANISM: Primary | ICD-10-CM

## 2022-12-09 DIAGNOSIS — I10 PRIMARY HYPERTENSION: Chronic | ICD-10-CM

## 2022-12-09 DIAGNOSIS — G60.9 HEREDITARY AND IDIOPATHIC PERIPHERAL NEUROPATHY: Chronic | ICD-10-CM

## 2022-12-09 DIAGNOSIS — E78.00 HYPERCHOLESTEREMIA: Chronic | ICD-10-CM

## 2022-12-09 PROCEDURE — 96372 THER/PROPH/DIAG INJ SC/IM: CPT | Performed by: INTERNAL MEDICINE

## 2022-12-09 PROCEDURE — 99213 OFFICE O/P EST LOW 20 MIN: CPT | Performed by: INTERNAL MEDICINE

## 2022-12-09 RX ORDER — TRIAMCINOLONE ACETONIDE 40 MG/ML
40 INJECTION, SUSPENSION INTRA-ARTICULAR; INTRAMUSCULAR ONCE
Status: COMPLETED | OUTPATIENT
Start: 2022-12-09 | End: 2022-12-09

## 2022-12-09 RX ADMIN — TRIAMCINOLONE ACETONIDE 40 MG: 40 INJECTION, SUSPENSION INTRA-ARTICULAR; INTRAMUSCULAR at 16:05

## 2022-12-09 NOTE — PROGRESS NOTES
The ABCs of the Annual Wellness Visit  Subsequent Medicare Wellness Visit    Chief Complaint   Patient presents with   • Medicare Wellness-subsequent      Subjective    History of Present Illness:  Chad Henriquez is a 89 y.o. male who presents for a Subsequent Medicare Wellness Visit.    The following portions of the patient's history were reviewed and   updated as appropriate: allergies, current medications, past family history, past medical history, past social history, past surgical history and problem list.    Compared to one year ago, the patient feels his physical   health is the same.    Compared to one year ago, the patient feels his mental   health is the same.    Recent Hospitalizations:  He was not admitted to the hospital during the last year.       Current Medical Providers:  Patient Care Team:  Miguel Bond MD as PCP - General (Hospitalist)  Dylon Maldonado MD as Consulting Physician (Urology)  Sukhdeep Guillory MD as Consulting Physician (Pain Medicine)    Outpatient Medications Prior to Visit   Medication Sig Dispense Refill   • albuterol (ACCUNEB) 0.63 MG/3ML nebulizer solution Take 3 mL by nebulization Every 6 (Six) Hours As Needed for Wheezing. 30 each 0   • alfuzosin (UROXATRAL) 10 MG 24 hr tablet Take 1 tablet by mouth Daily. 30 tablet 5   • amLODIPine (NORVASC) 2.5 MG tablet Take 2.5 mg by mouth Daily.     • Ascorbic Acid (Vitamin C) 500 MG capsule Take 1 capsule by mouth 2 (two) times a day.     • aspirin 81 MG EC tablet Take 81 mg by mouth Daily.     • chlorpheniramine (CHLOR-TRIMETON) 4 MG tablet Take 4 mg by mouth Daily.     • Cholecalciferol (Vitamin D3) 50 MCG (2000 UT) tablet Take 1 tablet by mouth Daily.     • doxycycline (VIBRAMYCIN) 100 MG capsule Take 1 capsule by mouth 2 (Two) Times a Day for 10 days. 20 capsule 0   • famotidine (PEPCID) 20 MG tablet Take 1 tablet by mouth 2 (Two) Times a Day. 20 tablet 0   • finasteride (PROSCAR) 5 MG tablet Take 1 tablet by mouth  Daily. 30 tablet 5   • Flaxseed, Linseed, (FLAXSEED OIL) 1000 MG capsule 540mg takes 4 tablets by mouth daily     • folic acid (FOLVITE) 800 MCG tablet Taking as multivitamin now     • gabapentin (NEURONTIN) 600 MG tablet Take 1 tablet by mouth 3 (Three) Times a Day. 90 tablet 3   • GNP Antiseptic Skin Cleanser 4 % solution APPLY  TOPICALLY TO THE APPROPRIATE AREA AS DIRECTED DAILY AS NEEDED FOR WOUND CARE.     • L-Lysine 600 MG tablet Take 1 tablet by mouth Daily.     • methylPREDNISolone (MEDROL) 4 MG dose pack Take as directed on package instructions. 1 tablet 1   • metoprolol tartrate (LOPRESSOR) 25 MG tablet 1/2 in morning and 1 at night 135 tablet 3   • mupirocin (Bactroban) 2 % cream Apply 1 application topically to the appropriate area as directed Daily. 15 g 0   • mupirocin (BACTROBAN) 2 % ointment APPLY 1 APPLICATION TOPICALLY TO THE APPROPRIATE AREA AS DIRECTED DAILY.     • ondansetron ODT (ZOFRAN-ODT) 8 MG disintegrating tablet Place 1 tablet on the tongue Every 8 (Eight) Hours As Needed for Nausea or Vomiting. 15 tablet 0   • pravastatin (PRAVACHOL) 20 MG tablet Take 1 tablet by mouth Every Night. 90 tablet 1   • primidone (MYSOLINE) 50 MG tablet Take 1/2 tablet at bedtime (Patient taking differently: Take 50 mg by mouth.) 45 tablet 3   • sennosides-docusate (senna-docusate sodium) 8.6-50 MG per tablet Take 2 tablets by mouth Daily. 60 tablet 1   • triamcinolone (KENALOG) 0.1 % ointment APPLY TWICE DAILY WHEN AND WHERE RASH IS PRESENT     • chlorhexidine (Hibiclens) 4 % external liquid Apply  topically to the appropriate area as directed Daily As Needed for Wound Care. 118 mL 0     No facility-administered medications prior to visit.       No opioid medication identified on active medication list. I have reviewed chart for other potential  high risk medication/s and harmful drug interactions in the elderly.          Aspirin is on active medication list. Aspirin use is indicated based on review of  "current medical condition/s. Pros and cons of this therapy have been discussed today. Benefits of this medication outweigh potential harm.  Patient has been encouraged to continue taking this medication.  .      Patient Active Problem List   Diagnosis   • BPH with urinary obstruction   • Frequency of urination   • Nocturia   • OAB (overactive bladder)   • Non-smoker   • Spinal stenosis, lumbar region, with neurogenic claudication   • Left leg pain   • Bilateral hip pain   • Acute left-sided low back pain with left-sided sciatica   • Essential tremor   • Spinal stenosis   • Degeneration of lumbar or lumbosacral intervertebral disc   • Body mass index (BMI) of 24.0 to 24.9 in adult   • Hereditary and idiopathic peripheral neuropathy   • Syncope   • Hypercholesteremia   • CAD (coronary artery disease)   • Hypertension   • Lumbar disc disease with radiculopathy   • COVID-19 virus infection   • Left hip pain     Advance Care Planning  Advance Directive is on file.  ACP discussion was held with the patient during this visit. Patient has an advance directive in EMR which is still valid.           Objective    Vitals:    12/09/22 1504   BP: 152/60   BP Location: Left arm   Patient Position: Sitting   Cuff Size: Adult   Pulse: 67   Temp: 97.8 °F (36.6 °C)   TempSrc: Temporal   SpO2: 98%   Weight: 75.8 kg (167 lb)   Height: 172.7 cm (68\")   PainSc: 0-No pain     Estimated body mass index is 25.39 kg/m² as calculated from the following:    Height as of this encounter: 172.7 cm (68\").    Weight as of this encounter: 75.8 kg (167 lb).    BMI is >= 25 and <30. (Overweight) The following options were offered after discussion;: none (medical contraindication)      Does the patient have evidence of cognitive impairment? No    Physical Exam  Vitals reviewed.   Constitutional:       Appearance: Normal appearance.      Comments: Hard of hearing   HENT:      Head: Normocephalic and atraumatic.   Eyes:      General: No scleral icterus.   "   Conjunctiva/sclera: Conjunctivae normal.   Cardiovascular:      Rate and Rhythm: Normal rate and regular rhythm.      Heart sounds: Murmur heard.   Pulmonary:      Effort: Pulmonary effort is normal.      Breath sounds: Normal breath sounds.   Abdominal:      General: Abdomen is flat. There is no distension.   Musculoskeletal:      Right lower leg: No edema.      Left lower leg: No edema.   Skin:     General: Skin is warm.      Coloration: Skin is pale.      Findings: Erythema (right lateral leg, dry) present.   Neurological:      General: No focal deficit present.      Mental Status: He is alert and oriented to person, place, and time.   Psychiatric:         Mood and Affect: Mood normal.         Behavior: Behavior normal.           Lab Results   Component Value Date    CHLPL 143 12/06/2022    TRIG 128 12/06/2022    HDL 31 (L) 12/06/2022    LDL 89 12/06/2022    VLDL 23 12/06/2022        The following data was reviewed by: Miguel Bond MD on 12/09/2022:  CMP    CMP 1/29/22 12/6/22   Glucose 106 (A) 88   BUN 14 10   Creatinine 0.60 (A) 0.77   eGFR Non African Am 126    Sodium 134 (A) 137   Potassium 4.4 4.6   Chloride 100 100   Calcium 8.3 9.0   Total Protein  6.0   Albumin 4.20 4.10   Globulin  1.9   Total Bilirubin 0.6 0.5   Alkaline Phosphatase 83 75   AST (SGOT) 26 19   ALT (SGPT) 23 14   (A) Abnormal value            CBC w/diff    CBC w/Diff 1/29/22 12/6/22   WBC 8.11 9.15   RBC 3.98 (A) 3.77 (A)   Hemoglobin 13.0 12.0 (A)   Hematocrit 38.7 36.3 (A)   MCV 97.2 (A) 96.3   MCH 32.7 31.8   MCHC 33.6 33.1   RDW 13.2 13.0   Platelets 310 303   Neutrophil Rel % 79.7 (A) 72.3   Immature Granulocyte Rel % 0.4    Lymphocyte Rel % 10.1 (A) 12.6 (A)   Monocyte Rel % 8.5 11.1   Eosinophil Rel % 0.9 3.0   Basophil Rel % 0.4 0.7   (A) Abnormal value            Lipid Panel    Lipid Panel 12/6/22   Total Cholesterol 143   Triglycerides 128   HDL Cholesterol 31 (A)   VLDL Cholesterol 23   LDL Cholesterol  89   (A)  Abnormal value       Comments are available for some flowsheets but are not being displayed.           TSH    TSH 22   TSH 2.140               Recent chest x-ray from  reviewed.  Patient has no pneumonia.  Bronchial thickening.     HEALTH RISK ASSESSMENT    Smoking Status:  Social History     Tobacco Use   Smoking Status Former   • Years: 6.00   • Types: Cigarettes   • Quit date:    • Years since quittin.9   Smokeless Tobacco Never   Tobacco Comments    age [age] years of age     Alcohol Consumption:  Social History     Substance and Sexual Activity   Alcohol Use No     Fall Risk Screen:    STEADI Fall Risk Assessment was completed, and patient is at LOW risk for falls.Assessment completed on:2022    Depression Screening:  PHQ-2/PHQ-9 Depression Screening 2022   Retired PHQ-9 Total Score -   Retired Total Score -   Little Interest or Pleasure in Doing Things 0-->not at all   Feeling Down, Depressed or Hopeless 0-->not at all   PHQ-9: Brief Depression Severity Measure Score 0       Health Habits and Functional and Cognitive Screening:  Functional & Cognitive Status 2022   Do you have difficulty preparing food and eating? No   Do you have difficulty bathing yourself, getting dressed or grooming yourself? No   Do you have difficulty using the toilet? No   Do you have difficulty moving around from place to place? No   Do you have trouble with steps or getting out of a bed or a chair? Yes   Current Diet Well Balanced Diet   Dental Exam Up to date   Eye Exam Up to date   Exercise (times per week) 7 times per week   Current Exercises Include Walking        Exercise Comment -   Do you need help using the phone?  -   Are you deaf or do you have serious difficulty hearing?  Yes   Do you need help with transportation? No   Do you need help shopping? No   Do you need help preparing meals?  No   Do you need help with housework?  No   Do you need help with laundry? No   Do you need help taking  your medications? No   Do you need help managing money? No   Do you ever drive or ride in a car without wearing a seat belt? No   Have you felt unusual stress, anger or loneliness in the last month? No   Who do you live with? Spouse   If you need help, do you have trouble finding someone available to you? No   Have you been bothered in the last four weeks by sexual problems? No   Do you have difficulty concentrating, remembering or making decisions? No       Age-appropriate Screening Schedule:  Refer to the list below for future screening recommendations based on patient's age, sex and/or medical conditions. Orders for these recommended tests are listed in the plan section. The patient has been provided with a written plan.    Health Maintenance   Topic Date Due   • ZOSTER VACCINE (1 of 2) Never done   • LIPID PANEL  12/06/2023   • TDAP/TD VACCINES (3 - Td or Tdap) 07/09/2029   • INFLUENZA VACCINE  Completed              Assessment & Plan   CMS Preventative Services Quick Reference  Risk Factors Identified During Encounter  Chronic Pain: Has had several back surgeries. Coping well with chronic pain, no complaints.  Fall Risk-High or Moderate: Patient compensating well.  No changes at this time.  encouraged wearing shoes and utilizing his brace for footdrop.  The above risks/problems have been discussed with the patient.  Follow up actions/plans if indicated are seen below in the Assessment/Plan Section.  Pertinent information has been shared with the patient in the After Visit Summary.    Diagnoses and all orders for this visit:    1. Acute bronchitis, unspecified organism (Primary)  Comments:  kenolog injection. continue medrol dose pack. continue doxycycline  Orders:  -     triamcinolone acetonide (KENALOG-40) injection 40 mg    2. Primary hypertension  Assessment & Plan:  Hypertension is unchanged.  Regular aerobic exercise.  Continue current medications.  Blood pressure will be reassessed at the next regular  appointment.    BP a little on the higher side but is acutely ill and taking steroids.  Will reassess at next visit.  Recommend home BP monitoring.      3. Hereditary and idiopathic peripheral neuropathy  Assessment & Plan:  Patient has given up driving due to lower ext peripheral neuropathy (R > L). Patient has extensive foot drop and wears brace.       4. Hypercholesteremia  Assessment & Plan:  Lipid abnormalities are improving with treatment.  Pharmacotherapy as ordered.  Lipids will be reassessed in 1 year.    Continue current regimen.  Discussed diet changes to increase HDL.  LDL is fantastic.        Follow Up:   No follow-ups on file.     An After Visit Summary and PPPS were made available to the patient.                         MIKHAIL Bond MD, FHM, FACP      Electronically signed by Miguel Bond MD, 12/09/22, 3:41 PM CST.

## 2022-12-10 NOTE — ASSESSMENT & PLAN NOTE
Hypertension is unchanged.  Regular aerobic exercise.  Continue current medications.  Blood pressure will be reassessed at the next regular appointment.    BP a little on the higher side but is acutely ill and taking steroids.  Will reassess at next visit.  Recommend home BP monitoring.

## 2022-12-10 NOTE — ASSESSMENT & PLAN NOTE
Patient has given up driving due to lower ext peripheral neuropathy (R > L). Patient has extensive foot drop and wears brace.

## 2022-12-10 NOTE — ASSESSMENT & PLAN NOTE
Coronary artery disease is unchanged.  Regular aerobic exercise.  Continue current medications.  Cardiac status will be reassessed next visit.    Patient had bypass surgery with Dr. Hanson in ~1987.  Patient has had a few stents since then.  The patient has no anginal symptoms.  Exercises regularly.

## 2022-12-10 NOTE — ASSESSMENT & PLAN NOTE
Lipid abnormalities are improving with treatment.  Pharmacotherapy as ordered.  Lipids will be reassessed in 1 year.    Continue current regimen.  Discussed diet changes to increase HDL.  LDL is fantastic.

## 2022-12-12 ENCOUNTER — TELEPHONE (OUTPATIENT)
Dept: CARDIOLOGY CLINIC | Age: 87
End: 2022-12-12

## 2022-12-12 NOTE — TELEPHONE ENCOUNTER
Sonia Tavera called to reschedule a  office visit with Dr. Osmel Ford only . [facility] unable to rescheduled until March. Please be advised that the best time to call him to accommodate their needs is Anytime. Thank you.

## 2022-12-16 NOTE — PROGRESS NOTES
Subjective    Mr. Henriquez is 89 y.o. male    Chief Complaint: 1 year follow up Urinary Retention      History of Present Illness  Benign Prostatic Hypertrophy  Patient complains of lower urinary tract symptoms. He reports frequency, incomplete emptying, intermittency, nocturia one time a night, straining, urgency and weak stream. He denies nothing. Patient states symptoms are of moderate severity. Onset of symptoms was a few years ago and was gradual in onset. His AUA Symptom Score is, 10/35.He reports a history of no complicating symptoms. He denies flank pain, gross hematuria, kidney stones and recurrent UTI.  Patient has tried Alpha blockers and 5 alpha reductase inhibitors with improvement.  Patient at one time and episode of retention requiring Garcia catheter but he has not had to have a catheter replaced or since he was last seen.      The following portions of the patient's history were reviewed and updated as appropriate: allergies, current medications, past family history, past medical history, past social history, past surgical history and problem list.    Review of Systems   Constitutional: Negative.    Genitourinary: Positive for frequency and urgency. Negative for flank pain.   All other systems reviewed and are negative.        Current Outpatient Medications:   •  albuterol (ACCUNEB) 0.63 MG/3ML nebulizer solution, Take 3 mL by nebulization Every 6 (Six) Hours As Needed for Wheezing., Disp: 30 each, Rfl: 0  •  alfuzosin (UROXATRAL) 10 MG 24 hr tablet, Take 1 tablet by mouth Daily., Disp: 30 tablet, Rfl: 5  •  amLODIPine (NORVASC) 2.5 MG tablet, Take 2.5 mg by mouth Daily., Disp: , Rfl:   •  Ascorbic Acid (Vitamin C) 500 MG capsule, Take 1 capsule by mouth 2 (two) times a day., Disp: , Rfl:   •  aspirin 81 MG EC tablet, Take 81 mg by mouth Daily., Disp: , Rfl:   •  chlorhexidine (Hibiclens) 4 % external liquid, Apply  topically to the appropriate area as directed Daily As Needed for Wound Care., Disp:  118 mL, Rfl: 0  •  chlorpheniramine (CHLOR-TRIMETON) 4 MG tablet, Take 4 mg by mouth Daily., Disp: , Rfl:   •  Cholecalciferol (Vitamin D3) 50 MCG (2000 UT) tablet, Take 1 tablet by mouth Daily., Disp: , Rfl:   •  famotidine (PEPCID) 20 MG tablet, Take 1 tablet by mouth 2 (Two) Times a Day., Disp: 20 tablet, Rfl: 0  •  finasteride (PROSCAR) 5 MG tablet, Take 1 tablet by mouth Daily., Disp: 30 tablet, Rfl: 5  •  Flaxseed, Linseed, (FLAXSEED OIL) 1000 MG capsule, 540mg takes 4 tablets by mouth daily, Disp: , Rfl:   •  folic acid (FOLVITE) 800 MCG tablet, Taking as multivitamin now, Disp: , Rfl:   •  gabapentin (NEURONTIN) 600 MG tablet, Take 1 tablet by mouth 3 (Three) Times a Day., Disp: 90 tablet, Rfl: 3  •  GNP Antiseptic Skin Cleanser 4 % solution, APPLY  TOPICALLY TO THE APPROPRIATE AREA AS DIRECTED DAILY AS NEEDED FOR WOUND CARE., Disp: , Rfl:   •  L-Lysine 600 MG tablet, Take 1 tablet by mouth Daily., Disp: , Rfl:   •  methylPREDNISolone (MEDROL) 4 MG dose pack, Take as directed on package instructions., Disp: 1 tablet, Rfl: 1  •  metoprolol tartrate (LOPRESSOR) 25 MG tablet, 1/2 in morning and 1 at night, Disp: 135 tablet, Rfl: 3  •  mupirocin (Bactroban) 2 % cream, Apply 1 application topically to the appropriate area as directed Daily., Disp: 15 g, Rfl: 0  •  mupirocin (BACTROBAN) 2 % ointment, APPLY 1 APPLICATION TOPICALLY TO THE APPROPRIATE AREA AS DIRECTED DAILY., Disp: , Rfl:   •  ondansetron ODT (ZOFRAN-ODT) 8 MG disintegrating tablet, Place 1 tablet on the tongue Every 8 (Eight) Hours As Needed for Nausea or Vomiting., Disp: 15 tablet, Rfl: 0  •  pravastatin (PRAVACHOL) 20 MG tablet, Take 1 tablet by mouth Every Night., Disp: 90 tablet, Rfl: 1  •  primidone (MYSOLINE) 50 MG tablet, Take 1/2 tablet at bedtime (Patient taking differently: Take 50 mg by mouth.), Disp: 45 tablet, Rfl: 3  •  sennosides-docusate (senna-docusate sodium) 8.6-50 MG per tablet, Take 2 tablets by mouth Daily., Disp: 60 tablet,  Rfl: 1  •  triamcinolone (KENALOG) 0.1 % ointment, APPLY TWICE DAILY WHEN AND WHERE RASH IS PRESENT, Disp: , Rfl:   •  alfuzosin (UROXATRAL) 10 MG 24 hr tablet, Take 1 tablet by mouth Daily for 360 days., Disp: 90 tablet, Rfl: 3  •  finasteride (PROSCAR) 5 MG tablet, Take 1 tablet by mouth Daily for 360 days., Disp: 90 tablet, Rfl: 3    Past Medical History:   Diagnosis Date   • Allergic rhinitis    • Anemia    • Arthritis    • Blind left eye    • BPH (benign prostatic hypertrophy) with urinary retention    • Cancer (HCC)     skin cancer   • Chronic back pain     RUNS DOWN BOTH LEGS   • Constipation    • Coronary artery disease    • Hard of hearing    • Heart attack (HCC) 1986    NO MUSCLE DAMAGE - JUST OCCLUDED VALVE   • High cholesterol    • History of skin cancer     BILATERAL EARS   • History of transfusion     1986   • Hypertension    • Inguinal hernia    • Leaky heart valve     DR CONCEPCION SAYS NOT ENOUGH TO BE OF CONCERN   • Other bursal cyst, right elbow    • PONV (postoperative nausea and vomiting)     has had scopalamine patch in past    • Sleep apnea     DOES NOT USE CPAP OR BIPAP   • Spinal stenosis of cervical region    • Spinal stenosis of lumbar region    • Tremors of nervous system    • Wears hearing aid     BILATERAL       Past Surgical History:   Procedure Laterality Date   • ANTERIOR LUMBAR EXPOSURE N/A 12/7/2018    Procedure: ANTERIOR LUMBAR EXPOSURE;  Surgeon: Manolo Mcleod DO;  Location:  [facility];  Service: Vascular   • APPENDECTOMY     • BACK SURGERY      X3   • CARDIAC SURGERY  08/08/1986    X1 BYPASS   • CORONARY ANGIOPLASTY WITH STENT PLACEMENT  1997    X3 STENTS   • HERNIA REPAIR Bilateral     x2   • INGUINAL HERNIA REPAIR Right 6/22/2017    Procedure: RIGHT INGUINAL HERNIA REPAIR AND EXCISION OF CYST RIGHT ELBOW ;  Surgeon: Jackelyn Arriola MD;  Location:  [facility];  Service:    • LUMBAR FUSION Left 6/30/2021    Procedure: LUMBAR LAMINECTOMY, DISCECTOMY, FACETECTOMY,   "TRANSFORAMINAL LUMBAR INTERBODY FUSION L2-3. REVISION OF INSTRUMENTATION L3-S1. USE OF IMAGE GUIDANCE AND SURGICAL ROBOT;  Surgeon: Kj Falcon MD;  Location:  [facility];  Service: Robotics - Neuro;  Laterality: Left;   • LUMBAR LAMINECTOMY N/A 3/12/2018    Procedure: LUMBAR LAMINECTOMY WITHOUT FUSION L3-4,4-5;  Surgeon: Kj Falcon MD;  Location:  [facility];  Service: Neurosurgery   • LUMBAR LAMINECTOMY ANTERIOR LUMBAR INTERBODY FUSION N/A 2018    Procedure: ANTERIOR LUMBAR INTERBODY FUSION L5-S1;  Surgeon: Kj Falcon MD;  Location:  [facility];  Service: Neurosurgery   • LUMBAR LAMINECTOMY WITH FUSION Left 12/10/2018    Procedure: LUMBAR LAMINECTOMY TRANSFORAMINAL LUMBAR INTERBODY FUSION L34,45;  Surgeon: Kj Falcon MD;  Location:  [facility];  Service: Neurosurgery   • LUMBAR LAMINECTOMY WITH FUSION Left 2018    Procedure: LUMBAR LAMINECTOMY TRANSFORAMINAL LUMBAR INTERBODY FUSION LEFT L3-4, L4-5 QUADRANT RETRACTOR;  Surgeon: Kj Falcon MD;  Location:  [facility];  Service: Neurosurgery   • SKIN LESION EXCISION      nasal       Social History     Socioeconomic History   • Marital status:    Tobacco Use   • Smoking status: Former     Years: 6.00     Types: Cigarettes     Quit date:      Years since quittin.0   • Smokeless tobacco: Never   • Tobacco comments:     age [age] years of age   Vaping Use   • Vaping Use: Never used   Substance and Sexual Activity   • Alcohol use: No   • Drug use: Never   • Sexual activity: Not Currently     Partners: Female       Family History   Problem Relation Age of Onset   • No Known Problems Mother    • No Known Problems Father        Objective    Temp 97.2 °F (36.2 °C)   Ht 172.7 cm (68\")   Wt 74.9 kg (165 lb 3.2 oz)   BMI 25.12 kg/m²     Physical Exam  Vitals reviewed.   Constitutional:       Appearance: Normal appearance.   HENT:      Head: Normocephalic and atraumatic.   Pulmonary:      Effort: Pulmonary effort is normal. "   Genitourinary:     Rectum: Normal.      Comments: Digital rectal exam revealed a smooth symmetric prostate no suspicious lesions nodules asymmetry or firmness were palpated and it was enlarged.  Skin:     Coloration: Skin is not pale.   Neurological:      Mental Status: He is alert and oriented to person, place, and time.   Psychiatric:         Mood and Affect: Mood normal.         Behavior: Behavior normal.             Results for orders placed or performed in visit on 12/20/22   POC Urinalysis Dipstick, Multipro    Specimen: Urine   Result Value Ref Range    Color Yellow Yellow, Straw, Dark Yellow, Geovanna    Clarity, UA Clear Clear    Glucose, UA Negative Negative mg/dL    Bilirubin Negative Negative    Ketones, UA Negative Negative    Specific Gravity  1.020 1.005 - 1.030    Blood, UA Negative Negative    pH, Urine 6.0 5.0 - 8.0    Protein, POC 2+ (A) Negative mg/dL    Urobilinogen, UA 0.2 E.U./dL Normal, 0.2 E.U./dL    Nitrite, UA Negative Negative    Leukocytes Negative Negative   IPSS Questionnaire (AUA-7):  Incomplete emptying  Over the past month, how often have you had a sensation of not emptying your bladder completely after you finish?: Less than 1 time in 5 (12/20/22 1434)  Frequency  Over the past month, how often have you had to urinate again less than two hours after you finishing urinating ?: Less than 1 time in 5 (12/20/22 1434)  Intermittency  Over the past month, how often have you found you stopped and started again several time when you urinated ?: Less than half the time (12/20/22 1434)  Urgency  Over the last month, how difficult  have you found it to postpone urination ?: About half the time (12/20/22 1434)  Weak Stream  Over the past month, how often have you had a weak urinary stream ?: Less than 1 time in 5 (12/20/22 1434)  Straining  Over the past month, how often have you had to push or strain to begin urination ?: Less than 1 time 5 (12/20/22 1434)  Nocturia  Over the past month, how  many times did you most typically get up to urinate from the time you went to bed until the time you got up in the morning ?: Less than 1 time in 5 (12/20/22 1434)  Quality of life due to urinary symptoms  If you were to spend the rest of your life with your urinary condition the way it is now, how would feel about that?: Pleased (12/20/22 1434)    Scores  Total IPSS Score: 10 (12/20/22 1434)  Total Score = Symtomatic Level: moderately symptomatic: 8-19 (12/20/22 1434)         Assessment and Plan    Diagnoses and all orders for this visit:    1. BPH with obstruction/lower urinary tract symptoms (Primary)  -     POC Urinalysis Dipstick, Multipro  -     finasteride (PROSCAR) 5 MG tablet; Take 1 tablet by mouth Daily for 360 days.  Dispense: 90 tablet; Refill: 3  -     alfuzosin (UROXATRAL) 10 MG 24 hr tablet; Take 1 tablet by mouth Daily for 360 days.  Dispense: 90 tablet; Refill: 3    Subjective the patient is voiding well digital rectal exam was benign he has had stable symptoms on alfuzosin and finasteride.  [facility]  Follow-up 1 year.

## 2022-12-20 ENCOUNTER — OFFICE VISIT (OUTPATIENT)
Dept: UROLOGY | Facility: CLINIC | Age: 87
End: 2022-12-20

## 2022-12-20 VITALS — BODY MASS INDEX: 25.04 KG/M2 | TEMPERATURE: 97.2 F | WEIGHT: 165.2 LBS | HEIGHT: 68 IN

## 2022-12-20 DIAGNOSIS — N13.8 BPH WITH OBSTRUCTION/LOWER URINARY TRACT SYMPTOMS: Primary | ICD-10-CM

## 2022-12-20 DIAGNOSIS — N40.1 BPH WITH OBSTRUCTION/LOWER URINARY TRACT SYMPTOMS: Primary | ICD-10-CM

## 2022-12-20 LAB
BILIRUB BLD-MCNC: NEGATIVE MG/DL
CLARITY, POC: CLEAR
COLOR UR: YELLOW
GLUCOSE UR STRIP-MCNC: NEGATIVE MG/DL
KETONES UR QL: NEGATIVE
LEUKOCYTE EST, POC: NEGATIVE
NITRITE UR-MCNC: NEGATIVE MG/ML
PH UR: 6 [PH] (ref 5–8)
PROT UR STRIP-MCNC: ABNORMAL MG/DL
RBC # UR STRIP: NEGATIVE /UL
SP GR UR: 1.02 (ref 1–1.03)
UROBILINOGEN UR QL: ABNORMAL

## 2022-12-20 PROCEDURE — 99213 OFFICE O/P EST LOW 20 MIN: CPT | Performed by: PHYSICIAN ASSISTANT

## 2022-12-20 PROCEDURE — 81001 URINALYSIS AUTO W/SCOPE: CPT | Performed by: PHYSICIAN ASSISTANT

## 2022-12-20 RX ORDER — FINASTERIDE 5 MG/1
5 TABLET, FILM COATED ORAL DAILY
Qty: 90 TABLET | Refills: 3 | Status: SHIPPED | OUTPATIENT
Start: 2022-12-20 | End: 2023-12-15

## 2022-12-20 RX ORDER — ALFUZOSIN HYDROCHLORIDE 10 MG/1
10 TABLET, EXTENDED RELEASE ORAL DAILY
Qty: 90 TABLET | Refills: 3 | Status: SHIPPED | OUTPATIENT
Start: 2022-12-20 | End: 2023-12-15

## 2023-01-26 ENCOUNTER — OFFICE VISIT (OUTPATIENT)
Dept: CARDIOLOGY CLINIC | Age: 88
End: 2023-01-26
Payer: MEDICARE

## 2023-01-26 VITALS
WEIGHT: 167 LBS | DIASTOLIC BLOOD PRESSURE: 60 MMHG | HEART RATE: 66 BPM | SYSTOLIC BLOOD PRESSURE: 118 MMHG | HEIGHT: 68 IN | BODY MASS INDEX: 25.31 KG/M2

## 2023-01-26 DIAGNOSIS — Z95.1 HX OF CABG: ICD-10-CM

## 2023-01-26 DIAGNOSIS — I25.10 CORONARY ARTERY DISEASE INVOLVING NATIVE CORONARY ARTERY OF NATIVE HEART WITHOUT ANGINA PECTORIS: Primary | ICD-10-CM

## 2023-01-26 DIAGNOSIS — E78.2 MIXED HYPERLIPIDEMIA: ICD-10-CM

## 2023-01-26 DIAGNOSIS — R06.02 SHORTNESS OF BREATH: ICD-10-CM

## 2023-01-26 DIAGNOSIS — Z95.1 HX OF CABG: Primary | ICD-10-CM

## 2023-01-26 DIAGNOSIS — I25.10 CORONARY ARTERY DISEASE INVOLVING NATIVE CORONARY ARTERY OF NATIVE HEART WITHOUT ANGINA PECTORIS: ICD-10-CM

## 2023-01-26 DIAGNOSIS — I10 PRIMARY HYPERTENSION: ICD-10-CM

## 2023-01-26 PROCEDURE — 1036F TOBACCO NON-USER: CPT | Performed by: INTERNAL MEDICINE

## 2023-01-26 PROCEDURE — 93000 ELECTROCARDIOGRAM COMPLETE: CPT | Performed by: INTERNAL MEDICINE

## 2023-01-26 PROCEDURE — G8417 CALC BMI ABV UP PARAM F/U: HCPCS | Performed by: INTERNAL MEDICINE

## 2023-01-26 PROCEDURE — 99214 OFFICE O/P EST MOD 30 MIN: CPT | Performed by: INTERNAL MEDICINE

## 2023-01-26 PROCEDURE — G8484 FLU IMMUNIZE NO ADMIN: HCPCS | Performed by: INTERNAL MEDICINE

## 2023-01-26 PROCEDURE — 1123F ACP DISCUSS/DSCN MKR DOCD: CPT | Performed by: INTERNAL MEDICINE

## 2023-01-26 PROCEDURE — G8427 DOCREV CUR MEDS BY ELIG CLIN: HCPCS | Performed by: INTERNAL MEDICINE

## 2023-01-26 ASSESSMENT — ENCOUNTER SYMPTOMS
RESPIRATORY NEGATIVE: 1
NAUSEA: 0
SHORTNESS OF BREATH: 0
DIARRHEA: 0
GASTROINTESTINAL NEGATIVE: 1
EYES NEGATIVE: 1
VOMITING: 0

## 2023-01-26 NOTE — PATIENT INSTRUCTIONS
[place] at the [address] [place] [address] and [address] [facility] located on the first floor of [place] [address] through [facility] main entrance and turn immediately to your left. Date/Time:     Patient's contact number:  [phone] (home)     Echocardiogram -  No prep. Takes approximately 30 min. An echocardiogram uses sound waves to produce images of your heart. This commonly used test allows your doctor to see how your heart is beating and pumping blood. Your doctor can use the images from an echocardiogram to identify various abnormalities in the heart muscle and valves. This test has 2 parts: You will be asked to disrobe from the waist up and given a gown to wear. The technologist will then hook up an EKG monitor to you for the entire exam.   You will then have an ultrasound of your heart (echocardiogram) to assess the heart muscle, heart valves and heart function. You may eat and take any medicines before the exam.     If you need to change your appointment, please call outpatient scheduling at [phone]. [place] at the [address] [place] [address] and [address] [facility] located on the first floor of [place] [address] through [facility] main entrance and turn immediately to your left. Patient's contact number:  [phone] (home)      Lexiscan Stress Test      Lexiscan (regadenoson injection) is a prescription drug given through an IV line that increases blood flow through the arteries of the heart during a cardiac nuclear stress test.     There are two parts to a Lexiscan stress test: the rest portion and the exercise portion. For the rest portion, a radioactive tracer is injected into your arm through the IV. After 30 to 60 minutes, the process of imaging will begin. A nuclear camera will be placed on your chest area and images are taken for the next 15 to 20 minutes. For the exercise portion, a nurse will attach EKG electrodes to your chest to monitor your heart rate. The drug Torres Pollen is administered to simulate stress on the heart. Your heart rhythm will then be monitored for the next few minutes. Your blood pressure will also be monitored throughout the exercise portion. Rentz through the exercise portion, a second round of radioactive tracer is injected into your body. Your heart rate and EKG will be monitored for another few minutes after administering the drug. Test Preparation:    Bring a list of your current medications. Do not take any of your medications the morning of the test, but bring all morning medications with you as you will take them after the stress portion of the test is completed. Do not eat Bananas 24 hours prior to test.    No caffeine 24 hours prior to the testing. This includes: coffee, pop/soda, chocolate, cold medications, etc.  Any product that might contain caffeine. No nicotine or alcohol 12 hours prior to your test.   Nothing to eat or drink 6-8 hours prior to appointment time. It is okay to drink small amounts of water during the four hours prior to the test.  Nitroglycerin patches must be taken off 1 hour before testing. Wear comfortable clothing. Please refrain from any strenuous exercise or activities the day before your test, or the day of your test.  The Nuclear Lexiscan Stress test takes about 2 ½ to 3 hours to complete. If for any reason you are unable to keep this appointment, please contact Outpatient Scheduling, [phone], as soon as possible to reschedule.

## 2023-01-26 NOTE — PROGRESS NOTES
[facility] Progress Note                            Date:  1/26/2023  Patient: Loraine Castorena  Age:  80 y. o., 11/9/1929      Reason for evaluation:         SUBJECTIVE:    Returns today follow-up assessment coronary artery disease hypertension hyperlipidemia previous CABG 1986. Had COVID in January 2021 since then he has been more markedly short of breath with activities cannot walk better short distance denies anginal chest pain. Blood pressure 118/60 heart rate 66. No recent cardiovascular test.  No other complaints or issues reported. EKG sinus rhythm nonspecific ST and T wave abnormalities. Review of Systems   Constitutional: Negative. Negative for chills, fever and unexpected weight change. HENT: Negative. Eyes: Negative. Respiratory: Negative. Negative for shortness of breath. Cardiovascular: Negative. Negative for chest pain. Gastrointestinal: Negative. Negative for diarrhea, nausea and vomiting. Endocrine: Negative. Genitourinary: Negative. Musculoskeletal: Negative. Skin: Negative. Neurological: Negative. All other systems reviewed and are negative. OBJECTIVE:     /60   Pulse 66   Ht 5' 8\" (1.727 m)   Wt 167 lb (75.8 kg)   BMI 25.39 kg/m²     Labs:   CBC: No results for input(s): WBC, HGB, HCT, PLT in the last 72 hours. BMP:No results for input(s): NA, K, CO2, BUN, CREATININE, LABGLOM, GLUCOSE in the last 72 hours. BNP: No results for input(s): BNP in the last 72 hours. PT/INR: No results for input(s): PROTIME, INR in the last 72 hours. APTT:No results for input(s): APTT in the last 72 hours. CARDIAC ENZYMES:No results for input(s): CKTOTAL, CKMB, CKMBINDEX, TROPONINI in the last 72 hours. FASTING LIPID PANEL:  Lab Results   Component Value Date/Time    HDL 45 05/12/2020 08:44 AM    LDLCALC 71 05/12/2020 08:44 AM    TRIG 86 05/12/2020 08:44 AM     LIVER PROFILE:No results for input(s): AST, ALT, LABALBU in the last 72 hours. Past Medical History:   Diagnosis Date    Arthritis     Blind 2009    Left eye    CAD (coronary artery disease)     SVG to OM 1986    Hyperlipidemia     [facility]     Hypertension     Low back pain radiating to right leg 1/27/2016    Lumbar facet arthropathy 1/6/2016    Lumbar radiculopathy intermittent 1/6/2016    Osteoarthritis     Peripheral neuropathy     Right foot drop     Tremor      Past Surgical History:   Procedure Laterality Date    APPENDECTOMY      BACK SURGERY      lumbar spine surgery, Dr. Marino Nolan, 3/2018    CARDIAC SURGERY      CABG    DIAGNOSTIC CARDIAC CATH LAB PROCEDURE      Stent to mid and distal RCA 1997    HERNIA REPAIR      NECK SURGERY       Family History   Problem Relation Age of Onset    High Blood Pressure Mother     Stroke Mother     Coronary Art Dis Father      Allergies   Allergen Reactions    Codeine     Fentanyl Other (See Comments)     Fentanyl patchs, caused patient to become confused and anxious per family     Current Outpatient Medications   Medication Sig Dispense Refill    aspirin 81 MG tablet Take 81 mg by mouth daily      metoprolol tartrate (LOPRESSOR) 25 MG tablet Take by mouth 1/2 in the am and a whole one at night      docusate sodium (COLACE, DULCOLAX) 100 MG CAPS Take 100 mg by mouth 2 times daily 60 capsule 0    alfuzosin (UROXATRAL) 10 MG extended release tablet Take 1 tablet by mouth nightly 30 tablet 3    finasteride (PROSCAR) 5 MG tablet Take 1 tablet by mouth daily 30 tablet 3    primidone (MYSOLINE) 50 MG tablet Take 5 mg by mouth nightly       chlorpheniramine (CHLOR-TRIMETON) 4 MG tablet Take 4 mg by mouth daily      Flaxseed, Linseed, (FLAXSEED OIL) 1000 MG CAPS Take 2,000 mg by mouth daily       pravastatin (PRAVACHOL) 40 MG tablet Take 20 mg by mouth daily      folic acid (FOLVITE) 689 MCG tablet Take 800 mcg by mouth daily. L-Lysine 500 MG TABS Take 500 mg by mouth daily.         gabapentin (NEURONTIN) 100 MG capsule Take 1-2 at night for leg pain. (Patient taking differently: Take 500 mg by mouth 3 times daily. ) 90 capsule 0     No current facility-administered medications for this visit. Social History     Socioeconomic History    Marital status:      Spouse name: Not on file    Number of children: Not on file    Years of education: Not on file    Highest education level: Not on file   Occupational History    Not on file   Tobacco Use    Smoking status: Former     Packs/day: 2.00     Years: 6.00     Pack years: 12.00     Types: Cigarettes    Smokeless tobacco: Never   Substance and Sexual Activity    Alcohol use: No    Drug use: No    Sexual activity: Yes     Partners: Female     Comment: He is . Other Topics Concern    Not on file   Social History Narrative    Not on file     Social Determinants of Health     Financial Resource Strain: Not on file   Food Insecurity: Not on file   Transportation Needs: Not on file   Physical Activity: Not on file   Stress: Not on file   Social Connections: Not on file   Intimate Partner Violence: Not on file   Housing Stability: Not on file       Physical Examination:  /60   Pulse 66   Ht 5' 8\" (1.727 m)   Wt 167 lb (75.8 kg)   BMI 25.39 kg/m²   Physical Exam  Vitals reviewed. Constitutional:       Appearance: He is well-developed. Neck:      Vascular: No carotid bruit or JVD. Cardiovascular:      Rate and Rhythm: Normal rate and regular rhythm. Heart sounds: Normal heart sounds. No murmur heard. No friction rub. No gallop. Pulmonary:      Effort: Pulmonary effort is normal. No respiratory distress. Breath sounds: Normal breath sounds. No wheezing or rales. Abdominal:      General: There is no distension. Tenderness: There is no abdominal tenderness. Lymphadenopathy:      Cervical: No cervical adenopathy. Skin:     General: Skin is warm and dry. ASSESSMENT:     Diagnosis Orders   1.  Coronary artery disease involving native coronary artery of native heart without angina pectoris        2. Primary hypertension        3. Mixed hyperlipidemia        4. Hx of CABG            PLAN:  No orders of the defined types were placed in this encounter. No orders of the defined types were placed in this encounter. Continue present medications  Echocardiogram  Lexiscan  Further comments to follow  Follow-up assessment in 6 months    Return in about 6 months (around 7/26/2023) for return to Dr. Nica Jackson only. Dionicio Coronado MD 1/26/2023 2:05 PM [facility]      Thisdictation was generated by voice recognition computer software. Although all attempts are made to edit the dictation for accuracy, there may be errors in the transcription that are not intended.

## 2023-04-21 ENCOUNTER — HOSPITAL ENCOUNTER (OUTPATIENT)
Dept: NON INVASIVE DIAGNOSTICS | Age: 88
Discharge: HOME | End: 2023-04-21
Payer: MEDICARE

## 2023-04-21 ENCOUNTER — APPOINTMENT (OUTPATIENT)
Dept: NUCLEAR MEDICINE | Age: 88
End: 2023-04-21
Payer: MEDICARE

## 2023-04-21 DIAGNOSIS — R06.02 SHORTNESS OF BREATH: ICD-10-CM

## 2023-04-21 DIAGNOSIS — Z95.1 HX OF CABG: ICD-10-CM

## 2023-04-21 LAB
LV EF: 48 %
LVEF MODALITY: NORMAL

## 2023-04-21 PROCEDURE — 6360000004 HC RX CONTRAST MEDICATION: Performed by: INTERNAL MEDICINE

## 2023-04-21 PROCEDURE — C8929 TTE W OR WO FOL WCON,DOPPLER: HCPCS

## 2023-04-21 RX ADMIN — PERFLUTREN 1.5 ML: 6.52 INJECTION, SUSPENSION INTRAVENOUS at 11:03

## 2023-05-09 ENCOUNTER — OFFICE VISIT (OUTPATIENT)
Dept: INTERNAL MEDICINE | Facility: CLINIC | Age: 88
End: 2023-05-09
Payer: MEDICARE

## 2023-05-09 VITALS
WEIGHT: 167 LBS | SYSTOLIC BLOOD PRESSURE: 140 MMHG | TEMPERATURE: 97.2 F | HEART RATE: 58 BPM | HEIGHT: 68 IN | DIASTOLIC BLOOD PRESSURE: 60 MMHG | OXYGEN SATURATION: 99 % | BODY MASS INDEX: 25.31 KG/M2

## 2023-05-09 DIAGNOSIS — A09 ACUTE INFECTIOUS DIARRHEA: Primary | ICD-10-CM

## 2023-05-09 PROCEDURE — 1160F RVW MEDS BY RX/DR IN RCRD: CPT | Performed by: INTERNAL MEDICINE

## 2023-05-09 PROCEDURE — 1159F MED LIST DOCD IN RCRD: CPT | Performed by: INTERNAL MEDICINE

## 2023-05-09 PROCEDURE — 99213 OFFICE O/P EST LOW 20 MIN: CPT | Performed by: INTERNAL MEDICINE

## 2023-05-09 RX ORDER — DIPHENOXYLATE HYDROCHLORIDE AND ATROPINE SULFATE 2.5; .025 MG/1; MG/1
1 TABLET ORAL 4 TIMES DAILY PRN
Qty: 30 TABLET | Refills: 0 | Status: SHIPPED | OUTPATIENT
Start: 2023-05-09

## 2023-05-09 NOTE — PROGRESS NOTES
"      Chief Complaint  Diarrhea (Started 4 days ago/Has tried imodium and pepto nothing seems to help, last night was the worst /This morning he had toast and that started again. )    Subjective        Chad Henriquez presents to [facility]    HPI  Patient is seen for the above problems.  The patient overall felt that he was getting better.  The patient has tried Imodium for several days without improvement.  The patient has had Pepto-Bismol today.  Patient had approximately 10 bowel movements last night.  8 of them were large watery bowel movements with 2 that were just simply mucus bowel movements.  Patient has had this ongoing since the weekend.  His best day was Saturday when he was able to eat a little bit.  However it has continued to decline.  We will try Lomotil.    Review of Systems    Objective   Vital Signs:  /60 (BP Location: Left arm, Patient Position: Sitting, Cuff Size: Adult)   Pulse 58   Temp 97.2 °F (36.2 °C) (Temporal)   Ht 172.7 cm (68\")   Wt 75.8 kg (167 lb)   SpO2 99%   BMI 25.39 kg/m²   Estimated body mass index is 25.39 kg/m² as calculated from the following:    Height as of this encounter: 172.7 cm (68\").    Weight as of this encounter: 75.8 kg (167 lb).      Physical Exam  Vitals reviewed.   HENT:      Head: Normocephalic and atraumatic.      Mouth/Throat:      Mouth: Mucous membranes are moist.      Palate: No mass and lesions.   Cardiovascular:      Rate and Rhythm: Normal rate and regular rhythm.   Pulmonary:      Effort: Pulmonary effort is normal. No respiratory distress.   Abdominal:      General: Abdomen is flat. Bowel sounds are normal. There is no distension.      Palpations: There is no mass.   Skin:     General: Skin is warm and dry.      Coloration: Skin is not pale.      Comments: Stable skin turgor   Neurological:      General: No focal deficit present.      Mental Status: He is alert and oriented to person, place, and time. "   Psychiatric:         Mood and Affect: Mood normal.         Behavior: Behavior normal.                   Assessment and Plan   Diagnoses and all orders for this visit:    1. Acute infectious diarrhea (Primary)  -     diphenoxylate-atropine (Lomotil) 2.5-0.025 MG per tablet; Take 1 tablet by mouth 4 (Four) Times a Day As Needed for Diarrhea.  Dispense: 30 tablet; Refill: 0  -     CBC w AUTO Differential  -     Comprehensive metabolic panel    If not better in 1-2 days would like him to come get a bag of IV.  Pedilyte  Stay hydrate  [place] diet      Result Review :                      Follow Up   No follow-ups on file.  Patient was given instructions and counseling regarding his condition or for health maintenance advice. Please see specific information pulled into the AVS if appropriate.       MIKHAIL Bond MD, FACP, [facility], [facility]      Electronically signed by Miguel Bond MD, 05/09/23, 1:43 PM CDT.

## 2023-05-10 LAB
ALBUMIN SERPL-MCNC: 4.5 G/DL (ref 3.5–5.2)
ALBUMIN/GLOB SERPL: 2.3 G/DL
ALP SERPL-CCNC: 77 U/L (ref 39–117)
ALT SERPL-CCNC: 15 U/L (ref 1–41)
AST SERPL-CCNC: 17 U/L (ref 1–40)
BASOPHILS # BLD AUTO: 0.06 10*3/MM3 (ref 0–0.2)
BASOPHILS NFR BLD AUTO: 0.5 % (ref 0–1.5)
BILIRUB SERPL-MCNC: 0.8 MG/DL (ref 0–1.2)
BUN SERPL-MCNC: 20 MG/DL (ref 8–23)
BUN/CREAT SERPL: 20.6 (ref 7–25)
CALCIUM SERPL-MCNC: 9.1 MG/DL (ref 8.2–9.6)
CHLORIDE SERPL-SCNC: 107 MMOL/L (ref 98–107)
CO2 SERPL-SCNC: 24.2 MMOL/L (ref 22–29)
CREAT SERPL-MCNC: 0.97 MG/DL (ref 0.76–1.27)
EGFRCR SERPLBLD CKD-EPI 2021: 72.8 ML/MIN/1.73
EOSINOPHIL # BLD AUTO: 0.22 10*3/MM3 (ref 0–0.4)
EOSINOPHIL NFR BLD AUTO: 1.7 % (ref 0.3–6.2)
ERYTHROCYTE [DISTWIDTH] IN BLOOD BY AUTOMATED COUNT: 12.4 % (ref 12.3–15.4)
GLOBULIN SER CALC-MCNC: 2 GM/DL
GLUCOSE SERPL-MCNC: 94 MG/DL (ref 65–99)
HCT VFR BLD AUTO: 39.2 % (ref 37.5–51)
HGB BLD-MCNC: 13 G/DL (ref 13–17.7)
IMM GRANULOCYTES # BLD AUTO: 0.04 10*3/MM3 (ref 0–0.05)
IMM GRANULOCYTES NFR BLD AUTO: 0.3 % (ref 0–0.5)
LYMPHOCYTES # BLD AUTO: 1.26 10*3/MM3 (ref 0.7–3.1)
LYMPHOCYTES NFR BLD AUTO: 9.8 % (ref 19.6–45.3)
MCH RBC QN AUTO: 32.7 PG (ref 26.6–33)
MCHC RBC AUTO-ENTMCNC: 33.2 G/DL (ref 31.5–35.7)
MCV RBC AUTO: 98.5 FL (ref 79–97)
MONOCYTES # BLD AUTO: 1.18 10*3/MM3 (ref 0.1–0.9)
MONOCYTES NFR BLD AUTO: 9.2 % (ref 5–12)
NEUTROPHILS # BLD AUTO: 10.11 10*3/MM3 (ref 1.7–7)
NEUTROPHILS NFR BLD AUTO: 78.5 % (ref 42.7–76)
NRBC BLD AUTO-RTO: 0 /100 WBC (ref 0–0.2)
PLATELET # BLD AUTO: 278 10*3/MM3 (ref 140–450)
POTASSIUM SERPL-SCNC: 4.3 MMOL/L (ref 3.5–5.2)
PROT SERPL-MCNC: 6.5 G/DL (ref 6–8.5)
RBC # BLD AUTO: 3.98 10*6/MM3 (ref 4.14–5.8)
SODIUM SERPL-SCNC: 140 MMOL/L (ref 136–145)
WBC # BLD AUTO: 12.87 10*3/MM3 (ref 3.4–10.8)

## 2023-05-17 ENCOUNTER — HOSPITAL ENCOUNTER (OUTPATIENT)
Dept: NUCLEAR MEDICINE | Age: 88
Discharge: HOME | End: 2023-05-19
Payer: MEDICARE

## 2023-05-17 DIAGNOSIS — R06.02 SHORTNESS OF BREATH: ICD-10-CM

## 2023-05-17 DIAGNOSIS — Z95.1 HX OF CABG: ICD-10-CM

## 2023-05-17 DIAGNOSIS — I25.10 CORONARY ARTERY DISEASE INVOLVING NATIVE CORONARY ARTERY OF NATIVE HEART WITHOUT ANGINA PECTORIS: ICD-10-CM

## 2023-05-17 LAB
LV EF: 70 %
LVEF MODALITY: NORMAL

## 2023-05-17 PROCEDURE — 6360000002 HC RX W HCPCS: Performed by: INTERNAL MEDICINE

## 2023-05-17 PROCEDURE — 93017 CV STRESS TEST TRACING ONLY: CPT

## 2023-05-17 PROCEDURE — A9502 TC99M TETROFOSMIN: HCPCS | Performed by: INTERNAL MEDICINE

## 2023-05-17 PROCEDURE — 3430000000 HC RX DIAGNOSTIC RADIOPHARMACEUTICAL: Performed by: INTERNAL MEDICINE

## 2023-05-17 RX ADMIN — TETROFOSMIN 8 MILLICURIE: 1.38 INJECTION, POWDER, LYOPHILIZED, FOR SOLUTION INTRAVENOUS at 10:52

## 2023-05-17 RX ADMIN — REGADENOSON 0.4 MG: 0.08 INJECTION, SOLUTION INTRAVENOUS at 09:57

## 2023-05-17 RX ADMIN — TETROFOSMIN 24 MILLICURIE: 1.38 INJECTION, POWDER, LYOPHILIZED, FOR SOLUTION INTRAVENOUS at 10:52

## 2023-05-23 ENCOUNTER — TELEPHONE (OUTPATIENT)
Dept: CARDIOLOGY CLINIC | Age: 88
End: 2023-05-23

## 2023-05-23 NOTE — TELEPHONE ENCOUNTER
Patient's wife called wanting Echo and Lia results. Please advise what to tell patient. 1. Ejection fraction 70%  2. Wall motion suggest inferoseptal hypokinesis  3. Myocardial perfusion imaging demonstrated homogeneous uptake of the  tracer in all visualized segments no definite areas of ischemia or  infarction are identified. Summary impressions:  Normal ejection fraction 70% normal perfusion study without evidence  of ischemia or prior infarction. Signed by Dr Claire Powell    Conclusions      Summary   Left ventricular cavity size is normal with estimated ejection fraction at   45-50%. Mild global hypokinesis. Moderate-severe concentric left   ventricular hypertrophy. Grade II diastolic function. Normal right ventricular size with preserved RV function. Mild bi-atrial dilation. Mild mitral regurgitation. Mild tricuspid regurgitation with estimated RVSP of 26 mmHg. Aortic root and ascending aorta dimensions are within normal limits. IVC normal.   No evidence of significant pericardial effusion is noted. The rhythm is sinus.

## 2023-06-15 ENCOUNTER — OFFICE VISIT (OUTPATIENT)
Dept: INTERNAL MEDICINE | Facility: CLINIC | Age: 88
End: 2023-06-15
Payer: MEDICARE

## 2023-06-15 VITALS
DIASTOLIC BLOOD PRESSURE: 68 MMHG | TEMPERATURE: 97.8 F | BODY MASS INDEX: 25.27 KG/M2 | WEIGHT: 166.2 LBS | HEART RATE: 51 BPM | SYSTOLIC BLOOD PRESSURE: 170 MMHG | OXYGEN SATURATION: 97 %

## 2023-06-15 DIAGNOSIS — L98.9 SKIN LESION: ICD-10-CM

## 2023-06-15 DIAGNOSIS — I50.32 DIASTOLIC DYSFUNCTION WITH CHRONIC HEART FAILURE: ICD-10-CM

## 2023-06-15 DIAGNOSIS — R06.02 SHORTNESS OF BREATH: Primary | ICD-10-CM

## 2023-06-15 DIAGNOSIS — Z85.828 HISTORY OF SKIN CANCER IN ADULTHOOD: ICD-10-CM

## 2023-06-15 DIAGNOSIS — I10 PRIMARY HYPERTENSION: Chronic | ICD-10-CM

## 2023-06-15 DIAGNOSIS — R06.02 PERSISTENT SHORTNESS OF BREATH AFTER COVID-19: ICD-10-CM

## 2023-06-15 DIAGNOSIS — E78.00 HYPERCHOLESTEREMIA: ICD-10-CM

## 2023-06-15 DIAGNOSIS — U09.9 PERSISTENT SHORTNESS OF BREATH AFTER COVID-19: ICD-10-CM

## 2023-06-15 DIAGNOSIS — J34.89 LESION OF NOSE: ICD-10-CM

## 2023-06-15 DIAGNOSIS — I25.10 CORONARY ARTERY DISEASE, UNSPECIFIED VESSEL OR LESION TYPE, UNSPECIFIED WHETHER ANGINA PRESENT, UNSPECIFIED WHETHER NATIVE OR TRANSPLANTED HEART: Chronic | ICD-10-CM

## 2023-06-15 RX ORDER — LOSARTAN POTASSIUM 25 MG/1
25 TABLET ORAL DAILY
Qty: 90 TABLET | Refills: 2 | Status: SHIPPED | OUTPATIENT
Start: 2023-06-15

## 2023-06-15 NOTE — PROGRESS NOTES
"      Chief Complaint  Hypertension (Pt here for follow up. Wants to discuss referral to Cardiology through [facility]. Pt isn't satisfied with [facility]) and Shortness of Breath (Pt states he has been experiencing sob frequently. Wants to discuss referral to Pulmonary. )    Subjective        Chad Henriquez presents to [facility]    HPI  Patient here for above problems and others seen in assessment and plan.      Review of Systems    Objective   Vital Signs:  /68 (BP Location: Left arm, Patient Position: Sitting, Cuff Size: Adult)   Pulse 51   Temp 97.8 °F (36.6 °C) (Infrared)   Wt 75.4 kg (166 lb 3.2 oz)   SpO2 97%   BMI 25.27 kg/m²   Estimated body mass index is 25.27 kg/m² as calculated from the following:    Height as of 5/9/23: 172.7 cm (68\").    Weight as of this encounter: 75.4 kg (166 lb 3.2 oz).      Physical Exam  Vitals reviewed.   Constitutional:       Appearance: He is not ill-appearing.   HENT:      Nose:        Mouth/Throat:      Mouth: Mucous membranes are dry.      Pharynx: Oropharynx is clear.   Eyes:      General: No scleral icterus.     Conjunctiva/sclera: Conjunctivae normal.   Cardiovascular:      Rate and Rhythm: Normal rate and regular rhythm.   Pulmonary:      Effort: Pulmonary effort is normal. No respiratory distress.      Comments: Diminished at bases  Skin:     General: Skin is warm and dry.   Neurological:      General: No focal deficit present.      Mental Status: He is alert and oriented to person, place, and time.   Psychiatric:         Mood and Affect: Mood normal.         Behavior: Behavior normal.                   Assessment and Plan   Diagnoses and all orders for this visit:    1. Shortness of breath (Primary)  -     Spirometry - Pre & Post Bronchodilator with Diffusion Capacity; Future    2. Primary hypertension  -     losartan (Cozaar) 25 MG tablet; Take 1 tablet by mouth Daily.  Dispense: 90 tablet; Refill: 2    3. Coronary artery disease, " unspecified vessel or lesion type, unspecified whether angina present, unspecified whether native or transplanted heart  -     Ambulatory Referral to Cardiology    4. Hypercholesteremia    5. Lesion of nose  -     Reference Histopathology  -     Skin Excision    6. Persistent shortness of breath after COVID-19    7. Skin lesion  -     Skin Excision    8. History of skin cancer in adulthood  -     Skin Excision    9. Diastolic dysfunction without heart failure      Patient has had persistent shortness of breath since he had COVID-19.  Patient indicates that he is not able to go very far without feeling short of breath he says it also only a couple of steps.  The patient was initially worried that it may be his heart and he underwent the evaluation with his previous cardiologist Dr. Lozano as below.  The patient indicates to me that he would like referral to a [facility] cardiologist, and mentioned Dr. Hull.  We will make this referral.  There is no urgency to this referral as he has recently had an echocardiogram and a stress test.  Patient is on appropriate medications at the present time.    In regards to the shortness of breath, I think that we should proceed forward with pulmonary function test.  He had bronchitis back in January and I reviewed the chest x-ray from then, he does not have any severe chronic lung changes.    Patient has a lesion on his nose close to the border between his graft and his regular nose.  Patient indicates that he excellently bumped it and it bleeds persistently.  Patient would like it off.  Discussed with him that noses typically bleed a little bit more and we may have to cauterize a little bit.  Patient is okay with this.  Patient has no significant feeling in this area anyways.    Patient on medications for his hyperlipidemia.  Patient [place] [facility] [place].  No urgent needs at present just wants someone new to follow.  Given advanced age and no active symptoms, not  sure we would adjust much.  However, given his EF 45-50% and diastolic dysfunction noted stressed importance of patients BP control which may be contributing to some of his issues.  Discussed switching from amlodipine to losartan for BP control, unsure at his age if I would want to do jardiance, but will consider.  I want him to measure Bps and see me back in 2 weeks so we can continue to titrate.          Nuclear Stress Test 5/17/2023:      Echo 4/21/2023:      Result Review :                      Follow Up   Return in about 2 weeks (around 6/29/2023), or if symptoms worsen or fail to improve, for Recheck.  Patient was given instructions and counseling regarding his condition or for health maintenance advice. Please see specific information pulled into the AVS if appropriate.       MIKHAIL Bond MD, FACP, [facility]      Electronically signed by Miguel Bond MD, 06/15/23, 1:18 PM CDT.            Skin Excision    Date/Time: 6/17/2023 12:26 PM  Performed by: Miguel Bond MD  Authorized by: Miguel Bond MD     Consent:     Consent obtained:  Verbal    Consent given by:  Patient    Risks discussed:  Bleeding, infection and poor cosmetic result    Alternatives discussed:  No treatment, observation and referral  Pre-procedure details:     Preparation: Patient was prepped and draped in usual sterile fashion    Anesthesia:     Anesthesia method:  None  Procedure details:     Body Area:  Nose    Malignancy: malignancy unknown      Initial Size:  3    Final defect size (mm):  0  Post-procedure details:     Patient tolerance of procedure:  Tolerated well, no immediate complications  Comments:      Patient had significant oozing due to highly vascular area and on antiplatelets.  Silver nitrate was not enough so had to use the electrocautery pen.  Eventually obtained hemostasis.  Hopefully patient has an adequate cosmetic result and no further difficulty.  Will send for pathology given the patients history and  location.

## 2023-06-17 PROBLEM — R06.02 SHORTNESS OF BREATH: Status: ACTIVE | Noted: 2023-06-17

## 2023-06-17 PROBLEM — Z85.828 HISTORY OF SKIN CANCER: Status: ACTIVE | Noted: 2023-06-17

## 2023-06-17 PROBLEM — L98.9 SKIN LESION: Status: ACTIVE | Noted: 2023-06-17

## 2023-06-17 PROBLEM — U09.9 PERSISTENT SHORTNESS OF BREATH AFTER COVID-19: Status: ACTIVE | Noted: 2023-06-17

## 2023-06-17 PROBLEM — I50.32 DIASTOLIC DYSFUNCTION WITH CHRONIC HEART FAILURE: Status: ACTIVE | Noted: 2023-06-17

## 2023-06-17 PROBLEM — R06.02 PERSISTENT SHORTNESS OF BREATH AFTER COVID-19: Status: ACTIVE | Noted: 2023-06-17

## 2023-06-20 LAB
DX ICD CODE: NORMAL
DX ICD CODE: NORMAL
PATH REPORT.FINAL DX SPEC: NORMAL
PATH REPORT.GROSS SPEC: NORMAL
PATH REPORT.SITE OF ORIGIN SPEC: NORMAL
PATHOLOGIST NAME: NORMAL
PAYMENT PROCEDURE: NORMAL

## 2023-09-28 ENCOUNTER — OFFICE VISIT (OUTPATIENT)
Dept: INTERNAL MEDICINE | Facility: CLINIC | Age: 88
End: 2023-09-28
Payer: MEDICARE

## 2023-09-28 VITALS
OXYGEN SATURATION: 97 % | DIASTOLIC BLOOD PRESSURE: 60 MMHG | SYSTOLIC BLOOD PRESSURE: 182 MMHG | BODY MASS INDEX: 25.16 KG/M2 | TEMPERATURE: 97.8 F | HEART RATE: 60 BPM | WEIGHT: 166 LBS | HEIGHT: 68 IN

## 2023-09-28 DIAGNOSIS — I50.32 DIASTOLIC DYSFUNCTION WITH CHRONIC HEART FAILURE: ICD-10-CM

## 2023-09-28 DIAGNOSIS — Z23 NEED FOR PNEUMOCOCCAL 20-VALENT CONJUGATE VACCINATION: Primary | ICD-10-CM

## 2023-09-28 DIAGNOSIS — I10 PRIMARY HYPERTENSION: Chronic | ICD-10-CM

## 2023-09-28 DIAGNOSIS — R06.02 PERSISTENT SHORTNESS OF BREATH AFTER COVID-19: ICD-10-CM

## 2023-09-28 DIAGNOSIS — U09.9 PERSISTENT SHORTNESS OF BREATH AFTER COVID-19: ICD-10-CM

## 2023-09-28 DIAGNOSIS — R21 RASH: ICD-10-CM

## 2023-09-28 PROBLEM — U07.1 COVID-19 VIRUS INFECTION: Status: RESOLVED | Noted: 2021-02-08 | Resolved: 2023-09-28

## 2023-09-28 RX ORDER — LOSARTAN POTASSIUM 50 MG/1
50 TABLET ORAL DAILY
Start: 2023-09-28

## 2023-09-28 RX ORDER — CLOTRIMAZOLE 1 %
1 CREAM (GRAM) TOPICAL 2 TIMES DAILY
COMMUNITY
End: 2023-09-28

## 2023-09-28 RX ORDER — CLOTRIMAZOLE AND BETAMETHASONE DIPROPIONATE 10; .64 MG/G; MG/G
1 CREAM TOPICAL 2 TIMES DAILY
Qty: 45 G | Refills: 0 | Status: SHIPPED | OUTPATIENT
Start: 2023-09-28

## 2023-09-28 RX ORDER — AMLODIPINE BESYLATE 2.5 MG/1
2.5 TABLET ORAL DAILY
Qty: 90 TABLET | Refills: 3
Start: 2023-09-28

## 2023-09-28 NOTE — PROGRESS NOTES
"      Chief Complaint  Hypertension (3 month follow up ), pneumo vaccine , Rash (Across chest has had about a month - Schaeffer especially when touched  itches on occasion /[facility]  gave clottrimazole but has not helped ), Balance Issues (Still having some issues but seems to be doing some better /Was more steady getting on scale and walking down gonzalez ), and Shortness of Breath (Having issues sense having covid in January of 2021)    Subjective        Chad Henriquez presents to [facility]    HPI  Patient here for the above problems.  See Assessment and Plan for further HPI components.        Review of Systems    Objective   Vital Signs:  BP (!) 182/60 (BP Location: Left arm, Patient Position: Sitting, Cuff Size: Adult)   Pulse 60   Temp 97.8 °F (36.6 °C) (Temporal)   Ht 172.7 cm (68\")   Wt 75.3 kg (166 lb)   SpO2 97%   BMI 25.24 kg/m²   Estimated body mass index is 25.24 kg/m² as calculated from the following:    Height as of this encounter: 172.7 cm (68\").    Weight as of this encounter: 75.3 kg (166 lb).      Physical Exam  Vitals reviewed.   Constitutional:       Appearance: He is not ill-appearing.   Cardiovascular:      Rate and Rhythm: Normal rate and regular rhythm.      Heart sounds: Murmur heard.   Pulmonary:      Effort: Pulmonary effort is normal.   Skin:            Comments: Rash   Neurological:      General: No focal deficit present.      Mental Status: He is alert and oriented to person, place, and time.                       Assessment and Plan   Diagnoses and all orders for this visit:    1. Need for pneumococcal 20-valent conjugate vaccination (Primary)  -     Pneumococcal Conjugate Vaccine 20-Valent All    2. Primary hypertension  -     losartan (COZAAR) 50 MG tablet; Take 1 tablet by mouth Daily.  -     amLODIPine (NORVASC) 2.5 MG tablet; Take 1 tablet by mouth Daily.  Dispense: 90 tablet; Refill: 3    3. Diastolic dysfunction without heart failure    4. Rash  -     " clotrimazole-betamethasone (Lotrisone) 1-0.05 % cream; Apply 1 application  topically to the appropriate area as directed 2 (Two) Times a Day.  Dispense: 45 g; Refill: 0    5. Persistent shortness of breath after COVID-19      Pneumonia shot today.      Patient did not seem to understand instructions at least visit.  He stopped his amlodipine and just started the losartan.  His BP has remained high.  Recommend taking 2.5 mg amlodipine and increasing the losartan to 50 mg.  Continue to monitor.  Will have nurse call and check on BP in a couple weeks.  Continue to monitor.    Patient has a history of diastolic dysfunction.  Patient appears euvolemic.  Need to work on BP.    Patients breathing continues to be an issue after COVID.  The patient however, is walking in the mall every day now.  His wife drives him there and he does it twice with a break between.  This is amazing for his age and medical problems.    Patient has a rash on his chest.  Think it is moisture related.  Patient tried clotrimazole without improvement.  Discussed keeping area dry and then applying lotrisone.  Will do this and again, really focus on keeping area dry.        Result Review :           BMI is >= 25 and <30. (Overweight) The following options were offered after discussion;: exercise counseling/recommendations and nutrition counseling/recommendations      BMI is >= 25 and <30. (Overweight) The following options were offered after discussion;: exercise counseling/recommendations and nutrition counseling/recommendations            Follow Up   Return in about 3 months (around 12/28/2023), or if symptoms worsen or fail to improve.  Patient was given instructions and counseling regarding his condition or for health maintenance advice. Please see specific information pulled into the AVS if appropriate.       MIKHAIL Bond MD, FACP, [facility]      Electronically signed by Miguel Bond MD, 09/28/23, 4:54 PM CDT.

## 2023-12-19 ENCOUNTER — LAB (OUTPATIENT)
Dept: INTERNAL MEDICINE | Facility: CLINIC | Age: 88
End: 2023-12-19
Payer: MEDICARE

## 2023-12-19 DIAGNOSIS — E78.00 HYPERCHOLESTEREMIA: ICD-10-CM

## 2023-12-19 DIAGNOSIS — Z79.899 ENCOUNTER FOR LONG-TERM CURRENT USE OF MEDICATION: ICD-10-CM

## 2023-12-19 DIAGNOSIS — I10 PRIMARY HYPERTENSION: Chronic | ICD-10-CM

## 2023-12-20 ENCOUNTER — OFFICE VISIT (OUTPATIENT)
Dept: INTERNAL MEDICINE | Facility: CLINIC | Age: 88
End: 2023-12-20
Payer: MEDICARE

## 2023-12-20 VITALS
BODY MASS INDEX: 25.76 KG/M2 | WEIGHT: 170 LBS | HEART RATE: 104 BPM | SYSTOLIC BLOOD PRESSURE: 135 MMHG | HEIGHT: 68 IN | OXYGEN SATURATION: 99 % | TEMPERATURE: 97.8 F | DIASTOLIC BLOOD PRESSURE: 76 MMHG

## 2023-12-20 DIAGNOSIS — N13.8 BPH WITH URINARY OBSTRUCTION: ICD-10-CM

## 2023-12-20 DIAGNOSIS — N40.1 BPH WITH URINARY OBSTRUCTION: ICD-10-CM

## 2023-12-20 DIAGNOSIS — U09.9 PERSISTENT SHORTNESS OF BREATH AFTER COVID-19: ICD-10-CM

## 2023-12-20 DIAGNOSIS — I25.10 CORONARY ARTERY DISEASE, UNSPECIFIED VESSEL OR LESION TYPE, UNSPECIFIED WHETHER ANGINA PRESENT, UNSPECIFIED WHETHER NATIVE OR TRANSPLANTED HEART: ICD-10-CM

## 2023-12-20 DIAGNOSIS — I25.10 CORONARY ARTERY DISEASE INVOLVING NATIVE CORONARY ARTERY OF NATIVE HEART WITHOUT ANGINA PECTORIS: Chronic | ICD-10-CM

## 2023-12-20 DIAGNOSIS — E78.00 HYPERCHOLESTEREMIA: Primary | ICD-10-CM

## 2023-12-20 DIAGNOSIS — R06.02 PERSISTENT SHORTNESS OF BREATH AFTER COVID-19: ICD-10-CM

## 2023-12-20 DIAGNOSIS — I10 PRIMARY HYPERTENSION: ICD-10-CM

## 2023-12-20 DIAGNOSIS — I50.32 DIASTOLIC DYSFUNCTION WITH CHRONIC HEART FAILURE: ICD-10-CM

## 2023-12-20 LAB
ALBUMIN SERPL-MCNC: 4.5 G/DL (ref 3.5–5.2)
ALBUMIN/GLOB SERPL: 2.6 G/DL
ALP SERPL-CCNC: 66 U/L (ref 39–117)
ALT SERPL-CCNC: 19 U/L (ref 1–41)
APPEARANCE UR: CLEAR
AST SERPL-CCNC: 19 U/L (ref 1–40)
BACTERIA #/AREA URNS HPF: NORMAL /HPF
BASOPHILS # BLD AUTO: 0.07 10*3/MM3 (ref 0–0.2)
BASOPHILS NFR BLD AUTO: 1.1 % (ref 0–1.5)
BILIRUB SERPL-MCNC: 0.7 MG/DL (ref 0–1.2)
BILIRUB UR QL STRIP: NEGATIVE
BUN SERPL-MCNC: 13 MG/DL (ref 8–23)
BUN/CREAT SERPL: 13.7 (ref 7–25)
CALCIUM SERPL-MCNC: 9.1 MG/DL (ref 8.2–9.6)
CASTS URNS MICRO: NORMAL
CHLORIDE SERPL-SCNC: 104 MMOL/L (ref 98–107)
CHOLEST SERPL-MCNC: 133 MG/DL (ref 0–200)
CO2 SERPL-SCNC: 25.4 MMOL/L (ref 22–29)
COLOR UR: YELLOW
CREAT SERPL-MCNC: 0.95 MG/DL (ref 0.76–1.27)
EGFRCR SERPLBLD CKD-EPI 2021: 74.2 ML/MIN/1.73
EOSINOPHIL # BLD AUTO: 0.24 10*3/MM3 (ref 0–0.4)
EOSINOPHIL NFR BLD AUTO: 3.9 % (ref 0.3–6.2)
EPI CELLS #/AREA URNS HPF: NORMAL /HPF
ERYTHROCYTE [DISTWIDTH] IN BLOOD BY AUTOMATED COUNT: 12.3 % (ref 12.3–15.4)
GLOBULIN SER CALC-MCNC: 1.7 GM/DL
GLUCOSE SERPL-MCNC: 94 MG/DL (ref 65–99)
GLUCOSE UR QL STRIP: NEGATIVE
HCT VFR BLD AUTO: 36.9 % (ref 37.5–51)
HDLC SERPL-MCNC: 44 MG/DL (ref 40–60)
HGB BLD-MCNC: 12.7 G/DL (ref 13–17.7)
HGB UR QL STRIP: NEGATIVE
IMM GRANULOCYTES # BLD AUTO: 0.02 10*3/MM3 (ref 0–0.05)
IMM GRANULOCYTES NFR BLD AUTO: 0.3 % (ref 0–0.5)
KETONES UR QL STRIP: NEGATIVE
LDLC SERPL CALC-MCNC: 77 MG/DL (ref 0–100)
LEUKOCYTE ESTERASE UR QL STRIP: NEGATIVE
LYMPHOCYTES # BLD AUTO: 1.31 10*3/MM3 (ref 0.7–3.1)
LYMPHOCYTES NFR BLD AUTO: 21.3 % (ref 19.6–45.3)
MCH RBC QN AUTO: 33.1 PG (ref 26.6–33)
MCHC RBC AUTO-ENTMCNC: 34.4 G/DL (ref 31.5–35.7)
MCV RBC AUTO: 96.1 FL (ref 79–97)
MONOCYTES # BLD AUTO: 0.68 10*3/MM3 (ref 0.1–0.9)
MONOCYTES NFR BLD AUTO: 11.1 % (ref 5–12)
NEUTROPHILS # BLD AUTO: 3.83 10*3/MM3 (ref 1.7–7)
NEUTROPHILS NFR BLD AUTO: 62.3 % (ref 42.7–76)
NITRITE UR QL STRIP: NEGATIVE
NRBC BLD AUTO-RTO: 0 /100 WBC (ref 0–0.2)
PH UR STRIP: 6.5 [PH] (ref 5–8)
PLATELET # BLD AUTO: 245 10*3/MM3 (ref 140–450)
POTASSIUM SERPL-SCNC: 4.4 MMOL/L (ref 3.5–5.2)
PROT SERPL-MCNC: 6.2 G/DL (ref 6–8.5)
PROT UR QL STRIP: NEGATIVE
RBC # BLD AUTO: 3.84 10*6/MM3 (ref 4.14–5.8)
RBC #/AREA URNS HPF: NORMAL /HPF
SODIUM SERPL-SCNC: 140 MMOL/L (ref 136–145)
SP GR UR STRIP: 1.01 (ref 1–1.03)
TRIGL SERPL-MCNC: 54 MG/DL (ref 0–150)
TSH SERPL DL<=0.005 MIU/L-ACNC: 2.24 UIU/ML (ref 0.27–4.2)
UROBILINOGEN UR STRIP-MCNC: NORMAL MG/DL
VLDLC SERPL CALC-MCNC: 12 MG/DL (ref 5–40)
WBC # BLD AUTO: 6.15 10*3/MM3 (ref 3.4–10.8)
WBC #/AREA URNS HPF: NORMAL /HPF

## 2023-12-20 NOTE — PROGRESS NOTES
The ABCs of the Annual Wellness Visit  Subsequent Medicare Wellness Visit    Chief Complaint   Patient presents with    Medicare Wellness-subsequent    Hearing Problem     Worsening     discuss rsv vaccine     Sinus drainage     Sinus drainage  On going - but worse when he eats       Subjective    History of Present Illness:  Chad Henriquez is a 89 y.o. male who presents for a Subsequent Medicare Wellness Visit.    The following portions of the patient's history were reviewed and   updated as appropriate: allergies, current medications, past family history, past medical history, past social history, past surgical history and problem list.    Compared to one year ago, the patient feels his physical   health is the same.    Compared to one year ago, the patient feels his mental   health is the same.    Recent Hospitalizations:  He was not admitted to the hospital during the last year.       Current Medical Providers:  Patient Care Team:  Miguel Bond MD as PCP - General (Hospitalist)  Dylon Maldonado MD as Consulting Physician (Urology)  Sukhdeep Guillory MD as Consulting Physician (Pain Medicine)    Outpatient Medications Prior to Visit   Medication Sig Dispense Refill    Ascorbic Acid (Vitamin C) 500 MG capsule Take 1 capsule by mouth 2 (two) times a day.      Cholecalciferol (Vitamin D3) 50 MCG (2000 UT) tablet Take 1 tablet by mouth Daily.      clotrimazole-betamethasone (Lotrisone) 1-0.05 % cream Apply 1 application  topically to the appropriate area as directed 2 (Two) Times a Day. 45 g 0    diphenoxylate-atropine (Lomotil) 2.5-0.025 MG per tablet Take 1 tablet by mouth 4 (Four) Times a Day As Needed for Diarrhea. 30 tablet 0    Docusate Calcium (STOOL SOFTENER PO) Take  by mouth.      famotidine (PEPCID) 20 MG tablet Take 1 tablet by mouth 2 (Two) Times a Day. (Patient taking differently: Take 1 tablet by mouth As Needed.) 20 tablet 0    Flaxseed, Linseed, (FLAXSEED OIL) 1000 MG capsule 540mg  takes 4 tablets by mouth daily      folic acid (FOLVITE) 800 MCG tablet Taking as multivitamin now      gabapentin (NEURONTIN) 600 MG tablet Take 1 tablet by mouth 3 (Three) Times a Day. 90 tablet 3    L-Lysine 600 MG tablet Take 1 tablet by mouth Daily.      losartan (COZAAR) 50 MG tablet Take 1 tablet by mouth Daily.      pravastatin (PRAVACHOL) 20 MG tablet Take 1 tablet by mouth Every Night. 90 tablet 1    primidone (MYSOLINE) 50 MG tablet Take 1/2 tablet at bedtime (Patient taking differently: Take 1 tablet by mouth.) 45 tablet 3    Psyllium (Metamucil) wafer wafer Take  by mouth Daily.      aspirin 81 MG EC tablet Take 1 tablet by mouth Daily.      metoprolol tartrate (LOPRESSOR) 25 MG tablet 1/2 in morning and 1 at night (Patient taking differently: 1.5 tablet qam and 1 tablet qpm) 135 tablet 3    amLODIPine (NORVASC) 2.5 MG tablet Take 1 tablet by mouth Daily. (Patient not taking: Reported on 12/20/2023) 90 tablet 3     No facility-administered medications prior to visit.       No opioid medication identified on active medication list. I have reviewed chart for other potential  high risk medication/s and harmful drug interactions in the elderly.        Aspirin is on active medication list. Aspirin use is indicated based on review of current medical condition/s. Pros and cons of this therapy have been discussed today. Benefits of this medication outweigh potential harm.  Patient has been encouraged to continue taking this medication.  .      Patient Active Problem List   Diagnosis    BPH with urinary obstruction    Frequency of urination    Nocturia    OAB (overactive bladder)    Non-smoker    Spinal stenosis, lumbar region, with neurogenic claudication    Left leg pain    Bilateral hip pain    Acute left-sided low back pain with left-sided sciatica    Essential tremor    Spinal stenosis    Degeneration of lumbar or lumbosacral intervertebral disc    Hereditary and idiopathic peripheral neuropathy    Syncope  "   Hypercholesteremia    Coronary artery disease involving native coronary artery of native heart without angina pectoris    Hypertension    Lumbar disc disease with radiculopathy    Left hip pain    History of skin cancer in adulthood    Skin lesion    Shortness of breath    Persistent shortness of breath after COVID-19    Diastolic dysfunction without heart failure    Atrial flutter with rapid ventricular response     Advance Care Planning  Advance Directive is on file.  ACP discussion was held with the patient during this visit. Patient has an advance directive in EMR which is still valid.        Objective    Vitals:    23 1447   BP: 135/76   BP Location: Left arm   Patient Position: Sitting   Cuff Size: Adult   Pulse: 104   Temp: 97.8 °F (36.6 °C)   TempSrc: Temporal   SpO2: 99%   Weight: 77.1 kg (170 lb)   Height: 172.7 cm (68\")   PainSc: 0-No pain     Estimated body mass index is 25.85 kg/m² as calculated from the following:    Height as of this encounter: 172.7 cm (68\").    Weight as of this encounter: 77.1 kg (170 lb).           Does the patient have evidence of cognitive impairment? No      Lab Results   Component Value Date    CHLPL 133 2023    TRIG 54 2023    HDL 44 2023    LDL 77 2023    VLDL 12 2023            HEALTH RISK ASSESSMENT    Smoking Status:  Social History     Tobacco Use   Smoking Status Former    Years: 6    Types: Cigarettes    Quit date: 1949    Years since quittin.0   Smokeless Tobacco Never   Tobacco Comments    age 14-20 years of age     Alcohol Consumption:  Social History     Substance and Sexual Activity   Alcohol Use No     Fall Risk Screen:    STEADI Fall Risk Assessment was completed, and patient is at LOW risk for falls.Assessment completed on:2023    Depression Screenin/20/2023     2:46 PM   PHQ-2/PHQ-9 Depression Screening   Little Interest or Pleasure in Doing Things 0-->not at all   Feeling Down, Depressed or Hopeless " 0-->not at all   PHQ-9: Brief Depression Severity Measure Score 0       Health Habits and Functional and Cognitive Screenin/20/2023     2:46 PM   Functional & Cognitive Status   Do you have difficulty preparing food and eating? No   Do you have difficulty bathing yourself, getting dressed or grooming yourself? No   Do you have difficulty using the toilet? No   Do you have difficulty moving around from place to place? Yes   Do you have trouble with steps or getting out of a bed or a chair? Yes   Current Diet Well Balanced Diet   Dental Exam Up to date   Eye Exam Up to date   Exercise (times per week) 4 times per week   Current Exercises Include No Regular Exercise;Walking   Do you need help using the phone?  No   Are you deaf or do you have serious difficulty hearing?  Yes   Do you need help to go to places out of walking distance? Yes   Do you need help shopping? Yes   Do you need help preparing meals?  No   Do you need help with housework?  Yes   Do you need help with laundry? No   Do you need help taking your medications? No   Do you need help managing money? No   Do you ever drive or ride in a car without wearing a seat belt? No   Have you felt unusual stress, anger or loneliness in the last month? No   Who do you live with? Spouse   If you need help, do you have trouble finding someone available to you? No   Have you been bothered in the last four weeks by sexual problems? No   Do you have difficulty concentrating, remembering or making decisions? No       Age-appropriate Screening Schedule:  Refer to the list below for future screening recommendations based on patient's age, sex and/or medical conditions. Orders for these recommended tests are listed in the plan section. The patient has been provided with a written plan.    Health Maintenance   Topic Date Due    ZOSTER VACCINE (1 of 2) Never done    COVID-19 Vaccine ( season) 2023    BMI FOLLOWUP  2024    LIPID PANEL  2024     ANNUAL WELLNESS VISIT  12/20/2024    TDAP/TD VACCINES (3 - Td or Tdap) 07/09/2029    INFLUENZA VACCINE  Completed    Pneumococcal Vaccine 65+  Completed              Assessment & Plan   CMS Preventative Services Quick Reference  Risk Factors Identified During Encounter  Immunizations Discussed/Encouraged: Shingrix, COVID19, and RSV (Respiratory Syncytial Virus)  Dental Screening Recommended  Vision Screening Recommended  The above risks/problems have been discussed with the patient.  Follow up actions/plans if indicated are seen below in the Assessment/Plan Section.  Pertinent information has been shared with the patient in the After Visit Summary.    Diagnoses and all orders for this visit:    1. Hypercholesteremia (Primary)    2. Primary hypertension    3. Coronary artery disease, unspecified vessel or lesion type, unspecified whether angina present, unspecified whether native or transplanted heart    4. Diastolic dysfunction without heart failure    5. Coronary artery disease involving native coronary artery of native heart without angina pectoris    6. BPH with urinary obstruction    7. Persistent shortness of breath after COVID-19    Other orders  -     RSVPreF3 Vac Recomb Adjuvanted (AREXVY) 120 MCG/0.5ML reconstituted suspension injection; Inject 0.5 mL into the appropriate muscle as directed by prescriber 1 (One) Time for 1 dose.  Dispense: 0.5 mL; Refill: 0      Recommend at least annual dental and vision screening.  Recommend annual influenza vaccination  Recommend a varied diet and appropriate portion sizes.   CDC recommendations for physical activity:  At least 150 minutes a week (for example, 30 minutes a day, 5 days a week) of moderate-intensity activity such as brisk walking. Or can consider 75 minutes a week of vigorous-intensity activity such as hiking, jogging, or running.  At least 2 days a week of activities that strengthen muscles.  Plus activities to improve balance.    Hearing is decreased,  but stable    RSV script given    Recommend [facility] for cough and sinus draining.          Result Review :           Follow Up:   Return in about 6 months (around 6/20/2024).     An After Visit Summary and PPPS were made available to the patient.                         MIKHAIL Bond MD, FACP, [facility]      Electronically signed by Miguel Bond MD, 12/20/23, 3:11 PM CST.

## 2023-12-28 ENCOUNTER — DOCUMENTATION (OUTPATIENT)
Dept: CARDIOLOGY | Facility: CLINIC | Age: 88
End: 2023-12-28

## 2023-12-28 ENCOUNTER — OFFICE VISIT (OUTPATIENT)
Dept: CARDIOLOGY | Facility: CLINIC | Age: 88
End: 2023-12-28
Payer: MEDICARE

## 2023-12-28 VITALS
BODY MASS INDEX: 25.61 KG/M2 | HEART RATE: 102 BPM | DIASTOLIC BLOOD PRESSURE: 68 MMHG | OXYGEN SATURATION: 98 % | HEIGHT: 68 IN | WEIGHT: 169 LBS | SYSTOLIC BLOOD PRESSURE: 112 MMHG

## 2023-12-28 DIAGNOSIS — I50.32 DIASTOLIC DYSFUNCTION WITH CHRONIC HEART FAILURE: ICD-10-CM

## 2023-12-28 DIAGNOSIS — E78.00 HYPERCHOLESTEREMIA: ICD-10-CM

## 2023-12-28 DIAGNOSIS — I48.92 ATRIAL FLUTTER WITH RAPID VENTRICULAR RESPONSE: ICD-10-CM

## 2023-12-28 DIAGNOSIS — I10 PRIMARY HYPERTENSION: Chronic | ICD-10-CM

## 2023-12-28 DIAGNOSIS — I25.10 CORONARY ARTERY DISEASE INVOLVING NATIVE CORONARY ARTERY OF NATIVE HEART WITHOUT ANGINA PECTORIS: Primary | Chronic | ICD-10-CM

## 2023-12-28 NOTE — PROGRESS NOTES
Subjective:     Encounter Date:12/28/2023      Patient ID: Chad Henriquez is a 89 y.o. male.    Chief Complaint: follow up CAD, hypertension, dyspnea on exertion; newly diagnosed with atrial flutter today      History of Present Illness    The patient presents to follow-up regarding his coronary artery disease, hypertension and dyspnea on exertion.    He was referred to Dr. Hull by Dr. Bond and Dr. Hull establish care with him in clinic in late June 2023.  At that point his shortness of breath was progressively worsening for approximately 18 months.  He had an echocardiogram in April at [facility] that showed mild global hypokinesis with an LVEF of 45 to 50%, moderate to severe LVH, grade 2 diastolic dysfunction.  There was no significant valvular disease.  He had an ischemic evaluation in the form of a Lexiscan in May without evidence of ischemia or infarction.    He had CABG in 1986 (vein graft to ).  He had stents placed a few years later.  He did not believe he had a heart attack at the time.    Dr. Bond had been working to adjust blood pressure medication for better blood pressure control.    At the visit with Dr. Hull he stated he had COVID-19 in late January 2022 and the shortness of breath began after the COVID-19 infection.  He was not sure if it had gotten any better.  He was having to take breaks and sit down while doing certain things around the house.  He reported chronic back problems and had had 6 prior back surgeries.  He reported neuropathy in his feet.  Still able to do some of his [place].  He would experience a squeezing tightness sensation in the chest that would resolve when he was able to catch his breath.  This would not continue at rest and would resolve within 1 to 2 minutes.  He denied orthopnea, PND.  However, his head of the bed is elevated due to his wife needing to sleep with the head of the bed elevated.    At that visit, Dr. Hull noted that his  symptoms did not appear to be cardiac in nature.  He recommended continuing with initiation of losartan for better blood pressure control as recommended by Dr. Bond.    Today the patient presents for follow-up and reports his shortness of breath with exertion is unchanged since his last visit.  He denies any chest pain, significant edema, rapid weight gain, orthopnea, PND, palpitations, syncope or presyncope.  He states his blood pressure is now well-controlled.  He states he stopped one of his antihypertensives due to low blood pressure but he is unable to tell me exactly which when he stopped-losartan or amlodipine.  He continues to take Lopressor 12.5 mg in the morning and 25 mg in the evening.     The following portions of the patient's history were reviewed and updated as appropriate: allergies, current medications, past family history, past medical history, past social history, past surgical history and problem list.    Review of Systems   Constitutional: Negative for malaise/fatigue.   Cardiovascular:  Positive for dyspnea on exertion. Negative for chest pain, claudication, leg swelling, near-syncope, orthopnea, palpitations, paroxysmal nocturnal dyspnea and syncope.   Respiratory:  Positive for shortness of breath. Negative for cough.    Hematologic/Lymphatic: Does not bruise/bleed easily.   Musculoskeletal:  Negative for falls.   Gastrointestinal:  Negative for bloating.   Neurological:  Positive for loss of balance. Negative for dizziness, light-headedness and weakness.       Allergies   Allergen Reactions    Codeine Nausea And Vomiting and GI Intolerance    Fentanyl Hallucinations and Other (See Comments)     DELUSIONAL  Fentanyl patchs, caused patient to become confused and anxious per family  Fentanyl patchs, caused patient to become confused and anxious per family    Augmentin [Amoxicillin-Pot Clavulanate] GI Intolerance       Current Outpatient Medications:     Ascorbic Acid (Vitamin C) 500 MG  "capsule, Take 1 capsule by mouth 2 (two) times a day., Disp: , Rfl:     Cholecalciferol (Vitamin D3) 50 MCG (2000 UT) tablet, Take 1 tablet by mouth Daily., Disp: , Rfl:     clotrimazole-betamethasone (Lotrisone) 1-0.05 % cream, Apply 1 application  topically to the appropriate area as directed 2 (Two) Times a Day., Disp: 45 g, Rfl: 0    diphenoxylate-atropine (Lomotil) 2.5-0.025 MG per tablet, Take 1 tablet by mouth 4 (Four) Times a Day As Needed for Diarrhea., Disp: 30 tablet, Rfl: 0    Docusate Calcium (STOOL SOFTENER PO), Take  by mouth., Disp: , Rfl:     famotidine (PEPCID) 20 MG tablet, Take 1 tablet by mouth 2 (Two) Times a Day. (Patient taking differently: Take 1 tablet by mouth As Needed.), Disp: 20 tablet, Rfl: 0    Flaxseed, Linseed, (FLAXSEED OIL) 1000 MG capsule, 540mg takes 4 tablets by mouth daily, Disp: , Rfl:     folic acid (FOLVITE) 800 MCG tablet, Taking as multivitamin now, Disp: , Rfl:     gabapentin (NEURONTIN) 600 MG tablet, Take 1 tablet by mouth 3 (Three) Times a Day., Disp: 90 tablet, Rfl: 3    L-Lysine 600 MG tablet, Take 1 tablet by mouth Daily., Disp: , Rfl:     losartan (COZAAR) 50 MG tablet, Take 1 tablet by mouth Daily., Disp: , Rfl:     pravastatin (PRAVACHOL) 20 MG tablet, Take 1 tablet by mouth Every Night., Disp: 90 tablet, Rfl: 1    primidone (MYSOLINE) 50 MG tablet, Take 1/2 tablet at bedtime (Patient taking differently: Take 1 tablet by mouth.), Disp: 45 tablet, Rfl: 3    Psyllium (Metamucil) wafer wafer, Take  by mouth Daily., Disp: , Rfl:     apixaban (ELIQUIS) 5 MG tablet tablet, Take 1 tablet by mouth 2 (Two) Times a Day., Disp: 60 tablet, Rfl: 11    metoprolol tartrate (LOPRESSOR) 25 MG tablet, Take 1 tablet by mouth 2 (Two) Times a Day. Takes 12.5 mg in am and 25 mg in pm, Disp: , Rfl:          Objective:    /68   Pulse 102   Ht 172.7 cm (68\")   Wt 76.7 kg (169 lb)   SpO2 98%   BMI 25.70 kg/m²        Vitals and nursing note reviewed.   Constitutional:       " "General: Not in acute distress.     Appearance: Well-developed and not in distress. Not diaphoretic.   Neck:      Vascular: No JVD.   Pulmonary:      Effort: Pulmonary effort is normal. No respiratory distress.      Breath sounds: Normal breath sounds.   Cardiovascular:      Tachycardia present. Regular rhythm.      Murmurs: There is no murmur.   Edema:     Peripheral edema absent.   Abdominal:      Tenderness: There is no abdominal tenderness.   Skin:     General: Skin is warm and dry.   Neurological:      Mental Status: Alert and oriented to person, place, and time.         Lab Review:   Lab Results   Component Value Date    GLUCOSE 94 12/19/2023    BUN 13 12/19/2023    CREATININE 0.95 12/19/2023    EGFRRESULT 74.2 12/19/2023    BCR 13.7 12/19/2023    K 4.4 12/19/2023    CO2 25.4 12/19/2023    CALCIUM 9.1 12/19/2023    PROTENTOTREF 6.2 12/19/2023    ALBUMIN 4.5 12/19/2023    BILITOT 0.7 12/19/2023    AST 19 12/19/2023    ALT 19 12/19/2023      Lab Results   Component Value Date    WBC 6.15 12/19/2023    HGB 12.7 (L) 12/19/2023    HCT 36.9 (L) 12/19/2023    MCV 96.1 12/19/2023     12/19/2023      Lab Results   Component Value Date    TSH 2.240 12/19/2023      Lab Results   Component Value Date    CHLPL 133 12/19/2023    TRIG 54 12/19/2023    HDL 44 12/19/2023    LDL 77 12/19/2023      No results found for: \"HGBA1C\"           ECG 12 Lead    Date/Time: 12/28/2023 10:56 AM  Performed by: Tatianna Jessica APRN    Authorized by: Tatianna Jessica APRN  Comparison: compared with previous ECG from 6/29/2023  Comparison to previous ECG: Atrial flutter replaces NSR  Rhythm: atrial flutter  BPM: 104  Conduction: right bundle branch block          4/2023 echo at [facility]:     Conclusions      Summary    Left ventricular cavity size is normal with estimated ejection fraction at    45-50%. Mild global hypokinesis. Moderate-severe concentric left    ventricular hypertrophy. Grade II diastolic function.    Normal right ventricular " size with preserved RV function.    Mild bi-atrial dilation.    Mild mitral regurgitation.    Mild tricuspid regurgitation with estimated RVSP of 26 mmHg.    Aortic root and ascending aorta dimensions are within normal limits.    IVC normal.    No evidence of significant pericardial effusion is noted.    The rhythm is sinus.      Signature      ----------------------------------------------------------------    Electronically signed by Felipa Mae(Interpreting physician)    on 04/21/2023 12:34 PM    ----------------------------------------------------------------   5/2023 Lexiscan at [facility]:    Lexiscan Nuclear Stress Test Report   Procedure date: 5/17/2023   Indications: shortness of breath, CAD   Procedure: Stress was performed with injection of 0.4 mg Lexiscan.   Vital signs and EKG were monitored. Technetium-99 Myoview was injected   in divided doses, approximately 8.7 mCi and 24.5 mCi respectively for   rest and stress imaging. The patient was discharged in stable   condition.   Results: Patient had no symptoms during infusion. Baseline EKG showed   normal sinus rhythm, right bundle branch block with nonspecific ST/T   changes. During stress there were PACs and PVCs. Baseline and peak   blood pressures were 157/67, and 166/49 respectively.  Baseline and   peak heart rates were 61 and  80 respectively.   EF=70%   Review of rest and stress images obtained utilizing a gated SPECT   acquisition protocol along with review of the polar plot revealed:   1. Ejection fraction 70%   2. Wall motion suggest inferoseptal hypokinesis   3. Myocardial perfusion imaging demonstrated homogeneous uptake of the   tracer in all visualized segments no definite areas of ischemia or   infarction are identified.   Summary impressions:   Normal ejection fraction 70% normal perfusion study without evidence   of ischemia or prior infarction.   Signed by Dr Ken Osuna     Assessment:      Problem List Items Addressed This Visit           Cardiac and Vasculature    Coronary artery disease involving native coronary artery of native heart without angina pectoris - Primary (Chronic)    Overview     1v CABG in '86 (SVG->OM, according to available records).  Subsequent PCI (two to one vessel, and one to another), ~'97.           Relevant Medications    metoprolol tartrate (LOPRESSOR) 25 MG tablet    Hypertension (Chronic)    Relevant Medications    metoprolol tartrate (LOPRESSOR) 25 MG tablet    Hypercholesteremia    Diastolic dysfunction without heart failure    Relevant Medications    metoprolol tartrate (LOPRESSOR) 25 MG tablet    Atrial flutter with rapid ventricular response    Relevant Medications    metoprolol tartrate (LOPRESSOR) 25 MG tablet    Other Relevant Orders    Holter Monitor - 72 Hour Up To 15 Days       Plan:     1.  Atrial flutter: New diagnosis.  Incidental finding today on EKG.  He is mildly tachycardic with a heart rate of 104 bpm today in the office.  He appears to be asymptomatic to this as his exertional dyspnea has been persistent and unchanged since his last visit and he was in normal sinus rhythm at his last visit.  His exertional dyspnea is not episodic.  He appears euvolemic. Denies CP, palpitations.  He denies any falls or history of major bleeding but he does have some issues with his balance.  [id number] is at least 4.    -Start Eliquis 5 mg twice daily for stroke prophylaxis.  In order to reduce his bleeding risk we will discontinue aspirin.  -Continue current dose of Lopressor for rate control  -Placing 7-day Holter monitor today to determine whether or not the atrial flutter is persistent or paroxysmal, assess heart rates and for symptom rhythm correlation.  -If atrial flutter is persistent on Holter monitor, will plan for cardioversion to see if he has symptomatic improvement with restoration of normal sinus rhythm after 4 weeks of uninterrupted anticoagulation    2.  Coronary artery disease: Established  problem, stable.  No angina.  He did have a low risk nuclear stress test earlier this year and as outlined by Dr. Hull we do not feel as though his stable exertional dyspnea is an anginal equivalent. Continue statin, beta blocker     3.  Hypertension: Now well-controlled.  I have asked him to call us back with an accurate medication list.  He continues metoprolol.  He stopped taking either amlodipine or losartan, he is unsure which.    Follow-up in 4 weeks after Holter monitor results are back

## 2023-12-28 NOTE — PROGRESS NOTES
The prescription for Eliquis has been faxed to the [facility].

## 2024-01-01 ENCOUNTER — HOME CARE VISIT (OUTPATIENT)
Dept: HOME HEALTH SERVICES | Facility: CLINIC | Age: 89
End: 2024-01-01
Payer: MEDICARE

## 2024-01-01 ENCOUNTER — HOME HEALTH ADMISSION (OUTPATIENT)
Dept: HOME HEALTH SERVICES | Facility: HOME HEALTHCARE | Age: 89
End: 2024-01-01
Payer: MEDICARE

## 2024-01-01 ENCOUNTER — HOME CARE VISIT (OUTPATIENT)
Dept: HOME HEALTH SERVICES | Facility: HOME HEALTHCARE | Age: 89
End: 2024-01-01
Payer: MEDICARE

## 2024-01-01 ENCOUNTER — TELEPHONE (OUTPATIENT)
Dept: INTERNAL MEDICINE | Facility: CLINIC | Age: 89
End: 2024-01-01

## 2024-01-01 ENCOUNTER — OFFICE VISIT (OUTPATIENT)
Dept: INTERNAL MEDICINE | Facility: CLINIC | Age: 89
End: 2024-01-01
Payer: MEDICARE

## 2024-01-01 ENCOUNTER — TELEPHONE (OUTPATIENT)
Dept: INTERNAL MEDICINE | Facility: CLINIC | Age: 89
End: 2024-01-01
Payer: MEDICARE

## 2024-01-01 ENCOUNTER — LAB REQUISITION (OUTPATIENT)
Dept: LAB | Facility: HOSPITAL | Age: 89
End: 2024-01-01
Payer: MEDICARE

## 2024-01-01 VITALS
SYSTOLIC BLOOD PRESSURE: 172 MMHG | TEMPERATURE: 97.5 F | HEART RATE: 56 BPM | DIASTOLIC BLOOD PRESSURE: 78 MMHG | RESPIRATION RATE: 16 BRPM | OXYGEN SATURATION: 99 %

## 2024-01-01 VITALS
TEMPERATURE: 98.7 F | HEART RATE: 61 BPM | RESPIRATION RATE: 18 BRPM | DIASTOLIC BLOOD PRESSURE: 60 MMHG | OXYGEN SATURATION: 94 % | SYSTOLIC BLOOD PRESSURE: 132 MMHG

## 2024-01-01 VITALS
WEIGHT: 166 LBS | HEART RATE: 83 BPM | OXYGEN SATURATION: 98 % | HEIGHT: 67 IN | BODY MASS INDEX: 26.06 KG/M2 | SYSTOLIC BLOOD PRESSURE: 148 MMHG | DIASTOLIC BLOOD PRESSURE: 60 MMHG | TEMPERATURE: 98.4 F

## 2024-01-01 VITALS
HEART RATE: 60 BPM | TEMPERATURE: 97.1 F | RESPIRATION RATE: 16 BRPM | SYSTOLIC BLOOD PRESSURE: 150 MMHG | DIASTOLIC BLOOD PRESSURE: 60 MMHG | OXYGEN SATURATION: 93 %

## 2024-01-01 VITALS
DIASTOLIC BLOOD PRESSURE: 58 MMHG | RESPIRATION RATE: 14 BRPM | SYSTOLIC BLOOD PRESSURE: 152 MMHG | TEMPERATURE: 98.4 F | HEART RATE: 57 BPM | OXYGEN SATURATION: 95 %

## 2024-01-01 VITALS
RESPIRATION RATE: 18 BRPM | TEMPERATURE: 97.8 F | SYSTOLIC BLOOD PRESSURE: 132 MMHG | OXYGEN SATURATION: 97 % | DIASTOLIC BLOOD PRESSURE: 90 MMHG | HEART RATE: 77 BPM

## 2024-01-01 DIAGNOSIS — I10 ESSENTIAL HYPERTENSION: ICD-10-CM

## 2024-01-01 DIAGNOSIS — I50.32 CHRONIC DIASTOLIC (CONGESTIVE) HEART FAILURE: ICD-10-CM

## 2024-01-01 DIAGNOSIS — R74.8 ALKALINE PHOSPHATASE ELEVATION: ICD-10-CM

## 2024-01-01 DIAGNOSIS — D64.9 ANEMIA, UNSPECIFIED TYPE: ICD-10-CM

## 2024-01-01 DIAGNOSIS — R74.8 ABNORMAL LEVELS OF OTHER SERUM ENZYMES: ICD-10-CM

## 2024-01-01 DIAGNOSIS — I10 ESSENTIAL (PRIMARY) HYPERTENSION: ICD-10-CM

## 2024-01-01 DIAGNOSIS — R74.8 ELEVATED LIVER ENZYMES: Primary | ICD-10-CM

## 2024-01-01 LAB
ALBUMIN SERPL-MCNC: 3 G/DL (ref 3.5–5.2)
ALBUMIN/GLOB SERPL: 0.9 G/DL
ALP SERPL-CCNC: 611 U/L (ref 39–117)
ALT SERPL W P-5'-P-CCNC: 28 U/L (ref 1–41)
ANION GAP SERPL CALCULATED.3IONS-SCNC: 7 MMOL/L (ref 5–15)
AST SERPL-CCNC: 32 U/L (ref 1–40)
BILIRUB SERPL-MCNC: 0.9 MG/DL (ref 0–1.2)
BUN SERPL-MCNC: 12 MG/DL (ref 8–23)
BUN/CREAT SERPL: 15.4 (ref 7–25)
CALCIUM SPEC-SCNC: 8.6 MG/DL (ref 8.2–9.6)
CHLORIDE SERPL-SCNC: 103 MMOL/L (ref 98–107)
CO2 SERPL-SCNC: 27 MMOL/L (ref 22–29)
CREAT SERPL-MCNC: 0.78 MG/DL (ref 0.76–1.27)
EGFRCR SERPLBLD CKD-EPI 2021: 82.6 ML/MIN/1.73
GLOBULIN UR ELPH-MCNC: 3.3 GM/DL
GLUCOSE SERPL-MCNC: 126 MG/DL (ref 65–99)
POTASSIUM SERPL-SCNC: 4.2 MMOL/L (ref 3.5–5.2)
PROT SERPL-MCNC: 6.3 G/DL (ref 6–8.5)
SODIUM SERPL-SCNC: 137 MMOL/L (ref 136–145)

## 2024-01-01 PROCEDURE — 90662 IIV NO PRSV INCREASED AG IM: CPT | Performed by: INTERNAL MEDICINE

## 2024-01-01 PROCEDURE — 1126F AMNT PAIN NOTED NONE PRSNT: CPT | Performed by: INTERNAL MEDICINE

## 2024-01-01 PROCEDURE — G0152 HHCP-SERV OF OT,EA 15 MIN: HCPCS

## 2024-01-01 PROCEDURE — G0008 ADMIN INFLUENZA VIRUS VAC: HCPCS | Performed by: INTERNAL MEDICINE

## 2024-01-01 PROCEDURE — G0157 HHC PT ASSISTANT EA 15: HCPCS

## 2024-01-01 PROCEDURE — 99214 OFFICE O/P EST MOD 30 MIN: CPT | Performed by: INTERNAL MEDICINE

## 2024-01-01 PROCEDURE — G0155 HHCP-SVS OF CSW,EA 15 MIN: HCPCS

## 2024-01-01 PROCEDURE — G0300 HHS/HOSPICE OF LPN EA 15 MIN: HCPCS

## 2024-01-01 PROCEDURE — 80053 COMPREHEN METABOLIC PANEL: CPT | Performed by: INTERNAL MEDICINE

## 2024-01-01 PROCEDURE — G0158 HHC OT ASSISTANT EA 15: HCPCS

## 2024-01-01 RX ORDER — FUROSEMIDE 20 MG
20 TABLET ORAL DAILY PRN
Qty: 90 TABLET | Refills: 0 | Status: SHIPPED | OUTPATIENT
Start: 2024-01-01

## 2024-01-01 RX ORDER — CARVEDILOL 6.25 MG/1
6.25 TABLET ORAL 2 TIMES DAILY WITH MEALS
Start: 2024-01-01

## 2024-01-17 DIAGNOSIS — I48.92 ATRIAL FLUTTER, UNSPECIFIED TYPE: Primary | ICD-10-CM

## 2024-01-29 ENCOUNTER — ANESTHESIA (OUTPATIENT)
Dept: CARDIOLOGY | Facility: HOSPITAL | Age: 89
End: 2024-01-29
Payer: MEDICARE

## 2024-01-29 ENCOUNTER — ANESTHESIA EVENT (OUTPATIENT)
Dept: CARDIOLOGY | Facility: HOSPITAL | Age: 89
End: 2024-01-29
Payer: MEDICARE

## 2024-01-29 ENCOUNTER — HOSPITAL ENCOUNTER (OUTPATIENT)
Dept: CARDIOLOGY | Facility: HOSPITAL | Age: 89
Discharge: HOME | End: 2024-01-29
Payer: MEDICARE

## 2024-01-29 VITALS
HEIGHT: 68 IN | OXYGEN SATURATION: 98 % | RESPIRATION RATE: 24 BRPM | BODY MASS INDEX: 24.71 KG/M2 | HEART RATE: 68 BPM | TEMPERATURE: 97.6 F | SYSTOLIC BLOOD PRESSURE: 111 MMHG | DIASTOLIC BLOOD PRESSURE: 70 MMHG | WEIGHT: 163 LBS

## 2024-01-29 DIAGNOSIS — I48.92 ATRIAL FLUTTER, UNSPECIFIED TYPE: ICD-10-CM

## 2024-01-29 LAB
QT INTERVAL: 342 MS
QT INTERVAL: 412 MS
QTC INTERVAL: 445 MS
QTC INTERVAL: 486 MS

## 2024-01-29 PROCEDURE — 25010000002 PROPOFOL 10 MG/ML EMULSION: Performed by: NURSE ANESTHETIST, CERTIFIED REGISTERED

## 2024-01-29 PROCEDURE — 92960 CARDIOVERSION ELECTRIC EXT: CPT | Performed by: INTERNAL MEDICINE

## 2024-01-29 PROCEDURE — 93005 ELECTROCARDIOGRAM TRACING: CPT | Performed by: INTERNAL MEDICINE

## 2024-01-29 PROCEDURE — 93246 EXT ECG>7D<15D RECORDING: CPT

## 2024-01-29 PROCEDURE — 25810000003 SODIUM CHLORIDE 0.9 % SOLUTION: Performed by: NURSE ANESTHETIST, CERTIFIED REGISTERED

## 2024-01-29 PROCEDURE — 92960 CARDIOVERSION ELECTRIC EXT: CPT

## 2024-01-29 RX ORDER — LIDOCAINE HYDROCHLORIDE 20 MG/ML
INJECTION, SOLUTION EPIDURAL; INFILTRATION; INTRACAUDAL; PERINEURAL AS NEEDED
Status: DISCONTINUED | OUTPATIENT
Start: 2024-01-29 | End: 2024-01-29 | Stop reason: SURG

## 2024-01-29 RX ORDER — FAMOTIDINE 20 MG/1
20 TABLET, FILM COATED ORAL AS NEEDED
Start: 2024-01-29

## 2024-01-29 RX ORDER — PROPOFOL 10 MG/ML
VIAL (ML) INTRAVENOUS AS NEEDED
Status: DISCONTINUED | OUTPATIENT
Start: 2024-01-29 | End: 2024-01-29 | Stop reason: SURG

## 2024-01-29 RX ORDER — SODIUM CHLORIDE 9 MG/ML
INJECTION, SOLUTION INTRAVENOUS CONTINUOUS PRN
Status: DISCONTINUED | OUTPATIENT
Start: 2024-01-29 | End: 2024-01-29 | Stop reason: SURG

## 2024-01-29 RX ADMIN — SODIUM CHLORIDE: 9 INJECTION, SOLUTION INTRAVENOUS at 11:14

## 2024-01-29 RX ADMIN — PROPOFOL 40 MG: 10 INJECTION, EMULSION INTRAVENOUS at 11:25

## 2024-01-29 RX ADMIN — LIDOCAINE HYDROCHLORIDE 100 MG: 20 INJECTION, SOLUTION EPIDURAL; INFILTRATION; INTRACAUDAL; PERINEURAL at 11:25

## 2024-01-29 NOTE — ANESTHESIA PREPROCEDURE EVALUATION
Anesthesia Evaluation     history of anesthetic complications:  PONV               Airway   No difficulty expected  Dental      Pulmonary    (+) ,shortness of breath, sleep apnea  Cardiovascular   Exercise tolerance: poor (<4 METS)    (+) hypertension, past MI , CAD, CABG, dysrhythmias Atrial Fib, hyperlipidemia      Neuro/Psych  (+) CVA, numbness  GI/Hepatic/Renal/Endo      Musculoskeletal     Abdominal    Substance History      OB/GYN          Other   arthritis,                   Anesthesia Plan    ASA 3     MAC       Anesthetic plan, risks, benefits, and alternatives have been provided, discussed and informed consent has been obtained with: patient.    CODE STATUS:

## 2024-01-29 NOTE — ANESTHESIA POSTPROCEDURE EVALUATION
"Patient: Chad Henriquez    Procedure Summary       Date: 01/29/24 Room / Location: [facility] CATH LAB    Anesthesia Start: 1113 Anesthesia Stop: 1132    Procedure: CARDIOVERSION EXTERNAL IN CARDIOLOGY DEPARTMENT Diagnosis:       Atrial flutter, unspecified type      (persistent atrial flutter)    Scheduled Providers: Jaspal Hull MD Provider: Lana Camacho CRNA    Anesthesia Type: MAC ASA Status: 3            Anesthesia Type: MAC    Vitals  No vitals data found for the desired time range.          Post Anesthesia Care and Evaluation    Patient location during evaluation: PHASE II  Patient participation: complete - patient participated  Level of consciousness: awake and alert  Pain management: adequate    Airway patency: patent  Anesthetic complications: No anesthetic complications    Cardiovascular status: acceptable  Respiratory status: acceptable  Hydration status: acceptable    Comments: Blood pressure 135/100, pulse 105, temperature 97.6 °F (36.4 °C), resp. rate 18, height 172.7 cm (68\"), weight 73.9 kg (163 lb), SpO2 99%.    Pt discharged from PACU based on jaelyn score >8    "

## 2024-02-25 LAB
QT INTERVAL: 342 MS
QT INTERVAL: 412 MS
QTC INTERVAL: 445 MS
QTC INTERVAL: 486 MS

## 2024-02-29 ENCOUNTER — OFFICE VISIT (OUTPATIENT)
Dept: CARDIOLOGY | Facility: CLINIC | Age: 89
End: 2024-02-29
Payer: MEDICARE

## 2024-02-29 VITALS
SYSTOLIC BLOOD PRESSURE: 140 MMHG | BODY MASS INDEX: 25.61 KG/M2 | HEIGHT: 68 IN | OXYGEN SATURATION: 96 % | WEIGHT: 169 LBS | HEART RATE: 65 BPM | DIASTOLIC BLOOD PRESSURE: 56 MMHG

## 2024-02-29 DIAGNOSIS — I25.10 CORONARY ARTERY DISEASE INVOLVING NATIVE CORONARY ARTERY OF NATIVE HEART WITHOUT ANGINA PECTORIS: Chronic | ICD-10-CM

## 2024-02-29 DIAGNOSIS — I48.92 ATRIAL FLUTTER WITH RAPID VENTRICULAR RESPONSE: Primary | ICD-10-CM

## 2024-02-29 NOTE — PROGRESS NOTES
"     Subjective:     Encounter Date:02/29/2024      Patient ID: Chad Henriquez is a 89 y.o. male.    Chief Complaint: Follow-up cardioversion    History of Present Illness  Mr. Henriquez returns today to discuss any potential symptomatic response to cardioversion I performed on 1/29/2024.  He had seen EDIE Hatch, here on 12/28/2023, at which point he was incidentally discovered in atrial flutter with variable AV conduction.  He was felt to most likely be asymptomatic however he had been complaining of chronic exertional dyspnea that seemed unchanged, though it was discussed that perhaps there could be a subtle worsening of that symptom as a result of the newly discovered atrial flutter.  He was started on Eliquis, and placed in a 7-day monitor to determine whether the arrhythmia was persistent or paroxysmal; indeed it proved persistent, so he was scheduled for cardioversion.  He returns now today to discuss whether he noticed any difference after cardioversion.    Otherwise, by way of review, he transferred care to us in June 2023 for known coronary disease, including a single-vessel CABG in 86 (vein graft to OM) with subsequent PCI.  He had also previously been followed at [facility] and in April 2023 had an echocardiogram documenting global hypokinesis with EF of 45 to 50%, moderate to severe LVH, and grade 2 diastolic dysfunction without significant valvular disease.  Lexiscan evaluation done at the time (May 2023) at [facility] showed no evidence of ischemia or infarction.  He was complaining then of progressively worsening shortness of breath for about a year and a half.    Patient, and (particularly) his wife, say he felt better, especially the first week, after the cardioversion.  Wife reports \"near constant napping\" and sleeping beforehand, that has been much better since.  No palpitations.   Still gets HERNANDEZ, but does think that's better as well.     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 Wife notes he is pretty worn out after this.      The following portions of the patient's history were reviewed and updated as appropriate: allergies, current medications, past family history, past medical history, past social history, past surgical history, and problem list.    Review of Systems   Constitutional: Negative for malaise/fatigue.   Cardiovascular:  Positive for dyspnea on exertion and leg swelling. Negative for chest pain, claudication, near-syncope, orthopnea, palpitations, paroxysmal nocturnal dyspnea and syncope.   Respiratory:  Positive for shortness of breath.    Hematologic/Lymphatic: Does not bruise/bleed easily.         Current Outpatient Medications:     apixaban (ELIQUIS) 5 MG tablet tablet, Take 1 tablet by mouth 2 (Two) Times a Day., Disp: 60 tablet, Rfl: 11    Ascorbic Acid (Vitamin C) 500 MG capsule, Take 1 capsule by mouth 2 (two) times a day., Disp: , Rfl:     Cholecalciferol (Vitamin D3) 50 MCG (2000 UT) tablet, Take 1 tablet by mouth Daily., Disp: , Rfl:     clotrimazole-betamethasone (Lotrisone) 1-0.05 % cream, Apply 1 application  topically to the appropriate area as directed 2 (Two) Times a Day., Disp: 45 g, Rfl: 0    diphenoxylate-atropine (Lomotil) 2.5-0.025 MG per tablet, Take 1 tablet by mouth 4 (Four) Times a Day As Needed for Diarrhea., Disp: 30 tablet, Rfl: 0    Docusate Calcium (STOOL SOFTENER PO), Take  by mouth., Disp: , Rfl:     famotidine (PEPCID) 20 MG tablet, Take 1 tablet by mouth As Needed for Indigestion or Heartburn., Disp: , Rfl:     Flaxseed, Linseed, (FLAXSEED OIL) 1000 MG capsule, 540mg takes 4 tablets by mouth daily, Disp: , Rfl:     folic acid (FOLVITE) 800 MCG tablet, Taking as multivitamin now, Disp: , Rfl:     gabapentin (NEURONTIN) 600 MG tablet, Take 1 tablet by mouth 3 (Three) Times a Day., Disp: 90 tablet, Rfl: 3    L-Lysine 600 MG tablet, Take 1 tablet by mouth Daily., Disp: , Rfl:     losartan  (COZAAR) 50 MG tablet, Take 1 tablet by mouth Daily., Disp: , Rfl:     metoprolol tartrate (LOPRESSOR) 25 MG tablet, Take 1 tablet by mouth 2 (Two) Times a Day. Takes 12.5 mg in am and 25 mg in pm, Disp: , Rfl:     pravastatin (PRAVACHOL) 20 MG tablet, Take 1 tablet by mouth Every Night., Disp: 90 tablet, Rfl: 1    Psyllium (Metamucil) wafer wafer, Take  by mouth Daily., Disp: , Rfl:        Objective:      Vitals:    02/29/24 1358   BP: 140/56   Pulse: 65   SpO2: 96%     Vitals and nursing note reviewed.   Constitutional:       General: Not in acute distress.     Appearance: Not in distress.   Neck:      Vascular: No JVD or JVR. JVD normal.   Pulmonary:      Effort: Pulmonary effort is normal.      Breath sounds: Normal breath sounds.   Cardiovascular:      Normal rate. Regular rhythm.      Murmurs: There is no murmur.      No gallop.  No rub.   Pulses:     Intact distal pulses.   Edema:     Peripheral edema absent.   Skin:     General: Skin is warm and dry.   Neurological:      Mental Status: Alert, oriented to person, place, and time and oriented to person, place and time.         Lab Review:         ECG 12 Lead    Date/Time: 2/29/2024 2:17 PM  Performed by: Jaspal Hull MD    Authorized by: Jaspal Hull MD  Comparison: compared with previous ECG from 1/29/2024  Comparison to previous ECG: PVCs are no longer present  Rhythm: sinus rhythm  Rate: normal  BPM: 65  Conduction: right bundle branch block, left anterior fascicular block and 1st degree AV block  QRS axis: normal  Other findings: left ventricular hypertrophy    Clinical impression: abnormal EKG              Assessment/Plan:     Problem List Items Addressed This Visit          Cardiac and Vasculature    Coronary artery disease involving native coronary artery of native heart without angina pectoris (Chronic)    Overview     1v CABG in '86 (SVG->OM, according to available records).  Subsequent PCI (two to one vessel, and one to another), ~'97.            Relevant Orders    ECG 12 Lead    Atrial flutter with rapid ventricular response - Primary    Relevant Orders    ECG 12 Lead         Recommendations/plans:  1.  Atrial flutter: Now maintaining sinus rhythm after cardioversion.  Though was incidentally discovered and not overtly symptomatic precardioversion, he and his wife both seem to note he experienced some mild improvement, particular with regards to energy (not sleeping all the time) and minor improvement in chronic exertional dyspnea, after restoring sinus rhythm.  -Continue anticoagulation (Eliquis 5 mg twice daily) for stroke protection  -Continue close observation for now; if he has a recurrence of arrhythmia and feels symptomatic again, then we will consider initiating antiarrhythmic therapy    2.  CAD status post CABG: Stable.  Continue statin, ARB, and beta-blocker; no need for antiplatelet therapy while also anticoagulated.    F/u 3-6 minths, or sooner with recurrence of symptoms or concerns for recurrent arrhythmia    Jaspal Hull MD  02/29/2024  14:29 CST

## 2024-03-19 ENCOUNTER — OFFICE VISIT (OUTPATIENT)
Dept: NEUROSURGERY | Facility: CLINIC | Age: 89
End: 2024-03-19
Payer: MEDICARE

## 2024-03-19 VITALS — BODY MASS INDEX: 25.61 KG/M2 | WEIGHT: 169 LBS | HEIGHT: 68 IN

## 2024-03-19 DIAGNOSIS — M21.371 RIGHT FOOT DROP: ICD-10-CM

## 2024-03-19 DIAGNOSIS — G89.29 CHRONIC BILATERAL LOW BACK PAIN WITHOUT SCIATICA: ICD-10-CM

## 2024-03-19 DIAGNOSIS — M54.50 CHRONIC BILATERAL LOW BACK PAIN WITHOUT SCIATICA: ICD-10-CM

## 2024-03-19 DIAGNOSIS — Z78.9 NONSMOKER: ICD-10-CM

## 2024-03-19 DIAGNOSIS — E66.3 OVERWEIGHT WITH BODY MASS INDEX (BMI) OF 25 TO 25.9 IN ADULT: ICD-10-CM

## 2024-03-19 PROCEDURE — 99214 OFFICE O/P EST MOD 30 MIN: CPT | Performed by: NURSE PRACTITIONER

## 2024-03-19 RX ORDER — TIZANIDINE 4 MG/1
4 TABLET ORAL 2 TIMES DAILY PRN
Qty: 60 TABLET | Refills: 1 | Status: SHIPPED | OUTPATIENT
Start: 2024-03-19

## 2024-03-19 NOTE — PROGRESS NOTES
Chief complaint:   Chief Complaint   Patient presents with    Back Pain     Pt here for f/u/e. Pt states he has been having constant lbp when standing and walking.         Subjective     HPI: This is a 89-year-old male gentleman who is known to our service.  He went to the operating room in June 2021 for an L2-3 lumbar fusion and extension of fusion from L2-S1.  When he was last seen he was not complaining of any back or leg pain.  He comes in today and says that he is complaining of back pain.  He is not having any lower extremity pain at this time.  Pain in his back is intermittent.  It is worse with standing and walking and better with sitting.  Denies any bowel or bladder incontinence.  He is walking with a cane.  Continues to take gabapentin for his peripheral neuropathy.  He does have an AFO on his right foot due to foot drop that has been a chronic issue.    Review of Systems   Constitutional:  Positive for activity change.   Musculoskeletal:  Positive for back pain and gait problem.   Neurological:  Positive for weakness.   All other systems reviewed and are negative.       Past Medical History:   Diagnosis Date    Allergic rhinitis     Anemia     Arthritis     Blind left eye     BPH (benign prostatic hypertrophy) with urinary retention     Cancer     skin cancer    Chronic back pain     RUNS DOWN BOTH LEGS    Constipation     Coronary artery disease     COVID     Hard of hearing     Heart attack 1986    NO MUSCLE DAMAGE - JUST OCCLUDED VALVE    High cholesterol     History of skin cancer     BILATERAL EARS    History of transfusion     1986    Hypertension     Inguinal hernia     Leaky heart valve     DR CONCEPCION SAYS NOT ENOUGH TO BE OF CONCERN    Other bursal cyst, right elbow     PONV (postoperative nausea and vomiting)     has had scopalamine patch in past     Sleep apnea     DOES NOT USE CPAP OR BIPAP    Spinal stenosis of cervical region     Spinal stenosis of lumbar region     Stroke     Tremors  of nervous system     Wears hearing aid     BILATERAL     Past Surgical History:   Procedure Laterality Date    ANTERIOR LUMBAR EXPOSURE N/A 12/07/2018    Procedure: ANTERIOR LUMBAR EXPOSURE;  Surgeon: Manolo Mcleod DO;  Location:  [facility];  Service: Vascular    APPENDECTOMY      BACK SURGERY      X3    CARDIAC SURGERY  08/08/1986    X1 BYPASS    CORONARY ANGIOPLASTY WITH STENT PLACEMENT  1997    X3 STENTS    CORONARY ARTERY BYPASS GRAFT      HERNIA REPAIR Bilateral     x2    INGUINAL HERNIA REPAIR Right 06/22/2017    Procedure: RIGHT INGUINAL HERNIA REPAIR AND EXCISION OF CYST RIGHT ELBOW ;  Surgeon: Jackelyn Arriola MD;  Location:  [facility];  Service:     LUMBAR FUSION Left 06/30/2021    Procedure: LUMBAR LAMINECTOMY, DISCECTOMY, FACETECTOMY,  TRANSFORAMINAL LUMBAR INTERBODY FUSION L2-3. REVISION OF INSTRUMENTATION L3-S1. USE OF IMAGE GUIDANCE AND SURGICAL ROBOT;  Surgeon: Kj Falcon MD;  Location:  [facility];  Service: Robotics - Neuro;  Laterality: Left;    LUMBAR LAMINECTOMY N/A 03/12/2018    Procedure: LUMBAR LAMINECTOMY WITHOUT FUSION L3-4,4-5;  Surgeon: Kj Falcon MD;  Location:  [facility];  Service: Neurosurgery    LUMBAR LAMINECTOMY ANTERIOR LUMBAR INTERBODY FUSION N/A 12/07/2018    Procedure: ANTERIOR LUMBAR INTERBODY FUSION L5-S1;  Surgeon: Kj Falcon MD;  Location:  [facility];  Service: Neurosurgery    LUMBAR LAMINECTOMY WITH FUSION Left 12/10/2018    Procedure: LUMBAR LAMINECTOMY TRANSFORAMINAL LUMBAR INTERBODY FUSION L34,45;  Surgeon: Kj Falcon MD;  Location:  [facility];  Service: Neurosurgery    LUMBAR LAMINECTOMY WITH FUSION Left 12/07/2018    Procedure: LUMBAR LAMINECTOMY TRANSFORAMINAL LUMBAR INTERBODY FUSION LEFT L3-4, L4-5 QUADRANT RETRACTOR;  Surgeon: Kj Falcon MD;  Location:  [facility];  Service: Neurosurgery    SKIN LESION EXCISION      nasal     Family History   Problem Relation Age of Onset    No Known Problems Mother     No Known Problems Father   "    Social History     Tobacco Use    Smoking status: Former     Current packs/day: 0.00     Types: Cigarettes     Start date:      Quit date:      Years since quittin.2    Smokeless tobacco: Never    Tobacco comments:     age 14-20 years of age   Vaping Use    Vaping status: Never Used   Substance Use Topics    Alcohol use: No    Drug use: Never     (Not in a hospital admission)    Allergies:  Codeine, Fentanyl, and Augmentin [amoxicillin-pot clavulanate]    Objective      Vital Signs  Ht 172.7 cm (68\")   Wt 76.7 kg (169 lb)   BMI 25.70 kg/m²     Physical Exam  Constitutional:       Appearance: Normal appearance. He is well-developed.   HENT:      Head: Normocephalic.   Eyes:      General: Lids are normal.      Extraocular Movements: EOM normal.      Conjunctiva/sclera: Conjunctivae normal.      Pupils: Pupils are equal, round, and reactive to light.   Pulmonary:      Effort: Pulmonary effort is normal.      Breath sounds: Normal breath sounds.   Musculoskeletal:         General: Normal range of motion.      Cervical back: Normal range of motion.   Skin:     General: Skin is warm.   Neurological:      Mental Status: He is alert and oriented to person, place, and time.      GCS: GCS eye subscore is 4. GCS verbal subscore is 5. GCS motor subscore is 6.      Cranial Nerves: No cranial nerve deficit.      Sensory: No sensory deficit.      Motor: Weakness present.      Gait: Gait abnormal.      Deep Tendon Reflexes: Reflexes are normal and symmetric. Reflexes normal.      Comments: Walking independently with a cane   Psychiatric:         Speech: Speech normal.         Behavior: Behavior normal.         Thought Content: Thought content normal.         Neurologic Exam     Mental Status   Oriented to person, place, and time.   Attention: normal. Concentration: normal.   Speech: speech is normal   Level of consciousness: alert  Normal comprehension.     Cranial Nerves     CN II   Visual fields full to " confrontation.     CN III, IV, VI   Pupils are equal, round, and reactive to light.  Extraocular motions are normal.     CN V   Facial sensation intact.     CN VII   Facial expression full, symmetric.     CN VIII   CN VIII normal.     CN IX, X   CN IX normal.   CN X normal.     CN XI   CN XI normal.     CN XII   CN XII normal.     Motor Exam   Muscle bulk: normal    Strength   Strength 5/5 except as noted. Right EHL and dorsiflexion 0 out of 5     Sensory Exam   Light touch normal.     Gait, Coordination, and Reflexes     Reflexes   Reflexes 2+ except as noted.       Imaging review: No new imaging        Assessment/Plan: Patient is continue to complain of back pain.  I am going to have him go for x-rays of the lumbar spine include standing flexion extension along with an MRI of the lumbar spine.  We will start her on Zanaflex to see if this will help with his pain.  Depending on the results of the imaging will determine her next course of action for the patient.  I will follow-up with him after imaging is completed.  His questions and concerns were addressed.  Patient is a nonsmoker  The patient's Body mass index is 25.7 kg/m².. BMI is above normal parameters. Recommendations include: educational material and nutrition counseling  Advance Care Planning   ACP discussion was held with the patient during this visit. Patient does not have an advance directive, information provided.  STEADI Fall Risk Assessment was completed, and patient is at MODERATE risk for falls. Assessment completed on:3/19/2024       Diagnoses and all orders for this visit:    1. Chronic bilateral low back pain without sciatica  -     MRI Lumbar Spine Without Contrast; Future  -     XR Spine Lumbar Complete With Flex & Ext    2. Right foot drop    3. Overweight with body mass index (BMI) of 25 to 25.9 in adult    4. Nonsmoker    Other orders  -     tiZANidine (ZANAFLEX) 4 MG tablet; Take 1 tablet by mouth 2 (Two) Times a Day As Needed for Muscle  Spasms.  Dispense: 60 tablet; Refill: 1          I discussed the patients findings and my recommendations with patient    Josiah Robles, EDIE  03/19/24  11:17 CDT

## 2024-04-09 ENCOUNTER — HOSPITAL ENCOUNTER (OUTPATIENT)
Dept: GENERAL RADIOLOGY | Facility: HOSPITAL | Age: 89
Discharge: HOME | End: 2024-04-09
Payer: MEDICARE

## 2024-04-09 ENCOUNTER — HOSPITAL ENCOUNTER (OUTPATIENT)
Dept: MRI IMAGING | Facility: HOSPITAL | Age: 89
Discharge: HOME | End: 2024-04-09
Payer: MEDICARE

## 2024-04-09 DIAGNOSIS — M54.50 CHRONIC BILATERAL LOW BACK PAIN WITHOUT SCIATICA: ICD-10-CM

## 2024-04-09 DIAGNOSIS — G89.29 CHRONIC BILATERAL LOW BACK PAIN WITHOUT SCIATICA: ICD-10-CM

## 2024-04-09 PROCEDURE — 72114 X-RAY EXAM L-S SPINE BENDING: CPT

## 2024-04-09 PROCEDURE — 72148 MRI LUMBAR SPINE W/O DYE: CPT

## 2024-04-11 ENCOUNTER — TELEPHONE (OUTPATIENT)
Dept: NEUROSURGERY | Facility: CLINIC | Age: 89
End: 2024-04-11
Payer: MEDICARE

## 2024-04-11 NOTE — TELEPHONE ENCOUNTER
I have notified the patient's wife.  Patient to keep his follow up scheduled with Josiah on 4/17/24.    DORINDA MALCOLM [facility]  PHYSICIAN LEAD  DR BJORN GOODWIN  [facility]

## 2024-04-11 NOTE — TELEPHONE ENCOUNTER
----- Message from Kj Falcon MD sent at 4/11/2024  2:11 PM CDT -----  Nothing to do for Chad right now except to manage his complaints.    ----- Message -----  From: Alayna Arriola CMA  Sent: 4/10/2024   7:58 AM CDT  To: Kj Falcon MD    This is my friend Chad from [facility], you operated on him twice.  Having some recurrent pain, Josiah saw, ordered xray/MRI.  Do not really think there is anything surgical.    Thoughts?    August

## 2024-04-17 ENCOUNTER — OFFICE VISIT (OUTPATIENT)
Dept: NEUROSURGERY | Facility: CLINIC | Age: 89
End: 2024-04-17
Payer: MEDICARE

## 2024-04-17 VITALS — WEIGHT: 169 LBS | BODY MASS INDEX: 25.61 KG/M2 | HEIGHT: 68 IN

## 2024-04-17 DIAGNOSIS — M21.371 RIGHT FOOT DROP: ICD-10-CM

## 2024-04-17 DIAGNOSIS — M54.50 CHRONIC BILATERAL LOW BACK PAIN WITHOUT SCIATICA: Primary | ICD-10-CM

## 2024-04-17 DIAGNOSIS — G89.29 CHRONIC BILATERAL LOW BACK PAIN WITHOUT SCIATICA: Primary | ICD-10-CM

## 2024-04-17 DIAGNOSIS — Z78.9 NONSMOKER: ICD-10-CM

## 2024-04-17 PROCEDURE — 1159F MED LIST DOCD IN RCRD: CPT | Performed by: NURSE PRACTITIONER

## 2024-04-17 PROCEDURE — 1160F RVW MEDS BY RX/DR IN RCRD: CPT | Performed by: NURSE PRACTITIONER

## 2024-04-17 PROCEDURE — 99213 OFFICE O/P EST LOW 20 MIN: CPT | Performed by: NURSE PRACTITIONER

## 2024-04-17 RX ORDER — TRIAMCINOLONE ACETONIDE 1 MG/G
OINTMENT TOPICAL
COMMUNITY
Start: 2024-03-20

## 2024-04-17 NOTE — PROGRESS NOTES
"    Chief complaint:   Chief Complaint   Patient presents with    Back Pain     Pt here for f/u/e MRI results.        Subjective     HPI: This is a 89-year-old male gentleman who is known to our service. He went to the operating room in June 2021 for an L2-3 lumbar fusion and extension of fusion from L2-S1. When he was last seen he was not complaining of any back or leg pain. He comes in today and says that he is complaining of back pain. He is not having any lower extremity pain at this time. Pain in his back is intermittent. It is worse with standing and walking and better with sitting. Denies any bowel or bladder incontinence. He is walking with a cane. Continues to take gabapentin for his peripheral neuropathy. He does have an AFO on his right foot due to foot drop that has been a chronic issue.     I did send the patient for an x-ray and MRI of the lumbar spine and also started him on Zanaflex.  He is here in follow-up today.  The patient says that he was not able to tolerate the Zanaflex.  He did have the imaging completed he continues to complain of back pain.  He is not really having any pain radiating into his legs but he continues to walk with a cane.  He also does have an AFO on his right foot.    Review of Systems   Constitutional:  Positive for activity change.   Musculoskeletal:  Positive for back pain and gait problem.   Neurological:  Positive for weakness.   All other systems reviewed and are negative.        Objective      Vital Signs  Ht 172.7 cm (68\")   Wt 76.7 kg (169 lb)   BMI 25.70 kg/m²     Physical Exam  Constitutional:       Appearance: Normal appearance. He is well-developed.   HENT:      Head: Normocephalic.   Eyes:      General: Lids are normal.      Extraocular Movements: EOM normal.      Conjunctiva/sclera: Conjunctivae normal.      Pupils: Pupils are equal, round, and reactive to light.   Pulmonary:      Effort: Pulmonary effort is normal.      Breath sounds: Normal breath sounds. "   Musculoskeletal:         General: Normal range of motion.      Cervical back: Normal range of motion.   Skin:     General: Skin is warm.   Neurological:      Mental Status: He is alert and oriented to person, place, and time.      GCS: GCS eye subscore is 4. GCS verbal subscore is 5. GCS motor subscore is 6.      Cranial Nerves: No cranial nerve deficit.      Sensory: No sensory deficit.      Motor: Weakness present.      Gait: Gait abnormal.      Deep Tendon Reflexes: Reflexes are normal and symmetric. Reflexes normal.      Comments: Walking independently with a cane   Psychiatric:         Speech: Speech normal.         Behavior: Behavior normal.         Thought Content: Thought content normal.         Neurologic Exam     Mental Status   Oriented to person, place, and time.   Attention: normal. Concentration: normal.   Speech: speech is normal   Level of consciousness: alert  Normal comprehension.     Cranial Nerves     CN II   Visual fields full to confrontation.     CN III, IV, VI   Pupils are equal, round, and reactive to light.  Extraocular motions are normal.     CN V   Facial sensation intact.     CN VII   Facial expression full, symmetric.     CN VIII   CN VIII normal.     CN IX, X   CN IX normal.   CN X normal.     CN XI   CN XI normal.     CN XII   CN XII normal.     Motor Exam   Muscle bulk: normal    Strength   Strength 5/5 except as noted. Right EHL and dorsiflexion 0 out of 5     Sensory Exam   Light touch normal.     Gait, Coordination, and Reflexes     Reflexes   Reflexes 2+ except as noted.       Imaging review: X-ray of the lumbar spine shows the lumbar fusion from L2-S1.  No hardware malfunction is noted.  Degenerative scoliosis is noted above the fusion.    MRI of the lumbar spine that was done on April 9, 2024 shows central canal stenosis and moderate bilateral foraminal narrowing at L1-2.  Lumbar stenosis is noted to be severe at L 2-3    Assessment/Plan: Patient does have chronic low back  pain.  Unfortunate this time there is nothing from a surgical standpoint that needs to be addressed for the patient.  I will order a compounding cream.  Will make referral to pain management to see about either undergoing injections or pain medication treatments.  They acknowledge understanding.  Their questions and concerns were addressed    Patient is a nonsmoker  The patient's Body mass index is 25.7 kg/m².. BMI is above normal parameters. Recommendations include: educational material and nutrition counseling  Advance Care Planning   ACP discussion was held with the patient during this visit. Patient has an advance directive in EMR which is still valid.    STEADI Fall Risk Assessment was completed, and patient is at MODERATE risk for falls. Assessment completed on:3/19/2024     Diagnoses and all orders for this visit:    1. Chronic bilateral low back pain without sciatica (Primary)  -     Gabapentin 8 %; Apply 1-2 g topically to the appropriate area as directed 3 (Three) to 4 (Four) times daily.  Dispense: 90 g; Refill: 2  -     Ambulatory Referral to Pain Management Clinic    2. Right foot drop    3. Nonsmoker        I discussed the patients findings and my recommendations with patient  Josiah Robles, EDIE  04/17/24  10:21 CDT

## 2024-04-17 NOTE — PATIENT INSTRUCTIONS
"DASH Eating Plan  DASH stands for Dietary Approaches to Stop Hypertension. The DASH eating plan is a healthy eating plan that has been shown to:  Reduce high blood pressure (hypertension).  Reduce your risk for type 2 diabetes, heart disease, and stroke.  Help with weight loss.  What are tips for following this plan?  Reading food labels  Check food labels for the amount of salt (sodium) per serving. Choose foods with less than 5 percent of the Daily Value of sodium. Generally, foods with less than 300 milligrams (mg) of sodium per serving fit into this eating plan.  To find whole grains, look for the word \"whole\" as the first word in the ingredient list.  Shopping  Buy products labeled as \"low-sodium\" or \"no salt added.\"  Buy fresh foods. Avoid canned foods and pre-made or frozen meals.  Cooking  Avoid adding salt when cooking. Use salt-free seasonings or herbs instead of table salt or sea salt. Check with your health care provider or pharmacist before using salt substitutes.  Do not walker foods. Cook foods using healthy methods such as baking, boiling, grilling, roasting, and broiling instead.  Cook with heart-healthy oils, such as olive, canola, avocado, soybean, or sunflower oil.  Meal planning    Eat a balanced diet that includes:  4 or more servings of fruits and 4 or more servings of vegetables each day. Try to fill one-half of your plate with fruits and vegetables.  6-8 servings of whole grains each day.  Less than 6 oz (170 g) of lean meat, poultry, or fish each day. A 3-oz (85-g) serving of meat is about the same size as a deck of cards. One egg equals 1 oz (28 g).  2-3 servings of low-fat dairy each day. One serving is 1 cup (237 mL).  1 serving of nuts, seeds, or beans 5 times each week.  2-3 servings of heart-healthy fats. Healthy fats called omega-3 fatty acids are found in foods such as walnuts, flaxseeds, fortified milks, and eggs. These fats are also found in cold-water fish, such as sardines, salmon, " and mackerel.  Limit how much you eat of:  Canned or prepackaged foods.  Food that is high in trans fat, such as some fried foods.  Food that is high in saturated fat, such as fatty meat.  Desserts and other sweets, sugary drinks, and other foods with added sugar.  Full-fat dairy products.  Do not salt foods before eating.  Do not eat more than 4 egg yolks a week.  Try to eat at least 2 vegetarian meals a week.  Eat more home-cooked food and less restaurant, buffet, and fast food.  Lifestyle  When eating at a restaurant, ask that your food be prepared with less salt or no salt, if possible.  If you drink alcohol:  Limit how much you use to:  0-1 drink a day for women who are not pregnant.  0-2 drinks a day for men.  Be aware of how much alcohol is in your drink. In the [place] one drink equals one 12 oz bottle of beer (355 mL), one 5 oz glass of wine (148 mL), or one 1½ oz glass of hard liquor (44 mL).  General information  Avoid eating more than 2,300 mg of salt a day. If you have hypertension, you may need to reduce your sodium intake to 1,500 mg a day.  Work with your health care provider to maintain a healthy body weight or to lose weight. Ask what an ideal weight is for you.  Get at least 30 minutes of exercise that causes your heart to beat faster (aerobic exercise) most days of the week. Activities may include walking, swimming, or biking.  Work with your health care provider or dietitian to adjust your eating plan to your individual calorie needs.  What foods should I eat?  Fruits  All fresh, dried, or frozen fruit. Canned fruit in natural juice (without added sugar).  Vegetables  Fresh or frozen vegetables (raw, steamed, roasted, or grilled). Low-sodium or reduced-sodium tomato and vegetable juice. Low-sodium or reduced-sodium tomato sauce and tomato paste. Low-sodium or reduced-sodium canned vegetables.  Grains  Whole-grain or whole-wheat bread. Whole-grain or whole-wheat pasta. Brown rice. Oatmeal. Quinoa.  Bulgur. Whole-grain and low-sodium cereals. Yuli bread. Low-fat, low-sodium crackers. Whole-wheat flour tortillas.  Meats and other proteins  Skinless chicken or turkey. Ground chicken or turkey. Pork with fat trimmed off. Fish and seafood. Egg whites. Dried beans, peas, or lentils. Unsalted nuts, nut butters, and seeds. Unsalted canned beans. Lean cuts of beef with fat trimmed off. Low-sodium, lean precooked or cured meat, such as sausages or meat loaves.  Dairy  Low-fat (1%) or fat-free (skim) milk. Reduced-fat, low-fat, or fat-free cheeses. Nonfat, low-sodium ricotta or cottage cheese. Low-fat or nonfat yogurt. Low-fat, low-sodium cheese.  Fats and oils  Soft margarine without trans fats. Vegetable oil. Reduced-fat, low-fat, or light mayonnaise and salad dressings (reduced-sodium). Canola, safflower, olive, avocado, soybean, and sunflower oils. Avocado.  Seasonings and condiments  Herbs. Spices. Seasoning mixes without salt.  Other foods  Unsalted popcorn and pretzels. Fat-free sweets.  The items listed above may not be a complete list of foods and beverages you can eat. Contact a dietitian for more information.  What foods should I avoid?  Fruits  Canned fruit in a light or heavy syrup. Fried fruit. Fruit in cream or butter sauce.  Vegetables  Creamed or fried vegetables. Vegetables in a cheese sauce. Regular canned vegetables (not low-sodium or reduced-sodium). Regular canned tomato sauce and paste (not low-sodium or reduced-sodium). Regular tomato and vegetable juice (not low-sodium or reduced-sodium). Pickles. Olives.  Grains  Baked goods made with fat, such as croissants, muffins, or some breads. Dry pasta or rice meal packs.  Meats and other proteins  Fatty cuts of meat. Ribs. Fried meat. Chu. Bologna, salami, and other precooked or cured meats, such as sausages or meat loaves. Fat from the back of a pig (fatback). Bratwurst. Salted nuts and seeds. Canned beans with added salt. Canned or smoked fish.  Whole eggs or egg yolks. Chicken or turkey with skin.  Dairy  Whole or 2% milk, cream, and half-and-half. Whole or full-fat cream cheese. Whole-fat or sweetened yogurt. Full-fat cheese. Nondairy creamers. Whipped toppings. Processed cheese and cheese spreads.  Fats and oils  Butter. Stick margarine. Lard. Shortening. Ghee. Chu fat. Tropical oils, such as coconut, palm kernel, or palm oil.  Seasonings and condiments  Onion salt, garlic salt, seasoned salt, table salt, and sea salt. Worcestershire sauce. Tartar sauce. Barbecue sauce. Teriyaki sauce. Soy sauce, including reduced-sodium. Steak sauce. Canned and packaged gravies. Fish sauce. Oyster sauce. Cocktail sauce. Store-bought horseradish. Ketchup. Mustard. Meat flavorings and tenderizers. Bouillon cubes. Hot sauces. Pre-made or packaged marinades. Pre-made or packaged taco seasonings. Relishes. Regular salad dressings.  Other foods  Salted popcorn and pretzels.  The items listed above may not be a complete list of foods and beverages you should avoid. Contact a dietitian for more information.  Where to find more information  National Heart, Lung, and Blood [place]: www.nhlbi.nih.gov  American Heart Association: www.heart.org  Academy of Nutrition and Dietetics: www.eatright.org  National Kidney Foundation: www.kidney.org  Summary  The DASH eating plan is a healthy eating plan that has been shown to reduce high blood pressure (hypertension). It may also reduce your risk for type 2 diabetes, heart disease, and stroke.  When on the DASH eating plan, aim to eat more fresh fruits and vegetables, whole grains, lean proteins, low-fat dairy, and heart-healthy fats.  With the DASH eating plan, you should limit salt (sodium) intake to 2,300 mg a day. If you have hypertension, you may need to reduce your sodium intake to 1,500 mg a day.  Work with your health care provider or dietitian to adjust your eating plan to your individual calorie needs.  This information is not  "intended to replace advice given to you by your health care provider. Make sure you discuss any questions you have with your health care provider.  Document Revised: 11/20/2020 Document Reviewed: 11/20/2020  [employer] Patient Education © 2023 [employer]  BMI for Adults  Body mass index (BMI) is a number found using a person's weight and height. BMI can help tell how much of a person's weight is made up of fat. BMI does not measure body fat directly. It is used instead of tests that directly measure body fat, which can be difficult and expensive.  What are BMI measurements used for?  BMI is useful to:  Find out if your weight puts you at higher risk for medical problems.  Help recommend changes, such as in diet and exercise. This can help you reach a healthy weight. BMI screening can be done again to see if these changes are working.  How is BMI calculated?  Your height and weight are measured. The BMI is found from those numbers. This can be done with [place] or metric measurements. Note that charts and online BMI calculators are available to help you find your BMI quickly and easily without doing these calculations.  To calculate your BMI in [place] measurements:  Measure your weight in pounds (lb).  Multiply the number of pounds by 703.  So, for an adult who weighs 150 lb, multiply that number by 703: 150 x 703, which equals 105,450.  Measure your height in inches. Then multiply that number by itself to get a measurement called \"inches squared.\"  So, for an adult who is 70 inches tall, the \"inches squared\" measurement is 70 inches x 70 inches, which equals 4,900 inches squared.  Divide the total from step 2 (number of lb x 703) by the total from step 3 (inches squared): 105,450 ÷ 4,900 = 21.5. This is your BMI.  To calculate your BMI in metric measurements:    Measure your weight in kilograms (kg).  For this example, the weight is 70 kg.  Measure your height in meters (m). Then multiply that number by itself to get a " "measurement called \"meters squared.\"  So, for an adult who is 1.75 m tall, the \"meters squared\" measurement is 1.75 m x 1.75 m, which equals 3.1 meters squared.  Divide the number of kilograms (your weight) by the meters squared number. In this example: 70 ÷ 3.1 = 22.6. This is your BMI.  What do the results mean?  BMI charts are used to see if you are underweight, normal weight, overweight, or obese. The following guidelines will be used:  Underweight: BMI less than 18.5.  Normal weight: BMI between 18.5 and 24.9.  Overweight: BMI between 25 and 29.9.  Obese: BMI of 30 or above.  BMI is a tool and cannot diagnose a condition. Talk with your health care provider about what your BMI means for you. Keep these notes in mind:  Weight includes fat and muscle. Someone with a muscular build, such as an athlete, may have a BMI that is higher than 24.9. In cases like these, BMI is not a correct measure of body fat.  If you have a BMI of 25 or higher, your provider may need to do more testing to find out if excess body fat is the cause.  BMI is measured the same way for males and females. Females usually have more body fat than males of the same height and weight.  Where to find more information  For more information about BMI, including tools to quickly find your BMI, go to:  Centers for Disease Control and Prevention: cdc.gov  American Heart Association: heart.org  National Heart, Lung, and Blood [place]: nhlbi.nih.gov  This information is not intended to replace advice given to you by your health care provider. Make sure you discuss any questions you have with your health care provider.  Document Revised: 09/07/2023 Document Reviewed: 08/31/2023  [employer] Patient Education © 2023 [employer]    "

## 2024-05-06 ENCOUNTER — APPOINTMENT (OUTPATIENT)
Dept: ULTRASOUND IMAGING | Facility: HOSPITAL | Age: 89
End: 2024-05-06
Payer: MEDICARE

## 2024-05-06 ENCOUNTER — APPOINTMENT (OUTPATIENT)
Dept: MRI IMAGING | Facility: HOSPITAL | Age: 89
End: 2024-05-06
Payer: MEDICARE

## 2024-05-06 ENCOUNTER — APPOINTMENT (OUTPATIENT)
Dept: CT IMAGING | Facility: HOSPITAL | Age: 89
End: 2024-05-06
Payer: MEDICARE

## 2024-05-06 ENCOUNTER — APPOINTMENT (OUTPATIENT)
Dept: GENERAL RADIOLOGY | Facility: HOSPITAL | Age: 89
End: 2024-05-06
Payer: MEDICARE

## 2024-05-06 ENCOUNTER — HOSPITAL ENCOUNTER (OUTPATIENT)
Facility: HOSPITAL | Age: 89
Setting detail: OBSERVATION
Discharge: HOME | End: 2024-05-09
Attending: EMERGENCY MEDICINE | Admitting: INTERNAL MEDICINE
Payer: MEDICARE

## 2024-05-06 DIAGNOSIS — R13.10 DYSPHAGIA, UNSPECIFIED TYPE: ICD-10-CM

## 2024-05-06 DIAGNOSIS — G45.9 TIA (TRANSIENT ISCHEMIC ATTACK): Primary | ICD-10-CM

## 2024-05-06 DIAGNOSIS — I10 PRIMARY HYPERTENSION: Chronic | ICD-10-CM

## 2024-05-06 PROBLEM — R29.90 STROKE-LIKE SYMPTOMS: Status: ACTIVE | Noted: 2024-05-06

## 2024-05-06 PROBLEM — Z79.01 CHRONIC ANTICOAGULATION: Status: ACTIVE | Noted: 2024-05-06

## 2024-05-06 PROBLEM — I48.0 PAROXYSMAL ATRIAL FIBRILLATION: Status: ACTIVE | Noted: 2024-05-06

## 2024-05-06 LAB
ABO GROUP BLD: NORMAL
ALBUMIN SERPL-MCNC: 4.6 G/DL (ref 3.5–5.2)
ALBUMIN/GLOB SERPL: 1.8 G/DL
ALP SERPL-CCNC: 74 U/L (ref 39–117)
ALT SERPL W P-5'-P-CCNC: 19 U/L (ref 1–41)
ANION GAP SERPL CALCULATED.3IONS-SCNC: 9 MMOL/L (ref 5–15)
APTT PPP: 35.7 SECONDS (ref 24.5–36)
AST SERPL-CCNC: 28 U/L (ref 1–40)
BASOPHILS # BLD AUTO: 0.05 10*3/MM3 (ref 0–0.2)
BASOPHILS NFR BLD AUTO: 0.6 % (ref 0–1.5)
BILIRUB SERPL-MCNC: 0.4 MG/DL (ref 0–1.2)
BLD GP AB SCN SERPL QL: NEGATIVE
BUN SERPL-MCNC: 20 MG/DL (ref 8–23)
BUN/CREAT SERPL: 26.7 (ref 7–25)
CALCIUM SPEC-SCNC: 9.7 MG/DL (ref 8.2–9.6)
CHLORIDE SERPL-SCNC: 101 MMOL/L (ref 98–107)
CO2 SERPL-SCNC: 28 MMOL/L (ref 22–29)
CREAT SERPL-MCNC: 0.75 MG/DL (ref 0.76–1.27)
DEPRECATED RDW RBC AUTO: 50.6 FL (ref 37–54)
EGFRCR SERPLBLD CKD-EPI 2021: 83.6 ML/MIN/1.73
EOSINOPHIL # BLD AUTO: 0.2 10*3/MM3 (ref 0–0.4)
EOSINOPHIL NFR BLD AUTO: 2.3 % (ref 0.3–6.2)
ERYTHROCYTE [DISTWIDTH] IN BLOOD BY AUTOMATED COUNT: 14.1 % (ref 12.3–15.4)
GLOBULIN UR ELPH-MCNC: 2.5 GM/DL
GLUCOSE BLDC GLUCOMTR-MCNC: 109 MG/DL (ref 70–130)
GLUCOSE BLDC GLUCOMTR-MCNC: 112 MG/DL (ref 70–130)
GLUCOSE SERPL-MCNC: 116 MG/DL (ref 65–99)
HCT VFR BLD AUTO: 40.4 % (ref 37.5–51)
HGB BLD-MCNC: 13.3 G/DL (ref 13–17.7)
HOLD SPECIMEN: NORMAL
HOLD SPECIMEN: NORMAL
IMM GRANULOCYTES # BLD AUTO: 0.03 10*3/MM3 (ref 0–0.05)
IMM GRANULOCYTES NFR BLD AUTO: 0.3 % (ref 0–0.5)
INR PPP: 1.12 (ref 0.91–1.09)
LYMPHOCYTES # BLD AUTO: 1.51 10*3/MM3 (ref 0.7–3.1)
LYMPHOCYTES NFR BLD AUTO: 17.4 % (ref 19.6–45.3)
MCH RBC QN AUTO: 32.1 PG (ref 26.6–33)
MCHC RBC AUTO-ENTMCNC: 32.9 G/DL (ref 31.5–35.7)
MCV RBC AUTO: 97.6 FL (ref 79–97)
MONOCYTES # BLD AUTO: 0.77 10*3/MM3 (ref 0.1–0.9)
MONOCYTES NFR BLD AUTO: 8.9 % (ref 5–12)
NEUTROPHILS NFR BLD AUTO: 6.14 10*3/MM3 (ref 1.7–7)
NEUTROPHILS NFR BLD AUTO: 70.5 % (ref 42.7–76)
NRBC BLD AUTO-RTO: 0 /100 WBC (ref 0–0.2)
PLATELET # BLD AUTO: 264 10*3/MM3 (ref 140–450)
PMV BLD AUTO: 9.6 FL (ref 6–12)
POTASSIUM SERPL-SCNC: 4.7 MMOL/L (ref 3.5–5.2)
PROT SERPL-MCNC: 7.1 G/DL (ref 6–8.5)
PROTHROMBIN TIME: 14.9 SECONDS (ref 11.8–14.8)
RBC # BLD AUTO: 4.14 10*6/MM3 (ref 4.14–5.8)
RH BLD: NEGATIVE
SODIUM SERPL-SCNC: 138 MMOL/L (ref 136–145)
T&S EXPIRATION DATE: NORMAL
TROPONIN T SERPL HS-MCNC: 39 NG/L
WBC NRBC COR # BLD AUTO: 8.7 10*3/MM3 (ref 3.4–10.8)
WHOLE BLOOD HOLD COAG: NORMAL
WHOLE BLOOD HOLD SPECIMEN: NORMAL

## 2024-05-06 PROCEDURE — 96374 THER/PROPH/DIAG INJ IV PUSH: CPT

## 2024-05-06 PROCEDURE — G0378 HOSPITAL OBSERVATION PER HR: HCPCS

## 2024-05-06 PROCEDURE — 99285 EMERGENCY DEPT VISIT HI MDM: CPT

## 2024-05-06 PROCEDURE — 93005 ELECTROCARDIOGRAM TRACING: CPT | Performed by: EMERGENCY MEDICINE

## 2024-05-06 PROCEDURE — 93010 ELECTROCARDIOGRAM REPORT: CPT | Performed by: INTERNAL MEDICINE

## 2024-05-06 PROCEDURE — 85730 THROMBOPLASTIN TIME PARTIAL: CPT | Performed by: EMERGENCY MEDICINE

## 2024-05-06 PROCEDURE — 86900 BLOOD TYPING SEROLOGIC ABO: CPT | Performed by: EMERGENCY MEDICINE

## 2024-05-06 PROCEDURE — 70551 MRI BRAIN STEM W/O DYE: CPT

## 2024-05-06 PROCEDURE — 86901 BLOOD TYPING SEROLOGIC RH(D): CPT | Performed by: EMERGENCY MEDICINE

## 2024-05-06 PROCEDURE — 70450 CT HEAD/BRAIN W/O DYE: CPT

## 2024-05-06 PROCEDURE — 85025 COMPLETE CBC W/AUTO DIFF WBC: CPT | Performed by: EMERGENCY MEDICINE

## 2024-05-06 PROCEDURE — 25010000002 LABETALOL 5 MG/ML SOLUTION: Performed by: EMERGENCY MEDICINE

## 2024-05-06 PROCEDURE — 96372 THER/PROPH/DIAG INJ SC/IM: CPT

## 2024-05-06 PROCEDURE — 82948 REAGENT STRIP/BLOOD GLUCOSE: CPT

## 2024-05-06 PROCEDURE — 36415 COLL VENOUS BLD VENIPUNCTURE: CPT

## 2024-05-06 PROCEDURE — 71045 X-RAY EXAM CHEST 1 VIEW: CPT

## 2024-05-06 PROCEDURE — 85610 PROTHROMBIN TIME: CPT | Performed by: EMERGENCY MEDICINE

## 2024-05-06 PROCEDURE — 93880 EXTRACRANIAL BILAT STUDY: CPT | Performed by: SURGERY

## 2024-05-06 PROCEDURE — 86850 RBC ANTIBODY SCREEN: CPT | Performed by: EMERGENCY MEDICINE

## 2024-05-06 PROCEDURE — 25010000002 ENOXAPARIN PER 10 MG: Performed by: NURSE PRACTITIONER

## 2024-05-06 PROCEDURE — 99204 OFFICE O/P NEW MOD 45 MIN: CPT | Performed by: PSYCHIATRY & NEUROLOGY

## 2024-05-06 PROCEDURE — 80053 COMPREHEN METABOLIC PANEL: CPT | Performed by: EMERGENCY MEDICINE

## 2024-05-06 PROCEDURE — 93880 EXTRACRANIAL BILAT STUDY: CPT

## 2024-05-06 PROCEDURE — 84484 ASSAY OF TROPONIN QUANT: CPT | Performed by: EMERGENCY MEDICINE

## 2024-05-06 RX ORDER — FINASTERIDE 5 MG/1
5 TABLET, FILM COATED ORAL DAILY
COMMUNITY

## 2024-05-06 RX ORDER — SODIUM CHLORIDE 0.9 % (FLUSH) 0.9 %
10 SYRINGE (ML) INJECTION AS NEEDED
Status: DISCONTINUED | OUTPATIENT
Start: 2024-05-06 | End: 2024-05-09 | Stop reason: HOSPADM

## 2024-05-06 RX ORDER — ATORVASTATIN CALCIUM 40 MG/1
80 TABLET, FILM COATED ORAL NIGHTLY
Status: DISCONTINUED | OUTPATIENT
Start: 2024-05-06 | End: 2024-05-09 | Stop reason: HOSPADM

## 2024-05-06 RX ORDER — BISACODYL 10 MG
10 SUPPOSITORY, RECTAL RECTAL DAILY PRN
Status: DISCONTINUED | OUTPATIENT
Start: 2024-05-06 | End: 2024-05-09 | Stop reason: HOSPADM

## 2024-05-06 RX ORDER — LABETALOL HYDROCHLORIDE 5 MG/ML
10 INJECTION, SOLUTION INTRAVENOUS ONCE
Status: COMPLETED | OUTPATIENT
Start: 2024-05-06 | End: 2024-05-06

## 2024-05-06 RX ORDER — SODIUM CHLORIDE 0.9 % (FLUSH) 0.9 %
10 SYRINGE (ML) INJECTION EVERY 12 HOURS SCHEDULED
Status: DISCONTINUED | OUTPATIENT
Start: 2024-05-06 | End: 2024-05-09 | Stop reason: HOSPADM

## 2024-05-06 RX ORDER — SODIUM CHLORIDE 9 MG/ML
40 INJECTION, SOLUTION INTRAVENOUS AS NEEDED
Status: DISCONTINUED | OUTPATIENT
Start: 2024-05-06 | End: 2024-05-09 | Stop reason: HOSPADM

## 2024-05-06 RX ORDER — ALUMINA, MAGNESIA, AND SIMETHICONE 2400; 2400; 240 MG/30ML; MG/30ML; MG/30ML
7.5 SUSPENSION ORAL EVERY 4 HOURS PRN
Status: DISCONTINUED | OUTPATIENT
Start: 2024-05-06 | End: 2024-05-09 | Stop reason: HOSPADM

## 2024-05-06 RX ORDER — ALFUZOSIN HYDROCHLORIDE 10 MG/1
10 TABLET, EXTENDED RELEASE ORAL EVERY EVENING
COMMUNITY

## 2024-05-06 RX ORDER — ENOXAPARIN SODIUM 100 MG/ML
1 INJECTION SUBCUTANEOUS EVERY 12 HOURS
Status: DISCONTINUED | OUTPATIENT
Start: 2024-05-06 | End: 2024-05-07

## 2024-05-06 RX ORDER — ONDANSETRON 2 MG/ML
4 INJECTION INTRAMUSCULAR; INTRAVENOUS EVERY 6 HOURS PRN
Status: DISCONTINUED | OUTPATIENT
Start: 2024-05-06 | End: 2024-05-09 | Stop reason: HOSPADM

## 2024-05-06 RX ADMIN — LABETALOL HYDROCHLORIDE 10 MG: 5 INJECTION, SOLUTION INTRAVENOUS at 17:50

## 2024-05-06 RX ADMIN — ENOXAPARIN SODIUM 80 MG: 100 INJECTION SUBCUTANEOUS at 21:49

## 2024-05-06 RX ADMIN — Medication 10 ML: at 21:50

## 2024-05-06 NOTE — H&P
"    [facility] Medicine Services  HISTORY AND PHYSICAL    Date of Admission: 5/6/2024  Primary Care Physician: Miguel Bond MD    Subjective   Primary Historian: Patient    Chief Complaint: Strokelike symptoms    History of Present Illness  Chad Henriquez is a 89 male with a past medical history of paroxysmal atrial fibrillation/flutter on Eliquis 5 mg twice daily.  He missed last evening's dose but took this morning's.  Rare today while he was i at a local nursing home, reading the Bible to residents, he developed difficulty reading and expressing himself.  He was transferred to [facility] ED for evaluation.  Code stroke was called.  Patient was seen by neurology.  He did resolve to baseline prior to neurology arrival.  Hospitalist have been asked to admit.  Orders have been placed by neurology.  Patient currently undergoing echocardiogram followed by MRI of the brain.  Will see as soon as possible.    1:30 PM patient transferred from MRI to room 337.  He is alert and oriented.  He is slowed somewhat to respond to questions.  However he is aware of his medications and how he takes them.  He follows all commands.  I did note puffing of words occasionally.  His wife states this is an old problem.  She states he had decreased alertness over the past few days where normally he is quite talkative he changed to answering questions only.  He did report to me that he was trying to read the Bible to residents at her nursing home and he could not say the words \"to save my life\".  He has no other complaints at this time except he would like to have something to eat.  I did explain we needed to have speech therapy to evaluate first to keep him safe.  He verbalized understanding.  He is admitted for further evaluation treatment.    Review of Systems   Otherwise complete ROS reviewed and negative except as mentioned in the HPI.    Past Medical History:   Past Medical History: "   Diagnosis Date    Allergic rhinitis     Anemia     Arthritis     Blind left eye     BPH (benign prostatic hypertrophy) with urinary retention     Cancer     skin cancer    Chronic back pain     RUNS DOWN BOTH LEGS    Constipation     Coronary artery disease     COVID     Hard of hearing     Heart attack 1986    NO MUSCLE DAMAGE - JUST OCCLUDED VALVE    High cholesterol     History of skin cancer     BILATERAL EARS    History of transfusion     1986    Hypertension     Inguinal hernia     Leaky heart valve     DR CONCEPCION SAYS NOT ENOUGH TO BE OF CONCERN    Other bursal cyst, right elbow     Paroxysmal atrial fibrillation 5/6/2024    PONV (postoperative nausea and vomiting)     has had scopalamine patch in past     Sleep apnea     DOES NOT USE CPAP OR BIPAP    Spinal stenosis of cervical region     Spinal stenosis of lumbar region     Stroke     Tremors of nervous system     Wears hearing aid     BILATERAL     Past Surgical History:  Past Surgical History:   Procedure Laterality Date    ANTERIOR LUMBAR EXPOSURE N/A 12/07/2018    Procedure: ANTERIOR LUMBAR EXPOSURE;  Surgeon: Manolo Mcleod DO;  Location:  [facility] OR;  Service: Vascular    APPENDECTOMY      BACK SURGERY      X3    CARDIAC SURGERY  08/08/1986    X1 BYPASS    CORONARY ANGIOPLASTY WITH STENT PLACEMENT  1997    X3 STENTS    CORONARY ARTERY BYPASS GRAFT      HERNIA REPAIR Bilateral     x2    INGUINAL HERNIA REPAIR Right 06/22/2017    Procedure: RIGHT INGUINAL HERNIA REPAIR AND EXCISION OF CYST RIGHT ELBOW ;  Surgeon: Jackelyn Arriola MD;  Location:  [facility] OR;  Service:     LUMBAR FUSION Left 06/30/2021    Procedure: LUMBAR LAMINECTOMY, DISCECTOMY, FACETECTOMY,  TRANSFORAMINAL LUMBAR INTERBODY FUSION L2-3. REVISION OF INSTRUMENTATION L3-S1. USE OF IMAGE GUIDANCE AND SURGICAL ROBOT;  Surgeon: Kj Falcon MD;  Location:  [facility] OR;  Service: Robotics - Neuro;  Laterality: Left;    LUMBAR LAMINECTOMY N/A 03/12/2018    Procedure: LUMBAR LAMINECTOMY  WITHOUT FUSION L3-4,4-5;  Surgeon: Kj Falcon MD;  Location:  [facility] OR;  Service: Neurosurgery    LUMBAR LAMINECTOMY ANTERIOR LUMBAR INTERBODY FUSION N/A 12/07/2018    Procedure: ANTERIOR LUMBAR INTERBODY FUSION L5-S1;  Surgeon: Kj Falcon MD;  Location:  [facility] OR;  Service: Neurosurgery    LUMBAR LAMINECTOMY WITH FUSION Left 12/10/2018    Procedure: LUMBAR LAMINECTOMY TRANSFORAMINAL LUMBAR INTERBODY FUSION L34,45;  Surgeon: Kj Falcon MD;  Location:  [facility];  Service: Neurosurgery    LUMBAR LAMINECTOMY WITH FUSION Left 12/07/2018    Procedure: LUMBAR LAMINECTOMY TRANSFORAMINAL LUMBAR INTERBODY FUSION LEFT L3-4, L4-5 QUADRANT RETRACTOR;  Surgeon: Kj Falcon MD;  Location:  [facility] OR;  Service: Neurosurgery    SKIN LESION EXCISION      nasal     Social History:  reports that he quit smoking about 75 years ago. His smoking use included cigarettes. He started smoking about 81 years ago. He has never used smokeless tobacco. He reports that he does not drink alcohol and does not use drugs.    Family History: family history includes No Known Problems in his father and mother.       Allergies:  Allergies   Allergen Reactions    Codeine Nausea And Vomiting and GI Intolerance    Fentanyl Hallucinations and Other (See Comments)     DELUSIONAL  Fentanyl patchs, caused patient to become confused and anxious per family  Fentanyl patchs, caused patient to become confused and anxious per family    Augmentin [Amoxicillin-Pot Clavulanate] GI Intolerance       Medications:  Prior to Admission medications    Medication Sig Start Date End Date Taking? Authorizing Provider   apixaban (ELIQUIS) 5 MG tablet tablet Take 1 tablet by mouth 2 (Two) Times a Day. 12/28/23   Tatianna Jessica APRN   Ascorbic Acid (Vitamin C) 500 MG capsule Take 1 capsule by mouth 2 (two) times a day.    ProviderFredrick MD   Cholecalciferol (Vitamin D3) 50 MCG (2000 UT) tablet Take 1 tablet by mouth Daily.    ProviderFredrick  MD   clotrimazole-betamethasone (Lotrisone) 1-0.05 % cream Apply 1 application  topically to the appropriate area as directed 2 (Two) Times a Day. 9/28/23   Miguel Bond MD   diphenoxylate-atropine (Lomotil) 2.5-0.025 MG per tablet Take 1 tablet by mouth 4 (Four) Times a Day As Needed for Diarrhea. 5/9/23   Miguel Bond MD   Docusate Calcium (STOOL SOFTENER PO) Take  by mouth.    Fredrick Manley MD   famotidine (PEPCID) 20 MG tablet Take 1 tablet by mouth As Needed for Indigestion or Heartburn. 1/29/24   Jaspal Hull MD   Flaxseed, Linseed, (FLAXSEED OIL) 1000 MG capsule 540mg takes 4 tablets by mouth daily    Fredrick Manley MD   folic acid (FOLVITE) 800 MCG tablet Taking as multivitamin now    Fredrick Manley MD   gabapentin (NEURONTIN) 600 MG tablet Take 1 tablet by mouth 3 (Three) Times a Day. 8/20/19   Kj Falcon MD   Gabapentin 8 % Apply 1-2 g topically to the appropriate area as directed 3 (Three) to 4 (Four) times daily. 4/17/24 4/17/25  Josiah Robles APRN   L-Lysine 600 MG tablet Take 1 tablet by mouth Daily.    Fredrick Manley MD   losartan (COZAAR) 50 MG tablet Take 1 tablet by mouth Daily. 9/28/23   Miguel Bond MD   metoprolol tartrate (LOPRESSOR) 25 MG tablet Take 1 tablet by mouth 2 (Two) Times a Day. Takes 12.5 mg in am and 25 mg in pm    Fredrick Manley MD   pravastatin (PRAVACHOL) 20 MG tablet Take 1 tablet by mouth Every Night. 7/7/21   Glenys Archer APRN   Psyllium (Metamucil) wafer wafer Take  by mouth Daily.    Fredrick Manley MD   tiZANidine (ZANAFLEX) 4 MG tablet Take 1 tablet by mouth 2 (Two) Times a Day As Needed for Muscle Spasms. 3/19/24   Josiah Robles APRN   triamcinolone (KENALOG) 0.1 % ointment APPLY TWICE A DAY TO AFFECTED AREAS ON TRUNK, LEGS, AND ARMS WHEN AND WHERE RASH IS PRESENT. DO NOT USE ON FACE. 3/20/24   Provider, MD Fredrick     I have utilized all available immediate  "resources to obtain, update, or review the patient's current medications (including all prescriptions, over-the-counter products, herbals, cannabis/cannabidiol products, and vitamin/mineral/dietary (nutritional) supplements).    Objective     Vital Signs: BP (!) 191/73   Pulse 68   Temp 97.8 °F (36.6 °C) (Oral)   Resp 16   Ht 172.7 cm (68\")   Wt 76.3 kg (168 lb 4.8 oz)   SpO2 96%   BMI 25.59 kg/m²   Physical Exam  Vitals reviewed.   Constitutional:       Appearance: Normal appearance.   HENT:      Head: Normocephalic and atraumatic.      Mouth/Throat:      Mouth: Mucous membranes are moist.      Pharynx: Oropharynx is clear.   Eyes:      Conjunctiva/sclera: Conjunctivae normal.      Comments: Blind in left eye, right pupil     Cardiovascular:      Rate and Rhythm: Normal rate. Rhythm irregular.   Pulmonary:      Effort: Pulmonary effort is normal.      Breath sounds: Normal breath sounds.   Abdominal:      General: There is no distension.      Palpations: Abdomen is soft.   Musculoskeletal:      Cervical back: Normal range of motion and neck supple.      Right lower leg: No edema.      Left lower leg: No edema.      Comments: Generalized weakness and debility of advanced age   Skin:     General: Skin is warm and dry.   Neurological:      General: No focal deficit present.      Mental Status: He is alert and oriented to person, place, and time.      Comments: Flat left-sided nasolabial fold, \"puffing\" of words   Psychiatric:         Mood and Affect: Mood normal.         Behavior: Behavior normal.          Results Reviewed:  Lab Results (last 24 hours)       Procedure Component Value Units Date/Time    Comprehensive Metabolic Panel [[id number]]  (Abnormal) Collected: 05/06/24 1706    Specimen: Blood Updated: 05/06/24 1742     Glucose 116 mg/dL      BUN 20 mg/dL      Creatinine 0.75 mg/dL      Sodium 138 mmol/L      Potassium 4.7 mmol/L      Comment: Slight hemolysis detected by analyzer. Result may be falsely " elevated.        Chloride 101 mmol/L      CO2 28.0 mmol/L      Calcium 9.7 mg/dL      Total Protein 7.1 g/dL      Albumin 4.6 g/dL      ALT (SGPT) 19 U/L      AST (SGOT) 28 U/L      Comment: Slight hemolysis detected by analyzer. Result may be falsely elevated.        Alkaline Phosphatase 74 U/L      Total Bilirubin 0.4 mg/dL      Globulin 2.5 gm/dL      A/G Ratio 1.8 g/dL      BUN/Creatinine Ratio 26.7     Anion Gap 9.0 mmol/L      eGFR 83.6 mL/min/1.73     Single High Sensitivity Troponin T [[id number]]  (Abnormal) Collected: 05/06/24 1706    Specimen: Blood Updated: 05/06/24 1735     HS Troponin T 39 ng/L     Protime-INR [[id number]]  (Abnormal) Collected: 05/06/24 1706    Specimen: Blood Updated: 05/06/24 1728     Protime 14.9 Seconds      INR 1.12    aPTT [[id number]]  (Normal) Collected: 05/06/24 1706    Specimen: Blood Updated: 05/06/24 1728     PTT 35.7 seconds     CBC Auto Differential [[id number]]  (Abnormal) Collected: 05/06/24 1706    Specimen: Blood Updated: 05/06/24 1718     WBC 8.70 10*3/mm3      RBC 4.14 10*6/mm3      Hemoglobin 13.3 g/dL      Hematocrit 40.4 %      MCV 97.6 fL      MCH 32.1 pg      MCHC 32.9 g/dL      RDW 14.1 %      RDW-SD 50.6 fl      MPV 9.6 fL      Platelets 264 10*3/mm3      Neutrophil % 70.5 %      Lymphocyte % 17.4 %      Monocyte % 8.9 %      Eosinophil % 2.3 %      Basophil % 0.6 %      Immature Grans % 0.3 %      Neutrophils, Absolute 6.14 10*3/mm3      Lymphocytes, Absolute 1.51 10*3/mm3      Monocytes, Absolute 0.77 10*3/mm3      Eosinophils, Absolute 0.20 10*3/mm3      Basophils, Absolute 0.05 10*3/mm3      Immature Grans, Absolute 0.03 10*3/mm3      nRBC 0.0 /100 WBC           Imaging Results (Last 24 Hours)       Procedure Component Value Units Date/Time    XR Chest 1 View [[id number]] Collected: 05/06/24 1740     Updated: 05/06/24 1744    Narrative:      EXAM: XR CHEST 1 VW-      DATE: 5/6/2024 4:38 PM     HISTORY: Acute Stroke Protocol (onset < 12 hrs)       COMPARISON:  12/6/2022.     TECHNIQUE:  Frontal view(s) of the chest submitted.     FINDINGS:    Lungs are grossly clear. Patient is status post median sternotomy and  CABG. There is calcification of the thoracic aorta which is tortuous.  There is enlargement of the cardiac silhouette. No effusion or  pneumothorax is seen.          Impression:         1. Status post CABG with enlarged cardiac silhouette and calcified,  ectatic, and tortuous thoracic aorta. No active disease is identified.     This report was signed and finalized on 5/6/2024 5:41 PM by Douglas Gonzalez.       US Carotid Bilateral [[id number]] Resulted: 05/06/24 1731     Updated: 05/06/24 1731    CT Head Without Contrast Stroke Protocol [[id number]] Collected: 05/06/24 1720     Updated: 05/06/24 1726    Narrative:      EXAM: CT HEAD WO CONTRAST STROKE PROTOCOL-      DATE: 5/6/2024 4:02 PM     HISTORY: Neuro deficit, acute, stroke suspected       COMPARISON: 2/21/2020.     DOSE LENGTH PRODUCT: 654.08 mGy.cm  Automated exposure control was also  utilized to decrease patient radiation dose.     TECHNIQUE: Unenhanced CT images obtained from vertex to skull base with  multiplanar reformats.     FINDINGS:  There is no acute intracranial hemorrhage, midline shift, mass effect,  or hydrocephalus. There is no CT evidence for acute infarct.     There are chronic changes with volume loss and chronic small vessel  ischemic change of the periventricular white matter.      SOFT TISSUES: The scalp soft tissues are unremarkable.        SINUS:The visualized paranasal sinuses and mastoid air cells are clear.      ORBITS: The visualized orbits and globes are unremarkable. There is  bilateral lens extraction.          Impression:      1. Chronic changes and no acute intracranial findings.      The emergency department was notified of the report at 5:23 p.m.     This report was signed and finalized on 5/6/2024 5:23 PM by Douglas Gonzalez.                Assessment / Plan   Assessment:    Active Hospital Problems    Diagnosis     Stroke-like symptoms     TIA (transient ischemic attack)     Chronic anticoagulation     Paroxysmal atrial fibrillation     Hypertension        Treatment Plan  1.  The patient will be admitted to Dr. Cordova's service here at [facility].   2.  Code stroke-seen by neurology-follow stroke protocol  3.  Home medications reviewed and restarted as appropriate  4.  DVT prophylaxis with SCDs  5.  Labs in a.m.      Medical Decision Making  Number and Complexity of problems: 5  Differential Diagnosis: None    Conditions and Status        Condition is unchanged.     [facility] Data  External documents reviewed: No  Cardiac tracing (EKG, telemetry) interpretation: Reviewed  Radiology interpretation: Reviewed  Labs reviewed: Yes  Any tests that were considered but not ordered: No     Decision rules/scores evaluated (example [id number], Wells, etc): No     Discussed with: Patient, family at bedside and Dr. Cordova     Care Planning  Shared decision making: Patient, family at bedside and Dr. Cordova  Code status and discussions: Full    Disposition  Social Determinants of Health that impact treatment or disposition: None  Estimated length of stay is 1-2 days.     I confirmed that the patient's advanced care plan is present, code status is documented, and a surrogate decision maker is listed in the patient's medical record.     The patient's surrogate decision maker is wife.     The patient was seen and examined by me on 5/6/2024 at 1743.    Electronically signed by EDIE Thompson, 05/06/24, 18:02 CDT.

## 2024-05-06 NOTE — CONSULTS
DOS: 2024  NAME: Chad Henriquez   : 1929  PCP: Miguel Bond MD  CC: Sudden speech impediment that occurred approximately 2 hours ago  Referring MD: Michael Wei MD    Neurological Problem and Interval History:  89 y.o. right-handed white male with a Hx of paroxysmal atrial fibrillation and flutter for which she has been taking Eliquis 5 mg twice daily with food had missed his dose last evening but took it this morning.  Approximately 2 hours ago while he was in the nursing home he was having a hard time reading something and also expressing himself and brought to the emergency room for an acute stroke alert.  By the time he was seen in the CT scan department with the help of our nurse practitioner Mr. Marc Neal he was back to baseline.  He was able to tell me his full name his date of birth and where he is located and also able to identify common objects without any issues.  His speech is clear and his mentation seems to be normal and his vision is normal and there is no facial asymmetry noted.  He is able to follow one-step and two-step commands well.  There is no drift noticeable in the upper and lower extremities and no sensory loss noted and is able to perform finger-to-nose coordination and heel-to-shin testing without problems.  The NIH stroke scale is 0.  I discussed with Dr. Michael Wei, the emergency room physician that we will just get a plain CT of the head as there is no evidence of any blockage or any retrievable thrombus and also he is on a blood thinner and we will go for an MRI after that.  However he needs to be admitted for observation for a stroke workup.    Past Medical/Surgical Hx:  Past Medical History:   Diagnosis Date    Allergic rhinitis     Anemia     Arthritis     Blind left eye     BPH (benign prostatic hypertrophy) with urinary retention     Cancer     skin cancer    Chronic back pain     RUNS DOWN BOTH LEGS    Constipation     Coronary artery disease     COVID      Hard of hearing     Heart attack 1986    NO MUSCLE DAMAGE - JUST OCCLUDED VALVE    High cholesterol     History of skin cancer     BILATERAL EARS    History of transfusion     1986    Hypertension     Inguinal hernia     Leaky heart valve     DR CONCEPCION SAYS NOT ENOUGH TO BE OF CONCERN    Other bursal cyst, right elbow     PONV (postoperative nausea and vomiting)     has had scopalamine patch in past     Sleep apnea     DOES NOT USE CPAP OR BIPAP    Spinal stenosis of cervical region     Spinal stenosis of lumbar region     Stroke     Tremors of nervous system     Wears hearing aid     BILATERAL     Past Surgical History:   Procedure Laterality Date    ANTERIOR LUMBAR EXPOSURE N/A 12/07/2018    Procedure: ANTERIOR LUMBAR EXPOSURE;  Surgeon: Manolo Mcleod DO;  Location:  [facility];  Service: Vascular    APPENDECTOMY      BACK SURGERY      X3    CARDIAC SURGERY  08/08/1986    X1 BYPASS    CORONARY ANGIOPLASTY WITH STENT PLACEMENT  1997    X3 STENTS    CORONARY ARTERY BYPASS GRAFT      HERNIA REPAIR Bilateral     x2    INGUINAL HERNIA REPAIR Right 06/22/2017    Procedure: RIGHT INGUINAL HERNIA REPAIR AND EXCISION OF CYST RIGHT ELBOW ;  Surgeon: Jackelyn Arriola MD;  Location:  [facility];  Service:     LUMBAR FUSION Left 06/30/2021    Procedure: LUMBAR LAMINECTOMY, DISCECTOMY, FACETECTOMY,  TRANSFORAMINAL LUMBAR INTERBODY FUSION L2-3. REVISION OF INSTRUMENTATION L3-S1. USE OF IMAGE GUIDANCE AND SURGICAL ROBOT;  Surgeon: Kj Falcon MD;  Location:  [facility];  Service: Robotics - Neuro;  Laterality: Left;    LUMBAR LAMINECTOMY N/A 03/12/2018    Procedure: LUMBAR LAMINECTOMY WITHOUT FUSION L3-4,4-5;  Surgeon: Kj Falcon MD;  Location:  [facility];  Service: Neurosurgery    LUMBAR LAMINECTOMY ANTERIOR LUMBAR INTERBODY FUSION N/A 12/07/2018    Procedure: ANTERIOR LUMBAR INTERBODY FUSION L5-S1;  Surgeon: Kj Falcon MD;  Location:  [facility];  Service: Neurosurgery    LUMBAR LAMINECTOMY WITH FUSION Left  12/10/2018    Procedure: LUMBAR LAMINECTOMY TRANSFORAMINAL LUMBAR INTERBODY FUSION L34,45;  Surgeon: Kj Falcon MD;  Location:  [facility];  Service: Neurosurgery    LUMBAR LAMINECTOMY WITH FUSION Left 12/07/2018    Procedure: LUMBAR LAMINECTOMY TRANSFORAMINAL LUMBAR INTERBODY FUSION LEFT L3-4, L4-5 QUADRANT RETRACTOR;  Surgeon: Kj Falcon MD;  Location:  [facility];  Service: Neurosurgery    SKIN LESION EXCISION      nasal       Review of Systems:    Constitutional: Pleasant gentleman currently sitting in the recliner getting ready for his CT scan.  Cardiovascular: Denies any chest pain or palpitations.  Respiratory: Denies any shortness of breath.  Gastrointestinal: Denies any nausea and vomiting.  Genitourinary: Denies any bladder incontinence.  Musculoskeletal: Denies any aches and pains in the muscles or joints.  Dermatological: No skin break down noted.  Neurological: The NIH stroke scale is 0.  Psychiatric: No major anxiety or depression noted.  Ophthalmological: No vision changes noted.          Medications On Admission  (Not in a hospital admission)      Prior to Admission medications    Medication Sig Start Date End Date Taking? Authorizing Provider   apixaban (ELIQUIS) 5 MG tablet tablet Take 1 tablet by mouth 2 (Two) Times a Day. 12/28/23   Tatianna Jessica APRN   Ascorbic Acid (Vitamin C) 500 MG capsule Take 1 capsule by mouth 2 (two) times a day.    ProviderFredrick MD   Cholecalciferol (Vitamin D3) 50 MCG (2000 UT) tablet Take 1 tablet by mouth Daily.    ProviderFredrick MD   clotrimazole-betamethasone (Lotrisone) 1-0.05 % cream Apply 1 application  topically to the appropriate area as directed 2 (Two) Times a Day. 9/28/23   Miguel Bond MD   diphenoxylate-atropine (Lomotil) 2.5-0.025 MG per tablet Take 1 tablet by mouth 4 (Four) Times a Day As Needed for Diarrhea. 5/9/23   Miguel Bond MD   Docusate Calcium (STOOL SOFTENER PO) Take  by mouth.    ProviderFredrick  MD   famotidine (PEPCID) 20 MG tablet Take 1 tablet by mouth As Needed for Indigestion or Heartburn. 1/29/24   Jaspal Hull MD   Flaxseed, Linseed, (FLAXSEED OIL) 1000 MG capsule 540mg takes 4 tablets by mouth daily    Fredrick Manley MD   folic acid (FOLVITE) 800 MCG tablet Taking as multivitamin now    Fredrick Manley MD   gabapentin (NEURONTIN) 600 MG tablet Take 1 tablet by mouth 3 (Three) Times a Day. 8/20/19   Kj Falcon MD   Gabapentin 8 % Apply 1-2 g topically to the appropriate area as directed 3 (Three) to 4 (Four) times daily. 4/17/24 4/17/25  Josiah Robles APRN   L-Lysine 600 MG tablet Take 1 tablet by mouth Daily.    Fredrick Manley MD   losartan (COZAAR) 50 MG tablet Take 1 tablet by mouth Daily. 9/28/23   Miguel Bond MD   metoprolol tartrate (LOPRESSOR) 25 MG tablet Take 1 tablet by mouth 2 (Two) Times a Day. Takes 12.5 mg in am and 25 mg in pm    Fredrick Manley MD   pravastatin (PRAVACHOL) 20 MG tablet Take 1 tablet by mouth Every Night. 7/7/21   Glenys Archer APRN   Psyllium (Metamucil) wafer wafer Take  by mouth Daily.    Fredrick Manley MD   tiZANidine (ZANAFLEX) 4 MG tablet Take 1 tablet by mouth 2 (Two) Times a Day As Needed for Muscle Spasms. 3/19/24   Josiah Robles APRN   triamcinolone (KENALOG) 0.1 % ointment APPLY TWICE A DAY TO AFFECTED AREAS ON TRUNK, LEGS, AND ARMS WHEN AND WHERE RASH IS PRESENT. DO NOT USE ON FACE. 3/20/24   Fredrick Manley MD        Allergies:  Allergies   Allergen Reactions    Codeine Nausea And Vomiting and GI Intolerance    Fentanyl Hallucinations and Other (See Comments)     DELUSIONAL  Fentanyl patchs, caused patient to become confused and anxious per family  Fentanyl patchs, caused patient to become confused and anxious per family    Augmentin [Amoxicillin-Pot Clavulanate] GI Intolerance       Social Hx:  Social History     Socioeconomic History    Marital status:    Tobacco  Use    Smoking status: Former     Current packs/day: 0.00     Types: Cigarettes     Start date:      Quit date:      Years since quittin.3    Smokeless tobacco: Never    Tobacco comments:     age [age] years of age   Vaping Use    Vaping status: Never Used   Substance and Sexual Activity    Alcohol use: No    Drug use: Never    Sexual activity: Not Currently     Partners: Female       Family Hx:  Family History   Problem Relation Age of Onset    No Known Problems Mother     No Known Problems Father        Review of Imaging (Interpretation of images not reports): An MRI of the brain without contrast performed on 2017 at the [facility] was interpreted as follows:    EXAMINATION: MRI BRAIN WO CONTRAST 10/2/2017 4:43 PM  HISTORY: Left hand tremors.  Report: Multiplanar multisequence MR imaging of the brain was  performed without contrast. There are no comparison studies.  There is no diffusion restriction. No intracranial mass or mass effect  is identified. The ventricles and basal cisterns are within normal  limits. There is mild diffuse cortical atrophy. Gradient echo images  show no evidence of intracranial hemorrhage. FLAIR and T2-weighted  images demonstrate confluent increased signal in the periventricular  white matter tracts, compatible with mild chronic small vessel white  matter ischemic disease. Sagittal images show a normal appearance of  the midline structures, including the pituitary gland, brainstem and  corpus callosum. Small symmetric basal ganglia calcifications are  present. The intracranial vascular flow voids appear within normal  limits. The extracranial structures are within normal limits.  Exam End: 10/02/17 17:42             Additional Tests Performed: The MRI of the lumbar spine without contrast performed on May 19, 2021 was interpreted as follows:    MRI lumbar spine: Multiplanar images lumbar spine performed without  contrast.     COMPARISON: CT abdomen pelvis  2/2/2021     FINDINGS: There is susceptibility artifact associated with the hardware  fusing the posterior L3-S1 vertebra anterior L5 and S1 vertebra. There  are interposition grafts between these fused vertebra. There is a right  convexity of the lumbar curvature measuring approximately 10 degrees.  There is subtle grade 1 spondylolisthesis at the L2/L3 level. Mild  chronic anterior wedging of the L1 vertebra. Schmorl's nodes scattered  along the endplates of each lumbar vertebra with moderate anterior  endplate spurring.     Conus medullaris terminates at the L1 level. There is central clumping  of the nerve roots within the spinal canal below the L2/L3 level.     At T11/T12, there is mild posterior disc bulging with mild facet  arthropathy resulting in mild central canal stenosis.     At T12/T11, no significant disc bulging. No canal stenosis or neural  foramen narrowing. Mild facet arthropathy.     At L1/L2, there is a small to moderate facet arthropathy with mild  posterior disc bulging. There is mild to moderate central canal stenosis  with no prominent neural foramen narrowing.     At L2/L3, there is severe central canal stenosis from the moderate facet  arthropathy and ligamentum flavum hypertrophy as well as mild posterior  endplate spurring and disc bulging. Only mild neural foramen narrowing  considered.     Laminectomy changes at L3/L4 through L5-S1 decompress the lower lumbar  canal. Susceptibility artifact degrades visualization of the neural  foramen.     At L3/L4, there is moderate facet arthropathy which does contribute to  mild canal stenosis. Posterior lateral endplate spurring foraminal disc  bulging contributing to mild to moderate bilateral neural foramen  narrowing.      At L4/L5, there is advanced facet osteoarthropathy with posterolateral  endplate spurring as well as based posterior disc bulging. There is mild  to moderate neural foramen narrowing considered at this level.     At L5-S1,  "decompression of the canal. Posterior lateral endplate  spurring foraminal disc bulging is suspected with mild neural foramen  narrowing considered.     IMPRESSION:  1. Right convexity of the degenerative lumbar spine with posterior  fusion at L3-S1 and anterior fusion at the L5-S1 level. Grade 1  spondylolisthesis at L2/L3 with severe central spinal canal stenosis at  the L2/L3 level. 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 No epidural  fluid collection identified. Please refer to dictation above for  detailed findings at each level.  This report was finalized on 05/19/2021 14:56 by Dr. Ayana Hurd MD.     Imaging                Laboratory Results:   Lab Results   Component Value Date    GLUCOSE 94 12/19/2023    CALCIUM 9.1 12/19/2023     12/19/2023    K 4.4 12/19/2023    CO2 25.4 12/19/2023     12/19/2023    BUN 13 12/19/2023    CREATININE 0.95 12/19/2023    EGFRIFAFRI 126 12/01/2021    EGFRIFNONA 126 01/29/2022    BCR 13.7 12/19/2023    ANIONGAP 11.0 01/29/2022     Lab Results   Component Value Date    WBC 6.15 12/19/2023    HGB 12.7 (L) 12/19/2023    HCT 36.9 (L) 12/19/2023    MCV 96.1 12/19/2023     12/19/2023     No results found for: \"CHOL\"  Lab Results   Component Value Date    HDL 44 12/19/2023    HDL 31 (L) 12/06/2022    HDL 55 12/01/2021     Lab Results   Component Value Date    LDL 77 12/19/2023    LDL 89 12/06/2022    LDL 94 12/01/2021     Lab Results   Component Value Date    TRIG 54 12/19/2023    TRIG 128 12/06/2022    TRIG 48 12/01/2021     No results found for: \"HGBA1C\"  Lab Results   Component Value Date    INR 1.16 (H) 01/29/2022    INR 1.12 10/22/2019    PROTIME 14.4 01/29/2022    PROTIME 13.8 10/22/2019     Lab Results   Component Value Date    MLYCIHGB61 488 12/14/2018     Lab Results   Component Value Date    FOLATE 19.1 12/14/2018     TSH          12/19/2023    07:43   TSH   TSH 2.240     " "      Physical Examination:  /65   Pulse 72   Resp 16   Ht 172.7 cm (68\")   Wt 76.3 kg (168 lb 4.8 oz)   SpO2 95%   BMI 25.59 kg/m²   General Appearance:   Well developed, well nourished, well groomed, alert, and cooperative.  HEENT: Normocephalic.    Neck and Spine: Normal range of motion.  Normal alignment. No mass or tenderness. No bruits.    Extremities:    No edema or deformities. Normal joint ROM.   Skin:    No rashes or birth marks.    Neurological examination:  Higher Integrative  Function: Oriented to time, place and person. Normal registration, recall, attention span and concentration. Normal language including comprehension, spontaneous speech, repetition, reading, writing, naming and vocabulary. No neglect with normal visual-spatial function and construction. Normal fund of knowledge and higher integrative function.  CN II:  Pupils are equal, round, and reactive to light. Normal visual acuity and visual fields.    CN III IV VI: Extraocular movements are full without nystagmus.   CN V:  Normal facial sensation and strength of muscles of mastication.  CN VII:  Facial movements are symmetric. No weakness.  CN VIII: Auditory acuity is normal.  CN IX & X: Symmetric palatal movement.  CN XI:  Sternocleidomastoid and trapezius are normal.  No weakness.  CN XII:  The tongue is midline.  No atrophy or fasciculations.  Motor:  Normal muscle strength, bulk and tone in upper and lower extremities.  No fasciculations, rigidity, spasticity, or abnormal movements.  Sensation: Normal to light touch, pinprick, vibration, temperature, and proprioception in arms and legs. Normal graphesthesia and no extinction on DSS.  Station and Gait: Currently sitting comfortably in the wheelchair without any problems.  Earlier he was able to walk slowly during the registration of the patient..    Coordination:  Finger to nose test shows no dysmetria.  Heel to shin normal.    NIHSS:    Baseline  0-->Alert: keenly " responsive  0-->Answers both questions correctly  0-->Performs both tasks correctly  0=normal  0=No visual loss  0=Normal symmetric movement  0-->No drift: limb holds 90 (or 45) degrees for full 10 secs  0-->No drift: limb holds 90 (or 45) degrees for full 10 secs  0-->No drift: limb holds 90 (or 45) degrees for full 10 secs  0-->No drift: limb holds 90 (or 45) degrees for full 10 secs  0=Absent  0=Normal; no sensory loss  0=No aphasia, normal  0=Normal  0=No abnormality    Total score: 0    Diagnoses / Discussion:  89 y.o. who presents with Sx of transient ischemic attack which is currently back to baseline.    Plan:  Continue the Eliquis at 5 mg twice daily with food as before.  Hydrate  Neuro checks  Check lipid panel, Hgb A1C, TSH, sed rate, vitamin B12, folic acid levels  Lipitor 80 mg  Non-pharmacological DVT prophylaxis  TTE with bubble study to look for PFO  Carotid ultrasound to look for left internal carotid artery stenosis.  MRI of the brain with a stroke protocol  Telemetry Unit admission/observation to look for cardiac arrhythmias  PT/OT/ST  Stroke Education  Smoking cessation protocol  Blood pressure control : Keep the systolic blood pressure between 120 and 140 and the diastolic between 70 and 80  Goal LDL <70-recommend high dose statins-   Serum glucose < 140  Body mass index: 25.6 kg/m² which is within normal limits     Discussed signs and symptoms of stroke and when to call 911. Instructed to follow a low fat diet including the Mediterranean diet. Instructed to take all medications daily as prescribed. Encouraged 30 minutes of exercise 3-4 times a week as tolerated. Stay well hydrated. Discussed potential side effects of new medications and signs and symptoms to report.  Reviewed stroke risk factors and stroke prevention plan. Patient and/or family verbalizes understanding and agrees with plan.     I have discussed the above with the patient and Dr. Michael rai and he will be followed up by our  inpatient teleneurology service..  Time spent with patient: 70 minutes in face-to-face evaluation and management of the patient using the dedicated telemedicine device without any interruption with the help of EDIE Jackson with the patient located at the [facility] and myself at a remote location.    Electronically signed by Bro Valdez MD, 05/06/24, 5:11 PM CDT.    Dictated using Dragon dictation.

## 2024-05-06 NOTE — ED PROVIDER NOTES
Subjective   History of Present Illness  Patient is a 89-year-old who came to the ED after having an episode in which she was not able to speak and had difficulty visualizing things.  Was evaluated in the triage Dr. Octavio anand code stroke was called on the patient.    Stroke  Presenting symptoms: language symptoms and visual change    Presenting symptoms: no change in level of consciousness, no confusion and no headaches    Onset quality:  Sudden  Last known well:  In the morning but the exact time is not completed to mind.  Timing:  Constant  Progression:  Partially resolved  Similar to previous episodes: no    Associated symptoms: no chest pain, no difficulty swallowing, no dizziness, no facial pain, no hearing loss, no bladder incontinence, no nausea, no seizures and no tinnitus        Review of Systems   Unable to perform ROS: Acuity of condition   HENT:  Negative for hearing loss, tinnitus and trouble swallowing.    Cardiovascular:  Negative for chest pain.   Gastrointestinal:  Negative for nausea.   Genitourinary:  Negative for bladder incontinence.   Neurological:  Negative for dizziness, seizures and headaches.   Psychiatric/Behavioral:  Negative for confusion.        Past Medical History:   Diagnosis Date    Allergic rhinitis     Anemia     Arthritis     Blind left eye     BPH (benign prostatic hypertrophy) with urinary retention     Cancer     skin cancer    Chronic back pain     RUNS DOWN BOTH LEGS    Constipation     Coronary artery disease     COVID     Hard of hearing     Heart attack 1986    NO MUSCLE DAMAGE - JUST OCCLUDED VALVE    High cholesterol     History of skin cancer     BILATERAL EARS    History of transfusion     1986    Hypertension     Inguinal hernia     Leaky heart valve     DR CONCEPCION SAYS NOT ENOUGH TO BE OF CONCERN    Other bursal cyst, right elbow     Paroxysmal atrial fibrillation 5/6/2024    PONV (postoperative nausea and vomiting)     has had scopalamine patch in past     Sleep  apnea     DOES NOT USE CPAP OR BIPAP    Spinal stenosis of cervical region     Spinal stenosis of lumbar region     Stroke     Tremors of nervous system     Wears hearing aid     BILATERAL       Allergies   Allergen Reactions    Codeine Nausea And Vomiting and GI Intolerance    Fentanyl Hallucinations and Other (See Comments)     DELUSIONAL  Fentanyl patchs, caused patient to become confused and anxious per family  Fentanyl patchs, caused patient to become confused and anxious per family    Augmentin [Amoxicillin-Pot Clavulanate] GI Intolerance       Past Surgical History:   Procedure Laterality Date    ANTERIOR LUMBAR EXPOSURE N/A 12/07/2018    Procedure: ANTERIOR LUMBAR EXPOSURE;  Surgeon: Manolo Mcleod DO;  Location:  [facility];  Service: Vascular    APPENDECTOMY      BACK SURGERY      X3    CARDIAC SURGERY  08/08/1986    X1 BYPASS    CORONARY ANGIOPLASTY WITH STENT PLACEMENT  1997    X3 STENTS    CORONARY ARTERY BYPASS GRAFT      HERNIA REPAIR Bilateral     x2    INGUINAL HERNIA REPAIR Right 06/22/2017    Procedure: RIGHT INGUINAL HERNIA REPAIR AND EXCISION OF CYST RIGHT ELBOW ;  Surgeon: Jackelyn Arriola MD;  Location:  [facility];  Service:     LUMBAR FUSION Left 06/30/2021    Procedure: LUMBAR LAMINECTOMY, DISCECTOMY, FACETECTOMY,  TRANSFORAMINAL LUMBAR INTERBODY FUSION L2-3. REVISION OF INSTRUMENTATION L3-S1. USE OF IMAGE GUIDANCE AND SURGICAL ROBOT;  Surgeon: Kj Falcon MD;  Location:  [facility];  Service: Robotics - Neuro;  Laterality: Left;    LUMBAR LAMINECTOMY N/A 03/12/2018    Procedure: LUMBAR LAMINECTOMY WITHOUT FUSION L3-4,4-5;  Surgeon: Kj Falcon MD;  Location:  [facility];  Service: Neurosurgery    LUMBAR LAMINECTOMY ANTERIOR LUMBAR INTERBODY FUSION N/A 12/07/2018    Procedure: ANTERIOR LUMBAR INTERBODY FUSION L5-S1;  Surgeon: Kj Falcon MD;  Location:  [facility];  Service: Neurosurgery    LUMBAR LAMINECTOMY WITH FUSION Left 12/10/2018    Procedure: LUMBAR LAMINECTOMY  TRANSFORAMINAL LUMBAR INTERBODY FUSION L34,45;  Surgeon: Kj Falcon MD;  Location:  [facility];  Service: Neurosurgery    LUMBAR LAMINECTOMY WITH FUSION Left 2018    Procedure: LUMBAR LAMINECTOMY TRANSFORAMINAL LUMBAR INTERBODY FUSION LEFT L3-4, L4-5 QUADRANT RETRACTOR;  Surgeon: Kj Falcon MD;  Location:  [facility];  Service: Neurosurgery    SKIN LESION EXCISION      nasal       Family History   Problem Relation Age of Onset    No Known Problems Mother     No Known Problems Father        Social History     Socioeconomic History    Marital status:    Tobacco Use    Smoking status: Former     Current packs/day: 0.00     Types: Cigarettes     Start date:      Quit date:      Years since quittin.3    Smokeless tobacco: Never    Tobacco comments:     age [age] years of age   Vaping Use    Vaping status: Never Used   Substance and Sexual Activity    Alcohol use: No    Drug use: Never    Sexual activity: Not Currently     Partners: Female           Objective   Physical Exam  Vitals and nursing note reviewed. Exam conducted with a chaperone present.   Constitutional:       General: He is not in acute distress.     Appearance: Normal appearance. He is not ill-appearing or diaphoretic.      Comments: Confused   HENT:      Head: Normocephalic and atraumatic.      Nose: Nose normal.      Mouth/Throat:      Mouth: Mucous membranes are moist.      Pharynx: Oropharynx is clear.   Eyes:      General: No visual field deficit or scleral icterus.     Extraocular Movements: Extraocular movements intact.      Conjunctiva/sclera: Conjunctivae normal.      Pupils: Pupils are equal, round, and reactive to light.      Comments: Blind in the left eye   Neck:      Vascular: No carotid bruit.   Cardiovascular:      Rate and Rhythm: Normal rate and regular rhythm.      Pulses: Normal pulses.      Heart sounds: No murmur heard.     No friction rub.   Pulmonary:      Effort: Pulmonary effort is normal. No  respiratory distress.      Breath sounds: Normal breath sounds. No stridor.   Abdominal:      General: Abdomen is flat. Bowel sounds are normal. There is no distension.      Palpations: There is no mass.      Tenderness: There is no abdominal tenderness.   Musculoskeletal:         General: No swelling or tenderness. Normal range of motion.      Cervical back: Normal range of motion and neck supple. No rigidity. No muscular tenderness.   Lymphadenopathy:      Cervical: No cervical adenopathy.   Skin:     General: Skin is warm.      Capillary Refill: Capillary refill takes less than 2 seconds.      Coloration: Skin is not jaundiced or pale.      Findings: No bruising or rash.   Neurological:      General: No focal deficit present.      Mental Status: He is oriented to person, place, and time. Mental status is at baseline.      GCS: GCS eye subscore is 4. GCS verbal subscore is 5. GCS motor subscore is 6.      Cranial Nerves: Facial asymmetry present. No cranial nerve deficit or dysarthria.      Sensory: Sensation is intact.      Motor: Motor function is intact. No weakness, abnormal muscle tone, seizure activity or pronator drift.      Deep Tendon Reflexes:      Reflex Scores:       Bicep reflexes are 1+ on the right side and 1+ on the left side.       Patellar reflexes are 1+ on the right side and 1+ on the left side.     Comments: No speech impediment at this time able to talk.  Orientation is grossly intact he is able to do finger-nose but not heel-shin.   Psychiatric:         Attention and Perception: Attention normal.         Mood and Affect: Mood and affect normal.         Speech: Speech normal.         Behavior: Behavior normal. Behavior is cooperative.         Procedures           ED Course  ED Course as of 05/06/24 1818   Mon May 06, 2024   1701 Code stroke was called on this patient with Dr. Cunningham saw the patient in triage.  Nursing staff asked me to put in orders. [TS]   1817 Patient did not receive tPA  because not a tPA candidate his symptoms are resolved neuro neurology has consulted and recommended not to give tPA to this patient. [TS]   1817 EKG is nonischemic. [TS]   1817 Discussed with neurology the patient will be admitted to medicine service [TS]   1818 NIH score is 1 [TS]      ED Course User Index  [TS] Michael Purcell MD                          Total (NIH Stroke Scale): 1                  Medical Decision Making  Patient is a 89-year-old who came to the ED with difficulty speaking and visual deficits his speech has improved and her deficit has resolved at this time.  Has some facial asymmetry.  Neurology has consulted on the patient after code stroke was called.  They recommend admitting the patient for TIA workup.  tPA was not given the patient    Amount and/or Complexity of Data Reviewed  Labs: ordered.     Details: Labs reviewed  Radiology: ordered.  ECG/medicine tests: ordered.  Discussion of management or test interpretation with external provider(s): As per the hospitalist and neurologist.    Risk  Prescription drug management.  Risk Details: Schizophrenia patient will be admitted.        Final diagnoses:   TIA (transient ischemic attack)       ED Disposition  ED Disposition       ED Disposition   Decision to Admit    Condition   --    Comment   Level of Care: Telemetry [5]   Diagnosis: TIA (transient ischemic attack) [[id number]]   Admitting Physician: RANJITH DUONG [[id number]]   Attending Physician: RANJITH DUONG [[id number]]                 No follow-up provider specified.       Medication List      No changes were made to your prescriptions during this visit.            Michael Purcell MD  05/06/24 3791

## 2024-05-07 ENCOUNTER — APPOINTMENT (OUTPATIENT)
Dept: CARDIOLOGY | Facility: HOSPITAL | Age: 89
End: 2024-05-07
Payer: MEDICARE

## 2024-05-07 LAB
BH CV ECHO MEAS - AO MAX PG: 3.5 MMHG
BH CV ECHO MEAS - AO MEAN PG: 2 MMHG
BH CV ECHO MEAS - AO ROOT DIAM: 3.3 CM
BH CV ECHO MEAS - AO V2 MAX: 92.9 CM/SEC
BH CV ECHO MEAS - AO V2 VTI: 17.6 CM
BH CV ECHO MEAS - AVA(I,D): 4.2 CM2
BH CV ECHO MEAS - EDV(CUBED): 75.4 ML
BH CV ECHO MEAS - EDV(MOD-SP4): 103 ML
BH CV ECHO MEAS - EF(MOD-SP4): 54.7 %
BH CV ECHO MEAS - ESV(CUBED): 29.8 ML
BH CV ECHO MEAS - ESV(MOD-SP4): 46.7 ML
BH CV ECHO MEAS - FS: 26.6 %
BH CV ECHO MEAS - IVS/LVPW: 1.04 CM
BH CV ECHO MEAS - IVSD: 1.41 CM
BH CV ECHO MEAS - LA DIMENSION: 4.5 CM
BH CV ECHO MEAS - LAT PEAK E' VEL: 6.2 CM/SEC
BH CV ECHO MEAS - LV DIASTOLIC VOL/BSA (35-75): 54.6 CM2
BH CV ECHO MEAS - LV MASS(C)D: 222.3 GRAMS
BH CV ECHO MEAS - LV MAX PG: 2.42 MMHG
BH CV ECHO MEAS - LV MEAN PG: 1 MMHG
BH CV ECHO MEAS - LV SYSTOLIC VOL/BSA (12-30): 24.7 CM2
BH CV ECHO MEAS - LV V1 MAX: 77.5 CM/SEC
BH CV ECHO MEAS - LV V1 VTI: 16.2 CM
BH CV ECHO MEAS - LVIDD: 4.2 CM
BH CV ECHO MEAS - LVIDS: 3.1 CM
BH CV ECHO MEAS - LVOT AREA: 4.5 CM2
BH CV ECHO MEAS - LVOT DIAM: 2.4 CM
BH CV ECHO MEAS - LVPWD: 1.36 CM
BH CV ECHO MEAS - MED PEAK E' VEL: 4.8 CM/SEC
BH CV ECHO MEAS - MR MAX PG: 35 MMHG
BH CV ECHO MEAS - MR MAX VEL: 296 CM/SEC
BH CV ECHO MEAS - MV A MAX VEL: 87.6 CM/SEC
BH CV ECHO MEAS - MV DEC TIME: 0.28 SEC
BH CV ECHO MEAS - MV E MAX VEL: 36.4 CM/SEC
BH CV ECHO MEAS - MV E/A: 0.42
BH CV ECHO MEAS - PA V2 MAX: 94.4 CM/SEC
BH CV ECHO MEAS - PI END-D VEL: 122 CM/SEC
BH CV ECHO MEAS - SV(LVOT): 73.3 ML
BH CV ECHO MEAS - SV(MOD-SP4): 56.3 ML
BH CV ECHO MEAS - SVI(LVOT): 38.8 ML/M2
BH CV ECHO MEAS - SVI(MOD-SP4): 29.8 ML/M2
BH CV ECHO MEAS - TAPSE (>1.6): 1.47 CM
BH CV ECHO MEAS - TR MAX PG: 23.6 MMHG
BH CV ECHO MEAS - TR MAX VEL: 243 CM/SEC
BH CV ECHO MEASUREMENTS AVERAGE E/E' RATIO: 6.62
BH CV XLRA - RV BASE: 3.9 CM
BH CV XLRA - TDI S': 12.1 CM/SEC
CHOLEST SERPL-MCNC: 144 MG/DL (ref 0–200)
DEPRECATED RDW RBC AUTO: 49.3 FL (ref 37–54)
ERYTHROCYTE [DISTWIDTH] IN BLOOD BY AUTOMATED COUNT: 13.9 % (ref 12.3–15.4)
ERYTHROCYTE [SEDIMENTATION RATE] IN BLOOD: 3 MM/HR (ref 0–20)
FOLATE SERPL-MCNC: >20 NG/ML (ref 4.78–24.2)
HBA1C MFR BLD: 5.3 % (ref 4.8–5.6)
HCT VFR BLD AUTO: 35.4 % (ref 37.5–51)
HDLC SERPL-MCNC: 41 MG/DL (ref 40–60)
HGB BLD-MCNC: 11.8 G/DL (ref 13–17.7)
LDLC SERPL CALC-MCNC: 88 MG/DL (ref 0–100)
LDLC/HDLC SERPL: 2.15 {RATIO}
MCH RBC QN AUTO: 32.1 PG (ref 26.6–33)
MCHC RBC AUTO-ENTMCNC: 33.3 G/DL (ref 31.5–35.7)
MCV RBC AUTO: 96.2 FL (ref 79–97)
PLATELET # BLD AUTO: 232 10*3/MM3 (ref 140–450)
PMV BLD AUTO: 9.9 FL (ref 6–12)
QT INTERVAL: 420 MS
QTC INTERVAL: 422 MS
RBC # BLD AUTO: 3.68 10*6/MM3 (ref 4.14–5.8)
TRIGL SERPL-MCNC: 75 MG/DL (ref 0–150)
TSH SERPL DL<=0.05 MIU/L-ACNC: 2.33 UIU/ML (ref 0.27–4.2)
VIT B12 BLD-MCNC: 412 PG/ML (ref 211–946)
VLDLC SERPL-MCNC: 15 MG/DL (ref 5–40)
WBC NRBC COR # BLD AUTO: 7.96 10*3/MM3 (ref 3.4–10.8)

## 2024-05-07 PROCEDURE — 92610 EVALUATE SWALLOWING FUNCTION: CPT | Performed by: SPEECH-LANGUAGE PATHOLOGIST

## 2024-05-07 PROCEDURE — 25010000002 LABETALOL 5 MG/ML SOLUTION: Performed by: INTERNAL MEDICINE

## 2024-05-07 PROCEDURE — 85027 COMPLETE CBC AUTOMATED: CPT | Performed by: PSYCHIATRY & NEUROLOGY

## 2024-05-07 PROCEDURE — G0378 HOSPITAL OBSERVATION PER HR: HCPCS

## 2024-05-07 PROCEDURE — 96372 THER/PROPH/DIAG INJ SC/IM: CPT

## 2024-05-07 PROCEDURE — 97161 PT EVAL LOW COMPLEX 20 MIN: CPT | Performed by: PHYSICAL THERAPIST

## 2024-05-07 PROCEDURE — 80061 LIPID PANEL: CPT | Performed by: PSYCHIATRY & NEUROLOGY

## 2024-05-07 PROCEDURE — 84443 ASSAY THYROID STIM HORMONE: CPT | Performed by: PSYCHIATRY & NEUROLOGY

## 2024-05-07 PROCEDURE — 96376 TX/PRO/DX INJ SAME DRUG ADON: CPT

## 2024-05-07 PROCEDURE — 36415 COLL VENOUS BLD VENIPUNCTURE: CPT | Performed by: PSYCHIATRY & NEUROLOGY

## 2024-05-07 PROCEDURE — 83036 HEMOGLOBIN GLYCOSYLATED A1C: CPT | Performed by: PSYCHIATRY & NEUROLOGY

## 2024-05-07 PROCEDURE — 82746 ASSAY OF FOLIC ACID SERUM: CPT | Performed by: PSYCHIATRY & NEUROLOGY

## 2024-05-07 PROCEDURE — 25010000002 ONDANSETRON PER 1 MG: Performed by: PSYCHIATRY & NEUROLOGY

## 2024-05-07 PROCEDURE — 96375 TX/PRO/DX INJ NEW DRUG ADDON: CPT

## 2024-05-07 PROCEDURE — 82607 VITAMIN B-12: CPT | Performed by: PSYCHIATRY & NEUROLOGY

## 2024-05-07 PROCEDURE — 85652 RBC SED RATE AUTOMATED: CPT | Performed by: PSYCHIATRY & NEUROLOGY

## 2024-05-07 PROCEDURE — 97165 OT EVAL LOW COMPLEX 30 MIN: CPT

## 2024-05-07 PROCEDURE — 25010000002 ENOXAPARIN PER 10 MG: Performed by: NURSE PRACTITIONER

## 2024-05-07 PROCEDURE — 25510000001 PERFLUTREN 6.52 MG/ML SUSPENSION: Performed by: INTERNAL MEDICINE

## 2024-05-07 PROCEDURE — 93306 TTE W/DOPPLER COMPLETE: CPT

## 2024-05-07 PROCEDURE — 93306 TTE W/DOPPLER COMPLETE: CPT | Performed by: HOSPITALIST

## 2024-05-07 RX ORDER — LABETALOL HYDROCHLORIDE 5 MG/ML
10 INJECTION, SOLUTION INTRAVENOUS EVERY 4 HOURS PRN
Status: DISCONTINUED | OUTPATIENT
Start: 2024-05-07 | End: 2024-05-09 | Stop reason: HOSPADM

## 2024-05-07 RX ORDER — LOSARTAN POTASSIUM 50 MG/1
25 TABLET ORAL DAILY
Status: DISCONTINUED | OUTPATIENT
Start: 2024-05-07 | End: 2024-05-08

## 2024-05-07 RX ORDER — LOSARTAN POTASSIUM 50 MG/1
25 TABLET ORAL DAILY
Start: 2024-05-07 | End: 2024-05-09 | Stop reason: HOSPADM

## 2024-05-07 RX ORDER — ATORVASTATIN CALCIUM 80 MG/1
80 TABLET, FILM COATED ORAL NIGHTLY
Qty: 30 TABLET | Refills: 0 | Status: SHIPPED | OUTPATIENT
Start: 2024-05-07 | End: 2024-06-06

## 2024-05-07 RX ORDER — LOSARTAN POTASSIUM 50 MG/1
25 TABLET ORAL ONCE
Status: COMPLETED | OUTPATIENT
Start: 2024-05-07 | End: 2024-05-07

## 2024-05-07 RX ADMIN — PERFLUTREN 6.52 MG: 6.52 INJECTION, SUSPENSION INTRAVENOUS at 09:00

## 2024-05-07 RX ADMIN — ONDANSETRON 4 MG: 2 INJECTION INTRAMUSCULAR; INTRAVENOUS at 19:46

## 2024-05-07 RX ADMIN — ENOXAPARIN SODIUM 80 MG: 100 INJECTION SUBCUTANEOUS at 10:28

## 2024-05-07 RX ADMIN — ATORVASTATIN CALCIUM 80 MG: 40 TABLET ORAL at 21:25

## 2024-05-07 RX ADMIN — METOPROLOL TARTRATE 12.5 MG: 25 TABLET, FILM COATED ORAL at 12:42

## 2024-05-07 RX ADMIN — LOSARTAN POTASSIUM 25 MG: 50 TABLET, FILM COATED ORAL at 18:35

## 2024-05-07 RX ADMIN — APIXABAN 5 MG: 5 TABLET, FILM COATED ORAL at 21:25

## 2024-05-07 RX ADMIN — LABETALOL HYDROCHLORIDE 10 MG: 5 INJECTION, SOLUTION INTRAVENOUS at 21:36

## 2024-05-07 RX ADMIN — LOSARTAN POTASSIUM 25 MG: 50 TABLET, FILM COATED ORAL at 12:42

## 2024-05-07 RX ADMIN — Medication 10 ML: at 10:19

## 2024-05-07 NOTE — PLAN OF CARE
Goal Outcome Evaluation:  Plan of Care Reviewed With: patient, spouse        Progress: no change  Outcome Evaluation: OT eval completed. Pt in fowlers upon therapist arrival; A&Ox4; No c/o pain; Pt's wife also present. Pt reports Mod I-I with all BADLs at baseline. Today, Pt performed supine<>sit utilizing bedrail with HOB elevated with Mod I. Pt donned B socks while seated EOB with SBA. Pt performed sit<>stand and ambulated from bed<>BR utilizing no AD with CGA; Pt does demo some unsteadiness on feet and Pt's wife reports that the Pt's balance has steadily been getting worse. Pt performed sit<>stand toilet transfer utilizing grab bar with CGA; Pt performed clothing management in standing with CGA. Pt demos BUE strength WFL. Some coordination deficits present, however, these deficits are present bilaterally at baseline due to intention tremors. No focal neuro deficits observed. Skilled OT intervention indicated in order to address deficits in fxl mobility, fxl activity tolerance, balance, coordination, and use of adaptive techniques/equipment during performance of BADLs. Recommend home with assist at discharge.      Anticipated Discharge Disposition (OT): home with assist

## 2024-05-07 NOTE — PLAN OF CARE
Goal Outcome Evaluation:  Plan of Care Reviewed With: patient, spouse        Progress: no change (eval)  Outcome Evaluation: ST clinical bedside swallow evaluation completed secondary to failed swallow screen. Pt admitted for acute TIA. Hx of CAD, HTN, stroke, hard of hearing. Pt noted to have slight labial droop on left side. Pt and spouse report that speech is back to baseline. Pt eating regular solids lunch tray and thin liquids upon ST arrival. Solids, thin, and puree observed. Pt was noted to have 3x slight delayed mild cough with thin liquids via cup. Pt observed with meds with thin liquids. Again delayed cough noted. Pt did take several drinks of thin via cup with no overt s/s of aspiration noted. RN listened to lungs following PO with no increased change noted.     Pt okay to remain on current diet of regular solids and thin liquids at this time. Meds whole with thin liquids. General aspiration precautions with PO. ST cannot fully r/o aspiration. RN to continue to monitor for any increased lung congestion. If any increased concerns noted, pt may benefit from thickened liquids. ST to follow PRN to ensure diet toleration.      Anticipated Discharge Disposition (SLP): home          SLP Swallowing Diagnosis: R/O pharyngeal dysphagia (05/07/24 3040)

## 2024-05-07 NOTE — PROGRESS NOTES
Neurology Progress Note    Primary Complaint:    TIA    Subjective    Interval History:  He is lying in bed doing well today.  No other symptoms overnight.  He had MRI of the brain which was unremarkable for any acute infarct.  Carotid ultrasound has been performed but not yet read.  He remains n.p.o. due to failing nursing bedside swallow.  However,  nursing reports that he has been evaluated for bedside swallow screen and is going to be receiving a diet.  He remains without any aphasia or any other neurological deficits at this time.    A1c 5.3%  TSH 2.3  LDL 88    Past Medical History:   Diagnosis Date    Allergic rhinitis     Anemia     Arthritis     Blind left eye     BPH (benign prostatic hypertrophy) with urinary retention     Cancer     skin cancer    Chronic back pain     RUNS DOWN BOTH LEGS    Constipation     Coronary artery disease     COVID     Hard of hearing     Heart attack 1986    NO MUSCLE DAMAGE - JUST OCCLUDED VALVE    High cholesterol     History of skin cancer     BILATERAL EARS    History of transfusion     1986    Hypertension     Inguinal hernia     Leaky heart valve     DR CONCEPCION SAYS NOT ENOUGH TO BE OF CONCERN    Other bursal cyst, right elbow     Paroxysmal atrial fibrillation 5/6/2024    PONV (postoperative nausea and vomiting)     has had scopalamine patch in past     Sleep apnea     DOES NOT USE CPAP OR BIPAP    Spinal stenosis of cervical region     Spinal stenosis of lumbar region     Stroke     Tremors of nervous system     Wears hearing aid     BILATERAL       Allergies   Allergen Reactions    Codeine Nausea And Vomiting and GI Intolerance    Fentanyl Hallucinations and Other (See Comments)     DELUSIONAL  Fentanyl patchs, caused patient to become confused and anxious per family  Fentanyl patchs, caused patient to become confused and anxious per family    Augmentin [Amoxicillin-Pot Clavulanate] GI Intolerance     No current facility-administered medications on file prior to  encounter.     Current Outpatient Medications on File Prior to Encounter   Medication Sig    alfuzosin (UROXATRAL) 10 MG 24 hr tablet Take 1 tablet by mouth Daily.    apixaban (ELIQUIS) 5 MG tablet tablet Take 1 tablet by mouth 2 (Two) Times a Day.    Ascorbic Acid (Vitamin C) 500 MG capsule Take 1 capsule by mouth 2 (two) times a day.    Cholecalciferol (Vitamin D3) 50 MCG (2000 UT) tablet Take 1 tablet by mouth Daily. 5000 units    clotrimazole-betamethasone (Lotrisone) 1-0.05 % cream Apply 1 application  topically to the appropriate area as directed 2 (Two) Times a Day.    diphenoxylate-atropine (Lomotil) 2.5-0.025 MG per tablet Take 1 tablet by mouth 4 (Four) Times a Day As Needed for Diarrhea.    Docusate Calcium (STOOL SOFTENER PO) Take  by mouth.    famotidine (PEPCID) 20 MG tablet Take 1 tablet by mouth As Needed for Indigestion or Heartburn.    finasteride (PROSCAR) 5 MG tablet Take 1 tablet by mouth Daily.    Flaxseed, Linseed, (FLAXSEED OIL) 1000 MG capsule 540mg takes 4 tablets by mouth daily    folic acid (FOLVITE) 800 MCG tablet Taking as multivitamin now    gabapentin (NEURONTIN) 600 MG tablet Take 1 tablet by mouth 3 (Three) Times a Day.    Gabapentin 8 % Apply 1-2 g topically to the appropriate area as directed 3 (Three) to 4 (Four) times daily.    L-Lysine 600 MG tablet Take 1 tablet by mouth Daily. 1000 mg    losartan (COZAAR) 50 MG tablet Take 1 tablet by mouth Daily. (Patient taking differently: Take 0.5 tablets by mouth Daily.)    metoprolol tartrate (LOPRESSOR) 25 MG tablet Take 0.5 tablets by mouth Daily. Takes 12.5 mg in am and 25 mg in pm    pravastatin (PRAVACHOL) 20 MG tablet Take 1 tablet by mouth Every Night.    Psyllium (Metamucil) wafer wafer Take  by mouth Daily.    tiZANidine (ZANAFLEX) 4 MG tablet Take 1 tablet by mouth 2 (Two) Times a Day As Needed for Muscle Spasms.    triamcinolone (KENALOG) 0.1 % ointment APPLY TWICE A DAY TO AFFECTED AREAS ON TRUNK, LEGS, AND ARMS WHEN AND  WHERE RASH IS PRESENT. DO NOT USE ON FACE.       Social History     Socioeconomic History    Marital status:    Tobacco Use    Smoking status: Former     Current packs/day: 0.00     Types: Cigarettes     Start date:      Quit date:      Years since quittin.3    Smokeless tobacco: Never    Tobacco comments:     age [age] years of age   Vaping Use    Vaping status: Never Used   Substance and Sexual Activity    Alcohol use: No    Drug use: Never    Sexual activity: Not Currently     Partners: Female     Family History   Problem Relation Age of Onset    No Known Problems Mother     No Known Problems Father          Objective    Vital Signs  Temp:  [97.4 °F (36.3 °C)-98.2 °F (36.8 °C)] 97.4 °F (36.3 °C)  Heart Rate:  [60-75] 60  Resp:  [12-18] 14  BP: (151-206)/(55-76) 179/63    Neurological Exam  Mental Status  Awake, alert and oriented to person, place and time. Recent and remote memory are intact. Speech is normal. Language is fluent with no aphasia. Attention and concentration are normal.    Cranial Nerves  CN II: Visual acuity is normal. Visual fields full to confrontation.  CN III, IV, VI: Extraocular movements intact bilaterally. Normal lids and orbits bilaterally. Pupils equal round and reactive to light bilaterally.  CN V: Facial sensation is normal.  CN VII: Full and symmetric facial movement.  CN IX, X: Palate elevates symmetrically. Normal gag reflex.  CN XI: Shoulder shrug strength is normal.  CN XII: Tongue midline without atrophy or fasciculations.    Motor   Strength is 5/5 throughout all four extremities.    Sensory  Sensation is intact to light touch, pinprick, vibration and proprioception in all four extremities.    Reflexes  Deep tendon reflexes are 2+ and symmetric in all four extremities.    Coordination    Finger-to-nose, rapid alternating movements and heel-to-shin normal bilaterally without dysmetria.    Gait  Normal casual, toe, heel and tandem gait.      Results Review:  Lab  Results (last 24 hours)       Procedure Component Value Units Date/Time    Vitamin B12 [[id number]]  (Normal) Collected: 05/07/24 0409    Specimen: Blood Updated: 05/07/24 1221     Vitamin B-12 412 pg/mL     Narrative:      Results may be falsely increased if patient taking Biotin.      Folate [[id number]]  (Normal) Collected: 05/07/24 0409    Specimen: Blood Updated: 05/07/24 1221     Folate >20.00 ng/mL     Narrative:      Results may be falsely increased if patient taking Biotin.      Lipid Panel [[id number]] Collected: 05/07/24 0409    Specimen: Blood Updated: 05/07/24 0526     Total Cholesterol 144 mg/dL      Triglycerides 75 mg/dL      HDL Cholesterol 41 mg/dL      LDL Cholesterol  88 mg/dL      VLDL Cholesterol 15 mg/dL      LDL/HDL Ratio 2.15    Narrative:      Cholesterol Reference Ranges  (U.S. Department of Health and Human Services ATP III Classifications)    Desirable          <200 mg/dL  Borderline High    200-239 mg/dL  High Risk          >240 mg/dL      Triglyceride Reference Ranges  (U.S. Department of Health and Human Services ATP III Classifications)    Normal           <150 mg/dL  Borderline High  150-199 mg/dL  High             200-499 mg/dL  Very High        >500 mg/dL    HDL Reference Ranges  (U.S. Department of Health and Human Services ATP III Classifications)    Low     <40 mg/dl (major risk factor for CHD)  High    >60 mg/dl ('negative' risk factor for CHD)        LDL Reference Ranges  (U.S. Department of Health and Human Services ATP III Classifications)    Optimal          <100 mg/dL  Near Optimal     100-129 mg/dL  Borderline High  130-159 mg/dL  High             160-189 mg/dL  Very High        >189 mg/dL    TSH [[id number]]  (Normal) Collected: 05/07/24 0409    Specimen: Blood Updated: 05/07/24 0524     TSH 2.330 uIU/mL     Hemoglobin A1c [[id number]]  (Normal) Collected: 05/07/24 0409    Specimen: Blood Updated: 05/07/24 0516     Hemoglobin A1C 5.30 %     Narrative:      Hemoglobin A1C  Ranges:    Increased Risk for Diabetes  5.7% to 6.4%  Diabetes                     >= 6.5%  Diabetic Goal                < 7.0%    Sedimentation Rate [[id number]]  (Normal) Collected: 05/07/24 0409    Specimen: Blood Updated: 05/07/24 0515     Sed Rate 3 mm/hr     CBC (No Diff) [[id number]]  (Abnormal) Collected: 05/07/24 0409    Specimen: Blood Updated: 05/07/24 0500     WBC 7.96 10*3/mm3      RBC 3.68 10*6/mm3      Hemoglobin 11.8 g/dL      Hematocrit 35.4 %      MCV 96.2 fL      MCH 32.1 pg      MCHC 33.3 g/dL      RDW 13.9 %      RDW-SD 49.3 fl      MPV 9.9 fL      Platelets 232 10*3/mm3     POC Glucose Once [[id number]]  (Normal) Collected: 05/06/24 2324    Specimen: Blood Updated: 05/06/24 2336     Glucose 112 mg/dL      Comment: : [id number] Tj AlexMeter ID: [id number]       Comprehensive Metabolic Panel [[id number]]  (Abnormal) Collected: 05/06/24 1706    Specimen: Blood Updated: 05/06/24 1742     Glucose 116 mg/dL      BUN 20 mg/dL      Creatinine 0.75 mg/dL      Sodium 138 mmol/L      Potassium 4.7 mmol/L      Comment: Slight hemolysis detected by analyzer. Result may be falsely elevated.        Chloride 101 mmol/L      CO2 28.0 mmol/L      Calcium 9.7 mg/dL      Total Protein 7.1 g/dL      Albumin 4.6 g/dL      ALT (SGPT) 19 U/L      AST (SGOT) 28 U/L      Comment: Slight hemolysis detected by analyzer. Result may be falsely elevated.        Alkaline Phosphatase 74 U/L      Total Bilirubin 0.4 mg/dL      Globulin 2.5 gm/dL      A/G Ratio 1.8 g/dL      BUN/Creatinine Ratio 26.7     Anion Gap 9.0 mmol/L      eGFR 83.6 mL/min/1.73     Narrative:      GFR Normal >60  Chronic Kidney Disease <60  Kidney Failure <15    The GFR formula is only valid for adults with stable renal function between ages 18 and 70.    Single High Sensitivity Troponin T [[id number]]  (Abnormal) Collected: 05/06/24 1706    Specimen: Blood Updated: 05/06/24 1735     HS Troponin T 39 ng/L     Narrative:      High Sensitive Troponin T  Reference Range:  <14.0 ng/L- Negative Female for AMI  <22.0 ng/L- Negative Male for AMI  >=14 - Abnormal Female indicating possible myocardial injury.  >=22 - Abnormal Male indicating possible myocardial injury.   Clinicians would have to utilize clinical acumen, EKG, Troponin, and serial changes to determine if it is an Acute Myocardial Infarction or myocardial injury due to an underlying chronic condition.         Protime-INR [[id number]]  (Abnormal) Collected: 05/06/24 1706    Specimen: Blood Updated: 05/06/24 1728     Protime 14.9 Seconds      INR 1.12    aPTT [[id number]]  (Normal) Collected: 05/06/24 1706    Specimen: Blood Updated: 05/06/24 1728     PTT 35.7 seconds     CBC & Differential [[id number]]  (Abnormal) Collected: 05/06/24 1706    Specimen: Blood Updated: 05/06/24 1718    Narrative:      The following orders were created for panel order CBC & Differential.  Procedure                               Abnormality         Status                     ---------                               -----------         ------                     CBC Auto Differential[[id number]]        Abnormal            Final result                 Please view results for these tests on the individual orders.    CBC Auto Differential [[id number]]  (Abnormal) Collected: 05/06/24 1706    Specimen: Blood Updated: 05/06/24 1718     WBC 8.70 10*3/mm3      RBC 4.14 10*6/mm3      Hemoglobin 13.3 g/dL      Hematocrit 40.4 %      MCV 97.6 fL      MCH 32.1 pg      MCHC 32.9 g/dL      RDW 14.1 %      RDW-SD 50.6 fl      MPV 9.6 fL      Platelets 264 10*3/mm3      Neutrophil % 70.5 %      Lymphocyte % 17.4 %      Monocyte % 8.9 %      Eosinophil % 2.3 %      Basophil % 0.6 %      Immature Grans % 0.3 %      Neutrophils, Absolute 6.14 10*3/mm3      Lymphocytes, Absolute 1.51 10*3/mm3      Monocytes, Absolute 0.77 10*3/mm3      Eosinophils, Absolute 0.20 10*3/mm3      Basophils, Absolute 0.05 10*3/mm3      Immature Grans, Absolute 0.03 10*3/mm3       nRBC 0.0 /100 WBC     Martin Draw [[id number]] Collected: 05/06/24 1706    Specimen: Blood Updated: 05/06/24 1715    Narrative:      The following orders were created for panel order Martin Draw.  Procedure                               Abnormality         Status                     ---------                               -----------         ------                     Green Top (Gel)[[id number]]                                  Final result               Lavender Top[[id number]]                                     Final result               Red Top[[id number]]                                          Final result               Light Blue Top[[id number]]                                   Final result                 Please view results for these tests on the individual orders.    Green Top (Gel) [[id number]] Collected: 05/06/24 1706    Specimen: Blood Updated: 05/06/24 1715     Extra Tube Hold for add-ons.     Comment: Auto resulted.       Lavender Top [[id number]] Collected: 05/06/24 1706    Specimen: Blood Updated: 05/06/24 1715     Extra Tube hold for add-on     Comment: Auto resulted       Red Top [[id number]] Collected: 05/06/24 1706    Specimen: Blood Updated: 05/06/24 1715     Extra Tube Hold for add-ons.     Comment: Auto resulted.       Light Blue Top [[id number]] Collected: 05/06/24 1706    Specimen: Blood Updated: 05/06/24 1715     Extra Tube Hold for add-ons.     Comment: Auto resulted       POC Glucose Once [[id number]]  (Normal) Collected: 05/06/24 1703    Specimen: Blood Updated: 05/06/24 1713     Glucose 109 mg/dL      Comment: : [id number] Tejas AceColette ID: [id number]               Imaging Results (Last 24 Hours)       Procedure Component Value Units Date/Time    MRI Brain Without Contrast [[id number]] Collected: 05/06/24 1857     Updated: 05/06/24 1902    Narrative:      MRI BRAIN WO CONTRAST- 5/6/2024 5:15 AM     HISTORY: Stroke, follow up     COMPARISON: Stroke, neurologic deficit      TECHNIQUE: Multisequence, multiplanar MRI of the brain without contrast           FINDINGS:     There are no areas of restricted diffusion to suggest acute infarct.  There is age-related parenchymal volume loss and T2 hyperintensity in  the periventricular white matter consistent with chronic small vessel  ischemic change. No intracranial mass, mass effect, midline shift, or  hydrocephalus is seen. Normal flow related signal voids are noted in the  major intracranial vessels. There is mild mucosal thickening at the left  maxillary sinus. Visualized paranasal sinuses and mastoid air cells are  otherwise clear. There is no evidence for hemorrhage.       Impression:           1. Chronic changes with volume loss and chronic small vessel ischemic  change of the periventricular white matter and mild mucosal thickening  at the left maxillary sinus.  2. No acute intracranial abnormality.     This report was signed and finalized on 5/6/2024 6:59 PM by Douglas Gonzalez.       US Carotid Bilateral [[id number]] Resulted: 05/06/24 1731     Updated: 05/06/24 1808    XR Chest 1 View [[id number]] Collected: 05/06/24 1740     Updated: 05/06/24 1744    Narrative:      EXAM: XR CHEST 1 VW-      DATE: 5/6/2024 4:38 PM     HISTORY: Acute Stroke Protocol (onset < 12 hrs)       COMPARISON: 12/6/2022.     TECHNIQUE:  Frontal view(s) of the chest submitted.     FINDINGS:    Lungs are grossly clear. Patient is status post median sternotomy and  CABG. There is calcification of the thoracic aorta which is tortuous.  There is enlargement of the cardiac silhouette. No effusion or  pneumothorax is seen.          Impression:         1. Status post CABG with enlarged cardiac silhouette and calcified,  ectatic, and tortuous thoracic aorta. No active disease is identified.     This report was signed and finalized on 5/6/2024 5:41 PM by Douglas Gonzalez.       CT Head Without Contrast Stroke Protocol [[id number]] Collected: 05/06/24 1720     Updated:  05/06/24 1726    Narrative:      EXAM: CT HEAD WO CONTRAST STROKE PROTOCOL-      DATE: 5/6/2024 4:02 PM     HISTORY: Neuro deficit, acute, stroke suspected       COMPARISON: 2/21/2020.     DOSE LENGTH PRODUCT: 654.08 mGy.cm  Automated exposure control was also  utilized to decrease patient radiation dose.     TECHNIQUE: Unenhanced CT images obtained from vertex to skull base with  multiplanar reformats.     FINDINGS:  There is no acute intracranial hemorrhage, midline shift, mass effect,  or hydrocephalus. There is no CT evidence for acute infarct.     There are chronic changes with volume loss and chronic small vessel  ischemic change of the periventricular white matter.      SOFT TISSUES: The scalp soft tissues are unremarkable.        SINUS:The visualized paranasal sinuses and mastoid air cells are clear.      ORBITS: The visualized orbits and globes are unremarkable. There is  bilateral lens extraction.          Impression:      1. Chronic changes and no acute intracranial findings.      The emergency department was notified of the report at 5:23 p.m.     This report was signed and finalized on 5/6/2024 5:23 PM by Douglas Gonzalez.                   Plan    Hospital Problem List      TIA (transient ischemic attack)    Hypertension    Stroke-like symptoms    Chronic anticoagulation    Paroxysmal atrial fibrillation    Impression  TIA  Expressive aphasia    Discussion:  Chad Henriquez is doing well today with no recurrent symptoms.  His symptoms are most likely consistent with TIA.  We are awaiting results for carotid ultrasound and cardiac echo.  Otherwise he is doing well.  Will continue him on Eliquis and Lipitor.  As long as he has no concerning findings on his carotid ultrasound and cardiac echo he is cleared from neurology standpoint to be discharged.  He should follow-up in the neurology clinic within the next 4 to 6 weeks.    Plan  Continue Eliquis 5 mg twice daily  Continue Lipitor 80 mg  LDL goal less  than 70  A1c goal less than 6.5%  Systolic blood pressure goal less than 140  Cardiac diet  Continue PT, OT, ST  Follow-up in neurology clinic in 4 to 6 weeks.  Neurology signing off       Marc Neal, APRN  05/07/24  12:52 CDT        Medical Decision Making     Number/Complexity of Problems  Moderate  1 undiagnosed new problem with uncertain prognosis -   1 acute illness with systemic symptoms -   High  1 acute or chronic illness that pose a threat to life/body function -   High     MDM Data  Moderate - 1/3 categories  Extensive - 2/3 categories     Category 1: 3 of the following  Review of external notes  Review of results  Ordering of each unique test  Independent historian  Category 2:  Independent interpretation of test (ex: imaging)  Category 3:  Discussion of management with another provider  Extensive I have reviewed outside records in Care Everywhere     Treatment Plan  Moderate - Prescription Drug management  High  Drug therapy requiring intensive monitoring for toxicity  Decision regarding hospitalization or escalation of care  De-escalate care/DNR decisions  High

## 2024-05-07 NOTE — THERAPY EVALUATION
Patient Name: Chad Henriquez  : 1929    MRN: [id number]                              Today's Date: 2024       Admit Date: 2024    Visit Dx:     ICD-10-CM ICD-9-CM   1. TIA (transient ischemic attack)  G45.9 435.9     Patient Active Problem List   Diagnosis    BPH with urinary obstruction    Frequency of urination    Nocturia    OAB (overactive bladder)    Non-smoker    Spinal stenosis, lumbar region, with neurogenic claudication    Left leg pain    Bilateral hip pain    Acute left-sided low back pain with left-sided sciatica    Essential tremor    Spinal stenosis    Degeneration of lumbar or lumbosacral intervertebral disc    Hereditary and idiopathic peripheral neuropathy    Syncope    Hypercholesteremia    Coronary artery disease involving native coronary artery of native heart without angina pectoris    Hypertension    Lumbar disc disease with radiculopathy    Left hip pain    History of skin cancer in adulthood    Skin lesion    Shortness of breath    Persistent shortness of breath after COVID-19    Diastolic dysfunction without heart failure    Atrial flutter with rapid ventricular response    Stroke-like symptoms    TIA (transient ischemic attack)    Chronic anticoagulation    Paroxysmal atrial fibrillation     Past Medical History:   Diagnosis Date    Allergic rhinitis     Anemia     Arthritis     Blind left eye     BPH (benign prostatic hypertrophy) with urinary retention     Cancer     skin cancer    Chronic back pain     RUNS DOWN BOTH LEGS    Constipation     Coronary artery disease     COVID     Hard of hearing     Heart attack     NO MUSCLE DAMAGE - JUST OCCLUDED VALVE    High cholesterol     History of skin cancer     BILATERAL EARS    History of transfusion         Hypertension     Inguinal hernia     Leaky heart valve     DR CONCEPCION SAYS NOT ENOUGH TO BE OF CONCERN    Other bursal cyst, right elbow     Paroxysmal atrial fibrillation 2024    PONV (postoperative nausea  and vomiting)     has had scopalamine patch in past     Sleep apnea     DOES NOT USE CPAP OR BIPAP    Spinal stenosis of cervical region     Spinal stenosis of lumbar region     Stroke     Tremors of nervous system     Wears hearing aid     BILATERAL     Past Surgical History:   Procedure Laterality Date    ANTERIOR LUMBAR EXPOSURE N/A 12/07/2018    Procedure: ANTERIOR LUMBAR EXPOSURE;  Surgeon: Manolo Mcleod DO;  Location:  [facility] OR;  Service: Vascular    APPENDECTOMY      BACK SURGERY      X3    CARDIAC SURGERY  08/08/1986    X1 BYPASS    CORONARY ANGIOPLASTY WITH STENT PLACEMENT  1997    X3 STENTS    CORONARY ARTERY BYPASS GRAFT      HERNIA REPAIR Bilateral     x2    INGUINAL HERNIA REPAIR Right 06/22/2017    Procedure: RIGHT INGUINAL HERNIA REPAIR AND EXCISION OF CYST RIGHT ELBOW ;  Surgeon: Jackelyn Arriola MD;  Location:  [facility];  Service:     LUMBAR FUSION Left 06/30/2021    Procedure: LUMBAR LAMINECTOMY, DISCECTOMY, FACETECTOMY,  TRANSFORAMINAL LUMBAR INTERBODY FUSION L2-3. REVISION OF INSTRUMENTATION L3-S1. USE OF IMAGE GUIDANCE AND SURGICAL ROBOT;  Surgeon: Kj Falcon MD;  Location:  [facility];  Service: Robotics - Neuro;  Laterality: Left;    LUMBAR LAMINECTOMY N/A 03/12/2018    Procedure: LUMBAR LAMINECTOMY WITHOUT FUSION L3-4,4-5;  Surgeon: Kj Falcon MD;  Location:  [facility];  Service: Neurosurgery    LUMBAR LAMINECTOMY ANTERIOR LUMBAR INTERBODY FUSION N/A 12/07/2018    Procedure: ANTERIOR LUMBAR INTERBODY FUSION L5-S1;  Surgeon: Kj Falcon MD;  Location:  [facility];  Service: Neurosurgery    LUMBAR LAMINECTOMY WITH FUSION Left 12/10/2018    Procedure: LUMBAR LAMINECTOMY TRANSFORAMINAL LUMBAR INTERBODY FUSION L34,45;  Surgeon: Kj Falcon MD;  Location:  [facility];  Service: Neurosurgery    LUMBAR LAMINECTOMY WITH FUSION Left 12/07/2018    Procedure: LUMBAR LAMINECTOMY TRANSFORAMINAL LUMBAR INTERBODY FUSION LEFT L3-4, L4-5 QUADRANT RETRACTOR;  Surgeon: Kj Falcon  "MD;  Location: [facility] OR;  Service: Neurosurgery    SKIN LESION EXCISION      nasal      General Information       Row Name 05/07/24 0945 05/07/24 0839       OT Time and Intention    Document Type evaluation  cc: strokelike symptoms. Dx: TIA  -LS --  -LS    Mode of Treatment occupational therapy  -LS --  -LS      Row Name 05/07/24 0945 05/07/24 0839       General Information    Patient Profile Reviewed yes  -LS --  -LS    Prior Level of Function independent:;ADL's;all household mobility;community mobility;home management;cooking;cleaning;dependent:;driving;shopping  Uses SC for fxl ambulation \"some\", wears R AFO at baseline for foot drop  -LS --    Existing Precautions/Restrictions fall;other (see comments)  R AFO  -LS --      Row Name 05/07/24 0945          Occupational Profile    Environmental Supports and Barriers (Occupational Profile) Walk in shower with shower chair. Other AD/DME: SC  -LS       Row Name 05/07/24 0945          Living Environment    People in Home spouse  -LS       Row Name 05/07/24 0945          Home Main Entrance    Number of Stairs, Main Entrance four  -LS     Stair Railings, Main Entrance railings on both sides of stairs  -LS       Row Name 05/07/24 0945          Stairs Within Home, Primary    Number of Stairs, Within Home, Primary other (see comments)  flight of stairs, but Pt can access all needs on main level  -LS       Row Name 05/07/24 0945          Cognition    Orientation Status (Cognition) oriented x 4  -LS       Row Name 05/07/24 0945          Safety Issues, Functional Mobility    Safety Issues Affecting Function (Mobility) safety precautions follow-through/compliance  -LS     Impairments Affecting Function (Mobility) balance;endurance/activity tolerance;coordination  -LS               User Key  (r) = Recorded By, (t) = Taken By, (c) = Cosigned By      Initials Name Provider Type    LS Sandi Medina OTR/L Occupational Therapist                     Mobility/ADL's       Row Name " 05/07/24 0945          Bed Mobility    Bed Mobility supine-sit;sit-supine  -LS     Supine-Sit [place] (Bed Mobility) modified independence  -LS     Sit-Supine [place] (Bed Mobility) modified independence  -LS     Assistive Device (Bed Mobility) bed rails;head of bed elevated  -LS       Row Name 05/07/24 0945          Transfers    Transfers sit-stand transfer;stand-sit transfer;toilet transfer  -LS       Row Name 05/07/24 0945          Sit-Stand Transfer    Sit-Stand [place] (Transfers) contact guard  -LS       Row Name 05/07/24 0945          Stand-Sit Transfer    Stand-Sit [place] (Transfers) contact guard  -LS       Row Name 05/07/24 0945          Toilet Transfer    Type (Toilet Transfer) sit-stand;stand-sit  -LS     [place] Level (Toilet Transfer) contact guard  -LS       Row Name 05/07/24 0945          Functional Mobility    Functional Mobility- Ind. Level contact guard assist  -LS     Patient was able to Ambulate yes  -LS       Row Name 05/07/24 0945          Activities of Daily Living    BADL Assessment/Intervention upper body dressing;lower body dressing;toileting  -LS       Row Name 05/07/24 0945          Upper Body Dressing Assessment/Training    [place] Level (Upper Body Dressing) upper body dressing skills;modified independence  -LS     Position (Upper Body Dressing) edge of bed sitting  -LS       Row Name 05/07/24 0945          Lower Body Dressing Assessment/Training    [place] Level (Lower Body Dressing) lower body dressing skills;contact guard assist  -LS     Position (Lower Body Dressing) unsupported sitting;unsupported standing  -LS       Row Name 05/07/24 0945          Toileting Assessment/Training    [place] Level (Toileting) toileting skills;contact guard assist  -LS     Position (Toileting) unsupported sitting;unsupported standing  -LS               User Key  (r) = Recorded By, (t) = Taken By, (c) = Cosigned By      Initials Name Provider Type    ENA Medina  DARRIUS JuniorR/L Occupational Therapist                   Obj/Interventions       Row Name 05/07/24 0945          Sensory Assessment (Somatosensory)    Sensory Assessment (Somatosensory) UE sensation intact  -LS       Row Name 05/07/24 0945          Vision Assessment/Intervention    Visual Impairment/Limitations other (see comments)  blind L eye; R eye WFL  -LS       Row Name 05/07/24 0945          Range of Motion Comprehensive    General Range of Motion bilateral upper extremity ROM WFL  -LS       Row Name 05/07/24 0945          Strength Comprehensive (MMT)    Comment, General Manual Muscle Testing (MMT) Assessment BUE strength grossly 4+/5  -LS       Row Name 05/07/24 0945          Motor Skills    Motor Skills coordination  -LS     Coordination bilateral;fine motor deficit;gross motor deficit;minimal impairment;other (see comments)  coordination slightly impaired due to intention tremors in BUE  -LS       Row Name 05/07/24 0945          Balance    Balance Assessment sitting static balance;sitting dynamic balance;standing static balance;standing dynamic balance  -LS     Static Sitting Balance independent  -LS     Dynamic Sitting Balance independent  -LS     Position, Sitting Balance unsupported;sitting edge of bed  -LS     Static Standing Balance contact guard  -LS     Dynamic Standing Balance contact guard  -LS     Position/Device Used, Standing Balance unsupported  -LS               User Key  (r) = Recorded By, (t) = Taken By, (c) = Cosigned By      Initials Name Provider Type     Sandi Medina OTR/L Occupational Therapist                   Goals/Plan       Row Name 05/07/24 0945          Transfer Goal 1 (OT)    Activity/Assistive Device (Transfer Goal 1, OT) toilet;shower chair  -     [place] Level/Cues Needed (Transfer Goal 1, OT) modified independence  -LS     Time Frame (Transfer Goal 1, OT) long term goal (LTG);10 days  -     Progress/Outcome (Transfer Goal 1, OT) new goal  -       Row Name  05/07/24 0945          Dressing Goal 1 (OT)    Activity/Device (Dressing Goal 1, OT) dressing skills, all  -LS     [place]/Cues Needed (Dressing Goal 1, OT) modified independence  -LS     Time Frame (Dressing Goal 1, OT) long term goal (LTG);10 days  -LS     Progress/Outcome (Dressing Goal 1, OT) new goal  -LS       Row Name 05/07/24 0945          Toileting Goal 1 (OT)    Activity/Device (Toileting Goal 1, OT) toileting skills, all  -LS     [place] Level/Cues Needed (Toileting Goal 1, OT) modified independence  -LS     Time Frame (Toileting Goal 1, OT) long term goal (LTG);10 days  -LS     Progress/Outcome (Toileting Goal 1, OT) new goal  -LS       Row Name 05/07/24 0945          Therapy Assessment/Plan (OT)    Planned Therapy Interventions (OT) activity tolerance training;functional balance retraining;occupation/activity based interventions;ROM/therapeutic exercise;strengthening exercise;transfer/mobility retraining;patient/caregiver education/training;adaptive equipment training;BADL retraining  -LS               User Key  (r) = Recorded By, (t) = Taken By, (c) = Cosigned By      Initials Name Provider Type    LS Sandi Medina OTR/L Occupational Therapist                   Clinical Impression       Row Name 05/07/24 0945          Pain Assessment    Pretreatment Pain Rating 0/10 - no pain  -LS     Posttreatment Pain Rating 0/10 - no pain  -LS       Row Name 05/07/24 0945          Plan of Care Review    Plan of Care Reviewed With patient;spouse  -LS     Progress no change  -LS     Outcome Evaluation OT eval completed. Pt in fowlers upon therapist arrival; A&Ox4; No c/o pain; Pt's wife also present. Pt reports Mod I-I with all BADLs at baseline. Today, Pt performed supine<>sit utilizing bedrail with HOB elevated with Mod I. Pt donned B socks while seated EOB with SBA. Pt performed sit<>stand and ambulated from bed<>BR utilizing no AD with CGA; Pt does demo some unsteadiness on feet and Pt's wife reports  that the Pt's balance has steadily been getting worse. Pt performed sit<>stand toilet transfer utilizing grab bar with CGA; Pt performed clothing management in standing with CGA. Pt demos BUE strength WFL. Some coordination deficits present, however, these deficits are present bilaterally at baseline due to intention tremors. No focal neuro deficits observed. Skilled OT intervention indicated in order to address deficits in fxl mobility, fxl activity tolerance, balance, coordination, and use of adaptive techniques/equipment during performance of BADLs. Recommend home with assist at discharge.  -       Row Name 05/07/24 0945          Therapy Assessment/Plan (OT)    Rehab Potential (OT) good, to achieve stated therapy goals  -     Criteria for Skilled Therapeutic Interventions Met (OT) yes;skilled treatment is necessary  -     Therapy Frequency (OT) 5 times/wk  -       Row Name 05/07/24 0945          Therapy Plan Review/Discharge Plan (OT)    Anticipated Discharge Disposition (OT) home with assist  -       Row Name 05/07/24 0945          Positioning and Restraints    Pre-Treatment Position in bed  -LS     Post Treatment Position bed  -LS     In Bed fowlers;side rails up x2;call light within reach;encouraged to call for assist;with family/caregiver  -               User Key  (r) = Recorded By, (t) = Taken By, (c) = Cosigned By      Initials Name Provider Type    LS Sandi Medina, OTR/L Occupational Therapist                   Outcome Measures       Row Name 05/07/24 0945          How much help from another is currently needed...    Putting on and taking off regular lower body clothing? 3  -LS     Bathing (including washing, rinsing, and drying) 3  -LS     Toileting (which includes using toilet bed pan or urinal) 3  -LS     Putting on and taking off regular upper body clothing 4  -LS     Taking care of personal grooming (such as brushing teeth) 4  -LS     Eating meals 4  -LS     AM-PAC 6 Clicks Score (OT) 21   -       Row Name 05/07/24 1001          How much help from another person do you currently need...    Turning from your back to your side while in flat bed without using bedrails? 4  -CB     Moving from lying on back to sitting on the side of a flat bed without bedrails? 4  -CB     Moving to and from a bed to a chair (including a wheelchair)? 3  -CB     Standing up from a chair using your arms (e.g., wheelchair, bedside chair)? 3  -CB     Climbing 3-5 steps with a railing? 3  -CB     To walk in hospital room? 3  -CB     AM-PAC 6 Clicks Score (PT) 20  -CB     Highest Level of Mobility Goal 6 --> Walk 10 steps or more  -       Row Name 05/07/24 0945          Functional Assessment    Outcome Measure Options AM-PAC 6 Clicks Daily Activity (OT)  -               User Key  (r) = Recorded By, (t) = Taken By, (c) = Cosigned By      Initials Name Provider Type    Silke Roman LPN Licensed Nurse    Sandi Richardson OTR/L Occupational Therapist                    Occupational Therapy Education       Title: PT OT SLP Therapies (In Progress)       Topic: Occupational Therapy (In Progress)       Point: ADL training (Done)       Description:   Instruct learner(s) on proper safety adaptation and remediation techniques during self care or transfers.   Instruct in proper use of assistive devices.                  Learning Progress Summary             Patient Acceptance, E, VU by  at 5/7/2024 1151                         Point: Home exercise program (Not Started)       Description:   Instruct learner(s) on appropriate technique for monitoring, assisting and/or progressing therapeutic exercises/activities.                  Learner Progress:  Not documented in this visit.              Point: Precautions (Done)       Description:   Instruct learner(s) on prescribed precautions during self-care and functional transfers.                  Learning Progress Summary             Patient Acceptance, E, VU by  at 5/7/2024  1151                         Point: Body mechanics (Done)       Description:   Instruct learner(s) on proper positioning and spine alignment during self-care, functional mobility activities and/or exercises.                  Learning Progress Summary             Patient Acceptance, E, VU by ENA at 5/7/2024 1151                                         User Key       Initials Effective Dates Name Provider Type Discipline    ENA 06/20/22 -  Sandi Medina, OTR/L Occupational Therapist OT                  OT Recommendation and Plan  Planned Therapy Interventions (OT): activity tolerance training, functional balance retraining, occupation/activity based interventions, ROM/therapeutic exercise, strengthening exercise, transfer/mobility retraining, patient/caregiver education/training, adaptive equipment training, BADL retraining  Therapy Frequency (OT): 5 times/wk  Plan of Care Review  Plan of Care Reviewed With: patient, spouse  Progress: no change  Outcome Evaluation: OT eval completed. Pt in fowlers upon therapist arrival; A&Ox4; No c/o pain; Pt's wife also present. Pt reports Mod I-I with all BADLs at baseline. Today, Pt performed supine<>sit utilizing bedrail with HOB elevated with Mod I. Pt donned B socks while seated EOB with SBA. Pt performed sit<>stand and ambulated from bed<>BR utilizing no AD with CGA; Pt does demo some unsteadiness on feet and Pt's wife reports that the Pt's balance has steadily been getting worse. Pt performed sit<>stand toilet transfer utilizing grab bar with CGA; Pt performed clothing management in standing with CGA. Pt demos BUE strength WFL. Some coordination deficits present, however, these deficits are present bilaterally at baseline due to intention tremors. No focal neuro deficits observed. Skilled OT intervention indicated in order to address deficits in fxl mobility, fxl activity tolerance, balance, coordination, and use of adaptive techniques/equipment during performance of BADLs.  Recommend home with assist at discharge.     Time Calculation:         Time Calculation- OT       Row Name 05/07/24 0945             Time Calculation- OT    OT Start Time 0945  +10 minutes chart review  -      OT Stop Time 1013  -      OT Time Calculation (min) 28 min  -      OT Non-Billable Time (min) 38 min  -      OT Received On 05/07/24  -      OT Goal Re-Cert Due Date 05/17/24  -                User Key  (r) = Recorded By, (t) = Taken By, (c) = Cosigned By      Initials Name Provider Type    Sandi Richardson, OTR/L Occupational Therapist                  Therapy Charges for Today       Code Description Service Date Service Provider Modifiers Qty    [id number] HC OT EVAL LOW COMPLEXITY 3 5/7/2024 Sandi Medina OTR/L GO 1                 Sandi Medina OTR/JAH  5/7/2024

## 2024-05-07 NOTE — THERAPY DISCHARGE NOTE
Patient Name: Chad Henriquez  : 1929    MRN: [id number]                              Today's Date: 2024       Admit Date: 2024    Visit Dx:     ICD-10-CM ICD-9-CM   1. TIA (transient ischemic attack)  G45.9 435.9   2. Dysphagia, unspecified type  R13.10 787.20   3. Primary hypertension  I10 401.9     Patient Active Problem List   Diagnosis    BPH with urinary obstruction    Frequency of urination    Nocturia    OAB (overactive bladder)    Non-smoker    Spinal stenosis, lumbar region, with neurogenic claudication    Left leg pain    Bilateral hip pain    Acute left-sided low back pain with left-sided sciatica    Essential tremor    Spinal stenosis    Degeneration of lumbar or lumbosacral intervertebral disc    Hereditary and idiopathic peripheral neuropathy    Syncope    Hypercholesteremia    Coronary artery disease involving native coronary artery of native heart without angina pectoris    Hypertension    Lumbar disc disease with radiculopathy    Left hip pain    History of skin cancer in adulthood    Skin lesion    Shortness of breath    Persistent shortness of breath after COVID-19    Diastolic dysfunction without heart failure    Atrial flutter with rapid ventricular response    Stroke-like symptoms    TIA (transient ischemic attack)    Chronic anticoagulation    Paroxysmal atrial fibrillation     Past Medical History:   Diagnosis Date    Allergic rhinitis     Anemia     Arthritis     Blind left eye     BPH (benign prostatic hypertrophy) with urinary retention     Cancer     skin cancer    Chronic back pain     RUNS DOWN BOTH LEGS    Constipation     Coronary artery disease     COVID     Hard of hearing     Heart attack     NO MUSCLE DAMAGE - JUST OCCLUDED VALVE    High cholesterol     History of skin cancer     BILATERAL EARS    History of transfusion     1986    Hypertension     Inguinal hernia     Leaky heart valve     DR CONCEPCION SAYS NOT ENOUGH TO BE OF CONCERN    Other bursal cyst,  right elbow     Paroxysmal atrial fibrillation 5/6/2024    PONV (postoperative nausea and vomiting)     has had scopalamine patch in past     Sleep apnea     DOES NOT USE CPAP OR BIPAP    Spinal stenosis of cervical region     Spinal stenosis of lumbar region     Stroke     Tremors of nervous system     Wears hearing aid     BILATERAL     Past Surgical History:   Procedure Laterality Date    ANTERIOR LUMBAR EXPOSURE N/A 12/07/2018    Procedure: ANTERIOR LUMBAR EXPOSURE;  Surgeon: Manolo Mcleod DO;  Location:  [facility] OR;  Service: Vascular    APPENDECTOMY      BACK SURGERY      X3    CARDIAC SURGERY  08/08/1986    X1 BYPASS    CORONARY ANGIOPLASTY WITH STENT PLACEMENT  1997    X3 STENTS    CORONARY ARTERY BYPASS GRAFT      HERNIA REPAIR Bilateral     x2    INGUINAL HERNIA REPAIR Right 06/22/2017    Procedure: RIGHT INGUINAL HERNIA REPAIR AND EXCISION OF CYST RIGHT ELBOW ;  Surgeon: Jackelyn Arriola MD;  Location:  [facility];  Service:     LUMBAR FUSION Left 06/30/2021    Procedure: LUMBAR LAMINECTOMY, DISCECTOMY, FACETECTOMY,  TRANSFORAMINAL LUMBAR INTERBODY FUSION L2-3. REVISION OF INSTRUMENTATION L3-S1. USE OF IMAGE GUIDANCE AND SURGICAL ROBOT;  Surgeon: Kj Falcon MD;  Location:  [facility] OR;  Service: Robotics - Neuro;  Laterality: Left;    LUMBAR LAMINECTOMY N/A 03/12/2018    Procedure: LUMBAR LAMINECTOMY WITHOUT FUSION L3-4,4-5;  Surgeon: Kj Falcon MD;  Location:  [facility];  Service: Neurosurgery    LUMBAR LAMINECTOMY ANTERIOR LUMBAR INTERBODY FUSION N/A 12/07/2018    Procedure: ANTERIOR LUMBAR INTERBODY FUSION L5-S1;  Surgeon: Kj Falcon MD;  Location:  [facility];  Service: Neurosurgery    LUMBAR LAMINECTOMY WITH FUSION Left 12/10/2018    Procedure: LUMBAR LAMINECTOMY TRANSFORAMINAL LUMBAR INTERBODY FUSION L34,45;  Surgeon: Kj Falcon MD;  Location:  [facility];  Service: Neurosurgery    LUMBAR LAMINECTOMY WITH FUSION Left 12/07/2018    Procedure: LUMBAR LAMINECTOMY TRANSFORAMINAL  LUMBAR INTERBODY FUSION LEFT L3-4, L4-5 QUADRANT RETRACTOR;  Surgeon: Kj Falcon MD;  Location: [facility] OR;  Service: Neurosurgery    SKIN LESION EXCISION      nasal      General Information       Row Name 05/07/24 1400          Physical Therapy Time and Intention    Document Type evaluation  difficulty expressive aphasia-resolved, blind L eye, AFO R foot drop  -MS     Mode of Treatment physical therapy;individual therapy  -MS       Row Name 05/07/24 1400          General Information    Patient Profile Reviewed yes  -MS     Prior Level of Function independent:;all household mobility;community mobility;ADL's;home management;dependent:;driving;shopping  R foot drop with AFO, sometimes walks with SC  -MS     Existing Precautions/Restrictions fall;other (see comments)  R AFO  -MS     Barriers to Rehab hearing deficit  -MS       Row Name 05/07/24 1400          Living Environment    People in Home spouse  -MS       Row Name 05/07/24 1400          Home Main Entrance    Number of Stairs, Main Entrance four  -MS     Stair Railings, Main Entrance railings on both sides of stairs  -MS       Row Name 05/07/24 1400          Stairs Within Home, Primary    Number of Stairs, Within Home, Primary none  -MS       Row Name 05/07/24 1400          Cognition    Orientation Status (Cognition) oriented x 4  -MS       Row Name 05/07/24 1400          Safety Issues, Functional Mobility    Impairments Affecting Function (Mobility) balance;endurance/activity tolerance;coordination  -MS               User Key  (r) = Recorded By, (t) = Taken By, (c) = Cosigned By      Initials Name Provider Type    MS Sarah Rainey R, PT, DPT, NCS Physical Therapist                   Mobility       Row Name 05/07/24 1400          Bed Mobility    Bed Mobility supine-sit  -MS     Supine-Sit [place] (Bed Mobility) modified independence  -MS     Assistive Device (Bed Mobility) bed rails  -MS       Row Name 05/07/24 1400          Sit-Stand Transfer     Sit-Stand [place] (Transfers) standby assist  -MS       Row Name 05/07/24 1400          Gait/Stairs (Locomotion)    [place] Level (Gait) standby assist;minimum assist (75% patient effort)  -MS     Distance in Feet (Gait) 300  -MS     Comment, (Gait/Stairs) pt able to ambulate forward, backward, and side to side. He had 1 LOB when taking steps to the side requiring mid A to recover  -MS               User Key  (r) = Recorded By, (t) = Taken By, (c) = Cosigned By      Initials Name Provider Type    Sarah Betancourt MINO, PT, DPT, NCS Physical Therapist                   Obj/Interventions       Row Name 05/07/24 1400          Range of Motion Comprehensive    General Range of Motion bilateral upper extremity ROM WFL;bilateral lower extremity ROM WFL  -MS       Row Name 05/07/24 1400          Strength Comprehensive (MMT)    Comment, General Manual Muscle Testing (MMT) Assessment grossly 4+/5 except for R dorsiflexion 0/5  -MS       Row Name 05/07/24 1400          Motor Skills    Motor Skills coordination;neuro-muscular function  -MS     Coordination WNL  -MS     Neuromuscular Function bilateral;upper extremity;tremor, intention  -MS       Row Name 05/07/24 1400          Balance    Balance Assessment sitting static balance;sitting dynamic balance;standing static balance;standing dynamic balance  -MS     Static Sitting Balance independent  -MS     Dynamic Sitting Balance independent  -MS     Position, Sitting Balance unsupported;sitting edge of bed  -MS     Static Standing Balance contact guard  -MS     Dynamic Standing Balance contact guard  -MS     Position/Device Used, Standing Balance unsupported  -MS       Row Name 05/07/24 1400          Sensory Assessment (Somatosensory)    Sensory Assessment (Somatosensory) sensation intact  B LE peripheral neuropathy  -MS               User Key  (r) = Recorded By, (t) = Taken By, (c) = Cosigned By      Initials Name Provider Type    Sarah Betancourt R, PT, DPT, NCS  Physical Therapist                   Goals/Plan    No documentation.                  Clinical Impression       Row Name 05/07/24 1400          Pain    Pretreatment Pain Rating 0/10 - no pain  -MS     Posttreatment Pain Rating 0/10 - no pain  -MS       Row Name 05/07/24 1400          Plan of Care Review    Plan of Care Reviewed With patient;spouse  -MS     Progress improving  -MS     Outcome Evaluation The patient presents alert and orients x4 lying in bed. His expressive aphasia has resolved and he demonstrates no other new focal neurological deficits in strength, sensation, or coordination. He has a chronic R foot drop which he wears an AFO for. He was able to ambulate in the hallway with a LOB when ambulating to the side. He required assist to maintain his balance. He will need to continue ambulating his cane and I recommend oupt PT to work on balance and gait mechanics.  -MS       Row Name 05/07/24 1400          Therapy Assessment/Plan (PT)    Criteria for Skilled Interventions Met (PT) other (see comments)  pt is discharging home today  -MS     Therapy Frequency (PT) evaluation only  -MS       Row Name 05/07/24 1400          Positioning and Restraints    Post Treatment Position chair  -MS     In Chair sitting;call light within reach;encouraged to call for assist;with family/caregiver  -MS               User Key  (r) = Recorded By, (t) = Taken By, (c) = Cosigned By      Initials Name Provider Type    MS Sarah Rainey R, PT, DPT, NCS Physical Therapist                   Outcome Measures       Row Name 05/07/24 1400 05/07/24 1001       How much help from another person do you currently need...    Turning from your back to your side while in flat bed without using bedrails? 4  -MS 4  -CB    Moving from lying on back to sitting on the side of a flat bed without bedrails? 4  -MS 4  -CB    Moving to and from a bed to a chair (including a wheelchair)? 4  -MS 3  -CB    Standing up from a chair using your arms (e.g.,  wheelchair, bedside chair)? 3  -MS 3  -CB    Climbing 3-5 steps with a railing? 3  -MS 3  -CB    To walk in hospital room? 3  -MS 3  -CB    AM-PAC 6 Clicks Score (PT) 21  -MS 20  -CB    Highest Level of Mobility Goal 6 --> Walk 10 steps or more  -MS 6 --> Walk 10 steps or more  -CB      Row Name 05/07/24 1400          Modified [place] Scale    Modified Miranda Scale 3 - Moderate disability.  Requiring some help, but able to walk without assistance.  -MS       Row Name 05/07/24 1400 05/07/24 0945       Functional Assessment    Outcome Measure Options AM-PAC 6 Clicks Basic Mobility (PT);Modified [place]  -MS AM-PAC 6 Clicks Daily Activity (OT)  -LS              User Key  (r) = Recorded By, (t) = Taken By, (c) = Cosigned By      Initials Name Provider Type    Sarah Betancourt R, PT, DPT, NCS Physical Therapist    Silke Roman LPN Licensed Nurse    Sandi Richardson, OTR/L Occupational Therapist                    PT Recommendation and Plan     Plan of Care Reviewed With: patient, spouse  Progress: improving  Outcome Evaluation: The patient presents alert and orients x4 lying in bed. His expressive aphasia has resolved and he demonstrates no other new focal neurological deficits in strength, sensation, or coordination. He has a chronic R foot drop which he wears an AFO for. He was able to ambulate in the hallway with a LOB when ambulating to the side. He required assist to maintain his balance. He will need to continue ambulating his cane and I recommend oupt PT to work on balance and gait mechanics.     Time Calculation:         PT Charges       Row Name 05/07/24 1400             Time Calculation    Start Time 1400  -MS      Stop Time 1442  -MS      Time Calculation (min) 42 min  -MS      PT Received On 05/07/24  -MS         Untimed Charges    PT Eval/Re-eval Minutes 42  -MS         Total Minutes    Untimed Charges Total Minutes 42  -MS       Total Minutes 42  -MS                User Key  (r) = Recorded By, (t)  = Taken By, (c) = Cosigned By      Initials Name Provider Type    Sarah Betancourt, PT, DPT, NCS Physical Therapist                      PT G-Codes  Outcome Measure Options: AM-PAC 6 Clicks Basic Mobility (PT), Modified [place]  AM-PAC 6 Clicks Score (PT): 21  AM-PAC 6 Clicks Score (OT): 21  Modified [place] Scale: 3 - Moderate disability.  Requiring some help, but able to walk without assistance.    PT Discharge Summary  Anticipated Discharge Disposition (PT): home with assist, home with outpatient therapy services    Sarah Rainey, PT, DPT, NCS  5/7/2024

## 2024-05-07 NOTE — DISCHARGE SUMMARY
[facility] Medicine Services  DISCHARGE SUMMARY       Date of Admission: 5/6/2024  Date of Discharge:  5/9/2024  Primary Care Physician: Miguel Bond MD    Presenting Problem/History of Present Illness:  hard time reading and expressing himself     Final Discharge Diagnoses:  Active Hospital Problems    Diagnosis     **TIA (transient ischemic attack)     Hypertensive urgency     Chronic anticoagulation     Paroxysmal atrial fibrillation     Hypertension        Consults: Dr. Valdez with neurology.    Procedures Performed: none.    Pertinent Test Results:   Results for orders placed during the hospital encounter of 05/06/24    Adult Transthoracic Echo Complete W/ Cont if Necessary Per Protocol (With Agitated Saline)    Interpretation Summary    Left ventricular systolic function is normal. Left ventricular ejection fraction appears to be 56 - 60%.    Left ventricular wall thickness is consistent with mild concentric hypertrophy.    Left ventricular diastolic function is consistent with (grade I) impaired relaxation.    Right ventricle is not well visualized but appears upper normal in size with normal systolic function.    The left atrial cavity is dilated.    Saline test results are negative.    The right atrial cavity is mildly  dilated.    There are no hemodynamically significant (more than mild) valve abnormalities.      Imaging Results (All)       Procedure Component Value Units Date/Time    MRI Brain Without Contrast [[id number]] Collected: 05/06/24 1857     Updated: 05/06/24 1902    Narrative:      MRI BRAIN WO CONTRAST- 5/6/2024 5:15 AM     HISTORY: Stroke, follow up     COMPARISON: Stroke, neurologic deficit     TECHNIQUE: Multisequence, multiplanar MRI of the brain without contrast           FINDINGS:     There are no areas of restricted diffusion to suggest acute infarct.  There is age-related parenchymal volume loss and T2 hyperintensity in  the periventricular  white matter consistent with chronic small vessel  ischemic change. No intracranial mass, mass effect, midline shift, or  hydrocephalus is seen. Normal flow related signal voids are noted in the  major intracranial vessels. There is mild mucosal thickening at the left  maxillary sinus. Visualized paranasal sinuses and mastoid air cells are  otherwise clear. There is no evidence for hemorrhage.       Impression:           1. Chronic changes with volume loss and chronic small vessel ischemic  change of the periventricular white matter and mild mucosal thickening  at the left maxillary sinus.  2. No acute intracranial abnormality.     This report was signed and finalized on 5/6/2024 6:59 PM by Douglas Gonzalez.       US Carotid Bilateral [[id number]] Resulted: 05/06/24 1731     Updated: 05/06/24 1808    XR Chest 1 View [[id number]] Collected: 05/06/24 1740     Updated: 05/06/24 1744    Narrative:      EXAM: XR CHEST 1 VW-      DATE: 5/6/2024 4:38 PM     HISTORY: Acute Stroke Protocol (onset < 12 hrs)       COMPARISON: 12/6/2022.     TECHNIQUE:  Frontal view(s) of the chest submitted.     FINDINGS:    Lungs are grossly clear. Patient is status post median sternotomy and  CABG. There is calcification of the thoracic aorta which is tortuous.  There is enlargement of the cardiac silhouette. No effusion or  pneumothorax is seen.          Impression:         1. Status post CABG with enlarged cardiac silhouette and calcified,  ectatic, and tortuous thoracic aorta. No active disease is identified.     This report was signed and finalized on 5/6/2024 5:41 PM by Douglas Gonzalez.       CT Head Without Contrast Stroke Protocol [[id number]] Collected: 05/06/24 1720     Updated: 05/06/24 1726    Narrative:      EXAM: CT HEAD WO CONTRAST STROKE PROTOCOL-      DATE: 5/6/2024 4:02 PM     HISTORY: Neuro deficit, acute, stroke suspected       COMPARISON: 2/21/2020.     DOSE LENGTH PRODUCT: 654.08 mGy.cm  Automated exposure control was  also  utilized to decrease patient radiation dose.     TECHNIQUE: Unenhanced CT images obtained from vertex to skull base with  multiplanar reformats.     FINDINGS:  There is no acute intracranial hemorrhage, midline shift, mass effect,  or hydrocephalus. There is no CT evidence for acute infarct.     There are chronic changes with volume loss and chronic small vessel  ischemic change of the periventricular white matter.      SOFT TISSUES: The scalp soft tissues are unremarkable.        SINUS:The visualized paranasal sinuses and mastoid air cells are clear.      ORBITS: The visualized orbits and globes are unremarkable. There is  bilateral lens extraction.          Impression:      1. Chronic changes and no acute intracranial findings.      The emergency department was notified of the report at 5:23 p.m.     This report was signed and finalized on 5/6/2024 5:23 PM by Douglas Gonzalez.             LAB RESULTS:      Lab 05/07/24 0409 05/06/24 1706   WBC 7.96 8.70   HEMOGLOBIN 11.8* 13.3   HEMATOCRIT 35.4* 40.4   PLATELETS 232 264   NEUTROS ABS  --  6.14   IMMATURE GRANS (ABS)  --  0.03   LYMPHS ABS  --  1.51   MONOS ABS  --  0.77   EOS ABS  --  0.20   MCV 96.2 97.6*   SED RATE 3  --    PROTIME  --  14.9*   APTT  --  35.7         Lab 05/07/24 0409 05/06/24 1706   SODIUM  --  138   POTASSIUM  --  4.7   CHLORIDE  --  101   CO2  --  28.0   ANION GAP  --  9.0   BUN  --  20   CREATININE  --  0.75*   EGFR  --  83.6   GLUCOSE  --  116*   CALCIUM  --  9.7*   HEMOGLOBIN A1C 5.30  --    TSH 2.330  --          Lab 05/06/24  1706   TOTAL PROTEIN 7.1   ALBUMIN 4.6   GLOBULIN 2.5   ALT (SGPT) 19   AST (SGOT) 28   BILIRUBIN 0.4   ALK PHOS 74         Lab 05/06/24  1706   HSTROP T 39*   PROTIME 14.9*   INR 1.12*         Lab 05/07/24 0409   CHOLESTEROL 144   LDL CHOL 88   HDL CHOL 41   TRIGLYCERIDES 75         Lab 05/07/24  0409 05/06/24  1706   FOLATE >20.00  --    VITAMIN B 12 412  --    ABO TYPING  --  A   RH TYPING  --   Negative   ANTIBODY SCREEN  --  Negative         Brief Urine Lab Results  (Last result in the past 365 days)        Color   Clarity   Blood   Leuk Est   Nitrite   Protein   CREAT   Urine HCG        12/19/23 0743 Yellow  Comment: REFERENCE RANGE: Yellow, Straw   Clear   Negative   Negative   Negative   Negative                 Microbiology Results (last 10 days)       ** No results found for the last 240 hours. **            Hospital Course:   Mr. Henriquez is a 89-year-old male who presented to [facility] on 5/6 when he had a hard time reading and expressing himself.  By the time he was seen in the CT scan department he was back to baseline.  NIH stroke scale 0. He has a history of paroxysmal atrial fibrillation, atrial flutter for which he takes Eliquis 5 mg twice daily.  He does admit to missing last evening's dose but took it on the morning of 5/6.     Neurology consulted.  MRI brain showed no acute intracranial normality.  Okay to resume Eliquis per neurology.  Per neurology, likely TIA.  He is okay for discharge from neurology standpoint with close follow-up in 4 to 6 weeks.     LDL 88 with goal less than 70.  Continue high intensity statin Lipitor 80 mg x 1 month at which time he can be lowered to 40 mg.     Carotid duplex shows less than 50% bilateral carotid stenosis.  Antegrade flow.     Results for orders placed during the hospital encounter of 05/06/24     Adult Transthoracic Echo Complete W/ Cont if Necessary Per Protocol (With Agitated Saline)     Interpretation Summary    Left ventricular systolic function is normal. Left ventricular ejection fraction appears to be 56 - 60%.    Left ventricular wall thickness is consistent with mild concentric hypertrophy.    Left ventricular diastolic function is consistent with (grade I) impaired relaxation.    Right ventricle is not well visualized but appears upper normal in size with normal systolic function.    The left atrial cavity is dilated.     "Saline test results are negative.    The right atrial cavity is mildly  dilated.    There are no hemodynamically significant (more than mild) valve abnormalities.     Hypertensive urgency with blood pressure as high as 206/76 on admission.  Blood pressure trend overall improved with last reading 151/64.  Continue Coreg 6.25 mg twice daily, losartan 50 mg daily and amlodipine 5 mg daily.  Continue to monitor blood pressure trend with outpatient blood pressure goal less than 140/80. Recommend to check blood pressure at home and keep a log of readings to bring to follow-up appointment with primary care provider.     He has a history of atrial fibrillation, atrial flutter chronically anticoagulated on Eliquis.  Normal sinus rhythm on telemetry.     PT/OT/SLP.  No therapy needs.    He has reached maximum benefit of hospitalization.  He is medically stable and appropriate for discharge today.    Physical Exam on Discharge:  /64 (BP Location: Left arm, Patient Position: Lying)   Pulse 57   Temp 98.2 °F (36.8 °C) (Oral)   Resp 18   Ht 170.2 cm (67\")   Wt 77.1 kg (170 lb)   SpO2 94%   BMI 26.63 kg/m²   Physical Exam  Vitals reviewed.   Constitutional:       General: He is not in acute distress.     Appearance: He is not toxic-appearing.      Comments: Lying in bed.  No acute distress.  On room air.  Wife at bedside.   HENT:      Head: Normocephalic and atraumatic.      Mouth/Throat:      Mouth: Mucous membranes are moist.      Pharynx: Oropharynx is clear.   Eyes:      Extraocular Movements: Extraocular movements intact.      Conjunctiva/sclera: Conjunctivae normal.      Pupils: Pupils are equal, round, and reactive to light.   Cardiovascular:      Rate and Rhythm: Normal rate and regular rhythm.      Pulses: Normal pulses.      Comments: Sinus 62-72  Pulmonary:      Effort: Pulmonary effort is normal. No respiratory distress.      Breath sounds: Normal breath sounds. No wheezing.   Abdominal:      General: " Bowel sounds are normal. There is no distension.      Palpations: Abdomen is soft.      Tenderness: There is no abdominal tenderness.   Musculoskeletal:         General: No swelling or tenderness. Normal range of motion.      Cervical back: Normal range of motion and neck supple. No muscular tenderness.   Skin:     General: Skin is warm and dry.      Findings: No erythema or rash.   Neurological:      General: No focal deficit present.      Mental Status: He is alert and oriented to person, place, and time.      Cranial Nerves: No cranial nerve deficit.      Motor: No weakness.   Psychiatric:         Mood and Affect: Mood normal.         Behavior: Behavior normal.       Condition on Discharge: medically stable.    Discharge Disposition:  Home or Self Care    Discharge Medications:     Discharge Medications        New Medications        Instructions Start Date   amLODIPine 5 MG tablet  Commonly known as: NORVASC  Notes to patient: Next dose due in am 5-10   5 mg, Oral, Every 24 Hours Scheduled      atorvastatin 80 MG tablet  Commonly known as: LIPITOR  Notes to patient: Next dose due tonight 5-9   80 mg, Oral, Nightly      carvedilol 6.25 MG tablet  Commonly known as: COREG  Notes to patient: Next dose due in am 5-10   6.25 mg, Oral, 2 Times Daily With Meals             Changes to Medications        Instructions Start Date   losartan 50 MG tablet  Commonly known as: COZAAR  What changed:   how much to take  Another medication with the same name was removed. Continue taking this medication, and follow the directions you see here.  Notes to patient: Next dose due in am 5-10   50 mg, Oral, Daily             Continue These Medications        Instructions Start Date   alfuzosin 10 MG 24 hr tablet  Commonly known as: UROXATRAL  Notes to patient: Next dose due tonight 5-9   10 mg, Oral, Every Evening      apixaban 5 MG tablet tablet  Commonly known as: ELIQUIS  Notes to patient: Next dose due tonight 5-9   5 mg, Oral, 2  Times Daily      Cholecalciferol 50 MCG (2000 UT) tablet  Notes to patient: Next dose due in am 5-10   1 tablet, Oral, Daily      finasteride 5 MG tablet  Commonly known as: PROSCAR  Notes to patient: Next dose due in am 5-10   5 mg, Oral, Daily      folic acid 1 MG tablet  Commonly known as: FOLVITE  Notes to patient: Next dose due in am 5-10   1 mg, Oral, Daily      gabapentin 600 MG tablet  Commonly known as: Neurontin  Notes to patient: Next dose due tonight 5-9   600 mg, Oral, 3 Times Daily      L-Lysine 600 MG tablet  Notes to patient: Next dose due in am 5-10   1 tablet, Oral, Daily, 1000 mg      Metamucil wafer wafer  Notes to patient: Next dose due in am 5-10   2 wafers, Oral, Daily      STOOL SOFTENER PO  Notes to patient: Next dose due in am 5-10   100 mg, Oral, Daily      Vitamin C 500 MG capsule  Notes to patient: Next dose due tonight 5-9   1 capsule, Oral, 2 times daily      vitamin E 100 UNIT capsule  Notes to patient: Next dose due in am 5-10   100 Units, Oral, Daily             Stop These Medications      metoprolol tartrate 25 MG tablet  Commonly known as: LOPRESSOR     pravastatin 20 MG tablet  Commonly known as: PRAVACHOL     pravastatin 40 MG tablet  Commonly known as: PRAVACHOL              Discharge Diet:   Diet Instructions       Diet: Regular/House Diet, Cardiac Diets; Healthy Heart (2-3 Na+); Regular (IDDSI 7); Thin (IDDSI 0)      Discharge Diet:  Regular/House Diet  Cardiac Diets       Cardiac Diet: Healthy Heart (2-3 Na+)    Texture: Regular (IDDSI 7)    Fluid Consistency: Thin (IDDSI 0)            Activity at Discharge:   Activity Instructions       Activity as Tolerated              Follow-up Appointments:   1.  Follow-up with primary care provider 1 week.  2.  Follow-up with neurology in 4-6 weeks.  Future Appointments   Date Time Provider Department [place]   6/19/2024  8:30 AM José Miguel Bermeo APRN [facility] N [facility]AD PAD   6/24/2024  2:15 PM Miguel Bond MD [facility] VSQ PAD   7/2/2024   1:00 PM Tatianna Jessica APRN [facility] PAD PAD       Test Results Pending at Discharge: none.    Electronically signed by EDIE Corey, 05/09/24, 09:51 CDT.    Time: 35 minutes.

## 2024-05-07 NOTE — PLAN OF CARE
Admitted 5/6 for TIA; Neuro consulted; MRI  Plans for echocardiogram, swallow eval  OLMSTEAD x4; back to baseline  SR 1st degree BBB 65-78  Problem: Adult Inpatient Plan of Care  Goal: Plan of Care Review  Outcome: Ongoing, Progressing     Problem: Hypertension Comorbidity  Goal: Blood Pressure in Desired Range  Outcome: Ongoing, Progressing     Problem: Adult Inpatient Plan of Care  Goal: Absence of Hospital-Acquired Illness or Injury  Intervention: Identify and Manage Fall Risk  Recent Flowsheet Documentation  Taken 5/7/2024 0100 by Kalyan Spencer RN  Safety Promotion/Fall Prevention: safety round/check completed  Taken 5/7/2024 0000 by Kalyan Spencer RN  Safety Promotion/Fall Prevention: safety round/check completed  Taken 5/6/2024 2300 by Kalyan Spencer RN  Safety Promotion/Fall Prevention: safety round/check completed  Taken 5/6/2024 2200 by Kalyan Spencer RN  Safety Promotion/Fall Prevention: safety round/check completed  Taken 5/6/2024 2100 by Kalyan Spencer RN  Safety Promotion/Fall Prevention: safety round/check completed  Taken 5/6/2024 2000 by Kalyan Spencer RN  Safety Promotion/Fall Prevention: safety round/check completed  Intervention: Prevent Skin Injury  Recent Flowsheet Documentation  Taken 5/6/2024 2150 by Kalyan Spencer RN  Body Position: position changed independently  Intervention: Prevent and Manage VTE (Venous Thromboembolism) Risk  Recent Flowsheet Documentation  Taken 5/6/2024 2150 by Kalyan Spencer RN  VTE Prevention/Management: (lovenox sq)   bilateral   sequential compression devices on  Range of Motion: active ROM (range of motion) encouraged   Goal Outcome Evaluation:

## 2024-05-07 NOTE — THERAPY EVALUATION
Acute Care - Speech Language Pathology   Swallow Initial Evaluation [facility]     Patient Name: Chad Henriquez  : 1929  MRN: [id number]  Today's Date: 2024               Admit Date: 2024  ST clinical bedside swallow evaluation completed secondary to failed swallow screen. Pt admitted for acute TIA. Hx of CAD, HTN, stroke, hard of hearing. Pt noted to have slight labial droop on left side. Pt and spouse report that speech is back to baseline. Pt eating regular solids lunch tray and thin liquids upon ST arrival. Solids, thin, and puree observed. Pt was noted to have 3x slight delayed mild cough with thin liquids via cup. Pt observed with meds with thin liquids. Again delayed cough noted. Pt did take several drinks of thin via cup with no overt s/s of aspiration noted. RN listened to lungs following PO with no increased change noted.     Pt okay to remain on current diet of regular solids and thin liquids at this time. Meds whole with thin liquids. General aspiration precautions with PO. ST cannot fully r/o aspiration. RN to continue to monitor for any increased lung congestion. If any increased concerns noted, pt may benefit from thickened liquids. ST to follow PRN to ensure diet toleration.  Radha Ley MS-CCC/SLP, CNT 2024 13:58 CDT    Visit Dx:     ICD-10-CM ICD-9-CM   1. TIA (transient ischemic attack)  G45.9 435.9   2. Dysphagia, unspecified type  R13.10 787.20     Patient Active Problem List   Diagnosis    BPH with urinary obstruction    Frequency of urination    Nocturia    OAB (overactive bladder)    Non-smoker    Spinal stenosis, lumbar region, with neurogenic claudication    Left leg pain    Bilateral hip pain    Acute left-sided low back pain with left-sided sciatica    Essential tremor    Spinal stenosis    Degeneration of lumbar or lumbosacral intervertebral disc    Hereditary and idiopathic peripheral neuropathy    Syncope    Hypercholesteremia    Coronary artery disease  involving native coronary artery of native heart without angina pectoris    Hypertension    Lumbar disc disease with radiculopathy    Left hip pain    History of skin cancer in adulthood    Skin lesion    Shortness of breath    Persistent shortness of breath after COVID-19    Diastolic dysfunction without heart failure    Atrial flutter with rapid ventricular response    Stroke-like symptoms    TIA (transient ischemic attack)    Chronic anticoagulation    Paroxysmal atrial fibrillation     Past Medical History:   Diagnosis Date    Allergic rhinitis     Anemia     Arthritis     Blind left eye     BPH (benign prostatic hypertrophy) with urinary retention     Cancer     skin cancer    Chronic back pain     RUNS DOWN BOTH LEGS    Constipation     Coronary artery disease     COVID     Hard of hearing     Heart attack 1986    NO MUSCLE DAMAGE - JUST OCCLUDED VALVE    High cholesterol     History of skin cancer     BILATERAL EARS    History of transfusion     1986    Hypertension     Inguinal hernia     Leaky heart valve     DR CONCEPCION SAYS NOT ENOUGH TO BE OF CONCERN    Other bursal cyst, right elbow     Paroxysmal atrial fibrillation 5/6/2024    PONV (postoperative nausea and vomiting)     has had scopalamine patch in past     Sleep apnea     DOES NOT USE CPAP OR BIPAP    Spinal stenosis of cervical region     Spinal stenosis of lumbar region     Stroke     Tremors of nervous system     Wears hearing aid     BILATERAL     Past Surgical History:   Procedure Laterality Date    ANTERIOR LUMBAR EXPOSURE N/A 12/07/2018    Procedure: ANTERIOR LUMBAR EXPOSURE;  Surgeon: Manolo Mcleod DO;  Location: [facility] OR;  Service: Vascular    APPENDECTOMY      BACK SURGERY      X3    CARDIAC SURGERY  08/08/1986    X1 BYPASS    CORONARY ANGIOPLASTY WITH STENT PLACEMENT  1997    X3 STENTS    CORONARY ARTERY BYPASS GRAFT      HERNIA REPAIR Bilateral     x2    INGUINAL HERNIA REPAIR Right 06/22/2017    Procedure: RIGHT INGUINAL HERNIA  REPAIR AND EXCISION OF CYST RIGHT ELBOW ;  Surgeon: Jackelyn Arriola MD;  Location:  [facility] OR;  Service:     LUMBAR FUSION Left 06/30/2021    Procedure: LUMBAR LAMINECTOMY, DISCECTOMY, FACETECTOMY,  TRANSFORAMINAL LUMBAR INTERBODY FUSION L2-3. REVISION OF INSTRUMENTATION L3-S1. USE OF IMAGE GUIDANCE AND SURGICAL ROBOT;  Surgeon: Kj Falcon MD;  Location:  [facility] OR;  Service: Robotics - Neuro;  Laterality: Left;    LUMBAR LAMINECTOMY N/A 03/12/2018    Procedure: LUMBAR LAMINECTOMY WITHOUT FUSION L3-4,4-5;  Surgeon: Kj Falcon MD;  Location:  [facility] OR;  Service: Neurosurgery    LUMBAR LAMINECTOMY ANTERIOR LUMBAR INTERBODY FUSION N/A 12/07/2018    Procedure: ANTERIOR LUMBAR INTERBODY FUSION L5-S1;  Surgeon: Kj Falcon MD;  Location:  [facility] OR;  Service: Neurosurgery    LUMBAR LAMINECTOMY WITH FUSION Left 12/10/2018    Procedure: LUMBAR LAMINECTOMY TRANSFORAMINAL LUMBAR INTERBODY FUSION L34,45;  Surgeon: Kj Falcon MD;  Location:  [facility] OR;  Service: Neurosurgery    LUMBAR LAMINECTOMY WITH FUSION Left 12/07/2018    Procedure: LUMBAR LAMINECTOMY TRANSFORAMINAL LUMBAR INTERBODY FUSION LEFT L3-4, L4-5 QUADRANT RETRACTOR;  Surgeon: Kj Falcon MD;  Location:  [facility] OR;  Service: Neurosurgery    SKIN LESION EXCISION      nasal       SLP Recommendation and Plan  SLP Swallowing Diagnosis: R/O pharyngeal dysphagia (05/07/24 1230)  SLP Diet Recommendation: regular textures, thin liquids (05/07/24 1230)  Recommended Precautions and Strategies: upright posture during/after eating, small bites of food and sips of liquid, general aspiration precautions (05/07/24 1230)  SLP Rec. for Method of Medication Administration: meds whole, with thin liquids (05/07/24 1230)     Monitor for Signs of Aspiration: yes, notify SLP if any concerns (05/07/24 1230)     Swallow Criteria for Skilled Therapeutic Interventions Met: demonstrates skilled criteria (05/07/24 1230)  Anticipated Discharge Disposition (SLP): home  (05/07/24 1230)  Rehab Potential/Prognosis, Swallowing: good, to achieve stated therapy goals (05/07/24 1230)  Therapy Frequency (Swallow): PRN (05/07/24 1230)  Predicted Duration Therapy Intervention (Days): until discharge (05/07/24 1230)  Oral Care Recommendations: Oral Care BID/PRN (05/07/24 1230)                                        Plan of Care Reviewed With: patient, spouse  Progress: no change (eval)  Outcome Evaluation: ST clinical bedside swallow evaluation completed secondary to failed swallow screen. Pt admitted for acute TIA. Hx of CAD, HTN, stroke, hard of hearing. Pt noted to have slight labial droop on left side. Pt and spouse report that speech is back to baseline. Pt eating regular solids lunch tray and thin liquids upon ST arrival. Solids, thin, and puree observed. Pt was noted to have 3x slight delayed mild cough with thin liquids via cup. Pt observed with meds with thin liquids. Again delayed cough noted. Pt did take several drinks of thin via cup with no overt s/s of aspiration noted. RN listened to lungs following PO with no increased change noted. Pt okay to remain on current diet of regular solids and thin liquids at this time. Meds whole with thin liquids. General aspiration precautions with PO. ST cannot fully r/o aspiration. RN to continue to monitor for any increased lung congestion. If any increased concerns noted, pt may benefit from thickened liquids. ST to follow PRN to ensure diet toleration.      SWALLOW EVALUATION (Last 72 Hours)       SLP Adult Swallow Evaluation       Row Name 05/07/24 1230                   Rehab Evaluation    Document Type evaluation  -BN        Subjective Information no complaints  -BN        Patient Observations alert;cooperative  -BN        Patient/Family/Caregiver Comments/Observations spouse at bedside  -BN        Patient Effort good  -BN           General Information    Patient Profile Reviewed yes  -BN        Pertinent History Of Current Problem acute  TIA. Hx of CAD, HTN, stroke, hard of hearing  -BN        Current Method of Nutrition regular textures;thin liquids  -BN        Precautions/Limitations, Vision WFL  -BN        Precautions/Limitations, Hearing hearing impairment, bilaterally;hearing aid, bilaterally  -BN        Prior Level of Function-Communication WFL  -BN        Prior Level of Function-Swallowing no diet consistency restrictions  -BN        Plans/Goals Discussed with patient and family  -BN        Barriers to Rehab none identified  -BN        Patient's Goals for Discharge patient did not state  -BN           Pain Scale: Numbers Pre/Post-Treatment    Pretreatment Pain Rating 0/10 - no pain  -BN        Posttreatment Pain Rating 0/10 - no pain  -BN           Oral Motor Structure and Function    Dentition Assessment upper dentures/partial in place;lower dentures/partial in place  -BN        Secretion Management WNL/WFL  -BN        Mucosal Quality moist, healthy  -BN           Oral Musculature and Cranial Nerve Assessment    Oral Motor General Assessment oral labial or buccal impairment  -BN        Oral Labial or Buccal Impairment, Detail, Cranial Nerve VII (Facial): left labial droop  -BN           General Eating/Swallowing Observations    Respiratory Support Currently in Use room air  -BN        Eating/Swallowing Skills self-fed  -BN        Positioning During Eating upright in bed  -BN        Utensils Used spoon;cup  -BN        Consistencies Trialed regular textures;pureed;thin liquids  -BN           Clinical Swallow Eval    Oral Prep Phase WFL  -BN        Oral Transit WFL  -BN        Oral Residue WFL  -BN        Pharyngeal Phase suspected pharyngeal impairment  -BN        Esophageal Phase unremarkable  -BN        Clinical Swallow Evaluation Summary ST clinical bedside swallow evaluation completed secondary to failed swallow screen. Pt admitted for acute TIA. Hx of CAD, HTN, stroke, hard of hearing. Pt noted to have slight labial droop on left side. Pt  and spouse report that speech is back to baseline. Pt eating regular solids lunch tray and thin liquids upon ST arrival. Solids, thin, and puree observed. Pt was noted to have 3x slight delayed mild cough with thin liquids via cup. Pt observed with meds with thin liquids. Again delayed cough noted. Pt did take several drinks of thin via cup with no overt s/s of aspiration noted. RN listened to lungs following PO with no increased change noted. Pt okay to remain on current diet of regular solids and thin liquids at this time. Meds whole with thin liquids. General aspiration precautions with PO. ST cannot fully r/o aspiration. RN to continue to monitor for any increased lung congestion. If any increased concerns noted, pt may benefit from thickened liquids. ST to follow PRN to ensure diet toleration.  -BN           Pharyngeal Phase Concerns    Pharyngeal Phase Concerns cough  -BN        Cough thin  -BN           SLP Evaluation Clinical Impression    SLP Swallowing Diagnosis R/O pharyngeal dysphagia  -BN        Functional Impact risk of aspiration/pneumonia  -BN        Rehab Potential/Prognosis, Swallowing good, to achieve stated therapy goals  -BN        Swallow Criteria for Skilled Therapeutic Interventions Met demonstrates skilled criteria  -BN           SLP Treatment Clinical Impressions    Care Plan Review evaluation/treatment results reviewed;care plan/treatment goals reviewed;risks/benefits reviewed;current/potential barriers reviewed;patient/other agree to care plan  -BN        Care Plan Review, Other Participant(s) caregiver;spouse  -BN           Recommendations    Therapy Frequency (Swallow) PRN  -BN        Predicted Duration Therapy Intervention (Days) until discharge  -BN        SLP Diet Recommendation regular textures;thin liquids  -BN        Recommended Precautions and Strategies upright posture during/after eating;small bites of food and sips of liquid;general aspiration precautions  -BN        Oral Care  Recommendations Oral Care BID/PRN  -BN        SLP Rec. for Method of Medication Administration meds whole;with thin liquids  -BN        Monitor for Signs of Aspiration yes;notify SLP if any concerns  -BN        Anticipated Discharge Disposition (SLP) home  -BN           Swallow Goals (SLP)    Swallow LTGs Swallow Long Term Goal (free text)  -BN        Swallow STGs diet tolerance goal selection (SLP)  -BN        Diet Tolerance Goal Selection (SLP) Patient will tolerate trials of  -BN           (LTG) Swallow    (LTG) Swallow Pt will tolerate LRD without s/s of aspiration.  -BN        [place] (Swallow Long Term Goal) independently (over 90% accuracy)  -BN        Time Frame (Swallow Long Term Goal) by discharge  -BN        Barriers (Swallow Long Term Goal) n/a  -BN        Progress/Outcomes (Swallow Long Term Goal) new goal  -BN           (STG) Patient will tolerate trials of    Consistencies Trialed (Tolerate trials) regular textures;thin liquids  -BN        Desired Outcome (Tolerate trials) without signs/symptoms of aspiration  -BN        [place] (Tolerate trials) independently (over 90% accuracy)  -BN        Time Frame (Tolerate trials) by discharge  -BN        Progress/Outcomes (Tolerate trials) new goal  -BN                  User Key  (r) = Recorded By, (t) = Taken By, (c) = Cosigned By      Initials Name Effective Dates    Radha Nicole, MS-CCC/SLP, [facility] 07/11/23 -                     EDUCATION  The patient has been educated in the following areas:   Dysphagia (Swallowing Impairment).        SLP GOALS       Row Name 05/07/24 1230             (LTG) Swallow    (LTG) Swallow Pt will tolerate LRD without s/s of aspiration.  -BN      [place] (Swallow Long Term Goal) independently (over 90% accuracy)  -BN      Time Frame (Swallow Long Term Goal) by discharge  -BN      Barriers (Swallow Long Term Goal) n/a  -BN      Progress/Outcomes (Swallow Long Term Goal) new goal  -BN         (STG) Patient  will tolerate trials of    Consistencies Trialed (Tolerate trials) regular textures;thin liquids  -BN      Desired Outcome (Tolerate trials) without signs/symptoms of aspiration  -BN      [place] (Tolerate trials) independently (over 90% accuracy)  -BN      Time Frame (Tolerate trials) by discharge  -BN      Progress/Outcomes (Tolerate trials) new goal  -BN                User Key  (r) = Recorded By, (t) = Taken By, (c) = Cosigned By      Initials Name Provider Type    Radha Nicole MS-CCC/SLP, JONATHON Speech and Language Pathologist                       Time Calculation:    Time Calculation- SLP       Row Name 05/07/24 1357             Time Calculation- SLP    SLP Start Time 1230  -BN      SLP Stop Time 1317  -BN      SLP Time Calculation (min) 47 min  -BN      SLP Received On 05/07/24  -BN      SLP Goal Re-Cert Due Date 05/17/24  -BN         Untimed Charges    SLP Eval/Re-eval  ST Eval Oral Pharyng Swallow - 54390  -BN      [id number] Eval Oral Pharyng Swallow Minutes 47  -BN         Total Minutes    Untimed Charges Total Minutes 47  -BN       Total Minutes 47  -BN                User Key  (r) = Recorded By, (t) = Taken By, (c) = Cosigned By      Initials Name Provider Type    Radha Nicole MS-CCC/SLP, JONATHON Speech and Language Pathologist                    Therapy Charges for Today       Code Description Service Date Service Provider Modifiers Qty    [id number]  ST EVAL ORAL PHARYNG SWALLOW 3 5/7/2024 Radha Ley MS-CCC/SLP, JONATHON GN 1                 Radha Av, MS-CCC/SLP, CNT  5/7/2024

## 2024-05-07 NOTE — PLAN OF CARE
Goal Outcome Evaluation:  Plan of Care Reviewed With: patient, spouse        Progress: improving  Outcome Evaluation: The patient presents alert and orients x4 lying in bed. His expressive aphasia has resolved and he demonstrates no other new focal neurological deficits in strength, sensation, or coordination. He has a chronic R foot drop which he wears an AFO for. He was able to ambulate in the hallway with a LOB when ambulating to the side. He required assist to maintain his balance. He will need to continue ambulating his cane and I recommend oupt PT to work on balance and gait mechanics.      Anticipated Discharge Disposition (PT): home with assist, home with outpatient therapy services

## 2024-05-08 PROBLEM — I16.0 HYPERTENSIVE URGENCY: Status: ACTIVE | Noted: 2024-05-08

## 2024-05-08 PROCEDURE — 25010000002 ONDANSETRON PER 1 MG: Performed by: PSYCHIATRY & NEUROLOGY

## 2024-05-08 PROCEDURE — 96376 TX/PRO/DX INJ SAME DRUG ADON: CPT

## 2024-05-08 PROCEDURE — G0378 HOSPITAL OBSERVATION PER HR: HCPCS

## 2024-05-08 PROCEDURE — 25010000002 LABETALOL 5 MG/ML SOLUTION: Performed by: INTERNAL MEDICINE

## 2024-05-08 PROCEDURE — 97535 SELF CARE MNGMENT TRAINING: CPT | Performed by: OCCUPATIONAL THERAPIST

## 2024-05-08 RX ORDER — CARVEDILOL 6.25 MG/1
6.25 TABLET ORAL 2 TIMES DAILY WITH MEALS
Status: DISCONTINUED | OUTPATIENT
Start: 2024-05-08 | End: 2024-05-08

## 2024-05-08 RX ORDER — TAMSULOSIN HYDROCHLORIDE 0.4 MG/1
0.4 CAPSULE ORAL NIGHTLY
Status: DISCONTINUED | OUTPATIENT
Start: 2024-05-08 | End: 2024-05-09 | Stop reason: HOSPADM

## 2024-05-08 RX ORDER — HYDRALAZINE HYDROCHLORIDE 25 MG/1
25 TABLET, FILM COATED ORAL 4 TIMES DAILY PRN
Status: DISCONTINUED | OUTPATIENT
Start: 2024-05-08 | End: 2024-05-09 | Stop reason: HOSPADM

## 2024-05-08 RX ORDER — LOSARTAN POTASSIUM 50 MG/1
50 TABLET ORAL DAILY
Status: DISCONTINUED | OUTPATIENT
Start: 2024-05-08 | End: 2024-05-09 | Stop reason: HOSPADM

## 2024-05-08 RX ORDER — PRAVASTATIN SODIUM 40 MG
20 TABLET ORAL NIGHTLY
COMMUNITY
End: 2024-05-09 | Stop reason: HOSPADM

## 2024-05-08 RX ORDER — FOLIC ACID 1 MG/1
1 TABLET ORAL DAILY
Status: DISCONTINUED | OUTPATIENT
Start: 2024-05-08 | End: 2024-05-09 | Stop reason: HOSPADM

## 2024-05-08 RX ORDER — AMLODIPINE BESYLATE 5 MG/1
5 TABLET ORAL
Status: DISCONTINUED | OUTPATIENT
Start: 2024-05-08 | End: 2024-05-09 | Stop reason: HOSPADM

## 2024-05-08 RX ORDER — PRAVASTATIN SODIUM 20 MG
10 TABLET ORAL NIGHTLY
Status: ON HOLD | COMMUNITY
End: 2024-05-08

## 2024-05-08 RX ORDER — LOSARTAN POTASSIUM 25 MG/1
25 TABLET ORAL DAILY
COMMUNITY
End: 2024-05-09 | Stop reason: HOSPADM

## 2024-05-08 RX ORDER — GABAPENTIN 300 MG/1
600 CAPSULE ORAL EVERY 8 HOURS SCHEDULED
Status: DISCONTINUED | OUTPATIENT
Start: 2024-05-08 | End: 2024-05-09 | Stop reason: HOSPADM

## 2024-05-08 RX ORDER — FOLIC ACID 1 MG/1
1 TABLET ORAL DAILY
COMMUNITY

## 2024-05-08 RX ORDER — CARVEDILOL 6.25 MG/1
6.25 TABLET ORAL 2 TIMES DAILY WITH MEALS
Status: DISCONTINUED | OUTPATIENT
Start: 2024-05-08 | End: 2024-05-09 | Stop reason: HOSPADM

## 2024-05-08 RX ORDER — FINASTERIDE 5 MG/1
5 TABLET, FILM COATED ORAL DAILY
Status: DISCONTINUED | OUTPATIENT
Start: 2024-05-08 | End: 2024-05-09 | Stop reason: HOSPADM

## 2024-05-08 RX ADMIN — ONDANSETRON 4 MG: 2 INJECTION INTRAMUSCULAR; INTRAVENOUS at 13:54

## 2024-05-08 RX ADMIN — GABAPENTIN 600 MG: 300 CAPSULE ORAL at 16:06

## 2024-05-08 RX ADMIN — ATORVASTATIN CALCIUM 80 MG: 40 TABLET ORAL at 21:13

## 2024-05-08 RX ADMIN — APIXABAN 5 MG: 5 TABLET, FILM COATED ORAL at 21:13

## 2024-05-08 RX ADMIN — METOPROLOL TARTRATE 12.5 MG: 25 TABLET, FILM COATED ORAL at 08:28

## 2024-05-08 RX ADMIN — CARVEDILOL 6.25 MG: 6.25 TABLET, FILM COATED ORAL at 12:58

## 2024-05-08 RX ADMIN — Medication 10 ML: at 08:29

## 2024-05-08 RX ADMIN — AMLODIPINE BESYLATE 5 MG: 5 TABLET ORAL at 21:13

## 2024-05-08 RX ADMIN — FINASTERIDE 5 MG: 5 TABLET, FILM COATED ORAL at 16:06

## 2024-05-08 RX ADMIN — TAMSULOSIN HYDROCHLORIDE 0.4 MG: 0.4 CAPSULE ORAL at 21:13

## 2024-05-08 RX ADMIN — FOLIC ACID 1 MG: 1 TABLET ORAL at 16:06

## 2024-05-08 RX ADMIN — GABAPENTIN 600 MG: 300 CAPSULE ORAL at 21:12

## 2024-05-08 RX ADMIN — ONDANSETRON 4 MG: 2 INJECTION INTRAMUSCULAR; INTRAVENOUS at 03:52

## 2024-05-08 RX ADMIN — LABETALOL HYDROCHLORIDE 10 MG: 5 INJECTION, SOLUTION INTRAVENOUS at 03:52

## 2024-05-08 RX ADMIN — APIXABAN 5 MG: 5 TABLET, FILM COATED ORAL at 08:28

## 2024-05-08 RX ADMIN — HYDRALAZINE HYDROCHLORIDE 25 MG: 25 TABLET ORAL at 15:30

## 2024-05-08 RX ADMIN — LOSARTAN POTASSIUM 50 MG: 50 TABLET, FILM COATED ORAL at 08:28

## 2024-05-08 NOTE — PROGRESS NOTES
[facility] Medicine Services  INPATIENT PROGRESS NOTE    Patient Name: Chad Henriquez  Date of Admission: 5/6/2024  Today's Date: 05/08/24  Length of Stay: 0  Primary Care Physician: Miguel Bond MD    Subjective   Chief Complaint: none offered  HPI   He was lying in bed with spouse at bedside.  He feels back at baseline.  He denies any acute complaints.    Blood pressure this afternoon 176/68.  His home medications to include metoprolol 12.5 and losartan 25 mg had not been resumed.  Per wife, his blood pressure had recently been adjusted given issues with weakness/hypotension.    Review of Systems   All pertinent negatives and positives are as above. All other systems have been reviewed and are negative unless otherwise stated.     Objective    Temp:  [97.4 °F (36.3 °C)-98.2 °F (36.8 °C)] 97.5 °F (36.4 °C)  Heart Rate:  [60-80] 63  Resp:  [14-18] 15  BP: (147-225)/(55-91) 174/88  Physical Exam  Vitals reviewed.   Constitutional:       General: He is not in acute distress.     Appearance: He is not toxic-appearing.      Comments: Lying in bed.  No acute distress.  On room air.  Spouse at bedside.   HENT:      Head: Normocephalic and atraumatic.      Mouth/Throat:      Mouth: Mucous membranes are moist.      Pharynx: Oropharynx is clear.   Eyes:      Extraocular Movements: Extraocular movements intact.      Conjunctiva/sclera: Conjunctivae normal.      Pupils: Pupils are equal, round, and reactive to light.   Cardiovascular:      Rate and Rhythm: Normal rate and regular rhythm.      Pulses: Normal pulses.   Pulmonary:      Effort: Pulmonary effort is normal. No respiratory distress.      Breath sounds: Normal breath sounds. No wheezing.   Abdominal:      General: Bowel sounds are normal. There is no distension.      Palpations: Abdomen is soft.      Tenderness: There is no abdominal tenderness.   Musculoskeletal:         General: No swelling or tenderness. Normal range of  motion.      Cervical back: Normal range of motion and neck supple. No muscular tenderness.   Skin:     General: Skin is warm and dry.      Findings: No erythema or rash.   Neurological:      General: No focal deficit present.      Mental Status: He is alert and oriented to person, place, and time.      Cranial Nerves: No cranial nerve deficit.      Motor: No weakness.   Psychiatric:         Mood and Affect: Mood normal.         Behavior: Behavior normal.       Results Review:  I have reviewed the labs, radiology results, and diagnostic studies.    Laboratory Data:   Results from last 7 days   Lab Units 05/07/24  0409 05/06/24  1706   WBC 10*3/mm3 7.96 8.70   HEMOGLOBIN g/dL 11.8* 13.3   HEMATOCRIT % 35.4* 40.4   PLATELETS 10*3/mm3 232 264        Results from last 7 days   Lab Units 05/06/24  1706   SODIUM mmol/L 138   POTASSIUM mmol/L 4.7   CHLORIDE mmol/L 101   CO2 mmol/L 28.0   BUN mg/dL 20   CREATININE mg/dL 0.75*   CALCIUM mg/dL 9.7*   BILIRUBIN mg/dL 0.4   ALK PHOS U/L 74   ALT (SGPT) U/L 19   AST (SGOT) U/L 28   GLUCOSE mg/dL 116*       Culture Data:   Microbiology Results (last 10 days)       ** No results found for the last 240 hours. **          Radiology Data:   Imaging Results (Last 24 Hours)       Procedure Component Value Units Date/Time    US Carotid Bilateral [[id number]] Collected: 05/07/24 1603     Updated: 05/07/24 1608    Narrative:      History: Carotid occlusive disease       Impression:      Impression:  1. There is less than 50% stenosis of the right internal carotid artery.  2. There is less than 50% stenosis of the left internal carotid artery.  3. Antegrade flow is demonstrated in bilateral vertebral arteries.     Comments: Bilateral carotid vertebral arterial duplex scan was  performed.     Grayscale imaging shows intimal thickening and calcified elements at the  carotid bifurcation. The right internal carotid artery peak systolic  velocity is 64 cm/sec. The end-diastolic velocity is 8.6  cm/sec. The  right ICA/CCA ratio is approximately 0.8. These findings correlate with  less than 50% stenosis of the right internal carotid artery.     Grayscale imaging shows intimal thickening and calcified elements at the  carotid bifurcation. The left internal carotid artery peak systolic  velocity is 95.4 cm/sec. The end-diastolic velocity is 14.9 cm/sec. The  left ICA/CCA ratio is approximately 1.1. These findings correlate with  less than 50% stenosis of the left internal carotid artery.     Antegrade flow is demonstrated in bilateral vertebral arteries.        This report was signed and finalized on 5/7/2024 4:05 PM by Dr. Manolo Mcleod MD.               I have reviewed the patient's current medications.     Assessment/Plan   Assessment  Active Hospital Problems    Diagnosis     **TIA (transient ischemic attack)     Stroke-like symptoms     Chronic anticoagulation     Paroxysmal atrial fibrillation     Hypertension      Mr. Henriquez is a 89-year-old male who presented to [facility] on 5/6 when he had a hard time reading and expressing himself. By the time he was seen in the CT scan department he was back to baseline. NIH stroke scale 0. He has a history of paroxysmal atrial fibrillation, atrial flutter for which he takes Eliquis 5 mg twice daily. He does admit to missing last evening's dose but took it on the morning of 5/6.     Treatment Plan  Neurology consulted.  MRI brain showed no acute intracranial normality.  Okay to resume Eliquis per neurology.  Neurology, likely TIA.  He is okay for discharge from neurology standpoint with close follow-up in 4 to 6 weeks.     LDL 88 with goal less than 70.  Continue high intensity statin.     Preliminary carotid duplex shows less than 50% bilateral carotid stenosis.  Antegrade flow.     Results for orders placed during the hospital encounter of 05/06/24     Adult Transthoracic Echo Complete W/ Cont if Necessary Per Protocol (With Agitated Saline)      Interpretation Summary    Left ventricular systolic function is normal. Left ventricular ejection fraction appears to be 56 - 60%.    Left ventricular wall thickness is consistent with mild concentric hypertrophy.    Left ventricular diastolic function is consistent with (grade I) impaired relaxation.    Right ventricle is not well visualized but appears upper normal in size with normal systolic function.    The left atrial cavity is dilated.    Saline test results are negative.    The right atrial cavity is mildly  dilated.    There are no hemodynamically significant (more than mild) valve abnormalities.     Hypertensive urgency with blood pressure as high as 206/76.  Blood pressure goal 140/80.  Blood pressure this morning 156/55 -not had his morning medications, will resume now.      He has a history of atrial fibrillation, atrial flutter chronically anticoagulated on Eliquis.  Normal sinus rhythm on telemetry.     PT/OT/SLP.  No therapy needs.    Medical Decision Making  Number and Complexity of problems: 2 acute problems  Differential Diagnosis: none considered at present    Conditions and Status        Condition is improved.     [facility] Data  External documents reviewed: Prior epic records  Cardiac tracing (EKG, telemetry) interpretation: Reviewed  Radiology interpretation: Interpreted by radiology  Labs reviewed: As above  Any tests that were considered but not ordered: none considered at present     Decision rules/scores evaluated (example [id number], [place], etc): none considered at present     Discussed with: Patient and Dr. Cordova     Care Planning  Shared decision making: Patient is agreeable to ongoing workup and treatment  Code status and discussions: Full code with full interventions    Disposition  Social Determinants of Health that impact treatment or disposition: none  I expect the patient to be discharged to home in 1-2 days.     Electronically signed by EDIE Corey, 05/08/24, 10:53  CDT.

## 2024-05-08 NOTE — PROGRESS NOTES
I performed a substantive part of the MDM during the patient’s E/M visit. I personally evaluated   and examined the patient. I personally made or approved the documented management plan and acknowledge its risk   of complications.   (Independent Interpretation) My (EKG/X-Ray/US/CT) interpretation (revisited information and correlated in patient's condition to help explain plan of care as discussed below.  (Discussion) Management/test interpretation discussed with-nothing new to interpret past but mention plan of care as outlined below     Patient seated on chair reading newspaper with figure 4 crossed leg not in any apparent distress  I discussed the case earlier with SUKHJINDER Carreno before seeing the patient  Noted that blood pressure has been as high as 204  Heart rate in been in the low 60s  Patient on beta-blocker metoprolol  Patient also is on losartan  I rechecked the blood pressure at bedside with nurse Rocio.  It read as 199/66.  In my discussion with SUKHJINDER Carreno, we switched him to carvedilol to optimize blood pressure reading.  I asked for first dose now given systolic blood pressure of 199  Wife at bedside.  She gave me the impression that he has whitecoat syndrome.  Yesterday at systolic of 170, they were concerned of going home but with systolic blood pressure in the 200, they feel that they should go home.  I discussed with him pros and cons of the contemplated plan.  They are agreeable to stay to further monitor effects of medication change.      Electronically signed by Baljeet Cordova MD, 5/8/2024, 12:11 CDT.

## 2024-05-08 NOTE — THERAPY TREATMENT NOTE
Patient Name: Chad Henriquez  : 1929    MRN: [id number]                              Today's Date: 2024       Admit Date: 2024    Visit Dx:     ICD-10-CM ICD-9-CM   1. TIA (transient ischemic attack)  G45.9 435.9   2. Dysphagia, unspecified type  R13.10 787.20   3. Primary hypertension  I10 401.9     Patient Active Problem List   Diagnosis    BPH with urinary obstruction    Frequency of urination    Nocturia    OAB (overactive bladder)    Non-smoker    Spinal stenosis, lumbar region, with neurogenic claudication    Left leg pain    Bilateral hip pain    Acute left-sided low back pain with left-sided sciatica    Essential tremor    Spinal stenosis    Degeneration of lumbar or lumbosacral intervertebral disc    Hereditary and idiopathic peripheral neuropathy    Syncope    Hypercholesteremia    Coronary artery disease involving native coronary artery of native heart without angina pectoris    Hypertension    Lumbar disc disease with radiculopathy    Left hip pain    History of skin cancer in adulthood    Skin lesion    Shortness of breath    Persistent shortness of breath after COVID-19    Diastolic dysfunction without heart failure    Atrial flutter with rapid ventricular response    TIA (transient ischemic attack)    Chronic anticoagulation    Paroxysmal atrial fibrillation    Hypertensive urgency     Past Medical History:   Diagnosis Date    Allergic rhinitis     Anemia     Arthritis     Blind left eye     BPH (benign prostatic hypertrophy) with urinary retention     Cancer     skin cancer    Chronic back pain     RUNS DOWN BOTH LEGS    Constipation     Coronary artery disease     COVID     Hard of hearing     Heart attack     NO MUSCLE DAMAGE - JUST OCCLUDED VALVE    High cholesterol     History of skin cancer     BILATERAL EARS    History of transfusion         Hypertension     Inguinal hernia     Leaky heart valve     DR CONCEPCION SAYS NOT ENOUGH TO BE OF CONCERN    Other bursal cyst,  right elbow     Paroxysmal atrial fibrillation 5/6/2024    PONV (postoperative nausea and vomiting)     has had scopalamine patch in past     Sleep apnea     DOES NOT USE CPAP OR BIPAP    Spinal stenosis of cervical region     Spinal stenosis of lumbar region     Stroke     Tremors of nervous system     Wears hearing aid     BILATERAL     Past Surgical History:   Procedure Laterality Date    ANTERIOR LUMBAR EXPOSURE N/A 12/07/2018    Procedure: ANTERIOR LUMBAR EXPOSURE;  Surgeon: Manolo Mcleod DO;  Location:  [facility] OR;  Service: Vascular    APPENDECTOMY      BACK SURGERY      X3    CARDIAC SURGERY  08/08/1986    X1 BYPASS    CORONARY ANGIOPLASTY WITH STENT PLACEMENT  1997    X3 STENTS    CORONARY ARTERY BYPASS GRAFT      HERNIA REPAIR Bilateral     x2    INGUINAL HERNIA REPAIR Right 06/22/2017    Procedure: RIGHT INGUINAL HERNIA REPAIR AND EXCISION OF CYST RIGHT ELBOW ;  Surgeon: Jackelyn Arriola MD;  Location:  [facility] OR;  Service:     LUMBAR FUSION Left 06/30/2021    Procedure: LUMBAR LAMINECTOMY, DISCECTOMY, FACETECTOMY,  TRANSFORAMINAL LUMBAR INTERBODY FUSION L2-3. REVISION OF INSTRUMENTATION L3-S1. USE OF IMAGE GUIDANCE AND SURGICAL ROBOT;  Surgeon: Kj Falcon MD;  Location:  [facility] OR;  Service: Robotics - Neuro;  Laterality: Left;    LUMBAR LAMINECTOMY N/A 03/12/2018    Procedure: LUMBAR LAMINECTOMY WITHOUT FUSION L3-4,4-5;  Surgeon: Kj Falcon MD;  Location:  [facility] OR;  Service: Neurosurgery    LUMBAR LAMINECTOMY ANTERIOR LUMBAR INTERBODY FUSION N/A 12/07/2018    Procedure: ANTERIOR LUMBAR INTERBODY FUSION L5-S1;  Surgeon: Kj Falcon MD;  Location:  [facility];  Service: Neurosurgery    LUMBAR LAMINECTOMY WITH FUSION Left 12/10/2018    Procedure: LUMBAR LAMINECTOMY TRANSFORAMINAL LUMBAR INTERBODY FUSION L34,45;  Surgeon: Kj Falcon MD;  Location:  [facility] OR;  Service: Neurosurgery    LUMBAR LAMINECTOMY WITH FUSION Left 12/07/2018    Procedure: LUMBAR LAMINECTOMY TRANSFORAMINAL  LUMBAR INTERBODY FUSION LEFT L3-4, L4-5 QUADRANT RETRACTOR;  Surgeon: Kj Falcon MD;  Location: [facility] OR;  Service: Neurosurgery    SKIN LESION EXCISION      nasal      General Information       Row Name 05/08/24 1343          OT Time and Intention    Document Type therapy note (daily note)  -     Mode of Treatment occupational therapy  -       Row Name 05/08/24 1343          General Information    Patient Profile Reviewed yes  -     Existing Precautions/Restrictions fall;other (see comments)  -       Row Name 05/08/24 1343          Cognition    Orientation Status (Cognition) oriented x 4  -       Row Name 05/08/24 1343          Safety Issues, Functional Mobility    Impairments Affecting Function (Mobility) balance;endurance/activity tolerance;coordination  -               User Key  (r) = Recorded By, (t) = Taken By, (c) = Cosigned By      Initials Name Provider Type     Deepti Buckner, OTR/L Occupational Therapist                     Mobility/ADL's       Row Name 05/08/24 1343          Bed Mobility    Supine-Sit [place] (Bed Mobility) modified independence  -     Sit-Supine [place] (Bed Mobility) modified [place]  -     Assistive Device (Bed Mobility) head of bed elevated;bed rails  -       Row Name 05/08/24 1343          Sit-Stand Transfer    Sit-Stand [place] (Transfers) supervision  -       Row Name 05/08/24 1343          Stand-Sit Transfer    Stand-Sit [place] (Transfers) supervision  -       Row Name 05/08/24 1343          Activities of Daily Living    BADL Assessment/Intervention grooming  -       Row Name 05/08/24 1343          Upper Body Dressing Assessment/Training    [place] Level (Upper Body Dressing) doff;[place]/diane;modified independence  [facility]     Position (Upper Body Dressing) edge of bed sitting  -       Row Name 05/08/24 1343          Grooming Assessment/Training    [place] Level (Grooming) set up;wash face, hands  -      Position (Grooming) edge of bed sitting  -               User Key  (r) = Recorded By, (t) = Taken By, (c) = Cosigned By      Initials Name Provider Type    Deepti Smallwood OTR/L Occupational Therapist                   Obj/Interventions       Row Name 05/08/24 1429 05/08/24 1343       Balance    Balance Interventions sitting;standing;sit to stand;supported;static;dynamic;occupation based/functional task  - sitting  -              User Key  (r) = Recorded By, (t) = Taken By, (c) = Cosigned By      Initials Name Provider Type    Deepti Smallwood, OTR/L Occupational Therapist                   Goals/Plan    No documentation.                  Clinical Impression       Row Name 05/08/24 1343          Pain Assessment    Pretreatment Pain Rating 0/10 - no pain  -     Posttreatment Pain Rating 0/10 - no pain  -     Pre/Posttreatment Pain Comment nausea  -       Row Name 05/08/24 1343          Plan of Care Review    Plan of Care Reviewed With patient  -     Outcome Evaluation Pt fowlers c/o nausea.  Alerted RN who administered meds for symptoms & requested therapist assist with BP.  OTR took bp while pt seated 214/65 on regular cuff, obtained small adult cuff and BP would not read.  Alerted RN of concerns & requested she take bp manually. Pt able to DOFF robe, briefly stand to remove it completely, & perform facial grooming without assist but BP cont to be of concern so therapist requested he lay back down.  -       Row Name 05/08/24 1343          Therapy Assessment/Plan (OT)    Rehab Potential (OT) good, to achieve stated therapy goals  -     Criteria for Skilled Therapeutic Interventions Met (OT) yes;skilled treatment is necessary  -     Therapy Frequency (OT) 5 times/wk  -       Row Name 05/08/24 1343          Vital Signs    Intra Systolic BP Rehab 214   -     Intra Treatment Diastolic BP 65   -       Row Name 05/08/24 1343          Positioning and Restraints    Pre-Treatment Position in bed   -CH     Post Treatment Position bed  -CH     In Bed fowlers;call light within reach;encouraged to call for assist;side rails up x2;notified nsg  -               User Key  (r) = Recorded By, (t) = Taken By, (c) = Cosigned By      Initials Name Provider Type    Deepti Smallwood, OTR/L Occupational Therapist                   Outcome Measures       Row Name 05/08/24 1343          How much help from another is currently needed...    Putting on and taking off regular lower body clothing? 3  -CH     Bathing (including washing, rinsing, and drying) 3  -CH     Toileting (which includes using toilet bed pan or urinal) 3  -CH     Putting on and taking off regular upper body clothing 4  -CH     Taking care of personal grooming (such as brushing teeth) 4  -CH     Eating meals 4  -CH     AM-PAC 6 Clicks Score (OT) 21  -CH       Row Name 05/08/24 0829          How much help from another person do you currently need...    Turning from your back to your side while in flat bed without using bedrails? 4  -CB     Moving from lying on back to sitting on the side of a flat bed without bedrails? 4  -CB     Moving to and from a bed to a chair (including a wheelchair)? 4  -CB     Standing up from a chair using your arms (e.g., wheelchair, bedside chair)? 3  -CB     Climbing 3-5 steps with a railing? 3  -CB     To walk in hospital room? 3  -CB     AM-PAC 6 Clicks Score (PT) 21  -CB     Highest Level of Mobility Goal 6 --> Walk 10 steps or more  -CB       Row Name 05/08/24 1343          Functional Assessment    Outcome Measure Options AM-PAC 6 Clicks Daily Activity (OT)  -               User Key  (r) = Recorded By, (t) = Taken By, (c) = Cosigned By      Initials Name Provider Type    Deepti Smallwood, OTR/L Occupational Therapist    Silke Roman LPN Licensed Nurse                    Occupational Therapy Education       Title: PT OT SLP Therapies (In Progress)       Topic: Occupational Therapy (In Progress)       Point: ADL  training (Done)       Description:   Instruct learner(s) on proper safety adaptation and remediation techniques during self care or transfers.   Instruct in proper use of assistive devices.                  Learning Progress Summary             Patient Acceptance, E, VU by  at 5/8/2024 1433    Acceptance, E, VU by  at 5/7/2024 1151                         Point: Home exercise program (Not Started)       Description:   Instruct learner(s) on appropriate technique for monitoring, assisting and/or progressing therapeutic exercises/activities.                  Learner Progress:  Not documented in this visit.              Point: Precautions (Done)       Description:   Instruct learner(s) on prescribed precautions during self-care and functional transfers.                  Learning Progress Summary             Patient Acceptance, E, VU by  at 5/8/2024 1433    Acceptance, E, VU by  at 5/7/2024 1151                         Point: Body mechanics (Done)       Description:   Instruct learner(s) on proper positioning and spine alignment during self-care, functional mobility activities and/or exercises.                  Learning Progress Summary             Patient Acceptance, E, VU by  at 5/7/2024 1151                                         User Key       Initials Effective Dates Name Provider Type Discipline     07/11/23 -  Deepti Buckner, OTR/L Occupational Therapist OT     06/20/22 -  Sandi Medina OTR/L Occupational Therapist OT                  OT Recommendation and Plan  Therapy Frequency (OT): 5 times/wk  Plan of Care Review  Plan of Care Reviewed With: patient  Outcome Evaluation: Pt fowlers c/o nausea.  Alerted RN who administered meds for symptoms & requested therapist assist with BP.  OTR took bp while pt seated 214/65 on regular cuff, obtained small adult cuff and BP would not read.  Alerted RN of concerns & requested she take bp manually. Pt able to DOFF robe, briefly stand to remove it completely,  & perform facial grooming without assist but BP cont to be of concern so therapist requested he lay back down.     Time Calculation:         Time Calculation- OT       Row Name 05/08/24 1343             Time Calculation- OT    OT Start Time 1343  -CH      OT Stop Time 1410  -CH      OT Time Calculation (min) 27 min  -CH      Total Timed Code Minutes- OT 27 minute(s)  -CH      OT Received On 05/08/24  -         Timed Charges    95258 - OT Self Care/Mgmt Minutes 27  -CH         Untimed Charges    OT Eval/Re-eval Minutes --  -CH         Total Minutes    Timed Charges Total Minutes 27  -CH      Untimed Charges Total Minutes --  -CH       Total Minutes 27  -CH                User Key  (r) = Recorded By, (t) = Taken By, (c) = Cosigned By      Initials Name Provider Type     Deepti Buckner OTR/L Occupational Therapist                  Therapy Charges for Today       Code Description Service Date Service Provider Modifiers Qty    [id number] HC OT SELF CARE/MGMT/TRAIN EA 15 MIN 5/8/2024 Deepti Buckner OTR/JAH GO 1                 ELENA Saeed/JAH  5/8/2024

## 2024-05-08 NOTE — CASE MANAGEMENT/SOCIAL WORK
Discharge Planning Assessment   [place]     Patient Name: Chad Henriquez  MRN: [id number]  Today's Date: 5/8/2024    Admit Date: 5/6/2024        Discharge Needs Assessment       Row Name 05/08/24 1417       Living Environment    People in Home spouse    Potentially Unsafe Housing Conditions none    In the past 12 months has the electric, gas, oil, or water company threatened to shut off services in your home? No    Primary Care Provided by self    Provides Primary Care For no one    Family Caregiver if Needed spouse    Quality of Family Relationships helpful;involved    Able to Return to Prior Arrangements yes       Resource/Environmental Concerns    Resource/Environmental Concerns none    Transportation Concerns none       Transportation Needs    In the past 12 months, has lack of transportation kept you from medical appointments or from getting medications? no    In the past 12 months, has lack of transportation kept you from meetings, work, or from getting things needed for daily living? No       Food Insecurity    Within the past 12 months, you worried that your food would run out before you got the money to buy more. Never true    Within the past 12 months, the food you bought just didn't last and you didn't have money to get more. Never true       Transition Planning    Patient/Family Anticipates Transition to home with family    Patient/Family Anticipated Services at Transition none    Transportation Anticipated family or friend will provide       Discharge Needs Assessment    Readmission Within the Last 30 Days no previous admission in last 30 days    Equipment Currently Used at Home cane, straight    Concerns to be Addressed denies needs/concerns at this time    Anticipated Changes Related to Illness none    Equipment Needed After Discharge none    Outpatient/Agency/Support Group Needs outpatient therapy    Discharge Facility/Level of Care Needs outpatient therapy                   Discharge Plan        Row Name 05/08/24 5071       Plan    Plan Comments Possible dc home tomorrow. No dc needs identified.                  Continued Care and Services - Admitted Since 5/6/2024    No active coordination exists for this encounter.       Expected Discharge Date and Time       Expected Discharge Date Expected Discharge Time    May 7, 2024            Demographic Summary    No documentation.                  Functional Status    No documentation.                  Psychosocial    No documentation.                  Abuse/Neglect    No documentation.                  Legal    No documentation.                  Substance Abuse    No documentation.                  Patient Forms    No documentation.                     DELORIS Davis

## 2024-05-08 NOTE — PLAN OF CARE
Goal Outcome Evaluation:  Plan of Care Reviewed With: patient           Outcome Evaluation: Pt fowlers c/o nausea.  Alerted RN who administered meds for symptoms & requested therapist assist with BP.  OTR took bp while pt seated 214/65 on regular cuff, obtained small adult cuff and BP would not read.  Alerted RN of concerns & requested she take bp manually. Pt able to DOFF robe, briefly stand to remove it completely, & perform facial grooming without assist but BP cont to be of concern so therapist requested he lay back down.

## 2024-05-08 NOTE — PROGRESS NOTES
[facility] Medicine Services  INPATIENT PROGRESS NOTE    Patient Name: Chad Henriquez  Date of Admission: 5/6/2024  Today's Date: 05/08/24  Length of Stay: 0  Primary Care Physician: Miguel Bond MD    Subjective   Chief Complaint: none offered  HPI   He was sitting up in the chair with spouse at bedside.  He was eating a grilled cheese and tomato soup.  He had some nausea this morning which is since resolved.  No abdominal pain.    Blood pressure this afternoon 199/66.  He received increased dose of losartan this morning and will receive Coreg tonight.    Review of Systems   All pertinent negatives and positives are as above. All other systems have been reviewed and are negative unless otherwise stated.     Objective    Temp:  [97.4 °F (36.3 °C)-98.2 °F (36.8 °C)] 97.5 °F (36.4 °C)  Heart Rate:  [60-80] 63  Resp:  [14-18] 15  BP: (147-225)/(55-91) 199/66  Physical Exam  Vitals reviewed.   Constitutional:       General: He is not in acute distress.     Appearance: He is not toxic-appearing.      Comments: Up in chair.  No acute distress.  On room air.  Spouse at bedside.   HENT:      Head: Normocephalic and atraumatic.      Mouth/Throat:      Mouth: Mucous membranes are moist.      Pharynx: Oropharynx is clear.   Eyes:      Extraocular Movements: Extraocular movements intact.      Conjunctiva/sclera: Conjunctivae normal.      Pupils: Pupils are equal, round, and reactive to light.   Cardiovascular:      Rate and Rhythm: Normal rate and regular rhythm.      Pulses: Normal pulses.   Pulmonary:      Effort: Pulmonary effort is normal. No respiratory distress.      Breath sounds: Normal breath sounds. No wheezing.   Abdominal:      General: Bowel sounds are normal. There is no distension.      Palpations: Abdomen is soft.      Tenderness: There is no abdominal tenderness.   Musculoskeletal:         General: No swelling or tenderness. Normal range of motion.      Cervical  back: Normal range of motion and neck supple. No muscular tenderness.   Skin:     General: Skin is warm and dry.      Findings: No erythema or rash.   Neurological:      General: No focal deficit present.      Mental Status: He is alert and oriented to person, place, and time.      Cranial Nerves: No cranial nerve deficit.      Motor: No weakness.   Psychiatric:         Mood and Affect: Mood normal.         Behavior: Behavior normal.       Results Review:  I have reviewed the labs, radiology results, and diagnostic studies.    Laboratory Data:   Results from last 7 days   Lab Units 05/07/24  0409 05/06/24  1706   WBC 10*3/mm3 7.96 8.70   HEMOGLOBIN g/dL 11.8* 13.3   HEMATOCRIT % 35.4* 40.4   PLATELETS 10*3/mm3 232 264        Results from last 7 days   Lab Units 05/06/24  1706   SODIUM mmol/L 138   POTASSIUM mmol/L 4.7   CHLORIDE mmol/L 101   CO2 mmol/L 28.0   BUN mg/dL 20   CREATININE mg/dL 0.75*   CALCIUM mg/dL 9.7*   BILIRUBIN mg/dL 0.4   ALK PHOS U/L 74   ALT (SGPT) U/L 19   AST (SGOT) U/L 28   GLUCOSE mg/dL 116*       Culture Data:   Microbiology Results (last 10 days)       ** No results found for the last 240 hours. **          Radiology Data:   Imaging Results (Last 24 Hours)       Procedure Component Value Units Date/Time    US Carotid Bilateral [[id number]] Collected: 05/07/24 1603     Updated: 05/07/24 1608    Narrative:      History: Carotid occlusive disease       Impression:      Impression:  1. There is less than 50% stenosis of the right internal carotid artery.  2. There is less than 50% stenosis of the left internal carotid artery.  3. Antegrade flow is demonstrated in bilateral vertebral arteries.     Comments: Bilateral carotid vertebral arterial duplex scan was  performed.     Grayscale imaging shows intimal thickening and calcified elements at the  carotid bifurcation. The right internal carotid artery peak systolic  velocity is 64 cm/sec. The end-diastolic velocity is 8.6 cm/sec. The  right  ICA/CCA ratio is approximately 0.8. These findings correlate with  less than 50% stenosis of the right internal carotid artery.     Grayscale imaging shows intimal thickening and calcified elements at the  carotid bifurcation. The left internal carotid artery peak systolic  velocity is 95.4 cm/sec. The end-diastolic velocity is 14.9 cm/sec. The  left ICA/CCA ratio is approximately 1.1. These findings correlate with  less than 50% stenosis of the left internal carotid artery.     Antegrade flow is demonstrated in bilateral vertebral arteries.        This report was signed and finalized on 5/7/2024 4:05 PM by Dr. Manolo Mcleod MD.               I have reviewed the patient's current medications.     Assessment/Plan   Assessment  Active Hospital Problems    Diagnosis     **TIA (transient ischemic attack)     Hypertensive urgency     Chronic anticoagulation     Paroxysmal atrial fibrillation     Hypertension      Mr. Henriquez is a 89-year-old male who presented to [facility] on 5/6 when he had a hard time reading and expressing himself. By the time he was seen in the CT scan department he was back to baseline. NIH stroke scale 0. He has a history of paroxysmal atrial fibrillation, atrial flutter for which he takes Eliquis 5 mg twice daily. He does admit to missing last evening's dose but took it on the morning of 5/6.     Treatment Plan  Neurology consulted.  MRI brain showed no acute intracranial normality.  Okay to resume Eliquis per neurology.  Neurology, likely TIA.  He is okay for discharge from neurology standpoint with close follow-up in 4 to 6 weeks.     LDL 88 with goal less than 70.  Continue high intensity statin.     Preliminary carotid duplex shows less than 50% bilateral carotid stenosis.  Antegrade flow.     Results for orders placed during the hospital encounter of 05/06/24     Adult Transthoracic Echo Complete W/ Cont if Necessary Per Protocol (With Agitated Saline)     Interpretation  Summary    Left ventricular systolic function is normal. Left ventricular ejection fraction appears to be 56 - 60%.    Left ventricular wall thickness is consistent with mild concentric hypertrophy.    Left ventricular diastolic function is consistent with (grade I) impaired relaxation.    Right ventricle is not well visualized but appears upper normal in size with normal systolic function.    The left atrial cavity is dilated.    Saline test results are negative.    The right atrial cavity is mildly  dilated.    There are no hemodynamically significant (more than mild) valve abnormalities.     Hypertensive urgency with blood pressure as high as 206/76 on admission.  Blood pressure this afternoon is 199/66.  Discussed with Dr. Cordova -discontinue metoprolol in favor of Coreg 6.25 mg twice daily, continue increased losartan.  Continue to monitor blood pressure trend with outpatient blood pressure goal less than 140/80. IV labetalol as needed.     He has a history of atrial fibrillation, atrial flutter chronically anticoagulated on Eliquis.  Normal sinus rhythm on telemetry.     PT/OT/SLP.  No therapy needs.    Medical Decision Making  Number and Complexity of problems: 2 acute problems  Differential Diagnosis: none considered at present    Conditions and Status        Condition is improved.     [facility] Data  External documents reviewed: Prior [facility] records  Cardiac tracing (EKG, telemetry) interpretation: Reviewed  Radiology interpretation: Interpreted by radiology  Labs reviewed: As above  Any tests that were considered but not ordered: none considered at present     Decision rules/scores evaluated (example [id number], [place], etc): none considered at present     Discussed with: Patient and Dr. Cordova     Care Planning  Shared decision making: Patient is agreeable to ongoing workup and treatment  Code status and discussions: Full code with full interventions    Disposition  Social Determinants of Health that  impact treatment or disposition: none  I expect the patient to be discharged to home in 1-2 days.     Electronically signed by EDIE Corey, 05/08/24, 12:02 CDT.

## 2024-05-08 NOTE — PLAN OF CARE
Goal Outcome Evaluation:  Plan of Care Reviewed With: patient        Progress: no change       Pt alert and oriented x4. VSS. No c/o pain. OLMSTEAD. PPP. Lovenox for VTE. Tele, NSR. . Tolerating cardiac diet. Skin intact. Voiding via bathroom. Up ad roldan. Plans to d/c home tomorrow. Wife at bedside. Bed alarm set. Call light within reach. Safety maintained. Plan of care ongoing.

## 2024-05-09 ENCOUNTER — TELEPHONE (OUTPATIENT)
Dept: INTERNAL MEDICINE | Facility: CLINIC | Age: 89
End: 2024-05-09
Payer: MEDICARE

## 2024-05-09 ENCOUNTER — READMISSION MANAGEMENT (OUTPATIENT)
Dept: CALL CENTER | Facility: HOSPITAL | Age: 89
End: 2024-05-09
Payer: MEDICARE

## 2024-05-09 VITALS
OXYGEN SATURATION: 94 % | SYSTOLIC BLOOD PRESSURE: 151 MMHG | WEIGHT: 170 LBS | RESPIRATION RATE: 18 BRPM | HEART RATE: 57 BPM | DIASTOLIC BLOOD PRESSURE: 64 MMHG | BODY MASS INDEX: 26.68 KG/M2 | HEIGHT: 67 IN | TEMPERATURE: 98.2 F

## 2024-05-09 PROCEDURE — G0378 HOSPITAL OBSERVATION PER HR: HCPCS

## 2024-05-09 RX ORDER — AMLODIPINE BESYLATE 5 MG/1
5 TABLET ORAL
Qty: 30 TABLET | Refills: 0 | Status: SHIPPED | OUTPATIENT
Start: 2024-05-09 | End: 2024-06-08

## 2024-05-09 RX ORDER — CARVEDILOL 6.25 MG/1
6.25 TABLET ORAL 2 TIMES DAILY WITH MEALS
Qty: 60 TABLET | Refills: 0 | Status: SHIPPED | OUTPATIENT
Start: 2024-05-09 | End: 2024-06-08

## 2024-05-09 RX ORDER — LOSARTAN POTASSIUM 50 MG/1
50 TABLET ORAL DAILY
Qty: 30 TABLET | Refills: 0 | Status: SHIPPED | OUTPATIENT
Start: 2024-05-09 | End: 2024-06-08

## 2024-05-09 RX ADMIN — AMLODIPINE BESYLATE 5 MG: 5 TABLET ORAL at 08:59

## 2024-05-09 RX ADMIN — GABAPENTIN 600 MG: 300 CAPSULE ORAL at 05:20

## 2024-05-09 RX ADMIN — APIXABAN 5 MG: 5 TABLET, FILM COATED ORAL at 08:59

## 2024-05-09 RX ADMIN — CARVEDILOL 6.25 MG: 6.25 TABLET, FILM COATED ORAL at 08:59

## 2024-05-09 RX ADMIN — HYDRALAZINE HYDROCHLORIDE 25 MG: 25 TABLET ORAL at 01:33

## 2024-05-09 RX ADMIN — LOSARTAN POTASSIUM 50 MG: 50 TABLET, FILM COATED ORAL at 08:59

## 2024-05-09 RX ADMIN — Medication 10 ML: at 09:00

## 2024-05-09 RX ADMIN — FOLIC ACID 1 MG: 1 TABLET ORAL at 09:00

## 2024-05-09 RX ADMIN — FINASTERIDE 5 MG: 5 TABLET, FILM COATED ORAL at 09:00

## 2024-05-09 NOTE — CASE MANAGEMENT/SOCIAL WORK
Continued Stay Note   [place]     Patient Name: Chad Henriquez  MRN: [id number]  Today's Date: 5/9/2024    Admit Date: 5/6/2024    Plan: Home   Discharge Plan       Row Name 05/09/24 0856       Plan    Plan Home    Final Discharge Disposition Code 01 - home or self-care    Final Note Patient is discharged home today with no dc planning needs/orders.                 Expected Discharge Date and Time       Expected Discharge Date Expected Discharge Time    May 9, 2024         DELORIS Terry

## 2024-05-09 NOTE — PLAN OF CARE
Goal Outcome Evaluation:  Plan of Care Reviewed With: patient        Progress: no change     Pt alert and oriented x4. VSS. No c/o pain. OLMSTEAD. PPP. Lovenox for VTE. Tele, NSR. , satting at 95% on room air . Tolerating cardiac diet. Skin intact. Voiding via bathroom. Up ad roldan. BP high this shift, Dr notified and medications adjusted. Wife at bedside. Bed alarm set. Call light within reach. Safety maintained. Plan of care ongoing. No changes this shift.

## 2024-05-09 NOTE — THERAPY DISCHARGE NOTE
Acute Care - Occupational Therapy Discharge Summary  [facility]     Patient Name: Chad Henriquez  : 1929  MRN: [id number]    Today's Date: 2024                 Admit Date: 2024        OT Recommendation and Plan    Visit Dx:    ICD-10-CM ICD-9-CM   1. TIA (transient ischemic attack)  G45.9 435.9   2. Dysphagia, unspecified type  R13.10 787.20   3. Primary hypertension  I10 401.9                OT Rehab Goals       Row Name 24 1200             Transfer Goal 1 (OT)    Activity/Assistive Device (Transfer Goal 1, OT) toilet;shower chair  -AC      [place] Level/Cues Needed (Transfer Goal 1, OT) modified independence  -AC      Time Frame (Transfer Goal 1, OT) long term goal (LTG);10 days  -AC      Progress/Outcome (Transfer Goal 1, OT) goal not met  -AC         Dressing Goal 1 (OT)    Activity/Device (Dressing Goal 1, OT) dressing skills, all  -AC      [place]/Cues Needed (Dressing Goal 1, OT) modified independence  -AC      Time Frame (Dressing Goal 1, OT) long term goal (LTG);10 days  -AC      Progress/Outcome (Dressing Goal 1, OT) goal not met  -AC         Toileting Goal 1 (OT)    Activity/Device (Toileting Goal 1, OT) toileting skills, all  -AC      [place] Level/Cues Needed (Toileting Goal 1, OT) modified independence  -AC      Time Frame (Toileting Goal 1, OT) long term goal (LTG);10 days  -AC      Progress/Outcome (Toileting Goal 1, OT) goal not met  -AC                User Key  (r) = Recorded By, (t) = Taken By, (c) = Cosigned By      Initials Name Provider Type Discipline    AC Allen Morales, OTR/L, CNT Occupational Therapist OT                        Timed Therapy Charges  Total Units: 2      Suggested Charges  Total Units: 2      Procedure Name Documented Minutes Units Code    HC OT SELF CARE/MGMT/TRAIN EA 15 MIN 27 2   45072 (CPT®)                 Documented Minutes  Total Minutes: 27      Therapy Provided Minutes    24742 - OT Self Care/Mgmt Minutes 27                         OT Discharge Summary  Anticipated Discharge Disposition (OT): home with assist  Reason for Discharge: Discharge from facility  Outcomes Achieved: Refer to plan of care for updates on goals achieved  Discharge Destination: Home with assist      Allen Morales, OTR/L, CNT  5/9/2024

## 2024-05-10 ENCOUNTER — TRANSITIONAL CARE MANAGEMENT TELEPHONE ENCOUNTER (OUTPATIENT)
Dept: CALL CENTER | Facility: HOSPITAL | Age: 89
End: 2024-05-10
Payer: MEDICARE

## 2024-05-10 NOTE — THERAPY DISCHARGE NOTE
Acute Care - Speech Language Pathology Discharge Summary  [facility]       Patient Name: Chad Henriquez  : 1929  MRN: [id number]    Today's Date: 5/10/2024                   Admit Date: 2024      SLP Recommendation and Plan  Regular solids and thin liquids    Visit Dx:    ICD-10-CM ICD-9-CM   1. TIA (transient ischemic attack)  G45.9 435.9   2. Dysphagia, unspecified type  R13.10 787.20   3. Primary hypertension  I10 401.9                SLP GOALS       Row Name 05/10/24 1400             (LTG) Swallow    (LTG) Swallow Pt will tolerate LRD without s/s of aspiration.  -MB      [place] (Swallow Long Term Goal) independently (over 90% accuracy)  -MB      Time Frame (Swallow Long Term Goal) by discharge  -MB      Barriers (Swallow Long Term Goal) n/a  -MB      Progress/Outcomes (Swallow Long Term Goal) goal not met  -MB         (STG) Patient will tolerate trials of    Consistencies Trialed (Tolerate trials) regular textures;thin liquids  -MB      Desired Outcome (Tolerate trials) without signs/symptoms of aspiration  -MB      [place] (Tolerate trials) independently (over 90% accuracy)  -MB      Time Frame (Tolerate trials) by discharge  -MB      Progress/Outcomes (Tolerate trials) goal not met  -MB                User Key  (r) = Recorded By, (t) = Taken By, (c) = Cosigned By      Initials Name Provider Type    Getachew Toro CCC-SLP Speech and Language Pathologist                            SLP Discharge Summary  Anticipated Discharge Disposition (SLP): home  Reason for Discharge: discharge from this facility  Progress Toward Achieving Short/long Term Goals: goals not met within established timelines  Discharge Destination: home      Getachew Pope CCC-SLP  5/10/2024

## 2024-05-21 ENCOUNTER — OFFICE VISIT (OUTPATIENT)
Dept: INTERNAL MEDICINE | Facility: CLINIC | Age: 89
End: 2024-05-21
Payer: MEDICARE

## 2024-05-21 VITALS
HEIGHT: 67 IN | HEART RATE: 52 BPM | SYSTOLIC BLOOD PRESSURE: 152 MMHG | BODY MASS INDEX: 26.37 KG/M2 | WEIGHT: 168 LBS | OXYGEN SATURATION: 98 % | TEMPERATURE: 97.9 F | DIASTOLIC BLOOD PRESSURE: 68 MMHG

## 2024-05-21 DIAGNOSIS — G45.9 TIA (TRANSIENT ISCHEMIC ATTACK): ICD-10-CM

## 2024-05-21 DIAGNOSIS — N40.1 BPH WITH URINARY OBSTRUCTION: ICD-10-CM

## 2024-05-21 DIAGNOSIS — I25.10 CORONARY ARTERY DISEASE INVOLVING NATIVE CORONARY ARTERY OF NATIVE HEART WITHOUT ANGINA PECTORIS: Primary | Chronic | ICD-10-CM

## 2024-05-21 DIAGNOSIS — I10 PRIMARY HYPERTENSION: Chronic | ICD-10-CM

## 2024-05-21 DIAGNOSIS — Z79.01 CHRONIC ANTICOAGULATION: ICD-10-CM

## 2024-05-21 DIAGNOSIS — I48.0 PAROXYSMAL ATRIAL FIBRILLATION: ICD-10-CM

## 2024-05-21 DIAGNOSIS — I16.0 HYPERTENSIVE URGENCY: ICD-10-CM

## 2024-05-21 DIAGNOSIS — N13.8 BPH WITH URINARY OBSTRUCTION: ICD-10-CM

## 2024-05-21 PROCEDURE — 1111F DSCHRG MED/CURRENT MED MERGE: CPT | Performed by: INTERNAL MEDICINE

## 2024-05-21 PROCEDURE — 1160F RVW MEDS BY RX/DR IN RCRD: CPT | Performed by: INTERNAL MEDICINE

## 2024-05-21 PROCEDURE — 1126F AMNT PAIN NOTED NONE PRSNT: CPT | Performed by: INTERNAL MEDICINE

## 2024-05-21 PROCEDURE — 1159F MED LIST DOCD IN RCRD: CPT | Performed by: INTERNAL MEDICINE

## 2024-05-21 PROCEDURE — 99495 TRANSJ CARE MGMT MOD F2F 14D: CPT | Performed by: INTERNAL MEDICINE

## 2024-05-21 RX ORDER — ATORVASTATIN CALCIUM 40 MG/1
40 TABLET, FILM COATED ORAL DAILY
Qty: 90 TABLET | Refills: 2 | Status: SHIPPED | OUTPATIENT
Start: 2024-05-21

## 2024-05-21 RX ORDER — LOSARTAN POTASSIUM 100 MG/1
100 TABLET ORAL DAILY
Qty: 90 TABLET | Refills: 2 | Status: SHIPPED | OUTPATIENT
Start: 2024-05-21

## 2024-05-21 RX ORDER — CARVEDILOL 6.25 MG/1
6.25 TABLET ORAL 2 TIMES DAILY WITH MEALS
Qty: 180 TABLET | Refills: 2 | Status: SHIPPED | OUTPATIENT
Start: 2024-05-21

## 2024-05-21 NOTE — PROGRESS NOTES
"    CC: hospital follow-up for TIA    History:  Chad Henriquez is a 89 y.o. male who presents today for transitional care management after hospitalization.    Date of Discharge: 5/9/2024  TCM call successfully made on: 5/10/2024 by Daniella Grey RN    Discharge summary:   \"Mr. Henriquez is a 89-year-old male who presented to [facility] on 5/6 when he had a hard time reading and expressing himself.  By the time he was seen in the CT scan department he was back to baseline.  NIH stroke scale 0. He has a history of paroxysmal atrial fibrillation, atrial flutter for which he takes Eliquis 5 mg twice daily.  He does admit to missing last evening's dose but took it on the morning of 5/6.     Neurology consulted.  MRI brain showed no acute intracranial normality.  Okay to resume Eliquis per neurology.  Per neurology, likely TIA.  He is okay for discharge from neurology standpoint with close follow-up in 4 to 6 weeks.     LDL 88 with goal less than 70.  Continue high intensity statin Lipitor 80 mg x 1 month at which time he can be lowered to 40 mg.     Carotid duplex shows less than 50% bilateral carotid stenosis.  Antegrade flow.     Results for orders placed during the hospital encounter of 05/06/24     Adult Transthoracic Echo Complete W/ Cont if Necessary Per Protocol (With Agitated Saline)     Interpretation Summary    Left ventricular systolic function is normal. Left ventricular ejection fraction appears to be 56 - 60%.    Left ventricular wall thickness is consistent with mild concentric hypertrophy.    Left ventricular diastolic function is consistent with (grade I) impaired relaxation.    Right ventricle is not well visualized but appears upper normal in size with normal systolic function.    The left atrial cavity is dilated.    Saline test results are negative.    The right atrial cavity is mildly  dilated.    There are no hemodynamically significant (more than mild) valve abnormalities.   " "  Hypertensive urgency with blood pressure as high as 206/76 on admission.  Blood pressure trend overall improved with last reading 151/64.  Continue Coreg 6.25 mg twice daily, losartan 50 mg daily and amlodipine 5 mg daily.  Continue to monitor blood pressure trend with outpatient blood pressure goal less than 140/80. Recommend to check blood pressure at home and keep a log of readings to bring to follow-up appointment with primary care provider.     He has a history of atrial fibrillation, atrial flutter chronically anticoagulated on Eliquis.  Normal sinus rhythm on telemetry.     PT/OT/SLP.  No therapy needs.     He has reached maximum benefit of hospitalization.  He is medically stable and appropriate for discharge today.\"        ROS:  Review of Systems    Mr. Henriquez  reports that he quit smoking about 75 years ago. His smoking use included cigarettes. He started smoking about 81 years ago. He has never used smokeless tobacco. He reports that he does not drink alcohol and does not use drugs.      Current Outpatient Medications:     alfuzosin (UROXATRAL) 10 MG 24 hr tablet, Take 1 tablet by mouth Every Evening., Disp: , Rfl:     apixaban (ELIQUIS) 5 MG tablet tablet, Take 1 tablet by mouth 2 (Two) Times a Day., Disp: 60 tablet, Rfl: 11    Ascorbic Acid (Vitamin C) 500 MG capsule, Take 1 capsule by mouth 2 (two) times a day., Disp: , Rfl:     carvedilol (COREG) 6.25 MG tablet, Take 1 tablet by mouth 2 (Two) Times a Day With Meals., Disp: 180 tablet, Rfl: 2    Cholecalciferol 50 MCG (2000 UT) tablet, Take 1 tablet by mouth Daily., Disp: , Rfl:     Docusate Calcium (STOOL SOFTENER PO), Take 100 mg by mouth Daily., Disp: , Rfl:     finasteride (PROSCAR) 5 MG tablet, Take 1 tablet by mouth Daily., Disp: , Rfl:     Flaxseed, Linseed, (FLAX SEED OIL PO), Take  by mouth., Disp: , Rfl:     folic acid (FOLVITE) 1 MG tablet, Take 1 tablet by mouth Daily., Disp: , Rfl:     gabapentin (NEURONTIN) 600 MG tablet, Take 1 tablet " "by mouth 3 (Three) Times a Day., Disp: 90 tablet, Rfl: 3    L-Lysine 600 MG tablet, Take 1 tablet by mouth Daily. 1000 mg, Disp: , Rfl:     losartan (COZAAR) 100 MG tablet, Take 1 tablet by mouth Daily., Disp: 90 tablet, Rfl: 2    Psyllium (Metamucil) wafer wafer, Take 2 wafers by mouth Daily., Disp: , Rfl:     vitamin E 100 UNIT capsule, Take 1 capsule by mouth Daily., Disp: , Rfl:     atorvastatin (Lipitor) 40 MG tablet, Take 1 tablet by mouth Daily., Disp: 90 tablet, Rfl: 2      OBJECTIVE:  /68 (BP Location: Left arm, Patient Position: Sitting, Cuff Size: Adult)   Pulse 52   Temp 97.9 °F (36.6 °C) (Temporal)   Ht 170.2 cm (67\")   Wt 76.2 kg (168 lb)   SpO2 98%   BMI 26.31 kg/m²    Physical Exam  Vitals and nursing note reviewed.   Constitutional:       Appearance: He is not ill-appearing.   Eyes:      General: No scleral icterus.     Conjunctiva/sclera: Conjunctivae normal.   Cardiovascular:      Rate and Rhythm: Normal rate and regular rhythm.   Pulmonary:      Effort: Pulmonary effort is normal. No respiratory distress.   Neurological:      General: No focal deficit present.      Mental Status: He is alert and oriented to person, place, and time.   Psychiatric:         Mood and Affect: Mood normal.         Behavior: Behavior normal.              Assessment/Plan    Diagnoses and all orders for this visit:    1. Coronary artery disease involving native coronary artery of native heart without angina pectoris (Primary)    2. Primary hypertension    3. Hypertensive urgency    4. Paroxysmal atrial fibrillation    5. Chronic anticoagulation    6. BPH with urinary obstruction    7. TIA (transient ischemic attack)    Other orders  -     losartan (COZAAR) 100 MG tablet; Take 1 tablet by mouth Daily.  Dispense: 90 tablet; Refill: 2  -     carvedilol (COREG) 6.25 MG tablet; Take 1 tablet by mouth 2 (Two) Times a Day With Meals.  Dispense: 180 tablet; Refill: 2  -     atorvastatin (Lipitor) 40 MG tablet; Take 1 " "tablet by mouth Daily.  Dispense: 90 tablet; Refill: 2      Patient presented to [facility] after having an episode where he was trying to read scripture and jumbled words came out.  He reports that it was like he was speaking in tongues.  They subsequently took him home, and his wife ultimately took him to the emergency department as he was still acting off.  Upon arrival to the hospital, stroke protocol was started.  Patient underwent CT scan of the head without contrast that showed chronic changes.  MRI of the brain subsequently showed chronic changes with no acute issues.  Patient was noted to have significant hypertensive urgency with systolic blood pressures of around 200.  Discussed with patient that he likely had a TIA or was having some hypertension urgency.  Patient has not been taking the amlodipine that he was discharged on, blood pressures have been very fluctuant.  Patient is not consistent on the timing of his medications.  Sometimes he forgets to take them until little bit later in the morning.  Blood pressures have still be fluctuant.  Will add the clinical reference \"How to take your blood pressure\" to the patients AVS.      I recommend that we continue the carvedilol 6.25 mg.  I recommend that we increase the losartan to 100 mg.  Patient had previously been on amlodipine.  We may have to add his medication back in.  Patient was noted on a previous echocardiogram to have an EF of 45 to 50% and we subsequently transition him to losartan instead of amlodipine.  I would like to maximize the losartan before adding another medication at the present time.  The patient would have improvement of risk factors with adequate control targetting <130/80 ideally, but 140/90 is also reasonable given age.     Patient has hyperlipidemia and likely had a TIA.  I start him on a high intensity statin.  After this is complete on the 80 mg, will reduce down to 40 mg.  Given his advanced age, may even cut all the " way back to 20 mg.  Unsure of the benefit of long-term higher dose statin therapy at his age.  As long as the patient is not having side effects, we can continue the 40 mg.    We went over timing of patient's medications, and adjusted them on the patient's medication chart.  Also recommended to consider taking a multivitamin instead of taking multiple independent vitamins and supplements.    Result Review :  The following data was reviewed by: Miguel Bond MD on 05/21/2024:  CMP          12/19/2023    07:43 5/6/2024    17:06   CMP   Glucose 94  116    BUN 13  20    Creatinine 0.95  0.75    EGFR  83.6    Sodium 140  138    Potassium 4.4  4.7    Chloride 104  101    Calcium 9.1  9.7    Total Protein 6.2     Total Protein  7.1    Albumin 4.5  4.6    Globulin 1.7     Globulin  2.5    Total Bilirubin 0.7  0.4    Alkaline Phosphatase 66  74    AST (SGOT) 19  28    ALT (SGPT) 19  19    Albumin/Globulin Ratio  1.8    BUN/Creatinine Ratio 13.7  26.7    Anion Gap  9.0      CBC          12/19/2023    07:43 5/6/2024    17:06 5/7/2024    04:09   CBC   WBC 6.15  8.70  7.96    RBC 3.84  4.14  3.68    Hemoglobin 12.7  13.3  11.8    Hematocrit 36.9  40.4  35.4    MCV 96.1  97.6  96.2    MCH 33.1  32.1  32.1    MCHC 34.4  32.9  33.3    RDW 12.3  14.1  13.9    Platelets 245  264  232      CBC w/diff          12/19/2023    07:43 5/6/2024    17:06 5/7/2024    04:09   CBC w/Diff   WBC 6.15  8.70  7.96    RBC 3.84  4.14  3.68    Hemoglobin 12.7  13.3  11.8    Hematocrit 36.9  40.4  35.4    MCV 96.1  97.6  96.2    MCH 33.1  32.1  32.1    MCHC 34.4  32.9  33.3    RDW 12.3  14.1  13.9    Platelets 245  264  232    Neutrophil Rel % 62.3  70.5     Immature Granulocyte Rel %  0.3     Lymphocyte Rel % 21.3  17.4     Monocyte Rel % 11.1  8.9     Eosinophil Rel % 3.9  2.3     Basophil Rel % 1.1  0.6       Lipid Panel          12/19/2023    07:43 5/7/2024    04:09   Lipid Panel   Total Cholesterol  144    Total Cholesterol 133      Triglycerides 54  75    HDL Cholesterol 44  41    VLDL Cholesterol 12  15    LDL Cholesterol  77  88    LDL/HDL Ratio  2.15      TSH          12/19/2023    07:43 5/7/2024    04:09   TSH   TSH 2.240  2.330      BMP          12/19/2023    07:43 5/6/2024    17:06   BMP   BUN 13  20    Creatinine 0.95  0.75    Sodium 140  138    Potassium 4.4  4.7    Chloride 104  101    CO2 25.4  28.0    Calcium 9.1  9.7      A1C Last 3 Results          5/7/2024    04:09   HGBA1C Last 3 Results   Hemoglobin A1C 5.30        UA          12/19/2023    07:43   Urinalysis   Blood, UA Negative    Leukocytes, UA Negative    Nitrite, UA Negative    RBC, UA 0-2    Bacteria, UA Comment      MRI Brain Without Contrast (05/06/2024 18:50)  US Carotid Bilateral (05/06/2024 18:07)  XR Chest 1 View (05/06/2024 17:40)  CT Head Without Contrast Stroke Protocol (05/06/2024 17:09)    Results for orders placed during the hospital encounter of 05/06/24    Adult Transthoracic Echo Complete W/ Cont if Necessary Per Protocol (With Agitated Saline)    Interpretation Summary    Left ventricular systolic function is normal. Left ventricular ejection fraction appears to be 56 - 60%.    Left ventricular wall thickness is consistent with mild concentric hypertrophy.    Left ventricular diastolic function is consistent with (grade I) impaired relaxation.    Right ventricle is not well visualized but appears upper normal in size with normal systolic function.    The left atrial cavity is dilated.    Saline test results are negative.    The right atrial cavity is mildly  dilated.    There are no hemodynamically significant (more than mild) valve abnormalities.               An After Visit Summary was printed and given to the patient at discharge.  Return in about 1 year (around 5/21/2025), or if symptoms worsen or fail to improve, for longitudinal care, follow up for above problems. Sooner if problems arise.         MIKHAIL Bond MD, M, FACP  Electronically  signed by Miguel Bond MD, 05/21/24, 2:42 PM CDT.

## 2024-05-28 RX ORDER — CARVEDILOL 6.25 MG/1
6.25 TABLET ORAL 2 TIMES DAILY WITH MEALS
Qty: 180 TABLET | Refills: 1 | Status: SHIPPED | OUTPATIENT
Start: 2024-05-28

## 2024-05-28 RX ORDER — ATORVASTATIN CALCIUM 40 MG/1
40 TABLET, FILM COATED ORAL DAILY
Qty: 90 TABLET | Refills: 1 | Status: SHIPPED | OUTPATIENT
Start: 2024-05-28

## 2024-05-28 RX ORDER — LOSARTAN POTASSIUM 100 MG/1
100 TABLET ORAL DAILY
Qty: 90 TABLET | Refills: 1 | Status: SHIPPED | OUTPATIENT
Start: 2024-05-28

## 2024-05-28 NOTE — TELEPHONE ENCOUNTER
Losartan 50mg 1 tablet daily.  Carvedilol 6.2mg 1 tablet twice daily with meals.   Atorvastatin 80mg 1 table once daily.    [facility] 5[facility]24 Dr[facility] Please send again. Patient only has 3 days of meds left.

## 2024-06-02 ENCOUNTER — APPOINTMENT (OUTPATIENT)
Dept: CT IMAGING | Facility: HOSPITAL | Age: 89
End: 2024-06-02
Payer: MEDICARE

## 2024-06-02 ENCOUNTER — HOSPITAL ENCOUNTER (EMERGENCY)
Facility: HOSPITAL | Age: 89
Discharge: HOME | End: 2024-06-02
Attending: FAMILY MEDICINE | Admitting: FAMILY MEDICINE
Payer: MEDICARE

## 2024-06-02 VITALS
OXYGEN SATURATION: 94 % | DIASTOLIC BLOOD PRESSURE: 80 MMHG | HEIGHT: 67 IN | SYSTOLIC BLOOD PRESSURE: 116 MMHG | RESPIRATION RATE: 12 BRPM | BODY MASS INDEX: 25.58 KG/M2 | HEART RATE: 67 BPM | WEIGHT: 163 LBS | TEMPERATURE: 97.5 F

## 2024-06-02 DIAGNOSIS — R55 SYNCOPE, UNSPECIFIED SYNCOPE TYPE: Primary | ICD-10-CM

## 2024-06-02 LAB
ANION GAP SERPL CALCULATED.3IONS-SCNC: 7 MMOL/L (ref 5–15)
APTT PPP: 37.6 SECONDS (ref 24.5–36)
B PARAPERT DNA SPEC QL NAA+PROBE: NOT DETECTED
B PERT DNA SPEC QL NAA+PROBE: NOT DETECTED
BACTERIA UR QL AUTO: NORMAL /HPF
BASOPHILS # BLD AUTO: 0.05 10*3/MM3 (ref 0–0.2)
BASOPHILS NFR BLD AUTO: 0.7 % (ref 0–1.5)
BILIRUB UR QL STRIP: NEGATIVE
BUN SERPL-MCNC: 17 MG/DL (ref 8–23)
BUN/CREAT SERPL: 23.6 (ref 7–25)
C PNEUM DNA NPH QL NAA+NON-PROBE: NOT DETECTED
CALCIUM SPEC-SCNC: 8.2 MG/DL (ref 8.2–9.6)
CHLORIDE SERPL-SCNC: 103 MMOL/L (ref 98–107)
CLARITY UR: CLEAR
CO2 SERPL-SCNC: 24 MMOL/L (ref 22–29)
COLOR UR: YELLOW
CREAT SERPL-MCNC: 0.72 MG/DL (ref 0.76–1.27)
D DIMER PPP FEU-MCNC: <0.27 MCGFEU/ML (ref 0–0.94)
DEPRECATED RDW RBC AUTO: 50.2 FL (ref 37–54)
EGFRCR SERPLBLD CKD-EPI 2021: 84.7 ML/MIN/1.73
EOSINOPHIL # BLD AUTO: 0.22 10*3/MM3 (ref 0–0.4)
EOSINOPHIL NFR BLD AUTO: 3.1 % (ref 0.3–6.2)
ERYTHROCYTE [DISTWIDTH] IN BLOOD BY AUTOMATED COUNT: 14 % (ref 12.3–15.4)
FLUAV SUBTYP SPEC NAA+PROBE: NOT DETECTED
FLUBV RNA ISLT QL NAA+PROBE: NOT DETECTED
GEN 5 2HR TROPONIN T REFLEX: 30 NG/L
GLUCOSE SERPL-MCNC: 126 MG/DL (ref 65–99)
GLUCOSE UR STRIP-MCNC: NEGATIVE MG/DL
HADV DNA SPEC NAA+PROBE: NOT DETECTED
HCOV 229E RNA SPEC QL NAA+PROBE: NOT DETECTED
HCOV HKU1 RNA SPEC QL NAA+PROBE: NOT DETECTED
HCOV NL63 RNA SPEC QL NAA+PROBE: NOT DETECTED
HCOV OC43 RNA SPEC QL NAA+PROBE: NOT DETECTED
HCT VFR BLD AUTO: 34.3 % (ref 37.5–51)
HGB BLD-MCNC: 11.2 G/DL (ref 13–17.7)
HGB UR QL STRIP.AUTO: NEGATIVE
HMPV RNA NPH QL NAA+NON-PROBE: NOT DETECTED
HPIV1 RNA ISLT QL NAA+PROBE: NOT DETECTED
HPIV2 RNA SPEC QL NAA+PROBE: NOT DETECTED
HPIV3 RNA NPH QL NAA+PROBE: NOT DETECTED
HPIV4 P GENE NPH QL NAA+PROBE: NOT DETECTED
HYALINE CASTS UR QL AUTO: NORMAL /LPF
IMM GRANULOCYTES # BLD AUTO: 0.02 10*3/MM3 (ref 0–0.05)
IMM GRANULOCYTES NFR BLD AUTO: 0.3 % (ref 0–0.5)
INR PPP: 1.45 (ref 0.91–1.09)
KETONES UR QL STRIP: ABNORMAL
LEUKOCYTE ESTERASE UR QL STRIP.AUTO: NEGATIVE
LYMPHOCYTES # BLD AUTO: 1.32 10*3/MM3 (ref 0.7–3.1)
LYMPHOCYTES NFR BLD AUTO: 18.6 % (ref 19.6–45.3)
M PNEUMO IGG SER IA-ACNC: NOT DETECTED
MAGNESIUM SERPL-MCNC: 2 MG/DL (ref 1.7–2.3)
MCH RBC QN AUTO: 32.1 PG (ref 26.6–33)
MCHC RBC AUTO-ENTMCNC: 32.7 G/DL (ref 31.5–35.7)
MCV RBC AUTO: 98.3 FL (ref 79–97)
MONOCYTES # BLD AUTO: 0.84 10*3/MM3 (ref 0.1–0.9)
MONOCYTES NFR BLD AUTO: 11.8 % (ref 5–12)
NEUTROPHILS NFR BLD AUTO: 4.66 10*3/MM3 (ref 1.7–7)
NEUTROPHILS NFR BLD AUTO: 65.5 % (ref 42.7–76)
NITRITE UR QL STRIP: NEGATIVE
NRBC BLD AUTO-RTO: 0 /100 WBC (ref 0–0.2)
NT-PROBNP SERPL-MCNC: 299.2 PG/ML (ref 0–1800)
PH UR STRIP.AUTO: 6.5 [PH] (ref 5–8)
PLATELET # BLD AUTO: 219 10*3/MM3 (ref 140–450)
PMV BLD AUTO: 9.5 FL (ref 6–12)
POTASSIUM SERPL-SCNC: 4.5 MMOL/L (ref 3.5–5.2)
PROT UR QL STRIP: ABNORMAL
PROTHROMBIN TIME: 18.2 SECONDS (ref 11.8–14.8)
RBC # BLD AUTO: 3.49 10*6/MM3 (ref 4.14–5.8)
RBC # UR STRIP: NORMAL /HPF
REF LAB TEST METHOD: NORMAL
RHINOVIRUS RNA SPEC NAA+PROBE: NOT DETECTED
RSV RNA NPH QL NAA+NON-PROBE: NOT DETECTED
SARS-COV-2 RNA NPH QL NAA+NON-PROBE: NOT DETECTED
SODIUM SERPL-SCNC: 134 MMOL/L (ref 136–145)
SP GR UR STRIP: 1.02 (ref 1–1.03)
SQUAMOUS #/AREA URNS HPF: NORMAL /HPF
TROPONIN T DELTA: -3 NG/L
TROPONIN T SERPL HS-MCNC: 33 NG/L
UROBILINOGEN UR QL STRIP: ABNORMAL
WBC # UR STRIP: NORMAL /HPF
WBC NRBC COR # BLD AUTO: 7.11 10*3/MM3 (ref 3.4–10.8)

## 2024-06-02 PROCEDURE — 85025 COMPLETE CBC W/AUTO DIFF WBC: CPT | Performed by: FAMILY MEDICINE

## 2024-06-02 PROCEDURE — 85610 PROTHROMBIN TIME: CPT | Performed by: FAMILY MEDICINE

## 2024-06-02 PROCEDURE — 99284 EMERGENCY DEPT VISIT MOD MDM: CPT

## 2024-06-02 PROCEDURE — 93005 ELECTROCARDIOGRAM TRACING: CPT | Performed by: FAMILY MEDICINE

## 2024-06-02 PROCEDURE — 25810000003 SODIUM CHLORIDE 0.9 % SOLUTION: Performed by: FAMILY MEDICINE

## 2024-06-02 PROCEDURE — 80048 BASIC METABOLIC PNL TOTAL CA: CPT | Performed by: FAMILY MEDICINE

## 2024-06-02 PROCEDURE — P9612 CATHETERIZE FOR URINE SPEC: HCPCS

## 2024-06-02 PROCEDURE — 36415 COLL VENOUS BLD VENIPUNCTURE: CPT

## 2024-06-02 PROCEDURE — 96374 THER/PROPH/DIAG INJ IV PUSH: CPT

## 2024-06-02 PROCEDURE — 70450 CT HEAD/BRAIN W/O DYE: CPT

## 2024-06-02 PROCEDURE — 25010000002 HYDRALAZINE PER 20 MG: Performed by: FAMILY MEDICINE

## 2024-06-02 PROCEDURE — 0202U NFCT DS 22 TRGT SARS-COV-2: CPT | Performed by: FAMILY MEDICINE

## 2024-06-02 PROCEDURE — 85730 THROMBOPLASTIN TIME PARTIAL: CPT | Performed by: FAMILY MEDICINE

## 2024-06-02 PROCEDURE — 83735 ASSAY OF MAGNESIUM: CPT | Performed by: FAMILY MEDICINE

## 2024-06-02 PROCEDURE — 85379 FIBRIN DEGRADATION QUANT: CPT | Performed by: FAMILY MEDICINE

## 2024-06-02 PROCEDURE — 84484 ASSAY OF TROPONIN QUANT: CPT | Performed by: FAMILY MEDICINE

## 2024-06-02 PROCEDURE — 81001 URINALYSIS AUTO W/SCOPE: CPT | Performed by: FAMILY MEDICINE

## 2024-06-02 PROCEDURE — 83880 ASSAY OF NATRIURETIC PEPTIDE: CPT | Performed by: FAMILY MEDICINE

## 2024-06-02 RX ORDER — SODIUM CHLORIDE 0.9 % (FLUSH) 0.9 %
10 SYRINGE (ML) INJECTION AS NEEDED
Status: DISCONTINUED | OUTPATIENT
Start: 2024-06-02 | End: 2024-06-02 | Stop reason: HOSPADM

## 2024-06-02 RX ORDER — HYDRALAZINE HYDROCHLORIDE 20 MG/ML
20 INJECTION INTRAMUSCULAR; INTRAVENOUS ONCE
Status: COMPLETED | OUTPATIENT
Start: 2024-06-02 | End: 2024-06-02

## 2024-06-02 RX ADMIN — SODIUM CHLORIDE 1000 ML: 9 INJECTION, SOLUTION INTRAVENOUS at 12:26

## 2024-06-02 RX ADMIN — HYDRALAZINE HYDROCHLORIDE 20 MG: 20 INJECTION INTRAMUSCULAR; INTRAVENOUS at 15:12

## 2024-06-04 ENCOUNTER — OFFICE VISIT (OUTPATIENT)
Dept: INTERNAL MEDICINE | Facility: CLINIC | Age: 89
End: 2024-06-04
Payer: MEDICARE

## 2024-06-04 VITALS
BODY MASS INDEX: 26.21 KG/M2 | DIASTOLIC BLOOD PRESSURE: 62 MMHG | WEIGHT: 167 LBS | HEIGHT: 67 IN | OXYGEN SATURATION: 96 % | SYSTOLIC BLOOD PRESSURE: 140 MMHG | HEART RATE: 58 BPM | TEMPERATURE: 97.8 F

## 2024-06-04 DIAGNOSIS — R55 VASOVAGAL SYNCOPE: ICD-10-CM

## 2024-06-04 DIAGNOSIS — R41.3 IMPAIRED MEMORY: ICD-10-CM

## 2024-06-04 DIAGNOSIS — I10 PRIMARY HYPERTENSION: Primary | Chronic | ICD-10-CM

## 2024-06-04 PROCEDURE — G2211 COMPLEX E/M VISIT ADD ON: HCPCS | Performed by: INTERNAL MEDICINE

## 2024-06-04 PROCEDURE — 1126F AMNT PAIN NOTED NONE PRSNT: CPT | Performed by: INTERNAL MEDICINE

## 2024-06-04 PROCEDURE — 99214 OFFICE O/P EST MOD 30 MIN: CPT | Performed by: INTERNAL MEDICINE
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
## 2024-06-06 LAB
QT INTERVAL: 470 MS
QTC INTERVAL: 419 MS

## 2024-06-17 ENCOUNTER — HOSPITAL ENCOUNTER (OUTPATIENT)
Dept: GENERAL RADIOLOGY | Facility: HOSPITAL | Age: 89
Discharge: HOME | End: 2024-06-17
Admitting: INTERNAL MEDICINE
Payer: MEDICARE

## 2024-06-17 ENCOUNTER — OFFICE VISIT (OUTPATIENT)
Dept: INTERNAL MEDICINE | Facility: CLINIC | Age: 89
End: 2024-06-17
Payer: MEDICARE

## 2024-06-17 VITALS
HEIGHT: 67 IN | OXYGEN SATURATION: 97 % | DIASTOLIC BLOOD PRESSURE: 78 MMHG | HEART RATE: 55 BPM | SYSTOLIC BLOOD PRESSURE: 120 MMHG | WEIGHT: 169 LBS | BODY MASS INDEX: 26.53 KG/M2 | TEMPERATURE: 96.9 F

## 2024-06-17 DIAGNOSIS — J01.90 ACUTE BACTERIAL SINUSITIS: ICD-10-CM

## 2024-06-17 DIAGNOSIS — I48.0 PAROXYSMAL ATRIAL FIBRILLATION: ICD-10-CM

## 2024-06-17 DIAGNOSIS — B96.89 ACUTE BACTERIAL BRONCHITIS: ICD-10-CM

## 2024-06-17 DIAGNOSIS — J20.8 ACUTE BACTERIAL BRONCHITIS: ICD-10-CM

## 2024-06-17 DIAGNOSIS — B96.89 ACUTE BACTERIAL SINUSITIS: ICD-10-CM

## 2024-06-17 DIAGNOSIS — B96.89 ACUTE BACTERIAL BRONCHITIS: Primary | ICD-10-CM

## 2024-06-17 DIAGNOSIS — J20.8 ACUTE BACTERIAL BRONCHITIS: Primary | ICD-10-CM

## 2024-06-17 DIAGNOSIS — I50.32 DIASTOLIC DYSFUNCTION WITH CHRONIC HEART FAILURE: ICD-10-CM

## 2024-06-17 DIAGNOSIS — I10 ESSENTIAL HYPERTENSION: ICD-10-CM

## 2024-06-17 PROCEDURE — 1125F AMNT PAIN NOTED PAIN PRSNT: CPT | Performed by: INTERNAL MEDICINE

## 2024-06-17 PROCEDURE — 1160F RVW MEDS BY RX/DR IN RCRD: CPT | Performed by: INTERNAL MEDICINE

## 2024-06-17 PROCEDURE — 71046 X-RAY EXAM CHEST 2 VIEWS: CPT

## 2024-06-17 PROCEDURE — 99214 OFFICE O/P EST MOD 30 MIN: CPT | Performed by: INTERNAL MEDICINE

## 2024-06-17 PROCEDURE — 1159F MED LIST DOCD IN RCRD: CPT | Performed by: INTERNAL MEDICINE

## 2024-06-17 RX ORDER — FLUTICASONE PROPIONATE 50 MCG
2 SPRAY, SUSPENSION (ML) NASAL DAILY
Qty: 16 G | Refills: 1 | Status: SHIPPED | OUTPATIENT
Start: 2024-06-17

## 2024-06-17 RX ORDER — CEFDINIR 300 MG/1
300 CAPSULE ORAL 2 TIMES DAILY
Qty: 14 CAPSULE | Refills: 0 | Status: SHIPPED | OUTPATIENT
Start: 2024-06-17

## 2024-06-17 NOTE — PROGRESS NOTES
"    Chief Complaint  Diarrhea (Started this morning), Nasal Congestion (X 7 days;  cough productive /Patient has been using OTC meds), and Headache (X 7 days)    Subjective        Chad Henriquez presents to [facility]  History of Present Illness  See below.     Objective   Vital Signs:  /78 (BP Location: Left arm, Patient Position: Sitting, Cuff Size: Adult)   Pulse 55   Temp 96.9 °F (36.1 °C) (Temporal)   Ht 170.2 cm (67\")   Wt 76.7 kg (169 lb)   SpO2 97%   BMI 26.47 kg/m²   Estimated body mass index is 26.47 kg/m² as calculated from the following:    Height as of this encounter: 170.2 cm (67\").    Weight as of this encounter: 76.7 kg (169 lb).         Physical Exam  Constitutional:       General: He is not in acute distress.     Appearance: He is not ill-appearing or toxic-appearing.      Comments: Seen and discussed with his wife.   HENT:      Head: Normocephalic and atraumatic.      Ears:      Comments: Raj hearing aids, but still somewhat hard of hearing.  They were removed and no issues were seen with the TMs or EACs.  Very mild ceruminosis.     Nose: Congestion and rhinorrhea present.      Comments: Inflamed nasal passages bilaterally.  No polyps.     Mouth/Throat:      Mouth: Mucous membranes are moist.      Pharynx: Posterior oropharyngeal erythema present. No oropharyngeal exudate.   Eyes:      Conjunctiva/sclera: Conjunctivae normal.      Pupils: Pupils are equal, round, and reactive to light.   Cardiovascular:      Rate and Rhythm: Normal rate and regular rhythm.      Heart sounds: Normal heart sounds.      Comments: Sinus by palpation and auscultation.  Pulmonary:      Effort: Pulmonary effort is normal. No respiratory distress.      Breath sounds: Normal breath sounds.   Abdominal:      General: There is no distension.      Palpations: Abdomen is soft.      Tenderness: There is no abdominal tenderness.   Musculoskeletal:         General: No swelling.      " Cervical back: Neck supple.   Skin:     General: Skin is warm and dry.      Findings: No rash.   Neurological:      General: No focal deficit present.      Mental Status: He is alert and oriented to person, place, and time.   Psychiatric:         Mood and Affect: Mood normal.         Behavior: Behavior normal.         Thought Content: Thought content normal.         Judgment: Judgment normal.        Result Review :  Creatinine on 6/2 was 0.72.    Results for orders placed during the hospital encounter of 05/06/24    Adult Transthoracic Echo Complete W/ Cont if Necessary Per Protocol (With Agitated Saline)    Interpretation Summary    Left ventricular systolic function is normal. Left ventricular ejection fraction appears to be 56 - 60%.    Left ventricular wall thickness is consistent with mild concentric hypertrophy.    Left ventricular diastolic function is consistent with (grade I) impaired relaxation.    Right ventricle is not well visualized but appears upper normal in size with normal systolic function.    The left atrial cavity is dilated.    Saline test results are negative.    The right atrial cavity is mildly  dilated.    There are no hemodynamically significant (more than mild) valve abnormalities.           Assessment and Plan   Diagnoses and all orders for this visit:    1. Acute bacterial bronchitis (Primary)  -     XR Chest PA & Lateral; Future  -     cefdinir (OMNICEF) 300 MG capsule; Take 1 capsule by mouth 2 (Two) Times a Day.  Dispense: 14 capsule; Refill: 0    2. Acute bacterial sinusitis  -     XR Chest PA & Lateral; Future  -     fluticasone (FLONASE) 50 MCG/ACT nasal spray; 2 sprays into the nostril(s) as directed by provider Daily.  Dispense: 16 g; Refill: 1  -     cefdinir (OMNICEF) 300 MG capsule; Take 1 capsule by mouth 2 (Two) Times a Day.  Dispense: 14 capsule; Refill: 0    3. Paroxysmal atrial fibrillation    4. Diastolic dysfunction without heart failure    5. Essential  hypertension       Regular patient of Dr. Bond.  Presents today for problem visit.    Dr. Bond just saw him on 6/4.  He had followed up after having a syncopal episode that caused him to go to the ER on 6/2.  No medication changes were made at that appointment.    He and his wife state that for about the last week he has been having problems with sinus congestion that is now trending more towards chest congestion.  They deny fever.  No sweats or chills.  He has had a headache attributed to coughing.  The cough has been productive of yellow sputum.  It is felt that most of this is coming from his sinuses.  He has been taking Coricidin to try and help control his symptoms.      He is allergic to Augmentin.  Plan to treat him with Omnicef.  I would also like for him to take fluticasone.  No plans for steroids given his frailty and history of PAF.  Also just do not think that they are needed.    I would like to go get a chest x-ray on him in case there is an early process.  This could change his antibiotic strategy.  They are agreeable to go to [facility] and get this done.  Will call them with results this afternoon.    He mentioned diarrhea to my MA.  However, he had 1 loose stool last night and 1 loose stool this morning.  He does not have any nausea or vomiting.  No abdominal pain.    He is on Eliquis chronically for PAF.  He is in sinus rhythm by auscultation and palpation in the office today.  He has diastolic dysfunction and does not appear to have any heart failure symptoms at present.  No orthopnea.  No increased lower extremity edema.  Trace on exam.  He just had an echocardiogram that was referenced as above.    Blood pressure well-controlled on losartan, carvedilol.  Continue the same.  No dizziness or near syncopal issues at present.    He already has an appointment to see Dr. Bond on 6/24.  He will keep that.      Follow Up   Return for Next scheduled follow up.  Patient was given  instructions and counseling regarding his condition or for health maintenance advice. Please see specific information pulled into the AVS if appropriate.      MIKHAIL Aviles DO       Electronically signed by CHAPINCITO Aviles DO, 06/17/24, 11:11 AM CDT.

## 2024-06-19 ENCOUNTER — OFFICE VISIT (OUTPATIENT)
Dept: NEUROLOGY | Facility: CLINIC | Age: 89
End: 2024-06-19
Payer: MEDICARE

## 2024-06-19 VITALS
BODY MASS INDEX: 26.37 KG/M2 | SYSTOLIC BLOOD PRESSURE: 130 MMHG | HEART RATE: 68 BPM | HEIGHT: 67 IN | WEIGHT: 168 LBS | DIASTOLIC BLOOD PRESSURE: 80 MMHG | RESPIRATION RATE: 18 BRPM

## 2024-06-19 DIAGNOSIS — I48.0 PAROXYSMAL ATRIAL FIBRILLATION: ICD-10-CM

## 2024-06-19 DIAGNOSIS — Z79.01 CHRONIC ANTICOAGULATION: ICD-10-CM

## 2024-06-19 DIAGNOSIS — I10 ESSENTIAL HYPERTENSION: ICD-10-CM

## 2024-06-19 DIAGNOSIS — R54 ADVANCED AGE: ICD-10-CM

## 2024-06-19 DIAGNOSIS — E78.5 HYPERLIPIDEMIA LDL GOAL <70: ICD-10-CM

## 2024-06-19 DIAGNOSIS — Z86.73 HISTORY OF TIA (TRANSIENT ISCHEMIC ATTACK): Primary | ICD-10-CM

## 2024-06-19 DIAGNOSIS — Z91.81 AT MODERATE RISK FOR FALL: ICD-10-CM

## 2024-06-19 PROCEDURE — 99214 OFFICE O/P EST MOD 30 MIN: CPT

## 2024-06-19 PROCEDURE — 1159F MED LIST DOCD IN RCRD: CPT

## 2024-06-19 PROCEDURE — 1160F RVW MEDS BY RX/DR IN RCRD: CPT
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
## 2024-06-26 ENCOUNTER — OFFICE VISIT (OUTPATIENT)
Dept: INTERNAL MEDICINE | Facility: CLINIC | Age: 89
End: 2024-06-26
Payer: MEDICARE

## 2024-06-26 VITALS
TEMPERATURE: 96.7 F | BODY MASS INDEX: 26.68 KG/M2 | OXYGEN SATURATION: 95 % | DIASTOLIC BLOOD PRESSURE: 58 MMHG | HEART RATE: 59 BPM | HEIGHT: 67 IN | WEIGHT: 170 LBS | SYSTOLIC BLOOD PRESSURE: 138 MMHG

## 2024-06-26 DIAGNOSIS — I10 ESSENTIAL HYPERTENSION: ICD-10-CM

## 2024-06-26 DIAGNOSIS — R26.89 BALANCE DISORDER: ICD-10-CM

## 2024-06-26 DIAGNOSIS — G62.9 PERIPHERAL POLYNEUROPATHY: Primary | ICD-10-CM

## 2024-06-26 DIAGNOSIS — G45.9 TIA (TRANSIENT ISCHEMIC ATTACK): ICD-10-CM

## 2024-06-26 DIAGNOSIS — M48.062 SPINAL STENOSIS, LUMBAR REGION, WITH NEUROGENIC CLAUDICATION: ICD-10-CM

## 2024-06-26 DIAGNOSIS — R26.9 GAIT ABNORMALITY: ICD-10-CM

## 2024-06-26 PROCEDURE — 1159F MED LIST DOCD IN RCRD: CPT | Performed by: INTERNAL MEDICINE

## 2024-06-26 PROCEDURE — 99214 OFFICE O/P EST MOD 30 MIN: CPT | Performed by: INTERNAL MEDICINE

## 2024-06-26 PROCEDURE — 1126F AMNT PAIN NOTED NONE PRSNT: CPT | Performed by: INTERNAL MEDICINE

## 2024-06-26 PROCEDURE — G2211 COMPLEX E/M VISIT ADD ON: HCPCS | Performed by: INTERNAL MEDICINE

## 2024-06-26 PROCEDURE — 1160F RVW MEDS BY RX/DR IN RCRD: CPT | Performed by: INTERNAL MEDICINE

## 2024-06-26 RX ORDER — SENNOSIDES A AND B 8.6 MG/1
1 TABLET, FILM COATED ORAL DAILY
COMMUNITY
Start: 2024-06-04

## 2024-06-30 PROBLEM — L98.9 SKIN LESION: Status: RESOLVED | Noted: 2023-06-17 | Resolved: 2024-06-30

## 2024-06-30 PROBLEM — R06.02 SHORTNESS OF BREATH: Status: RESOLVED | Noted: 2023-06-17 | Resolved: 2024-06-30

## 2024-06-30 PROBLEM — I16.0 HYPERTENSIVE URGENCY: Status: RESOLVED | Noted: 2024-05-08 | Resolved: 2024-06-30

## 2024-06-30 NOTE — PROGRESS NOTES
"      Chief Complaint  Follow-up (Bumping into furniture and doors) and Hypertension    Subjective        Chad Henriquez presents to [facility]    HPI    Patient here for the above problems.  See Assessment and Plan for further HPI components.      Review of Systems    Objective   Vital Signs:  /58 (BP Location: Left arm, Patient Position: Sitting, Cuff Size: Adult)   Pulse 59   Temp 96.7 °F (35.9 °C) (Temporal)   Ht 170.2 cm (67\")   Wt 77.1 kg (170 lb)   SpO2 95%   BMI 26.63 kg/m²   Estimated body mass index is 26.63 kg/m² as calculated from the following:    Height as of this encounter: 170.2 cm (67\").    Weight as of this encounter: 77.1 kg (170 lb).      Physical Exam  Vitals and nursing note reviewed.   Constitutional:       Appearance: He is not ill-appearing.   Eyes:      General: No scleral icterus.     Conjunctiva/sclera: Conjunctivae normal.   Cardiovascular:      Rate and Rhythm: Normal rate.      Heart sounds: Murmur heard.   Pulmonary:      Effort: Pulmonary effort is normal. No respiratory distress.   Neurological:      General: No focal deficit present.      Mental Status: He is alert and oriented to person, place, and time.   Psychiatric:         Mood and Affect: Mood normal.         Behavior: Behavior normal.                       Assessment and Plan   Diagnoses and all orders for this visit:    1. Peripheral polyneuropathy (Primary)  -     Ambulatory Referral to Physical Therapy    2. Balance disorder  -     Ambulatory Referral to Physical Therapy    3. Spinal stenosis, lumbar region, with neurogenic claudication  -     Ambulatory Referral to Physical Therapy    4. Gait abnormality  -     Ambulatory Referral to Physical Therapy    5. Essential hypertension    6. TIA (transient ischemic attack)        Patient has hypertension.  BP is at goal.  The patient's BP goal is < 130/80.  Recommend ambulatory BP monitoring after patient has taken medication.  " Recommend varying the times of the blood pressure readings.  Patient is currently on carvedilol 6.25 mg twice daily, losartan 100 mg.  Recommend we continue current regimen.    Continue to monitor.    Patient had follow-up with neurology for TIA.  They recommend continuing Eliquis 40 mg, continue atorvastatin.  They also indicated that we could decrease the atorvastatin if desired, and deferred to me for this.  LDL goal is less than 70, but due to his advanced age, may do a little bit less with medication.  For now I have decided to go ahead and discontinue the atorvastatin 40 mg.    Patient's gait is getting a little bit worse.  His neuropathy is also little bit worse.  Patient walks with a cane when he goes out.  But at home he furniture surface.  I am going to refer him to physical therapy.  Patient is willing to do so.  I think that this will help his gait.    Result Review :                               Follow Up   Return in about 6 months (around 12/26/2024), or if symptoms worsen or fail to improve, for Medicare Wellness - Labs prior to visit, follow up for above problems. Longitudinal care..  Patient was given instructions and counseling regarding his condition or for health maintenance advice. Please see specific information pulled into the AVS if appropriate.       MIKHAIL Bond MD, FACP, [facility]      Electronically signed by Miguel Bond MD, 06/30/24, 1:13 PM CDT.

## 2024-07-02 ENCOUNTER — OFFICE VISIT (OUTPATIENT)
Dept: CARDIOLOGY | Facility: CLINIC | Age: 89
End: 2024-07-02
Payer: MEDICARE

## 2024-07-02 VITALS
BODY MASS INDEX: 25.99 KG/M2 | HEART RATE: 50 BPM | DIASTOLIC BLOOD PRESSURE: 76 MMHG | HEIGHT: 67 IN | SYSTOLIC BLOOD PRESSURE: 118 MMHG | OXYGEN SATURATION: 97 % | WEIGHT: 165.6 LBS

## 2024-07-02 DIAGNOSIS — I48.92 ATRIAL FLUTTER WITH RAPID VENTRICULAR RESPONSE: ICD-10-CM

## 2024-07-02 DIAGNOSIS — I25.10 CORONARY ARTERY DISEASE INVOLVING NATIVE CORONARY ARTERY OF NATIVE HEART WITHOUT ANGINA PECTORIS: Primary | Chronic | ICD-10-CM

## 2024-07-02 PROCEDURE — 99214 OFFICE O/P EST MOD 30 MIN: CPT | Performed by: NURSE PRACTITIONER

## 2024-07-02 PROCEDURE — 1160F RVW MEDS BY RX/DR IN RCRD: CPT | Performed by: NURSE PRACTITIONER

## 2024-07-02 PROCEDURE — 93000 ELECTROCARDIOGRAM COMPLETE: CPT | Performed by: NURSE PRACTITIONER

## 2024-07-02 PROCEDURE — 1159F MED LIST DOCD IN RCRD: CPT | Performed by: NURSE PRACTITIONER

## 2024-07-02 NOTE — PROGRESS NOTES
Subjective:     Encounter Date: 7/2/2024      Patient ID: Chad Henriquez is a 89 y.o. male.    Chief Complaint: Follow up CAD, atrial flutter     History of Present Illness    Mr. Henriquez returns today for follow up.    I saw the patient in clinic in December 2023 and he was incidentally discovered to be in atrial flutter.  He seemed to be asymptomatic but had been complaining of chronic exertional dyspnea that might have been mildly worse.  He was started on Eliquis for anticoagulation and 7-day Holter monitor determined the atrial flutter was persistent.  Therefore in January he underwent successful cardioversion with Dr. Hull.    He came back for follow-up with Dr. Hull in February and reported he felt better after cardioversion.  He seemed to be napping less.  He was not having any palpitations.  He was still short of breath with exertion but felt that was somewhat improved after cardioversion.  No changes were made at that visit.  He was kept on low-dose Lopressor and Eliquis.  It was noted antiarrhythmic medication might be considered if he had a symptomatic recurrence.    Otherwise, by way of review, he transferred care to us in June 2023 for known coronary disease, including a single-vessel CABG in 86 (vein graft to ) with subsequent PCI.  He had also previously been followed at [facility] and in April 2023 had an echocardiogram documenting global hypokinesis with EF of 45 to 50%, moderate to severe LVH, and grade 2 diastolic dysfunction without significant valvular disease.  Lexiscan evaluation done at the time (May 2023) at [facility] showed no evidence of ischemia or infarction.  He was complaining then of progressively worsening shortness of breath for about a year and a half.    More recently, the patient was hospitalized briefly in May 2024 for a TIA.  He was noted to have an acutely elevated blood pressure at that time as well.  Adjustments were made in his antihypertensive  regimen.  He is now on a higher dose of losartan and now also on Coreg 6.25 mg twice daily.  See echocardiogram results from that admission below.  Eliquis and high intensity statin were continued.    He was in the ER on 6/2 after syncopal spell when sitting in [place].  It appears workup was unremarkable.  Available EKGs reveal sinus rhythm and sinus bradycardia since his last visit.    Today he states he is doing pretty well overall other than his continued chronic weakness, fatigue and shortness of breath with exertion.  He also has issues with his balance and is going to begin physical therapy soon.  He states his blood pressure is now well-controlled.  He is not having palpitations, orthopnea, PND.  He has had no further syncope.  He also denies near syncope.  He is not having any bleeding issues on the Eliquis.    The following portions of the patient's history were reviewed and updated as appropriate: allergies, current medications, past family history, past medical history, past social history, past surgical history, and problem list.    Review of Systems   Constitutional: Positive for malaise/fatigue.   Cardiovascular:  Positive for dyspnea on exertion. Negative for chest pain, claudication, leg swelling, near-syncope, orthopnea, palpitations, paroxysmal nocturnal dyspnea and syncope.   Respiratory:  Negative for cough.    Hematologic/Lymphatic: Does not bruise/bleed easily.   Musculoskeletal:  Negative for falls.   Gastrointestinal:  Negative for bloating.   Neurological:  Positive for weakness. Negative for dizziness and light-headedness.         Current Outpatient Medications:     alfuzosin (UROXATRAL) 10 MG 24 hr tablet, Take 1 tablet by mouth Every Evening., Disp: , Rfl:     apixaban (ELIQUIS) 5 MG tablet tablet, Take 1 tablet by mouth 2 (Two) Times a Day., Disp: 60 tablet, Rfl: 11    Ascorbic Acid (Vitamin C) 500 MG capsule, Take 1 capsule by mouth 2 (two) times a day., Disp: , Rfl:     atorvastatin  (Lipitor) 40 MG tablet, Take 1 tablet by mouth Daily., Disp: 90 tablet, Rfl: 1    carvedilol (COREG) 6.25 MG tablet, Take 1 tablet by mouth 2 (Two) Times a Day With Meals., Disp: 180 tablet, Rfl: 1    Cholecalciferol 50 MCG (2000 UT) tablet, Take 1 tablet by mouth Daily., Disp: , Rfl:     Docusate Calcium (STOOL SOFTENER PO), Take 100 mg by mouth Daily., Disp: , Rfl:     finasteride (PROSCAR) 5 MG tablet, Take 1 tablet by mouth Daily., Disp: , Rfl:     Flaxseed, Linseed, (FLAX SEED OIL PO), Take  by mouth., Disp: , Rfl:     fluticasone (FLONASE) 50 MCG/ACT nasal spray, 2 sprays into the nostril(s) as directed by provider Daily., Disp: 16 g, Rfl: 1    folic acid (FOLVITE) 1 MG tablet, Take 1 tablet by mouth Daily., Disp: , Rfl:     gabapentin (NEURONTIN) 600 MG tablet, Take 1 tablet by mouth 3 (Three) Times a Day., Disp: 90 tablet, Rfl: 3    L-Lysine 600 MG tablet, Take 1 tablet by mouth Daily. 1000 mg, Disp: , Rfl:     losartan (COZAAR) 100 MG tablet, Take 1 tablet by mouth Daily., Disp: 90 tablet, Rfl: 1    Psyllium (Metamucil) wafer wafer, Take 2 wafers by mouth Daily., Disp: , Rfl:     senna (SENOKOT) 8.6 MG tablet, Take 1 tablet by mouth Daily., Disp: , Rfl:     vitamin E 100 UNIT capsule, Take 1 capsule by mouth Daily., Disp: , Rfl:        Objective:      Vitals:    07/02/24 1246   BP: 118/76   Pulse: 50   SpO2: 97%   Weight - 165 lbs    Vitals and nursing note reviewed.   Constitutional:       General: Not in acute distress.     Appearance: Well-developed and not in distress. Frail. Not diaphoretic.   Neck:      Vascular: No JVD or JVR. JVD normal.   Pulmonary:      Effort: Pulmonary effort is normal. No respiratory distress.      Breath sounds: Normal breath sounds.   Cardiovascular:      Bradycardia present. Regular rhythm.      Murmurs: There is no murmur.   Edema:     Peripheral edema absent.   Abdominal:      Tenderness: There is no abdominal tenderness.   Skin:     General: Skin is warm and dry.    Neurological:      Mental Status: Alert, oriented to person, place, and time and oriented to person, place and time.         Lab Review:   Lab Results   Component Value Date    GLUCOSE 126 (H) 06/02/2024    BUN 17 06/02/2024    CREATININE 0.72 (L) 06/02/2024    EGFRRESULT 74.2 12/19/2023    EGFR 84.7 06/02/2024    BCR 23.6 06/02/2024    K 4.5 06/02/2024    CO2 24.0 06/02/2024    CALCIUM 8.2 06/02/2024    PROTENTOTREF 6.2 12/19/2023    ALBUMIN 4.6 05/06/2024    BILITOT 0.4 05/06/2024    AST 28 05/06/2024    ALT 19 05/06/2024        Lab Results   Component Value Date    CHOL 144 05/07/2024    CHLPL 133 12/19/2023    TRIG 75 05/07/2024    HDL 41 05/07/2024    LDL 88 05/07/2024        Lab Results   Component Value Date    HGBA1C 5.30 05/07/2024        Lab Results   Component Value Date    WBC 7.11 06/02/2024    HGB 11.2 (L) 06/02/2024    HCT 34.3 (L) 06/02/2024    MCV 98.3 (H) 06/02/2024     06/02/2024        Lab Results   Component Value Date    TSH 2.330 05/07/2024            ECG 12 Lead    Date/Time: 7/2/2024 1:22 PM  Performed by: Tatianna Jessica APRN    Authorized by: Tatianna Jessica APRN  Comparison: compared with previous ECG from 6/2/2024  Similar to previous ECG  Rhythm: sinus bradycardia  BPM: 49  Conduction: right bundle branch block and 1st degree AV block    Clinical impression: abnormal EKG      5/2024 echo:      Left ventricular systolic function is normal. Left ventricular ejection fraction appears to be 56 - 60%.    Left ventricular wall thickness is consistent with mild concentric hypertrophy.    Left ventricular diastolic function is consistent with (grade I) impaired relaxation.    Right ventricle is not well visualized but appears upper normal in size with normal systolic function.    The left atrial cavity is dilated.    Saline test results are negative.    The right atrial cavity is mildly  dilated.    There are no hemodynamically significant (more than mild) valve abnormalities.            Assessment/Plan:     Problem List Items Addressed This Visit          Cardiac and Vasculature    Coronary artery disease involving native coronary artery of native heart without angina pectoris - Primary (Chronic)    Overview     1v CABG in '86 (SVG->OM, according to available records).  Subsequent PCI (two to one vessel, and one to another), ~'97.           Atrial flutter with rapid ventricular response         Recommendations/plans:  1.  Atrial flutter: Now maintaining sinus rhythm after cardioversion January 2024.  Though was incidentally discovered and not overtly symptomatic precardioversion, he seemed to note at least mild improvement in the way he felt after the cardioversion.    -Continue anticoagulation (Eliquis 5 mg twice daily) for stroke protection  -Continue close observation for now; if he has a recurrence of arrhythmia and feels symptomatic again, then we will consider initiating antiarrhythmic therapy  -Continue Coreg 6.25 mg twice daily for now-this replaced Lopressor 12.5 mg twice daily during recent hospitalization (was transitioned for better blood pressure control).  He is in sinus bradycardia with a heart rate around 50 bpm.  He does not appear to be symptomatic to this but would have a low threshold to decrease dose if resting heart rate drops further with any associated symptoms.    2.  CAD status post CABG: Stable.  No angina.  Continue statin, ARB, and beta-blocker; no need for antiplatelet therapy while also anticoagulated.    F/u 3-6 minths, or sooner with recurrence of symptoms or concerns for recurrent arrhythmia    EDIE Elias

## 2024-07-23 RX ORDER — ATORVASTATIN CALCIUM 40 MG/1
40 TABLET, FILM COATED ORAL DAILY
Qty: 10 TABLET | Refills: 0 | Status: SHIPPED | OUTPATIENT
Start: 2024-07-23 | End: 2024-07-23 | Stop reason: SDUPTHER

## 2024-07-23 RX ORDER — ATORVASTATIN CALCIUM 40 MG/1
40 TABLET, FILM COATED ORAL DAILY
Qty: 90 TABLET | Refills: 3 | Status: SHIPPED | OUTPATIENT
Start: 2024-07-23

## 2024-07-23 NOTE — TELEPHONE ENCOUNTER
Patient needs [facility]. Patient says he takes 80mg but I only see 40mg in chart.

## 2024-07-23 NOTE — TELEPHONE ENCOUNTER
After discussion and review of chart - pt has continued to take 40mg bid since hospital d/c, but has run out early since his bottle only says once daily.  Jamilah reviewed Dr. Bond's last note, and feels he meant to say to d/c the 80mg and continue the 40mg daily.  She will send a 10 day supply to [facility] and they will pay out of pocket.  Will have Dr. Bond sign a written Rx, which they wish to [facility] deliver [facility], as they are concerned that since Dr. Bond is his assigned doctor, [facility] may not [facility] a written [facility] from [facility].

## 2024-08-08 ENCOUNTER — TELEPHONE (OUTPATIENT)
Dept: INTERNAL MEDICINE | Facility: CLINIC | Age: 89
End: 2024-08-08
Payer: MEDICARE

## 2024-08-08 RX ORDER — ATORVASTATIN CALCIUM 40 MG/1
40 TABLET, FILM COATED ORAL DAILY
Qty: 30 TABLET | Refills: 5 | Status: SHIPPED | OUTPATIENT
Start: 2024-08-08

## 2024-08-08 NOTE — TELEPHONE ENCOUNTER
Caller: Eva Herniquez    Relationship: Emergency Contact    Best call back number: [phone]     Requested Prescriptions:   Requested Prescriptions     Pending Prescriptions Disp Refills    atorvastatin (Lipitor) 40 MG tablet 90 tablet 3     Sig: Take 1 tablet by mouth Daily.        Pharmacy where request should be sent: [facility] [place] [address] - [phone]  - [phone] FX     Last office visit with prescribing clinician: 6/26/2024   Last telemedicine visit with prescribing clinician: Visit date not found   Next office visit with prescribing clinician: 12/24/2024     Additional details provided by patient: COMPLETELY OUT.  [facility] AND [facility] CAN[facility]T FILL UNTIL GETS THE OFFICE NOTES. PATIENT IS OUT OF MEDICATION FOR PAST 3 DAYS.     Does the patient have less than a 3 day supply:  [x] Yes  [] No    Would you like a call back once the refill request has been completed: [x] Yes [] No    If the office needs to give you a call back, can they leave a voicemail: [x] Yes [] No    Awais Stanton III, Merly Rep   08/08/24 12:50 CDT

## 2024-08-09 ENCOUNTER — APPOINTMENT (OUTPATIENT)
Dept: CT IMAGING | Facility: HOSPITAL | Age: 89
End: 2024-08-09
Payer: MEDICARE

## 2024-08-09 ENCOUNTER — APPOINTMENT (OUTPATIENT)
Dept: GENERAL RADIOLOGY | Facility: HOSPITAL | Age: 89
End: 2024-08-09
Payer: MEDICARE

## 2024-08-09 ENCOUNTER — HOSPITAL ENCOUNTER (EMERGENCY)
Facility: HOSPITAL | Age: 89
Discharge: HOME | End: 2024-08-09
Attending: EMERGENCY MEDICINE
Payer: MEDICARE

## 2024-08-09 VITALS
OXYGEN SATURATION: 92 % | HEART RATE: 59 BPM | RESPIRATION RATE: 21 BRPM | TEMPERATURE: 100 F | HEIGHT: 67 IN | DIASTOLIC BLOOD PRESSURE: 57 MMHG | BODY MASS INDEX: 25.9 KG/M2 | SYSTOLIC BLOOD PRESSURE: 176 MMHG | WEIGHT: 165 LBS

## 2024-08-09 DIAGNOSIS — R55 SYNCOPE AND COLLAPSE: Primary | ICD-10-CM

## 2024-08-09 DIAGNOSIS — S09.90XA INJURY OF HEAD, INITIAL ENCOUNTER: ICD-10-CM

## 2024-08-09 DIAGNOSIS — K76.89 LIVER CYST: ICD-10-CM

## 2024-08-09 DIAGNOSIS — R74.01 TRANSAMINITIS: ICD-10-CM

## 2024-08-09 DIAGNOSIS — R50.9 FEBRILE ILLNESS: ICD-10-CM

## 2024-08-09 DIAGNOSIS — K44.9 HIATAL HERNIA: ICD-10-CM

## 2024-08-09 LAB
ALBUMIN SERPL-MCNC: 3.4 G/DL (ref 3.5–5.2)
ALBUMIN/GLOB SERPL: 1 G/DL
ALP SERPL-CCNC: 907 U/L (ref 39–117)
ALT SERPL W P-5'-P-CCNC: 70 U/L (ref 1–41)
ANION GAP SERPL CALCULATED.3IONS-SCNC: 9 MMOL/L (ref 5–15)
AST SERPL-CCNC: 79 U/L (ref 1–40)
B PARAPERT DNA SPEC QL NAA+PROBE: NOT DETECTED
B PERT DNA SPEC QL NAA+PROBE: NOT DETECTED
BACTERIA UR QL AUTO: NORMAL /HPF
BASOPHILS # BLD AUTO: 0.06 10*3/MM3 (ref 0–0.2)
BASOPHILS NFR BLD AUTO: 0.4 % (ref 0–1.5)
BILIRUB SERPL-MCNC: 1.4 MG/DL (ref 0–1.2)
BILIRUB UR QL STRIP: ABNORMAL
BUN SERPL-MCNC: 18 MG/DL (ref 8–23)
BUN/CREAT SERPL: 22.5 (ref 7–25)
C PNEUM DNA NPH QL NAA+NON-PROBE: NOT DETECTED
CALCIUM SPEC-SCNC: 8.8 MG/DL (ref 8.2–9.6)
CHLORIDE SERPL-SCNC: 104 MMOL/L (ref 98–107)
CLARITY UR: CLEAR
CO2 SERPL-SCNC: 24 MMOL/L (ref 22–29)
COLOR UR: ABNORMAL
CREAT SERPL-MCNC: 0.8 MG/DL (ref 0.76–1.27)
D-LACTATE SERPL-SCNC: 1.8 MMOL/L (ref 0.5–2)
DEPRECATED RDW RBC AUTO: 52.5 FL (ref 37–54)
EGFRCR SERPLBLD CKD-EPI 2021: 82 ML/MIN/1.73
EOSINOPHIL # BLD AUTO: 0.77 10*3/MM3 (ref 0–0.4)
EOSINOPHIL NFR BLD AUTO: 5.5 % (ref 0.3–6.2)
ERYTHROCYTE [DISTWIDTH] IN BLOOD BY AUTOMATED COUNT: 14.6 % (ref 12.3–15.4)
FLUAV SUBTYP SPEC NAA+PROBE: NOT DETECTED
FLUBV RNA ISLT QL NAA+PROBE: NOT DETECTED
GLOBULIN UR ELPH-MCNC: 3.3 GM/DL
GLUCOSE SERPL-MCNC: 130 MG/DL (ref 65–99)
GLUCOSE UR STRIP-MCNC: NEGATIVE MG/DL
HADV DNA SPEC NAA+PROBE: NOT DETECTED
HAV IGM SERPL QL IA: NORMAL
HBV CORE IGM SERPL QL IA: NORMAL
HBV SURFACE AG SERPL QL IA: NORMAL
HCOV 229E RNA SPEC QL NAA+PROBE: NOT DETECTED
HCOV HKU1 RNA SPEC QL NAA+PROBE: NOT DETECTED
HCOV NL63 RNA SPEC QL NAA+PROBE: NOT DETECTED
HCOV OC43 RNA SPEC QL NAA+PROBE: NOT DETECTED
HCT VFR BLD AUTO: 34 % (ref 37.5–51)
HCV AB SER QL: NORMAL
HGB BLD-MCNC: 11.1 G/DL (ref 13–17.7)
HGB UR QL STRIP.AUTO: NEGATIVE
HMPV RNA NPH QL NAA+NON-PROBE: NOT DETECTED
HOLD SPECIMEN: NORMAL
HPIV1 RNA ISLT QL NAA+PROBE: NOT DETECTED
HPIV2 RNA SPEC QL NAA+PROBE: NOT DETECTED
HPIV3 RNA NPH QL NAA+PROBE: NOT DETECTED
HPIV4 P GENE NPH QL NAA+PROBE: NOT DETECTED
HYALINE CASTS UR QL AUTO: NORMAL /LPF
IMM GRANULOCYTES # BLD AUTO: 0.07 10*3/MM3 (ref 0–0.05)
IMM GRANULOCYTES NFR BLD AUTO: 0.5 % (ref 0–0.5)
INR PPP: 1.35 (ref 0.91–1.09)
KETONES UR QL STRIP: ABNORMAL
LEUKOCYTE ESTERASE UR QL STRIP.AUTO: ABNORMAL
LYMPHOCYTES # BLD AUTO: 0.95 10*3/MM3 (ref 0.7–3.1)
LYMPHOCYTES NFR BLD AUTO: 6.8 % (ref 19.6–45.3)
M PNEUMO IGG SER IA-ACNC: NOT DETECTED
MCH RBC QN AUTO: 32.4 PG (ref 26.6–33)
MCHC RBC AUTO-ENTMCNC: 32.6 G/DL (ref 31.5–35.7)
MCV RBC AUTO: 99.1 FL (ref 79–97)
MONOCYTES # BLD AUTO: 1.4 10*3/MM3 (ref 0.1–0.9)
MONOCYTES NFR BLD AUTO: 10 % (ref 5–12)
NEUTROPHILS NFR BLD AUTO: 10.71 10*3/MM3 (ref 1.7–7)
NEUTROPHILS NFR BLD AUTO: 76.8 % (ref 42.7–76)
NITRITE UR QL STRIP: NEGATIVE
NRBC BLD AUTO-RTO: 0 /100 WBC (ref 0–0.2)
PH UR STRIP.AUTO: 7.5 [PH] (ref 5–8)
PLATELET # BLD AUTO: 281 10*3/MM3 (ref 140–450)
PMV BLD AUTO: 9.7 FL (ref 6–12)
POTASSIUM SERPL-SCNC: 4.6 MMOL/L (ref 3.5–5.2)
PROCALCITONIN SERPL-MCNC: 0.9 NG/ML (ref 0–0.25)
PROT SERPL-MCNC: 6.7 G/DL (ref 6–8.5)
PROT UR QL STRIP: ABNORMAL
PROTHROMBIN TIME: 17.2 SECONDS (ref 11.8–14.8)
RBC # BLD AUTO: 3.43 10*6/MM3 (ref 4.14–5.8)
RBC # UR STRIP: NORMAL /HPF
REF LAB TEST METHOD: NORMAL
RHINOVIRUS RNA SPEC NAA+PROBE: NOT DETECTED
RSV RNA NPH QL NAA+NON-PROBE: NOT DETECTED
SARS-COV-2 RNA NPH QL NAA+NON-PROBE: NOT DETECTED
SODIUM SERPL-SCNC: 137 MMOL/L (ref 136–145)
SP GR UR STRIP: 1.02 (ref 1–1.03)
SQUAMOUS #/AREA URNS HPF: NORMAL /HPF
UROBILINOGEN UR QL STRIP: ABNORMAL
WBC # UR STRIP: NORMAL /HPF
WBC NRBC COR # BLD AUTO: 13.96 10*3/MM3 (ref 3.4–10.8)
WHOLE BLOOD HOLD COAG: NORMAL
WHOLE BLOOD HOLD SPECIMEN: NORMAL

## 2024-08-09 PROCEDURE — 0202U NFCT DS 22 TRGT SARS-COV-2: CPT | Performed by: EMERGENCY MEDICINE

## 2024-08-09 PROCEDURE — 71250 CT THORAX DX C-: CPT

## 2024-08-09 PROCEDURE — P9612 CATHETERIZE FOR URINE SPEC: HCPCS

## 2024-08-09 PROCEDURE — 71045 X-RAY EXAM CHEST 1 VIEW: CPT

## 2024-08-09 PROCEDURE — 85025 COMPLETE CBC W/AUTO DIFF WBC: CPT | Performed by: EMERGENCY MEDICINE

## 2024-08-09 PROCEDURE — 81001 URINALYSIS AUTO W/SCOPE: CPT | Performed by: EMERGENCY MEDICINE

## 2024-08-09 PROCEDURE — 83605 ASSAY OF LACTIC ACID: CPT | Performed by: EMERGENCY MEDICINE

## 2024-08-09 PROCEDURE — 96375 TX/PRO/DX INJ NEW DRUG ADDON: CPT

## 2024-08-09 PROCEDURE — 74177 CT ABD & PELVIS W/CONTRAST: CPT

## 2024-08-09 PROCEDURE — 25510000001 IOPAMIDOL 61 % SOLUTION: Performed by: FAMILY MEDICINE

## 2024-08-09 PROCEDURE — 80074 ACUTE HEPATITIS PANEL: CPT | Performed by: EMERGENCY MEDICINE

## 2024-08-09 PROCEDURE — 80053 COMPREHEN METABOLIC PANEL: CPT | Performed by: EMERGENCY MEDICINE

## 2024-08-09 PROCEDURE — 36415 COLL VENOUS BLD VENIPUNCTURE: CPT

## 2024-08-09 PROCEDURE — 87040 BLOOD CULTURE FOR BACTERIA: CPT | Performed by: EMERGENCY MEDICINE

## 2024-08-09 PROCEDURE — 72125 CT NECK SPINE W/O DYE: CPT

## 2024-08-09 PROCEDURE — 70450 CT HEAD/BRAIN W/O DYE: CPT

## 2024-08-09 PROCEDURE — 93005 ELECTROCARDIOGRAM TRACING: CPT | Performed by: EMERGENCY MEDICINE

## 2024-08-09 PROCEDURE — 25010000002 CEFTRIAXONE PER 250 MG: Performed by: EMERGENCY MEDICINE

## 2024-08-09 PROCEDURE — 84145 PROCALCITONIN (PCT): CPT | Performed by: EMERGENCY MEDICINE

## 2024-08-09 PROCEDURE — 96365 THER/PROPH/DIAG IV INF INIT: CPT

## 2024-08-09 PROCEDURE — 85610 PROTHROMBIN TIME: CPT | Performed by: EMERGENCY MEDICINE

## 2024-08-09 PROCEDURE — 25010000002 ONDANSETRON PER 1 MG: Performed by: EMERGENCY MEDICINE

## 2024-08-09 PROCEDURE — 99285 EMERGENCY DEPT VISIT HI MDM: CPT

## 2024-08-09 RX ORDER — ONDANSETRON 2 MG/ML
4 INJECTION INTRAMUSCULAR; INTRAVENOUS ONCE
Status: COMPLETED | OUTPATIENT
Start: 2024-08-09 | End: 2024-08-09

## 2024-08-09 RX ORDER — ACETAMINOPHEN 500 MG
1000 TABLET ORAL ONCE
Status: COMPLETED | OUTPATIENT
Start: 2024-08-09 | End: 2024-08-09

## 2024-08-09 RX ORDER — CEFDINIR 300 MG/1
300 CAPSULE ORAL 2 TIMES DAILY
Qty: 14 CAPSULE | Refills: 0 | Status: SHIPPED | OUTPATIENT
Start: 2024-08-09 | End: 2024-08-16

## 2024-08-09 RX ADMIN — IOPAMIDOL 100 ML: 612 INJECTION, SOLUTION INTRAVENOUS at 18:52

## 2024-08-09 RX ADMIN — SODIUM CHLORIDE 1000 MG: 900 INJECTION INTRAVENOUS at 18:18

## 2024-08-09 RX ADMIN — ONDANSETRON 4 MG: 2 INJECTION INTRAMUSCULAR; INTRAVENOUS at 14:05

## 2024-08-09 RX ADMIN — ACETAMINOPHEN 1000 MG: 500 TABLET, FILM COATED ORAL at 18:17

## 2024-08-09 NOTE — ED PROVIDER NOTES
Subjective   History of Present Illness  Patient is a 89-year-old gentleman who was going to his doctor's office and tripped and fell landing on the deck hitting his head there was no loss of consciousness but the patient had a couple episodes of vomiting after  Per the patient's wife he was not feeling well and has been having some cough and congestion went outside to sit down when he is set up had a syncopal episode and fell down and hit his head.  There was no loss of consciousness.  No nausea or vomiting associate with this.  At that time but started vomiting and vomited once after that was nauseous and the subsequent came to the ED for evaluation.  Patient has a contusion of the scalp with no obvious lacerations.    Syncope  Episode history:  Single  Most recent episode:  Today  Progression:  Resolved  Context: standing up    Context: not blood draw, not bowel movement, not dehydration, not inactivity, not medication change, not with normal activity and not sight of blood    Witnessed: yes    Relieved by:  Nothing  Worsened by:  Nothing  Ineffective treatments:  None tried  Associated symptoms: malaise/fatigue and shortness of breath    Associated symptoms: no anxiety, no chest pain, no confusion, no diaphoresis, no difficulty breathing, no dizziness, no fever, no headaches, no nausea, no rectal bleeding, no visual change, no vomiting and no weakness    Risk factors: no seizures    Head Injury  Location:  Occipital  Mechanism of injury: fall    Fall:     Fall occurred:  Standing    Height of fall:  Floor level    Impact surface:  Hard floor  Pain details:     Quality:  Aching  Associated symptoms: no blurred vision, no difficulty breathing, no disorientation, no headaches, no hearing loss, no loss of consciousness, no nausea, no numbness and no vomiting    Risk factors: no alcohol use        Review of Systems   Constitutional: Negative.  Positive for malaise/fatigue. Negative for chills, diaphoresis, fatigue  and fever.   HENT: Negative.  Negative for congestion and hearing loss.    Eyes:  Negative for blurred vision.   Respiratory: Negative.  Positive for shortness of breath. Negative for cough, chest tightness and stridor.    Cardiovascular: Negative.  Positive for syncope. Negative for chest pain.   Gastrointestinal: Negative.  Negative for abdominal distention, abdominal pain, nausea and vomiting.   Endocrine: Negative.    Genitourinary: Negative.  Negative for difficulty urinating and flank pain.   Musculoskeletal: Negative.    Skin: Negative.  Negative for color change.   Neurological: Negative.  Negative for dizziness, loss of consciousness, weakness, numbness and headaches.   Psychiatric/Behavioral:  Negative for confusion.    All other systems reviewed and are negative.      Past Medical History:   Diagnosis Date    Allergic rhinitis     Anemia     Arthritis     Blind left eye     BPH (benign prostatic hypertrophy) with urinary retention     Cancer     skin cancer    Chronic back pain     RUNS DOWN BOTH LEGS    Constipation     Coronary artery disease     COVID     Hard of hearing     Heart attack 1986    NO MUSCLE DAMAGE - JUST OCCLUDED VALVE    High cholesterol     History of skin cancer     BILATERAL EARS    History of transfusion     1986    Hypertension     Inguinal hernia     Leaky heart valve     DR CONCEPCION SAYS NOT ENOUGH TO BE OF CONCERN    Other bursal cyst, right elbow     Paroxysmal atrial fibrillation 5/6/2024    PONV (postoperative nausea and vomiting)     has had scopalamine patch in past     Sleep apnea     DOES NOT USE CPAP OR BIPAP    Spinal stenosis of cervical region     Spinal stenosis of lumbar region     Stroke     Tremors of nervous system     Wears hearing aid     BILATERAL       Allergies   Allergen Reactions    Codeine Nausea And Vomiting and GI Intolerance    Fentanyl Hallucinations and Other (See Comments)     DELUSIONAL  Fentanyl patchs, caused patient to become confused and  anxious per family  Fentanyl patchs, caused patient to become confused and anxious per family    Augmentin [Amoxicillin-Pot Clavulanate] GI Intolerance       Past Surgical History:   Procedure Laterality Date    ANTERIOR LUMBAR EXPOSURE N/A 12/07/2018    Procedure: ANTERIOR LUMBAR EXPOSURE;  Surgeon: Manolo Mcleod DO;  Location:  [facility] OR;  Service: Vascular    APPENDECTOMY      BACK SURGERY      X3    CARDIAC SURGERY  08/08/1986    X1 BYPASS    CORONARY ANGIOPLASTY WITH STENT PLACEMENT  1997    X3 STENTS    CORONARY ARTERY BYPASS GRAFT      HERNIA REPAIR Bilateral     x2    INGUINAL HERNIA REPAIR Right 06/22/2017    Procedure: RIGHT INGUINAL HERNIA REPAIR AND EXCISION OF CYST RIGHT ELBOW ;  Surgeon: Jackelyn Arriola MD;  Location:  [facility];  Service:     LUMBAR FUSION Left 06/30/2021    Procedure: LUMBAR LAMINECTOMY, DISCECTOMY, FACETECTOMY,  TRANSFORAMINAL LUMBAR INTERBODY FUSION L2-3. REVISION OF INSTRUMENTATION L3-S1. USE OF IMAGE GUIDANCE AND SURGICAL ROBOT;  Surgeon: Kj Falcon MD;  Location:  [facility];  Service: Robotics - Neuro;  Laterality: Left;    LUMBAR LAMINECTOMY N/A 03/12/2018    Procedure: LUMBAR LAMINECTOMY WITHOUT FUSION L3-4,4-5;  Surgeon: Kj Falcon MD;  Location:  [facility];  Service: Neurosurgery    LUMBAR LAMINECTOMY ANTERIOR LUMBAR INTERBODY FUSION N/A 12/07/2018    Procedure: ANTERIOR LUMBAR INTERBODY FUSION L5-S1;  Surgeon: Kj Falcon MD;  Location:  [facility];  Service: Neurosurgery    LUMBAR LAMINECTOMY WITH FUSION Left 12/10/2018    Procedure: LUMBAR LAMINECTOMY TRANSFORAMINAL LUMBAR INTERBODY FUSION L34,45;  Surgeon: Kj Falcon MD;  Location:  [facility];  Service: Neurosurgery    LUMBAR LAMINECTOMY WITH FUSION Left 12/07/2018    Procedure: LUMBAR LAMINECTOMY TRANSFORAMINAL LUMBAR INTERBODY FUSION LEFT L3-4, L4-5 QUADRANT RETRACTOR;  Surgeon: Kj Falcon MD;  Location:  [facility];  Service: Neurosurgery    SKIN LESION EXCISION      nasal       Family  History   Problem Relation Age of Onset    No Known Problems Mother     No Known Problems Father        Social History     Socioeconomic History    Marital status:    Tobacco Use    Smoking status: Former     Current packs/day: 0.00     Types: Cigarettes     Start date:      Quit date:      Years since quittin.6    Smokeless tobacco: Never    Tobacco comments:     age [age] years of age   Vaping Use    Vaping status: Never Used   Substance and Sexual Activity    Alcohol use: No    Drug use: Never    Sexual activity: Not Currently     Partners: Female           Objective   Physical Exam  Vitals and nursing note reviewed. Exam conducted with a chaperone present.   Constitutional:       General: He is awake. He is not in acute distress.     Appearance: Normal appearance. He is well-developed. He is not ill-appearing or diaphoretic.   HENT:      Head: Normocephalic. No raccoon eyes, Douglass's sign, abrasion, contusion or laceration.      Jaw: There is normal jaw occlusion. No tenderness.      Comments: No clinical evidence of hemotympanums no douglass or sign no periorbital ecchymosis.  Scalp has a contusion to the left posterior parietal aspect no step-offs no obvious skull fractures.     Right Ear: Tympanic membrane and external ear normal.      Left Ear: Tympanic membrane and external ear normal.      Nose: Nose normal.   Eyes:      General: Lids are normal.      Extraocular Movements: Extraocular movements intact.      Conjunctiva/sclera: Conjunctivae normal.      Pupils: Pupils are equal, round, and reactive to light.   Neck:      Vascular: Normal carotid pulses. No JVD.      Trachea: Trachea normal. No tracheal tenderness or tracheal deviation.      Comments: No step-offs or laxity or tenderness.  Cardiovascular:      Rate and Rhythm: Normal rate and regular rhythm.      Pulses: Normal pulses.      Heart sounds: Normal heart sounds.   Pulmonary:      Effort: Pulmonary effort is normal. No  accessory muscle usage or respiratory distress.      Breath sounds: Normal breath sounds and air entry. No stridor.      Comments: Bilateral equal breath sounds some scattered rales.  Chest:      Chest wall: No mass, lacerations, deformity, swelling, tenderness or crepitus.   Abdominal:      General: Abdomen is flat. Bowel sounds are normal. There is no distension.      Palpations: Abdomen is soft. There is no pulsatile mass.      Tenderness: There is no abdominal tenderness. There is no right CVA tenderness or left CVA tenderness.      Hernia: No hernia is present.      Comments: Soft nontender   Musculoskeletal:         General: No tenderness or deformity. Normal range of motion.      Right shoulder: Normal.      Left shoulder: Normal.      Cervical back: Normal range of motion and neck supple. No deformity, rigidity, tenderness, bony tenderness or crepitus. No spinous process tenderness or muscular tenderness. Normal range of motion.      Thoracic back: Normal. No spasms, tenderness or bony tenderness.      Lumbar back: Normal. No deformity, tenderness or bony tenderness. Normal range of motion.      Right hip: Normal.      Left hip: Normal.      Right lower leg: No edema.      Left lower leg: No edema.      Comments: Axial skeletal examination of upper and lower extremities reveals no deformity in the long bones  L-spine T-spine negative step-off laxity or tenderness.   Skin:     General: Skin is warm and dry.      Capillary Refill: Capillary refill takes less than 2 seconds.      Coloration: Skin is not cyanotic, mottled or pale.   Neurological:      General: No focal deficit present.      Mental Status: He is alert and oriented to person, place, and time. Mental status is at baseline. He is confused.      GCS: GCS eye subscore is 4. GCS verbal subscore is 5. GCS motor subscore is 6.      Cranial Nerves: Cranial nerves 2-12 are intact. No cranial nerve deficit or facial asymmetry.      Motor: Motor function is  intact. No weakness, atrophy or abnormal muscle tone.      Deep Tendon Reflexes: Reflexes are normal and symmetric. Reflexes normal.   Psychiatric:         Speech: Speech normal.         Behavior: Behavior normal. Behavior is cooperative.       Procedures           ED Course  ED Course as of 08/09/24 1744   Fri Aug 09, 2024   1353  Patient is 18 or older, presenting with minor blunt head trauma. Head CT (including cosigned orders) was ordered  by an emergency care clinician for trauma because patient suspected of taking anticoagulant medication.        [TS]   1426 Normal sinus rhythm with nonspecific ST-T wave changes [TS]   1731 EKG shows normal sinus rhythm with intraventricular conduction delay and physical block which have been seen on prior EKGs also. [TS]   1732 Evaluation performed for the cough and congestion.  White count of 13,000. [TS]   1734 I have discussed this with the patient's wife also and with the patient he is awake alert answering questions at this time.  The wife states that she had given the patient some cough syrup and decongestions and he was sitting outside and he was diaphoretic got up and passed also probably had a vasovagal episode his liver enzymes elevated but his abdominal examination unremarkable.  Alkaline phosphatase is elevated but no significant leukocytosis.  He had 1 episode of vomiting after the fall but is not vomited since then procalcitonin is 0.9.  Therefore with the episode of vomiting which could be related to his head injury but with leukocytosis and this slightly elevated AST ALT elevated alkaline phosphatase obstructive lesions cannot be totally excluded therefore we will go ahead and get a CT of the abdomen pelvis May CT chest also to rule out pneumonia and then get hepatitis profile get some BNP and cardiac enzymes. [TS]   1742 Also talk with the patient's wife regarding admitting the patient and she did not want him to be admitted because he gets very anxious in  the hospital. [TS]   1742 Turned over to Dr. Cunningham for this test to come back. [TS]      ED Course User Index  [TS] Michael Purcell MD                                             Medical Decision Making  Patient with a near syncope/syncopal episode with a fall hitting his head came to the ED with episode of vomiting lab workup with HIV access obtained.Differential Diagnosis:  I considered vasovagal reflex, situational stimulus, cardiac sinus hypersensitivity, vascular etiology, sudden postural change, hypovolemia, vasodilator drugs, autonomic neuropathy, cardiac etiology, arrhythmia, heart block, myocardial infarction, cardiac tamponade, aortic dissection, pulmonary hypertension, CNS etiology, seizure, hypoxia, hypoglycemia and basivertebral TIA as a possible cause of syncope in this patient. This is a partial list of diagnoses considered.               Amount and/or Complexity of Data Reviewed  Labs: ordered.  Radiology: ordered.  ECG/medicine tests: ordered.    Risk  Prescription drug management.        Final diagnoses:   None       ED Disposition  ED Disposition       None            No follow-up provider specified.       Medication List      No changes were made to your prescriptions during this visit.     COURSE & MEDICAL DECISION MAKING     Assumed care of the patient from Dr. Purcell while awaiting the CT scan of the chest abdomen pelvis.  If normal patient would like to go home.    CT abdomen pelvis and chest shows no acute findings.  No findings concerning for immediate surgical intervention or admission.  Patient will be discharged home.    Results was explained to the patient patient feels comfortable with the plan of discharge        Patient's level of risk: Moderate        CRITICAL CARE    CRITICAL CARE: No    CRITICAL CARE TIME: None      Recent Results (from the past 24 hour(s))   Green Top (Gel)    Collection Time: 08/09/24  1:50 PM   Result Value Ref Range    Extra Tube Hold for add-ons.    Lavender  Top    Collection Time: 08/09/24  1:50 PM   Result Value Ref Range    Extra Tube hold for add-on    Red Top    Collection Time: 08/09/24  1:50 PM   Result Value Ref Range    Extra Tube Hold for add-ons.    Gray Top    Collection Time: 08/09/24  1:50 PM   Result Value Ref Range    Extra Tube Hold for add-ons.    Light Blue Top    Collection Time: 08/09/24  1:50 PM   Result Value Ref Range    Extra Tube Hold for add-ons.    Protime-INR    Collection Time: 08/09/24  1:50 PM    Specimen: Blood   Result Value Ref Range    Protime 17.2 (H) 11.8 - 14.8 Seconds    INR 1.35 (H) 0.91 - 1.09   Comprehensive Metabolic Panel    Collection Time: 08/09/24  1:50 PM    Specimen: Blood   Result Value Ref Range    Glucose 130 (H) 65 - 99 mg/dL    BUN 18 8 - 23 mg/dL    Creatinine 0.80 0.76 - 1.27 mg/dL    Sodium 137 136 - 145 mmol/L    Potassium 4.6 3.5 - 5.2 mmol/L    Chloride 104 98 - 107 mmol/L    CO2 24.0 22.0 - 29.0 mmol/L    Calcium 8.8 8.2 - 9.6 mg/dL    Total Protein 6.7 6.0 - 8.5 g/dL    Albumin 3.4 (L) 3.5 - 5.2 g/dL    ALT (SGPT) 70 (H) 1 - 41 U/L    AST (SGOT) 79 (H) 1 - 40 U/L    Alkaline Phosphatase 907 (H) 39 - 117 U/L    Total Bilirubin 1.4 (H) 0.0 - 1.2 mg/dL    Globulin 3.3 gm/dL    A/G Ratio 1.0 g/dL    BUN/Creatinine Ratio 22.5 7.0 - 25.0    Anion Gap 9.0 5.0 - 15.0 mmol/L    eGFR 82.0 >60.0 mL/min/1.73   Procalcitonin    Collection Time: 08/09/24  1:50 PM    Specimen: Blood   Result Value Ref Range    Procalcitonin 0.90 (H) 0.00 - 0.25 ng/mL   Lactic Acid, Plasma    Collection Time: 08/09/24  1:50 PM    Specimen: Blood   Result Value Ref Range    Lactate 1.8 0.5 - 2.0 mmol/L   CBC Auto Differential    Collection Time: 08/09/24  1:50 PM    Specimen: Blood   Result Value Ref Range    WBC 13.96 (H) 3.40 - 10.80 10*3/mm3    RBC 3.43 (L) 4.14 - 5.80 10*6/mm3    Hemoglobin 11.1 (L) 13.0 - 17.7 g/dL    Hematocrit 34.0 (L) 37.5 - 51.0 %    MCV 99.1 (H) 79.0 - 97.0 fL    MCH 32.4 26.6 - 33.0 pg    MCHC 32.6 31.5 - 35.7  g/dL    RDW 14.6 12.3 - 15.4 %    RDW-SD 52.5 37.0 - 54.0 fl    MPV 9.7 6.0 - 12.0 fL    Platelets 281 140 - 450 10*3/mm3    Neutrophil % 76.8 (H) 42.7 - 76.0 %    Lymphocyte % 6.8 (L) 19.6 - 45.3 %    Monocyte % 10.0 5.0 - 12.0 %    Eosinophil % 5.5 0.3 - 6.2 %    Basophil % 0.4 0.0 - 1.5 %    Immature Grans % 0.5 0.0 - 0.5 %    Neutrophils, Absolute 10.71 (H) 1.70 - 7.00 10*3/mm3    Lymphocytes, Absolute 0.95 0.70 - 3.10 10*3/mm3    Monocytes, Absolute 1.40 (H) 0.10 - 0.90 10*3/mm3    Eosinophils, Absolute 0.77 (H) 0.00 - 0.40 10*3/mm3    Basophils, Absolute 0.06 0.00 - 0.20 10*3/mm3    Immature Grans, Absolute 0.07 (H) 0.00 - 0.05 10*3/mm3    nRBC 0.0 0.0 - 0.2 /100 WBC   ECG 12 Lead Syncope    Collection Time: 08/09/24  1:53 PM   Result Value Ref Range    QT Interval 414 ms    QTC Interval 486 ms   Respiratory Panel PCR w/COVID-19(SARS-CoV-2) [facility]-House, NP Swab in UTM/VTM, 2 HR TAT - Swab, Nasopharynx    Collection Time: 08/09/24  2:06 PM    Specimen: Nasopharynx; Swab   Result Value Ref Range    ADENOVIRUS, PCR Not Detected Not Detected    Coronavirus 229E Not Detected Not Detected    Coronavirus HKU1 Not Detected Not Detected    Coronavirus NL63 Not Detected Not Detected    Coronavirus OC43 Not Detected Not Detected    COVID19 Not Detected Not Detected - Ref. Range    Human Metapneumovirus Not Detected Not Detected    Human Rhinovirus/Enterovirus Not Detected Not Detected    Influenza A PCR Not Detected Not Detected    Influenza B PCR Not Detected Not Detected    Parainfluenza Virus 1 Not Detected Not Detected    Parainfluenza Virus 2 Not Detected Not Detected    Parainfluenza Virus 3 Not Detected Not Detected    Parainfluenza Virus 4 Not Detected Not Detected    RSV, PCR Not Detected Not Detected    Bordetella pertussis pcr Not Detected Not Detected    Bordetella parapertussis PCR Not Detected Not Detected    Chlamydophila pneumoniae PCR Not Detected Not Detected    Mycoplasma pneumo by  PCR Not Detected Not Detected   Urinalysis With Culture If Indicated - Straight Cath    Collection Time: 08/09/24  4:34 PM    Specimen: Straight Cath; Urine   Result Value Ref Range    Color, UA Dark Yellow (A) Yellow, Straw    Appearance, UA Clear Clear    pH, UA 7.5 5.0 - 8.0    Specific Gravity, UA 1.023 1.005 - 1.030    Glucose, UA Negative Negative    Ketones, UA Trace (A) Negative    Bilirubin, UA Small (1+) (A) Negative    Blood, UA Negative Negative    Protein,  mg/dL (2+) (A) Negative    Leuk Esterase, UA Trace (A) Negative    Nitrite, UA Negative Negative    Urobilinogen, UA 1.0 E.U./dL 0.2 - 1.0 E.U./dL   Urinalysis, Microscopic Only - Straight Cath    Collection Time: 08/09/24  4:34 PM    Specimen: Straight Cath; Urine   Result Value Ref Range    RBC, UA 0-2 None Seen, 0-2 /HPF    WBC, UA 0-2 None Seen, 0-2 /HPF    Bacteria, UA None Seen None Seen /HPF    Squamous Epithelial Cells, UA None Seen None Seen, 0-2 /HPF    Hyaline Casts, UA None Seen None Seen /LPF    Methodology Manual Light Microscopy    Hepatitis Panel, Acute    Collection Time: 08/09/24  6:21 PM    Specimen: Blood   Result Value Ref Range    Hepatitis B Surface Ag Non-Reactive Non-Reactive    Hep A IgM      Hep B C IgM      Hepatitis C Ab Non-Reactive Non-Reactive           Old charts were reviewed per [facility] EMR.  Pertinent details are summarized above.  All laboratory, radiologic, and EKG studies that were performed in the Emergency Department were a necessary part of the evaluation needed to exclude unstable or  emergent medical conditions.     Patient was hemodynamically and neurologically stable in the ED.   Pertinent studies were reviewed as above.     The patient received:  Medications   ondansetron (ZOFRAN) injection 4 mg (4 mg Intravenous Given 8/9/24 1405)   cefTRIAXone (ROCEPHIN) 1,000 mg in sodium chloride 0.9 % 100 mL MBP (1,000 mg Intravenous New Bag 8/9/24 0798)   acetaminophen (TYLENOL) tablet 1,000 mg (1,000 mg Oral  Given 8/9/24 1817)   iopamidol (ISOVUE-300) 61 % injection 100 mL (100 mL Intravenous Given 8/9/24 1852)       ED Course as of 08/09/24 1938   Fri Aug 09, 2024   1353  Patient is 18 or older, presenting with minor blunt head trauma. Head CT (including cosigned orders) was ordered  by an emergency care clinician for trauma because patient suspected of taking anticoagulant medication.        [TS]   1426 Normal sinus rhythm with nonspecific ST-T wave changes [TS]   1731 EKG shows normal sinus rhythm with intraventricular conduction delay and physical block which have been seen on prior EKGs also. [TS]   1732 Evaluation performed for the cough and congestion.  White count of 13,000. [TS]   1734 I have discussed this with the patient's wife also and with the patient he is awake alert answering questions at this time.  The wife states that she had given the patient some cough syrup and decongestions and he was sitting outside and he was diaphoretic got up and passed also probably had a vasovagal episode his liver enzymes elevated but his abdominal examination unremarkable.  Alkaline phosphatase is elevated but no significant leukocytosis.  He had 1 episode of vomiting after the fall but is not vomited since then procalcitonin is 0.9.  Therefore with the episode of vomiting which could be related to his head injury but with leukocytosis and this slightly elevated AST ALT elevated alkaline phosphatase obstructive lesions cannot be totally excluded therefore we will go ahead and get a CT of the abdomen pelvis May CT chest also to rule out pneumonia and then get hepatitis profile get some BNP and cardiac enzymes. [TS]   1742 Also talk with the patient's wife regarding admitting the patient and she did not want him to be admitted because he gets very anxious in the hospital. [TS]   1742 Turned over to Dr. Cunningham for this test to come back. [TS]      ED Course User Index  [TS] Michael Purcell MD       ED Disposition       ED  Disposition   Discharge    Condition   Stable    Comment   --                 Recent Results (from the past 24 hour(s))   Green Top (Gel)    Collection Time: 08/09/24  1:50 PM   Result Value Ref Range    Extra Tube Hold for add-ons.    Lavender Top    Collection Time: 08/09/24  1:50 PM   Result Value Ref Range    Extra Tube hold for add-on    Red Top    Collection Time: 08/09/24  1:50 PM   Result Value Ref Range    Extra Tube Hold for add-ons.    Gray Top    Collection Time: 08/09/24  1:50 PM   Result Value Ref Range    Extra Tube Hold for add-ons.    Light Blue Top    Collection Time: 08/09/24  1:50 PM   Result Value Ref Range    Extra Tube Hold for add-ons.    Protime-INR    Collection Time: 08/09/24  1:50 PM    Specimen: Blood   Result Value Ref Range    Protime 17.2 (H) 11.8 - 14.8 Seconds    INR 1.35 (H) 0.91 - 1.09   Comprehensive Metabolic Panel    Collection Time: 08/09/24  1:50 PM    Specimen: Blood   Result Value Ref Range    Glucose 130 (H) 65 - 99 mg/dL    BUN 18 8 - 23 mg/dL    Creatinine 0.80 0.76 - 1.27 mg/dL    Sodium 137 136 - 145 mmol/L    Potassium 4.6 3.5 - 5.2 mmol/L    Chloride 104 98 - 107 mmol/L    CO2 24.0 22.0 - 29.0 mmol/L    Calcium 8.8 8.2 - 9.6 mg/dL    Total Protein 6.7 6.0 - 8.5 g/dL    Albumin 3.4 (L) 3.5 - 5.2 g/dL    ALT (SGPT) 70 (H) 1 - 41 U/L    AST (SGOT) 79 (H) 1 - 40 U/L    Alkaline Phosphatase 907 (H) 39 - 117 U/L    Total Bilirubin 1.4 (H) 0.0 - 1.2 mg/dL    Globulin 3.3 gm/dL    A/G Ratio 1.0 g/dL    BUN/Creatinine Ratio 22.5 7.0 - 25.0    Anion Gap 9.0 5.0 - 15.0 mmol/L    eGFR 82.0 >60.0 mL/min/1.73   Procalcitonin    Collection Time: 08/09/24  1:50 PM    Specimen: Blood   Result Value Ref Range    Procalcitonin 0.90 (H) 0.00 - 0.25 ng/mL   Lactic Acid, Plasma    Collection Time: 08/09/24  1:50 PM    Specimen: Blood   Result Value Ref Range    Lactate 1.8 0.5 - 2.0 mmol/L   CBC Auto Differential    Collection Time: 08/09/24  1:50 PM    Specimen: Blood   Result Value Ref  Range    WBC 13.96 (H) 3.40 - 10.80 10*3/mm3    RBC 3.43 (L) 4.14 - 5.80 10*6/mm3    Hemoglobin 11.1 (L) 13.0 - 17.7 g/dL    Hematocrit 34.0 (L) 37.5 - 51.0 %    MCV 99.1 (H) 79.0 - 97.0 fL    MCH 32.4 26.6 - 33.0 pg    MCHC 32.6 31.5 - 35.7 g/dL    RDW 14.6 12.3 - 15.4 %    RDW-SD 52.5 37.0 - 54.0 fl    MPV 9.7 6.0 - 12.0 fL    Platelets 281 140 - 450 10*3/mm3    Neutrophil % 76.8 (H) 42.7 - 76.0 %    Lymphocyte % 6.8 (L) 19.6 - 45.3 %    Monocyte % 10.0 5.0 - 12.0 %    Eosinophil % 5.5 0.3 - 6.2 %    Basophil % 0.4 0.0 - 1.5 %    Immature Grans % 0.5 0.0 - 0.5 %    Neutrophils, Absolute 10.71 (H) 1.70 - 7.00 10*3/mm3    Lymphocytes, Absolute 0.95 0.70 - 3.10 10*3/mm3    Monocytes, Absolute 1.40 (H) 0.10 - 0.90 10*3/mm3    Eosinophils, Absolute 0.77 (H) 0.00 - 0.40 10*3/mm3    Basophils, Absolute 0.06 0.00 - 0.20 10*3/mm3    Immature Grans, Absolute 0.07 (H) 0.00 - 0.05 10*3/mm3    nRBC 0.0 0.0 - 0.2 /100 WBC   ECG 12 Lead Syncope    Collection Time: 08/09/24  1:53 PM   Result Value Ref Range    QT Interval 414 ms    QTC Interval 486 ms   Respiratory Panel PCR w/COVID-19(SARS-CoV-2) [facility]/RICHI/GENIA/PAD/COR/SAMSON In-House, NP Swab in UT/VTM, 2 HR TAT - Swab, Nasopharynx    Collection Time: 08/09/24  2:06 PM    Specimen: Nasopharynx; Swab   Result Value Ref Range    ADENOVIRUS, PCR Not Detected Not Detected    Coronavirus 229E Not Detected Not Detected    Coronavirus HKU1 Not Detected Not Detected    Coronavirus NL63 Not Detected Not Detected    Coronavirus OC43 Not Detected Not Detected    COVID19 Not Detected Not Detected - Ref. Range    Human Metapneumovirus Not Detected Not Detected    Human Rhinovirus/Enterovirus Not Detected Not Detected    Influenza A PCR Not Detected Not Detected    Influenza B PCR Not Detected Not Detected    Parainfluenza Virus 1 Not Detected Not Detected    Parainfluenza Virus 2 Not Detected Not Detected    Parainfluenza Virus 3 Not Detected Not Detected    Parainfluenza Virus 4 Not Detected  Not Detected    RSV, PCR Not Detected Not Detected    Bordetella pertussis pcr Not Detected Not Detected    Bordetella parapertussis PCR Not Detected Not Detected    Chlamydophila pneumoniae PCR Not Detected Not Detected    Mycoplasma pneumo by PCR Not Detected Not Detected   Urinalysis With Culture If Indicated - Straight Cath    Collection Time: 08/09/24  4:34 PM    Specimen: Straight Cath; Urine   Result Value Ref Range    Color, UA Dark Yellow (A) Yellow, Straw    Appearance, UA Clear Clear    pH, UA 7.5 5.0 - 8.0    Specific Gravity, UA 1.023 1.005 - 1.030    Glucose, UA Negative Negative    Ketones, UA Trace (A) Negative    Bilirubin, UA Small (1+) (A) Negative    Blood, UA Negative Negative    Protein,  mg/dL (2+) (A) Negative    Leuk Esterase, UA Trace (A) Negative    Nitrite, UA Negative Negative    Urobilinogen, UA 1.0 E.U./dL 0.2 - 1.0 E.U./dL   Urinalysis, Microscopic Only - Straight Cath    Collection Time: 08/09/24  4:34 PM    Specimen: Straight Cath; Urine   Result Value Ref Range    RBC, UA 0-2 None Seen, 0-2 /HPF    WBC, UA 0-2 None Seen, 0-2 /HPF    Bacteria, UA None Seen None Seen /HPF    Squamous Epithelial Cells, UA None Seen None Seen, 0-2 /HPF    Hyaline Casts, UA None Seen None Seen /LPF    Methodology Manual Light Microscopy    Hepatitis Panel, Acute    Collection Time: 08/09/24  6:21 PM    Specimen: Blood   Result Value Ref Range    Hepatitis B Surface Ag Non-Reactive Non-Reactive    Hep A IgM      Hep B C IgM      Hepatitis C Ab Non-Reactive Non-Reactive       The patient received:      Dragon disclaimer:  Part of this note may be an electronic transcription/translation of spoken language to printed text using the Dragon Dictation System.       I have reviewed the patient’s prescription history via a prescription monitoring program.  This information is consistent with my knowledge of the patient’s controlled substance use history.    Patient evaluated during Coronavirus Pandemic.  Isolation practices followed according to [facility] policy.    FINAL IMPRESSION   Diagnosis Plan   1. Syncope and collapse        2. Injury of head, initial encounter        3. Febrile illness        4. Transaminitis        5. Liver cyst        6. Hiatal hernia              MD Octavio Riggins Jr, Thomas Mark Jr., MD  08/09/24 1939

## 2024-08-10 LAB
QT INTERVAL: 414 MS
QTC INTERVAL: 486 MS

## 2024-08-14 LAB
BACTERIA SPEC AEROBE CULT: NORMAL
BACTERIA SPEC AEROBE CULT: NORMAL

## 2024-08-27 ENCOUNTER — OFFICE VISIT (OUTPATIENT)
Dept: INTERNAL MEDICINE | Facility: CLINIC | Age: 89
End: 2024-08-27
Payer: MEDICARE

## 2024-08-27 VITALS
SYSTOLIC BLOOD PRESSURE: 160 MMHG | TEMPERATURE: 98.2 F | HEART RATE: 55 BPM | DIASTOLIC BLOOD PRESSURE: 60 MMHG | HEIGHT: 67 IN | OXYGEN SATURATION: 98 % | WEIGHT: 166 LBS | BODY MASS INDEX: 26.06 KG/M2

## 2024-08-27 DIAGNOSIS — R21 RASH: ICD-10-CM

## 2024-08-27 DIAGNOSIS — R26.9 GAIT ABNORMALITY: ICD-10-CM

## 2024-08-27 DIAGNOSIS — R82.90 ABNORMAL URINE: ICD-10-CM

## 2024-08-27 DIAGNOSIS — I10 ESSENTIAL HYPERTENSION: ICD-10-CM

## 2024-08-27 DIAGNOSIS — M48.062 SPINAL STENOSIS, LUMBAR REGION, WITH NEUROGENIC CLAUDICATION: ICD-10-CM

## 2024-08-27 DIAGNOSIS — R26.89 BALANCE DISORDER: ICD-10-CM

## 2024-08-27 DIAGNOSIS — G62.9 PERIPHERAL POLYNEUROPATHY: ICD-10-CM

## 2024-08-27 DIAGNOSIS — R79.89 ELEVATED LFTS: Primary | ICD-10-CM

## 2024-08-27 LAB
BILIRUB BLD-MCNC: NEGATIVE MG/DL
CLARITY, POC: CLEAR
COLOR UR: ABNORMAL
GLUCOSE UR STRIP-MCNC: NEGATIVE MG/DL
KETONES UR QL: NEGATIVE
LEUKOCYTE EST, POC: NEGATIVE
NITRITE UR-MCNC: NEGATIVE MG/ML
PH UR: 6 [PH] (ref 5–8)
PROT UR STRIP-MCNC: ABNORMAL MG/DL
RBC # UR STRIP: NEGATIVE /UL
SP GR UR: 1.02 (ref 1–1.03)
UROBILINOGEN UR QL: NORMAL

## 2024-08-27 PROCEDURE — 1126F AMNT PAIN NOTED NONE PRSNT: CPT | Performed by: INTERNAL MEDICINE

## 2024-08-27 PROCEDURE — 81003 URINALYSIS AUTO W/O SCOPE: CPT | Performed by: INTERNAL MEDICINE

## 2024-08-27 PROCEDURE — G2211 COMPLEX E/M VISIT ADD ON: HCPCS | Performed by: INTERNAL MEDICINE

## 2024-08-27 PROCEDURE — 99214 OFFICE O/P EST MOD 30 MIN: CPT | Performed by: INTERNAL MEDICINE

## 2024-08-27 RX ORDER — TRAMADOL HYDROCHLORIDE 50 MG/1
50 TABLET ORAL EVERY 6 HOURS PRN
Qty: 30 TABLET | Refills: 0 | Status: SHIPPED | OUTPATIENT
Start: 2024-08-27

## 2024-08-27 RX ORDER — CLOBETASOL PROPIONATE 0.5 MG/G
1 EMULSION TOPICAL 2 TIMES DAILY
Qty: 30 G | Refills: 0 | Status: SHIPPED | OUTPATIENT
Start: 2024-08-27

## 2024-08-30 NOTE — PROGRESS NOTES
"      Chief Complaint  Follow-up (D/c from [facility], [place], operated by [facility] on 8/9/24- syncope and head injury from fall ), Fall (Fell this morning, and has fallen a few times this week- just gets off balance ), Urinary Tract Infection (Pt was given medication for abnormal urine, but still dark and has odor), and Rash (Right arm)    Subjective        Chad Henriquez presents to [facility]    HPI    Patient here for the above problems.  See Assessment and Plan for further HPI components.      Review of Systems    Objective   Vital Signs:  /60 (BP Location: Left arm, Patient Position: Sitting, Cuff Size: Adult)   Pulse 55   Temp 98.2 °F (36.8 °C) (Temporal)   Ht 170.2 cm (67\")   Wt 75.3 kg (166 lb)   SpO2 98%   BMI 26.00 kg/m²   Estimated body mass index is 26 kg/m² as calculated from the following:    Height as of this encounter: 170.2 cm (67\").    Weight as of this encounter: 75.3 kg (166 lb).      Physical Exam  Vitals and nursing note reviewed.   Constitutional:       Appearance: He is not ill-appearing.   Eyes:      General: No scleral icterus.     Conjunctiva/sclera: Conjunctivae normal.   Pulmonary:      Effort: Pulmonary effort is normal. No respiratory distress.   Skin:         Neurological:      General: No focal deficit present.      Mental Status: He is alert and oriented to person, place, and time.   Psychiatric:         Mood and Affect: Mood normal.         Behavior: Behavior normal.                       Assessment and Plan   Diagnoses and all orders for this visit:    1. Elevated LFTs (Primary)  -     Comprehensive metabolic panel    2. Abnormal urine  -     POC Urinalysis Dipstick, Multipro    3. Spinal stenosis, lumbar region, with neurogenic claudication  -     traMADol (ULTRAM) 50 MG tablet; Take 1 tablet by mouth Every 6 (Six) Hours As Needed for Moderate Pain.  Dispense: 30 tablet; Refill: 0    4. Gait abnormality    5. Essential hypertension    6. Balance disorder    7. " Peripheral polyneuropathy    8. Rash  -     clobetasol prop emollient base (Clobetasol Propionate E) 0.05 % emollient cream; Apply 1 Application topically to the appropriate area as directed 2 (Two) Times a Day.  Dispense: 30 g; Refill: 0      Emergency department diagnosed the patient with a UTI.  Patient only had trace leukocytes noted on dipstick and no white blood cells or bacteria noted on the microscopic.  Do not think that the patient had a urinary tract infection.  It sounds as though the patient may not have been staying very well-hydrated as noted by the significant elevation of liver enzymes as well as the dark yellow urine.Patient had attempted to stand up and walk to the door.  Patient got lightheaded in the door frame and subsequently fell.  I suspected the patient was having orthostasis.  We emphasized staying hydrated.  He fell again this morning, he bent over to get some off the floor and lost his balance and subsequently fell.  We discussed making sure to ask for help.  Patient has a tendency to try to do things on his own that he should be trying to do.  His wife is in much better shape than he is.    We also discussed risk and benefits of remaining on Eliquis due to his more recent frequent falls.  Patient understands that if he was not on anticoagulation he did increased risk of stroke related to atrial fibrillation.  Patient also understands that he is at increased risk of significant bleed related to falls while being on anticoagulation.  Patient has elected to stay on anticoagulation at the present time.    Do not think that we need to adjust the patient's blood pressure medications at this time.  Patient has no significant hypotension.  I think that if the patient stays hydrated this will significantly help.  Patient also has significant balance issues, and needs to utilize his rollator in the house more frequently, not so ask for help more frequently.    Patient rarely has to take tramadol.   Will provide a prescription for this.  Patient aware that this could increase risk of falls, but it is something that helps with quality of life which is important at this point in his life.    We will recheck urine today as it is still dark.  Focus again on hydration.    Liver enzymes were elevated.  We will recheck these.    Patient also noted to have patch of eczema on his arms.  Will send in some clobetasol.    Result Review :  The following data was reviewed by: Miguel Bond MD on 08/27/2024:  CT Chest Without Contrast Diagnostic (08/09/2024 18:49)  CT Abdomen Pelvis With Contrast (08/09/2024 18:49)  CT Cervical Spine Without Contrast (08/09/2024 14:43)  CT Head Without Contrast (08/09/2024 14:43)  XR Chest 1 View (08/09/2024 14:14)   Latest Reference Range & Units 08/09/24 16:34   Color, UA Yellow, Straw  Dark Yellow !   Appearance, UA Clear  Clear   Specific Gravity, UA 1.005 - 1.030  1.023   pH, UA 5.0 - 8.0  7.5   Glucose Negative  Negative   Ketones, UA Negative  Trace !   Blood, UA Negative  Negative   Nitrite, UA Negative  Negative   Leukocytes, UA Negative  Trace !   Protein, UA Negative  100 mg/dL (2+) !   Bilirubin, UA Negative  Small (1+) !   Urobilinogen, UA 0.2 - 1.0 E.U./dL  1.0 E.U./dL   RBC, UA None Seen, 0-2 /HPF 0-2   WBC, UA None Seen, 0-2 /HPF 0-2   Bacteria, UA None Seen /HPF None Seen   Squamous Epithelial Cells, UA None Seen, 0-2 /HPF None Seen   Hyaline Casts, UA None Seen /LPF None Seen   Methodology:  Manual Light Microscopy   !: Data is abnormal     Latest Reference Range & Units 08/09/24 13:50   Sodium 136 - 145 mmol/L 137   Potassium 3.5 - 5.2 mmol/L 4.6   Chloride 98 - 107 mmol/L 104   CO2 22.0 - 29.0 mmol/L 24.0   Anion Gap 5.0 - 15.0 mmol/L 9.0   BUN 8 - 23 mg/dL 18   Creatinine 0.76 - 1.27 mg/dL 0.80   BUN/Creatinine Ratio 7.0 - 25.0  22.5   eGFR >60.0 mL/min/1.73 82.0   Glucose 65 - 99 mg/dL 130 (H)   Calcium 8.2 - 9.6 mg/dL 8.8   Alkaline Phosphatase 39 - 117 U/L 907  (H)   Total Protein 6.0 - 8.5 g/dL 6.7   Albumin 3.5 - 5.2 g/dL 3.4 (L)   Globulin gm/dL 3.3   A/G Ratio g/dL 1.0   AST (SGOT) 1 - 40 U/L 79 (H)   ALT (SGPT) 1 - 41 U/L 70 (H)   Total Bilirubin 0.0 - 1.2 mg/dL 1.4 (H)   Lactate 0.5 - 2.0 mmol/L 1.8   Procalcitonin 0.00 - 0.25 ng/mL 0.90 (H)   Protime 11.8 - 14.8 Seconds 17.2 (H)   INR 0.91 - 1.09  1.35 (H)   WBC 3.40 - 10.80 10*3/mm3 13.96 (H)   RBC 4.14 - 5.80 10*6/mm3 3.43 (L)   Hemoglobin 13.0 - 17.7 g/dL 11.1 (L)   Hematocrit 37.5 - 51.0 % 34.0 (L)   Platelets 140 - 450 10*3/mm3 281   RDW 12.3 - 15.4 % 14.6   MCV 79.0 - 97.0 fL 99.1 (H)   MCH 26.6 - 33.0 pg 32.4   MCHC 31.5 - 35.7 g/dL 32.6   MPV 6.0 - 12.0 fL 9.7   RDW-SD 37.0 - 54.0 fl 52.5   Neutrophil Rel % 42.7 - 76.0 % 76.8 (H)   Lymphocyte Rel % 19.6 - 45.3 % 6.8 (L)   Monocyte Rel % 5.0 - 12.0 % 10.0   Eosinophil Rel % 0.3 - 6.2 % 5.5   Basophil Rel % 0.0 - 1.5 % 0.4   Immature Granulocyte Rel % 0.0 - 0.5 % 0.5   Neutrophils Absolute 1.70 - 7.00 10*3/mm3 10.71 (H)   Lymphocytes Absolute 0.70 - 3.10 10*3/mm3 0.95   Monocytes Absolute 0.10 - 0.90 10*3/mm3 1.40 (H)   Eosinophils Absolute 0.00 - 0.40 10*3/mm3 0.77 (H)   Basophils Absolute 0.00 - 0.20 10*3/mm3 0.06   Immature Grans, Absolute 0.00 - 0.05 10*3/mm3 0.07 (H)   nRBC 0.0 - 0.2 /100 WBC 0.0   (H): Data is abnormally high  (L): Data is abnormally low                               Follow Up   Return in about 3 months (around 11/27/2024), or if symptoms worsen or fail to improve, for follow up for above problems. Longitudinal care..  Patient was given instructions and counseling regarding his condition or for health maintenance advice. Please see specific information pulled into the AVS if appropriate.       MIKHAIL Bond MD, FACP, FHM      Electronically signed by Miguel Bond MD, 08/30/24, 10:02 AM CDT.

## 2024-09-05 ENCOUNTER — APPOINTMENT (OUTPATIENT)
Dept: GENERAL RADIOLOGY | Facility: HOSPITAL | Age: 89
DRG: 446 | End: 2024-09-05
Payer: MEDICARE

## 2024-09-05 ENCOUNTER — APPOINTMENT (OUTPATIENT)
Dept: CT IMAGING | Facility: HOSPITAL | Age: 89
DRG: 446 | End: 2024-09-05
Payer: MEDICARE

## 2024-09-05 ENCOUNTER — APPOINTMENT (OUTPATIENT)
Dept: ULTRASOUND IMAGING | Facility: HOSPITAL | Age: 89
DRG: 446 | End: 2024-09-05
Payer: MEDICARE

## 2024-09-05 ENCOUNTER — HOSPITAL ENCOUNTER (INPATIENT)
Facility: HOSPITAL | Age: 89
LOS: 2 days | Discharge: HOME | DRG: 446 | End: 2024-09-09
Attending: EMERGENCY MEDICINE | Admitting: FAMILY MEDICINE
Payer: MEDICARE

## 2024-09-05 DIAGNOSIS — M48.062 SPINAL STENOSIS, LUMBAR REGION, WITH NEUROGENIC CLAUDICATION: ICD-10-CM

## 2024-09-05 DIAGNOSIS — M48.00 SPINAL STENOSIS, UNSPECIFIED SPINAL REGION: ICD-10-CM

## 2024-09-05 DIAGNOSIS — G25.0 ESSENTIAL TREMOR: ICD-10-CM

## 2024-09-05 DIAGNOSIS — M25.551 BILATERAL HIP PAIN: ICD-10-CM

## 2024-09-05 DIAGNOSIS — K44.9 LARGE HIATAL HERNIA: ICD-10-CM

## 2024-09-05 DIAGNOSIS — I25.10 CORONARY ARTERY DISEASE INVOLVING NATIVE CORONARY ARTERY OF NATIVE HEART WITHOUT ANGINA PECTORIS: Chronic | ICD-10-CM

## 2024-09-05 DIAGNOSIS — R35.0 FREQUENCY OF URINATION: ICD-10-CM

## 2024-09-05 DIAGNOSIS — I48.92 ATRIAL FLUTTER WITH RAPID VENTRICULAR RESPONSE: ICD-10-CM

## 2024-09-05 DIAGNOSIS — R41.3 IMPAIRED MEMORY: ICD-10-CM

## 2024-09-05 DIAGNOSIS — N40.1 BPH WITH URINARY OBSTRUCTION: ICD-10-CM

## 2024-09-05 DIAGNOSIS — M79.605 LEFT LEG PAIN: ICD-10-CM

## 2024-09-05 DIAGNOSIS — R74.8 ELEVATED ALKALINE PHOSPHATASE LEVEL: ICD-10-CM

## 2024-09-05 DIAGNOSIS — I48.0 PAROXYSMAL ATRIAL FIBRILLATION: ICD-10-CM

## 2024-09-05 DIAGNOSIS — Z78.9 NON-SMOKER: ICD-10-CM

## 2024-09-05 DIAGNOSIS — M54.42 ACUTE LEFT-SIDED LOW BACK PAIN WITH LEFT-SIDED SCIATICA: ICD-10-CM

## 2024-09-05 DIAGNOSIS — N32.81 OAB (OVERACTIVE BLADDER): ICD-10-CM

## 2024-09-05 DIAGNOSIS — K82.8 DILATED GALLBLADDER: ICD-10-CM

## 2024-09-05 DIAGNOSIS — M51.16 LUMBAR DISC DISEASE WITH RADICULOPATHY: ICD-10-CM

## 2024-09-05 DIAGNOSIS — M51.37 DEGENERATION OF LUMBAR OR LUMBOSACRAL INTERVERTEBRAL DISC: ICD-10-CM

## 2024-09-05 DIAGNOSIS — I10 ESSENTIAL HYPERTENSION: ICD-10-CM

## 2024-09-05 DIAGNOSIS — Z74.09 IMPAIRED MOBILITY: ICD-10-CM

## 2024-09-05 DIAGNOSIS — N13.8 BPH WITH URINARY OBSTRUCTION: ICD-10-CM

## 2024-09-05 DIAGNOSIS — Z85.828 HISTORY OF SKIN CANCER IN ADULTHOOD: ICD-10-CM

## 2024-09-05 DIAGNOSIS — Z79.01 CHRONIC ANTICOAGULATION: ICD-10-CM

## 2024-09-05 DIAGNOSIS — K81.0 ACUTE CHOLECYSTITIS: Primary | ICD-10-CM

## 2024-09-05 DIAGNOSIS — E78.00 HYPERCHOLESTEREMIA: ICD-10-CM

## 2024-09-05 DIAGNOSIS — U09.9 PERSISTENT SHORTNESS OF BREATH AFTER COVID-19: ICD-10-CM

## 2024-09-05 DIAGNOSIS — R55 SYNCOPE, UNSPECIFIED SYNCOPE TYPE: ICD-10-CM

## 2024-09-05 DIAGNOSIS — R53.1 GENERALIZED WEAKNESS: ICD-10-CM

## 2024-09-05 DIAGNOSIS — E80.6 HYPERBILIRUBINEMIA: ICD-10-CM

## 2024-09-05 DIAGNOSIS — R35.1 NOCTURIA: ICD-10-CM

## 2024-09-05 DIAGNOSIS — G45.9 TIA (TRANSIENT ISCHEMIC ATTACK): ICD-10-CM

## 2024-09-05 DIAGNOSIS — R74.01 TRANSAMINITIS: ICD-10-CM

## 2024-09-05 DIAGNOSIS — G60.9 HEREDITARY AND IDIOPATHIC PERIPHERAL NEUROPATHY: ICD-10-CM

## 2024-09-05 DIAGNOSIS — R06.02 PERSISTENT SHORTNESS OF BREATH AFTER COVID-19: ICD-10-CM

## 2024-09-05 DIAGNOSIS — M25.552 LEFT HIP PAIN: ICD-10-CM

## 2024-09-05 DIAGNOSIS — M25.552 BILATERAL HIP PAIN: ICD-10-CM

## 2024-09-05 DIAGNOSIS — I50.32 DIASTOLIC DYSFUNCTION WITH CHRONIC HEART FAILURE: ICD-10-CM

## 2024-09-05 LAB
ALBUMIN SERPL-MCNC: 2.9 G/DL (ref 3.5–5.2)
ALBUMIN/GLOB SERPL: 0.8 G/DL
ALP SERPL-CCNC: 1419 U/L (ref 39–117)
ALT SERPL W P-5'-P-CCNC: 80 U/L (ref 1–41)
AMMONIA BLD-SCNC: 30 UMOL/L (ref 16–60)
ANION GAP SERPL CALCULATED.3IONS-SCNC: 4 MMOL/L (ref 5–15)
APTT PPP: 59.3 SECONDS (ref 24.5–36)
AST SERPL-CCNC: 126 U/L (ref 1–40)
BACTERIA UR QL AUTO: ABNORMAL /HPF
BASOPHILS # BLD AUTO: 0.05 10*3/MM3 (ref 0–0.2)
BASOPHILS NFR BLD AUTO: 0.7 % (ref 0–1.5)
BILIRUB SERPL-MCNC: 1.9 MG/DL (ref 0–1.2)
BILIRUB UR QL STRIP: ABNORMAL
BUN SERPL-MCNC: 14 MG/DL (ref 8–23)
BUN/CREAT SERPL: 17.1 (ref 7–25)
CALCIUM SPEC-SCNC: 8.1 MG/DL (ref 8.2–9.6)
CHLORIDE SERPL-SCNC: 105 MMOL/L (ref 98–107)
CLARITY UR: CLEAR
CO2 SERPL-SCNC: 26 MMOL/L (ref 22–29)
COLOR UR: ABNORMAL
CREAT SERPL-MCNC: 0.82 MG/DL (ref 0.76–1.27)
D-LACTATE SERPL-SCNC: 1.1 MMOL/L (ref 0.5–2)
DEPRECATED RDW RBC AUTO: 56 FL (ref 37–54)
EGFRCR SERPLBLD CKD-EPI 2021: 81.4 ML/MIN/1.73
EOSINOPHIL # BLD AUTO: 0.69 10*3/MM3 (ref 0–0.4)
EOSINOPHIL NFR BLD AUTO: 9 % (ref 0.3–6.2)
ERYTHROCYTE [DISTWIDTH] IN BLOOD BY AUTOMATED COUNT: 15.8 % (ref 12.3–15.4)
GLOBULIN UR ELPH-MCNC: 3.5 GM/DL
GLUCOSE SERPL-MCNC: 128 MG/DL (ref 65–99)
GLUCOSE UR STRIP-MCNC: NEGATIVE MG/DL
HCT VFR BLD AUTO: 30.9 % (ref 37.5–51)
HGB BLD-MCNC: 9.8 G/DL (ref 13–17.7)
HGB UR QL STRIP.AUTO: ABNORMAL
HYALINE CASTS UR QL AUTO: ABNORMAL /LPF
IMM GRANULOCYTES # BLD AUTO: 0.03 10*3/MM3 (ref 0–0.05)
IMM GRANULOCYTES NFR BLD AUTO: 0.4 % (ref 0–0.5)
INR PPP: 1.3 (ref 0.91–1.09)
KETONES UR QL STRIP: NEGATIVE
LEUKOCYTE ESTERASE UR QL STRIP.AUTO: NEGATIVE
LIPASE SERPL-CCNC: 161 U/L (ref 13–60)
LYMPHOCYTES # BLD AUTO: 0.68 10*3/MM3 (ref 0.7–3.1)
LYMPHOCYTES NFR BLD AUTO: 8.9 % (ref 19.6–45.3)
MCH RBC QN AUTO: 30.9 PG (ref 26.6–33)
MCHC RBC AUTO-ENTMCNC: 31.7 G/DL (ref 31.5–35.7)
MCV RBC AUTO: 97.5 FL (ref 79–97)
MONOCYTES # BLD AUTO: 0.89 10*3/MM3 (ref 0.1–0.9)
MONOCYTES NFR BLD AUTO: 11.6 % (ref 5–12)
NEUTROPHILS NFR BLD AUTO: 5.34 10*3/MM3 (ref 1.7–7)
NEUTROPHILS NFR BLD AUTO: 69.4 % (ref 42.7–76)
NITRITE UR QL STRIP: NEGATIVE
NRBC BLD AUTO-RTO: 0 /100 WBC (ref 0–0.2)
PH UR STRIP.AUTO: 7 [PH] (ref 5–8)
PLATELET # BLD AUTO: 220 10*3/MM3 (ref 140–450)
PMV BLD AUTO: 9.8 FL (ref 6–12)
POTASSIUM SERPL-SCNC: 4.6 MMOL/L (ref 3.5–5.2)
PROCALCITONIN SERPL-MCNC: 0.17 NG/ML (ref 0–0.25)
PROT SERPL-MCNC: 6.4 G/DL (ref 6–8.5)
PROT UR QL STRIP: ABNORMAL
PROTHROMBIN TIME: 16.8 SECONDS (ref 11.8–14.8)
RBC # BLD AUTO: 3.17 10*6/MM3 (ref 4.14–5.8)
RBC # UR STRIP: ABNORMAL /HPF
REF LAB TEST METHOD: ABNORMAL
SODIUM SERPL-SCNC: 135 MMOL/L (ref 136–145)
SP GR UR STRIP: >1.03 (ref 1–1.03)
SQUAMOUS #/AREA URNS HPF: ABNORMAL /HPF
UROBILINOGEN UR QL STRIP: ABNORMAL
WBC # UR STRIP: ABNORMAL /HPF
WBC NRBC COR # BLD AUTO: 7.68 10*3/MM3 (ref 3.4–10.8)

## 2024-09-05 PROCEDURE — 71045 X-RAY EXAM CHEST 1 VIEW: CPT

## 2024-09-05 PROCEDURE — 93005 ELECTROCARDIOGRAM TRACING: CPT | Performed by: EMERGENCY MEDICINE

## 2024-09-05 PROCEDURE — 82140 ASSAY OF AMMONIA: CPT | Performed by: EMERGENCY MEDICINE

## 2024-09-05 PROCEDURE — 76705 ECHO EXAM OF ABDOMEN: CPT

## 2024-09-05 PROCEDURE — 25010000002 PIPERACILLIN SOD-TAZOBACTAM PER 1 G: Performed by: EMERGENCY MEDICINE

## 2024-09-05 PROCEDURE — 84145 PROCALCITONIN (PCT): CPT | Performed by: EMERGENCY MEDICINE

## 2024-09-05 PROCEDURE — 93010 ELECTROCARDIOGRAM REPORT: CPT | Performed by: INTERNAL MEDICINE

## 2024-09-05 PROCEDURE — 85025 COMPLETE CBC W/AUTO DIFF WBC: CPT | Performed by: EMERGENCY MEDICINE

## 2024-09-05 PROCEDURE — 85730 THROMBOPLASTIN TIME PARTIAL: CPT | Performed by: EMERGENCY MEDICINE

## 2024-09-05 PROCEDURE — 74177 CT ABD & PELVIS W/CONTRAST: CPT

## 2024-09-05 PROCEDURE — 99285 EMERGENCY DEPT VISIT HI MDM: CPT

## 2024-09-05 PROCEDURE — 25510000001 IOPAMIDOL 61 % SOLUTION: Performed by: EMERGENCY MEDICINE

## 2024-09-05 PROCEDURE — 83690 ASSAY OF LIPASE: CPT | Performed by: EMERGENCY MEDICINE

## 2024-09-05 PROCEDURE — 36415 COLL VENOUS BLD VENIPUNCTURE: CPT

## 2024-09-05 PROCEDURE — 83605 ASSAY OF LACTIC ACID: CPT | Performed by: EMERGENCY MEDICINE

## 2024-09-05 PROCEDURE — 80053 COMPREHEN METABOLIC PANEL: CPT | Performed by: EMERGENCY MEDICINE

## 2024-09-05 PROCEDURE — 25810000003 SODIUM CHLORIDE 0.9 % SOLUTION: Performed by: HOSPITALIST

## 2024-09-05 PROCEDURE — 81001 URINALYSIS AUTO W/SCOPE: CPT | Performed by: EMERGENCY MEDICINE

## 2024-09-05 PROCEDURE — 85610 PROTHROMBIN TIME: CPT | Performed by: EMERGENCY MEDICINE

## 2024-09-05 PROCEDURE — 87040 BLOOD CULTURE FOR BACTERIA: CPT | Performed by: EMERGENCY MEDICINE

## 2024-09-05 RX ORDER — SODIUM CHLORIDE 0.9 % (FLUSH) 0.9 %
10 SYRINGE (ML) INJECTION EVERY 12 HOURS SCHEDULED
Status: DISCONTINUED | OUTPATIENT
Start: 2024-09-05 | End: 2024-09-09 | Stop reason: HOSPADM

## 2024-09-05 RX ORDER — GABAPENTIN 300 MG/1
600 CAPSULE ORAL EVERY 12 HOURS SCHEDULED
Status: DISCONTINUED | OUTPATIENT
Start: 2024-09-05 | End: 2024-09-09 | Stop reason: HOSPADM

## 2024-09-05 RX ORDER — METOPROLOL TARTRATE 25 MG/1
25 TABLET, FILM COATED ORAL 2 TIMES DAILY
Status: ON HOLD | COMMUNITY
End: 2024-09-07

## 2024-09-05 RX ORDER — PRAVASTATIN SODIUM 40 MG
40 TABLET ORAL DAILY
Status: ON HOLD | COMMUNITY
End: 2024-09-07

## 2024-09-05 RX ORDER — LOSARTAN POTASSIUM 50 MG/1
100 TABLET ORAL DAILY
Status: DISCONTINUED | OUTPATIENT
Start: 2024-09-05 | End: 2024-09-09 | Stop reason: HOSPADM

## 2024-09-05 RX ORDER — CARVEDILOL 6.25 MG/1
6.25 TABLET ORAL 2 TIMES DAILY WITH MEALS
Status: DISCONTINUED | OUTPATIENT
Start: 2024-09-05 | End: 2024-09-07

## 2024-09-05 RX ORDER — BISACODYL 5 MG/1
5 TABLET, DELAYED RELEASE ORAL DAILY PRN
Status: DISCONTINUED | OUTPATIENT
Start: 2024-09-05 | End: 2024-09-09 | Stop reason: HOSPADM

## 2024-09-05 RX ORDER — SODIUM CHLORIDE 9 MG/ML
75 INJECTION, SOLUTION INTRAVENOUS CONTINUOUS
Status: DISCONTINUED | OUTPATIENT
Start: 2024-09-05 | End: 2024-09-07

## 2024-09-05 RX ORDER — LOSARTAN POTASSIUM 50 MG/1
100 TABLET ORAL DAILY
Status: DISCONTINUED | OUTPATIENT
Start: 2024-09-06 | End: 2024-09-05

## 2024-09-05 RX ORDER — BISACODYL 10 MG
10 SUPPOSITORY, RECTAL RECTAL DAILY PRN
Status: DISCONTINUED | OUTPATIENT
Start: 2024-09-05 | End: 2024-09-09 | Stop reason: HOSPADM

## 2024-09-05 RX ORDER — PANTOPRAZOLE SODIUM 40 MG/10ML
40 INJECTION, POWDER, LYOPHILIZED, FOR SOLUTION INTRAVENOUS
Status: DISCONTINUED | OUTPATIENT
Start: 2024-09-06 | End: 2024-09-09 | Stop reason: HOSPADM

## 2024-09-05 RX ORDER — SODIUM CHLORIDE 0.9 % (FLUSH) 0.9 %
10 SYRINGE (ML) INJECTION AS NEEDED
Status: DISCONTINUED | OUTPATIENT
Start: 2024-09-05 | End: 2024-09-09 | Stop reason: HOSPADM

## 2024-09-05 RX ORDER — HYDRALAZINE HYDROCHLORIDE 20 MG/ML
20 INJECTION INTRAMUSCULAR; INTRAVENOUS EVERY 4 HOURS PRN
Status: DISCONTINUED | OUTPATIENT
Start: 2024-09-05 | End: 2024-09-09 | Stop reason: HOSPADM

## 2024-09-05 RX ORDER — ONDANSETRON 2 MG/ML
4 INJECTION INTRAMUSCULAR; INTRAVENOUS EVERY 6 HOURS PRN
Status: DISCONTINUED | OUTPATIENT
Start: 2024-09-05 | End: 2024-09-09 | Stop reason: HOSPADM

## 2024-09-05 RX ORDER — POLYETHYLENE GLYCOL 3350 17 G/17G
17 POWDER, FOR SOLUTION ORAL DAILY PRN
Status: DISCONTINUED | OUTPATIENT
Start: 2024-09-05 | End: 2024-09-09 | Stop reason: HOSPADM

## 2024-09-05 RX ORDER — AMOXICILLIN 250 MG
2 CAPSULE ORAL 2 TIMES DAILY PRN
Status: DISCONTINUED | OUTPATIENT
Start: 2024-09-05 | End: 2024-09-09 | Stop reason: HOSPADM

## 2024-09-05 RX ORDER — SODIUM CHLORIDE 9 MG/ML
40 INJECTION, SOLUTION INTRAVENOUS AS NEEDED
Status: DISCONTINUED | OUTPATIENT
Start: 2024-09-05 | End: 2024-09-09 | Stop reason: HOSPADM

## 2024-09-05 RX ORDER — IOPAMIDOL 612 MG/ML
100 INJECTION, SOLUTION INTRAVASCULAR
Status: COMPLETED | OUTPATIENT
Start: 2024-09-05 | End: 2024-09-05

## 2024-09-05 RX ADMIN — CARVEDILOL 6.25 MG: 6.25 TABLET, FILM COATED ORAL at 18:23

## 2024-09-05 RX ADMIN — IOPAMIDOL 100 ML: 612 INJECTION, SOLUTION INTRAVENOUS at 16:15

## 2024-09-05 RX ADMIN — LOSARTAN POTASSIUM 100 MG: 50 TABLET, FILM COATED ORAL at 21:16

## 2024-09-05 RX ADMIN — GABAPENTIN 600 MG: 300 CAPSULE ORAL at 21:16

## 2024-09-05 RX ADMIN — Medication 10 ML: at 21:17

## 2024-09-05 RX ADMIN — PIPERACILLIN AND TAZOBACTAM 4.5 G: 4; .5 INJECTION, POWDER, FOR SOLUTION INTRAVENOUS at 16:56

## 2024-09-05 RX ADMIN — SODIUM CHLORIDE 75 ML/HR: 9 INJECTION, SOLUTION INTRAVENOUS at 18:23

## 2024-09-05 NOTE — ED PROVIDER NOTES
Subjective   History of Present Illness  Patient is a 89-year-old male with a history of hypertension and coronary artery disease who presents to the ER with abnormal labs.  Patient states he has felt very bad over the last week and a half.  He complains of lethargy, generalized weakness, decreased appetite and chills.  Patient had some labs performed and they showed an elevated ammonia level and LFTs.  Patient denies any known liver abnormalities.  Patient was called today and told to come to the ER.  He has had some intermittent confusion.  He denies any fever, chest pain, shortness of air, abdominal pain, nausea vomiting diarrhea, urinary changes, neurologic changes.  Patient states his abdomen has been swelling.      Review of Systems   Constitutional:  Positive for activity change, appetite change and chills.   HENT: Negative.     Eyes: Negative.    Respiratory: Negative.     Cardiovascular: Negative.    Gastrointestinal:  Positive for abdominal distention.   Endocrine: Negative.    Genitourinary: Negative.    Musculoskeletal: Negative.    Skin: Negative.    Allergic/Immunologic: Negative.    Neurological:  Positive for weakness.   Hematological: Negative.    Psychiatric/Behavioral:  Positive for confusion.    All other systems reviewed and are negative.      Past Medical History:   Diagnosis Date    Allergic rhinitis     Anemia     Arthritis     Blind left eye     BPH (benign prostatic hypertrophy) with urinary retention     Cancer     skin cancer    Chronic back pain     RUNS DOWN BOTH LEGS    Constipation     Coronary artery disease     COVID     Hard of hearing     Heart attack 1986    NO MUSCLE DAMAGE - JUST OCCLUDED VALVE    High cholesterol     History of skin cancer     BILATERAL EARS    History of transfusion     1986    Hypertension     Inguinal hernia     Leaky heart valve     DR CONCEPCION SAYS NOT ENOUGH TO BE OF CONCERN    Other bursal cyst, right elbow     Paroxysmal atrial fibrillation 5/6/2024     PONV (postoperative nausea and vomiting)     has had scopalamine patch in past     Sleep apnea     DOES NOT USE CPAP OR BIPAP    Spinal stenosis of cervical region     Spinal stenosis of lumbar region     Stroke     Tremors of nervous system     Wears hearing aid     BILATERAL       Allergies   Allergen Reactions    Codeine Nausea And Vomiting and GI Intolerance    Fentanyl Hallucinations and Other (See Comments)     DELUSIONAL  Fentanyl patchs, caused patient to become confused and anxious per family  Fentanyl patchs, caused patient to become confused and anxious per family    Augmentin [Amoxicillin-Pot Clavulanate] GI Intolerance       Past Surgical History:   Procedure Laterality Date    ANTERIOR LUMBAR EXPOSURE N/A 12/07/2018    Procedure: ANTERIOR LUMBAR EXPOSURE;  Surgeon: Manolo Mcleod DO;  Location:  [facility];  Service: Vascular    APPENDECTOMY      BACK SURGERY      X3    CARDIAC SURGERY  08/08/1986    X1 BYPASS    CORONARY ANGIOPLASTY WITH STENT PLACEMENT  1997    X3 STENTS    CORONARY ARTERY BYPASS GRAFT      HERNIA REPAIR Bilateral     x2    INGUINAL HERNIA REPAIR Right 06/22/2017    Procedure: RIGHT INGUINAL HERNIA REPAIR AND EXCISION OF CYST RIGHT ELBOW ;  Surgeon: Jackelyn Arriola MD;  Location:  [facility];  Service:     LUMBAR FUSION Left 06/30/2021    Procedure: LUMBAR LAMINECTOMY, DISCECTOMY, FACETECTOMY,  TRANSFORAMINAL LUMBAR INTERBODY FUSION L2-3. REVISION OF INSTRUMENTATION L3-S1. USE OF IMAGE GUIDANCE AND SURGICAL ROBOT;  Surgeon: Kj Falcon MD;  Location:  [facility];  Service: Robotics - Neuro;  Laterality: Left;    LUMBAR LAMINECTOMY N/A 03/12/2018    Procedure: LUMBAR LAMINECTOMY WITHOUT FUSION L3-4,4-5;  Surgeon: Kj Falcon MD;  Location:  [facility];  Service: Neurosurgery    LUMBAR LAMINECTOMY ANTERIOR LUMBAR INTERBODY FUSION N/A 12/07/2018    Procedure: ANTERIOR LUMBAR INTERBODY FUSION L5-S1;  Surgeon: Kj Falcon MD;  Location:  [facility];  Service: Neurosurgery     LUMBAR LAMINECTOMY WITH FUSION Left 12/10/2018    Procedure: LUMBAR LAMINECTOMY TRANSFORAMINAL LUMBAR INTERBODY FUSION L34,45;  Surgeon: Kj Falcon MD;  Location:  [facility];  Service: Neurosurgery    LUMBAR LAMINECTOMY WITH FUSION Left 2018    Procedure: LUMBAR LAMINECTOMY TRANSFORAMINAL LUMBAR INTERBODY FUSION LEFT L3-4, L4-5 QUADRANT RETRACTOR;  Surgeon: Kj Falcon MD;  Location:  [facility];  Service: Neurosurgery    SKIN LESION EXCISION      nasal       Family History   Problem Relation Age of Onset    No Known Problems Mother     No Known Problems Father        Social History     Socioeconomic History    Marital status:    Tobacco Use    Smoking status: Former     Current packs/day: 0.00     Types: Cigarettes     Start date:      Quit date:      Years since quittin.7    Smokeless tobacco: Never    Tobacco comments:     age 14-20 years of age   Vaping Use    Vaping status: Never Used   Substance and Sexual Activity    Alcohol use: No    Drug use: Never    Sexual activity: Not Currently     Partners: Female           Objective   Physical Exam  Vitals and nursing note reviewed.   Constitutional:       Appearance: He is well-developed.   HENT:      Head: Normocephalic and atraumatic.   Eyes:      Conjunctiva/sclera: Conjunctivae normal.      Pupils: Pupils are equal, round, and reactive to light.   Cardiovascular:      Rate and Rhythm: Normal rate and regular rhythm.      Heart sounds: Normal heart sounds.   Pulmonary:      Effort: Pulmonary effort is normal.      Breath sounds: Normal breath sounds.   Abdominal:      Palpations: Abdomen is soft.      Tenderness: There is no abdominal tenderness. There is no right CVA tenderness, left CVA tenderness, guarding or rebound.   Musculoskeletal:         General: No deformity. Normal range of motion.      Cervical back: Normal range of motion.   Skin:     General: Skin is warm.   Neurological:      Mental Status: He is alert and  oriented to person, place, and time.   Psychiatric:         Behavior: Behavior normal.         Procedures           ED Course      EKG as interpreted by me: Sinus bradycardia with a rate of 57, right bundle branch block, no acute ischemia or infarction.    Lab Results (last 24 hours)       Procedure Component Value Units Date/Time    CBC & Differential [[id number]]  (Abnormal) Collected: 09/05/24 1516    Specimen: Blood Updated: 09/05/24 1532    Narrative:      The following orders were created for panel order CBC & Differential.  Procedure                               Abnormality         Status                     ---------                               -----------         ------                     CBC Auto Differential[[id number]]        Abnormal            Final result                 Please view results for these tests on the individual orders.    Comprehensive Metabolic Panel [[id number]]  (Abnormal) Collected: 09/05/24 1516    Specimen: Blood Updated: 09/05/24 1614     Glucose 128 mg/dL      BUN 14 mg/dL      Creatinine 0.82 mg/dL      Sodium 135 mmol/L      Potassium 4.6 mmol/L      Chloride 105 mmol/L      CO2 26.0 mmol/L      Calcium 8.1 mg/dL      Total Protein 6.4 g/dL      Albumin 2.9 g/dL      ALT (SGPT) 80 U/L      AST (SGOT) 126 U/L      Alkaline Phosphatase 1,419 U/L      Total Bilirubin 1.9 mg/dL      Globulin 3.5 gm/dL      A/G Ratio 0.8 g/dL      BUN/Creatinine Ratio 17.1     Anion Gap 4.0 mmol/L      eGFR 81.4 mL/min/1.73     Narrative:      GFR Normal >60  Chronic Kidney Disease <60  Kidney Failure <15    The GFR formula is only valid for adults with stable renal function between ages 18 and 70.    Lipase [[id number]]  (Abnormal) Collected: 09/05/24 1516    Specimen: Blood Updated: 09/05/24 1553     Lipase 161 U/L     Protime-INR [[id number]]  (Abnormal) Collected: 09/05/24 1516    Specimen: Blood Updated: 09/05/24 1542     Protime 16.8 Seconds      INR 1.30    aPTT [[id number]]  (Abnormal)  "Collected: 09/05/24 1516    Specimen: Blood Updated: 09/05/24 1542     PTT 59.3 seconds     Blood Culture - Blood, Arm, Right [[id number]] Collected: 09/05/24 1516    Specimen: Blood from Arm, Right Updated: 09/05/24 1531    Lactic Acid, Plasma [[id number]]  (Normal) Collected: 09/05/24 1516    Specimen: Blood Updated: 09/05/24 1553     Lactate 1.1 mmol/L     Procalcitonin [[id number]]  (Normal) Collected: 09/05/24 1516    Specimen: Blood Updated: 09/05/24 1600     Procalcitonin 0.17 ng/mL     Narrative:      As a Marker for Sepsis (Non-Neonates):    1. <0.5 ng/mL represents a low risk of severe sepsis and/or septic shock.  2. >2 ng/mL represents a high risk of severe sepsis and/or septic shock.    As a Marker for Lower Respiratory Tract Infections that require antibiotic therapy:    PCT on Admission    Antibiotic Therapy       6-12 Hrs later    >0.5                Strongly Recommended  >0.25 - <0.5        Recommended   0.1 - 0.25          Discouraged              Remeasure/reassess PCT  <0.1                Strongly Discouraged     Remeasure/reassess PCT    As 28 day mortality risk marker: \"Change in Procalcitonin Result\" (>80% or <=80%) if Day 0 (or Day 1) and Day 4 values are available. Refer to http://www.[employer]-pct-calculator.com    Change in PCT <=80%  A decrease of PCT levels below or equal to 80% defines a positive change in PCT test result representing a higher risk for 28-day all-cause mortality of patients diagnosed with severe sepsis for septic shock.    Change in PCT >80%  A decrease of PCT levels of more than 80% defines a negative change in PCT result representing a lower risk for 28-day all-cause mortality of patients diagnosed with severe sepsis or septic shock.       Ammonia [[id number]]  (Normal) Collected: 09/05/24 1516    Specimen: Blood Updated: 09/05/24 1552     Ammonia 30 umol/L     CBC Auto Differential [[id number]]  (Abnormal) Collected: 09/05/24 1516    Specimen: Blood Updated: 09/05/24 1532     " WBC 7.68 10*3/mm3      RBC 3.17 10*6/mm3      Hemoglobin 9.8 g/dL      Hematocrit 30.9 %      MCV 97.5 fL      MCH 30.9 pg      MCHC 31.7 g/dL      RDW 15.8 %      RDW-SD 56.0 fl      MPV 9.8 fL      Platelets 220 10*3/mm3      Neutrophil % 69.4 %      Lymphocyte % 8.9 %      Monocyte % 11.6 %      Eosinophil % 9.0 %      Basophil % 0.7 %      Immature Grans % 0.4 %      Neutrophils, Absolute 5.34 10*3/mm3      Lymphocytes, Absolute 0.68 10*3/mm3      Monocytes, Absolute 0.89 10*3/mm3      Eosinophils, Absolute 0.69 10*3/mm3      Basophils, Absolute 0.05 10*3/mm3      Immature Grans, Absolute 0.03 10*3/mm3      nRBC 0.0 /100 WBC     Blood Culture - Blood, Wrist, Right [[id number]] Collected: 09/05/24 1526    Specimen: Blood from Wrist, Right Updated: 09/05/24 1548           CT Abdomen Pelvis With Contrast   Final Result   1. No mass or abscess.   2. No bowel obstruction.   3. No focal liver abnormality is seen.       This report was signed and finalized on 9/5/2024 4:26 PM by Dr. Samuel Hickman MD.          US Gallbladder   Final Result   1. No gallstones are appreciated. There is gallbladder wall thickening   and a small amount of pericholecystic fluid. Acute cholecystitis is   considered in the differential. There is no ductal dilatation.   2. Mild increased liver echogenicity suggesting fatty infiltration.   3. Echogenic pancreas may be a normal variant. It may also be seen in   patients with diabetes and prediabetes.               The images and report are stored on PAC's per institutional and state   guidelines.       This report was signed and finalized on 9/5/2024 3:22 PM by Dr. Uli Jessica MD.          XR Chest 1 View   Final Result   1. Stable cardiomegaly and stable chronic lung changes.               This report was signed and finalized on 9/5/2024 2:13 PM by Dr. Samuel Hickman MD.                                                  Medical Decision Making  Patient is a 89-year-old male with a history  of hypertension and coronary artery disease who presents to the ER with abnormal labs.  Patient states he has felt very bad over the last week and a half.  He complains of lethargy, generalized weakness, decreased appetite and chills.  Patient had some labs performed and they showed an elevated ammonia level and LFTs.  Patient denies any known liver abnormalities.  Patient was called today and told to come to the ER.  He has had some intermittent confusion.  He denies any fever, chest pain, shortness of air, abdominal pain, nausea vomiting diarrhea, urinary changes, neurologic changes.  Patient states his abdomen has been swelling.    Differential diagnosis: Choledocholithiasis, cholecystitis, pancreatitis, fatty liver    Labs showed elevated LFTs including AST, ALT, alkaline phosphatase and bilirubin.  Lipase was also mildly elevated.  Patient was mildly anemic.  Chest x-ray shows stable cardiomegaly and stable chronic lung changes but no acute findings.  Ultrasound of the gallbladder showed no gallstones.  Patient did have gallbladder wall thickening and a small amount of pericholecystic fluid concerning for acute cholecystitis.  There was no ductal dilatation.  Patient did have mild increased liver echogenicity consistent with fatty liver.  CT scan of the abdomen pelvis showed no acute findings.  I discussed the case with Dr. Plummer with general surgery.  He recommended treating the patient with antibiotics and having the hospitalist admit the patient and perform a HIDA scan.  Patient was given Zosyn.  I then discussed the case with Alisa Cannon with the hospitalist service and patient was admitted to Dr. Richard's team for further treatment.    Problems Addressed:  Acute cholecystitis: complicated acute illness or injury  Generalized weakness: complicated acute illness or injury  Transaminitis: complicated acute illness or injury    Amount and/or Complexity of Data Reviewed  Labs: ordered. Decision-making details  documented in ED Course.  Radiology: ordered. Decision-making details documented in ED Course.  ECG/medicine tests: ordered. Decision-making details documented in ED Course.  Discussion of management or test interpretation with external provider(s): Dr. Plummer with general surgery  Alisa Cannon with the hospitalist service    Risk  Prescription drug management.  Decision regarding hospitalization.        Final diagnoses:   Acute cholecystitis   Transaminitis   Generalized weakness       ED Disposition  ED Disposition       ED Disposition   Decision to Admit    Condition   --    Comment   Level of Care: Med/Surg [1]   Diagnosis: Acute cholecystitis [575.0.ICD-9-CM]   Admitting Physician: ANA GAO [[id number]]   Attending Physician: ANA GAO [[id number]]   Certification: I Certify That Inpatient Hospital Services Are Medically Necessary For Greater Than 2 Midnights                 No follow-up provider specified.       Medication List      No changes were made to your prescriptions during this visit.            Laura Hull MD  09/05/24 1379       Laura Hull MD  09/05/24 3092

## 2024-09-05 NOTE — H&P
[facility] Medicine Services  HISTORY AND PHYSICAL    Date of Admission: 9/5/2024  Primary Care Physician: Miguel Bond MD    Subjective   Primary Historian: patient     Chief Complaint: abnormal labs, nausea, weakness , chills     Abnormal Lab      Patient is a 89-year-old male with a history of hypertension and coronary artery disease s/p CABG and stenting  , hyperlipidemia,afib  on lipitor eliquis who presents to the ER with abnormal labs. Patient states he has felt very bad over the last week and a half. He complains of lethargy, generalized weakness, decreased appetite and chills. Patient had some labs performed and they showed an elevated ammonia level and LFTs. Patient denies any known liver abnormalities. Patient was called today and told to come to the ER. He has had some intermittent confusion. He denies any fever, chest pain, shortness of air, abdominal pain, nausea vomiting diarrhea, urinary changes, neurologic changes. Patient states his abdomen has been swelling ,   In ER CT abdomen was -ve, US Gallbladder was +ve acute cholecystitis no stones, no fever no wbc , LFTS mildly elevated, bilirubin mild, ammonia -ve, case was discussed with surgery who wanted us to admit, HIDA, abx, will keep NPO, IVF, conservative meds was given zosyn in ER and tolerated it, continue, trend LFTS hold lipitor, eliquis in case of surgery, identify reconcile restart home meds once reconciled, SCDS for DVT prophylaxis       Review of Systems   Otherwise complete ROS reviewed and negative except as mentioned in the HPI.    Past Medical History:   Past Medical History:   Diagnosis Date    Allergic rhinitis     Anemia     Arthritis     Blind left eye     BPH (benign prostatic hypertrophy) with urinary retention     Cancer     skin cancer    Chronic back pain     RUNS DOWN BOTH LEGS    Constipation     Coronary artery disease     COVID     Hard of hearing     Heart attack 1986    NO  MUSCLE DAMAGE - JUST OCCLUDED VALVE    High cholesterol     History of skin cancer     BILATERAL EARS    History of transfusion     1986    Hypertension     Inguinal hernia     Leaky heart valve     DR CONCEPCION SAYS NOT ENOUGH TO BE OF CONCERN    Other bursal cyst, right elbow     Paroxysmal atrial fibrillation 5/6/2024    PONV (postoperative nausea and vomiting)     has had scopalamine patch in past     Sleep apnea     DOES NOT USE CPAP OR BIPAP    Spinal stenosis of cervical region     Spinal stenosis of lumbar region     Stroke     Tremors of nervous system     Wears hearing aid     BILATERAL     Past Surgical History:  Past Surgical History:   Procedure Laterality Date    ANTERIOR LUMBAR EXPOSURE N/A 12/07/2018    Procedure: ANTERIOR LUMBAR EXPOSURE;  Surgeon: Manolo Mcleod DO;  Location:  [facility] OR;  Service: Vascular    APPENDECTOMY      BACK SURGERY      X3    CARDIAC SURGERY  08/08/1986    X1 BYPASS    CORONARY ANGIOPLASTY WITH STENT PLACEMENT  1997    X3 STENTS    CORONARY ARTERY BYPASS GRAFT      HERNIA REPAIR Bilateral     x2    INGUINAL HERNIA REPAIR Right 06/22/2017    Procedure: RIGHT INGUINAL HERNIA REPAIR AND EXCISION OF CYST RIGHT ELBOW ;  Surgeon: Jackelyn Arriola MD;  Location:  [facility] OR;  Service:     LUMBAR FUSION Left 06/30/2021    Procedure: LUMBAR LAMINECTOMY, DISCECTOMY, FACETECTOMY,  TRANSFORAMINAL LUMBAR INTERBODY FUSION L2-3. REVISION OF INSTRUMENTATION L3-S1. USE OF IMAGE GUIDANCE AND SURGICAL ROBOT;  Surgeon: Kj Falcon MD;  Location:  [facility] OR;  Service: Robotics - Neuro;  Laterality: Left;    LUMBAR LAMINECTOMY N/A 03/12/2018    Procedure: LUMBAR LAMINECTOMY WITHOUT FUSION L3-4,4-5;  Surgeon: Kj Falcon MD;  Location:  [facility];  Service: Neurosurgery    LUMBAR LAMINECTOMY ANTERIOR LUMBAR INTERBODY FUSION N/A 12/07/2018    Procedure: ANTERIOR LUMBAR INTERBODY FUSION L5-S1;  Surgeon: Kj Falcon MD;  Location:  [facility];  Service: Neurosurgery    LUMBAR  LAMINECTOMY WITH FUSION Left 12/10/2018    Procedure: LUMBAR LAMINECTOMY TRANSFORAMINAL LUMBAR INTERBODY FUSION L34,45;  Surgeon: Kj Falcon MD;  Location:  [facility] OR;  Service: Neurosurgery    LUMBAR LAMINECTOMY WITH FUSION Left 12/07/2018    Procedure: LUMBAR LAMINECTOMY TRANSFORAMINAL LUMBAR INTERBODY FUSION LEFT L3-4, L4-5 QUADRANT RETRACTOR;  Surgeon: Kj Falcon MD;  Location:  [facility] OR;  Service: Neurosurgery    SKIN LESION EXCISION      nasal     Social History:  reports that he quit smoking about 75 years ago. His smoking use included cigarettes. He started smoking about 81 years ago. He has never used smokeless tobacco. He reports that he does not drink alcohol and does not use drugs.    Family History: family history includes No Known Problems in his father and mother.       Allergies:  Allergies   Allergen Reactions    Codeine Nausea And Vomiting and GI Intolerance    Fentanyl Hallucinations and Other (See Comments)     DELUSIONAL  Fentanyl patchs, caused patient to become confused and anxious per family  Fentanyl patchs, caused patient to become confused and anxious per family    Augmentin [Amoxicillin-Pot Clavulanate] GI Intolerance       Medications:  Prior to Admission medications    Medication Sig Start Date End Date Taking? Authorizing Provider   alfuzosin (UROXATRAL) 10 MG 24 hr tablet Take 1 tablet by mouth Every Evening.    ProviderFredrick MD   apixaban (ELIQUIS) 5 MG tablet tablet Take 1 tablet by mouth 2 (Two) Times a Day. 12/28/23   Tatianna Jessica APRN   Ascorbic Acid (Vitamin C) 500 MG capsule Take 1 capsule by mouth 2 (two) times a day.    ProviderFredrick MD   atorvastatin (Lipitor) 40 MG tablet Take 1 tablet by mouth Daily. 8/8/24   Miguel Bond MD   carvedilol (COREG) 6.25 MG tablet Take 1 tablet by mouth 2 (Two) Times a Day With Meals. 5/28/24   Miguel Bond MD   Cholecalciferol 50 MCG (2000 UT) tablet Take 1 tablet by mouth Daily.    Provider  "MD Fredrick   clobetasol prop emollient base (Clobetasol Propionate E) 0.05 % emollient cream Apply 1 Application topically to the appropriate area as directed 2 (Two) Times a Day. 8/27/24   Miguel Bond MD   Docusate Calcium (STOOL SOFTENER PO) Take 100 mg by mouth Daily.    Fredrick Manley MD   finasteride (PROSCAR) 5 MG tablet Take 1 tablet by mouth Daily.    Fredrick Manley MD   Flaxseed, Linseed, (FLAX SEED OIL PO) Take  by mouth.    Fredrick Manley MD   fluticasone (FLONASE) 50 MCG/ACT nasal spray 2 sprays into the nostril(s) as directed by provider Daily. 6/17/24   CHAPINCITO Aviles DO   folic acid (FOLVITE) 1 MG tablet Take 1 tablet by mouth Daily.    Fredrick Manley MD   gabapentin (NEURONTIN) 600 MG tablet Take 1 tablet by mouth 3 (Three) Times a Day. 8/20/19   Kj Falcon MD   L-Lysine 600 MG tablet Take 1 tablet by mouth Daily. 1000 mg    Fredrick Manley MD   losartan (COZAAR) 100 MG tablet Take 1 tablet by mouth Daily. 5/28/24   Miguel Bond MD   Psyllium (Metamucil) wafer wafer Take 2 wafers by mouth Daily.    Fredrick Manley MD   senna (SENOKOT) 8.6 MG tablet Take 1 tablet by mouth Daily. 6/4/24   Fredrick Manley MD   traMADol (ULTRAM) 50 MG tablet Take 1 tablet by mouth Every 6 (Six) Hours As Needed for Moderate Pain. 8/27/24   Miguel Bond MD   vitamin E 100 UNIT capsule Take 1 capsule by mouth Daily.    Fredrick Manley MD     I have utilized all available immediate resources to obtain, update, or review the patient's current medications (including all prescriptions, over-the-counter products, herbals, cannabis/cannabidiol products, and vitamin/mineral/dietary (nutritional) supplements).    Objective     Vital Signs: /40 (BP Location: Left arm, Patient Position: Sitting)   Pulse 59   Temp 97.7 °F (36.5 °C) (Oral)   Resp 18   Ht 170.2 cm (67\")   Wt 74.8 kg (165 lb)   SpO2 95%   BMI 25.84 kg/m²   Physical " Exam  Constitutional:       Appearance: Normal appearance.   HENT:      Head: Normocephalic.      Nose: Nose normal.   Cardiovascular:      Rate and Rhythm: Rhythm irregular.   Pulmonary:      Effort: Pulmonary effort is normal.   Abdominal:      General: There is distension.      Tenderness: There is abdominal tenderness.   Musculoskeletal:      Cervical back: Normal range of motion.   Skin:     Capillary Refill: Capillary refill takes less than 2 seconds.   Neurological:      Mental Status: He is alert.              Results Reviewed:  Lab Results (last 24 hours)       Procedure Component Value Units Date/Time    Comprehensive Metabolic Panel [[id number]]  (Abnormal) Collected: 09/05/24 1516    Specimen: Blood Updated: 09/05/24 1614     Glucose 128 mg/dL      BUN 14 mg/dL      Creatinine 0.82 mg/dL      Sodium 135 mmol/L      Potassium 4.6 mmol/L      Chloride 105 mmol/L      CO2 26.0 mmol/L      Calcium 8.1 mg/dL      Total Protein 6.4 g/dL      Albumin 2.9 g/dL      ALT (SGPT) 80 U/L      AST (SGOT) 126 U/L      Alkaline Phosphatase 1,419 U/L      Total Bilirubin 1.9 mg/dL      Globulin 3.5 gm/dL      A/G Ratio 0.8 g/dL      BUN/Creatinine Ratio 17.1     Anion Gap 4.0 mmol/L      eGFR 81.4 mL/min/1.73     Narrative:      GFR Normal >60  Chronic Kidney Disease <60  Kidney Failure <15    The GFR formula is only valid for adults with stable renal function between ages 18 and 70.    Procalcitonin [[id number]]  (Normal) Collected: 09/05/24 1516    Specimen: Blood Updated: 09/05/24 1600     Procalcitonin 0.17 ng/mL     Narrative:      As a Marker for Sepsis (Non-Neonates):    1. <0.5 ng/mL represents a low risk of severe sepsis and/or septic shock.  2. >2 ng/mL represents a high risk of severe sepsis and/or septic shock.    As a Marker for Lower Respiratory Tract Infections that require antibiotic therapy:    PCT on Admission    Antibiotic Therapy       6-12 Hrs later    >0.5                Strongly Recommended  >0.25 -  "<0.5        Recommended   0.1 - 0.25          Discouraged              Remeasure/reassess PCT  <0.1                Strongly Discouraged     Remeasure/reassess PCT    As 28 day mortality risk marker: \"Change in Procalcitonin Result\" (>80% or <=80%) if Day 0 (or Day 1) and Day 4 values are available. Refer to http://www.[facility]-pct-calculator.com    Change in PCT <=80%  A decrease of PCT levels below or equal to 80% defines a positive change in PCT test result representing a higher risk for 28-day all-cause mortality of patients diagnosed with severe sepsis for septic shock.    Change in PCT >80%  A decrease of PCT levels of more than 80% defines a negative change in PCT result representing a lower risk for 28-day all-cause mortality of patients diagnosed with severe sepsis or septic shock.       Lipase [[id number]]  (Abnormal) Collected: 09/05/24 1516    Specimen: Blood Updated: 09/05/24 1553     Lipase 161 U/L     Lactic Acid, Plasma [[id number]]  (Normal) Collected: 09/05/24 1516    Specimen: Blood Updated: 09/05/24 1553     Lactate 1.1 mmol/L     Ammonia [[id number]]  (Normal) Collected: 09/05/24 1516    Specimen: Blood Updated: 09/05/24 1552     Ammonia 30 umol/L     Blood Culture - Blood, Wrist, Right [[id number]] Collected: 09/05/24 1526    Specimen: Blood from Wrist, Right Updated: 09/05/24 1545    Protime-INR [[id number]]  (Abnormal) Collected: 09/05/24 1516    Specimen: Blood Updated: 09/05/24 1542     Protime 16.8 Seconds      INR 1.30    aPTT [[id number]]  (Abnormal) Collected: 09/05/24 1516    Specimen: Blood Updated: 09/05/24 1542     PTT 59.3 seconds     CBC & Differential [[id number]]  (Abnormal) Collected: 09/05/24 1516    Specimen: Blood Updated: 09/05/24 1532    Narrative:      The following orders were created for panel order CBC & Differential.  Procedure                               Abnormality         Status                     ---------                               -----------         ------          "            CBC Auto Differential[[id number]]        Abnormal            Final result                 Please view results for these tests on the individual orders.    CBC Auto Differential [[id number]]  (Abnormal) Collected: 09/05/24 1516    Specimen: Blood Updated: 09/05/24 1532     WBC 7.68 10*3/mm3      RBC 3.17 10*6/mm3      Hemoglobin 9.8 g/dL      Hematocrit 30.9 %      MCV 97.5 fL      MCH 30.9 pg      MCHC 31.7 g/dL      RDW 15.8 %      RDW-SD 56.0 fl      MPV 9.8 fL      Platelets 220 10*3/mm3      Neutrophil % 69.4 %      Lymphocyte % 8.9 %      Monocyte % 11.6 %      Eosinophil % 9.0 %      Basophil % 0.7 %      Immature Grans % 0.4 %      Neutrophils, Absolute 5.34 10*3/mm3      Lymphocytes, Absolute 0.68 10*3/mm3      Monocytes, Absolute 0.89 10*3/mm3      Eosinophils, Absolute 0.69 10*3/mm3      Basophils, Absolute 0.05 10*3/mm3      Immature Grans, Absolute 0.03 10*3/mm3      nRBC 0.0 /100 WBC     Blood Culture - Blood, Arm, Right [[id number]] Collected: 09/05/24 1516    Specimen: Blood from Arm, Right Updated: 09/05/24 1531          Imaging Results (Last 24 Hours)       Procedure Component Value Units Date/Time    CT Abdomen Pelvis With Contrast [[id number]] Collected: 09/05/24 1623     Updated: 09/05/24 1629    Narrative:      EXAMINATION: CT ABDOMEN PELVIS W CONTRAST-      9/5/2024 3:03 PM     HISTORY: Elevated liver function tests.  Elevated and pneumonia. Lethargy.     In order to have a CT radiation dose as low as reasonably achievable  Automated Exposure Control was utilized for adjustment of the mA and/or  KV according to patient size.     CT Dose DLP = 359.48 mGy.cm.  (If there are multiple studies performed at the same time this  represents the total dose).     Abdomen/pelvis CT with IV contrast injection.  Axial, sagittal, and coronal sequences.     COMPARISON:  8/9/2024.     Borderline cardiomegaly.  Coronary artery calcification.  Large hiatal hernia.  Mild chronic change at the lung bases.      Slightly mottled appearance of the liver.  No focal liver lesion.  No biliary dilation.  Normal appearance of the gallbladder, pancreas, and spleen.  Spleen = 12 x 5 x 10 cm.     Normal adrenal glands and kidneys.  Aortic calcification with no aneurysm.     There is no bowel dilation or free fluid.  Sigmoid diverticula with no diverticulitis.     Changes related to RIGHT inguinal hernia repair noted. A small amount of  free fluid within the pelvis is a nonspecific finding.     L2-S1 postsurgical changes.  Multilevel degenerative disc, endplate, and facet disease within the  lumbar spine and lower thoracic spine.       Impression:      1. No mass or abscess.  2. No bowel obstruction.  3. No focal liver abnormality is seen.     This report was signed and finalized on 9/5/2024 4:26 PM by Dr. Samuel Hickman MD.       US Gallbladder [[id number]] Collected: 09/05/24 1518     Updated: 09/05/24 1525    Narrative:      EXAMINATION:  US GALLBLADDER-  9/5/2024 1:35 PM     HISTORY: Elevated liver function test.     COMPARISON: No comparison study.     TECHNIQUE: Grayscale and color flow ultrasound was performed.     PANCREAS: The visualized pancreas is echogenic. No pancreatic duct  dilatation is seen. The pancreatic tail was not well visualized.     LIVER: There is mild increased echogenicity of the liver compared to the  right kidney. No focal liver lesion is seen. The portal vein blood flow  direction is normal.     GALLBLADDER: The gallbladder is not well distended. There is thickening  of the gallbladder wall measuring about 5-6 mm. No gallstones are  appreciated. On some images, there may be a small amount of  pericholecystic fluid.     COMMON BILE DUCT: 4 mm.     RIGHT KIDNEY: The renal size, cortical thickness and echogenicity are  normal. There is no hydronephrosis.     OTHER: The visualized abdominal aorta and inferior vena cava are normal  caliber.          Impression:      1. No gallstones are appreciated. There is  gallbladder wall thickening  and a small amount of pericholecystic fluid. Acute cholecystitis is  considered in the differential. There is no ductal dilatation.  2. Mild increased liver echogenicity suggesting fatty infiltration.  3. Echogenic pancreas may be a normal variant. It may also be seen in  patients with diabetes and prediabetes.           The images and report are stored on PAC's per institutional and state  guidelines.     This report was signed and finalized on 9/5/2024 3:22 PM by Dr. Uli Jessica MD.       XR Chest 1 View [[id number]] Collected: 09/05/24 1412     Updated: 09/05/24 1416    Narrative:      EXAMINATION: XR CHEST 1 VW-     9/5/2024 1:04 PM     HISTORY: Altered mental status.     1 view chest x-ray.     COMPARISON:  8/9/2024.     Unchanged cardiomegaly.  CABG changes noted.     Chronic interstitial lung disease.     No focal infiltrate.     No pneumothorax or heart failure.       Impression:      1. Stable cardiomegaly and stable chronic lung changes.           This report was signed and finalized on 9/5/2024 2:13 PM by Dr. Samuel Hickman MD.             I have personally reviewed and interpreted the radiology studies and ECG obtained at time of admission.     Assessment / Plan   Assessment:   Active Hospital Problems    Diagnosis     **Acute cholecystitis        Treatment Plan  The patient will be admitted to my service here at [facility].   In ER CT abdomen was -ve, US Gallbladder was +ve acute cholecystitis no stones, no fever no wbc , LFTS mildly elevated, bilirubin mild, ammonia -ve, case was discussed with surgery who wanted us to admit, HIDA, abx, will keep NPO, IVF, conservative meds was given zosyn in ER and tolerated it, continue, trend LFTS hold lipitor, eliquis in case of surgery, identify reconcile restart home meds once reconciled, SCDS for DVT prophylaxis     Medical Decision Making  Number and Complexity of problems: 2 acute   Differential Diagnosis: as above      Conditions and Status        Condition is at treatment goal.     [facility] Data  External documents reviewed: no  Cardiac tracing (EKG, telemetry) interpretation: yes  Radiology interpretation: yes  Labs reviewed: yes  Any tests that were considered but not ordered: no     Decision rules/scores evaluated (example [id number], [place], etc): yes     Discussed with: ER     Care Planning  Shared decision making: Patient apprised of current labs, vitals, imaging and treatment plan.  They are agreeable with proceeding with plans as discussed.   Code status and discussions: full     Disposition  Social Determinants of Health that impact treatment or disposition: no  Estimated length of stay is tbd .     I confirmed that the patient's advanced care plan is present, code status is documented, and a surrogate decision maker is listed in the patient's medical record.         The patient was seen and examined by me on 1740 at [facility], operated by [facility] .    Electronically signed by Heather Richard MD, 09/05/24, 17:25 CDT.

## 2024-09-05 NOTE — ED NOTES
"Nursing report ED to floor  Chad Henriquez  89 y.o.  male    HPI:   Chief Complaint   Patient presents with    Abnormal Lab       Admitting doctor:   Heather Richard MD    Consulting provider(s):  Consults       No orders found from 8/7/2024 to 9/6/2024.             Admitting diagnosis:   The primary encounter diagnosis was Acute cholecystitis. Diagnoses of Transaminitis and Generalized weakness were also pertinent to this visit.    Code status:   Current Code Status       Date Active Code Status Order ID Comments User Context       Prior            Allergies:   Codeine, Fentanyl, and Augmentin [amoxicillin-pot clavulanate]    Intake and Output  No intake or output data in the 24 hours ending 09/05/24 1653    Weight:       09/05/24  1319   Weight: 74.8 kg (165 lb)       Most recent vitals:   Vitals:    09/05/24 1319 09/05/24 1322   BP: 174/40    BP Location: Left arm    Patient Position: Sitting    Pulse: 59    Resp: 18    Temp:  97.7 °F (36.5 °C)   TempSrc:  Oral   SpO2: 95%    Weight: 74.8 kg (165 lb)    Height: 170.2 cm (67\")      Oxygen Therapy: .    Active LDAs/IV Access:   Lines, Drains & Airways       Active LDAs       Name Placement date Placement time Site Days    Peripheral IV 09/05/24 1519 Right Antecubital 09/05/24  1519  Antecubital  less than 1                    Labs (abnormal labs have a star):   Labs Reviewed   COMPREHENSIVE METABOLIC PANEL - Abnormal; Notable for the following components:       Result Value    Glucose 128 (*)     Sodium 135 (*)     Calcium 8.1 (*)     Albumin 2.9 (*)     ALT (SGPT) 80 (*)     AST (SGOT) 126 (*)     Alkaline Phosphatase 1,419 (*)     Total Bilirubin 1.9 (*)     Anion Gap 4.0 (*)     All other components within normal limits    Narrative:     GFR Normal >60  Chronic Kidney Disease <60  Kidney Failure <15    The GFR formula is only valid for adults with stable renal function between ages 18 and 70.   LIPASE - Abnormal; Notable for the following components:    Lipase " "161 (*)     All other components within normal limits   PROTIME-INR - Abnormal; Notable for the following components:    Protime 16.8 (*)     INR 1.30 (*)     All other components within normal limits   APTT - Abnormal; Notable for the following components:    PTT 59.3 (*)     All other components within normal limits   CBC WITH AUTO DIFFERENTIAL - Abnormal; Notable for the following components:    RBC 3.17 (*)     Hemoglobin 9.8 (*)     Hematocrit 30.9 (*)     MCV 97.5 (*)     RDW 15.8 (*)     RDW-SD 56.0 (*)     Lymphocyte % 8.9 (*)     Eosinophil % 9.0 (*)     Lymphocytes, Absolute 0.68 (*)     Eosinophils, Absolute 0.69 (*)     All other components within normal limits   LACTIC ACID, PLASMA - Normal   PROCALCITONIN - Normal    Narrative:     As a Marker for Sepsis (Non-Neonates):    1. <0.5 ng/mL represents a low risk of severe sepsis and/or septic shock.  2. >2 ng/mL represents a high risk of severe sepsis and/or septic shock.    As a Marker for Lower Respiratory Tract Infections that require antibiotic therapy:    PCT on Admission    Antibiotic Therapy       6-12 Hrs later    >0.5                Strongly Recommended  >0.25 - <0.5        Recommended   0.1 - 0.25          Discouraged              Remeasure/reassess PCT  <0.1                Strongly Discouraged     Remeasure/reassess PCT    As 28 day mortality risk marker: \"Change in Procalcitonin Result\" (>80% or <=80%) if Day 0 (or Day 1) and Day 4 values are available. Refer to http://www.[facility]-pct-calculator.com    Change in PCT <=80%  A decrease of PCT levels below or equal to 80% defines a positive change in PCT test result representing a higher risk for 28-day all-cause mortality of patients diagnosed with severe sepsis for septic shock.    Change in PCT >80%  A decrease of PCT levels of more than 80% defines a negative change in PCT result representing a lower risk for 28-day all-cause mortality of patients diagnosed with severe sepsis or septic shock. "      AMMONIA - Normal   BLOOD CULTURE   BLOOD CULTURE   URINALYSIS W/ CULTURE IF INDICATED   CBC AND DIFFERENTIAL    Narrative:     The following orders were created for panel order CBC & Differential.  Procedure                               Abnormality         Status                     ---------                               -----------         ------                     CBC Auto Differential[[id number]]        Abnormal            Final result                 Please view results for these tests on the individual orders.       Meds given in ED:   Medications   sodium chloride 0.9 % flush 10 mL (has no administration in time range)   piperacillin-tazobactam (ZOSYN) 4.5 g IVPB in 100 mL NS MBP (CD) (has no administration in time range)   iopamidol (ISOVUE-300) 61 % injection 100 mL (100 mL Intravenous Given 9/5/24 1615)           NIH Stroke Scale:       Isolation/Infection(s):  No active isolations   No active infections     COVID Testing  Collected .  Resulted .    Nursing report ED to floor:  Mental status: .  Ambulatory status: .  Precautions: .    ED nurse phone extentsion- ..

## 2024-09-06 ENCOUNTER — APPOINTMENT (OUTPATIENT)
Dept: NUCLEAR MEDICINE | Facility: HOSPITAL | Age: 89
DRG: 446 | End: 2024-09-06
Payer: MEDICARE

## 2024-09-06 PROBLEM — E80.6 HYPERBILIRUBINEMIA: Status: ACTIVE | Noted: 2024-09-06

## 2024-09-06 PROBLEM — R74.8 ELEVATED ALKALINE PHOSPHATASE LEVEL: Status: ACTIVE | Noted: 2024-09-06

## 2024-09-06 PROBLEM — K82.8 DILATED GALLBLADDER: Status: ACTIVE | Noted: 2024-09-06

## 2024-09-06 PROBLEM — K44.9 LARGE HIATAL HERNIA: Status: ACTIVE | Noted: 2024-09-06

## 2024-09-06 PROBLEM — R74.01 TRANSAMINITIS: Status: ACTIVE | Noted: 2024-01-01

## 2024-09-06 LAB
BASOPHILS # BLD AUTO: 0.06 10*3/MM3 (ref 0–0.2)
BASOPHILS NFR BLD AUTO: 0.7 % (ref 0–1.5)
DEPRECATED RDW RBC AUTO: 56.3 FL (ref 37–54)
EOSINOPHIL # BLD AUTO: 0.64 10*3/MM3 (ref 0–0.4)
EOSINOPHIL NFR BLD AUTO: 7.6 % (ref 0.3–6.2)
ERYTHROCYTE [DISTWIDTH] IN BLOOD BY AUTOMATED COUNT: 16 % (ref 12.3–15.4)
HCT VFR BLD AUTO: 29.3 % (ref 37.5–51)
HGB BLD-MCNC: 9.2 G/DL (ref 13–17.7)
IMM GRANULOCYTES # BLD AUTO: 0.03 10*3/MM3 (ref 0–0.05)
IMM GRANULOCYTES NFR BLD AUTO: 0.4 % (ref 0–0.5)
LYMPHOCYTES # BLD AUTO: 0.85 10*3/MM3 (ref 0.7–3.1)
LYMPHOCYTES NFR BLD AUTO: 10.1 % (ref 19.6–45.3)
MCH RBC QN AUTO: 30.6 PG (ref 26.6–33)
MCHC RBC AUTO-ENTMCNC: 31.4 G/DL (ref 31.5–35.7)
MCV RBC AUTO: 97.3 FL (ref 79–97)
MONOCYTES # BLD AUTO: 0.96 10*3/MM3 (ref 0.1–0.9)
MONOCYTES NFR BLD AUTO: 11.4 % (ref 5–12)
NEUTROPHILS NFR BLD AUTO: 5.88 10*3/MM3 (ref 1.7–7)
NEUTROPHILS NFR BLD AUTO: 69.8 % (ref 42.7–76)
NRBC BLD AUTO-RTO: 0 /100 WBC (ref 0–0.2)
PLATELET # BLD AUTO: 225 10*3/MM3 (ref 140–450)
PMV BLD AUTO: 10.2 FL (ref 6–12)
RBC # BLD AUTO: 3.01 10*6/MM3 (ref 4.14–5.8)
WBC NRBC COR # BLD AUTO: 8.42 10*3/MM3 (ref 3.4–10.8)

## 2024-09-06 PROCEDURE — 25010000002 HYDRALAZINE PER 20 MG: Performed by: HOSPITALIST

## 2024-09-06 PROCEDURE — 99222 1ST HOSP IP/OBS MODERATE 55: CPT | Performed by: SURGERY

## 2024-09-06 PROCEDURE — 78227 HEPATOBIL SYST IMAGE W/DRUG: CPT

## 2024-09-06 PROCEDURE — 97162 PT EVAL MOD COMPLEX 30 MIN: CPT

## 2024-09-06 PROCEDURE — A9537 TC99M MEBROFENIN: HCPCS | Performed by: HOSPITALIST

## 2024-09-06 PROCEDURE — 25810000003 SODIUM CHLORIDE 0.9 % SOLUTION: Performed by: HOSPITALIST

## 2024-09-06 PROCEDURE — 0 TECHNETIUM TC 99M MEBROFENIN KIT: Performed by: HOSPITALIST

## 2024-09-06 PROCEDURE — G0378 HOSPITAL OBSERVATION PER HR: HCPCS

## 2024-09-06 PROCEDURE — 85025 COMPLETE CBC W/AUTO DIFF WBC: CPT | Performed by: HOSPITALIST

## 2024-09-06 PROCEDURE — 25010000002 PIPERACILLIN SOD-TAZOBACTAM PER 1 G: Performed by: HOSPITALIST

## 2024-09-06 RX ORDER — KIT FOR THE PREPARATION OF TECHNETIUM TC 99M MEBROFENIN 45 MG/10ML
1 INJECTION, POWDER, LYOPHILIZED, FOR SOLUTION INTRAVENOUS
Status: COMPLETED | OUTPATIENT
Start: 2024-09-06 | End: 2024-09-06

## 2024-09-06 RX ADMIN — HYDRALAZINE HYDROCHLORIDE 20 MG: 20 INJECTION, SOLUTION INTRAMUSCULAR; INTRAVENOUS at 17:43

## 2024-09-06 RX ADMIN — PANTOPRAZOLE SODIUM 40 MG: 40 INJECTION, POWDER, FOR SOLUTION INTRAVENOUS at 06:04

## 2024-09-06 RX ADMIN — PIPERACILLIN AND TAZOBACTAM 4.5 G: 4; .5 INJECTION, POWDER, FOR SOLUTION INTRAVENOUS at 00:01

## 2024-09-06 RX ADMIN — Medication 10 ML: at 22:07

## 2024-09-06 RX ADMIN — MEBROFENIN 1 DOSE: 45 INJECTION, POWDER, LYOPHILIZED, FOR SOLUTION INTRAVENOUS at 10:30

## 2024-09-06 RX ADMIN — GABAPENTIN 600 MG: 300 CAPSULE ORAL at 22:06

## 2024-09-06 RX ADMIN — SODIUM CHLORIDE 75 ML/HR: 9 INJECTION, SOLUTION INTRAVENOUS at 07:44

## 2024-09-06 RX ADMIN — PIPERACILLIN AND TAZOBACTAM 4.5 G: 4; .5 INJECTION, POWDER, FOR SOLUTION INTRAVENOUS at 23:07

## 2024-09-06 RX ADMIN — PIPERACILLIN AND TAZOBACTAM 4.5 G: 4; .5 INJECTION, POWDER, FOR SOLUTION INTRAVENOUS at 16:01

## 2024-09-06 RX ADMIN — Medication 10 ML: at 08:18

## 2024-09-06 RX ADMIN — Medication 10 MG: at 23:06

## 2024-09-06 RX ADMIN — GABAPENTIN 600 MG: 300 CAPSULE ORAL at 08:15

## 2024-09-06 RX ADMIN — SODIUM CHLORIDE 75 ML/HR: 9 INJECTION, SOLUTION INTRAVENOUS at 23:06

## 2024-09-06 RX ADMIN — PIPERACILLIN AND TAZOBACTAM 4.5 G: 4; .5 INJECTION, POWDER, FOR SOLUTION INTRAVENOUS at 07:42

## 2024-09-06 RX ADMIN — CARVEDILOL 6.25 MG: 6.25 TABLET, FILM COATED ORAL at 08:15

## 2024-09-06 RX ADMIN — LOSARTAN POTASSIUM 100 MG: 50 TABLET, FILM COATED ORAL at 08:15

## 2024-09-06 RX ADMIN — CARVEDILOL 6.25 MG: 6.25 TABLET, FILM COATED ORAL at 17:43

## 2024-09-06 NOTE — CASE MANAGEMENT/SOCIAL WORK
Discharge Planning Assessment  [facility]     Patient Name: Chad Henriquez  MRN: [id number]  Today's Date: 9/6/2024    Admit Date: 9/5/2024        Discharge Needs Assessment       Row Name 09/06/24 0857       Living Environment    People in Home spouse    Name(s) of People in Home Eva Henriquez (Spouse)  [phone] (Mobile)    Current Living Arrangements home    Potentially Unsafe Housing Conditions none    In the past 12 months has the electric, gas, oil, or water company threatened to shut off services in your home? No    Primary Care Provided by self    Provides Primary Care For no one    Family Caregiver if Needed spouse    Family Caregiver Names wife Eva Henriquez    Quality of Family Relationships helpful;involved;supportive       Resource/Environmental Concerns    Resource/Environmental Concerns none    Transportation Concerns none       Transportation Needs    In the past 12 months, has lack of transportation kept you from medical appointments or from getting medications? no    In the past 12 months, has lack of transportation kept you from meetings, work, or from getting things needed for daily living? No       Food Insecurity    Within the past 12 months, you worried that your food would run out before you got the money to buy more. Never true    Within the past 12 months, the food you bought just didn't last and you didn't have money to get more. Never true       Transition Planning    Patient/Family Anticipates Transition to home with family    Patient/Family Anticipated Services at Transition none    Transportation Anticipated family or friend will provide       Discharge Needs Assessment    Equipment Currently Used at Home walker, rolling    Concerns to be Addressed discharge planning    Discharge Coordination/Progress Patient lives at home with wife. Has a rolling walker to use. Has traditional Medicare coverage and established PCP is Dr. Samuel Bond. He denies needs at this time but CM/SS will  follow closely as patient will be having surgery.                   Discharge Plan    No documentation.                 Continued Care and Services - Admitted Since 9/5/2024    No active coordination exists for this encounter.          Demographic Summary    No documentation.                  Functional Status    No documentation.                  Psychosocial    No documentation.                  Abuse/Neglect    No documentation.                  Legal    No documentation.                  Substance Abuse    No documentation.                  Patient Forms    No documentation.                     Rosie Potts RN

## 2024-09-06 NOTE — PROGRESS NOTES
[facility]  INPATIENT PROGRESS NOTE    Patient Name: Chad Henriquez  Date of Admission: 9/5/2024  Today's Date: 09/06/24  Length of Stay: 1  Primary Care Physician: Miguel Bond MD    Subjective   Chief Complaint: Abnormal labs  HPI   Patient is a 89-year-old male with a history of hypertension and coronary artery disease s/p CABG and stenting  , hyperlipidemia,afib  on lipitor eliquis who presents to the ER with abnormal labs. Patient states he has felt very bad over the last week and a half. He complains of lethargy, generalized weakness, decreased appetite and chills. Patient had some labs performed and they showed an elevated ammonia level and LFTs. Patient denies any known liver abnormalities. Patient was called today and told to come to the ER. He has had some intermittent confusion. He denies any fever, chest pain, shortness of air, abdominal pain, nausea vomiting diarrhea, urinary changes, neurologic changes. Patient states his abdomen has been swelling ,   In ER CT abdomen was -ve, US Gallbladder was +ve acute cholecystitis no stones, no fever no wbc , LFTS mildly elevated, bilirubin mild, ammonia -ve, case was discussed with surgery who wanted us to admit, HIDA, abx, will keep NPO, IVF, conservative meds was given zosyn in ER and tolerated it, continue, trend LFTS hold lipitor, eliquis in case of surgery, identify reconcile restart home meds once reconciled, SCDS for DVT prophylaxis  9/6  As before admitted to us with abnormal labs but acute cholecystitis on antibiotic awaiting surgery consult, and HIDA scan blood pressure remains an issue, on Cozaar Coreg hydralazine he said he was switched from Coreg to metoprolol suspect for his A-fib to control his heart rate as Coreg is better in controlling blood pressure we will continue to monitor      Review of Systems   All pertinent negatives and positives are as above. All other systems have been  "reviewed and are negative unless otherwise stated.     Objective    Temp:  [97.7 °F (36.5 °C)-98.9 °F (37.2 °C)] 97.7 °F (36.5 °C)  Heart Rate:  [58-64] 58  Resp:  [18] 18  BP: (169-207)/(40-68) 182/61  Physical Exam  Constitutional:       Appearance: Normal appearance.   HENT:      Head: Normocephalic.      Nose: Nose normal.   Eyes:      Pupils: Pupils are equal, round, and reactive to light.   Cardiovascular:      Rate and Rhythm: Normal rate.   Pulmonary:      Effort: Pulmonary effort is normal.   Abdominal:      General: Abdomen is flat.   Musculoskeletal:         General: Normal range of motion.      Cervical back: Normal range of motion.   Neurological:      Mental Status: He is alert.           Results Review:  I have reviewed the labs, radiology results, and diagnostic studies.    Laboratory Data:   Results from last 7 days   Lab Units 09/06/24  0443 09/05/24  1516   WBC 10*3/mm3 8.42 7.68   HEMOGLOBIN g/dL 9.2* 9.8*   HEMATOCRIT % 29.3* 30.9*   PLATELETS 10*3/mm3 225 220        Results from last 7 days   Lab Units 09/05/24  1516   SODIUM mmol/L 135*   POTASSIUM mmol/L 4.6   CHLORIDE mmol/L 105   CO2 mmol/L 26.0   BUN mg/dL 14   CREATININE mg/dL 0.82   CALCIUM mg/dL 8.1*   BILIRUBIN mg/dL 1.9*   ALK PHOS U/L 1,419*   ALT (SGPT) U/L 80*   AST (SGOT) U/L 126*   GLUCOSE mg/dL 128*       Culture Data:   No results found for: \"BLOODCX\", \"URINECX\", \"WOUNDCX\", \"MRSACX\", \"RESPCX\", \"STOOLCX\"    Radiology Data:   Imaging Results (Last 24 Hours)       Procedure Component Value Units Date/Time    CT Abdomen Pelvis With Contrast [[id number]] Collected: 09/05/24 1623     Updated: 09/05/24 1629    Narrative:      EXAMINATION: CT ABDOMEN PELVIS W CONTRAST-      9/5/2024 3:03 PM     HISTORY: Elevated liver function tests.  Elevated and pneumonia. Lethargy.     In order to have a CT radiation dose as low as reasonably achievable  Automated Exposure Control was utilized for adjustment of the mA and/or  KV according to patient " size.     CT Dose DLP = 359.48 mGy.cm.  (If there are multiple studies performed at the same time this  represents the total dose).     Abdomen/pelvis CT with IV contrast injection.  Axial, sagittal, and coronal sequences.     COMPARISON:  8/9/2024.     Borderline cardiomegaly.  Coronary artery calcification.  Large hiatal hernia.  Mild chronic change at the lung bases.     Slightly mottled appearance of the liver.  No focal liver lesion.  No biliary dilation.  Normal appearance of the gallbladder, pancreas, and spleen.  Spleen = 12 x 5 x 10 cm.     Normal adrenal glands and kidneys.  Aortic calcification with no aneurysm.     There is no bowel dilation or free fluid.  Sigmoid diverticula with no diverticulitis.     Changes related to RIGHT inguinal hernia repair noted. A small amount of  free fluid within the pelvis is a nonspecific finding.     L2-S1 postsurgical changes.  Multilevel degenerative disc, endplate, and facet disease within the  lumbar spine and lower thoracic spine.       Impression:      1. No mass or abscess.  2. No bowel obstruction.  3. No focal liver abnormality is seen.     This report was signed and finalized on 9/5/2024 4:26 PM by Dr. Samuel Hickman MD.       US Gallbladder [[id number]] Collected: 09/05/24 1518     Updated: 09/05/24 1525    Narrative:      EXAMINATION:  US GALLBLADDER-  9/5/2024 1:35 PM     HISTORY: Elevated liver function test.     COMPARISON: No comparison study.     TECHNIQUE: Grayscale and color flow ultrasound was performed.     PANCREAS: The visualized pancreas is echogenic. No pancreatic duct  dilatation is seen. The pancreatic tail was not well visualized.     LIVER: There is mild increased echogenicity of the liver compared to the  right kidney. No focal liver lesion is seen. The portal vein blood flow  direction is normal.     GALLBLADDER: The gallbladder is not well distended. There is thickening  of the gallbladder wall measuring about 5-6 mm. No gallstones  are  appreciated. On some images, there may be a small amount of  pericholecystic fluid.     COMMON BILE DUCT: 4 mm.     RIGHT KIDNEY: The renal size, cortical thickness and echogenicity are  normal. There is no hydronephrosis.     OTHER: The visualized abdominal aorta and inferior vena cava are normal  caliber.          Impression:      1. No gallstones are appreciated. There is gallbladder wall thickening  and a small amount of pericholecystic fluid. Acute cholecystitis is  considered in the differential. There is no ductal dilatation.  2. Mild increased liver echogenicity suggesting fatty infiltration.  3. Echogenic pancreas may be a normal variant. It may also be seen in  patients with diabetes and prediabetes.           The images and report are stored on PAC's per institutional and state  guidelines.     This report was signed and finalized on 9/5/2024 3:22 PM by Dr. Uli Jessica MD.       XR Chest 1 View [[id number]] Collected: 09/05/24 1412     Updated: 09/05/24 1416    Narrative:      EXAMINATION: XR CHEST 1 VW-     9/5/2024 1:04 PM     HISTORY: Altered mental status.     1 view chest x-ray.     COMPARISON:  8/9/2024.     Unchanged cardiomegaly.  CABG changes noted.     Chronic interstitial lung disease.     No focal infiltrate.     No pneumothorax or heart failure.       Impression:      1. Stable cardiomegaly and stable chronic lung changes.           This report was signed and finalized on 9/5/2024 2:13 PM by Dr. Samuel Hickman MD.               I have reviewed the patient's current medications.     Assessment/Plan   Assessment  Active Hospital Problems    Diagnosis     **Acute cholecystitis        Treatment Plan  . In ER CT abdomen was -ve, US Gallbladder was +ve acute cholecystitis no stones, no fever no wbc , LFTS mildly elevated, bilirubin mild, ammonia -ve, case was discussed with surgery who wanted us to admit, HIDA, abx, will keep NPO, IVF, conservative meds was given zosyn in ER and tolerated  it, continue, trend LFTS hold lipitor, eliquis in case of surgery, identify reconcile restart home meds once reconciled, SCDS for DVT prophylaxis      Medical Decision Making  Number and Complexity of problems: 2 acute   Differential Diagnosis: as above      Conditions and Status        Condition is at treatment goal.     [facility] Data  External documents reviewed: no  Cardiac tracing (EKG, telemetry) interpretation: yes  Radiology interpretation: yes  Labs reviewed: yes  Any tests that were considered but not ordered: no     Decision rules/scores evaluated (example [id number], Wells, etc): yes     Discussed with: ER     Care Planning  Shared decision making: Patient apprised of current labs, vitals, imaging and treatment plan.  They are agreeable with proceeding with plans as discussed.   Code status and discussions: full      Disposition  Social Determinants of Health that impact treatment or disposition: no  Estimated length of stay is tbd    Electronically signed by Heather Richard MD, 09/06/24, 10:02 CDT.

## 2024-09-06 NOTE — THERAPY EVALUATION
Patient Name: Chad Henriquez  : 1929    MRN: [id number]                              Today's Date: 2024       Admit Date: 2024    Visit Dx:     ICD-10-CM ICD-9-CM   1. Acute cholecystitis  K81.0 575.0   2. Transaminitis  R74.01 790.4   3. Generalized weakness  R53.1 780.79     Patient Active Problem List   Diagnosis    BPH with urinary obstruction    Frequency of urination    Nocturia    OAB (overactive bladder)    Non-smoker    Spinal stenosis, lumbar region, with neurogenic claudication    Left leg pain    Bilateral hip pain    Acute left-sided low back pain with left-sided sciatica    Essential tremor    Spinal stenosis    Degeneration of lumbar or lumbosacral intervertebral disc    Hereditary and idiopathic peripheral neuropathy    Syncope    Hypercholesteremia    Coronary artery disease involving native coronary artery of native heart without angina pectoris    Essential hypertension    Lumbar disc disease with radiculopathy    Left hip pain    History of skin cancer in adulthood    Persistent shortness of breath after COVID-19    Diastolic dysfunction without heart failure    Atrial flutter with rapid ventricular response    TIA (transient ischemic attack)    Chronic anticoagulation    Paroxysmal atrial fibrillation    Impaired memory    Acute cholecystitis     Past Medical History:   Diagnosis Date    Allergic rhinitis     Anemia     Arthritis     Blind left eye     BPH (benign prostatic hypertrophy) with urinary retention     Cancer     skin cancer    Chronic back pain     RUNS DOWN BOTH LEGS    Constipation     Coronary artery disease     COVID     Hard of hearing     Heart attack     NO MUSCLE DAMAGE - JUST OCCLUDED VALVE    High cholesterol     History of skin cancer     BILATERAL EARS    History of transfusion         Hypertension     Inguinal hernia     Leaky heart valve     DR CONCEPCION SAYS NOT ENOUGH TO BE OF CONCERN    Other bursal cyst, right elbow     Paroxysmal atrial  fibrillation 5/6/2024    PONV (postoperative nausea and vomiting)     has had scopalamine patch in past     Sleep apnea     DOES NOT USE CPAP OR BIPAP    Spinal stenosis of cervical region     Spinal stenosis of lumbar region     Stroke     Tremors of nervous system     Wears hearing aid     BILATERAL     Past Surgical History:   Procedure Laterality Date    ANTERIOR LUMBAR EXPOSURE N/A 12/07/2018    Procedure: ANTERIOR LUMBAR EXPOSURE;  Surgeon: Manolo Mcleod DO;  Location:  [facility] OR;  Service: Vascular    APPENDECTOMY      BACK SURGERY      X3    CARDIAC SURGERY  08/08/1986    X1 BYPASS    CORONARY ANGIOPLASTY WITH STENT PLACEMENT  1997    X3 STENTS    CORONARY ARTERY BYPASS GRAFT      HERNIA REPAIR Bilateral     x2    INGUINAL HERNIA REPAIR Right 06/22/2017    Procedure: RIGHT INGUINAL HERNIA REPAIR AND EXCISION OF CYST RIGHT ELBOW ;  Surgeon: Jackelyn Arriola MD;  Location:  [facility] OR;  Service:     LUMBAR FUSION Left 06/30/2021    Procedure: LUMBAR LAMINECTOMY, DISCECTOMY, FACETECTOMY,  TRANSFORAMINAL LUMBAR INTERBODY FUSION L2-3. REVISION OF INSTRUMENTATION L3-S1. USE OF IMAGE GUIDANCE AND SURGICAL ROBOT;  Surgeon: Kj Falcon MD;  Location:  [facility] OR;  Service: Robotics - Neuro;  Laterality: Left;    LUMBAR LAMINECTOMY N/A 03/12/2018    Procedure: LUMBAR LAMINECTOMY WITHOUT FUSION L3-4,4-5;  Surgeon: Kj Falcon MD;  Location:  [facility] OR;  Service: Neurosurgery    LUMBAR LAMINECTOMY ANTERIOR LUMBAR INTERBODY FUSION N/A 12/07/2018    Procedure: ANTERIOR LUMBAR INTERBODY FUSION L5-S1;  Surgeon: Kj Falcon MD;  Location:  [facility] OR;  Service: Neurosurgery    LUMBAR LAMINECTOMY WITH FUSION Left 12/10/2018    Procedure: LUMBAR LAMINECTOMY TRANSFORAMINAL LUMBAR INTERBODY FUSION L34,45;  Surgeon: Kj Falcon MD;  Location:  [facility] OR;  Service: Neurosurgery    LUMBAR LAMINECTOMY WITH FUSION Left 12/07/2018    Procedure: LUMBAR LAMINECTOMY TRANSFORAMINAL LUMBAR INTERBODY FUSION LEFT L3-4,  L4-5 QUADRANT RETRACTOR;  Surgeon: Kj Falcon MD;  Location: [facility] OR;  Service: Neurosurgery    SKIN LESION EXCISION      nasal      General Information       Row Name 09/06/24 1330          Physical Therapy Time and Intention    Document Type evaluation  PMX: hypertension and coronary artery disease s/p CABG and stenting  , hyperlipidemia,afib  on lipitor eliquis. presents with fever, chills, lack of appetite, generalized weakness, and some confusion.  -CHE (r) BL (t) CHE (c)     Mode of Treatment physical therapy  DX: Abnormal liver function with increased ammonia levels.  -CHE (r) BL (t) CHE (c)       Row Name 09/06/24 1330          General Information    Patient Profile Reviewed yes  -CHE (r) BL (t) CHE (c)     Prior Level of Function independent:;all household mobility;community mobility;gait;transfer;bed mobility;ADL's;feeding;grooming;dressing;bathing (P)   -BL     Existing Precautions/Restrictions fall  -CHE (r) BL (t) CHE (c)     Barriers to Rehab medically complex;previous functional deficit;physical barrier;cognitive status  -CHE (r) BL (t) CHE (c)       Row Name 09/06/24 1330          Living Environment    People in Home spouse  currently uses Rollator at home. Wife helps take care of household activities. Has walk in shower with shower seat.  -CHE (r) BL (t) CHE (c)       Row Name 09/06/24 1330          Home Main Entrance    Number of Stairs, Main Entrance four  -CHE (r) BL (t) CHE (c)     Stair Railings, Main Entrance railings safe and in good condition;railings on both sides of stairs  -CHE (r) BL (t) CHE (c)       Row Name 09/06/24 1330          Stairs Within Home, Primary    Number of Stairs, Within Home, Primary none  -CHE (r) BL (t) CHE (c)     Stair Railings, Within Home, Primary none  -CHE (r) BL (t) CHE (c)       Row Name 09/06/24 1330          Cognition    Orientation Status (Cognition) oriented x 4  -CHE (r) BL (t) CHE (c)       Row Name 09/06/24 1330          Safety Issues, Functional Mobility    Safety  Issues Affecting Function (Mobility) friction/shear risk;safety precautions follow-through/compliance;impulsivity  -CHE (r) BL (t) CHE (c)     Impairments Affecting Function (Mobility) balance;endurance/activity tolerance;shortness of breath;strength  hard of hearing better on L side  -CHE (r) BL (t) CHE (c)               User Key  (r) = Recorded By, (t) = Taken By, (c) = Cosigned By      Initials Name Provider Type    Demetrio Pereira, PT DPT Physical Therapist    Brett Garrett PT Student PT Student                   Mobility       Row Name 09/06/24 1330          Bed Mobility    Bed Mobility rolling right;supine-sit;scooting/bridging  -CHE (r) BL (t) CHE (c)     Scooting/Bridging [place] (Bed Mobility) standby assist;supervision  -CHE (r) BL (t) CHE (c)     Supine-Sit [place] (Bed Mobility) standby assist;supervision  -CHE (r) BL (t) CHE (c)     Assistive Device (Bed Mobility) bed rails  -CHE (r) BL (t) CHE (c)     Comment, (Bed Mobility) pt. able to get to sitting EOB on their own  -CHE (r) BL (t) CHE (c)       Row Name 09/06/24 1330          Sit-Stand Transfer    Sit-Stand [place] (Transfers) contact guard  -CHE (r) BL (t) CHE (c)     Assistive Device (Sit-Stand Transfers) walker, 4-wheeled  -CHE (r) BL (t) CHE (c)     Comment, (Sit-Stand Transfer) VC to push from bed when coing to standing  -CHE (r) BL (t) CHE (c)       Row Name 09/06/24 1330          Gait/Stairs (Locomotion)    [place] Level (Gait) contact guard  -CHE (r) BL (t) CHE (c)     Assistive Device (Gait) walker, 4-wheeled  -CHE (r) BL (t) CHE (c)     Distance in Feet (Gait) 200  -CHE (r) BL (t) CHE (c)     Deviations/Abnormal Patterns (Gait) right sided deviations;gait speed decreased;stride length decreased;coty decreased  -CHE (r) BL (t) CHE (c)     Right Sided Gait Deviations foot drop/toe drag  has AFO for ambulation. (in closet)  -CHE (r) BL (t) CHE (c)     Comment, (Gait/Stairs) pt. does well with ambulation, no LOB recorded during eval.  -CHE  (r) BL (t) CHE (c)               User Key  (r) = Recorded By, (t) = Taken By, (c) = Cosigned By      Initials Name Provider Type    Demetrio Pereira, PT DPT Physical Therapist    Brett Garrett, JESSIE Student PT Student                   Obj/Interventions       Row Name 09/06/24 1330          Range of Motion Comprehensive    General Range of Motion no range of motion deficits identified  -CHE (r) BL (t) CHE (c)     Comment, General Range of Motion All ROM WFL  -CHE (r) BL (t) CHE (c)       Row Name 09/06/24 1330          Strength Comprehensive (MMT)    General Manual Muscle Testing (MMT) Assessment no strength deficits identified  -CHE (r) BL (t) CHE (c)     Comment, General Manual Muscle Testing (MMT) Assessment UE and LE strength grossly 4/5  -CHE (r) BL (t) CHE (c)       Row Name 09/06/24 1330          Balance    Balance Assessment sitting static balance;sitting dynamic balance;standing static balance;standing dynamic balance;sit to stand dynamic balance  -CHE (r) BL (t) CHE (c)     Static Sitting Balance modified independence  -CHE (r) BL (t) CHE (c)     Dynamic Sitting Balance modified independence  -CHE (r) BL (t) CHE (c)     Position, Sitting Balance unsupported;sitting edge of bed  -CHE (r) BL (t) CHE (c)     Sit to Stand Dynamic Balance contact guard  -CHE (r) BL (t) CHE (c)     Static Standing Balance contact guard  -CHE (r) BL (t) CHE (c)     Dynamic Standing Balance contact guard  -CHE (r) BL (t) CHE (c)     Position/Device Used, Standing Balance supported;walker, 4-wheeled  -CHE (r) BL (t) CHE (c)     Balance Interventions sitting;standing;sit to stand;supported;static;dynamic  -CHE (r) BL (t) CHE (c)     Comment, Balance pt. showed fair balance sitting EOB and with ambulation.  -CHE (r) BL (t) CHE (c)       Row Name 09/06/24 1330          Sensory Assessment (Somatosensory)    Sensory Assessment (Somatosensory) sensation intact;UE sensation intact;LE sensation intact  -CHE (r) BL (t) CHE (c)               User Key  (r) = Recorded  By, (t) = Taken By, (c) = Cosigned By      Initials Name Provider Type    Demetrio Periera, PT DPT Physical Therapist    Brett Garrett, PT Student PT Student                   Goals/Plan       Row Name 09/06/24 1330          Bed Mobility Goal 1 (PT)    Activity/Assistive Device (Bed Mobility Goal 1, PT) rolling to left;rolling to right;scooting;sit to supine;supine to sit  -CHE (r) BL (t) CHE (c)     [place] Level/Cues Needed (Bed Mobility Goal 1, PT) modified independence  -CHE (r) BL (t) CHE (c)     Time Frame (Bed Mobility Goal 1, PT) long term goal (LTG)  -CHE (r) BL (t) CHE (c)     Progress/Outcomes (Bed Mobility Goal 1, PT) new goal  -CHE (r) BL (t) CHE (c)       Row Name 09/06/24 1330          Transfer Goal 1 (PT)    Activity/Assistive Device (Transfer Goal 1, PT) sit-to-stand/stand-to-sit;bed-to-chair/chair-to-bed;other (see comments)  with rollator.  -CHE (r) BL (t) CHE (c)     [place] Level/Cues Needed (Transfer Goal 1, PT) standby assist  -CHE (r) BL (t) CHE (c)     Time Frame (Transfer Goal 1, PT) long term goal (LTG)  -CHE (r) BL (t) CHE (c)     Progress/Outcome (Transfer Goal 1, PT) new goal  -CHE (r) BL (t) CHE (c)       Row Name 09/06/24 1330          Gait Training Goal 1 (PT)    Activity/Assistive Device (Gait Training Goal 1, PT) gait (walking locomotion);decrease fall risk;increase endurance/gait distance  -CHE     [place] Level (Gait Training Goal 1, PT) standby assist  -CHE (r) BL (t) CHE (c)     Distance (Gait Training Goal 1, PT) 250'  -CHE (r) BL (t) CHE (c)     Time Frame (Gait Training Goal 1, PT) long term goal (LTG)  -CHE (r) BL (t) CHE (c)     Progress/Outcome (Gait Training Goal 1, PT) new goal  -CHE (r) BL (t) CHE (c)       Row Name 09/06/24 4625          Therapy Assessment/Plan (PT)    Planned Therapy Interventions (PT) balance training;bed mobility training;gait training;home exercise program;strengthening;stair training;patient/family education;transfer training  -CHE (r) BL (t) CHE (c)   "             User Key  (r) = Recorded By, (t) = Taken By, (c) = Cosigned By      Initials Name Provider Type    Demetrio Pereira, PT DPT Physical Therapist    Brett Garrett, PT Student PT Student                   Clinical Impression       Row Name 09/06/24 1330          Pain    Pretreatment Pain Rating 0/10 - no pain  discomfort in abdomen from swelling  -CHE (r) BL (t) CHE (c)     Posttreatment Pain Rating 0/10 - no pain  -CHE (r) BL (t) CHE (c)     Pain Location - abdomen  -CHE (r) BL (t) CHE (c)     Pain Intervention(s) Ambulation/increased activity;Repositioned  -CHE (r) BL (t) CHE (c)     Additional Documentation Pain Scale: Numbers Pre/Post-Treatment (Group)  -CHE (r) BL (t) CHE (c)       Row Name 09/06/24 1336          Plan of Care Review    Plan of Care Reviewed With patient;spouse  -CHE (r) BL (t) CHE (c)     Progress no change  -CHE (r) BL (t) CHE (c)     Outcome Evaluation PT eval complete. Oriented x4. Pt. presents to [facility] with complaint of fever, chills, nausea, and general weakness. Pt. presents with abdomen swelling and increased fatigue. Able to get to sitting EOB with SBA. Extremity sensation intact and UE/LE strength grossly 4/5. Pt. able to come to standng EOB with CGA and capable of ambulating 200' with CGA. Pt. states that they are \"mildly tired\" after their walk. Skilled therapy recommended to work on endurance, functional mobility, and manage fatigue. Recommended D/C home with assist.  -CHE (r) BL (t) CHE (c)       Row Name 09/06/24 1333          Therapy Assessment/Plan (PT)    Patient/Family Therapy Goals Statement (PT) return home  -CHE (r) BL (t) CHE (c)     Rehab Potential (PT) good, to achieve stated therapy goals  -CHE (r) BL (t) CHE (c)     Criteria for Skilled Interventions Met (PT) yes;meets criteria;skilled treatment is necessary  -CHE (r) BL (t) CHE (c)     Therapy Frequency (PT) 2 times/day  -CHE (r) BL (t) CHE (c)     Predicted Duration of Therapy Intervention (PT) Until D/C or goals " met  -CHE (r) BL (t) CHE (c)       Row Name 09/06/24 1330          Vital Signs    O2 Delivery Pre Treatment room air  -CHE (r) BL (t) CHE (c)     O2 Delivery Intra Treatment room air  -CHE (r) BL (t) CHE (c)     O2 Delivery Post Treatment room air  -CHE (r) BL (t) CHE (c)     Pre Patient Position Supine  -CHE (r) BL (t) CHE (c)     Intra Patient Position Standing  -CHE (r) BL (t) CHE (c)     Post Patient Position Sitting  -CHE (r) BL (t) CHE (c)       Row Name 09/06/24 1330          Positioning and Restraints    Pre-Treatment Position in bed  -CHE (r) BL (t) CHE (c)     Post Treatment Position chair  -CHE (r) BL (t) CHE (c)     In Chair notified nsg;reclined;sitting;call light within reach;encouraged to call for assist;with family/caregiver;legs elevated  -CHE (r) BL (t) CHE (c)               User Key  (r) = Recorded By, (t) = Taken By, (c) = Cosigned By      Initials Name Provider Type    Demetrio Pereira, PT DPT Physical Therapist    Brett Garrett, JESSIE Student PT Student                   Outcome Measures       Row Name 09/06/24 1330 09/06/24 0800       How much help from another person do you currently need...    Turning from your back to your side while in flat bed without using bedrails? 4  -CHE (r) BL (t) CHE (c) 3  -KJ    Moving from lying on back to sitting on the side of a flat bed without bedrails? 4  -CHE (r) BL (t) CHE (c) 3  -KJ    Moving to and from a bed to a chair (including a wheelchair)? 3  -CHE (r) BL (t) CHE (c) 3  -KJ    Standing up from a chair using your arms (e.g., wheelchair, bedside chair)? 4  -CHE (r) BL (t) CHE (c) 3  -KJ    Climbing 3-5 steps with a railing? 3  -CHE (r) BL (t) CHE (c) 3  -KJ    To walk in hospital room? 3  -CHE (r) BL (t) CHE (c) 3  -KJ    AM-PAC 6 Clicks Score (PT) 21  -CHE (r) BL (t) 18  -KJ    Highest Level of Mobility Goal 6 --> Walk 10 steps or more  -CHE (r) BL (t) 6 --> Walk 10 steps or more  -KJ      Row Name 09/06/24 1330          Functional Assessment    Outcome Measure Options AM-PAC 6  Clicks Basic Mobility (PT)  -CHE (r) BL (t) CHE (c)               User Key  (r) = Recorded By, (t) = Taken By, (c) = Cosigned By      Initials Name Provider Type    Demetrio Pereira, PT DPT Physical Therapist    Shaila Dorantes, RN Registered Nurse    Brett Garrett, PT Student PT Student                                 Physical Therapy Education       Title: PT OT SLP Therapies (In Progress)       Topic: Physical Therapy (In Progress)       Point: Mobility training (Done)       Learning Progress Summary             Patient Acceptance, E,D, VU,DU by BL at 9/6/2024 1447    Comment: pt. educated on proper techniques to come to standing to maintain proper body mechanics andd safety. Educated on the importance of functional mobility to increase/maintain daily function.   Significant Other Acceptance, E,D, VU,DU by BL at 9/6/2024 1447    Comment: pt. educated on proper techniques to come to standing to maintain proper body mechanics andd safety. Educated on the importance of functional mobility to increase/maintain daily function.                         Point: Home exercise program (Not Started)       Learner Progress:  Not documented in this visit.              Point: Body mechanics (Done)       Learning Progress Summary             Patient Acceptance, E,D, VU,DU by BL at 9/6/2024 1447    Comment: pt. educated on proper techniques to come to standing to maintain proper body mechanics andd safety. Educated on the importance of functional mobility to increase/maintain daily function.   Significant Other Acceptance, E,D, VU,DU by BL at 9/6/2024 1447    Comment: pt. educated on proper techniques to come to standing to maintain proper body mechanics andd safety. Educated on the importance of functional mobility to increase/maintain daily function.                         Point: Precautions (Done)       Learning Progress Summary             Patient Acceptance, E,D, VU,DU by BL at 9/6/2024 1447    Comment: pt. educated  "on proper techniques to come to standing to maintain proper body mechanics andd safety. Educated on the importance of functional mobility to increase/maintain daily function.   Significant Other Acceptance, E,D, OLESYA,STACIA by  at 9/6/2024 6758    Comment: pt. educated on proper techniques to come to standing to maintain proper body mechanics andd safety. Educated on the importance of functional mobility to increase/maintain daily function.                                         User Key       Initials Effective Dates Name Provider Type Discipline     08/21/24 -  Brett Merino, PT Student PT Student PT                  PT Recommendation and Plan  Planned Therapy Interventions (PT): balance training, bed mobility training, gait training, home exercise program, strengthening, stair training, patient/family education, transfer training  Plan of Care Reviewed With: patient, spouse  Progress: no change  Outcome Evaluation: PT eval complete. Oriented x4. Pt. presents to [facility] with complaint of fever, chills, nausea, and general weakness. Pt. presents with abdomen swelling and increased fatigue. Able to get to sitting EOB with SBA. Extremity sensation intact and UE/LE strength grossly 4/5. Pt. able to come to standng EOB with CGA and capable of ambulating 200' with CGA. Pt. states that they are \"mildly tired\" after their walk. Skilled therapy recommended to work on endurance, functional mobility, and manage fatigue. Recommended D/C home with assist.     Time Calculation:         PT Charges       Row Name 09/06/24 9169             Time Calculation    Start Time 1330  -CHE (r) BL (t) CHE (c)      Stop Time 1425  -CHE (r) BL (t) CHE (c)      Time Calculation (min) 55 min  -CHE (r) BL (t)      PT Received On 09/06/24  -CHE (r) BL (t) CHE (c)      PT Goal Re-Cert Due Date 09/16/24  -CHE (r) BL (t) CHE (c)                User Key  (r) = Recorded By, (t) = Taken By, (c) = Cosigned By      Initials Name Provider Type    CHE " Demetrio Tejeda, PT DPT Physical Therapist    Brett Garrett, PT Student PT Student                      PT G-Codes  Outcome Measure Options: AM-PAC 6 Clicks Basic Mobility (PT)  AM-PAC 6 Clicks Score (PT): 21  PT Discharge Summary  Anticipated Discharge Disposition (PT): home with assist    Brett Merino PT Student  9/6/2024

## 2024-09-06 NOTE — PLAN OF CARE
"Goal Outcome Evaluation:  Plan of Care Reviewed With: patient, spouse        Progress: no change  Outcome Evaluation: PT eval complete. Oriented x4. Pt. presents to [facility] with complaint of fever, chills, nausea, and general weakness. Pt. presents with abdomen swelling and increased fatigue. Able to get to sitting EOB with SBA. Extremity sensation intact and UE/LE strength grossly 4/5. Pt. able to come to standng EOB with CGA and capable of ambulating 200' with CGA. Pt. states that they are \"mildly tired\" after their walk. Skilled therapy recommended to work on endurance, functional mobility, and manage fatigue. Recommended D/C home with assist.      Anticipated Discharge Disposition (PT): home with assist                        "

## 2024-09-06 NOTE — PLAN OF CARE
Goal Outcome Evaluation:  Plan of Care Reviewed With: patient        Progress: no change  Outcome Evaluation: VSS. No c/o pain. IVF/abx as ordered. Pt NPO for hida scan today. Safety maintained.

## 2024-09-06 NOTE — PLAN OF CARE
Goal Outcome Evaluation:  Plan of Care Reviewed With: patient           Outcome Evaluation: denied pain this shift; denies nausea; BP elevated see MAR; HIDA scan today; IVF and IV abx; safety maintained

## 2024-09-07 LAB
ALBUMIN SERPL-MCNC: 2.6 G/DL (ref 3.5–5.2)
ALP SERPL-CCNC: 1194 U/L (ref 39–117)
ALT SERPL W P-5'-P-CCNC: 71 U/L (ref 1–41)
AST SERPL-CCNC: 120 U/L (ref 1–40)
BASOPHILS # BLD AUTO: 0.05 10*3/MM3 (ref 0–0.2)
BASOPHILS NFR BLD AUTO: 0.7 % (ref 0–1.5)
BILIRUB CONJ SERPL-MCNC: 1.3 MG/DL (ref 0–0.3)
BILIRUB INDIRECT SERPL-MCNC: 0.5 MG/DL
BILIRUB SERPL-MCNC: 1.8 MG/DL (ref 0–1.2)
CANCER AG19-9 SERPL-ACNC: 188 U/ML
CEA SERPL-MCNC: 2.83 NG/ML
DEPRECATED RDW RBC AUTO: 56.9 FL (ref 37–54)
EOSINOPHIL # BLD AUTO: 0.49 10*3/MM3 (ref 0–0.4)
EOSINOPHIL NFR BLD AUTO: 6.4 % (ref 0.3–6.2)
ERYTHROCYTE [DISTWIDTH] IN BLOOD BY AUTOMATED COUNT: 16 % (ref 12.3–15.4)
HAV IGM SERPL QL IA: NORMAL
HBV CORE IGM SERPL QL IA: NORMAL
HBV SURFACE AG SERPL QL IA: NORMAL
HCT VFR BLD AUTO: 28.6 % (ref 37.5–51)
HCV AB SER QL: NORMAL
HGB BLD-MCNC: 9 G/DL (ref 13–17.7)
IMM GRANULOCYTES # BLD AUTO: 0.04 10*3/MM3 (ref 0–0.05)
IMM GRANULOCYTES NFR BLD AUTO: 0.5 % (ref 0–0.5)
LYMPHOCYTES # BLD AUTO: 0.96 10*3/MM3 (ref 0.7–3.1)
LYMPHOCYTES NFR BLD AUTO: 12.5 % (ref 19.6–45.3)
MCH RBC QN AUTO: 30.5 PG (ref 26.6–33)
MCHC RBC AUTO-ENTMCNC: 31.5 G/DL (ref 31.5–35.7)
MCV RBC AUTO: 96.9 FL (ref 79–97)
MONOCYTES # BLD AUTO: 0.83 10*3/MM3 (ref 0.1–0.9)
MONOCYTES NFR BLD AUTO: 10.8 % (ref 5–12)
NEUTROPHILS NFR BLD AUTO: 5.3 10*3/MM3 (ref 1.7–7)
NEUTROPHILS NFR BLD AUTO: 69.1 % (ref 42.7–76)
NRBC BLD AUTO-RTO: 0 /100 WBC (ref 0–0.2)
PLATELET # BLD AUTO: 205 10*3/MM3 (ref 140–450)
PMV BLD AUTO: 9.8 FL (ref 6–12)
PROT SERPL-MCNC: 6.1 G/DL (ref 6–8.5)
QT INTERVAL: 490 MS
QTC INTERVAL: 476 MS
RBC # BLD AUTO: 2.95 10*6/MM3 (ref 4.14–5.8)
WBC NRBC COR # BLD AUTO: 7.67 10*3/MM3 (ref 3.4–10.8)

## 2024-09-07 PROCEDURE — G0378 HOSPITAL OBSERVATION PER HR: HCPCS

## 2024-09-07 PROCEDURE — 25010000002 PIPERACILLIN SOD-TAZOBACTAM PER 1 G: Performed by: HOSPITALIST

## 2024-09-07 PROCEDURE — 86301 IMMUNOASSAY TUMOR CA 19-9: CPT | Performed by: SURGERY

## 2024-09-07 PROCEDURE — 25810000003 SODIUM CHLORIDE 0.9 % SOLUTION: Performed by: HOSPITALIST

## 2024-09-07 PROCEDURE — 80074 ACUTE HEPATITIS PANEL: CPT | Performed by: HOSPITALIST

## 2024-09-07 PROCEDURE — 80076 HEPATIC FUNCTION PANEL: CPT | Performed by: HOSPITALIST

## 2024-09-07 PROCEDURE — 25010000002 CEFTRIAXONE PER 250 MG: Performed by: HOSPITALIST

## 2024-09-07 PROCEDURE — 25010000002 ONDANSETRON PER 1 MG: Performed by: HOSPITALIST

## 2024-09-07 PROCEDURE — 97116 GAIT TRAINING THERAPY: CPT

## 2024-09-07 PROCEDURE — 85025 COMPLETE CBC W/AUTO DIFF WBC: CPT | Performed by: HOSPITALIST

## 2024-09-07 PROCEDURE — 25010000002 METRONIDAZOLE 500 MG/100ML SOLUTION: Performed by: HOSPITALIST

## 2024-09-07 PROCEDURE — 99232 SBSQ HOSP IP/OBS MODERATE 35: CPT | Performed by: SURGERY

## 2024-09-07 PROCEDURE — 82378 CARCINOEMBRYONIC ANTIGEN: CPT | Performed by: SURGERY

## 2024-09-07 RX ORDER — ATORVASTATIN CALCIUM 80 MG/1
40 TABLET, FILM COATED ORAL DAILY
COMMUNITY
End: 2024-09-09 | Stop reason: HOSPADM

## 2024-09-07 RX ORDER — ALUMINA, MAGNESIA, AND SIMETHICONE 2400; 2400; 240 MG/30ML; MG/30ML; MG/30ML
15 SUSPENSION ORAL EVERY 6 HOURS PRN
Status: DISCONTINUED | OUTPATIENT
Start: 2024-09-07 | End: 2024-09-09

## 2024-09-07 RX ORDER — CARVEDILOL 6.25 MG/1
12.5 TABLET ORAL 2 TIMES DAILY WITH MEALS
Status: DISCONTINUED | OUTPATIENT
Start: 2024-09-07 | End: 2024-09-08

## 2024-09-07 RX ORDER — MULTIPLE VITAMINS W/ MINERALS TAB 9MG-400MCG
1 TAB ORAL DAILY
COMMUNITY
End: 2024-09-09 | Stop reason: HOSPADM

## 2024-09-07 RX ORDER — METRONIDAZOLE 500 MG/100ML
500 INJECTION, SOLUTION INTRAVENOUS EVERY 8 HOURS
Status: DISCONTINUED | OUTPATIENT
Start: 2024-09-07 | End: 2024-09-09 | Stop reason: HOSPADM

## 2024-09-07 RX ADMIN — METRONIDAZOLE 500 MG: 500 INJECTION, SOLUTION INTRAVENOUS at 13:14

## 2024-09-07 RX ADMIN — Medication 10 ML: at 21:37

## 2024-09-07 RX ADMIN — CARVEDILOL 6.25 MG: 6.25 TABLET, FILM COATED ORAL at 08:32

## 2024-09-07 RX ADMIN — CARVEDILOL 12.5 MG: 6.25 TABLET, FILM COATED ORAL at 17:34

## 2024-09-07 RX ADMIN — SODIUM CHLORIDE 1000 MG: 900 INJECTION INTRAVENOUS at 11:02

## 2024-09-07 RX ADMIN — PIPERACILLIN AND TAZOBACTAM 4.5 G: 4; .5 INJECTION, POWDER, FOR SOLUTION INTRAVENOUS at 06:45

## 2024-09-07 RX ADMIN — GABAPENTIN 600 MG: 300 CAPSULE ORAL at 21:37

## 2024-09-07 RX ADMIN — METRONIDAZOLE 500 MG: 500 INJECTION, SOLUTION INTRAVENOUS at 21:37

## 2024-09-07 RX ADMIN — Medication 10 ML: at 08:33

## 2024-09-07 RX ADMIN — SODIUM CHLORIDE 75 ML/HR: 9 INJECTION, SOLUTION INTRAVENOUS at 11:55

## 2024-09-07 RX ADMIN — ONDANSETRON 4 MG: 2 INJECTION INTRAMUSCULAR; INTRAVENOUS at 17:39

## 2024-09-07 RX ADMIN — GABAPENTIN 600 MG: 300 CAPSULE ORAL at 08:32

## 2024-09-07 RX ADMIN — LOSARTAN POTASSIUM 100 MG: 50 TABLET, FILM COATED ORAL at 08:32

## 2024-09-07 RX ADMIN — PANTOPRAZOLE SODIUM 40 MG: 40 INJECTION, POWDER, FOR SOLUTION INTRAVENOUS at 06:45

## 2024-09-07 RX ADMIN — Medication 10 MG: at 21:37

## 2024-09-07 NOTE — PROGRESS NOTES
[facility] Medicine Services  INPATIENT PROGRESS NOTE    Patient Name: Chad Henriquez  Date of Admission: 9/5/2024  Today's Date: 09/07/24  Length of Stay: 1  Primary Care Physician: Miguel Bond MD    Subjective   Chief Complaint: Abnormal labs  Abnormal Lab       Patient is a 89-year-old male with a history of hypertension and coronary artery disease s/p CABG and stenting  , hyperlipidemia,afib  on lipitor eliquis who presents to the ER with abnormal labs. Patient states he has felt very bad over the last week and a half. He complains of lethargy, generalized weakness, decreased appetite and chills. Patient had some labs performed and they showed an elevated ammonia level and LFTs. Patient denies any known liver abnormalities. Patient was called today and told to come to the ER. He has had some intermittent confusion. He denies any fever, chest pain, shortness of air, abdominal pain, nausea vomiting diarrhea, urinary changes, neurologic changes. Patient states his abdomen has been swelling ,   In ER CT abdomen was -ve, US Gallbladder was +ve acute cholecystitis no stones, no fever no wbc , LFTS mildly elevated, bilirubin mild, ammonia -ve, case was discussed with surgery who wanted us to admit, HIDA, abx, will keep NPO, IVF, conservative meds was given zosyn in ER and tolerated it, continue, trend LFTS hold lipitor, eliquis in case of surgery, identify reconcile restart home meds once reconciled, SCDS for DVT prophylaxis  9/6  As before admitted to us with abnormal labs but acute cholecystitis on antibiotic awaiting surgery consult, and HIDA scan blood pressure remains an issue, on Cozaar Coreg hydralazine he said he was switched from Coreg to metoprolol suspect for his A-fib to control his heart rate as Coreg is better in controlling blood pressure we will continue to monitor  9/7  Appreciate surgery patient gallbladder ultrasound as above HIDA scan was unremarkable,  agree with surgery  Given his recent symptoms with these markedly elevated alkaline phosphatase and mildly elevated total bilirubin without any obvious symptoms of gallbladder disease, I am concerned that this could represent an occult bile duct malignancy. Plan on obtaining an MRCP, but will have to wait 24 hours after HIDA scan due to the administration of radiotracer , Will make n.p.o. after midnight on Sunday , will consult gi, regarding elevated LFTS, continue to hold statin, check acute hepatitis panel, pt has large HH, and fatty liver both could explain high LFTS with statin and his symptoms, on PPI for now   Increase his Coreg for better blood pressure control  Review of Systems   All pertinent negatives and positives are as above. All other systems have been reviewed and are negative unless otherwise stated.     Objective    Temp:  [97.4 °F (36.3 °C)-98.9 °F (37.2 °C)] 97.7 °F (36.5 °C)  Heart Rate:  [55-62] 55  Resp:  [16-18] 16  BP: (117-190)/(39-72) 167/52  Physical Exam  Constitutional:       Appearance: Normal appearance.   HENT:      Head: Normocephalic.      Nose: Nose normal.   Eyes:      Pupils: Pupils are equal, round, and reactive to light.   Cardiovascular:      Rate and Rhythm: Normal rate.   Pulmonary:      Effort: Pulmonary effort is normal.   Abdominal:      General: Abdomen is flat.   Musculoskeletal:         General: Normal range of motion.      Cervical back: Normal range of motion.   Neurological:      Mental Status: He is alert.           Results Review:  I have reviewed the labs, radiology results, and diagnostic studies.    Laboratory Data:   Results from last 7 days   Lab Units 09/07/24  0433 09/06/24  0443 09/05/24  1516   WBC 10*3/mm3 7.67 8.42 7.68   HEMOGLOBIN g/dL 9.0* 9.2* 9.8*   HEMATOCRIT % 28.6* 29.3* 30.9*   PLATELETS 10*3/mm3 205 225 220        Results from last 7 days   Lab Units 09/05/24  1516   SODIUM mmol/L 135*   POTASSIUM mmol/L 4.6   CHLORIDE mmol/L 105   CO2 mmol/L  "26.0   BUN mg/dL 14   CREATININE mg/dL 0.82   CALCIUM mg/dL 8.1*   BILIRUBIN mg/dL 1.9*   ALK PHOS U/L 1,419*   ALT (SGPT) U/L 80*   AST (SGOT) U/L 126*   GLUCOSE mg/dL 128*       Culture Data:   No results found for: \"BLOODCX\", \"URINECX\", \"WOUNDCX\", \"MRSACX\", \"RESPCX\", \"STOOLCX\"    Radiology Data:   Imaging Results (Last 24 Hours)       Procedure Component Value Units Date/Time    NM HIDA SCAN WITH PHARMACOLOGICAL INTERVENTION [[id number]] Collected: 09/06/24 1155     Updated: 09/06/24 1159    Narrative:      EXAMINATION: NM HIDA SCAN WITH PHARMACOLOGICAL INTERVENTION-     9/6/2024 9:24 AM     HISTORY: ? acute cholecystitis; K81.0-Acute cholecystitis;  R74.01-Elevation of levels of liver transaminase levels; R53.1-Weakness     Nuclear medicine gallbladder imaging with pharmacologic stimulation of  gallbladder contraction and measurement of the percentage of emptying.     Routine protocol anterior imaging of the abdomen was performed.  Dose: 5.40 mCi of Tc-Mebrofenin.  Route of administration:  IV injection.     Gallbladder and small bowel activity indicates patent cystic and common  bile ducts.     Anterior imaging was repeated after oral ingestion of 30 mL of corn oil  emulsion over 5 minutes for physiologic gallbladder stimulation.     Maximum ejection fraction = 93%.     A normal gallbladder ejection fraction (when using the corn oil  emulsion) is considered to be any value greater than 20%.       Impression:      1. Patent cystic and common bile ducts.  2. Normal ejection fraction.                          This report was signed and finalized on 9/6/2024 11:56 AM by Dr. Samuel Hickman MD.               I have reviewed the patient's current medications.     Assessment/Plan   Assessment  Active Hospital Problems    Diagnosis     Transaminitis     Dilated gallbladder     Elevated alkaline phosphatase level     Hyperbilirubinemia     Large hiatal hernia        Treatment Plan  . In ER CT abdomen was -ve, US " Gallbladder was +ve acute cholecystitis no stones, no fever no wbc , LFTS mildly elevated, bilirubin mild, ammonia -ve, case was discussed with surgery who wanted us to admit, HIDA, abx, will keep NPO, IVF, conservative meds was given zosyn in ER and tolerated it, continue, trend LFTS hold lipitor, eliquis in case of surgery, identify reconcile restart home meds once reconciled, SCDS for DVT prophylaxis      Medical Decision Making  Number and Complexity of problems: 2 acute   Differential Diagnosis: as above      Conditions and Status        Condition is at treatment goal.     MDM Data  External documents reviewed: no  Cardiac tracing (EKG, telemetry) interpretation: yes  Radiology interpretation: yes  Labs reviewed: yes  Any tests that were considered but not ordered: no     Decision rules/scores evaluated (example [id number], Wells, etc): yes     Discussed with: ER     Care Planning  Shared decision making: Patient apprised of current labs, vitals, imaging and treatment plan.  They are agreeable with proceeding with plans as discussed.   Code status and discussions: full      Disposition  Social Determinants of Health that impact treatment or disposition: no  Estimated length of stay is tbd    Electronically signed by Heather Richard MD, 09/07/24, 09:59 CDT.

## 2024-09-07 NOTE — PROGRESS NOTES
"GENERAL SURGERY INPATIENT PROGRESS NOTE    Patient Name:  Chad Henriquez  YOB: 1929  MRN: [id number]    SUBJECTIVE    Feeling tired today.  Appetite is still poor but tolerating a diet.    Allergies:  Allergies   Allergen Reactions    Codeine Nausea And Vomiting and GI Intolerance    Fentanyl Hallucinations and Other (See Comments)     DELUSIONAL  Fentanyl patchs, caused patient to become confused and anxious per family  Fentanyl patchs, caused patient to become confused and anxious per family    Augmentin [Amoxicillin-Pot Clavulanate] GI Intolerance       Medications:  carvedilol, 6.25 mg, Oral, BID With Meals  gabapentin, 600 mg, Oral, Q12H  losartan, 100 mg, Oral, Daily  pantoprazole, 40 mg, Intravenous, Q AM  piperacillin-tazobactam, 4.5 g, Intravenous, Q8H  sodium chloride, 10 mL, Intravenous, Q12H    sodium chloride, 75 mL/hr, Last Rate: 75 mL/hr (09/07/24 0347)        OBJECTIVE    VITAL SIGNS  /52 (BP Location: Left arm, Patient Position: Lying)   Pulse 55   Temp 97.7 °F (36.5 °C) (Oral)   Resp 16   Ht 170.2 cm (67\")   Wt 74.8 kg (165 lb)   SpO2 97%   BMI 25.84 kg/m²     Intake/Output Summary (Last 24 hours) at 9/7/2024 0946  Last data filed at 9/6/2024 2313  Gross per 24 hour   Intake 480 ml   Output 200 ml   Net 280 ml       PHYSICAL EXAM    General: Elderly male, in and out of sleep, in no acute distress.  HEENT:  NCAT, PERRL, EOM intact bilaterally, hearing intact, nares patent  Heart:  Regular rate, normotensive, no JVD bilaterally  Lungs:  Normal respiratory effort, regular respiratory rate, no wheezing  Abdomen: Flat, soft, nontender  Extremities:  No cyanosis, clubbing or edema.   Musculoskeletal:  Normal development of bilateral upper extremities. Normal development of bilateral lower extremities.  Range of motion intact.  No evidence of trauma.  Skin:  No rashes, ecchymosis or jaundice. Dry and non-diaphoretic. Skin color is consistent with " ethnicity.    RESULTS    Labs:  I personally reviewed all pertinent labs.   Imaging:  I personally reviewed all pertinent imaging studies.   Pathology:        ASSESSMENT AND PLAN    Active Hospital Problems    Diagnosis     Transaminitis     Dilated gallbladder     Elevated alkaline phosphatase level     Hyperbilirubinemia     Large hiatal hernia          DAILY CARE PLAN    Since the HIDA scan was unremarkable, will plan for MRCP tomorrow morning.  N.p.o. after midnight.    I discussed the patient's findings and my recommendations with the patient/family, as well as the primary care team.    Miguel lPummer MD, FACS  General Surgery  9/7/2024  09:46 CDT    Please note that portions of this note were completed with a voice recognition program.

## 2024-09-07 NOTE — PLAN OF CARE
Goal Outcome Evaluation:  Plan of Care Reviewed With: patient        Progress: no change  Outcome Evaluation: BP stable this shift. IVF/abx as ordered. VSS. Safety maintained. No c/o pain this shift. Pt appeared to sleep most of night.

## 2024-09-07 NOTE — THERAPY TREATMENT NOTE
Acute Care - Physical Therapy Treatment Note  [facility]     Patient Name: Chad Henriquez  : 1929  MRN: [id number]  Today's Date: 2024      Visit Dx:     ICD-10-CM ICD-9-CM   1. Acute cholecystitis  K81.0 575.0   2. Transaminitis  R74.01 790.4   3. Generalized weakness  R53.1 780.79   4. Impaired mobility [Z74.09]  Z74.09 799.89     Patient Active Problem List   Diagnosis    BPH with urinary obstruction    Frequency of urination    Nocturia    OAB (overactive bladder)    Non-smoker    Spinal stenosis, lumbar region, with neurogenic claudication    Left leg pain    Bilateral hip pain    Acute left-sided low back pain with left-sided sciatica    Essential tremor    Spinal stenosis    Degeneration of lumbar or lumbosacral intervertebral disc    Hereditary and idiopathic peripheral neuropathy    Syncope    Hypercholesteremia    Coronary artery disease involving native coronary artery of native heart without angina pectoris    Essential hypertension    Lumbar disc disease with radiculopathy    Left hip pain    History of skin cancer in adulthood    Persistent shortness of breath after COVID-19    Diastolic dysfunction without heart failure    Atrial flutter with rapid ventricular response    TIA (transient ischemic attack)    Chronic anticoagulation    Paroxysmal atrial fibrillation    Impaired memory    Acute cholecystitis    Transaminitis    Dilated gallbladder    Elevated alkaline phosphatase level    Hyperbilirubinemia    Large hiatal hernia     Past Medical History:   Diagnosis Date    Allergic rhinitis     Anemia     Arthritis     Blind left eye     BPH (benign prostatic hypertrophy) with urinary retention     Cancer     skin cancer    Chronic back pain     RUNS DOWN BOTH LEGS    Constipation     Coronary artery disease     COVID     Hard of hearing     Heart attack     NO MUSCLE DAMAGE - JUST OCCLUDED VALVE    High cholesterol     History of skin cancer     BILATERAL EARS    History of transfusion      1986    Hypertension     Inguinal hernia     Leaky heart valve     DR CONCEPCION SAYS NOT ENOUGH TO BE OF CONCERN    Other bursal cyst, right elbow     Paroxysmal atrial fibrillation 5/6/2024    PONV (postoperative nausea and vomiting)     has had scopalamine patch in past     Sleep apnea     DOES NOT USE CPAP OR BIPAP    Spinal stenosis of cervical region     Spinal stenosis of lumbar region     Stroke     Tremors of nervous system     Wears hearing aid     BILATERAL     Past Surgical History:   Procedure Laterality Date    ANTERIOR LUMBAR EXPOSURE N/A 12/07/2018    Procedure: ANTERIOR LUMBAR EXPOSURE;  Surgeon: Manolo Mcleod DO;  Location:  [facility] OR;  Service: Vascular    APPENDECTOMY      BACK SURGERY      X3    CARDIAC SURGERY  08/08/1986    X1 BYPASS    CORONARY ANGIOPLASTY WITH STENT PLACEMENT  1997    X3 STENTS    CORONARY ARTERY BYPASS GRAFT      HERNIA REPAIR Bilateral     x2    INGUINAL HERNIA REPAIR Right 06/22/2017    Procedure: RIGHT INGUINAL HERNIA REPAIR AND EXCISION OF CYST RIGHT ELBOW ;  Surgeon: Jackelyn Arriola MD;  Location:  [facility] OR;  Service:     LUMBAR FUSION Left 06/30/2021    Procedure: LUMBAR LAMINECTOMY, DISCECTOMY, FACETECTOMY,  TRANSFORAMINAL LUMBAR INTERBODY FUSION L2-3. REVISION OF INSTRUMENTATION L3-S1. USE OF IMAGE GUIDANCE AND SURGICAL ROBOT;  Surgeon: Kj Falcon MD;  Location:  [facility] OR;  Service: Robotics - Neuro;  Laterality: Left;    LUMBAR LAMINECTOMY N/A 03/12/2018    Procedure: LUMBAR LAMINECTOMY WITHOUT FUSION L3-4,4-5;  Surgeon: Kj Falcon MD;  Location:  [facility];  Service: Neurosurgery    LUMBAR LAMINECTOMY ANTERIOR LUMBAR INTERBODY FUSION N/A 12/07/2018    Procedure: ANTERIOR LUMBAR INTERBODY FUSION L5-S1;  Surgeon: Kj Falcon MD;  Location:  [facility];  Service: Neurosurgery    LUMBAR LAMINECTOMY WITH FUSION Left 12/10/2018    Procedure: LUMBAR LAMINECTOMY TRANSFORAMINAL LUMBAR INTERBODY FUSION L34,45;  Surgeon: Kj Falcon MD;   Location:  [facility];  Service: Neurosurgery    LUMBAR LAMINECTOMY WITH FUSION Left 12/07/2018    Procedure: LUMBAR LAMINECTOMY TRANSFORAMINAL LUMBAR INTERBODY FUSION LEFT L3-4, L4-5 QUADRANT RETRACTOR;  Surgeon: Kj Falcon MD;  Location:  [facility] OR;  Service: Neurosurgery    SKIN LESION EXCISION      nasal     PT Assessment (Last 12 Hours)       PT Evaluation and Treatment       Row Name 09/07/24 0916          Physical Therapy Time and Intention    Subjective Information no complaints  -NW     Document Type therapy note (daily note)  -NW     Mode of Treatment physical therapy  -NW       Row Name 09/07/24 0916          General Information    Existing Precautions/Restrictions fall  -NW     Limitations/Impairments hearing  -NW       Row Name 09/07/24 0916          Pain    Pretreatment Pain Rating 0/10 - no pain  -NW       Row Name 09/07/24 0916          Bed Mobility    Comment, (Bed Mobility) chair  -NW       Row Name 09/07/24 0916          Transfers    Transfers sit-stand transfer;stand-sit transfer  -NW       Row Name 09/07/24 0916          Sit-Stand Transfer    Sit-Stand [place] (Transfers) verbal cues;contact guard  -NW       Row Name 09/07/24 0916          Stand-Sit Transfer    Stand-Sit [place] (Transfers) verbal cues;contact guard  -NW       Row Name 09/07/24 0916          Gait/Stairs (Locomotion)    [place] Level (Gait) verbal cues;contact guard  -NW     Assistive Device (Gait) walker, 4-wheeled  -NW     Distance in Feet (Gait) 100  100x4  -NW     Comment, (Gait/Stairs) cues for safety  -NW       Row Name 09/07/24 0916          Safety Issues, Functional Mobility    Impairments Affecting Function (Mobility) balance  -NW       Row Name 09/07/24 0916          Positioning and Restraints    Pre-Treatment Position sitting in chair/recliner  -NW     Post Treatment Position chair  -NW     In Chair sitting;call light within reach;encouraged to call for assist;with family/caregiver  -NW                User Key  (r) = Recorded By, (t) = Taken By, (c) = Cosigned By      Initials Name Provider Type    NW Rosalinda Aquino, PTA Physical Therapist Assistant                    Physical Therapy Education       Title: PT OT SLP Therapies (In Progress)       Topic: Physical Therapy (In Progress)       Point: Mobility training (Done)       Learning Progress Summary             Patient Acceptance, E,D, VU,DU by BL at 9/6/2024 1447    Comment: pt. educated on proper techniques to come to standing to maintain proper body mechanics andd safety. Educated on the importance of functional mobility to increase/maintain daily function.   Significant Other Acceptance, E,D, VU,DU by BL at 9/6/2024 1447    Comment: pt. educated on proper techniques to come to standing to maintain proper body mechanics andd safety. Educated on the importance of functional mobility to increase/maintain daily function.                         Point: Home exercise program (Not Started)       Learner Progress:  Not documented in this visit.              Point: Body mechanics (Done)       Learning Progress Summary             Patient Acceptance, E,D, VU,DU by BL at 9/6/2024 1447    Comment: pt. educated on proper techniques to come to standing to maintain proper body mechanics andd safety. Educated on the importance of functional mobility to increase/maintain daily function.   Significant Other Acceptance, E,D, VU,DU by BL at 9/6/2024 1447    Comment: pt. educated on proper techniques to come to standing to maintain proper body mechanics andd safety. Educated on the importance of functional mobility to increase/maintain daily function.                         Point: Precautions (Done)       Learning Progress Summary             Patient Acceptance, E,D, VU,DU by BL at 9/6/2024 1447    Comment: pt. educated on proper techniques to come to standing to maintain proper body mechanics andd safety. Educated on the importance of functional mobility to  increase/maintain daily function.   Significant Other Acceptance, E,D, VU,DU by  at 9/6/2024 5796    Comment: pt. educated on proper techniques to come to standing to maintain proper body mechanics andd safety. Educated on the importance of functional mobility to increase/maintain daily function.                                         User Key       Initials Effective Dates Name Provider Type Discipline     08/21/24 -  Brett Merino, PT Student PT Student PT                  PT Recommendation and Plan     Plan of Care Reviewed With: patient  Progress: improving  Outcome Evaluation: Pt up in chair. Pt is [place]. sit-stand cga w/ cues. Pt was able to amb 100'x4 rwx cga cues for safety. reviwed home safety and POC. feel pt will be safe for d/c home when medically stable, will benfit from .       Time Calculation:    PT Charges       Row Name 09/07/24 0939             Time Calculation    Start Time 0916  -NW      Stop Time 0940  -NW      Time Calculation (min) 24 min  -NW      PT Received On 09/07/24  -NW      PT Goal Re-Cert Due Date 09/16/24  -NW         Time Calculation- PT    Total Timed Code Minutes- PT 24 minute(s)  -NW         Timed Charges    04833 - Gait Training Minutes  24  -NW         Total Minutes    Timed Charges Total Minutes 24  -NW       Total Minutes 24  -NW                User Key  (r) = Recorded By, (t) = Taken By, (c) = Cosigned By      Initials Name Provider Type    NW Rosalinda Aquino PTA Physical Therapist Assistant                  Therapy Charges for Today       Code Description Service Date Service Provider Modifiers Qty    [id number] HC GAIT TRAINING EA 15 MIN 9/7/2024 Rosalinda Aquino PTA GP 2            PT G-Codes  Outcome Measure Options: AM-PAC 6 Clicks Basic Mobility (PT)  AM-PAC 6 Clicks Score (PT): 21    Rosalinda Aquino PTA  9/7/2024

## 2024-09-07 NOTE — CONSULTS
GENERAL SURGERY CONSULTATION NOTE    Patient Name:  Chad Henriquez  YOB: 1929  MRN: [id number]    Patient Care Team:  Miguel Bond MD as PCP - General (Hospitalist)  Dylon Maldonado MD as Consulting Physician (Urology)  Sukhdeep Guillory MD as Consulting Physician (Pain Medicine)  Kj Falcon MD as Surgeon (Neurosurgery)  Josiah Robles APRN as Nurse Practitioner (Nurse Practitioner)    Date of Consultation: 09/06/24    Consulting Physician: Heather Richard MD    Reason for Consult: Abnormal labs, dilated gallbladder with pericholecystic fluid    History of Present Illness  Chad Henriquez is a 89 y.o. male that I am seeing in consultation for possible acute cholecystitis.  The patient was found to have markedly abnormal labs therefore he was sent to the emergency department for further workup.  He was found to have a transaminitis, elevated bilirubin and elevated alkaline phosphatase.  An ultrasound of the gallbladder did not show any gallstones however did show some gallbladder wall thickening with a small amount of pericholecystic fluid.  A CT of the abdomen and pelvis did not show any abnormalities except for a large hiatal hernia.  He had a previous acute hepatitis panel performed last month that  was negative.  The patient is not having any abdominal pain nor does he have any complaints of dysphagia or shortness of breath.  His primary complaint is weakness, fatigue, lethargy and poor appetite.  He denies nausea, vomiting or bowel habit changes.  He has not noticed any darkening of his skin or tea colored urine.    Allergies   Allergies   Allergen Reactions    Codeine Nausea And Vomiting and GI Intolerance    Fentanyl Hallucinations and Other (See Comments)     DELUSIONAL  Fentanyl patchs, caused patient to become confused and anxious per family  Fentanyl patchs, caused patient to become confused and anxious per family    Augmentin [Amoxicillin-Pot Clavulanate] GI  Intolerance       Medications  carvedilol, 6.25 mg, Oral, BID With Meals  gabapentin, 600 mg, Oral, Q12H  losartan, 100 mg, Oral, Daily  pantoprazole, 40 mg, Intravenous, Q AM  piperacillin-tazobactam, 4.5 g, Intravenous, Q8H  sodium chloride, 10 mL, Intravenous, Q12H      sodium chloride, 75 mL/hr, Last Rate: 75 mL/hr (09/06/24 1478)      No current facility-administered medications on file prior to encounter.     Current Outpatient Medications on File Prior to Encounter   Medication Sig    alfuzosin (UROXATRAL) 10 MG 24 hr tablet Take 1 tablet by mouth Every Evening.    apixaban (ELIQUIS) 5 MG tablet tablet Take 1 tablet by mouth 2 (Two) Times a Day.    Ascorbic Acid (Vitamin C) 500 MG capsule Take 1 capsule by mouth 2 (two) times a day.    atorvastatin (Lipitor) 40 MG tablet Take 1 tablet by mouth Daily.    Cholecalciferol 50 MCG (2000 UT) tablet Take 1 tablet by mouth Daily.    clobetasol prop emollient base (Clobetasol Propionate E) 0.05 % emollient cream Apply 1 Application topically to the appropriate area as directed 2 (Two) Times a Day.    Docusate Calcium (STOOL SOFTENER PO) Take 100 mg by mouth Daily.    finasteride (PROSCAR) 5 MG tablet Take 1 tablet by mouth Daily.    Flaxseed, Linseed, (FLAX SEED OIL PO) Take  by mouth.    fluticasone (FLONASE) 50 MCG/ACT nasal spray 2 sprays into the nostril(s) as directed by provider Daily.    folic acid (FOLVITE) 1 MG tablet Take 1 tablet by mouth Daily.    gabapentin (NEURONTIN) 600 MG tablet Take 1 tablet by mouth 3 (Three) Times a Day.    L-Lysine 600 MG tablet Take 1 tablet by mouth Daily. 1000 mg    losartan (COZAAR) 100 MG tablet Take 1 tablet by mouth Daily.    metoprolol tartrate (LOPRESSOR) 25 MG tablet Take 1 tablet by mouth 2 (Two) Times a Day.    pravastatin (PRAVACHOL) 40 MG tablet Take 1 tablet by mouth Daily.    Psyllium (Metamucil) wafer wafer Take 2 wafers by mouth Daily.    senna (SENOKOT) 8.6 MG tablet Take 1 tablet by mouth Daily.    traMADol  (ULTRAM) 50 MG tablet Take 1 tablet by mouth Every 6 (Six) Hours As Needed for Moderate Pain.    vitamin E 100 UNIT capsule Take 1 capsule by mouth Daily.    carvedilol (COREG) 6.25 MG tablet Take 1 tablet by mouth 2 (Two) Times a Day With Meals.       History  Past Medical History:   Diagnosis Date    Allergic rhinitis     Anemia     Arthritis     Blind left eye     BPH (benign prostatic hypertrophy) with urinary retention     Cancer     skin cancer    Chronic back pain     RUNS DOWN BOTH LEGS    Constipation     Coronary artery disease     COVID     Hard of hearing     Heart attack 1986    NO MUSCLE DAMAGE - JUST OCCLUDED VALVE    High cholesterol     History of skin cancer     BILATERAL EARS    History of transfusion     1986    Hypertension     Inguinal hernia     Leaky heart valve     DR CONCEPCION SAYS NOT ENOUGH TO BE OF CONCERN    Other bursal cyst, right elbow     Paroxysmal atrial fibrillation 5/6/2024    PONV (postoperative nausea and vomiting)     has had scopalamine patch in past     Sleep apnea     DOES NOT USE CPAP OR BIPAP    Spinal stenosis of cervical region     Spinal stenosis of lumbar region     Stroke     Tremors of nervous system     Wears hearing aid     BILATERAL   ,   Past Surgical History:   Procedure Laterality Date    ANTERIOR LUMBAR EXPOSURE N/A 12/07/2018    Procedure: ANTERIOR LUMBAR EXPOSURE;  Surgeon: Manolo Mcleod DO;  Location:  [facility];  Service: Vascular    APPENDECTOMY      BACK SURGERY      X3    CARDIAC SURGERY  08/08/1986    X1 BYPASS    CORONARY ANGIOPLASTY WITH STENT PLACEMENT  1997    X3 STENTS    CORONARY ARTERY BYPASS GRAFT      HERNIA REPAIR Bilateral     x2    INGUINAL HERNIA REPAIR Right 06/22/2017    Procedure: RIGHT INGUINAL HERNIA REPAIR AND EXCISION OF CYST RIGHT ELBOW ;  Surgeon: Jackelyn Arriola MD;  Location:  [facility];  Service:     LUMBAR FUSION Left 06/30/2021    Procedure: LUMBAR LAMINECTOMY, DISCECTOMY, FACETECTOMY,  TRANSFORAMINAL LUMBAR INTERBODY  FUSION L2-3. REVISION OF INSTRUMENTATION L3-S1. USE OF IMAGE GUIDANCE AND SURGICAL ROBOT;  Surgeon: Kj Falcon MD;  Location:  [facility];  Service: Robotics - Neuro;  Laterality: Left;    LUMBAR LAMINECTOMY N/A 2018    Procedure: LUMBAR LAMINECTOMY WITHOUT FUSION L3-4,4-5;  Surgeon: Kj Falcon MD;  Location:  [facility];  Service: Neurosurgery    LUMBAR LAMINECTOMY ANTERIOR LUMBAR INTERBODY FUSION N/A 2018    Procedure: ANTERIOR LUMBAR INTERBODY FUSION L5-S1;  Surgeon: Kj Falcon MD;  Location:  [facility];  Service: Neurosurgery    LUMBAR LAMINECTOMY WITH FUSION Left 12/10/2018    Procedure: LUMBAR LAMINECTOMY TRANSFORAMINAL LUMBAR INTERBODY FUSION L34,45;  Surgeon: Kj Falcon MD;  Location:  [facility];  Service: Neurosurgery    LUMBAR LAMINECTOMY WITH FUSION Left 2018    Procedure: LUMBAR LAMINECTOMY TRANSFORAMINAL LUMBAR INTERBODY FUSION LEFT L3-4, L4-5 QUADRANT RETRACTOR;  Surgeon: Kj Falcon MD;  Location:  [facility];  Service: Neurosurgery    SKIN LESION EXCISION      nasal     Family History   Problem Relation Age of Onset    No Known Problems Mother     No Known Problems Father      Social History     Tobacco Use    Smoking status: Former     Current packs/day: 0.00     Types: Cigarettes     Start date:      Quit date:      Years since quittin.7    Smokeless tobacco: Never    Tobacco comments:     age 14-20 years of age   Vaping Use    Vaping status: Never Used   Substance Use Topics    Alcohol use: No    Drug use: Never   Pt lives in [facility]     Current Facility-Administered Medications:     sennosides-docusate (PERICOLACE) 8.6-50 MG per tablet 2 tablet, 2 tablet, Oral, BID PRN **AND** polyethylene glycol (MIRALAX) packet 17 g, 17 g, Oral, Daily PRN **AND** bisacodyl (DULCOLAX) EC tablet 5 mg, 5 mg, Oral, Daily PRN **AND** bisacodyl (DULCOLAX) suppository 10 mg, 10 mg, Rectal, Daily PRN, Heather Richard MD    Calcium Replacement - Follow  Nurse / BPA Driven Protocol, , Does not apply, PRNHilary Fakhri, MD    carvedilol (COREG) tablet 6.25 mg, 6.25 mg, Oral, BID With Meals, Heather Richard MD, 6.25 mg at 09/06/24 1743    gabapentin (NEURONTIN) capsule 600 mg, 600 mg, Oral, Q12H, Heather Richard MD, 600 mg at 09/06/24 0815    hydrALAZINE (APRESOLINE) injection 20 mg, 20 mg, Intravenous, Q4H PRN, Heather Richard MD, 20 mg at 09/06/24 1743    losartan (COZAAR) tablet 100 mg, 100 mg, Oral, Daily, Heather Richard MD, 100 mg at 09/06/24 0815    Magnesium Standard Dose Replacement - Follow Nurse / BPA Driven Protocol, , Does not apply, Hilary ELIZONDO Fakhri, MD    ondansetron (ZOFRAN) injection 4 mg, 4 mg, Intravenous, Q6H PRN, Heather Richard MD    pantoprazole (PROTONIX) injection 40 mg, 40 mg, Intravenous, Q AM, Heather Richard MD, 40 mg at 09/06/24 0604    Phosphorus Replacement - Follow Nurse / BPA Driven Protocol, , Does not apply, Hilary ELIZONDO Fakhri, MD    piperacillin-tazobactam (ZOSYN) 4.5 g IVPB in 100 mL NS MBP (CD), 4.5 g, Intravenous, Q8H, Heather Richard MD, 4.5 g at 09/06/24 1601    Potassium Replacement - Follow Nurse / BPA Driven Protocol, , Does not apply, Hilary ELIZONDO Fakhri, MD    [COMPLETED] Insert Peripheral IV, , , Once **AND** sodium chloride 0.9 % flush 10 mL, 10 mL, Intravenous, PRN, Laura Hull MD    sodium chloride 0.9 % flush 10 mL, 10 mL, Intravenous, Q12H, Heather Richard MD, 10 mL at 09/06/24 0818    sodium chloride 0.9 % flush 10 mL, 10 mL, Intravenous, PRNHilary Fakhri, MD    sodium chloride 0.9 % infusion 40 mL, 40 mL, Intravenous, PRN, Heather Richard MD    sodium chloride 0.9 % infusion, 75 mL/hr, Intravenous, Continuous, Heather Richard MD, Last Rate: 75 mL/hr at 09/06/24 0744, 75 mL/hr at 09/06/24 0744    Review of Systems  A comprehensive 14 point review of systems was performed and is negative unless otherwise noted.     Vital Signs  /47 (BP Location: Left arm, Patient Position: Lying)   Pulse 61    "Temp 98.4 °F (36.9 °C) (Oral)   Resp 18   Ht 170.2 cm (67\")   Wt 74.8 kg (165 lb)   SpO2 94%   BMI 25.84 kg/m²   Body mass index is 25.84 kg/m².     Intake/Output Summary (Last 24 hours) at 9/6/2024 1930  Last data filed at 9/6/2024 1740  Gross per 24 hour   Intake 480 ml   Output 200 ml   Net 280 ml       Physical Exam  Constitutional:       Appearance: Normal appearance. He is normal weight.      Comments: Pleasant elderly male, in no acute distress. Normal development, normal body habitus. Well nourished   HENT:      Head: Normocephalic and atraumatic.      Right Ear: External ear normal.      Left Ear: External ear normal.      Ears:      Comments: Hard of hearing bilaterally with hearing aids in place     Nose: Nose normal.      Comments: Nares patent, no septal deviation, no drainage     Mouth/Throat:      Comments: Airway patent, dentition intact, mucus membranes moist  Eyes:      Extraocular Movements: Extraocular movements intact.      Conjunctiva/sclera: Conjunctivae normal.      Pupils: Pupils are equal, round, and reactive to light.      Comments: External eyelids grossly normal, vision intact, no scleral icterus   Neck:      Comments: Trachea midline  Cardiovascular:      Rate and Rhythm: Normal rate.      Comments: Normotensive, no JVD bilaterally  Pulmonary:      Effort: Pulmonary effort is normal.      Breath sounds: Normal breath sounds.      Comments: Normal respiratory rate  Abdominal:      Comments: Protuberant, mildly distended, soft, nontender. No scars hernias or masses.   Musculoskeletal:         General: Normal range of motion.      Cervical back: Normal range of motion.      Comments: Strength intact. Ambulating without difficulty. Absent of trauma   Skin:     General: Skin is warm and dry.      Comments: Skin color is consistent with ethnicity   Neurological:      General: No focal deficit present.      Mental Status: He is alert and oriented to person, place, and time.   Psychiatric: "         Mood and Affect: Mood normal.         Behavior: Behavior normal.         Thought Content: Thought content normal.         Judgment: Judgment normal.      Comments: Patient understands disease process and has decision making capacity.          Results:  Labs:  I personally reviewed all pertinent labs.   Imaging: I personally reviewed all pertinent imaging studies.     ASSESSMENT AND PLAN    Active Hospital Problems    Diagnosis     Transaminitis     Dilated gallbladder     Elevated alkaline phosphatase level     Hyperbilirubinemia     Large hiatal hernia        Chad Henriquez is a 89 y.o. male with lethargy, weakness decreased appetite and chills with abnormal labs.  Blood work showed a transaminitis, elevated total bilirubin and markedly elevated alkaline phosphatase with a mildly dilated gallbladder on ultrasound without stones nor dilated common bile duct, and a CT of the abdomen and pelvis that was unremarkable.  HIDA scan showed a patent cystic duct with ejection fraction of 93% and no symptoms of biliary colic.  Acute hepatitis panel from last month was negative.    Given his recent symptoms with these markedly elevated alkaline phosphatase and mildly elevated total bilirubin without any obvious symptoms of gallbladder disease, I am concerned that this could represent an occult bile duct malignancy.  Plan on obtaining an MRCP, but will have to wait 24 hours after HIDA scan due to the administration of radiotracer.  Okay for a regular diet for now.  Will make n.p.o. after midnight on Sunday.  In the meantime, may benefit from a GI consult for any further recommendations regarding the hyperammonemia and elevated liver enzymes.  If MRCP is completely negative and there is no other identifiable etiology for his symptoms, a laparoscopic cholecystectomy is certainly reasonable to see if this is diagnostic and therapeutic.  Despite the large hiatal hernia, it does not appear that he is experiencing any  significant symptoms of dysphagia or shortness of breath at this time.  General surgery will continue to follow.    I discussed the patient's findings and my recommendations with the patient and/or family, as well as the nursing staff and primary care team.    Miguel Plummer MD, FACS  General Surgery  9/6/2024  19:30 CDT    Please note that portions of this note were completed with a voice recognition program.

## 2024-09-07 NOTE — PLAN OF CARE
Goal Outcome Evaluation:  Plan of Care Reviewed With: patient        Progress: improving  Outcome Evaluation: Pt up in chair. Pt is [place]. sit-stand cga w/ cues. Pt was able to amb 100'x4 rwx cga cues for safety. reviwed home safety and POC. feel pt will be safe for d/c home when medically stable, will benfit from .

## 2024-09-08 ENCOUNTER — APPOINTMENT (OUTPATIENT)
Dept: MRI IMAGING | Facility: HOSPITAL | Age: 89
DRG: 446 | End: 2024-09-08
Payer: MEDICARE

## 2024-09-08 PROCEDURE — 74183 MRI ABD W/O CNTR FLWD CNTR: CPT

## 2024-09-08 PROCEDURE — 97116 GAIT TRAINING THERAPY: CPT

## 2024-09-08 PROCEDURE — 25010000002 METRONIDAZOLE 500 MG/100ML SOLUTION: Performed by: HOSPITALIST

## 2024-09-08 PROCEDURE — 99222 1ST HOSP IP/OBS MODERATE 55: CPT | Performed by: NURSE PRACTITIONER

## 2024-09-08 PROCEDURE — 25010000002 CEFTRIAXONE PER 250 MG: Performed by: HOSPITALIST

## 2024-09-08 PROCEDURE — 0 GADOBENATE DIMEGLUMINE 529 MG/ML SOLUTION: Performed by: HOSPITALIST

## 2024-09-08 PROCEDURE — A9577 INJ MULTIHANCE: HCPCS | Performed by: HOSPITALIST

## 2024-09-08 PROCEDURE — 99232 SBSQ HOSP IP/OBS MODERATE 35: CPT | Performed by: SURGERY

## 2024-09-08 PROCEDURE — 97110 THERAPEUTIC EXERCISES: CPT

## 2024-09-08 RX ORDER — CARVEDILOL 6.25 MG/1
25 TABLET ORAL 2 TIMES DAILY WITH MEALS
Status: DISCONTINUED | OUTPATIENT
Start: 2024-09-08 | End: 2024-09-09 | Stop reason: HOSPADM

## 2024-09-08 RX ADMIN — METRONIDAZOLE 500 MG: 500 INJECTION, SOLUTION INTRAVENOUS at 20:25

## 2024-09-08 RX ADMIN — APIXABAN 5 MG: 2.5 TABLET, FILM COATED ORAL at 20:25

## 2024-09-08 RX ADMIN — CARVEDILOL 12.5 MG: 6.25 TABLET, FILM COATED ORAL at 09:09

## 2024-09-08 RX ADMIN — Medication 10 ML: at 20:25

## 2024-09-08 RX ADMIN — GABAPENTIN 600 MG: 300 CAPSULE ORAL at 09:16

## 2024-09-08 RX ADMIN — METRONIDAZOLE 500 MG: 500 INJECTION, SOLUTION INTRAVENOUS at 04:56

## 2024-09-08 RX ADMIN — PANTOPRAZOLE SODIUM 40 MG: 40 INJECTION, POWDER, FOR SOLUTION INTRAVENOUS at 05:03

## 2024-09-08 RX ADMIN — GABAPENTIN 600 MG: 300 CAPSULE ORAL at 20:24

## 2024-09-08 RX ADMIN — GADOBENATE DIMEGLUMINE 20 ML: 529 INJECTION, SOLUTION INTRAVENOUS at 11:47

## 2024-09-08 RX ADMIN — LOSARTAN POTASSIUM 100 MG: 50 TABLET, FILM COATED ORAL at 09:10

## 2024-09-08 RX ADMIN — CARVEDILOL 25 MG: 6.25 TABLET, FILM COATED ORAL at 18:27

## 2024-09-08 RX ADMIN — Medication 5 MG: at 20:25

## 2024-09-08 RX ADMIN — SODIUM CHLORIDE 1000 MG: 900 INJECTION INTRAVENOUS at 12:46

## 2024-09-08 RX ADMIN — METRONIDAZOLE 500 MG: 500 INJECTION, SOLUTION INTRAVENOUS at 13:54

## 2024-09-08 NOTE — PROGRESS NOTES
[facility] Medicine Services  INPATIENT PROGRESS NOTE    Patient Name: Chad Henriquez  Date of Admission: 9/5/2024  Today's Date: 09/08/24  Length of Stay: 1  Primary Care Physician: Miguel Bond MD    Subjective   Chief Complaint: Abnormal labs  Abnormal Lab       Patient is a 89-year-old male with a history of hypertension and coronary artery disease s/p CABG and stenting  , hyperlipidemia,afib  on lipitor eliquis who presents to the ER with abnormal labs. Patient states he has felt very bad over the last week and a half. He complains of lethargy, generalized weakness, decreased appetite and chills. Patient had some labs performed and they showed an elevated ammonia level and LFTs. Patient denies any known liver abnormalities. Patient was called today and told to come to the ER. He has had some intermittent confusion. He denies any fever, chest pain, shortness of air, abdominal pain, nausea vomiting diarrhea, urinary changes, neurologic changes. Patient states his abdomen has been swelling ,   In ER CT abdomen was -ve, US Gallbladder was +ve acute cholecystitis no stones, no fever no wbc , LFTS mildly elevated, bilirubin mild, ammonia -ve, case was discussed with surgery who wanted us to admit, HIDA, abx, will keep NPO, IVF, conservative meds was given zosyn in ER and tolerated it, continue, trend LFTS hold lipitor, eliquis in case of surgery, identify reconcile restart home meds once reconciled, SCDS for DVT prophylaxis  9/6  As before admitted to us with abnormal labs but acute cholecystitis on antibiotic awaiting surgery consult, and HIDA scan blood pressure remains an issue, on Cozaar Coreg hydralazine he said he was switched from Coreg to metoprolol suspect for his A-fib to control his heart rate as Coreg is better in controlling blood pressure we will continue to monitor  9/7  Appreciate surgery patient gallbladder ultrasound as above HIDA scan was unremarkable,  agree with surgery  Given his recent symptoms with these markedly elevated alkaline phosphatase and mildly elevated total bilirubin without any obvious symptoms of gallbladder disease, I am concerned that this could represent an occult bile duct malignancy. Plan on obtaining an MRCP, but will have to wait 24 hours after HIDA scan due to the administration of radiotracer , Will make n.p.o. after midnight on Sunday , will consult gi, regarding elevated LFTS, continue to hold statin, check acute hepatitis panel, pt has large HH, and fatty liver both could explain high LFTS with statin and his symptoms, on PPI for now   Increase his Coreg for better blood pressure control  9/8  As before MRCP planned for tomorrow, appreciate GI being consulted for high LFTs abdominal pain nausea suspect his pain is secondary to huge hiatal hernia and acid reflux disease PPI seems to help no immediate EGD is planned, blood cultures respiratory panel has been all unremarkable, LFTs are elevated alkaline phosphatase very high however acute hepatitis panel is unremarkable please note that the patient has fatty liver and was on Lipitor Lipitor had been discontinued, blood pressure remains hard to control will increase his Coreg continue antibiotics random coverage for now with Rocephin and Flagyl, to prevent any ascending cholangitis pending MRCP remains afebrile white count unremarkable blood cultures unremarkable UA was unremarkable  Review of Systems   All pertinent negatives and positives are as above. All other systems have been reviewed and are negative unless otherwise stated.     Objective    Temp:  [97.2 °F (36.2 °C)-99 °F (37.2 °C)] 97.7 °F (36.5 °C)  Heart Rate:  [54-62] 59  Resp:  [15-16] 16  BP: (155-180)/(49-60) 177/49  Physical Exam  Constitutional:       Appearance: Normal appearance.   HENT:      Head: Normocephalic.      Nose: Nose normal.   Eyes:      Pupils: Pupils are equal, round, and reactive to light.  "  Cardiovascular:      Rate and Rhythm: Normal rate.   Pulmonary:      Effort: Pulmonary effort is normal.   Abdominal:      General: Abdomen is flat.   Musculoskeletal:         General: Normal range of motion.      Cervical back: Normal range of motion.   Neurological:      Mental Status: He is alert.           Results Review:  I have reviewed the labs, radiology results, and diagnostic studies.    Laboratory Data:   Results from last 7 days   Lab Units 09/07/24  0433 09/06/24  0443 09/05/24  1516   WBC 10*3/mm3 7.67 8.42 7.68   HEMOGLOBIN g/dL 9.0* 9.2* 9.8*   HEMATOCRIT % 28.6* 29.3* 30.9*   PLATELETS 10*3/mm3 205 225 220        Results from last 7 days   Lab Units 09/07/24  1017 09/05/24  1516   SODIUM mmol/L  --  135*   POTASSIUM mmol/L  --  4.6   CHLORIDE mmol/L  --  105   CO2 mmol/L  --  26.0   BUN mg/dL  --  14   CREATININE mg/dL  --  0.82   CALCIUM mg/dL  --  8.1*   BILIRUBIN mg/dL 1.8* 1.9*   ALK PHOS U/L 1,194* 1,419*   ALT (SGPT) U/L 71* 80*   AST (SGOT) U/L 120* 126*   GLUCOSE mg/dL  --  128*       Culture Data:   No results found for: \"BLOODCX\", \"URINECX\", \"WOUNDCX\", \"MRSACX\", \"RESPCX\", \"STOOLCX\"    Radiology Data:   Imaging Results (Last 24 Hours)       ** No results found for the last 24 hours. **            I have reviewed the patient's current medications.     Assessment/Plan   Assessment  Active Hospital Problems    Diagnosis     **Acute cholecystitis     Transaminitis     Dilated gallbladder     Elevated alkaline phosphatase level     Hyperbilirubinemia     Large hiatal hernia        Treatment Plan  . In ER CT abdomen was -ve, US Gallbladder was +ve acute cholecystitis no stones, no fever no wbc , LFTS mildly elevated, bilirubin mild, ammonia -ve, case was discussed with surgery who wanted us to admit, HIDA, abx, will keep NPO, IVF, conservative meds was given zosyn in ER and tolerated it, continue, trend LFTS hold lipitor, eliquis in case of surgery, identify reconcile restart home meds once " reconciled, SCDS for DVT prophylaxis      Medical Decision Making  Number and Complexity of problems: 2 acute   Differential Diagnosis: as above      Conditions and Status        Condition is at treatment goal.     [facility] Data  External documents reviewed: no  Cardiac tracing (EKG, telemetry) interpretation: yes  Radiology interpretation: yes  Labs reviewed: yes  Any tests that were considered but not ordered: no     Decision rules/scores evaluated (example [id number], [place], etc): yes     Discussed with: ER     Care Planning  Shared decision making: Patient apprised of current labs, vitals, imaging and treatment plan.  They are agreeable with proceeding with plans as discussed.   Code status and discussions: full      Disposition  Social Determinants of Health that impact treatment or disposition: no  Estimated length of stay is tbd    Electronically signed by Heather Richard MD, 09/08/24, 11:04 CDT.

## 2024-09-08 NOTE — PLAN OF CARE
Goal Outcome Evaluation:  Plan of Care Reviewed With: patient  Progress: no change         Pt with no c/o pain thus far this shift. IV abx infused per order, otherwise IV saline locked. Voiding. NPO since 0000 for MRCP. Spouse @ bedside. Resting well between care. VSS. Safety maintained.

## 2024-09-08 NOTE — PROGRESS NOTES
"GENERAL SURGERY INPATIENT PROGRESS NOTE    Patient Name:  Chad Henriquez  YOB: 1929  MRN: [id number]    SUBJECTIVE  Underwent MRI today.    He is tolerating a regular diet without any dysphagia, abdominal pain, or nausea.  Appetite is good.    Allergies:  Allergies   Allergen Reactions    Codeine Nausea And Vomiting and GI Intolerance    Fentanyl Hallucinations and Other (See Comments)     DELUSIONAL  Fentanyl patchs, caused patient to become confused and anxious per family  Fentanyl patchs, caused patient to become confused and anxious per family    Augmentin [Amoxicillin-Pot Clavulanate] GI Intolerance       Medications:  carvedilol, 25 mg, Oral, BID With Meals  cefTRIAXone, 1,000 mg, Intravenous, Q24H  gabapentin, 600 mg, Oral, Q12H  losartan, 100 mg, Oral, Daily  metroNIDAZOLE, 500 mg, Intravenous, Q8H  pantoprazole, 40 mg, Intravenous, Q AM  sodium chloride, 10 mL, Intravenous, Q12H         OBJECTIVE    VITAL SIGNS  /58 (BP Location: Right arm, Patient Position: Lying) Comment: Primary Nurse (Kris) notified  Pulse 51   Temp 98.6 °F (37 °C) (Oral)   Resp 16   Ht 170.2 cm (67\")   Wt 74.8 kg (165 lb)   SpO2 94%   BMI 25.84 kg/m²     Intake/Output Summary (Last 24 hours) at 9/8/2024 1744  Last data filed at 9/8/2024 0352  Gross per 24 hour   Intake --   Output 250 ml   Net -250 ml       PHYSICAL EXAM    General: Elderly male, sitting up in bed, in no acute distress.  HEENT:  NCAT, PERRL, EOM intact bilaterally, hearing intact, nares patent, hearing loss bilaterally  Heart:  Regular rate, normotensive, no JVD bilaterally  Lungs:  Normal respiratory effort, regular respiratory rate, no wheezing  Abdomen: Obese, soft, nontender even with deep palpation  Extremities:  No cyanosis, clubbing or edema.   Musculoskeletal:  Normal development of bilateral upper extremities. Normal development of bilateral lower extremities.  Range of motion intact.  No evidence of trauma.  Skin:  No rashes, " ecchymosis or jaundice. Dry and non-diaphoretic. Skin color is consistent with ethnicity.    RESULTS    Labs:  I personally reviewed all pertinent labs.   Imaging:  I personally reviewed all pertinent imaging studies.   Pathology:        ASSESSMENT AND PLAN    Active Hospital Problems    Diagnosis     **Acute cholecystitis     Transaminitis     Dilated gallbladder     Elevated alkaline phosphatase level     Hyperbilirubinemia     Large hiatal hernia          DAILY CARE PLAN    MRCP showed no evidence of acute cholecystitis, gallstones, common bile duct stones, biliary duct dilatation, occult biliary tree masses, liver lesions or really anything else to account for his abnormal liver enzymes.  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  This can be followed up as an outpatient.  Since there is no evidence of cholecystitis, would recommend discontinuing antibiotics.  Hiatal hernia is asymptomatic for does not need to be addressed in the acute setting.  He is safe to be discharged home from my standpoint.  If there is any desire to pursue further workup, I am happy to see him in my clinic as an outpatient to make any appropriate referrals.  General surgery will sign off at this time.  Remainder of care per primary team.    I discussed the patient's findings and my recommendations with the patient/family, as well as the primary care team.    Miguel Plummer MD, FACS  General Surgery  9/8/2024  17:44 CDT    Please note that portions of this note were completed with a voice recognition program.

## 2024-09-08 NOTE — PLAN OF CARE
Goal Outcome Evaluation:                 Patient alert and oriented x4. Ambulating in room with standby assistance. General surgery signed off and recommended following up with hepatology if liver enzymes elevation persists.. Discharge tomorrow after physical therapy.

## 2024-09-08 NOTE — THERAPY TREATMENT NOTE
Acute Care - Physical Therapy Treatment Note  [facility]     Patient Name: Chad Henriquez  : 1929  MRN: [id number]  Today's Date: 2024      Visit Dx:     ICD-10-CM ICD-9-CM   1. Acute cholecystitis  K81.0 575.0   2. Transaminitis  R74.01 790.4   3. Generalized weakness  R53.1 780.79   4. Impaired mobility [Z74.09]  Z74.09 799.89     Patient Active Problem List   Diagnosis    BPH with urinary obstruction    Frequency of urination    Nocturia    OAB (overactive bladder)    Non-smoker    Spinal stenosis, lumbar region, with neurogenic claudication    Left leg pain    Bilateral hip pain    Acute left-sided low back pain with left-sided sciatica    Essential tremor    Spinal stenosis    Degeneration of lumbar or lumbosacral intervertebral disc    Hereditary and idiopathic peripheral neuropathy    Syncope    Hypercholesteremia    Coronary artery disease involving native coronary artery of native heart without angina pectoris    Essential hypertension    Lumbar disc disease with radiculopathy    Left hip pain    History of skin cancer in adulthood    Persistent shortness of breath after COVID-19    Diastolic dysfunction without heart failure    Atrial flutter with rapid ventricular response    TIA (transient ischemic attack)    Chronic anticoagulation    Paroxysmal atrial fibrillation    Impaired memory    Acute cholecystitis    Transaminitis    Dilated gallbladder    Elevated alkaline phosphatase level    Hyperbilirubinemia    Large hiatal hernia     Past Medical History:   Diagnosis Date    Allergic rhinitis     Anemia     Arthritis     Blind left eye     BPH (benign prostatic hypertrophy) with urinary retention     Cancer     skin cancer    Chronic back pain     RUNS DOWN BOTH LEGS    Constipation     Coronary artery disease     COVID     Hard of hearing     Heart attack     NO MUSCLE DAMAGE - JUST OCCLUDED VALVE    High cholesterol     History of skin cancer     BILATERAL EARS    History of transfusion      1986    Hypertension     Inguinal hernia     Leaky heart valve     DR CONCEPCION SAYS NOT ENOUGH TO BE OF CONCERN    Other bursal cyst, right elbow     Paroxysmal atrial fibrillation 5/6/2024    PONV (postoperative nausea and vomiting)     has had scopalamine patch in past     Sleep apnea     DOES NOT USE CPAP OR BIPAP    Spinal stenosis of cervical region     Spinal stenosis of lumbar region     Stroke     Tremors of nervous system     Wears hearing aid     BILATERAL     Past Surgical History:   Procedure Laterality Date    ANTERIOR LUMBAR EXPOSURE N/A 12/07/2018    Procedure: ANTERIOR LUMBAR EXPOSURE;  Surgeon: Manolo Mcleod DO;  Location:  [facility];  Service: Vascular    APPENDECTOMY      BACK SURGERY      X3    CARDIAC SURGERY  08/08/1986    X1 BYPASS    CORONARY ANGIOPLASTY WITH STENT PLACEMENT  1997    X3 STENTS    CORONARY ARTERY BYPASS GRAFT      HERNIA REPAIR Bilateral     x2    INGUINAL HERNIA REPAIR Right 06/22/2017    Procedure: RIGHT INGUINAL HERNIA REPAIR AND EXCISION OF CYST RIGHT ELBOW ;  Surgeon: Jackelyn Arriola MD;  Location:  [facility];  Service:     LUMBAR FUSION Left 06/30/2021    Procedure: LUMBAR LAMINECTOMY, DISCECTOMY, FACETECTOMY,  TRANSFORAMINAL LUMBAR INTERBODY FUSION L2-3. REVISION OF INSTRUMENTATION L3-S1. USE OF IMAGE GUIDANCE AND SURGICAL ROBOT;  Surgeon: Kj Falcon MD;  Location:  [facility];  Service: Robotics - Neuro;  Laterality: Left;    LUMBAR LAMINECTOMY N/A 03/12/2018    Procedure: LUMBAR LAMINECTOMY WITHOUT FUSION L3-4,4-5;  Surgeon: Kj Falcon MD;  Location:  [facility];  Service: Neurosurgery    LUMBAR LAMINECTOMY ANTERIOR LUMBAR INTERBODY FUSION N/A 12/07/2018    Procedure: ANTERIOR LUMBAR INTERBODY FUSION L5-S1;  Surgeon: Kj Falcon MD;  Location:  [facility];  Service: Neurosurgery    LUMBAR LAMINECTOMY WITH FUSION Left 12/10/2018    Procedure: LUMBAR LAMINECTOMY TRANSFORAMINAL LUMBAR INTERBODY FUSION L34,45;  Surgeon: Kj Falcon MD;   "Location:  [facility];  Service: Neurosurgery    LUMBAR LAMINECTOMY WITH FUSION Left 12/07/2018    Procedure: LUMBAR LAMINECTOMY TRANSFORAMINAL LUMBAR INTERBODY FUSION LEFT L3-4, L4-5 QUADRANT RETRACTOR;  Surgeon: Kj Falcon MD;  Location:  [facility];  Service: Neurosurgery    SKIN LESION EXCISION      nasal     PT Assessment (Last 12 Hours)       PT Evaluation and Treatment       Row Name 09/08/24 1403 09/08/24 0850       Physical Therapy Time and Intention    Subjective Information complains of  belly bloated  -NW --  -NW    Document Type therapy note (daily note)  -NW --    Mode of Treatment physical therapy  -NW --    Session Not Performed -- other (see comments)  -NW    Comment, Session Not Performed -- resting waiting to go for MRCP, wife request to ck bk in afternoon, \"really wanting him to get up and walk\"  -      Row Name 09/08/24 1403          General Information    Existing Precautions/Restrictions fall  -NW     Limitations/Impairments hearing  -       Row Name 09/08/24 1403          Pain    Pretreatment Pain Rating 0/10 - no pain  -NW       Row Name 09/08/24 1403          Bed Mobility    Supine-Sit [place] (Bed Mobility) verbal cues;contact guard;standby assist  -       Row Name 09/08/24 1403          Transfers    Transfers sit-stand transfer;stand-sit transfer  -NW       Row Name 09/08/24 1403          Sit-Stand Transfer    Sit-Stand [place] (Transfers) verbal cues;contact guard  -       Row Name 09/08/24 1403          Stand-Sit Transfer    Stand-Sit [place] (Transfers) verbal cues;contact guard  -       Row Name 09/08/24 1403          Gait/Stairs (Locomotion)    [place] Level (Gait) verbal cues;contact guard  -NW     Assistive Device (Gait) walker, 4-wheeled  -NW     Distance in Feet (Gait) 525  525 standing rest 200'  -NW     Right Sided Gait Deviations foot drop/toe drag  -NW       Row Name 09/08/24 1403          Safety Issues, Functional Mobility    Impairments " Affecting Function (Mobility) balance  -NW       Row Name 09/08/24 1403          Motor Skills    Therapeutic Exercise aerobic  -NW       Row Name 09/08/24 1403          Aerobic Exercise    Time Performed (Aerobic Exercise) AROM BLEs  -NW       Row Name 09/08/24 1403          Positioning and Restraints    Pre-Treatment Position in bed  -NW     Post Treatment Position chair  -NW     In Chair reclined;call light within reach;encouraged to call for assist;with family/caregiver  -NW               User Key  (r) = Recorded By, (t) = Taken By, (c) = Cosigned By      Initials Name Provider Type    Rosalinda Billings, PTA Physical Therapist Assistant                    Physical Therapy Education       Title: PT OT SLP Therapies (In Progress)       Topic: Physical Therapy (In Progress)       Point: Mobility training (Done)       Learning Progress Summary             Patient Acceptance, E,D, VU,DU by BL at 9/6/2024 1447    Comment: pt. educated on proper techniques to come to standing to maintain proper body mechanics andd safety. Educated on the importance of functional mobility to increase/maintain daily function.   Significant Other Acceptance, E,D, VU,DU by BL at 9/6/2024 1447    Comment: pt. educated on proper techniques to come to standing to maintain proper body mechanics andd safety. Educated on the importance of functional mobility to increase/maintain daily function.                         Point: Home exercise program (Not Started)       Learner Progress:  Not documented in this visit.              Point: Body mechanics (Done)       Learning Progress Summary             Patient Acceptance, E,D, VU,DU by BL at 9/6/2024 1447    Comment: pt. educated on proper techniques to come to standing to maintain proper body mechanics andd safety. Educated on the importance of functional mobility to increase/maintain daily function.   Significant Other Acceptance, E,D, VU,DU by BL at 9/6/2024 1447    Comment: pt. educated on  proper techniques to come to standing to maintain proper body mechanics andd safety. Educated on the importance of functional mobility to increase/maintain daily function.                         Point: Precautions (Done)       Learning Progress Summary             Patient Acceptance, E,D, OLESYA,DU by  at 9/6/2024 1447    Comment: pt. educated on proper techniques to come to standing to maintain proper body mechanics andd safety. Educated on the importance of functional mobility to increase/maintain daily function.   Significant Other Acceptance, E,D, VU,DU by  at 9/6/2024 1447    Comment: pt. educated on proper techniques to come to standing to maintain proper body mechanics andd safety. Educated on the importance of functional mobility to increase/maintain daily function.                                         User Key       Initials Effective Dates Name Provider Type Discipline     08/21/24 -  Brett Merino, PT Student PT Student PT                  PT Recommendation and Plan     Plan of Care Reviewed With: patient  Progress: improving  Outcome Evaluation: Pt up in chair. Pt is [place]. sit-stand cga w/ cues. Pt was able to amb 100'x4 rwx cga cues for safety. reviwed home safety and POC. feel pt will be safe for d/c home when medically stable, will benfit from .       Time Calculation:    PT Charges       Row Name 09/08/24 1435             Time Calculation    Start Time 1403  -NW      Stop Time 1435  -NW      Time Calculation (min) 32 min  -NW      PT Received On 09/08/24  -NW      PT Goal Re-Cert Due Date 09/16/24  -NW         Time Calculation- PT    Total Timed Code Minutes- PT 32 minute(s)  -NW         Timed Charges    88509 - PT Therapeutic Exercise Minutes 13  -NW      04179 - Gait Training Minutes  19  -NW         Total Minutes    Timed Charges Total Minutes 32  -NW       Total Minutes 32  -NW                User Key  (r) = Recorded By, (t) = Taken By, (c) = Cosigned By      Initials Name Provider Type     NW Rosalinda Aquino PTA Physical Therapist Assistant                  Therapy Charges for Today       Code Description Service Date Service Provider Modifiers Qty    84361434847 HC GAIT TRAINING EA 15 MIN 9/7/2024 Rosalinda Aquino, [place] GP 2    72908775901 HC PT THER PROC EA 15 MIN 9/8/2024 Rosalinda Aquino, [place] GP 1    74621127620 HC GAIT TRAINING EA 15 MIN 9/8/2024 Rosalinda Aquino, [place] GP 1            PT G-Codes  Outcome Measure Options: AM-PAC 6 Clicks Basic Mobility (PT)  AM-PAC 6 Clicks Score (PT): 18    Rosalinda Aquino PTA  9/8/2024

## 2024-09-08 NOTE — CONSULTS
[facility] Gastroenterology  Initial Inpatient Consult Note    Referring Provider: Heather Richard MD    Date of Admission: 9/5/2024  Date of Service:  09/08/24    Reason for Consultation: Elevated alk phos    Subjective     History of present illness:      This is a 89-year-old male presented to [facility] ER for abnormal labs.  He was found to have significantly elevated alkaline phosphatase as well as elevated bilirubin and transaminitis.  His wife reports over the past month postprandial abdominal bloating with midepigastric pain that radiates into right upper quadrant.  Ultrasound of the gallbladder showed wall thickening and small amount of fluid but no gallstones.  He had a negative CT of the abdomen.  Negative hepatitis panel has been drawn within the past month.  Abdominal bloating/pain only related with food intake.  PPI did not relieve symptoms prior to admission.  Over the past month bowels noted to be more loose than normal.  No signs of GI blood loss.      Past Medical History:  Past Medical History:   Diagnosis Date    Allergic rhinitis     Anemia     Arthritis     Blind left eye     BPH (benign prostatic hypertrophy) with urinary retention     Cancer     skin cancer    Chronic back pain     RUNS DOWN BOTH LEGS    Constipation     Coronary artery disease     COVID     Hard of hearing     Heart attack 1986    NO MUSCLE DAMAGE - JUST OCCLUDED VALVE    High cholesterol     History of skin cancer     BILATERAL EARS    History of transfusion     1986    Hypertension     Inguinal hernia     Leaky heart valve     DR CONCEPCION SAYS NOT ENOUGH TO BE OF CONCERN    Other bursal cyst, right elbow     Paroxysmal atrial fibrillation 5/6/2024    PONV (postoperative nausea and vomiting)     has had scopalamine patch in past     Sleep apnea     DOES NOT USE CPAP OR BIPAP    Spinal stenosis of cervical region     Spinal stenosis of lumbar region     Stroke     Tremors of nervous system     Wears hearing  aid     BILATERAL       Past Surgical History:  Past Surgical History:   Procedure Laterality Date    ANTERIOR LUMBAR EXPOSURE N/A 12/07/2018    Procedure: ANTERIOR LUMBAR EXPOSURE;  Surgeon: Manolo Mcleod DO;  Location:  [facility] OR;  Service: Vascular    APPENDECTOMY      BACK SURGERY      X3    CARDIAC SURGERY  08/08/1986    X1 BYPASS    CORONARY ANGIOPLASTY WITH STENT PLACEMENT  1997    X3 STENTS    CORONARY ARTERY BYPASS GRAFT      HERNIA REPAIR Bilateral     x2    INGUINAL HERNIA REPAIR Right 06/22/2017    Procedure: RIGHT INGUINAL HERNIA REPAIR AND EXCISION OF CYST RIGHT ELBOW ;  Surgeon: Jackelyn Arriola MD;  Location:  [facility] OR;  Service:     LUMBAR FUSION Left 06/30/2021    Procedure: LUMBAR LAMINECTOMY, DISCECTOMY, FACETECTOMY,  TRANSFORAMINAL LUMBAR INTERBODY FUSION L2-3. REVISION OF INSTRUMENTATION L3-S1. USE OF IMAGE GUIDANCE AND SURGICAL ROBOT;  Surgeon: Kj Falcon MD;  Location:  [facility] OR;  Service: Robotics - Neuro;  Laterality: Left;    LUMBAR LAMINECTOMY N/A 03/12/2018    Procedure: LUMBAR LAMINECTOMY WITHOUT FUSION L3-4,4-5;  Surgeon: Kj Falcon MD;  Location:  [facility] OR;  Service: Neurosurgery    LUMBAR LAMINECTOMY ANTERIOR LUMBAR INTERBODY FUSION N/A 12/07/2018    Procedure: ANTERIOR LUMBAR INTERBODY FUSION L5-S1;  Surgeon: Kj Falcon MD;  Location:  [facility];  Service: Neurosurgery    LUMBAR LAMINECTOMY WITH FUSION Left 12/10/2018    Procedure: LUMBAR LAMINECTOMY TRANSFORAMINAL LUMBAR INTERBODY FUSION L34,45;  Surgeon: Kj Falcon MD;  Location:  [facility];  Service: Neurosurgery    LUMBAR LAMINECTOMY WITH FUSION Left 12/07/2018    Procedure: LUMBAR LAMINECTOMY TRANSFORAMINAL LUMBAR INTERBODY FUSION LEFT L3-4, L4-5 QUADRANT RETRACTOR;  Surgeon: Kj Falcon MD;  Location:  [facility] OR;  Service: Neurosurgery    SKIN LESION EXCISION      nasal        Social History:   Social History     Tobacco Use    Smoking status: Former     Current packs/day: 0.00     Types:  Cigarettes     Start date:      Quit date:      Years since quittin.7    Smokeless tobacco: Never    Tobacco comments:     age [age] years of age   Substance Use Topics    Alcohol use: No        Family History:  Family History   Problem Relation Age of Onset    No Known Problems Mother     No Known Problems Father        Home Meds:  Medications Prior to Admission   Medication Sig Dispense Refill Last Dose    alfuzosin (UROXATRAL) 10 MG 24 hr tablet Take 1 tablet by mouth Every Evening.       apixaban (ELIQUIS) 5 MG tablet tablet Take 1 tablet by mouth 2 (Two) Times a Day. 60 tablet 11     clobetasol prop emollient base (Clobetasol Propionate E) 0.05 % emollient cream Apply 1 Application topically to the appropriate area as directed 2 (Two) Times a Day. 30 g 0     Docusate Calcium (STOOL SOFTENER PO) Take 100 mg by mouth Daily.       finasteride (PROSCAR) 5 MG tablet Take 1 tablet by mouth Daily.       fluticasone (FLONASE) 50 MCG/ACT nasal spray 2 sprays into the nostril(s) as directed by provider Daily. 16 g 1     folic acid (FOLVITE) 1 MG tablet Take 1 tablet by mouth Daily.       gabapentin (NEURONTIN) 600 MG tablet Take 1 tablet by mouth 3 (Three) Times a Day. 90 tablet 3     L-Lysine 500 MG tablet tablet Take 1 tablet by mouth Daily.       losartan (COZAAR) 100 MG tablet Take 1 tablet by mouth Daily. 90 tablet 1     Psyllium (Metamucil) wafer wafer Take 2 wafers by mouth Daily.       senna (SENOKOT) 8.6 MG tablet Take 1 tablet by mouth Daily.       traMADol (ULTRAM) 50 MG tablet Take 1 tablet by mouth Every 6 (Six) Hours As Needed for Moderate Pain. 30 tablet 0     atorvastatin (LIPITOR) 80 MG tablet Take 0.5 tablets by mouth Daily.       carvedilol (COREG) 6.25 MG tablet Take 1 tablet by mouth 2 (Two) Times a Day With Meals. 180 tablet 1     multivitamin with minerals tablet tablet Take 1 tablet by mouth Daily.       NON FORMULARY Take 1 tablet by mouth At Night As Needed (slee[). OTC Natures  Bounty Sleep 3 (Melatonin 10mg/L-theanine 200mg/Nighttime herbal blend 50mg (Chamomile Extract, Lavender Extract, Lemon Balm Extract, Valerian Root Extract))          Current Meds:     Current Facility-Administered Medications:     aluminum-magnesium hydroxide-simethicone (MAALOX MAX) 400-400-40 MG/5ML suspension 15 mL, 15 mL, Oral, Q6H PRN, Heather Richard MD    sennosides-docusate (PERICOLACE) 8.6-50 MG per tablet 2 tablet, 2 tablet, Oral, BID PRN **AND** polyethylene glycol (MIRALAX) packet 17 g, 17 g, Oral, Daily PRN **AND** bisacodyl (DULCOLAX) EC tablet 5 mg, 5 mg, Oral, Daily PRN **AND** bisacodyl (DULCOLAX) suppository 10 mg, 10 mg, Rectal, Daily PRN, Heather Richard MD    Calcium Replacement - Follow Nurse / BPA Driven Protocol, , Does not apply, PRN, Heather Richard MD    carvedilol (COREG) tablet 12.5 mg, 12.5 mg, Oral, BID With Meals, Heather Richard MD, 12.5 mg at 09/08/24 0909    cefTRIAXone (ROCEPHIN) 1,000 mg in sodium chloride 0.9 % 100 mL MBP, 1,000 mg, Intravenous, Q24H, Heather Richard MD, Last Rate: 200 mL/hr at 09/07/24 1102, 1,000 mg at 09/07/24 1102    gabapentin (NEURONTIN) capsule 600 mg, 600 mg, Oral, Q12H, Heather Richard MD, 600 mg at 09/08/24 0916    hydrALAZINE (APRESOLINE) injection 20 mg, 20 mg, Intravenous, Q4H PRN, Heather Richard MD, 20 mg at 09/06/24 1743    losartan (COZAAR) tablet 100 mg, 100 mg, Oral, Daily, Heather Richard MD, 100 mg at 09/08/24 0910    Magnesium Standard Dose Replacement - Follow Nurse / BPA Driven Protocol, , Does not apply, PRNHilary Fakhri, MD    melatonin tablet 10 mg, 10 mg, Oral, Nightly PRN, Jerod Horta MD, 10 mg at 09/07/24 2137    metroNIDAZOLE (FLAGYL) IVPB 500 mg, 500 mg, Intravenous, Q8H, Heather Richard MD, Stopped at 09/08/24 0657    ondansetron (ZOFRAN) injection 4 mg, 4 mg, Intravenous, Q6H PRN, Heather Richard MD, 4 mg at 09/07/24 1739    pantoprazole (PROTONIX) injection 40 mg, 40 mg, Intravenous, Q AM, Heather Richard MD,  40 mg at 09/08/24 0503    Phosphorus Replacement - Follow Nurse / BPA Driven Protocol, , Does not apply, Hilary ELIZONDO Fakhri, MD    Potassium Replacement - Follow Nurse / BPA Driven Protocol, , Does not apply, Hilary ELIZONDO Fakhri, MD    [COMPLETED] Insert Peripheral IV, , , Once **AND** sodium chloride 0.9 % flush 10 mL, 10 mL, Intravenous, PRN, Laura Hull MD    sodium chloride 0.9 % flush 10 mL, 10 mL, Intravenous, Q12H, Heather Richard MD, 10 mL at 09/07/24 2137    sodium chloride 0.9 % flush 10 mL, 10 mL, Intravenous, PRNHilary Fakhri, MD    sodium chloride 0.9 % infusion 40 mL, 40 mL, Intravenous, PRN, Heather Richard MD    Allergies:  Allergies   Allergen Reactions    Codeine Nausea And Vomiting and GI Intolerance    Fentanyl Hallucinations and Other (See Comments)     DELUSIONAL  Fentanyl patchs, caused patient to become confused and anxious per family  Fentanyl patchs, caused patient to become confused and anxious per family    Augmentin [Amoxicillin-Pot Clavulanate] GI Intolerance       Vital Signs  Temp:  [97.2 °F (36.2 °C)-99 °F (37.2 °C)] 97.7 °F (36.5 °C)  Heart Rate:  [54-62] 59  Resp:  [15-16] 16  BP: (155-180)/(49-60) 177/49  Body mass index is 25.84 kg/m².    Intake/Output Summary (Last 24 hours) at 9/8/2024 1014  Last data filed at 9/8/2024 0352  Gross per 24 hour   Intake --   Output 250 ml   Net -250 ml     No intake/output data recorded.    Review of Systems     Constitution:  negative for chills, fatigue and fevers  Eyes:  negative for blurriness and change of vision  ENT:   negative for sore throat and voice change  Respiratory: negative for  cough and shortness of air  Cardiovascular:  Negative for chest pain or palpitations  Gastrointestinal:  negative for  See HPI  Genitourinary:  negative for  blood in urine and painful urination  Integument: negative for  rash and redness  Hematologic / Lymphatic: negative for  excessive bleeding and easy bruising  Musculoskeletal: negative for   joint pain and joint stiffness out of the ordinary  Neurological:  negative for  seizures and speech abnormality  Behavioral/Psych:  negative for  anxiety and depression out of the ordinary  Endocrine: negative for  diabetes and weight loss, unintended  Allergies / Immunologic:  negative for  anaphylaxis and rash      Objective     Physical Exam:  General :    Alert, cooperative, in no acute distress   Head:    Normocephalic, without obvious abnormality, atraumatic   Eyes:            Lids and lashes normal, conjunctivae and sclerae normal, no   Icterus, conjunctival pallor   Throat:   No oral lesions, no thrush, oral mucosa moist, posterior oropharynx clear   Neck:   No adenopathy, supple, trachea midline, no thyromegaly, no   carotid bruit, no JVD   Lungs:     Clear to auscultation, respirations regular, even and                   unlabored    Heart:    Regular rhythm and normal rate, normal S1 and S2, no            murmur   Chest Wall:    No abnormalities observed   Abdomen:     Normal bowel sounds, no masses, no organomegaly, soft        nontender, nondistended, no guarding, no rebound                 tenderness   Rectal:     Deferred   Extremities:   No edema, no cyanosis   Skin:   No open lesions, bruising or rash   Lymph nodes:   No palpable cervical adenopathy   Psychiatric:  Judgement and insight: normal   Orientation to person place and time: normal   Mood and affect: normal        Results Review:    I have reviewed all of the patients current test results  Results from last 7 days   Lab Units 09/07/24  0433 09/06/24  0443 09/05/24  1516   WBC 10*3/mm3 7.67 8.42 7.68   HEMOGLOBIN g/dL 9.0* 9.2* 9.8*   HEMATOCRIT % 28.6* 29.3* 30.9*   PLATELETS 10*3/mm3 205 225 220       Results from last 7 days   Lab Units 09/07/24  1017 09/05/24  1516   SODIUM mmol/L  --  135*   POTASSIUM mmol/L  --  4.6   CHLORIDE mmol/L  --  105   CO2 mmol/L  --  26.0   BUN mg/dL  --  14   CREATININE mg/dL  --  0.82   CALCIUM mg/dL  --   8.1*   BILIRUBIN mg/dL 1.8* 1.9*   ALK PHOS U/L 1,194* 1,419*   ALT (SGPT) U/L 71* 80*   AST (SGOT) U/L 120* 126*   GLUCOSE mg/dL  --  128*       Results from last 7 days   Lab Units 09/05/24  1516   INR  1.30*       Lab Results   Lab Value Date/Time    LIPASE 161 (H) 09/05/2024 1516    LIPASE 28 01/29/2022 1145    LIPASE 61 (H) 01/29/2021 1106       Radiology:    Imaging Results (Last 24 Hours)       ** No results found for the last 24 hours. **              Assessment & Plan       Acute cholecystitis    Transaminitis    Dilated gallbladder    Elevated alkaline phosphatase level    Hyperbilirubinemia    Large hiatal hernia      Impression/Plan    Elevated alk phos level  Transaminitis  Acute cholecystitis    General Surgery has evaluated patient with recommendations on MRCP for today.  HIDA scan yesterday was unremarkable.  Further comments/recommendation pending results of today's MRI.  No specific dysphagia.  PPI did not alleviate symptoms therefore doubtful bloating/abdominal discomfort related to acid reflux, no plans on EGD at this time.    Further recommendation pending patient's progress as well as results of ordered testing, GI will continue to follow    Dr. Virgen takes over GI services as of 7 AM Monday morning.      Electronically signed by EDIE Luther, 09/08/24, 10:14 AM CDT.         EDIE Luther  09/08/24  10:14 CDT

## 2024-09-09 ENCOUNTER — READMISSION MANAGEMENT (OUTPATIENT)
Dept: CALL CENTER | Facility: HOSPITAL | Age: 89
End: 2024-09-09
Payer: MEDICARE

## 2024-09-09 VITALS
TEMPERATURE: 97.9 F | HEART RATE: 51 BPM | WEIGHT: 165 LBS | HEIGHT: 67 IN | DIASTOLIC BLOOD PRESSURE: 50 MMHG | BODY MASS INDEX: 25.9 KG/M2 | OXYGEN SATURATION: 93 % | RESPIRATION RATE: 16 BRPM | SYSTOLIC BLOOD PRESSURE: 161 MMHG

## 2024-09-09 LAB
ALBUMIN SERPL-MCNC: 2.6 G/DL (ref 3.5–5.2)
ALBUMIN/GLOB SERPL: 0.8 G/DL
ALP SERPL-CCNC: 1035 U/L (ref 39–117)
ALT SERPL W P-5'-P-CCNC: 56 U/L (ref 1–41)
ANION GAP SERPL CALCULATED.3IONS-SCNC: 9 MMOL/L (ref 5–15)
AST SERPL-CCNC: 83 U/L (ref 1–40)
BASOPHILS # BLD AUTO: 0.06 10*3/MM3 (ref 0–0.2)
BASOPHILS NFR BLD AUTO: 0.7 % (ref 0–1.5)
BILIRUB SERPL-MCNC: 1.3 MG/DL (ref 0–1.2)
BUN SERPL-MCNC: 12 MG/DL (ref 8–23)
BUN/CREAT SERPL: 16.4 (ref 7–25)
CALCIUM SPEC-SCNC: 7.5 MG/DL (ref 8.2–9.6)
CHLORIDE SERPL-SCNC: 107 MMOL/L (ref 98–107)
CO2 SERPL-SCNC: 18 MMOL/L (ref 22–29)
CREAT SERPL-MCNC: 0.73 MG/DL (ref 0.76–1.27)
DEPRECATED RDW RBC AUTO: 57.5 FL (ref 37–54)
EGFRCR SERPLBLD CKD-EPI 2021: 84.3 ML/MIN/1.73
EOSINOPHIL # BLD AUTO: 0.65 10*3/MM3 (ref 0–0.4)
EOSINOPHIL NFR BLD AUTO: 7.5 % (ref 0.3–6.2)
ERYTHROCYTE [DISTWIDTH] IN BLOOD BY AUTOMATED COUNT: 16.2 % (ref 12.3–15.4)
GLOBULIN UR ELPH-MCNC: 3.2 GM/DL
GLUCOSE SERPL-MCNC: 94 MG/DL (ref 65–99)
HCT VFR BLD AUTO: 29.9 % (ref 37.5–51)
HGB BLD-MCNC: 9.5 G/DL (ref 13–17.7)
IMM GRANULOCYTES # BLD AUTO: 0.05 10*3/MM3 (ref 0–0.05)
IMM GRANULOCYTES NFR BLD AUTO: 0.6 % (ref 0–0.5)
LYMPHOCYTES # BLD AUTO: 0.98 10*3/MM3 (ref 0.7–3.1)
LYMPHOCYTES NFR BLD AUTO: 11.4 % (ref 19.6–45.3)
MCH RBC QN AUTO: 30.9 PG (ref 26.6–33)
MCHC RBC AUTO-ENTMCNC: 31.8 G/DL (ref 31.5–35.7)
MCV RBC AUTO: 97.4 FL (ref 79–97)
MONOCYTES # BLD AUTO: 0.78 10*3/MM3 (ref 0.1–0.9)
MONOCYTES NFR BLD AUTO: 9 % (ref 5–12)
NEUTROPHILS NFR BLD AUTO: 6.11 10*3/MM3 (ref 1.7–7)
NEUTROPHILS NFR BLD AUTO: 70.8 % (ref 42.7–76)
NRBC BLD AUTO-RTO: 0 /100 WBC (ref 0–0.2)
PLATELET # BLD AUTO: 229 10*3/MM3 (ref 140–450)
PMV BLD AUTO: 10.3 FL (ref 6–12)
POTASSIUM SERPL-SCNC: 4 MMOL/L (ref 3.5–5.2)
PROT SERPL-MCNC: 5.8 G/DL (ref 6–8.5)
RBC # BLD AUTO: 3.07 10*6/MM3 (ref 4.14–5.8)
SODIUM SERPL-SCNC: 134 MMOL/L (ref 136–145)
WBC NRBC COR # BLD AUTO: 8.63 10*3/MM3 (ref 3.4–10.8)

## 2024-09-09 PROCEDURE — 25010000002 METRONIDAZOLE 500 MG/100ML SOLUTION: Performed by: HOSPITALIST

## 2024-09-09 PROCEDURE — 99232 SBSQ HOSP IP/OBS MODERATE 35: CPT | Performed by: NURSE PRACTITIONER

## 2024-09-09 PROCEDURE — 80053 COMPREHEN METABOLIC PANEL: CPT | Performed by: HOSPITALIST

## 2024-09-09 PROCEDURE — 85025 COMPLETE CBC W/AUTO DIFF WBC: CPT | Performed by: HOSPITALIST

## 2024-09-09 RX ORDER — PANTOPRAZOLE SODIUM 40 MG/1
40 TABLET, DELAYED RELEASE ORAL DAILY
Qty: 90 TABLET | Refills: 0 | Status: SHIPPED | OUTPATIENT
Start: 2024-09-09

## 2024-09-09 RX ORDER — GABAPENTIN 600 MG/1
300 TABLET ORAL 2 TIMES DAILY
Qty: 90 TABLET | Refills: 3 | Status: SHIPPED | OUTPATIENT
Start: 2024-09-09

## 2024-09-09 RX ORDER — CARVEDILOL 6.25 MG/1
12.5 TABLET ORAL 2 TIMES DAILY WITH MEALS
Qty: 180 TABLET | Refills: 1 | Status: SHIPPED | OUTPATIENT
Start: 2024-09-09

## 2024-09-09 RX ORDER — ALUMINA, MAGNESIA, AND SIMETHICONE 2400; 2400; 240 MG/30ML; MG/30ML; MG/30ML
30 SUSPENSION ORAL 3 TIMES DAILY
Status: DISCONTINUED | OUTPATIENT
Start: 2024-09-09 | End: 2024-09-09 | Stop reason: HOSPADM

## 2024-09-09 RX ORDER — ALUMINA, MAGNESIA, AND SIMETHICONE 2400; 2400; 240 MG/30ML; MG/30ML; MG/30ML
30 SUSPENSION ORAL 3 TIMES DAILY
Qty: 769 ML | Refills: 0 | Status: SHIPPED | OUTPATIENT
Start: 2024-09-09

## 2024-09-09 RX ADMIN — GABAPENTIN 600 MG: 300 CAPSULE ORAL at 08:37

## 2024-09-09 RX ADMIN — APIXABAN 5 MG: 2.5 TABLET, FILM COATED ORAL at 08:37

## 2024-09-09 RX ADMIN — PANTOPRAZOLE SODIUM 40 MG: 40 INJECTION, POWDER, FOR SOLUTION INTRAVENOUS at 06:00

## 2024-09-09 RX ADMIN — CARVEDILOL 25 MG: 6.25 TABLET, FILM COATED ORAL at 08:37

## 2024-09-09 RX ADMIN — METRONIDAZOLE 500 MG: 500 INJECTION, SOLUTION INTRAVENOUS at 06:00

## 2024-09-09 RX ADMIN — LOSARTAN POTASSIUM 100 MG: 50 TABLET, FILM COATED ORAL at 08:38

## 2024-09-09 RX ADMIN — Medication 10 ML: at 08:38

## 2024-09-09 NOTE — THERAPY DISCHARGE NOTE
Acute Care - Physical Therapy Discharge Summary  [facility]       Patient Name: Chad Henriquez  : 1929  MRN: [id number]    Today's Date: 2024                 Admit Date: 2024      PT Recommendation and Plan    Visit Dx:    ICD-10-CM ICD-9-CM   1. Acute cholecystitis  K81.0 575.0   2. Transaminitis  R74.01 790.4   3. Generalized weakness  R53.1 780.79   4. Impaired mobility [Z74.09]  Z74.09 799.89   5. Spinal stenosis, lumbar region, with neurogenic claudication  M48.062 724.03   6. Hereditary and idiopathic peripheral neuropathy  G60.9 356.9   7. Large hiatal hernia  K44.9 553.3   8. Hyperbilirubinemia  E80.6 782.4   9. Elevated alkaline phosphatase level  R74.8 790.5   10. Dilated gallbladder  K82.8 575.8   11. Impaired memory  R41.3 780.93   12. Paroxysmal atrial fibrillation  I48.0 427.31   13. Chronic anticoagulation  Z79.01 V58.61   14. TIA (transient ischemic attack)  G45.9 435.9   15. Atrial flutter with rapid ventricular response  I48.92 427.32   16. Diastolic dysfunction without heart failure  I50.32 428.32   17. Persistent shortness of breath after COVID-19  R06.02 786.05    U09.9 139.8   18. History of skin cancer in adulthood  Z85.828 V10.83   19. Left hip pain  M25.552 719.45   20. Lumbar disc disease with radiculopathy  M51.16 722.10     724.4   21. Essential hypertension  I10 401.9   22. Coronary artery disease involving native coronary artery of native heart without angina pectoris  I25.10 414.01   23. Hypercholesteremia  E78.00 272.0   24. Syncope, unspecified syncope type  R55 780.2   25. Degeneration of lumbar or lumbosacral intervertebral disc  M51.37 722.52   26. Spinal stenosis, unspecified spinal region  M48.00 724.00   27. Essential tremor  G25.0 333.1   28. Acute left-sided low back pain with left-sided sciatica  M54.42 724.2     724.3   29. Bilateral hip pain  M25.551 719.45    M25.552    30. Left leg pain  M79.605 729.5   31. Non-smoker  Z78.9 V49.89   32. OAB (overactive  bladder)  N32.81 596.51   33. Nocturia  R35.1 788.43   34. Frequency of urination  R35.0 788.41   35. BPH with urinary obstruction  N40.1 600.01    N13.8 599.69                PT Rehab Goals       Row Name 09/09/24 1609             Bed Mobility Goal 1 (PT)    Activity/Assistive Device (Bed Mobility Goal 1, PT) rolling to left;rolling to right;scooting;sit to supine;supine to sit  -NW      Laurel Level/Cues Needed (Bed Mobility Goal 1, PT) modified independence  -NW      Time Frame (Bed Mobility Goal 1, PT) long term goal (LTG)  -NW      Progress/Outcomes (Bed Mobility Goal 1, PT) goal not met  -NW         Transfer Goal 1 (PT)    Activity/Assistive Device (Transfer Goal 1, PT) sit-to-stand/stand-to-sit;bed-to-chair/chair-to-bed;other (see comments)  with rollator.  -NW      Laurel Level/Cues Needed (Transfer Goal 1, PT) standby assist  -NW      Time Frame (Transfer Goal 1, PT) long term goal (LTG)  -NW      Progress/Outcome (Transfer Goal 1, PT) goal not met  -NW         Gait Training Goal 1 (PT)    Activity/Assistive Device (Gait Training Goal 1, PT) gait (walking locomotion);decrease fall risk;increase endurance/gait distance  -NW      Laurel Level (Gait Training Goal 1, PT) standby assist  -NW      Distance (Gait Training Goal 1, PT) 250'  -NW      Time Frame (Gait Training Goal 1, PT) long term goal (LTG)  -NW      Progress/Outcome (Gait Training Goal 1, PT) goal not met  -NW                User Key  (r) = Recorded By, (t) = Taken By, (c) = Cosigned By      Initials Name Provider Type Discipline    NW Rosalinda Aquino PTA Physical Therapist Assistant PT                    Therapy Charges for Today       Code Description Service Date Service Provider Modifiers Qty    33700520508 HC PT THER PROC EA 15 MIN 9/8/2024 Rosalinda Aquino PTA GP 1    28760500506 HC GAIT TRAINING EA 15 MIN 9/8/2024 Rosalinda Aquino, [place] GP 1            PT Discharge Summary  Anticipated Discharge Disposition (PT): home,  home with home health  Reason for Discharge: Discharge from facility  Outcomes Achieved: Refer to plan of care for updates on goals achieved  Discharge Destination: Home with home health      Rosalinda Aquino, PTA   9/9/2024

## 2024-09-09 NOTE — OUTREACH NOTE
Prep Survey      Flowsheet Row Responses   [facility] facility patient discharged from? [facility]   Is LACE score < 7 ? No   Eligibility [facility]   Date of Admission 09/05/24   Date of Discharge 09/09/24   Discharge Disposition Home-Health Care Sv   Discharge diagnosis Acute cholecystitis   Does the patient have one of the following disease processes/diagnoses(primary or secondary)? Other   Does the patient have Home health ordered? No   Is there a DME ordered? No   Prep survey completed? Yes            BERTHA A - Registered Nurse

## 2024-09-09 NOTE — DISCHARGE SUMMARY
[facility] Medicine Services  DISCHARGE SUMMARY       Date of Admission: 9/5/2024  Date of Discharge:  9/9/2024  Primary Care Physician: Miguel Bond MD    Presenting Problem/History of Present Illness:      Final Discharge Diagnoses:  Active Hospital Problems    Diagnosis     **Acute cholecystitis     Transaminitis     Dilated gallbladder     Elevated alkaline phosphatase level     Hyperbilirubinemia     Large hiatal hernia        Consults: GI . general surgery .    Pertinent Test Results:   Results for orders placed during the hospital encounter of 05/06/24    Adult Transthoracic Echo Complete W/ Cont if Necessary Per Protocol (With Agitated Saline)    Interpretation Summary    Left ventricular systolic function is normal. Left ventricular ejection fraction appears to be 56 - 60%.    Left ventricular wall thickness is consistent with mild concentric hypertrophy.    Left ventricular diastolic function is consistent with (grade I) impaired relaxation.    Right ventricle is not well visualized but appears upper normal in size with normal systolic function.    The left atrial cavity is dilated.    Saline test results are negative.    The right atrial cavity is mildly  dilated.    There are no hemodynamically significant (more than mild) valve abnormalities.      Imaging Results (All)       Procedure Component Value Units Date/Time    MRI abdomen w wo contrast mrcp [[id number]] Collected: 09/08/24 1159     Updated: 09/08/24 1216    Narrative:      EXAM: MRI ABDOMEN W WO CONTRAST MRCP-     HISTORY: Elevated liver enzymes. Evaluate for cholangiocarcinoma.     COMPARISON: CT abdomen pelvis 9/5/2024.     TECHNIQUE: Multiplanar multisequence MR images was obtained of the  abdomen prior to and after the administration of MultiHance intravenous  contrast. Additionally heavily weighted MRCP images was performed with  reconstruction. 3D images was preformed on a separate workstation  under  physician supervision.     CONTRAST: 20 MultiHance.     FINDINGS:     Decrease sensitivity due to motion.     Lower Chest: Bibasilar consolidation, likely passive atelectasis. Small  bilateral pleural effusion.     Liver: Small hepatic cysts. Normal liver contour and signal  characteristics. No enhancing liver lesion.     Biliary Tree: Unremarkable.     Spleen: Unremarkable.     Pancreas: 0.9 cm uncinate cyst.     Adrenal Glands: Unremarkable.     Kidneys/Upper Ureters: Small right renal cyst.     Gastrointestinal Tract: Large hiatal hernia.     Lymphatics: Unremarkable.     Vasculature: Unremarkable.      Peritoneum/Retroperitoneum: Trace perihepatic free fluid. Mild  generalized mesenteric edema.     Abdominal Wall/Soft Tissues: Mild body wall anasarca.     Osseous Structures: No suspicious findings       Impression:         Decrease sensitivity due to motion.     No suspicious liver lesion.     Small bilateral pleural effusions. Additionally there is trace  perihepatic fluid, mild diffuse mesenteric edema, and body wall anasarca  which may be related to fluid status.     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        This report was signed and finalized on 9/8/2024 12:13 PM by Sebastian Olivares.       NM HIDA SCAN WITH PHARMACOLOGICAL INTERVENTION [[id number]] Collected: 09/06/24 1155     Updated: 09/06/24 1159    Narrative:      EXAMINATION: NM HIDA SCAN WITH PHARMACOLOGICAL INTERVENTION-     9/6/2024 9:24 AM     HISTORY: ? acute cholecystitis; K81.0-Acute cholecystitis;  R74.01-Elevation of levels of liver transaminase levels; R53.1-Weakness     Nuclear medicine gallbladder imaging with pharmacologic stimulation of  gallbladder contraction and measurement of the percentage of emptying.     Routine protocol anterior imaging of the abdomen was performed.  Dose: 5.40 mCi of Tc-Mebrofenin.  Route of administration:  IV injection.     Gallbladder and small bowel activity indicates patent cystic  and common  bile ducts.     Anterior imaging was repeated after oral ingestion of 30 mL of corn oil  emulsion over 5 minutes for physiologic gallbladder stimulation.     Maximum ejection fraction = 93%.     A normal gallbladder ejection fraction (when using the corn oil  emulsion) is considered to be any value greater than 20%.       Impression:      1. Patent cystic and common bile ducts.  2. Normal ejection fraction.                          This report was signed and finalized on 9/6/2024 11:56 AM by Dr. Samuel Hickman MD.       CT Abdomen Pelvis With Contrast [[id number]] Collected: 09/05/24 1623     Updated: 09/05/24 1629    Narrative:      EXAMINATION: CT ABDOMEN PELVIS W CONTRAST-      9/5/2024 3:03 PM     HISTORY: Elevated liver function tests.  Elevated and pneumonia. Lethargy.     In order to have a CT radiation dose as low as reasonably achievable  Automated Exposure Control was utilized for adjustment of the mA and/or  KV according to patient size.     CT Dose DLP = 359.48 mGy.cm.  (If there are multiple studies performed at the same time this  represents the total dose).     Abdomen/pelvis CT with IV contrast injection.  Axial, sagittal, and coronal sequences.     COMPARISON:  8/9/2024.     Borderline cardiomegaly.  Coronary artery calcification.  Large hiatal hernia.  Mild chronic change at the lung bases.     Slightly mottled appearance of the liver.  No focal liver lesion.  No biliary dilation.  Normal appearance of the gallbladder, pancreas, and spleen.  Spleen = 12 x 5 x 10 cm.     Normal adrenal glands and kidneys.  Aortic calcification with no aneurysm.     There is no bowel dilation or free fluid.  Sigmoid diverticula with no diverticulitis.     Changes related to RIGHT inguinal hernia repair noted. A small amount of  free fluid within the pelvis is a nonspecific finding.     L2-S1 postsurgical changes.  Multilevel degenerative disc, endplate, and facet disease within the  lumbar spine and lower  thoracic spine.       Impression:      1. No mass or abscess.  2. No bowel obstruction.  3. No focal liver abnormality is seen.     This report was signed and finalized on 9/5/2024 4:26 PM by Dr. Samuel Hickman MD.       US Gallbladder [[id number]] Collected: 09/05/24 1518     Updated: 09/05/24 1525    Narrative:      EXAMINATION:  US GALLBLADDER-  9/5/2024 1:35 PM     HISTORY: Elevated liver function test.     COMPARISON: No comparison study.     TECHNIQUE: Grayscale and color flow ultrasound was performed.     PANCREAS: The visualized pancreas is echogenic. No pancreatic duct  dilatation is seen. The pancreatic tail was not well visualized.     LIVER: There is mild increased echogenicity of the liver compared to the  right kidney. No focal liver lesion is seen. The portal vein blood flow  direction is normal.     GALLBLADDER: The gallbladder is not well distended. There is thickening  of the gallbladder wall measuring about 5-6 mm. No gallstones are  appreciated. On some images, there may be a small amount of  pericholecystic fluid.     COMMON BILE DUCT: 4 mm.     RIGHT KIDNEY: The renal size, cortical thickness and echogenicity are  normal. There is no hydronephrosis.     OTHER: The visualized abdominal aorta and inferior vena cava are normal  caliber.          Impression:      1. No gallstones are appreciated. There is gallbladder wall thickening  and a small amount of pericholecystic fluid. Acute cholecystitis is  considered in the differential. There is no ductal dilatation.  2. Mild increased liver echogenicity suggesting fatty infiltration.  3. Echogenic pancreas may be a normal variant. It may also be seen in  patients with diabetes and prediabetes.           The images and report are stored on PAC's per institutional and state  guidelines.     This report was signed and finalized on 9/5/2024 3:22 PM by Dr. Uli Jessica MD.       XR Chest 1 View [[id number]] Collected: 09/05/24 1412     Updated: 09/05/24  1961    Narrative:      EXAMINATION: XR CHEST 1 VW-     9/5/2024 1:04 PM     HISTORY: Altered mental status.     1 view chest x-ray.     COMPARISON:  8/9/2024.     Unchanged cardiomegaly.  CABG changes noted.     Chronic interstitial lung disease.     No focal infiltrate.     No pneumothorax or heart failure.       Impression:      1. Stable cardiomegaly and stable chronic lung changes.           This report was signed and finalized on 9/5/2024 2:13 PM by Dr. Samuel Hickman MD.             LAB RESULTS:      Lab 09/09/24  0724 09/07/24  0433 09/06/24  0443 09/05/24  1516   WBC 8.63 7.67 8.42 7.68   HEMOGLOBIN 9.5* 9.0* 9.2* 9.8*   HEMATOCRIT 29.9* 28.6* 29.3* 30.9*   PLATELETS 229 205 225 220   NEUTROS ABS 6.11 5.30 5.88 5.34   IMMATURE GRANS (ABS) 0.05 0.04 0.03 0.03   LYMPHS ABS 0.98 0.96 0.85 0.68*   MONOS ABS 0.78 0.83 0.96* 0.89   EOS ABS 0.65* 0.49* 0.64* 0.69*   MCV 97.4* 96.9 97.3* 97.5*   PROCALCITONIN  --   --   --  0.17   LACTATE  --   --   --  1.1   PROTIME  --   --   --  16.8*   APTT  --   --   --  59.3*         Lab 09/09/24  0724 09/05/24  1516   SODIUM 134* 135*   POTASSIUM 4.0 4.6   CHLORIDE 107 105   CO2 18.0* 26.0   ANION GAP 9.0 4.0*   BUN 12 14   CREATININE 0.73* 0.82   EGFR 84.3 81.4   GLUCOSE 94 128*   CALCIUM 7.5* 8.1*         Lab 09/09/24  0724 09/07/24  1017 09/05/24  1516   TOTAL PROTEIN 5.8* 6.1 6.4   ALBUMIN 2.6* 2.6* 2.9*   GLOBULIN 3.2  --  3.5   ALT (SGPT) 56* 71* 80*   AST (SGOT) 83* 120* 126*   BILIRUBIN 1.3* 1.8* 1.9*   INDIRECT BILIRUBIN  --  0.5  --    BILIRUBIN DIRECT  --  1.3*  --    ALK PHOS 1,035* 1,194* 1,419*   LIPASE  --   --  161*         Lab 09/05/24  1516   PROTIME 16.8*   INR 1.30*                 Brief Urine Lab Results  (Last result in the past 365 days)        Color   Clarity   Blood   Leuk Est   Nitrite   Protein   CREAT   Urine HCG        09/05/24 1947 Dark Yellow   Clear   Trace   Negative   Negative   100 mg/dL (2+)                 Microbiology Results (last 10  days)       Procedure Component Value - Date/Time    Blood Culture - Blood, Wrist, Right [[id number]]  (Normal) Collected: 09/05/24 1526    Lab Status: Preliminary result Specimen: Blood from Wrist, Right Updated: 09/08/24 1600     Blood Culture No growth at 3 days    Blood Culture - Blood, Arm, Right [[id number]]  (Normal) Collected: 09/05/24 1516    Lab Status: Preliminary result Specimen: Blood from Arm, Right Updated: 09/08/24 1545     Blood Culture No growth at 3 days            HPI .  Patient is a 89-year-old male with a history of hypertension and coronary artery disease s/p CABG and stenting , hyperlipidemia,afib on lipitor eliquis who presents to the ER with abnormal labs. Patient states he has felt very bad over the last week and a half. He complains of lethargy, generalized weakness, decreased appetite and chills. Patient had some labs performed and they showed an elevated ammonia level and LFTs. Patient denies any known liver abnormalities. Patient was called today and told to come to the ER. He has had some intermittent confusion. He denies any fever, chest pain, shortness of air, abdominal pain, nausea vomiting diarrhea, urinary changes, neurologic changes. Patient states his abdomen has been swelling.     Treatment Plan  Elevated liver enzymes.  GI consult.  General surgery consult.  General surgery signed off.  Rocephin and Flagyl.  CT scan abdomen pelvic-No mass or abscess, No bowel obstruction, No focal liver abnormality is seen.  Ultrasound the gallbladder-No gallstones are appreciated-gallbladder wall thickening  and a small amount of pericholecystic fluid- Acute cholecystitis is considered in the differential- no ductal dilatation, Mild increased liver echogenicity suggesting fatty infiltration,  Echogenic pancreas may be a normal variant-seen in patients with diabetes and prediabetes.  HIDA scan-Patent cystic and common bile ducts, Normal ejection fraction.  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     Hypertension/coronary artery disease/status post CABG with stent/hyperlipidemia/atrial fibs.  Lipitor.  Eliquis.  Coreg. Cozaar . Hydralazine as needed..     Anemia.  Hemoglobin stable.  No sign of acute bleed.     Hepatic encephalopathy.     Large hiatal hernia/reflux.  Protonix.  Maalox.  Zofran as needed.     COPD  Chest x-ray-Stable cardiomegaly and stable chronic lung changes.      Neuropathy.  Neurontin.     Insomnia.  Melatonin at night.     Nutrition.  Cardiac diet.     Deconditioning.  PT consult.     Blood culture-no growth for 3 days.     Advanced age . 89 years old .     Blood blood pressures discussed with nursing.  Need to be rechecked.  Patient no acute distress.  Discussed with wife, she is anxious he want to go home.    Physical Exam on Discharge:  BP (!) 188/53 (BP Location: Right arm, Patient Position: Sitting) Comment: primary RN notified  Pulse 54   Temp 97.9 °F (36.6 °C) (Oral)   Resp 16   Ht 170.2 cm (67\")   Wt 74.8 kg (165 lb)   SpO2 93%   BMI 25.84 kg/m² blood pressure is high, discussed with nursing to recheck a . before discharge.  Physical Exam  Vitals and nursing note reviewed.   Constitutional:       Comments: Advanced age.   HENT:      Head: Normocephalic.   Eyes:      Conjunctiva/sclera: Conjunctivae normal.      Pupils: Pupils are equal, round, and reactive to light.   Cardiovascular:      Rate and Rhythm: Regular rhythm. Bradycardia present.      Heart sounds: Normal heart sounds.   Pulmonary:      Effort: Pulmonary effort is normal. No respiratory distress.      Breath sounds: Normal breath sounds.      Comments: On room air  Abdominal:      General: Bowel sounds are normal. There is no distension.      Palpations: Abdomen is soft.      Tenderness: There is no " abdominal tenderness.   Musculoskeletal:         General: No swelling.      Cervical back: Neck supple.   Skin:     General: Skin is warm and dry.      Capillary Refill: Capillary refill takes 2 to 3 seconds.      Findings: No rash.   Neurological:      Mental Status: He is alert.      Motor: Weakness present.      Coordination: Coordination abnormal.      Gait: Gait abnormal.   Psychiatric:         Mood and Affect: Mood normal.         Behavior: Behavior normal.         Condition on Discharge: Stable.    Discharge Disposition:  [place] [facility] [place]    Discharge Medications:     Discharge Medications        New Medications        Instructions Start Date   aluminum-magnesium hydroxide-simethicone 400-400-40 MG/5ML suspension  Commonly known as: MAALOX MAX   30 mL, Oral, 3 times daily      melatonin 5 MG tablet tablet   5 mg, Oral, Nightly      pantoprazole 40 MG EC tablet  Commonly known as: PROTONIX   40 mg, Oral, Daily             Changes to Medications        Instructions Start Date   carvedilol 6.25 MG tablet  Commonly known as: COREG  What changed: how much to take   12.5 mg, Oral, 2 Times Daily With Meals             Continue These Medications        Instructions Start Date   apixaban 5 MG tablet tablet  Commonly known as: ELIQUIS   5 mg, Oral, 2 Times Daily      Clobetasol Propionate E 0.05 % emollient cream  Generic drug: clobetasol prop emollient base   1 Application, Topical, 2 Times Daily      finasteride 5 MG tablet  Commonly known as: PROSCAR   5 mg, Oral, Daily      fluticasone 50 MCG/ACT nasal spray  Commonly known as: FLONASE   2 sprays, Nasal, Daily      folic acid 1 MG tablet  Commonly known as: FOLVITE   1 mg, Oral, Daily      losartan 100 MG tablet  Commonly known as: COZAAR   100 mg, Oral, Daily      senna 8.6 MG tablet  Commonly known as: SENOKOT   1 tablet, Oral, Daily      STOOL SOFTENER PO   100 mg, Oral, Daily             Stop These Medications      alfuzosin 10 MG 24 hr tablet  Commonly  known as: UROXATRAL     atorvastatin 80 MG tablet  Commonly known as: LIPITOR     L-Lysine 500 MG tablet tablet     Metamucil wafer wafer     multivitamin with minerals tablet tablet     NON FORMULARY     traMADol 50 MG tablet  Commonly known as: ULTRAM            ASK your doctor about these medications        Instructions Start Date   gabapentin 100 MG capsule  Commonly known as: NEURONTIN  Ask about: Which instructions should I use?   Take 1-2 at night for leg pain.      gabapentin 600 MG tablet  Commonly known as: Neurontin  Ask about: Which instructions should I use?   300 mg, Oral, 2 Times Daily                   Discharge Diet:   Diet Instructions       Advance Diet As Tolerated -Target Diet: Regular .      Target Diet: Regular .            Activity at Discharge:   Activity Instructions       Activity as Tolerated              Follow-up Appointments:   Future Appointments   Date Time Provider Department Center   12/23/2024  1:00 PM Miguel Bond MD [facility]   1/2/2025  3:00 PM Jaspal Hull MD [facility]     Follow-up with PCP 1 week.    Electronically signed by Tanvir Haro MD, 09/09/24, 12:16 CDT.    Time: Greater than 30 minutes.

## 2024-09-09 NOTE — PLAN OF CARE
Goal Outcome Evaluation:                        Pt. Rested well this shift. VSS. Safety ;maintained. Plan discharge home later today.

## 2024-09-10 ENCOUNTER — DOCUMENTATION (OUTPATIENT)
Dept: HOME HEALTH SERVICES | Facility: HOME HEALTHCARE | Age: 89
End: 2024-09-10
Payer: MEDICARE

## 2024-09-10 ENCOUNTER — TRANSITIONAL CARE MANAGEMENT TELEPHONE ENCOUNTER (OUTPATIENT)
Dept: CALL CENTER | Facility: HOSPITAL | Age: 89
End: 2024-09-10
Payer: MEDICARE

## 2024-09-10 LAB
BACTERIA SPEC AEROBE CULT: NORMAL
BACTERIA SPEC AEROBE CULT: NORMAL

## 2024-09-10 NOTE — OUTREACH NOTE
Call Center TCM Note      Flowsheet Row Responses   [facility] patient discharged from? [place]   Does the patient have one of the following disease processes/diagnoses(primary or secondary)? Other   TCM attempt successful? Yes   Call start time 1551   Call end time 1455   Discharge diagnosis Acute cholecystitis   Person spoke with today (if not patient) and relationship patient   Meds reviewed with patient/caregiver? Yes   Is the patient having any side effects they believe may be caused by any medication additions or changes? No   Does the patient have all medications ordered at discharge? Yes   Is the patient taking all medications as directed (includes completed medication regime)? Yes   Comments Patient has a hospital discharge followup scheduled tomorrow on 9/11 with Dr. Bond.   Does the patient have an appointment with their PCP within 7-14 days of discharge? Yes   Has home health visited the patient within 72 hours of discharge? Yes   Has all DME been delivered? Yes   Psychosocial issues? Yes   Did the patient receive a copy of their discharge instructions? Yes   Nursing interventions Reviewed instructions with patient   What is the patient's perception of their health status since discharge? Improving  [Better today, but last night patient had a rough night. He had chills, vomiting and diarrhea. Today however, he is doing better. Wife thinks it may have been a stomach bug he picked up.]   Is the patient/caregiver able to teach back signs and symptoms related to disease process for when to call PCP? Yes   Is the patient/caregiver able to teach back signs and symptoms related to disease process for when to call 911? Yes   Is the patient/caregiver able to teach back the hierarchy of who to call/visit for symptoms/problems? PCP, Specialist, Home health nurse, Urgent Care, ED, 911 Yes   If the patient is a current smoker, are they able to teach back resources for cessation? Not a smoker   TCM call  completed? Yes   Wrap up additional comments No needs at this time.   Call end time 1409   Would this patient benefit from a Referral to [facility]? No   Is the patient interested in additional calls from an ambulatory ? No            Daniel Bosch RN    9/10/2024, 14:56 CDT

## 2024-09-10 NOTE — PROGRESS NOTES
Received HH referral from [facility] for SN, PT, OT. Dr. Bond will follow. Spoke with pts spouse who states they are agreeable to services. Demographics are correct.

## 2024-09-11 ENCOUNTER — OFFICE VISIT (OUTPATIENT)
Dept: INTERNAL MEDICINE | Facility: CLINIC | Age: 89
End: 2024-09-11
Payer: MEDICARE

## 2024-09-11 VITALS
HEART RATE: 65 BPM | TEMPERATURE: 98.9 F | OXYGEN SATURATION: 98 % | SYSTOLIC BLOOD PRESSURE: 130 MMHG | HEIGHT: 67 IN | BODY MASS INDEX: 25.58 KG/M2 | WEIGHT: 163 LBS | DIASTOLIC BLOOD PRESSURE: 54 MMHG

## 2024-09-11 DIAGNOSIS — Z79.899 ENCOUNTER FOR LONG-TERM CURRENT USE OF MEDICATION: ICD-10-CM

## 2024-09-11 DIAGNOSIS — R74.8 ELEVATED ALKALINE PHOSPHATASE MEASUREMENT: ICD-10-CM

## 2024-09-11 DIAGNOSIS — E78.00 HYPERCHOLESTEREMIA: Primary | ICD-10-CM

## 2024-09-11 DIAGNOSIS — I10 ESSENTIAL HYPERTENSION: ICD-10-CM

## 2024-09-11 DIAGNOSIS — R74.8 ELEVATED LIVER ENZYMES: ICD-10-CM

## 2024-09-11 PROCEDURE — 1111F DSCHRG MED/CURRENT MED MERGE: CPT | Performed by: INTERNAL MEDICINE

## 2024-09-11 PROCEDURE — 1126F AMNT PAIN NOTED NONE PRSNT: CPT | Performed by: INTERNAL MEDICINE

## 2024-09-11 PROCEDURE — 99495 TRANSJ CARE MGMT MOD F2F 14D: CPT | Performed by: INTERNAL MEDICINE

## 2024-09-11 RX ORDER — FUROSEMIDE 20 MG
20 TABLET ORAL DAILY
Qty: 10 TABLET | Refills: 0 | Status: SHIPPED | OUTPATIENT
Start: 2024-09-11

## 2024-09-11 NOTE — PROGRESS NOTES
"    CC: hospital follow-up for abdominal pain, nausea and vomiting    History:  Chad Henriquez is a 89 y.o. male who presents today for transitional care management after hospitalization.    Date of Discharge: 9/9/2024  TCM call successfully made on: 9/10/2024 by Daniel Bosch RN    Discharge Summary:  \"Patient is a 89-year-old male with a history of hypertension and coronary artery disease s/p CABG and stenting , hyperlipidemia,afib on lipitor eliquis who presents to the ER with abnormal labs. Patient states he has felt very bad over the last week and a half. He complains of lethargy, generalized weakness, decreased appetite and chills. Patient had some labs performed and they showed an elevated ammonia level and LFTs. Patient denies any known liver abnormalities. Patient was called today and told to come to the ER. He has had some intermittent confusion. He denies any fever, chest pain, shortness of air, abdominal pain, nausea vomiting diarrhea, urinary changes, neurologic changes. Patient states his abdomen has been swelling.     Treatment Plan  Elevated liver enzymes.  GI consult.  General surgery consult.  General surgery signed off.  Rocephin and Flagyl.  CT scan abdomen pelvic-No mass or abscess, No bowel obstruction, No focal liver abnormality is seen.  Ultrasound the gallbladder-No gallstones are appreciated-gallbladder wall thickening  and a small amount of pericholecystic fluid- Acute cholecystitis is considered in the differential- no ductal dilatation, Mild increased liver echogenicity suggesting fatty infiltration,  Echogenic pancreas may be a normal variant-seen in patients with diabetes and prediabetes.  HIDA scan-Patent cystic and common bile ducts, Normal ejection fraction.  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     Hypertension/coronary artery disease/status post CABG with stent/hyperlipidemia/atrial fibs.  Lipitor.  Eliquis.  Coreg. Cozaar . Hydralazine as needed..     Anemia.  Hemoglobin stable.  No sign of acute bleed.     Hepatic encephalopathy.     Large hiatal hernia/reflux.  Protonix.  Maalox.  Zofran as needed.     COPD  Chest x-ray-Stable cardiomegaly and stable chronic lung changes.      Neuropathy.  Neurontin.     Insomnia.  Melatonin at night.     Nutrition.  Cardiac diet.     Deconditioning.  PT consult.     Blood culture-no growth for 3 days.     Advanced age . 89 years old .     Blood blood pressures discussed with nursing.  Need to be rechecked.  Patient no acute distress.  Discussed with wife, she is anxious he want to go home.    ROS:  Review of Systems    Mr. Henriquez  reports that he quit smoking about 75 years ago. His smoking use included cigarettes. He started smoking about 81 years ago. He has never used smokeless tobacco. He reports that he does not drink alcohol and does not use drugs.      Current Outpatient Medications:     aluminum-magnesium hydroxide-simethicone (MAALOX MAX) 400-400-40 MG/5ML suspension, Take 30 mL by mouth 3 times a day., Disp: 769 mL, Rfl: 0    apixaban (ELIQUIS) 5 MG tablet tablet, Take 1 tablet by mouth 2 (Two) Times a Day., Disp: 60 tablet, Rfl: 11    carvedilol (COREG) 6.25 MG tablet, Take 2 tablets by mouth 2 (Two) Times a Day With Meals., Disp: 180 tablet, Rfl: 1    clobetasol prop emollient base (Clobetasol Propionate E) 0.05 % emollient cream, Apply 1 Application topically to the appropriate area as directed 2 (Two) Times a Day., Disp: 30 g, Rfl: 0    Docusate Calcium (STOOL SOFTENER PO), Take 100 mg by mouth Daily., Disp: , Rfl:     finasteride (PROSCAR) 5 MG tablet, Take 1 tablet by mouth Daily., Disp: , Rfl:     fluticasone (FLONASE) 50 MCG/ACT nasal spray, 2 sprays into the nostril(s) as directed by provider Daily., Disp: 16 g, Rfl: 1    folic  "acid (FOLVITE) 1 MG tablet, Take 1 tablet by mouth Daily., Disp: , Rfl:     gabapentin (Neurontin) 600 MG tablet, Take 0.5 tablets by mouth 2 (Two) Times a Day., Disp: 90 tablet, Rfl: 3    losartan (COZAAR) 100 MG tablet, Take 1 tablet by mouth Daily., Disp: 90 tablet, Rfl: 1    melatonin 5 MG tablet tablet, Take 1 tablet by mouth Every Night., Disp: 30 tablet, Rfl: 0    pantoprazole (PROTONIX) 40 MG EC tablet, Take 1 tablet by mouth Daily., Disp: 90 tablet, Rfl: 0    senna (SENOKOT) 8.6 MG tablet, Take 1 tablet by mouth Daily., Disp: , Rfl:     furosemide (Lasix) 20 MG tablet, Take 1 tablet by mouth Daily., Disp: 10 tablet, Rfl: 0      OBJECTIVE:  /54 (BP Location: Left arm, Patient Position: Sitting, Cuff Size: Adult)   Pulse 65   Temp 98.9 °F (37.2 °C) (Temporal)   Ht 170.2 cm (67\")   Wt 73.9 kg (163 lb)   SpO2 98%   BMI 25.53 kg/m²    Physical Exam  Vitals and nursing note reviewed.   Constitutional:       Appearance: He is not ill-appearing.   Eyes:      General: No scleral icterus.     Conjunctiva/sclera: Conjunctivae normal.   Cardiovascular:      Rate and Rhythm: Normal rate and regular rhythm.      Heart sounds: Murmur heard.   Pulmonary:      Effort: Pulmonary effort is normal. No respiratory distress.   Abdominal:      General: There is distension.      Palpations: There is no mass.      Tenderness: There is abdominal tenderness.   Musculoskeletal:      Right lower leg: Edema present.      Left lower leg: Edema present.   Neurological:      General: No focal deficit present.      Mental Status: He is alert and oriented to person, place, and time.   Psychiatric:         Mood and Affect: Mood normal.         Behavior: Behavior normal.              Assessment/Plan    Diagnoses and all orders for this visit:    1. Hypercholesteremia (Primary)    2. Essential hypertension    3. Encounter for long-term current use of medication    4. Elevated liver enzymes  -     Comprehensive metabolic panel  -     " Alkaline Phosphatase, Isoenzymes  -     Gamma GT  -     5' Nucleotidase  -     CBC w AUTO Differential    5. Elevated alkaline phosphatase measurement  -     Comprehensive metabolic panel  -     Alkaline Phosphatase, Isoenzymes  -     Gamma GT  -     5' Nucleotidase  -     CBC w AUTO Differential    Other orders  -     furosemide (Lasix) 20 MG tablet; Take 1 tablet by mouth Daily.  Dispense: 10 tablet; Refill: 0    Patient was hospitalized from 9/5 to 9/9.  Patient presented with abdominal pain, fever and chills.  Upon presentation, the patient was noted to have elevated alkaline phosphatase, elevated liver enzymes, and elevated bilirubin.  Patient had a ultrasound of the gallbladder that showed no cholelithiasis with a small amount of pericholecystic fluid.  The report indicated that acute cholecystitis could be considered in the differential.  There was echogenic pancreas noted.  Patient underwent a CT scan of his abdomen and pelvis that showed no acute abnormality.  Patient underwent a nuclear medicine HIDA scan that showed normal gallbladder ejection fraction.  Patient subsequently went and had an MRCP.  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  Patient had trace perihepatic fluid and mesenteric's edema, and body wall anasarca.  No liver lesions found.  Biliary tree was unremarkable.  Patient was treated with empiric antibiotics.  After general surgery evaluation and gastroenterology evaluation, there was no further workup deemed necessary and patient was subsequently discharged.  Patient was eager to get home.  Unfortunately the patient continues to feel poorly.    I would back and reviewed the patient's chart as he has been declining over the last several months.  Patient continues to complain of bloating when he eats and feeling full.  I reviewed the patient's chart extensively and tried to look for a reason for worsening over recent months.  The patient previously had normal  liver enzymes, normal alkaline phosphatase, and normal bilirubin.  In August, the patient had visited the ER for a fall.  He was noted to have an alkaline phosphatase of 907, and elevated AST and ALT, with a very mildly elevated bilirubin.  Hepatitis panel was noted to be normal at that time.  Upon presentation to the hospital on 9/5, the patient was noted to have an alkaline phosphatase of 1419, a low calcium, and AST of 126, and ALT of 80.  Patient's alkaline phosphatase and liver enzymes remained elevated.  Discussed with the patient that I am not sure at this point what is causing it, I reviewed all of his scans to look for things such as bone metastasis which could cause an elevation of alkaline phosphatase, but I did not see any lesions on the CT scan of his chest abdomen and pelvis.  One would would think if he had bony metastasis that it would show up on the scans.  I am going to get gamma GT and alkaline phosphatase isoenzymes.  I am also getting a CBC and a CMP.    If this reveals a liver etiology, I am not sure what other workup to pursue.  At his advanced age I do not think worthwhile to pursue something such as a liver biopsy.    1 thing that is noted, is the patient's anasarca and lower extremity edema has worsened.  Patient has significant hypoalbuminemia which may be contributing.  Patient does have proteinuria but it is not nephrotic range proteinuria.  I think the patient probably received quite a bit of fluid resuscitation in the hospital.  I will give 3 days of Lasix, and some extra tablets if needed.  Elevate lower extremities.      Result Review :  The following data was reviewed by: Miguel Bond MD on 09/11/2024:  CMP          9/5/2024    15:16 9/7/2024    10:17 9/9/2024    07:24   CMP   Glucose 128   94    BUN 14   12    Creatinine 0.82   0.73    EGFR 81.4   84.3    Sodium 135   134    Potassium 4.6   4.0    Chloride 105   107    Calcium 8.1   7.5    Total Protein 6.4  6.1  5.8     Albumin 2.9  2.6  2.6    Globulin 3.5   3.2    Total Bilirubin 1.9  1.8  1.3    Alkaline Phosphatase 1,419  1,194  1,035    AST (SGOT) 126  120  83    ALT (SGPT) 80  71  56    Albumin/Globulin Ratio 0.8   0.8    BUN/Creatinine Ratio 17.1   16.4    Anion Gap 4.0   9.0      CBC          9/6/2024    04:43 9/7/2024    04:33 9/9/2024    07:24   CBC   WBC 8.42  7.67  8.63    RBC 3.01  2.95  3.07    Hemoglobin 9.2  9.0  9.5    Hematocrit 29.3  28.6  29.9    MCV 97.3  96.9  97.4    MCH 30.6  30.5  30.9    MCHC 31.4  31.5  31.8    RDW 16.0  16.0  16.2    Platelets 225  205  229      CBC w/diff          9/6/2024    04:43 9/7/2024    04:33 9/9/2024    07:24   CBC w/Diff   WBC 8.42  7.67  8.63    RBC 3.01  2.95  3.07    Hemoglobin 9.2  9.0  9.5    Hematocrit 29.3  28.6  29.9    MCV 97.3  96.9  97.4    MCH 30.6  30.5  30.9    MCHC 31.4  31.5  31.8    RDW 16.0  16.0  16.2    Platelets 225  205  229    Neutrophil Rel % 69.8  69.1  70.8    Immature Granulocyte Rel % 0.4  0.5  0.6    Lymphocyte Rel % 10.1  12.5  11.4    Monocyte Rel % 11.4  10.8  9.0    Eosinophil Rel % 7.6  6.4  7.5    Basophil Rel % 0.7  0.7  0.7      Lipid Panel          12/19/2023    07:43 5/7/2024    04:09   Lipid Panel   Total Cholesterol  144    Total Cholesterol 133     Triglycerides 54  75    HDL Cholesterol 44  41    VLDL Cholesterol 12  15    LDL Cholesterol  77  88    LDL/HDL Ratio  2.15      TSH          12/19/2023    07:43 5/7/2024    04:09   TSH   TSH 2.240  2.330      BMP          8/9/2024    13:50 9/5/2024    15:16 9/9/2024    07:24   BMP   BUN 18  14  12    Creatinine 0.80  0.82  0.73    Sodium 137  135  134    Potassium 4.6  4.6  4.0    Chloride 104  105  107    CO2 24.0  26.0  18.0    Calcium 8.8  8.1  7.5           MRI abdomen w wo contrast mrcp (09/08/2024 11:57)  NM HIDA SCAN WITH PHARMACOLOGICAL INTERVENTION (09/06/2024 11:50)  CT Abdomen Pelvis With Contrast (09/05/2024 16:15)  US Gallbladder (09/05/2024 14:55)  XR Chest 1 View (09/05/2024  14:04)  An After Visit Summary was printed and given to the patient at discharge.  Return in about 2 weeks (around 9/25/2024), or if symptoms worsen or fail to improve, for Med Check, follow up for above problems. Longitudinal care.. Sooner if problems arise.         MIKHAIL Bond MD, [facility], FACP  Electronically signed by Miguel Bond MD, 09/11/24, 6:04 PM CDT.

## 2024-09-12 ENCOUNTER — HOME CARE VISIT (OUTPATIENT)
Dept: HOME HEALTH SERVICES | Facility: CLINIC | Age: 89
End: 2024-09-12
Payer: MEDICARE

## 2024-09-12 VITALS
OXYGEN SATURATION: 98 % | RESPIRATION RATE: 16 BRPM | TEMPERATURE: 98.1 F | DIASTOLIC BLOOD PRESSURE: 72 MMHG | HEART RATE: 81 BPM | SYSTOLIC BLOOD PRESSURE: 180 MMHG

## 2024-09-12 DIAGNOSIS — R74.8 ALKALINE PHOSPHATASE ELEVATION: Primary | ICD-10-CM

## 2024-09-12 DIAGNOSIS — R79.89 ELEVATED LFTS: ICD-10-CM

## 2024-09-12 DIAGNOSIS — D64.9 ANEMIA, UNSPECIFIED TYPE: ICD-10-CM

## 2024-09-12 LAB
5NT SERPL-CCNC: 35 IU/L (ref 0–18)
ALBUMIN SERPL-MCNC: 3 G/DL (ref 3.5–5.2)
ALBUMIN/GLOB SERPL: 1 G/DL
ALP BONE CFR SERPL: 31 % (ref 12–68)
ALP INTEST CFR SERPL: 0 % (ref 0–18)
ALP LIVER CFR SERPL: 69 % (ref 13–88)
ALP SERPL-CCNC: 1039 U/L (ref 39–117)
ALT SERPL-CCNC: 57 U/L (ref 1–41)
AST SERPL-CCNC: 94 U/L (ref 1–40)
BASOPHILS # BLD AUTO: 0.06 10*3/MM3 (ref 0–0.2)
BASOPHILS NFR BLD AUTO: 0.6 % (ref 0–1.5)
BILIRUB SERPL-MCNC: 1.4 MG/DL (ref 0–1.2)
BUN SERPL-MCNC: 15 MG/DL (ref 8–23)
BUN/CREAT SERPL: 14.7 (ref 7–25)
CALCIUM SERPL-MCNC: 8.2 MG/DL (ref 8.2–9.6)
CHLORIDE SERPL-SCNC: 107 MMOL/L (ref 98–107)
CO2 SERPL-SCNC: 20.3 MMOL/L (ref 22–29)
CREAT SERPL-MCNC: 1.02 MG/DL (ref 0.76–1.27)
EGFRCR SERPLBLD CKD-EPI 2021: 68.1 ML/MIN/1.73
EOSINOPHIL # BLD AUTO: 0.67 10*3/MM3 (ref 0–0.4)
EOSINOPHIL NFR BLD AUTO: 6.4 % (ref 0.3–6.2)
ERYTHROCYTE [DISTWIDTH] IN BLOOD BY AUTOMATED COUNT: 13.4 % (ref 12.3–15.4)
GGT SERPL-CCNC: 529 U/L (ref 8–61)
GLOBULIN SER CALC-MCNC: 2.9 GM/DL
GLUCOSE SERPL-MCNC: 125 MG/DL (ref 65–99)
HCT VFR BLD AUTO: 31.1 % (ref 37.5–51)
HGB BLD-MCNC: 9.9 G/DL (ref 13–17.7)
IMM GRANULOCYTES # BLD AUTO: 0.05 10*3/MM3 (ref 0–0.05)
IMM GRANULOCYTES NFR BLD AUTO: 0.5 % (ref 0–0.5)
LYMPHOCYTES # BLD AUTO: 0.95 10*3/MM3 (ref 0.7–3.1)
LYMPHOCYTES NFR BLD AUTO: 9.1 % (ref 19.6–45.3)
MCH RBC QN AUTO: 30.2 PG (ref 26.6–33)
MCHC RBC AUTO-ENTMCNC: 31.8 G/DL (ref 31.5–35.7)
MCV RBC AUTO: 94.8 FL (ref 79–97)
MONOCYTES # BLD AUTO: 1.01 10*3/MM3 (ref 0.1–0.9)
MONOCYTES NFR BLD AUTO: 9.7 % (ref 5–12)
NEUTROPHILS # BLD AUTO: 7.68 10*3/MM3 (ref 1.7–7)
NEUTROPHILS NFR BLD AUTO: 73.7 % (ref 42.7–76)
NRBC BLD AUTO-RTO: 0 /100 WBC (ref 0–0.2)
PLATELET # BLD AUTO: 233 10*3/MM3 (ref 140–450)
POTASSIUM SERPL-SCNC: 4.5 MMOL/L (ref 3.5–5.2)
PROT SERPL-MCNC: 5.9 G/DL (ref 6–8.5)
RBC # BLD AUTO: 3.28 10*6/MM3 (ref 4.14–5.8)
SODIUM SERPL-SCNC: 136 MMOL/L (ref 136–145)
WBC # BLD AUTO: 10.42 10*3/MM3 (ref 3.4–10.8)

## 2024-09-12 PROCEDURE — G0151 HHCP-SERV OF PT,EA 15 MIN: HCPCS

## 2024-09-12 PROCEDURE — G0299 HHS/HOSPICE OF RN EA 15 MIN: HCPCS

## 2024-09-12 NOTE — CASE COMMUNICATION
"pt / family report hospitalized and very weak and tired   pt reports using rw now where I used a SPC prior to hospitalization    pt requires A for bed mobility - transfers and amb for safety    Patient's goal:  \" gain strength and keep up moving \"     Problems identified:    1. LATESHA LE weakness    2. decreased endurance - varied fatigue requiring seated rest with hosuehold amb    3. decreased balance - unable SLS    4. decrease safety wi th AD     Infectious disease screen : Pt denies s/s or a exposure to anyone with  s/s of Covid -19    Pt presents  with altered gait , bed mobility , transfers and balance promoting fall risk and limited mobility per safety . HH PT appropriate to promote safe mobility per dx       Reason for Hosp/Primary Dx : 89 yo male Primary Dx: Acute cholecystitis . Pt reports hospitalized 9-5 thru 9-10 per dx       referral orders : Primary Dx: Acu te cholecystitis      Focus of Care: deconditioning, bed mobility , transfers, gait, safety per Primary Dx: Acute cholecystitis      Current Functional status/mobility/DME : See DME    HB status/Living Arrangements:   pt requires A for mobility and pt safety    pt lives with family  -  4 step entry     Skin Integrity/wound status: na    Code Status: FULL CODE    Fall Risk: high risk    PmHx:  [place] - chronic LBP ( sx x 6 - R foot drop / A FO ) - HTN -     PLOF : household amb - community prn with A"

## 2024-09-12 NOTE — HOME HEALTH
Reason for Hosp/Primary Dx : 89 yo male Primary Dx: Acute cholecystitis . Pt reports hospitalized 9-5 thru 9-10 per dx       referral orders : Primary Dx: Acute cholecystitis      Focus of Care: deconditioning, bed mobility , transfers, gait, safety per Primary Dx: Acute cholecystitis      Current Functional status/mobility/DME : See DME    HB status/Living Arrangements:   pt requires A for mobility and pt safety    pt lives with family  -  4 step entry     Skin Integrity/wound status: na    Code Status: FULL CODE    Fall Risk: high risk    PmHx:  [place] - chronic LBP ( sx x 6 - R foot drop / AFO ) - HTN -     PLOF : household amb - community prn with A

## 2024-09-13 VITALS
BODY MASS INDEX: 25.9 KG/M2 | WEIGHT: 165 LBS | RESPIRATION RATE: 16 BRPM | SYSTOLIC BLOOD PRESSURE: 180 MMHG | HEIGHT: 67 IN | DIASTOLIC BLOOD PRESSURE: 72 MMHG | TEMPERATURE: 97.8 F | OXYGEN SATURATION: 97 % | HEART RATE: 60 BPM

## 2024-09-13 DIAGNOSIS — Z78.9 IMPAIRED MOBILITY AND ADLS: ICD-10-CM

## 2024-09-13 DIAGNOSIS — Z74.09 IMPAIRED MOBILITY AND ADLS: ICD-10-CM

## 2024-09-13 DIAGNOSIS — G62.9 PERIPHERAL POLYNEUROPATHY: ICD-10-CM

## 2024-09-13 DIAGNOSIS — R26.9 GAIT ABNORMALITY: Primary | ICD-10-CM

## 2024-09-13 DIAGNOSIS — D64.9 ANEMIA, UNSPECIFIED TYPE: ICD-10-CM

## 2024-09-13 DIAGNOSIS — R79.89 ELEVATED LFTS: ICD-10-CM

## 2024-09-13 DIAGNOSIS — R53.1 GENERALIZED WEAKNESS: ICD-10-CM

## 2024-09-13 DIAGNOSIS — R74.8 ALKALINE PHOSPHATASE ELEVATION: ICD-10-CM

## 2024-09-13 NOTE — TELEPHONE ENCOUNTER
Caller: DEMETRIO THOMPSON    Relationship: Other    Best call back number:  [phone]     What orders are you requesting (i.e. lab or imaging):  PATIENT NEEDS AN ORDER FOR BEDSIDE COMMODE SENT TO [facility].  NOTE IS IN THE CHART AS TO SPECIFICS.     In what timeframe would the patient need to come in:SEE CHART    Where will you receive your lab/imaging services:

## 2024-09-13 NOTE — CASE COMMUNICATION
"SOC Note: 89 year old gentleman admitted to [facility] on 9/5/24 due abnormal labs drawn earlier at MD office and showed elevated liver enzymes. Patient has hx of HTN, and afib.  Patient has a large hiatal hernia. Patient was treated for eric Patient complaints of overall fatigue, weakness and no energy. Wife at visit and reports over past month patient has been declining and have a few falls and increased fatigue. Patient has had  multiple back surgeries in the past and does have generalized pain.  Patient ambulates with rolling walker.  Patient reports soa with minimal exertion due to over all fatigue and weakness. Patient requesting a commode over toilet due to difficulty getting up from toilet.  Patient seen Dr. Bond yesterday in office and they started him on lasix 20 mg to take due to his swelling.  Patient weight today 165, noted 1+ pitting edema to demond ateral extremities.  Patient [place] and only has hearing aid in left ear d/t sore in right ear from a scratch.  Patient is also blind in his left eye.  Patient was handling all his medication but wife is assisting him now due to his decline.  Patient blood pressure elevated at visit.  Blood pressue medication was increased after hospital stay.  Patient complaining of a dull headache at visit, denies chest pain.  Patient has BP machine at h ome and will be checking later.        Home Health ordered for: disciplines SN, PT, OT    Reason for Hosp/Primary Dx/Co-morbidities: cholecystitis, elevated liver enzymes, HTN    Focus of Care: cholecystitis    Patient's goal(s): \"feel better\"    Current Functional status/mobility/DME: walker, cane, rollator    HB status/Living Arrangements: lives with spouse    Skin Integrity/wound status: multiple bruising, thin skin    Code Status: F ull code    Fall Risk/Safety concerns: weakness, balance, fatigue    Educated on Emergency Plan, steps to take prior to going to the ER and when to Call [facility]:  educated at " in home visit     Medication issues/Concerns: none    Additional Problems/Concerns: none    SDOH Barriers (i.e. caregiver concerns, social isolation, transportation, food insecurity, environment, income etc.)/Need for MSW: none

## 2024-09-13 NOTE — HOME HEALTH
"SOC Note: 89 year old gentleman admitted to [facility] on 9/5/24 due abnormal labs drawn earlier at MD office and showed elevated liver enzymes. Patient has hx of HTN, and afib.  Patient has a large hiatal hernia. Patient was treated for eric Patient complaints of overall fatigue, weakness and no energy. Wife at visit and reports over past month patient has been declining and have a few falls and increased fatigue. Patient has had multiple back surgeries in the past and does have generalized pain.  Patient ambulates with rolling walker.  Patient reports soa with minimal exertion due to over all fatigue and weakness. Patient requesting a commode over toilet due to difficulty getting up from toilet.  Patient seen Dr. Bond yesterday in office and they started him on lasix 20 mg to take due to his swelling.  Patient weight today 165, noted 1+ pitting edema to bilateral extremities.  Patient [place] and only has hearing aid in left ear d/t sore in right ear from a scratch.  Patient is also blind in his left eye.  Patient was handling all his medication but wife is assisting him now due to his decline.  Patient blood pressure elevated at visit.  Blood pressue medication was increased after hospital stay.  Patient complaining of a dull headache at visit, denies chest pain.  Patient has BP machine at home and will be checking later.        Home Health ordered for: disciplines SN, PT, OT    Reason for Hosp/Primary Dx/Co-morbidities: cholecystitis, elevated liver enzymes, HTN    Focus of Care: cholecystitis    Patient's goal(s): \"feel better\"    Current Functional status/mobility/DME: walker, cane, rollator    HB status/Living Arrangements: lives with spouse    Skin Integrity/wound status: multiple bruising, thin skin    Code Status: Full code    Fall Risk/Safety concerns: weakness, balance, fatigue    Educated on Emergency Plan, steps to take prior to going to the ER and when to Call [facility]:  educated at in " home visit     Medication issues/Concerns: none    Additional Problems/Concerns: none    SDOH Barriers (i.e. caregiver concerns, social isolation, transportation, food insecurity, environment, income etc.)/Need for MSW: none

## 2024-09-16 ENCOUNTER — TELEPHONE (OUTPATIENT)
Dept: INTERNAL MEDICINE | Facility: CLINIC | Age: 89
End: 2024-09-16
Payer: MEDICARE

## 2024-09-16 ENCOUNTER — HOME CARE VISIT (OUTPATIENT)
Dept: HOME HEALTH SERVICES | Facility: CLINIC | Age: 89
End: 2024-09-16
Payer: MEDICARE

## 2024-09-16 VITALS
DIASTOLIC BLOOD PRESSURE: 52 MMHG | RESPIRATION RATE: 20 BRPM | TEMPERATURE: 98.3 F | HEART RATE: 78 BPM | SYSTOLIC BLOOD PRESSURE: 102 MMHG | OXYGEN SATURATION: 95 %

## 2024-09-16 LAB
ACE SERPL-CCNC: 95 U/L (ref 14–82)
ANA SER QL: NEGATIVE
FERRITIN SERPL-MCNC: 310 NG/ML (ref 30–400)
IGG4 SER-MCNC: 21 MG/DL (ref 2–96)
IRON SATN MFR SERPL: 21 % (ref 15–55)
IRON SERPL-MCNC: 45 UG/DL (ref 38–169)
LDH SERPL L TO P-CCNC: 301 IU/L (ref 121–224)
MITOCHONDRIA M2 IGG SER-ACNC: <20 UNITS (ref 0–20)
TIBC SERPL-MCNC: 219 UG/DL (ref 250–450)
UIBC SERPL-MCNC: 174 UG/DL (ref 111–343)

## 2024-09-16 PROCEDURE — G0299 HHS/HOSPICE OF RN EA 15 MIN: HCPCS

## 2024-09-17 ENCOUNTER — HOME CARE VISIT (OUTPATIENT)
Dept: HOME HEALTH SERVICES | Facility: CLINIC | Age: 89
End: 2024-09-17
Payer: MEDICARE

## 2024-09-17 VITALS
TEMPERATURE: 97.5 F | DIASTOLIC BLOOD PRESSURE: 60 MMHG | OXYGEN SATURATION: 96 % | RESPIRATION RATE: 18 BRPM | HEART RATE: 59 BPM | SYSTOLIC BLOOD PRESSURE: 110 MMHG

## 2024-09-17 DIAGNOSIS — I10 ESSENTIAL HYPERTENSION: ICD-10-CM

## 2024-09-17 DIAGNOSIS — R74.8 ELEVATED LIVER ENZYMES: Primary | ICD-10-CM

## 2024-09-17 PROCEDURE — G0151 HHCP-SERV OF PT,EA 15 MIN: HCPCS

## 2024-09-19 ENCOUNTER — HOME CARE VISIT (OUTPATIENT)
Dept: HOME HEALTH SERVICES | Facility: CLINIC | Age: 89
End: 2024-09-19
Payer: MEDICARE

## 2024-09-19 VITALS
SYSTOLIC BLOOD PRESSURE: 120 MMHG | OXYGEN SATURATION: 97 % | DIASTOLIC BLOOD PRESSURE: 60 MMHG | TEMPERATURE: 96.8 F | HEART RATE: 52 BPM | RESPIRATION RATE: 16 BRPM

## 2024-09-19 PROCEDURE — G0151 HHCP-SERV OF PT,EA 15 MIN: HCPCS

## 2024-09-20 ENCOUNTER — LAB REQUISITION (OUTPATIENT)
Dept: LAB | Facility: HOSPITAL | Age: 89
End: 2024-09-20
Payer: MEDICARE

## 2024-09-20 ENCOUNTER — HOME CARE VISIT (OUTPATIENT)
Dept: HOME HEALTH SERVICES | Facility: CLINIC | Age: 89
End: 2024-09-20
Payer: MEDICARE

## 2024-09-20 DIAGNOSIS — I25.10 ATHEROSCLEROTIC HEART DISEASE OF NATIVE CORONARY ARTERY WITHOUT ANGINA PECTORIS: ICD-10-CM

## 2024-09-20 DIAGNOSIS — K81.0 ACUTE CHOLECYSTITIS: ICD-10-CM

## 2024-09-20 LAB
ANION GAP SERPL CALCULATED.3IONS-SCNC: 7 MMOL/L (ref 5–15)
BUN SERPL-MCNC: 14 MG/DL (ref 8–23)
BUN/CREAT SERPL: 17.7 (ref 7–25)
CALCIUM SPEC-SCNC: 8.1 MG/DL (ref 8.2–9.6)
CHLORIDE SERPL-SCNC: 105 MMOL/L (ref 98–107)
CO2 SERPL-SCNC: 25 MMOL/L (ref 22–29)
CREAT SERPL-MCNC: 0.79 MG/DL (ref 0.76–1.27)
EGFRCR SERPLBLD CKD-EPI 2021: 82.3 ML/MIN/1.73
GLUCOSE SERPL-MCNC: 127 MG/DL (ref 65–99)
MAGNESIUM SERPL-MCNC: 1.9 MG/DL (ref 1.7–2.3)
POTASSIUM SERPL-SCNC: 4.3 MMOL/L (ref 3.5–5.2)
SODIUM SERPL-SCNC: 137 MMOL/L (ref 136–145)

## 2024-09-20 PROCEDURE — 83735 ASSAY OF MAGNESIUM: CPT | Performed by: INTERNAL MEDICINE

## 2024-09-20 PROCEDURE — G0493 RN CARE EA 15 MIN HH/HOSPICE: HCPCS

## 2024-09-20 PROCEDURE — 80048 BASIC METABOLIC PNL TOTAL CA: CPT | Performed by: INTERNAL MEDICINE

## 2024-09-22 VITALS
HEART RATE: 77 BPM | SYSTOLIC BLOOD PRESSURE: 132 MMHG | RESPIRATION RATE: 18 BRPM | TEMPERATURE: 97.9 F | DIASTOLIC BLOOD PRESSURE: 90 MMHG | OXYGEN SATURATION: 97 %

## 2024-09-25 PROBLEM — I87.2 VENOUS INSUFFICIENCY: Status: ACTIVE | Noted: 2024-01-01

## (undated) DEVICE — PROBE 8225101 5PK STD PRASS FL TIP ROHS

## (undated) DEVICE — 3M™ STERI-STRIP™ REINFORCED ADHESIVE SKIN CLOSURES, R1547, 1/2 IN X 4 IN (12 MM X 100 MM), 6 STRIPS/ENVELOPE: Brand: 3M™ STERI-STRIP™

## (undated) DEVICE — PK TURNOVER RM ADV

## (undated) DEVICE — 4-PORT MANIFOLD: Brand: NEPTUNE 2

## (undated) DEVICE — ADHS LIQ MASTISOL 2/3ML

## (undated) DEVICE — ELECTRD BLD EZ CLN MOD XLNG 2.75IN

## (undated) DEVICE — DRSNG SURESITE WNDW 4X4.5

## (undated) DEVICE — SPNG GZ 2S 2X2 8PLY STRL PK/2

## (undated) DEVICE — SPNG DISSCT SECTO KTTNER PK/5

## (undated) DEVICE — CONN FLX BREATHE CIRCT

## (undated) DEVICE — GLV SURG TRIUMPH GREEN W/ALOE PF LTX 7 STRL

## (undated) DEVICE — CVR UNIV C/ARM

## (undated) DEVICE — PAD MINOR UNIVERSAL: Brand: MEDLINE INDUSTRIES, INC.

## (undated) DEVICE — ANTIBACTERIAL UNDYED BRAIDED (POLYGLACTIN 910), SYNTHETIC ABSORBABLE SUTURE: Brand: COATED VICRYL

## (undated) DEVICE — ELECTRD BLD EXT EDGE/INSUL 1P 4IN

## (undated) DEVICE — ELECTRD BLD EZ CLN MOD 4IN

## (undated) DEVICE — TRY PREP SCRB VAG PVP

## (undated) DEVICE — INSTRUMENT 9560658 QUADRANT ILLUMIN SYS: Brand: MAST QUADRANT™ RETRACTOR SYSTEM

## (undated) DEVICE — DISPOSABLE IRRIGATION CASSETTE: Brand: CORE

## (undated) DEVICE — SUT SILK 3/0 SUTUPAK TIES 24IN SA74H

## (undated) DEVICE — CATH IV ANGIO FEP 12G 3IN LTBLU 10PK

## (undated) DEVICE — GLV SURG BIOGEL LTX PF 8

## (undated) DEVICE — SCANLAN® SURG-I-PAW® INSTRUMENT COVERS - RED, 1/10" X 5"/ 3 MMX13 CM (2 - 5" PCS /PKG): Brand: SCANLAN® SURG-I-PAW® INSTRUMENT COVERS

## (undated) DEVICE — SUT VIC 0 MO4 CR8 18IN VCP701D

## (undated) DEVICE — PK SPINE POST 30

## (undated) DEVICE — VAGINAL PREP TRAY: Brand: MEDLINE INDUSTRIES, INC.

## (undated) DEVICE — SPHR MARKR STEALTH STATION

## (undated) DEVICE — LP VESL MAXI RED 2PK

## (undated) DEVICE — SHEET, T, LAPAROTOMY, STERILE: Brand: MEDLINE

## (undated) DEVICE — CVR HNDL LIGHT RIGID

## (undated) DEVICE — ANTIBACTERIAL VIOLET BRAIDED (POLYGLACTIN 910), SYNTHETIC ABSORBABLE SUTURE: Brand: COATED VICRYL

## (undated) DEVICE — KNIF BAYO METRX DISECT

## (undated) DEVICE — GLV SURG BIOGEL LTX PF 6 1/2

## (undated) DEVICE — 3M™ IOBAN™ 2 ANTIMICROBIAL INCISE DRAPE 6650EZ: Brand: IOBAN™ 2

## (undated) DEVICE — GLV SURG TRIUMPH GREEN W/ALOE PF LTX 8 STRL

## (undated) DEVICE — SPNG GZ STRL 2S 4X4 12PLY

## (undated) DEVICE — TUBING, SUCTION, 1/4" X 12', STRAIGHT: Brand: MEDLINE

## (undated) DEVICE — DRSNG TELFA PAD NONADH STR 1S 3X8IN

## (undated) DEVICE — TOOL 14MH30 LEGEND 14CM 3MM: Brand: MIDAS REX ™

## (undated) DEVICE — WIPE THERAWASH SLV SPEC CARE 2PK

## (undated) DEVICE — SURGICAL SUCTION CONNECTING TUBE WITH MALE CONNECTOR AND SUCTION CLAMP, 2 FT. LONG (.6 M), 5 MM I.D.: Brand: CONMED

## (undated) DEVICE — SUT SILK 0 SUTUPAK TIES 24IN SA76G

## (undated) DEVICE — HEMOST ABS GELFOAM GELATIN SPNG SZ100

## (undated) DEVICE — ELECTRD BLD EXT EDGE 1P COAT 6.5IN STRL

## (undated) DEVICE — CLTH CLENS READYCLEANSE PERI CARE PK/5

## (undated) DEVICE — SUT SILK 4/0 SUTUPAK TIES 24IN SA73H

## (undated) DEVICE — ELECTRD BLD EDGE/INSUL1P 2.4X5.1MM STRL

## (undated) DEVICE — SUT PROLN 5/0 C1 DA 24IN 8725H

## (undated) DEVICE — SPK10277 JACKSON/PRO-AXIS KIT: Brand: SPK10277 JACKSON/PRO-AXIS KIT

## (undated) DEVICE — DRP C/ARMOR

## (undated) DEVICE — DECANTER: Brand: UNBRANDED

## (undated) DEVICE — SUT MNCRYL 4/0 PS2 27IN UD MCP426H

## (undated) DEVICE — SUT SILK 2/0 SUTUPAK TIES 24IN SA75H

## (undated) DEVICE — TOTAL TRAY, 16FR 10ML SIL FOLEY, URN: Brand: MEDLINE

## (undated) DEVICE — 3M™ STERI-DRAPE™ INSTRUMENT POUCH 1018: Brand: STERI-DRAPE™

## (undated) DEVICE — SUT PROLN 0 MO7 CR8 18IN C841G

## (undated) DEVICE — GOWN,NON-REINFORCED,SIRUS,SET IN SLV,XL: Brand: MEDLINE

## (undated) DEVICE — GLV SURG SENSICARE W/ALOE PF LF 7.5 STRL

## (undated) DEVICE — PENCL ES MEGADINE EZ/CLEAN BUTN W/HOLSTR 10FT

## (undated) DEVICE — DRP SURG UNIV BASIC BILAMINATE 53X77IN DISP

## (undated) DEVICE — KT PKIT PROAXIS W/PRONEVIEW ACP TBG

## (undated) DEVICE — DRP TABL BK STERILEZ ZFLD

## (undated) DEVICE — DISPOSABLE BIPOLAR CABLE 12FT. (3.6M): Brand: KIRWAN

## (undated) DEVICE — SHEET,DRAPE,53X77,STERILE: Brand: MEDLINE

## (undated) DEVICE — DISPOSABLE DRAPE, STERILE, FOR A CDS-3072 6 FOOT TABLE: Brand: PEDIGO PRODUCTS, INC.

## (undated) DEVICE — PROXIMATE RH ROTATING HEAD SKIN STAPLERS (35 WIDE) CONTAINS 35 STAINLESS STEEL STAPLES: Brand: PROXIMATE

## (undated) DEVICE — 4.0MM PRECISION ROUND

## (undated) DEVICE — DRN JP FLT NO TROC SIL FUL/PERF 7MM

## (undated) DEVICE — PROXIMATE RH ROTATING HEAD SKIN STAPLERS (35 REGULAR) CONTAINS 35 STAINLESS STEEL STAPLES: Brand: PROXIMATE

## (undated) DEVICE — RESERVOIR,SUCTION,100CC,SILICONE: Brand: MEDLINE

## (undated) DEVICE — SUT SILK 3/0 SH CR8 30IN C017D

## (undated) DEVICE — SUT SILK 2/0 SH 75CM 30IN BLK C016D

## (undated) DEVICE — 3M™ STERI-DRAPE™ U-DRAPE 1015: Brand: STERI-DRAPE™

## (undated) DEVICE — GLV SURG DERMASSURE GRN LF PF 8.0

## (undated) DEVICE — DRP STRL STERILEZ ZFLD